# Patient Record
Sex: MALE | Race: WHITE | NOT HISPANIC OR LATINO | Employment: OTHER | URBAN - METROPOLITAN AREA
[De-identification: names, ages, dates, MRNs, and addresses within clinical notes are randomized per-mention and may not be internally consistent; named-entity substitution may affect disease eponyms.]

---

## 2017-01-09 RX ORDER — OMEPRAZOLE 20 MG/1
CAPSULE, DELAYED RELEASE ORAL
Qty: 60 CAPSULE | Refills: 0 | Status: SHIPPED | OUTPATIENT
Start: 2017-01-09 | End: 2017-04-01 | Stop reason: SDUPTHER

## 2017-01-13 DIAGNOSIS — N40.0 BENIGN NON-NODULAR PROSTATIC HYPERPLASIA WITHOUT LOWER URINARY TRACT SYMPTOMS: ICD-10-CM

## 2017-01-13 RX ORDER — TAMSULOSIN HYDROCHLORIDE 0.4 MG/1
CAPSULE ORAL
Qty: 180 CAPSULE | Refills: 0 | Status: SHIPPED | OUTPATIENT
Start: 2017-01-13 | End: 2017-03-06 | Stop reason: SDUPTHER

## 2017-02-22 RX ORDER — METOPROLOL TARTRATE 25 MG/1
TABLET, FILM COATED ORAL
Qty: 180 TABLET | Refills: 3 | Status: SHIPPED | OUTPATIENT
Start: 2017-02-22 | End: 2018-03-01 | Stop reason: SDUPTHER

## 2017-02-22 RX ORDER — SOTALOL HYDROCHLORIDE 80 MG/1
TABLET ORAL
Qty: 180 TABLET | Refills: 3 | Status: SHIPPED | OUTPATIENT
Start: 2017-02-22 | End: 2018-03-01 | Stop reason: SDUPTHER

## 2017-03-06 DIAGNOSIS — N40.0 BENIGN NON-NODULAR PROSTATIC HYPERPLASIA WITHOUT LOWER URINARY TRACT SYMPTOMS: ICD-10-CM

## 2017-03-06 RX ORDER — TAMSULOSIN HYDROCHLORIDE 0.4 MG/1
CAPSULE ORAL
Qty: 180 CAPSULE | Refills: 0 | Status: SHIPPED | OUTPATIENT
Start: 2017-03-06 | End: 2017-07-07 | Stop reason: SDUPTHER

## 2017-04-03 RX ORDER — OMEPRAZOLE 20 MG/1
CAPSULE, DELAYED RELEASE ORAL
Qty: 60 CAPSULE | Refills: 6 | Status: ON HOLD | OUTPATIENT
Start: 2017-04-03 | End: 2017-07-16

## 2017-04-03 RX ORDER — OMEPRAZOLE 20 MG/1
CAPSULE, DELAYED RELEASE ORAL
Qty: 60 CAPSULE | Refills: 0 | Status: SHIPPED | OUTPATIENT
Start: 2017-04-03 | End: 2017-07-14

## 2017-06-13 DIAGNOSIS — R60.0 EDEMA EXTREMITIES: ICD-10-CM

## 2017-06-13 RX ORDER — HYDROCHLOROTHIAZIDE 25 MG/1
TABLET ORAL
Qty: 30 TABLET | Refills: 0 | OUTPATIENT
Start: 2017-06-13

## 2017-06-17 DIAGNOSIS — R60.0 EDEMA EXTREMITIES: ICD-10-CM

## 2017-06-20 RX ORDER — HYDROCHLOROTHIAZIDE 25 MG/1
TABLET ORAL
Qty: 30 TABLET | Refills: 0 | OUTPATIENT
Start: 2017-06-20

## 2017-06-21 DIAGNOSIS — R60.0 EDEMA EXTREMITIES: ICD-10-CM

## 2017-06-22 DIAGNOSIS — R60.0 EDEMA EXTREMITIES: ICD-10-CM

## 2017-06-22 RX ORDER — HYDROCHLOROTHIAZIDE 25 MG/1
TABLET ORAL
Qty: 30 TABLET | Refills: 0 | OUTPATIENT
Start: 2017-06-22

## 2017-06-23 DIAGNOSIS — R60.0 EDEMA EXTREMITIES: ICD-10-CM

## 2017-06-23 RX ORDER — HYDROCHLOROTHIAZIDE 25 MG/1
TABLET ORAL
Qty: 30 TABLET | Refills: 0 | OUTPATIENT
Start: 2017-06-23

## 2017-06-26 RX ORDER — HYDROCHLOROTHIAZIDE 25 MG/1
TABLET ORAL
Qty: 30 TABLET | Refills: 0 | OUTPATIENT
Start: 2017-06-26

## 2017-06-27 NOTE — TELEPHONE ENCOUNTER
Faxed request from [facility] from [facility].  Past due in seeing pcp dr bowman.  Hasn't seen dr patino since December.    Left msg at pharmacy rx denied.

## 2017-06-30 DIAGNOSIS — R60.0 EDEMA EXTREMITIES: ICD-10-CM

## 2017-07-01 RX ORDER — HYDROCHLOROTHIAZIDE 25 MG/1
TABLET ORAL
Qty: 30 TABLET | Refills: 0 | OUTPATIENT
Start: 2017-07-01

## 2017-07-07 DIAGNOSIS — N40.0 BENIGN NON-NODULAR PROSTATIC HYPERPLASIA WITHOUT LOWER URINARY TRACT SYMPTOMS: ICD-10-CM

## 2017-07-07 RX ORDER — TAMSULOSIN HYDROCHLORIDE 0.4 MG/1
CAPSULE ORAL
Qty: 180 CAPSULE | Refills: 0 | OUTPATIENT
Start: 2017-07-07

## 2017-07-07 RX ORDER — TAMSULOSIN HYDROCHLORIDE 0.4 MG/1
2 CAPSULE ORAL DAILY
Qty: 180 CAPSULE | Refills: 0 | Status: SHIPPED | OUTPATIENT
Start: 2017-07-07 | End: 2017-09-29 | Stop reason: SDUPTHER

## 2017-07-14 ENCOUNTER — HOSPITAL ENCOUNTER (OUTPATIENT)
Facility: HOSPITAL | Age: 57
Discharge: HOME | End: 2017-07-16
Attending: EMERGENCY MEDICINE | Admitting: EMERGENCY MEDICINE
Payer: COMMERCIAL

## 2017-07-14 DIAGNOSIS — R06.02 SOB (SHORTNESS OF BREATH): ICD-10-CM

## 2017-07-14 DIAGNOSIS — I10 HTN (HYPERTENSION): Primary | ICD-10-CM

## 2017-07-14 DIAGNOSIS — R07.9 CHEST PAIN: ICD-10-CM

## 2017-07-14 DIAGNOSIS — I20.89 STABLE ANGINA PECTORIS: ICD-10-CM

## 2017-07-14 PROBLEM — N40.1 BENIGN PROSTATIC HYPERPLASIA WITH LOWER URINARY TRACT SYMPTOMS: Status: ACTIVE | Noted: 2017-07-14

## 2017-07-14 PROBLEM — K21.9 GASTROESOPHAGEAL REFLUX DISEASE WITHOUT ESOPHAGITIS: Status: ACTIVE | Noted: 2017-07-14

## 2017-07-14 PROBLEM — I48.0 PAROXYSMAL A-FIB: Status: ACTIVE | Noted: 2017-07-14

## 2017-07-14 PROBLEM — R60.0 BILATERAL LEG EDEMA: Status: ACTIVE | Noted: 2017-07-14

## 2017-07-14 LAB
ALBUMIN SERPL BCP-MCNC: 4 G/DL
ALP SERPL-CCNC: 77 U/L
ALT SERPL W/O P-5'-P-CCNC: 13 U/L
ANION GAP SERPL CALC-SCNC: 8 MMOL/L
AST SERPL-CCNC: 15 U/L
BASOPHILS # BLD AUTO: 0.01 K/UL
BASOPHILS NFR BLD: 0.1 %
BILIRUB SERPL-MCNC: 0.9 MG/DL
BNP SERPL-MCNC: 11 PG/ML
BUN SERPL-MCNC: 15 MG/DL
CALCIUM SERPL-MCNC: 9.3 MG/DL
CHLORIDE SERPL-SCNC: 98 MMOL/L
CO2 SERPL-SCNC: 27 MMOL/L
CREAT SERPL-MCNC: 1.1 MG/DL
DIFFERENTIAL METHOD: ABNORMAL
EOSINOPHIL # BLD AUTO: 0 K/UL
EOSINOPHIL NFR BLD: 0.2 %
ERYTHROCYTE [DISTWIDTH] IN BLOOD BY AUTOMATED COUNT: 13.5 %
EST. GFR  (AFRICAN AMERICAN): >60 ML/MIN/1.73 M^2
EST. GFR  (NON AFRICAN AMERICAN): >60 ML/MIN/1.73 M^2
GLUCOSE SERPL-MCNC: 114 MG/DL
HCT VFR BLD AUTO: 41.7 %
HGB BLD-MCNC: 14 G/DL
INR PPP: 1
LYMPHOCYTES # BLD AUTO: 0.9 K/UL
LYMPHOCYTES NFR BLD: 8.9 %
MCH RBC QN AUTO: 29.9 PG
MCHC RBC AUTO-ENTMCNC: 33.6 %
MCV RBC AUTO: 89 FL
MONOCYTES # BLD AUTO: 0.5 K/UL
MONOCYTES NFR BLD: 5.6 %
NEUTROPHILS # BLD AUTO: 8.1 K/UL
NEUTROPHILS NFR BLD: 84.9 %
PLATELET # BLD AUTO: 201 K/UL
PMV BLD AUTO: 10 FL
POTASSIUM SERPL-SCNC: 3.9 MMOL/L
PROT SERPL-MCNC: 7 G/DL
PROTHROMBIN TIME: 10.9 SEC
RBC # BLD AUTO: 4.68 M/UL
SODIUM SERPL-SCNC: 133 MMOL/L
TROPONIN I SERPL DL<=0.01 NG/ML-MCNC: 0.01 NG/ML
WBC # BLD AUTO: 9.53 K/UL

## 2017-07-14 PROCEDURE — 83880 ASSAY OF NATRIURETIC PEPTIDE: CPT

## 2017-07-14 PROCEDURE — G0378 HOSPITAL OBSERVATION PER HR: HCPCS

## 2017-07-14 PROCEDURE — 93005 ELECTROCARDIOGRAM TRACING: CPT

## 2017-07-14 PROCEDURE — 84484 ASSAY OF TROPONIN QUANT: CPT | Mod: 91

## 2017-07-14 PROCEDURE — 36415 COLL VENOUS BLD VENIPUNCTURE: CPT

## 2017-07-14 PROCEDURE — 84484 ASSAY OF TROPONIN QUANT: CPT

## 2017-07-14 PROCEDURE — 85610 PROTHROMBIN TIME: CPT

## 2017-07-14 PROCEDURE — 63600175 PHARM REV CODE 636 W HCPCS: Performed by: HOSPITALIST

## 2017-07-14 PROCEDURE — 93010 ELECTROCARDIOGRAM REPORT: CPT | Mod: ,,, | Performed by: INTERNAL MEDICINE

## 2017-07-14 PROCEDURE — 85025 COMPLETE CBC W/AUTO DIFF WBC: CPT

## 2017-07-14 PROCEDURE — 80053 COMPREHEN METABOLIC PANEL: CPT

## 2017-07-14 PROCEDURE — 99220 PR INITIAL OBSERVATION CARE,LEVL III: CPT | Mod: ,,, | Performed by: HOSPITALIST

## 2017-07-14 PROCEDURE — 99284 EMERGENCY DEPT VISIT MOD MDM: CPT | Mod: 25

## 2017-07-14 PROCEDURE — 25000003 PHARM REV CODE 250: Performed by: HOSPITALIST

## 2017-07-14 PROCEDURE — 94761 N-INVAS EAR/PLS OXIMETRY MLT: CPT

## 2017-07-14 PROCEDURE — 99283 EMERGENCY DEPT VISIT LOW MDM: CPT | Mod: ,,, | Performed by: EMERGENCY MEDICINE

## 2017-07-14 RX ORDER — HYDROCODONE BITARTRATE AND ACETAMINOPHEN 5; 325 MG/1; MG/1
1 TABLET ORAL EVERY 4 HOURS PRN
Status: DISCONTINUED | OUTPATIENT
Start: 2017-07-14 | End: 2017-07-16 | Stop reason: HOSPADM

## 2017-07-14 RX ORDER — GLUCAGON 1 MG
1 KIT INJECTION
Status: DISCONTINUED | OUTPATIENT
Start: 2017-07-14 | End: 2017-07-16 | Stop reason: HOSPADM

## 2017-07-14 RX ORDER — SILODOSIN 8 MG/1
8 CAPSULE ORAL DAILY
Status: DISCONTINUED | OUTPATIENT
Start: 2017-07-15 | End: 2017-07-16 | Stop reason: HOSPADM

## 2017-07-14 RX ORDER — RAMELTEON 8 MG/1
8 TABLET ORAL NIGHTLY PRN
Status: DISCONTINUED | OUTPATIENT
Start: 2017-07-14 | End: 2017-07-16 | Stop reason: HOSPADM

## 2017-07-14 RX ORDER — PANTOPRAZOLE SODIUM 40 MG/1
40 TABLET, DELAYED RELEASE ORAL DAILY
Status: DISCONTINUED | OUTPATIENT
Start: 2017-07-15 | End: 2017-07-16 | Stop reason: HOSPADM

## 2017-07-14 RX ORDER — CETIRIZINE HYDROCHLORIDE 5 MG/1
10 TABLET ORAL DAILY
Status: DISCONTINUED | OUTPATIENT
Start: 2017-07-15 | End: 2017-07-16 | Stop reason: HOSPADM

## 2017-07-14 RX ORDER — SOTALOL HYDROCHLORIDE 80 MG/1
80 TABLET ORAL 2 TIMES DAILY
Status: DISCONTINUED | OUTPATIENT
Start: 2017-07-14 | End: 2017-07-16

## 2017-07-14 RX ORDER — POLYETHYLENE GLYCOL 3350, SODIUM SULFATE ANHYDROUS, SODIUM BICARBONATE, SODIUM CHLORIDE, POTASSIUM CHLORIDE 236; 22.74; 6.74; 5.86; 2.97 G/4L; G/4L; G/4L; G/4L; G/4L
4 POWDER, FOR SOLUTION ORAL ONCE
Status: DISCONTINUED | OUTPATIENT
Start: 2017-07-14 | End: 2017-07-14

## 2017-07-14 RX ORDER — METOPROLOL TARTRATE 25 MG/1
25 TABLET, FILM COATED ORAL 2 TIMES DAILY
Status: DISCONTINUED | OUTPATIENT
Start: 2017-07-14 | End: 2017-07-15

## 2017-07-14 RX ORDER — METHYLPHENIDATE HYDROCHLORIDE 5 MG/1
5 TABLET ORAL 2 TIMES DAILY WITH MEALS
Status: DISCONTINUED | OUTPATIENT
Start: 2017-07-15 | End: 2017-07-14

## 2017-07-14 RX ORDER — TAMSULOSIN HYDROCHLORIDE 0.4 MG/1
2 CAPSULE ORAL DAILY
Status: DISCONTINUED | OUTPATIENT
Start: 2017-07-15 | End: 2017-07-16 | Stop reason: HOSPADM

## 2017-07-14 RX ORDER — FUROSEMIDE 10 MG/ML
40 INJECTION INTRAMUSCULAR; INTRAVENOUS ONCE
Status: COMPLETED | OUTPATIENT
Start: 2017-07-14 | End: 2017-07-14

## 2017-07-14 RX ORDER — AMOXICILLIN 250 MG
2 CAPSULE ORAL 2 TIMES DAILY PRN
Status: DISCONTINUED | OUTPATIENT
Start: 2017-07-14 | End: 2017-07-16 | Stop reason: HOSPADM

## 2017-07-14 RX ORDER — IPRATROPIUM BROMIDE AND ALBUTEROL SULFATE 2.5; .5 MG/3ML; MG/3ML
3 SOLUTION RESPIRATORY (INHALATION) EVERY 4 HOURS PRN
Status: DISCONTINUED | OUTPATIENT
Start: 2017-07-14 | End: 2017-07-16 | Stop reason: HOSPADM

## 2017-07-14 RX ORDER — BUPROPION HYDROCHLORIDE 150 MG/1
300 TABLET ORAL DAILY
Status: DISCONTINUED | OUTPATIENT
Start: 2017-07-15 | End: 2017-07-16 | Stop reason: HOSPADM

## 2017-07-14 RX ORDER — IBUPROFEN 200 MG
16 TABLET ORAL
Status: DISCONTINUED | OUTPATIENT
Start: 2017-07-14 | End: 2017-07-16 | Stop reason: HOSPADM

## 2017-07-14 RX ORDER — POLYETHYLENE GLYCOL 3350 17 G/17G
17 POWDER, FOR SOLUTION ORAL DAILY
Status: DISCONTINUED | OUTPATIENT
Start: 2017-07-15 | End: 2017-07-16 | Stop reason: HOSPADM

## 2017-07-14 RX ORDER — POTASSIUM CHLORIDE 20 MEQ/1
20 TABLET, EXTENDED RELEASE ORAL DAILY
Status: DISCONTINUED | OUTPATIENT
Start: 2017-07-14 | End: 2017-07-16 | Stop reason: HOSPADM

## 2017-07-14 RX ORDER — FUROSEMIDE 10 MG/ML
40 INJECTION INTRAMUSCULAR; INTRAVENOUS 2 TIMES DAILY
Status: DISCONTINUED | OUTPATIENT
Start: 2017-07-15 | End: 2017-07-15

## 2017-07-14 RX ORDER — IBUPROFEN 200 MG
24 TABLET ORAL
Status: DISCONTINUED | OUTPATIENT
Start: 2017-07-14 | End: 2017-07-16 | Stop reason: HOSPADM

## 2017-07-14 RX ORDER — HYDROCHLOROTHIAZIDE 25 MG/1
25 TABLET ORAL DAILY
Status: DISCONTINUED | OUTPATIENT
Start: 2017-07-15 | End: 2017-07-15

## 2017-07-14 RX ORDER — ASPIRIN 325 MG
325 TABLET ORAL ONCE
Status: COMPLETED | OUTPATIENT
Start: 2017-07-14 | End: 2017-07-14

## 2017-07-14 RX ORDER — GUAIFENESIN 600 MG/1
600 TABLET, EXTENDED RELEASE ORAL 2 TIMES DAILY
Status: DISCONTINUED | OUTPATIENT
Start: 2017-07-14 | End: 2017-07-16 | Stop reason: HOSPADM

## 2017-07-14 RX ORDER — DIPHENHYDRAMINE HCL 50 MG
50 CAPSULE ORAL 2 TIMES DAILY
Status: DISCONTINUED | OUTPATIENT
Start: 2017-07-14 | End: 2017-07-16 | Stop reason: HOSPADM

## 2017-07-14 RX ORDER — NAPROXEN SODIUM 220 MG/1
81 TABLET, FILM COATED ORAL DAILY
Status: DISCONTINUED | OUTPATIENT
Start: 2017-07-15 | End: 2017-07-16 | Stop reason: HOSPADM

## 2017-07-14 RX ORDER — ONDANSETRON 2 MG/ML
4 INJECTION INTRAMUSCULAR; INTRAVENOUS EVERY 6 HOURS PRN
Status: DISCONTINUED | OUTPATIENT
Start: 2017-07-14 | End: 2017-07-16 | Stop reason: HOSPADM

## 2017-07-14 RX ORDER — ACETAMINOPHEN 325 MG/1
650 TABLET ORAL EVERY 6 HOURS PRN
Status: DISCONTINUED | OUTPATIENT
Start: 2017-07-14 | End: 2017-07-16 | Stop reason: HOSPADM

## 2017-07-14 RX ADMIN — METOPROLOL TARTRATE 25 MG: 25 TABLET ORAL at 09:07

## 2017-07-14 RX ADMIN — POTASSIUM CHLORIDE 20 MEQ: 1500 TABLET, EXTENDED RELEASE ORAL at 10:07

## 2017-07-14 RX ADMIN — SOTALOL HYDROCHLORIDE 80 MG: 80 TABLET ORAL at 10:07

## 2017-07-14 RX ADMIN — FUROSEMIDE 40 MG: 10 INJECTION, SOLUTION INTRAMUSCULAR; INTRAVENOUS at 09:07

## 2017-07-14 RX ADMIN — DIPHENHYDRAMINE HYDROCHLORIDE 50 MG: 50 CAPSULE ORAL at 10:07

## 2017-07-14 RX ADMIN — PANTOPRAZOLE SODIUM 600 MG: 40 TABLET, DELAYED RELEASE ORAL at 10:07

## 2017-07-14 RX ADMIN — ASPIRIN 325 MG ORAL TABLET 325 MG: 325 PILL ORAL at 10:07

## 2017-07-14 RX ADMIN — ACETAMINOPHEN 650 MG: 325 TABLET ORAL at 09:07

## 2017-07-14 NOTE — ED TRIAGE NOTES
"Pt presenst to the ED c c/o generlaized, non-raidating chest heaviness that is associated with nausea and sweating (when heaviness occurs). Pt rates discomfort as a 8/10 that is intermitten in nature. Discmfort is worse when the pt exerts himself and is mild at rest.     C/o legs hurting and swelling when "off of medications".    C/o wound to BLEs that open up when he has edema.    C/o diarrhea x1 day c 3 episodes    HA 10/10.   "

## 2017-07-14 NOTE — PROVIDER PROGRESS NOTES - EMERGENCY DEPT.
Encounter Date: 7/14/2017    ED Physician Progress Notes         EKG - STEMI Decision  Initial Reading: No STEMI present.

## 2017-07-14 NOTE — ED PROVIDER NOTES
Encounter Date: 7/14/2017    SCRIBE #1 NOTE: I, Veronica Lam, am scribing for, and in the presence of, Dr. Zuñiga.       History     Chief Complaint   Patient presents with    Chest Pain     Pt presented to the ED c/o chest pain. Pt c/o dizziness. nausea, and leg cramps as well. Pt states the s/s started this morning.      Time seen by provider: 5:20 PM    This is a 57 y.o. male with a PMHx of atrial fibrillation on xarelto and sotalol, depression, fibromyalgia asthma, HTN, DM, and thyroid disease who presents with complaint of episodes of chest heaviness, nausea, diaphoresis that are brought on by exertion for the past week. The patient denies recent infection or URI symptoms. These episodes resolve once he rests. He also has dyspnea on exertion and more severe lower extremity swelling than usual. He states he ran out of his hydrochlorothiazide and this made the lower extremity swelling worse. He refilled it about 10 days ago and the swelling has been improving but is not back to baseline yet.       The history is provided by the patient.     Review of patient's allergies indicates:   Allergen Reactions    Iodinated contrast- oral and iv dye Other (See Comments)     Raises BP       Past Medical History:   Diagnosis Date    Allergy     Asthma     Atrial fibrillation     Chronic rhinitis 9/26/2013    DDD (degenerative disc disease) 4/3/2015    Depression     Diabetes mellitus     Fibromyalgia     Gastroesophageal reflux disease without esophagitis 7/14/2017    Hypertension     Sinusitis, acute, maxillary 12/20/2012    Thyroid disease      Past Surgical History:   Procedure Laterality Date    CERVICAL SPINE SURGERY      GASTRIC BYPASS      [facility]  [facility]    TONSILLECTOMY       Family History   Problem Relation Age of Onset    Heart disease Father     Cancer Mother      lung    Hypertension Sister     Heart disease Brother     Cirrhosis Brother     Diabetes Brother       Social History   Substance Use Topics    Smoking status: Never Smoker    Smokeless tobacco: Never Used    Alcohol use No      Comment: rare     Review of Systems    Physical Exam     Initial Vitals [07/14/17 1541]   BP Pulse Resp Temp SpO2   (!) 151/79 71 17 99.1 °F (37.3 °C) 98 %      MAP       103         Physical Exam    Nursing note and vitals reviewed.  Constitutional: He appears well-developed and well-nourished. He is not diaphoretic. No distress.   HENT:   Head: Normocephalic and atraumatic.   Eyes: Conjunctivae and EOM are normal. Pupils are equal, round, and reactive to light. No scleral icterus.   Neck: Normal range of motion. Neck supple. No JVD present.   Cardiovascular: Normal rate, regular rhythm, normal heart sounds and intact distal pulses.   Pulmonary/Chest: Breath sounds normal. No respiratory distress. He has no wheezes. He exhibits no tenderness.   Abdominal: Soft. Bowel sounds are normal. He exhibits no distension. There is no tenderness.   Musculoskeletal: Normal range of motion. He exhibits edema. He exhibits no tenderness.   2+ pitting edema of lower extremities to mid shin    Neurological: He is alert and oriented to person, place, and time. He has normal strength. No cranial nerve deficit or sensory deficit.   Skin: Skin is warm and dry. No rash noted. No erythema.   Psychiatric: He has a normal mood and affect. His behavior is normal.         ED Course   Procedures  Labs Reviewed   CBC W/ AUTO DIFFERENTIAL - Abnormal; Notable for the following:        Result Value    Gran # 8.1 (*)     Lymph # 0.9 (*)     Gran% 84.9 (*)     Lymph% 8.9 (*)     All other components within normal limits   COMPREHENSIVE METABOLIC PANEL - Abnormal; Notable for the following:     Sodium 133 (*)     Glucose 114 (*)     All other components within normal limits   TROPONIN I   PROTIME-INR   B-TYPE NATRIURETIC PEPTIDE     EKG Readings: (Independently Interpreted)   Heart Rate: 71.   Normal sinus rhythm. No  signs of acute ischemia or arhythmia.        X-Rays:   Independently Interpreted Readings:   Chest X-Ray: No pneumonia, mild increase in CPS. No CHF     Medical Decision Making:   History:   Old Medical Records: I decided to obtain old medical records.  Old Records Summarized: records from clinic visits and records from previous admission(s).       <> Summary of Records: This is a 57 y.o. male with a PMHx of atrial fibrillation on xarelto and sotalol, depression, fibromyalgia asthma, HTN, DM, and thyroid disease. Last echo in 2014 with an EF of 65%.  Initial Assessment:   Patient with chest heaviness and shortness of breath on exertion for the last week as well as lower extremities swelling. His last echo was in 2014 showing a normal ef. Will do ACS workup and consult cardiology.   Independently Interpreted Test(s):   I have ordered and independently interpreted X-rays - see prior notes.  I have ordered and independently interpreted EKG Reading(s) - see prior notes  Clinical Tests:   Lab Tests: Ordered and Reviewed  Radiological Study: Ordered and Reviewed  Medical Tests: Ordered and Reviewed  Other:   I have discussed this case with another health care provider.            Scribe Attestation:   Scribe #1: I performed the above scribed service and the documentation accurately describes the services I performed. I attest to the accuracy of the note.    Attending Attestation:           Physician Attestation for Scribe:  Physician Attestation Statement for Scribe #1: I, Dr. Zuñiga, reviewed documentation, as scribed by Veronica Lam in my presence, and it is both accurate and complete.         Attending ED Notes:   Normal troponin and BNP. Discussed with cardiology who suggests serial troponins and stress test in the morning. Will admit to IM.          ED Course     Clinical Impression:   The primary encounter diagnosis was HTN (hypertension). Diagnoses of Chest pain, SOB (shortness of breath), and Stable angina  pectoris were also pertinent to this visit.    Disposition:   Disposition: Admitted                        Molly Zuñiga MD  07/19/17 6864

## 2017-07-15 LAB
ANION GAP SERPL CALC-SCNC: 16 MMOL/L
BASOPHILS # BLD AUTO: 0.02 K/UL
BASOPHILS NFR BLD: 0.3 %
BUN SERPL-MCNC: 13 MG/DL
CALCIUM SERPL-MCNC: 9.6 MG/DL
CHLORIDE SERPL-SCNC: 100 MMOL/L
CHOLEST/HDLC SERPL: 4.8 {RATIO}
CO2 SERPL-SCNC: 23 MMOL/L
CREAT SERPL-MCNC: 1.2 MG/DL
DIASTOLIC DYSFUNCTION: NO
DIFFERENTIAL METHOD: NORMAL
EOSINOPHIL # BLD AUTO: 0 K/UL
EOSINOPHIL NFR BLD: 0.1 %
ERYTHROCYTE [DISTWIDTH] IN BLOOD BY AUTOMATED COUNT: 13.6 %
EST. GFR  (AFRICAN AMERICAN): >60 ML/MIN/1.73 M^2
EST. GFR  (NON AFRICAN AMERICAN): >60 ML/MIN/1.73 M^2
GLUCOSE SERPL-MCNC: 96 MG/DL
HCT VFR BLD AUTO: 43.4 %
HDL/CHOLESTEROL RATIO: 21 %
HDLC SERPL-MCNC: 181 MG/DL
HDLC SERPL-MCNC: 38 MG/DL
HGB BLD-MCNC: 14.2 G/DL
LDLC SERPL CALC-MCNC: 107.2 MG/DL
LYMPHOCYTES # BLD AUTO: 1.4 K/UL
LYMPHOCYTES NFR BLD: 18.1 %
MAGNESIUM SERPL-MCNC: 1.9 MG/DL
MCH RBC QN AUTO: 29.8 PG
MCHC RBC AUTO-ENTMCNC: 32.7 %
MCV RBC AUTO: 91 FL
MITRAL VALVE REGURGITATION: NORMAL
MONOCYTES # BLD AUTO: 0.6 K/UL
MONOCYTES NFR BLD: 8.4 %
NEUTROPHILS # BLD AUTO: 5.5 K/UL
NEUTROPHILS NFR BLD: 73 %
NONHDLC SERPL-MCNC: 143 MG/DL
PHOSPHATE SERPL-MCNC: 3.7 MG/DL
PLATELET # BLD AUTO: 210 K/UL
PMV BLD AUTO: 10.6 FL
POTASSIUM SERPL-SCNC: 3.2 MMOL/L
RBC # BLD AUTO: 4.77 M/UL
RETIRED EF AND QEF - SEE NOTES: 60 (ref 55–65)
SODIUM SERPL-SCNC: 139 MMOL/L
TRICUSPID VALVE REGURGITATION: NORMAL
TRIGL SERPL-MCNC: 179 MG/DL
TROPONIN I SERPL DL<=0.01 NG/ML-MCNC: 0.01 NG/ML
TROPONIN I SERPL DL<=0.01 NG/ML-MCNC: <0.006 NG/ML
TSH SERPL DL<=0.005 MIU/L-ACNC: 1.66 UIU/ML
WBC # BLD AUTO: 7.47 K/UL

## 2017-07-15 PROCEDURE — 93306 TTE W/DOPPLER COMPLETE: CPT

## 2017-07-15 PROCEDURE — 93005 ELECTROCARDIOGRAM TRACING: CPT

## 2017-07-15 PROCEDURE — 85025 COMPLETE CBC W/AUTO DIFF WBC: CPT

## 2017-07-15 PROCEDURE — 84484 ASSAY OF TROPONIN QUANT: CPT

## 2017-07-15 PROCEDURE — 99225 PR SUBSEQUENT OBSERVATION CARE,LEVEL II: CPT | Mod: ,,, | Performed by: HOSPITALIST

## 2017-07-15 PROCEDURE — 99244 OFF/OP CNSLTJ NEW/EST MOD 40: CPT | Mod: ,,, | Performed by: INTERNAL MEDICINE

## 2017-07-15 PROCEDURE — 93306 TTE W/DOPPLER COMPLETE: CPT | Mod: 26,,, | Performed by: INTERNAL MEDICINE

## 2017-07-15 PROCEDURE — 83735 ASSAY OF MAGNESIUM: CPT

## 2017-07-15 PROCEDURE — 83036 HEMOGLOBIN GLYCOSYLATED A1C: CPT

## 2017-07-15 PROCEDURE — 84100 ASSAY OF PHOSPHORUS: CPT

## 2017-07-15 PROCEDURE — 80061 LIPID PANEL: CPT

## 2017-07-15 PROCEDURE — 36415 COLL VENOUS BLD VENIPUNCTURE: CPT

## 2017-07-15 PROCEDURE — 84443 ASSAY THYROID STIM HORMONE: CPT

## 2017-07-15 PROCEDURE — 80048 BASIC METABOLIC PNL TOTAL CA: CPT

## 2017-07-15 PROCEDURE — 93010 ELECTROCARDIOGRAM REPORT: CPT | Mod: ,,, | Performed by: INTERNAL MEDICINE

## 2017-07-15 PROCEDURE — 25000003 PHARM REV CODE 250: Performed by: HOSPITALIST

## 2017-07-15 PROCEDURE — 63600175 PHARM REV CODE 636 W HCPCS: Performed by: HOSPITALIST

## 2017-07-15 PROCEDURE — G0378 HOSPITAL OBSERVATION PER HR: HCPCS

## 2017-07-15 RX ORDER — POTASSIUM CHLORIDE 20 MEQ/1
60 TABLET, EXTENDED RELEASE ORAL ONCE
Status: COMPLETED | OUTPATIENT
Start: 2017-07-15 | End: 2017-07-15

## 2017-07-15 RX ORDER — NITROGLYCERIN 400 UG/1
1 SPRAY ORAL EVERY 5 MIN PRN
Status: DISCONTINUED | OUTPATIENT
Start: 2017-07-15 | End: 2017-07-15

## 2017-07-15 RX ORDER — METOPROLOL TARTRATE 25 MG/1
25 TABLET, FILM COATED ORAL 2 TIMES DAILY
Status: COMPLETED | OUTPATIENT
Start: 2017-07-15 | End: 2017-07-15

## 2017-07-15 RX ORDER — ATORVASTATIN CALCIUM 20 MG/1
80 TABLET, FILM COATED ORAL NIGHTLY
Status: DISCONTINUED | OUTPATIENT
Start: 2017-07-15 | End: 2017-07-16 | Stop reason: HOSPADM

## 2017-07-15 RX ORDER — NITROGLYCERIN 0.4 MG/1
0.4 TABLET SUBLINGUAL EVERY 5 MIN PRN
Status: DISCONTINUED | OUTPATIENT
Start: 2017-07-15 | End: 2017-07-16 | Stop reason: HOSPADM

## 2017-07-15 RX ADMIN — DIPHENHYDRAMINE HYDROCHLORIDE 50 MG: 50 CAPSULE ORAL at 08:07

## 2017-07-15 RX ADMIN — PANTOPRAZOLE SODIUM 600 MG: 40 TABLET, DELAYED RELEASE ORAL at 08:07

## 2017-07-15 RX ADMIN — NITROGLYCERIN 0.4 MG: 0.4 TABLET SUBLINGUAL at 10:07

## 2017-07-15 RX ADMIN — METOPROLOL TARTRATE 25 MG: 25 TABLET ORAL at 08:07

## 2017-07-15 RX ADMIN — CETIRIZINE HYDROCHLORIDE 10 MG: 5 TABLET, FILM COATED ORAL at 08:07

## 2017-07-15 RX ADMIN — PANTOPRAZOLE SODIUM 40 MG: 40 TABLET, DELAYED RELEASE ORAL at 08:07

## 2017-07-15 RX ADMIN — ATORVASTATIN CALCIUM 80 MG: 20 TABLET, FILM COATED ORAL at 08:07

## 2017-07-15 RX ADMIN — TAMSULOSIN HYDROCHLORIDE 0.8 MG: 0.4 CAPSULE ORAL at 08:07

## 2017-07-15 RX ADMIN — ACETAMINOPHEN 650 MG: 325 TABLET ORAL at 08:07

## 2017-07-15 RX ADMIN — POTASSIUM CHLORIDE 20 MEQ: 1500 TABLET, EXTENDED RELEASE ORAL at 08:07

## 2017-07-15 RX ADMIN — POTASSIUM CHLORIDE 60 MEQ: 1500 TABLET, EXTENDED RELEASE ORAL at 08:07

## 2017-07-15 RX ADMIN — ASPIRIN 81 MG 81 MG: 81 TABLET ORAL at 08:07

## 2017-07-15 RX ADMIN — BUPROPION HYDROCHLORIDE 300 MG: 150 TABLET, FILM COATED, EXTENDED RELEASE ORAL at 08:07

## 2017-07-15 RX ADMIN — SOTALOL HYDROCHLORIDE 80 MG: 80 TABLET ORAL at 08:07

## 2017-07-15 RX ADMIN — HYDROCHLOROTHIAZIDE 25 MG: 25 TABLET ORAL at 08:07

## 2017-07-15 RX ADMIN — FUROSEMIDE 40 MG: 10 INJECTION, SOLUTION INTRAVENOUS at 08:07

## 2017-07-15 RX ADMIN — RIVAROXABAN 20 MG: 20 TABLET, FILM COATED ORAL at 08:07

## 2017-07-15 NOTE — PROGRESS NOTES
Pt arrived to room from ED. Pt aaox4. Ambulating in room. Pt c/o headache 8/10 and chest heaviness. Pt reports chest heaviness is same pain as he has been having all day. Pt on telemetry. Pt oriented to room. Call bell in reach

## 2017-07-15 NOTE — ASSESSMENT & PLAN NOTE
- s/p pulmonary veins ablation in 2014  - 30 days event monitor in 2015 w/o evidence of afib   - Continue xarelto, sotalol and metoprolol   - no evidence of bleeding   - patient follows with Dr. Webb in EP clinic

## 2017-07-15 NOTE — H&P
[facility] Hospital Medicine  History & Physical    Patient Name: Shiraz Jha  MRN: [id number]  Admission Date: 7/14/2017  Attending Physician: Cathy Peres MD   Primary Care Provider: GUSTAVO Theodore MD    [facility] Medicine Team: [facility] MED C Sally Mast DO     Patient information was obtained from patient and ER records.     Subjective:     Principal Problem:Chest pain    Chief Complaint:   Chief Complaint   Patient presents with    Chest Pain     Pt presented to the ED c/o chest pain. Pt c/o dizziness. nausea, and leg cramps as well. Pt states the s/s started this morning.         HPI:     This is a 57 y.o. male with a PMHx of chronic paroxysmal atrial fibrillation well controlled on xarelto and sotalol, s/p pulmonary veins cryoablation in 2014, 30 days event monitor in 2015 w/o evidence of afib, HTN, allergic rhinitis, GERD, morbid obesity,  depression, fibromyalgia  presents to ER with complaint of episodes of chest heaviness, nausea, diaphoresis that are brought on by exertion for the past month. States he felt having chest pressure associated with diaphoresis, dizzy and frontal headache today around noon while walking with buggie at the grocery store. His coworker brought him to ER for further evaluation. Reports symptoms lasted for 30 minutes and then spontaneously resolved. States his headache is mild and likely from sinus congestion. Notes he feels exhausted and sometime have chest pressure after carrying his 100 lb dog up the stairs at home ( 20 steps ). He carries his dog 4-5 times a day. He states he ran out of his hydrochlorothiazide and this made the lower extremity swelling worse. He refilled it about 10 days ago and the swelling has been improving but is not back to baseline yet.        Past Medical History:   Diagnosis Date    Allergy     Asthma     Atrial fibrillation     Chronic rhinitis 9/26/2013    DDD (degenerative disc disease) 4/3/2015    Depression      Diabetes mellitus     Fibromyalgia     Gastroesophageal reflux disease without esophagitis 7/14/2017    Hypertension     Sinusitis, acute, maxillary 12/20/2012    Thyroid disease        Past Surgical History:   Procedure Laterality Date    CERVICAL SPINE SURGERY      GASTRIC BYPASS      SINUS SURGERY  2000    Dr. Watt at [facility]    TONSILLECTOMY         Review of patient's allergies indicates:   Allergen Reactions    Iodinated contrast- oral and iv dye Other (See Comments)     Raises BP         No current facility-administered medications on file prior to encounter.      Current Outpatient Prescriptions on File Prior to Encounter   Medication Sig    AMPHETAMINE SALT COMBO 20 MG tablet     buPROPion (WELLBUTRIN XL) 300 MG 24 hr tablet Take 300 mg by mouth once daily.      cetirizine (ZYRTEC) 10 MG tablet Take 10 mg by mouth once daily.    diphenhydrAMINE (BENADRYL) 50 MG capsule Take 1 capsule (50 mg total) by mouth as directed.    hydrochlorothiazide (HYDRODIURIL) 25 MG tablet Take 1 tablet (25 mg total) by mouth once daily.    loratadine (CLARITIN) 10 mg tablet Take 10 mg by mouth once daily.      metoprolol tartrate (LOPRESSOR) 25 MG tablet TAKE ONE TABLET BY MOUTH TWICE DAILY    omeprazole (PRILOSEC) 20 MG capsule TAKE ONE CAPSULE BY MOUTH ONCE DAILY    rivaroxaban (XARELTO) 20 mg Tab Take 1 tablet (20 mg total) by mouth once daily.    sotalol (BETAPACE) 80 MG tablet TAKE ONE TABLET BY MOUTH TWICE DAILY    tamsulosin (FLOMAX) 0.4 mg Cp24 Take 2 capsules (0.8 mg total) by mouth once daily.    [DISCONTINUED] buPROPion (WELLBUTRIN XL) 150 MG TB24 tablet     polyethylene glycol (GOLYTELY,NULYTELY) 236-22.74-6.74 gram suspension Take 4 L by mouth once.    silodosin 8 mg Cap capsule Take 1 capsule (8 mg total) by mouth once daily.    [DISCONTINUED] duloxetine (CYMBALTA) 60 MG capsule Take 120 mg by mouth once daily.      [DISCONTINUED] naftifine (NAFTIN) 1 % cream Apply topically once daily.  4wks to soles, daily to nails ongoing    [DISCONTINUED] omeprazole (PRILOSEC) 20 MG capsule TAKE ONE CAPSULE BY MOUTH ONCE DAILY    [DISCONTINUED] sucralfate (CARAFATE) 1 gram tablet Take 1 tablet (1 g total) by mouth 4 (four) times daily before meals and nightly.     Family History     Problem Relation (Age of Onset)    Cancer Mother    Cirrhosis Brother    Diabetes Brother    Heart disease Father, Brother    Hypertension Sister        Social History Main Topics    Smoking status: Never Smoker    Smokeless tobacco: Never Used    Alcohol use No      Comment: rare    Drug use: No    Sexual activity: Yes     Partners: Female     Review of Systems   Constitutional: Positive for diaphoresis and fatigue. Negative for activity change, appetite change, chills, fever and unexpected weight change.   HENT: Positive for congestion (sinus congestion ) and sinus pressure. Negative for dental problem, drooling, ear discharge, ear pain, facial swelling, hearing loss, mouth sores, nosebleeds, postnasal drip, rhinorrhea, sneezing, sore throat, tinnitus, trouble swallowing and voice change.    Eyes: Negative for photophobia, pain, discharge, redness, itching and visual disturbance.   Respiratory: Positive for chest tightness (chest pressure ). Negative for apnea, cough, choking, shortness of breath, wheezing and stridor.    Cardiovascular: Positive for leg swelling. Negative for chest pain and palpitations.   Gastrointestinal: Negative for abdominal distention, abdominal pain, anal bleeding, blood in stool, constipation, diarrhea, nausea, rectal pain and vomiting.   Endocrine: Negative for cold intolerance, heat intolerance, polydipsia, polyphagia and polyuria.   Genitourinary: Negative for decreased urine volume, difficulty urinating, discharge, dysuria, enuresis, flank pain, frequency, genital sores, hematuria, penile pain, penile swelling, scrotal swelling, testicular pain and urgency.   Musculoskeletal: Negative for  arthralgias, back pain, gait problem, joint swelling, myalgias, neck pain and neck stiffness.   Skin: Negative for color change, pallor, rash and wound.   Allergic/Immunologic: Negative for environmental allergies, food allergies and immunocompromised state.   Neurological: Positive for dizziness and headaches (frontal headache ). Negative for tremors, seizures, syncope, facial asymmetry, speech difficulty, weakness, light-headedness and numbness.   Hematological: Negative for adenopathy. Does not bruise/bleed easily.   Psychiatric/Behavioral: Negative for agitation, behavioral problems, confusion, decreased concentration, dysphoric mood, hallucinations, self-injury, sleep disturbance and suicidal ideas. The patient is not nervous/anxious and is not hyperactive.      Objective:     Vital Signs (Most Recent):  Temp: 99.3 °F (37.4 °C) (07/14/17 2115)  Pulse: 73 (07/14/17 2115)  Resp: 17 (07/14/17 2115)  BP: (!) 150/70 (07/14/17 2115)  SpO2: 99 % (07/14/17 2115) Vital Signs (24h Range):  Temp:  [99 °F (37.2 °C)-99.6 °F (37.6 °C)] 99.3 °F (37.4 °C)  Pulse:  [71-76] 73  Resp:  [17-18] 17  SpO2:  [96 %-99 %] 99 %  BP: (130-151)/(62-79) 150/70     Weight: 123.3 kg (271 lb 13.2 oz)  Body mass index is 42.57 kg/m².    Physical Exam   Constitutional: He is oriented to person, place, and time. He appears well-developed and well-nourished. No distress.   HENT:   Head: Normocephalic and atraumatic.   Right Ear: External ear normal.   Left Ear: External ear normal.   Nose: Nose normal.   Mouth/Throat: Oropharynx is clear and moist. No oropharyngeal exudate.   Eyes: Conjunctivae and EOM are normal. Pupils are equal, round, and reactive to light. Right eye exhibits no discharge. Left eye exhibits no discharge. No scleral icterus.   Neck: Normal range of motion. Neck supple. No JVD present. No tracheal deviation present. No thyromegaly present.   Cardiovascular: Normal rate, regular rhythm, normal heart sounds and intact distal  pulses.  Exam reveals no gallop and no friction rub.    No murmur heard.  Pulmonary/Chest: Effort normal and breath sounds normal. No stridor. No respiratory distress. He has no wheezes. He has no rales. He exhibits no tenderness.   Abdominal: Soft. Bowel sounds are normal. He exhibits no distension and no mass. There is no tenderness. No hernia.   Musculoskeletal: Normal range of motion. He exhibits edema (2 + bilateral  pitting edema of legs up to knees  ). He exhibits no tenderness or deformity.   Lymphadenopathy:     He has no cervical adenopathy.   Neurological: He is alert and oriented to person, place, and time. He has normal reflexes. He displays normal reflexes. No cranial nerve deficit. He exhibits normal muscle tone. Coordination normal.   Skin: Skin is warm and dry. No rash noted. He is not diaphoretic. No erythema. No pallor.   Psychiatric: He has a normal mood and affect. His behavior is normal. Judgment and thought content normal.       Significant Labs:   Admission on 07/14/2017   Component Date Value Ref Range Status    WBC 07/14/2017 9.53  3.90 - 12.70 K/uL Final    RBC 07/14/2017 4.68  4.60 - 6.20 M/uL Final    Hemoglobin 07/14/2017 14.0  14.0 - 18.0 g/dL Final    Hematocrit 07/14/2017 41.7  40.0 - 54.0 % Final    MCV 07/14/2017 89  82 - 98 fL Final    MCH 07/14/2017 29.9  27.0 - 31.0 pg Final    MCHC 07/14/2017 33.6  32.0 - 36.0 % Final    RDW 07/14/2017 13.5  11.5 - 14.5 % Final    Platelets 07/14/2017 201  150 - 350 K/uL Final    MPV 07/14/2017 10.0  9.2 - 12.9 fL Final    Gran # 07/14/2017 8.1* 1.8 - 7.7 K/uL Final    Lymph # 07/14/2017 0.9* 1.0 - 4.8 K/uL Final    Mono # 07/14/2017 0.5  0.3 - 1.0 K/uL Final    Eos # 07/14/2017 0.0  0.0 - 0.5 K/uL Final    Baso # 07/14/2017 0.01  0.00 - 0.20 K/uL Final    Gran% 07/14/2017 84.9* 38.0 - 73.0 % Final    Lymph% 07/14/2017 8.9* 18.0 - 48.0 % Final    Mono% 07/14/2017 5.6  4.0 - 15.0 % Final    Eosinophil% 07/14/2017 0.2  0.0 -  8.0 % Final    Basophil% 07/14/2017 0.1  0.0 - 1.9 % Final    Differential Method 07/14/2017 Automated   Final    Sodium 07/14/2017 133* 136 - 145 mmol/L Final    Potassium 07/14/2017 3.9  3.5 - 5.1 mmol/L Final    Chloride 07/14/2017 98  95 - 110 mmol/L Final    CO2 07/14/2017 27  23 - 29 mmol/L Final    Glucose 07/14/2017 114* 70 - 110 mg/dL Final    BUN, Bld 07/14/2017 15  6 - 20 mg/dL Final    Creatinine 07/14/2017 1.1  0.5 - 1.4 mg/dL Final    Calcium 07/14/2017 9.3  8.7 - 10.5 mg/dL Final    Total Protein 07/14/2017 7.0  6.0 - 8.4 g/dL Final    Albumin 07/14/2017 4.0  3.5 - 5.2 g/dL Final    Total Bilirubin 07/14/2017 0.9  0.1 - 1.0 mg/dL Final    Alkaline Phosphatase 07/14/2017 77  55 - 135 U/L Final    AST 07/14/2017 15  10 - 40 U/L Final    ALT 07/14/2017 13  10 - 44 U/L Final    Anion Gap 07/14/2017 8  8 - 16 mmol/L Final    eGFR if African American 07/14/2017 >60.0  >60 mL/min/1.73 m^2 Final    eGFR if non African American 07/14/2017 >60.0  >60 mL/min/1.73 m^2 Final    Troponin I 07/14/2017 0.013  0.000 - 0.026 ng/mL Final    Prothrombin Time 07/14/2017 10.9  9.0 - 12.5 sec Final    INR 07/14/2017 1.0  0.8 - 1.2 Final    BNP 07/14/2017 11  0 - 99 pg/mL Final         Significant Imaging: I have reviewed and interpreted all pertinent imaging results/findings within the past 24 hours.     EKG :  NSR @ 72 bpm, no acute ischemic pattern     CXR : NAPD     30 day Event monitor ( 03/09/15 ) :    Event monitor revealed no evidence of atrial fibrillation.  Please relay to patient.  If the patient reports that he had typical symptoms while wearing the monitor, I would reassure him, and no need for repeat PVI at this time.  If he did not have typical symptoms, consider repeat monitor or ILR (we can bring him in to discuss).    Pulmonary veins cryoablation ( 7/16/14 ) :    CONCLUSIONS:  1.  Normal baseline intervals.  2.  Pulmonary vein isolation with the cryoablation as described above.  3.   Block across the cavotricuspid isthmus.    2D ECHO ( 09/19/13 ) :     CONCLUSIONS     1 - Normal left ventricular systolic function (EF 60-65%).     2 - Quantitatively measured LV function is 55%.     3 - Normal left ventricular diastolic function.     4 - Biatrial enlargement.     5 - Normal right ventricular systolic function .     6 - Increased central venous pressure.     Assessment/Plan:     Active Diagnoses:    Diagnosis Date Noted POA    PRINCIPAL PROBLEM:  Chest pain [R07.9] 07/14/2017 Yes    Paroxysmal a-fib [I48.0] 07/14/2017 Yes    Gastroesophageal reflux disease without esophagitis [K21.9] 07/14/2017 Yes    Benign prostatic hyperplasia with lower urinary tract symptoms [N40.1] 07/14/2017 Yes    Bilateral leg edema [R60.0] 07/14/2017 Yes    Chronic rhinitis [J31.0] 09/26/2013 Yes    Morbid obesity [E66.01] 01/03/2013 Yes    Depression [F32.9] 11/15/2012 Yes    HTN (hypertension) [I10] 11/15/2012 Yes      Problems Resolved During this Admission:    Diagnosis Date Noted Date Resolved POA     # Stable angina   - chest pressure with exertion   - currently chest pain free, reports mild frontal headache 2/2 sinus congestion/allergies   - Oxygenating well on RA   - EKG NSR w/o ischemic pattern, troponin negative   - Continue beta blocker   - will start on aspirin   - trend troponin and monitor under telemetry   - check lipid panel in am   - Dobutamine stress echo for risk stratification   - Hold morning dose of sotalol and metoprolol, NPO after mid night  for stress test     # Leg edema   - 2+ pitting edema up to bl knees   - likely due to non compliant with diuretics HCTZ   - oxygenating well and CXR w/o pulmonary edema   - BNP negative in obese patient   - will start on lasix 40 mg IV bid while in hospital for diuresis   - strict I/Os, daily weight, fluid restriction, low salt diet   - dobutamine stress echo in am       # Paroxysmal afib   - s/p pulmonary veins ablation in 2014  - 30 days event  monitor in 2015 w/o evidence of afib   - Continue xarelto, sotalol and metoprolol   - no evidence of bleeding   - patient follows with Dr. Webb in EP clinic     # HTN   - Continue HCTZ and metoprolol     # BPH  - On rafaflo and flomax     # GERD : On PPI     # Depression   - stable on wellbutrin and Trintellix     # Allergic rhinitis   - on benadryl, zyrtec and mucinex     VTE Risk Mitigation         Ordered     rivaroxaban tablet 20 mg  Daily     Route:  Oral        07/14/17 2032        Sally Mast DO  Department of Hospital Medicine   [facility]

## 2017-07-15 NOTE — HPI
Principal Problem:Chest pain     Chief Complaint:        Chief Complaint   Patient presents with    Chest Pain       Pt presented to the ED c/o chest pain. Pt c/o dizziness. nausea, and leg cramps as well. Pt states the s/s started this morning.          HPI:      This is a 57 y.o. male with a PMHx of chronic paroxysmal atrial fibrillation well controlled on xarelto and sotalol, s/p pulmonary veins cryoablation in 2014, 30 days event monitor in 2015 w/o evidence of afib, HTN, allergic rhinitis, GERD, morbid obesity,  depression, fibromyalgia  presents to ER with complaint of episodes of chest heaviness, nausea, diaphoresis that are brought on by exertion for the past month. States he felt having chest pressure associated with diaphoresis, dizzy and frontal headache today around noon while walking with buggie at the grocery store. His coworker brought him to ER for further evaluation. Reports symptoms lasted for 30 minutes and then spontaneously resolved. States his headache is mild and likely from sinus congestion. Notes he feels exhausted and sometime have chest pressure after carrying his 100 lb dog up the stairs at home ( 20 steps ). He carries his dog 4-5 times a day. He states he ran out of his hydrochlorothiazide and this made the lower extremity swelling worse. He refilled it about 10 days ago and the swelling has been improving but is not back to baseline yet.

## 2017-07-15 NOTE — SUBJECTIVE & OBJECTIVE
Interval History: Pt reports worsening  7/10 left sided chest and abdominal pain associated with diaphoresis and nausea. Similar to chest pain which brought pt in.  Mild relief with ntg. Stress test held for now.        Review of Systems   Constitutional: Positive for diaphoresis. Negative for chills, fatigue and fever.   HENT: Negative for ear discharge and rhinorrhea.    Respiratory: Negative.  Negative for cough, chest tightness, shortness of breath and wheezing.    Cardiovascular: Positive for chest pain. Negative for palpitations and leg swelling.   Gastrointestinal: Positive for abdominal pain. Negative for abdominal distention, constipation, diarrhea, nausea and vomiting.   Genitourinary: Negative for decreased urine volume, dysuria, flank pain, frequency and urgency.   Musculoskeletal: Negative for back pain, gait problem and joint swelling.   Skin: Negative for rash.   Neurological: Negative for dizziness, weakness, light-headedness and headaches.   Psychiatric/Behavioral: Negative for agitation, behavioral problems and confusion.     Objective:     Vital Signs (Most Recent):  Temp: 98.1 °F (36.7 °C) (07/15/17 1557)  Pulse: 66 (07/15/17 1557)  Resp: 18 (07/15/17 1557)  BP: 116/69 (07/15/17 1557)  SpO2: 98 % (07/15/17 1557) Vital Signs (24h Range):  Temp:  [97.5 °F (36.4 °C)-99.6 °F (37.6 °C)] 98.1 °F (36.7 °C)  Pulse:  [61-76] 66  Resp:  [17-18] 18  SpO2:  [94 %-100 %] 98 %  BP: (115-165)/(62-94) 116/69     Weight: 123.3 kg (271 lb 13.2 oz)  Body mass index is 42.57 kg/m².    Intake/Output Summary (Last 24 hours) at 07/15/17 1646  Last data filed at 07/15/17 1300   Gross per 24 hour   Intake              320 ml   Output             3800 ml   Net            -3480 ml      Physical Exam   Constitutional: He is oriented to person, place, and time. He appears well-developed and well-nourished.   Eyes: EOM are normal. Pupils are equal, round, and reactive to light.   Cardiovascular: Normal rate, regular rhythm  and normal heart sounds.  Exam reveals no gallop and no friction rub.    No murmur heard.  Pulmonary/Chest: Effort normal and breath sounds normal. No respiratory distress. He has no wheezes. He has no rales. He exhibits no tenderness.   Abdominal: Soft. Bowel sounds are normal. He exhibits no distension and no mass. There is no tenderness. There is no rebound and no guarding.   Musculoskeletal: Normal range of motion.   Neurological: He is alert and oriented to person, place, and time. He displays normal reflexes. No cranial nerve deficit. He exhibits normal muscle tone. Coordination normal.   Skin: Skin is warm and dry.       Significant Labs:   CBC:   Recent Labs  Lab 07/14/17  1650 07/15/17  0342   WBC 9.53 7.47   HGB 14.0 14.2   HCT 41.7 43.4    210     CMP:   Recent Labs  Lab 07/14/17  1650 07/15/17  0342   * 139   K 3.9 3.2*   CL 98 100   CO2 27 23   * 96   BUN 15 13   CREATININE 1.1 1.2   CALCIUM 9.3 9.6   PROT 7.0  --    ALBUMIN 4.0  --    BILITOT 0.9  --    ALKPHOS 77  --    AST 15  --    ALT 13  --    ANIONGAP 8 16   EGFRNONAA >60.0 >60.0     Troponin:   Recent Labs  Lab 07/14/17 1650 07/14/17 2327 07/15/17  0342   TROPONINI 0.013 <0.006 0.015     EKG: NSR, no STD,COLLEEN,TWI    Significant Imaging: I have reviewed and interpreted all pertinent imaging results/findings within the past 24 hours.

## 2017-07-15 NOTE — ASSESSMENT & PLAN NOTE
- 2+ pitting edema up to bl knees on admit. Improved with lasix   - likely due to non compliant with diuretics HCTZ   - oxygenating well and CXR w/o pulmonary edema   - BNP negative in obese patient   - s/p lasix 40 mg IV x 2   - strict I/Os, daily weight, fluid restriction, low salt diet   - echo pending

## 2017-07-15 NOTE — UM SECONDARY REVIEW
Physician Advisor External    Level of Care Issue    Approved Observation- 07/15/2017 @ 0545- per Dr. Dmitriy Rodriguez, EHR, approves Outpatient.  LMD, RN

## 2017-07-15 NOTE — ASSESSMENT & PLAN NOTE
- Pt with hx of atypical chest pain which is exertional, relived by rest. Pt also reports some chest pain at rest recently as well.    - Pt reports 7/10 chest pain upon evaluation this AM at rest. Some improvement with ntg. Hold pharm stress test for now and cardiology consult to eval need to [facility] vs. SPECT stress given active chest pain   - EKG remains NSR w/o ischemic pattern, troponin's remain negative   - Continue beta blocker, aspirin, statin   - f/u lipid panel, check a1c    - Hold morning dose of sotalol and metoprolol, NPO after mid night  for stress test

## 2017-07-15 NOTE — PROGRESS NOTES
[facility] Hospital Medicine  Progress Note    Patient Name: Shiraz Jha  MRN: [id number]  Patient Class: OP- Observation   Admission Date: 7/14/2017  Length of Stay: 0 days  Attending Physician: Cathy Peers MD  Primary Care Provider: GUSTAVO Theodore MD    [facility] Medicine Team: [facility] Cathy Peres MD    Subjective:       Principal Problem:Chest pain     Chief Complaint:        Chief Complaint   Patient presents with    Chest Pain       Pt presented to the ED c/o chest pain. Pt c/o dizziness. nausea, and leg cramps as well. Pt states the s/s started this morning.          HPI:      This is a 57 y.o. male with a PMHx of chronic paroxysmal atrial fibrillation well controlled on xarelto and sotalol, s/p pulmonary veins cryoablation in 2014, 30 days event monitor in 2015 w/o evidence of afib, HTN, allergic rhinitis, GERD, morbid obesity,  depression, fibromyalgia  presents to ER with complaint of episodes of chest heaviness, nausea, diaphoresis that are brought on by exertion for the past month. States he felt having chest pressure associated with diaphoresis, dizzy and frontal headache today around noon while walking with buggie at the grocery store. His coworker brought him to ER for further evaluation. Reports symptoms lasted for 30 minutes and then spontaneously resolved. States his headache is mild and likely from sinus congestion. Notes he feels exhausted and sometime have chest pressure after carrying his 100 lb dog up the stairs at home ( 20 steps ). He carries his dog 4-5 times a day. He states he ran out of his hydrochlorothiazide and this made the lower extremity swelling worse. He refilled it about 10 days ago and the swelling has been improving but is not back to baseline yet.          Hospital Course:  No notes on file    Interval History: Pt reports worsening  7/10 left sided chest and abdominal pain associated with diaphoresis and nausea. Similar to chest pain  which brought pt in.  Mild relief with ntg. Stress test held for now.        Review of Systems   Constitutional: Positive for diaphoresis. Negative for chills, fatigue and fever.   HENT: Negative for ear discharge and rhinorrhea.    Respiratory: Negative.  Negative for cough, chest tightness, shortness of breath and wheezing.    Cardiovascular: Positive for chest pain. Negative for palpitations and leg swelling.   Gastrointestinal: Positive for abdominal pain. Negative for abdominal distention, constipation, diarrhea, nausea and vomiting.   Genitourinary: Negative for decreased urine volume, dysuria, flank pain, frequency and urgency.   Musculoskeletal: Negative for back pain, gait problem and joint swelling.   Skin: Negative for rash.   Neurological: Negative for dizziness, weakness, light-headedness and headaches.   Psychiatric/Behavioral: Negative for agitation, behavioral problems and confusion.     Objective:     Vital Signs (Most Recent):  Temp: 98.1 °F (36.7 °C) (07/15/17 1557)  Pulse: 66 (07/15/17 1557)  Resp: 18 (07/15/17 1557)  BP: 116/69 (07/15/17 1557)  SpO2: 98 % (07/15/17 1557) Vital Signs (24h Range):  Temp:  [97.5 °F (36.4 °C)-99.6 °F (37.6 °C)] 98.1 °F (36.7 °C)  Pulse:  [61-76] 66  Resp:  [17-18] 18  SpO2:  [94 %-100 %] 98 %  BP: (115-165)/(62-94) 116/69     Weight: 123.3 kg (271 lb 13.2 oz)  Body mass index is 42.57 kg/m².    Intake/Output Summary (Last 24 hours) at 07/15/17 1646  Last data filed at 07/15/17 1300   Gross per 24 hour   Intake              320 ml   Output             3800 ml   Net            -3480 ml      Physical Exam   Constitutional: He is oriented to person, place, and time. He appears well-developed and well-nourished.   Eyes: EOM are normal. Pupils are equal, round, and reactive to light.   Cardiovascular: Normal rate, regular rhythm and normal heart sounds.  Exam reveals no gallop and no friction rub.    No murmur heard.  Pulmonary/Chest: Effort normal and breath sounds  normal. No respiratory distress. He has no wheezes. He has no rales. He exhibits no tenderness.   Abdominal: Soft. Bowel sounds are normal. He exhibits no distension and no mass. There is no tenderness. There is no rebound and no guarding.   Musculoskeletal: Normal range of motion.   Neurological: He is alert and oriented to person, place, and time. He displays normal reflexes. No cranial nerve deficit. He exhibits normal muscle tone. Coordination normal.   Skin: Skin is warm and dry.       Significant Labs:   CBC:   Recent Labs  Lab 07/14/17  1650 07/15/17  0342   WBC 9.53 7.47   HGB 14.0 14.2   HCT 41.7 43.4    210     CMP:   Recent Labs  Lab 07/14/17  1650 07/15/17  0342   * 139   K 3.9 3.2*   CL 98 100   CO2 27 23   * 96   BUN 15 13   CREATININE 1.1 1.2   CALCIUM 9.3 9.6   PROT 7.0  --    ALBUMIN 4.0  --    BILITOT 0.9  --    ALKPHOS 77  --    AST 15  --    ALT 13  --    ANIONGAP 8 16   EGFRNONAA >60.0 >60.0     Troponin:   Recent Labs  Lab 07/14/17 1650 07/14/17  2327 07/15/17  0342   TROPONINI 0.013 <0.006 0.015     EKG: NSR, no STD,COLLEEN,TWI    Significant Imaging: I have reviewed and interpreted all pertinent imaging results/findings within the past 24 hours.    Assessment/Plan:      * Chest pain    - Pt with hx of atypical chest pain which is exertional, relived by rest. Pt also reports some chest pain at rest recently as well.    - Pt reports 7/10 chest pain upon evaluation this AM at rest. Some improvement with ntg. Hold pharm stress test for now and cardiology consult to eval need to [facility] vs. SPECT stress given active chest pain   - EKG remains NSR w/o ischemic pattern, troponin's remain negative   - Continue beta blocker, aspirin, statin   - f/u lipid panel, check a1c    - Hold morning dose of sotalol and metoprolol, NPO after mid night  for stress test           Bilateral leg edema    - 2+ pitting edema up to bl knees on admit. Improved with lasix   - likely due to non compliant with  diuretics HCTZ   - oxygenating well and CXR w/o pulmonary edema   - BNP negative in obese patient   - s/p lasix 40 mg IV x 2   - strict I/Os, daily weight, fluid restriction, low salt diet   - echo pending         Benign prostatic hyperplasia with lower urinary tract symptoms    - On rafaflo and flomax         Gastroesophageal reflux disease without esophagitis    Cont PPI           Paroxysmal a-fib    - s/p pulmonary veins ablation in 2014  - 30 days event monitor in 2015 w/o evidence of afib   - Continue xarelto, sotalol and metoprolol   - no evidence of bleeding   - patient follows with Dr. Webb in EP clinic           Chronic rhinitis    - on benadryl, zyrtec and mucinex           Depression    stable on wellbutrin and Trintellix           HTN (hypertension)    Hold hctz for now ; lasix prn given hx of lower extremity edema.   -Received Lasix 40 IV x 2           VTE Risk Mitigation         Ordered     rivaroxaban tablet 20 mg  Daily     Route:  Oral        07/14/17 2032          Cathy Peres MD  Department of Hospital Medicine   [facility]

## 2017-07-15 NOTE — CONSULTS
"7/14/2017    RFC: "chest pain"    HPI:  Shiraz Jha is a 57 y.o. male with PMH of pAF, pulm vein cryoablation 2014, HTN, obesity, fibromyalgia with chronic neck and back pain. He reports more exertional CP over the last month particularly with carrying his dog up stairs. Additionally, he has had CP with recent grocery shopping that goes away with rest but is a/w diaphoresis and N/V. He currently has LUQ abdominal pain as well as "chest heaviness". However, he denies SOB and vomiting. The pain is non-radiating and is not a/w hemoptysis. He denies changes with breathing or body position. The pain at rest is what prompted him to go to the ED. PET 2013 negative. ECHO 2013 with EF 60-65%.     PMH:  Past Medical History:   Diagnosis Date    Allergy     Asthma     Atrial fibrillation     Chronic rhinitis 9/26/2013    DDD (degenerative disc disease) 4/3/2015    Depression     Diabetes mellitus     Fibromyalgia     Gastroesophageal reflux disease without esophagitis 7/14/2017    Hypertension     Sinusitis, acute, maxillary 12/20/2012    Thyroid disease        PSH:  Past Surgical History:   Procedure Laterality Date    CERVICAL SPINE SURGERY      GASTRIC BYPASS      SINUS SURGERY  2000    [facility]    TONSILLECTOMY         Family:  Family History   Problem Relation Age of Onset    Heart disease Father     Cancer Mother      lung    Hypertension Sister     Heart disease Brother     Cirrhosis Brother     Diabetes Brother        Social:  Social History     Social History    Marital status: Single     Spouse name: N/A    Number of children: N/A    Years of education: N/A     Occupational History    Not on file.     Social History Main Topics    Smoking status: Never Smoker    Smokeless tobacco: Never Used    Alcohol use No      Comment: rare    Drug use: No    Sexual activity: Yes     Partners: Female     Other Topics Concern    Not on file     Social History Narrative    [place], lives " alone w/2 dogs.    Dogs are his kids.    Works PT --  at [employer], on ssdi from neck and back/depression.       Medications:  No current facility-administered medications on file prior to encounter.      Current Outpatient Prescriptions on File Prior to Encounter   Medication Sig Dispense Refill    AMPHETAMINE SALT COMBO 20 MG tablet       buPROPion (WELLBUTRIN XL) 300 MG 24 hr tablet Take 300 mg by mouth once daily.        cetirizine (ZYRTEC) 10 MG tablet Take 10 mg by mouth once daily.      diphenhydrAMINE (BENADRYL) 50 MG capsule Take 1 capsule (50 mg total) by mouth as directed. 3 capsule 1    hydrochlorothiazide (HYDRODIURIL) 25 MG tablet Take 1 tablet (25 mg total) by mouth once daily. 30 tablet 11    loratadine (CLARITIN) 10 mg tablet Take 10 mg by mouth once daily.        metoprolol tartrate (LOPRESSOR) 25 MG tablet TAKE ONE TABLET BY MOUTH TWICE DAILY 180 tablet 3    omeprazole (PRILOSEC) 20 MG capsule TAKE ONE CAPSULE BY MOUTH ONCE DAILY 60 capsule 6    rivaroxaban (XARELTO) 20 mg Tab Take 1 tablet (20 mg total) by mouth once daily. 30 tablet 5    sotalol (BETAPACE) 80 MG tablet TAKE ONE TABLET BY MOUTH TWICE DAILY 180 tablet 3    tamsulosin (FLOMAX) 0.4 mg Cp24 Take 2 capsules (0.8 mg total) by mouth once daily. 180 capsule 0    polyethylene glycol (GOLYTELY,NULYTELY) 236-22.74-6.74 gram suspension Take 4 L by mouth once.      silodosin 8 mg Cap capsule Take 1 capsule (8 mg total) by mouth once daily. 30 capsule 0       Allergies:  Review of patient's allergies indicates:   Allergen Reactions    Iodinated contrast- oral and iv dye Other (See Comments)     Raises BP         Tobacco, alcohol, drugs:  History   Smoking Status    Never Smoker   Smokeless Tobacco    Never Used     History   Alcohol Use No     Comment: rare     History   Drug Use No       ROS:  (+) is positive  General: Weight change, fever, chills, night sweats  Skin: Itching and rash  HEENT: Trauma, acute visual  loss, hearing loss, tinnitus, discharge, epistaxis, sore throat   Cardiac: CP, palpitations, DAI, orthopnea, PND  Resp: SOB, wheeze, cough, hemoptysis  GI: N/V, dysphagia, bowel movement changes, diarrhea, constipation, bleeding, abdominal pain, and jaundice  Urinary: Frequency, urgency, dysuria, hematuria, incontinence  Vascular: Edema and claudication  MSK: Muscle weakness, pain, joint stiffness, redness, swelling  Neuro: Numbness, tingling, tremors  Heme: Easy bruising and bleeding  Endo: Heat/cold intolerance, excessive sweating, polyuria, polydipsia  Psych: Depression and anxiety    Vitals:  Temp: 97.5 °F (36.4 °C) (07/15/17 1200)  Pulse: 74 (07/15/17 1400)  Resp: 18 (07/15/17 1200)  BP: 125/72 (07/15/17 1200)  SpO2: 95 % (07/15/17 1007)  Temp:  [97.5 °F (36.4 °C)-99.6 °F (37.6 °C)]   Pulse:  [61-76]   Resp:  [17-18]   BP: (115-165)/(62-94)   SpO2:  [94 %-100 %]     Ins/Outs:    Intake/Output Summary (Last 24 hours) at 07/15/17 1521  Last data filed at 07/15/17 0600   Gross per 24 hour   Intake                0 ml   Output             2300 ml   Net            -2300 ml       PE:  General: alert and oriented, conversant  Skin: no nail pitting apparent  HEENT: no scleral icterus, EOMI, no ear discharge  Neck: Normal ROM, no masses  Heart: RRR, no r/g appreciated  Lungs: CTAB  Abdomen: soft, NT, ND, +BS  MSK: Normal ROM  Vascular: 2+ radial pulses, no edema  Lymph: No gross cervical LORNE  Neuro: CN 2-12 grossly intact, 5/5 strength in the UE and LEs     Labs:  CBC with Diff:     Recent Labs  Lab 07/14/17  1650 07/15/17  0342   WBC 9.53 7.47   HGB 14.0 14.2   HCT 41.7 43.4    210   LYMPH 8.9*  0.9* 18.1  1.4   MONO 5.6  0.5 8.4  0.6   EOSINOPHIL 0.2 0.1       COAG:    Recent Labs  Lab 07/14/17  1650   INR 1.0       CMP:   Recent Labs  Lab 07/14/17  1650 07/15/17  0342   * 96   CALCIUM 9.3 9.6   ALBUMIN 4.0  --    PROT 7.0  --    * 139   K 3.9 3.2*   CO2 27 23   CL 98 100   BUN 15 13    CREATININE 1.1 1.2   ALKPHOS 77  --    ALT 13  --    AST 15  --    BILITOT 0.9  --    MG  --  1.9   PHOS  --  3.7     Estimated Creatinine Clearance: 87.6 mL/min (based on Cr of 1.2).    .  Recent Labs  Lab 07/14/17  1650 07/14/17  2327 07/15/17  0342   TROPONINI 0.013 <0.006 0.015   BNP 11  --   --          Diagnostic Results:  Ejection Fractions   EF   Date Value Ref Range Status   07/15/2014 65     09/19/2013 60     11/02/2012  50          Assessment and Plan  Shiraz Jha is a 57 y.o. male with PMH of pAF, pulm vein cryoablation 2014, HTN, obesity, fibromyalgia with chronic neck and back pain who presents with CP.    Atypical CP  - ECGs and Tn negative for ischemia  - recommend exercise stress ECG    Migue Hinojosa MD

## 2017-07-15 NOTE — PLAN OF CARE
Problem: Patient Care Overview  Goal: Plan of Care Review  Outcome: Ongoing (interventions implemented as appropriate)  Pt remained free from falls or injuries this shift. Pt independent in repositioning. No skin breakdown noticed. Pt rested well through the night. Lasix 40mg IV given x1. Pt instructed on accurate I&O's. troponins q6hr. Pt SR on telemetry. Stress test this am

## 2017-07-16 ENCOUNTER — DOCUMENTATION ONLY (OUTPATIENT)
Dept: CARDIOLOGY | Facility: CLINIC | Age: 57
End: 2017-07-16

## 2017-07-16 VITALS
HEART RATE: 71 BPM | BODY MASS INDEX: 42.66 KG/M2 | HEIGHT: 67 IN | OXYGEN SATURATION: 97 % | RESPIRATION RATE: 18 BRPM | SYSTOLIC BLOOD PRESSURE: 116 MMHG | TEMPERATURE: 98 F | DIASTOLIC BLOOD PRESSURE: 62 MMHG | WEIGHT: 271.81 LBS

## 2017-07-16 LAB
DIASTOLIC DYSFUNCTION: NO
ESTIMATED AVG GLUCOSE: 114 MG/DL
HBA1C MFR BLD HPLC: 5.6 %
RETIRED EF AND QEF - SEE NOTES: 60 (ref 55–65)

## 2017-07-16 PROCEDURE — C8930 TTE W OR W/O CONTR, CONT ECG: HCPCS

## 2017-07-16 PROCEDURE — 25000003 PHARM REV CODE 250: Performed by: HOSPITALIST

## 2017-07-16 PROCEDURE — 93351 STRESS TTE COMPLETE: CPT | Mod: 26,,, | Performed by: INTERNAL MEDICINE

## 2017-07-16 PROCEDURE — G0378 HOSPITAL OBSERVATION PER HR: HCPCS

## 2017-07-16 PROCEDURE — 93321 DOPPLER ECHO F-UP/LMTD STD: CPT | Mod: 26,,, | Performed by: INTERNAL MEDICINE

## 2017-07-16 PROCEDURE — 99217 PR OBSERVATION CARE DISCHARGE: CPT | Mod: ,,, | Performed by: HOSPITALIST

## 2017-07-16 RX ORDER — NAPROXEN SODIUM 220 MG/1
81 TABLET, FILM COATED ORAL DAILY
Refills: 0 | COMMUNITY
Start: 2017-07-16 | End: 2022-05-06

## 2017-07-16 RX ORDER — NITROGLYCERIN 0.4 MG/1
0.4 TABLET SUBLINGUAL EVERY 5 MIN PRN
Qty: 30 TABLET | Refills: 0 | Status: SHIPPED | OUTPATIENT
Start: 2017-07-16 | End: 2017-07-16 | Stop reason: HOSPADM

## 2017-07-16 RX ORDER — OMEPRAZOLE 20 MG/1
40 CAPSULE, DELAYED RELEASE ORAL DAILY
Qty: 60 CAPSULE | Refills: 3 | Status: SHIPPED | OUTPATIENT
Start: 2017-07-16 | End: 2019-01-07 | Stop reason: SDUPTHER

## 2017-07-16 RX ORDER — POTASSIUM CHLORIDE 20 MEQ/1
40 TABLET, EXTENDED RELEASE ORAL EVERY 4 HOURS
Status: COMPLETED | OUTPATIENT
Start: 2017-07-16 | End: 2017-07-16

## 2017-07-16 RX ADMIN — POTASSIUM CHLORIDE 20 MEQ: 1500 TABLET, EXTENDED RELEASE ORAL at 09:07

## 2017-07-16 RX ADMIN — PANTOPRAZOLE SODIUM 600 MG: 40 TABLET, DELAYED RELEASE ORAL at 09:07

## 2017-07-16 RX ADMIN — SILODOSIN 8 MG: 8 CAPSULE ORAL at 09:07

## 2017-07-16 RX ADMIN — POTASSIUM CHLORIDE 40 MEQ: 1500 TABLET, EXTENDED RELEASE ORAL at 03:07

## 2017-07-16 RX ADMIN — DIPHENHYDRAMINE HYDROCHLORIDE 50 MG: 50 CAPSULE ORAL at 09:07

## 2017-07-16 RX ADMIN — ASPIRIN 81 MG 81 MG: 81 TABLET ORAL at 09:07

## 2017-07-16 RX ADMIN — CETIRIZINE HYDROCHLORIDE 10 MG: 5 TABLET, FILM COATED ORAL at 09:07

## 2017-07-16 RX ADMIN — PANTOPRAZOLE SODIUM 40 MG: 40 TABLET, DELAYED RELEASE ORAL at 09:07

## 2017-07-16 RX ADMIN — BUPROPION HYDROCHLORIDE 300 MG: 150 TABLET, FILM COATED, EXTENDED RELEASE ORAL at 09:07

## 2017-07-16 RX ADMIN — TAMSULOSIN HYDROCHLORIDE 0.8 MG: 0.4 CAPSULE ORAL at 09:07

## 2017-07-16 RX ADMIN — RIVAROXABAN 20 MG: 20 TABLET, FILM COATED ORAL at 09:07

## 2017-07-16 RX ADMIN — POTASSIUM CHLORIDE 40 MEQ: 1500 TABLET, EXTENDED RELEASE ORAL at 06:07

## 2017-07-16 NOTE — PLAN OF CARE
Problem: Patient Care Overview  Goal: Plan of Care Review  Outcome: Ongoing (interventions implemented as appropriate)  Plan of care reviewed with pt and understanding verbalized. Fall reduction measures in place, bed in lowest position, wheels locked, upper side rails raised. Cardiac rhythm being monitored. Pt denies pain or discomfort.

## 2017-07-16 NOTE — PROGRESS NOTES
Patient verified by 2 identifiers and allergies reviewed.  Procedure explained & consent obtained.  20g IV in place to Rt AC.  Exercise Stress Echo performed using Echo contrast.  Denies previous reactions to blood transfusions   3cc Optison administered, echo images obtained.  Pt tolerated testing well. Post study discharge instructions reviewed with patient, patient verbalized understanding.  Patient transferred back to OBS 1 via wc in stable condition.

## 2017-07-16 NOTE — PLAN OF CARE
Problem: Patient Care Overview  Goal: Plan of Care Review  Outcome: Ongoing (interventions implemented as appropriate)  Fall precaution maintained thru shift; pt denies any pain or discomfort; no sign of distress noted; blood sugar monitor; skin dry and intact;

## 2017-07-16 NOTE — NURSING
Pt discharged home. VSS. AVS reviewed and understanding verbalized by patient. IV removed, tip intact. Cardiac monitor on. Pt waiting for family to arrive.

## 2017-07-16 NOTE — PROGRESS NOTES
Brief Cardiology Note:  57M with pAF, HTN here with chest pain sent for exercise stress echo today.    Stress test results reviewed with Dr. Sadler  -EKG/echo negative for ischemia; however patient failed to achieve target HR (HR peak 103, 62% predicted).  -Notably, his sx of CP and dyspnea were not reproduced during the protocol  -Based on lack of reproducibility with significant exertion, would consider negative   -Can follow up with general cardiology or PCP as per patient preference  -Return to clinic or ED if symptoms return or worsen.    Sebas Milligan MD  Cardiology P[facility]  [phone]

## 2017-07-19 ENCOUNTER — OFFICE VISIT (OUTPATIENT)
Dept: INTERNAL MEDICINE | Facility: CLINIC | Age: 57
End: 2017-07-19
Payer: COMMERCIAL

## 2017-07-19 VITALS
TEMPERATURE: 98 F | BODY MASS INDEX: 40.43 KG/M2 | HEIGHT: 68 IN | SYSTOLIC BLOOD PRESSURE: 90 MMHG | WEIGHT: 266.75 LBS | HEART RATE: 68 BPM | DIASTOLIC BLOOD PRESSURE: 70 MMHG

## 2017-07-19 DIAGNOSIS — R60.9 EDEMA, UNSPECIFIED TYPE: Primary | ICD-10-CM

## 2017-07-19 DIAGNOSIS — I10 ESSENTIAL HYPERTENSION: ICD-10-CM

## 2017-07-19 DIAGNOSIS — I48.91 ATRIAL FIBRILLATION, UNSPECIFIED TYPE: ICD-10-CM

## 2017-07-19 DIAGNOSIS — E66.01 MORBID OBESITY DUE TO EXCESS CALORIES: ICD-10-CM

## 2017-07-19 PROCEDURE — 99999 PR PBB SHADOW E&M-EST. PATIENT-LVL III: CPT | Mod: PBBFAC,,, | Performed by: INTERNAL MEDICINE

## 2017-07-19 PROCEDURE — 99213 OFFICE O/P EST LOW 20 MIN: CPT | Mod: S$GLB,,, | Performed by: INTERNAL MEDICINE

## 2017-07-19 RX ORDER — FUROSEMIDE 40 MG/1
40 TABLET ORAL DAILY
Qty: 30 TABLET | Refills: 12 | Status: SHIPPED | OUTPATIENT
Start: 2017-07-19 | End: 2019-01-07 | Stop reason: SDUPTHER

## 2017-07-19 RX ORDER — DEXTROAMPHETAMINE SACCHARATE, AMPHETAMINE ASPARTATE, DEXTROAMPHETAMINE SULFATE AND AMPHETAMINE SULFATE 7.5; 7.5; 7.5; 7.5 MG/1; MG/1; MG/1; MG/1
30 TABLET ORAL DAILY
Refills: 0 | COMMUNITY
Start: 2017-07-05 | End: 2021-04-22 | Stop reason: DRUGHIGH

## 2017-07-19 NOTE — PROGRESS NOTES
"Subjective:       Patient ID: Shiraz Jha is a 57 y.o. male.    Chief Complaint: No chief complaint on file.    Patient presents for followup after being admitted to Observation Unit in the ER on 7/14/17.  Summary reviewed with patient.  He had a stress test and echocardiogram which were normal.  He originally went to the ER because of progressive problem with swelling of his legs; right greater than left.  He had been without his HCTZ for several days.  Received IV Lasix and the above tests.  Fluid improved and patient discharged for followup with primary care and cardiology.  He is still waiting on the cardiology appointment.  His regular PCP couldn't see him so he is booked with me for ER followup.  He still thinks he has some leg swelling in the right leg.    He also states that he was told that he should have a test to "check the circulation in his legs."  I see nothing about this referred in the summary.  I suspect someone was thinking venous study to R/O chronic DVT.  So will proceed with this.    Denies chest pain, but still has exertional dyspnea.  He is morbidly obese.      Review of Systems   Constitutional: Negative for chills and fever.   Respiratory: Positive for shortness of breath. Negative for cough, chest tightness and wheezing.    Cardiovascular: Positive for leg swelling. Negative for chest pain and palpitations.   Gastrointestinal: Negative.    Neurological: Negative for dizziness, light-headedness and headaches.       Objective:      Physical Exam   Constitutional: He appears well-developed and well-nourished. No distress.   Cardiovascular: Normal rate, regular rhythm and normal heart sounds.    Pulmonary/Chest: Effort normal and breath sounds normal. No respiratory distress.   Musculoskeletal:   Trace pretibial edema on right; none on left leg.   Vitals reviewed.      Assessment:       1. Edema, unspecified type    2. Essential hypertension    3. Atrial fibrillation, unspecified type    4.    "  Morbid obesity.  Plan:   1. Change HCTZ to Lasix for now.  2. See Cardiology as planned.  3. Weight reduction.  4. Schedule venous doppler of both legs to r/o DVT.  5. Followup with regular PCP as planned.

## 2017-07-19 NOTE — PATIENT INSTRUCTIONS
1. Change HCTZ to Lasix for the leg fluid for now.  2. See Cardiology as planned.  3. Schedule venous doppler of legs to look at circulation.  4. Continue all regular meds.

## 2017-07-20 ENCOUNTER — CLINICAL SUPPORT (OUTPATIENT)
Dept: CARDIOLOGY | Facility: CLINIC | Age: 57
End: 2017-07-20
Payer: MEDICARE

## 2017-07-20 DIAGNOSIS — R60.9 EDEMA, UNSPECIFIED TYPE: ICD-10-CM

## 2017-07-20 PROCEDURE — 93970 EXTREMITY STUDY: CPT | Mod: S$GLB,,, | Performed by: INTERNAL MEDICINE

## 2017-07-20 NOTE — DISCHARGE SUMMARY
[facility] Hospital Medicine  Discharge Summary      Patient Name: Shiraz Jha  MRN: [id number]  Admission Date: 7/14/2017  Hospital Length of Stay: 0 days  Discharge Date and Time: 7/16/2017  6:40 PM  Attending Physician: No att. providers found   Discharging Provider: Cathy Peres MD  Primary Care Provider: GUSTAVO Theodore MD    [facility] Medicine Team: [facility] HOSP MED C Cathy Peres MD          Principal Problem:Chest pain     Chief Complaint:           Chief Complaint   Patient presents with    Chest Pain       Pt presented to the ED c/o chest pain. Pt c/o dizziness. nausea, and leg cramps as well. Pt states the s/s started this morning.          HPI:      This is a 57 y.o. male with a PMHx of chronic paroxysmal atrial fibrillation well controlled on xarelto and sotalol, s/p pulmonary veins cryoablation in 2014, 30 days event monitor in 2015 w/o evidence of afib, HTN, allergic rhinitis, GERD, morbid obesity,  depression, fibromyalgia  presents to ER with complaint of episodes of chest heaviness, nausea, diaphoresis that are brought on by exertion for the past month. States he felt having chest pressure associated with diaphoresis, dizzy and frontal headache today around noon while walking with buggie at the grocery store. His coworker brought him to ER for further evaluation. Reports symptoms lasted for 30 minutes and then spontaneously resolved. States his headache is mild and likely from sinus congestion. Notes he feels exhausted and sometime have chest pressure after carrying his 100 lb dog up the stairs at home ( 20 steps ). He carries his dog 4-5 times a day. He states he ran out of his hydrochlorothiazide and this made the lower extremity swelling worse. He refilled it about 10 days ago and the swelling has been improving but is not back to baseline yet.             * No surgery found *      Indwelling Lines/Drains at time of discharge:   Lines/Drains/Airways          No matching  active lines, drains, or airways        Hospital Course:     Chest pain   Pt reported worsening 7/10 left sided chest and abdominal pain associated with diaphoresis and nausea. Similar to chest pain which brought pt in.  Mild relief with ntg. Troponin's and EKG were wnl. Cardiology was consulted. Recommending proceeding with exercise stress echo. Stress echo was done and negative; however pt did not reach target heart rate thus less sensitive. Given hx, pain likely noncardiac in nature. Echo was wl.   Pt discharged to f/u with PCP.       Bilateral leg edema     - 2+ pitting edema up to bl knees on admit. Improved with lasix   - oxygenating well and CXR w/o pulmonary edema. BNP negative  - May be a component of venous insufficiency given negative cardiac workup and echo, normal albumin, normal cmp. Recommend SURINDER's given claudication symptoms then hi [facility] as an outpt. Can assess for DVT as well; however pt is on anticoagulation thus less likely           Consults:   Consults         Status Ordering Provider     Inpatient consult to Cardiology  Once     Provider:  (Not yet assigned)    Completed RACHAEL BRADFORD          Significant Diagnostic Studies: Cardiac Graphics: Echocardiogram:   2D echo with color flow doppler:   Results for orders placed or performed during the hospital encounter of 07/14/17   2D echo with color flow doppler   Result Value Ref Range    EF 60 55 - 65    Mitral Valve Regurgitation TRIVIAL     Diastolic Dysfunction No     Tricuspid Valve Regurgitation TRIVIAL        Pending Diagnostic Studies:     Procedure Component Value Units Date/Time    Pharmacological stress echo [[id number]]     Order Status:  Sent Lab Status:  No result         Final Active Diagnoses:    Diagnosis Date Noted POA    PRINCIPAL PROBLEM:  Chest pain [R07.9] 07/14/2017 Yes    Paroxysmal a-fib [I48.0] 07/14/2017 Yes    Gastroesophageal reflux disease without esophagitis [K21.9] 07/14/2017 Yes    Benign prostatic  hyperplasia with lower urinary tract symptoms [N40.1] 07/14/2017 Yes    Bilateral leg edema [R60.0] 07/14/2017 Yes    Chronic rhinitis [J31.0] 09/26/2013 Yes    Depression [F32.9] 11/15/2012 Yes    HTN (hypertension) [I10] 11/15/2012 Yes      Problems Resolved During this Admission:    Diagnosis Date Noted Date Resolved POA      Discharged Condition: good    Disposition: Home or Self Care    Follow Up:  Follow-up Information     P Randolph Theodore MD. Schedule an appointment as soon as possible for a visit in 2 weeks.    Specialty:  Internal Medicine  Contact information:  [address] [facility] [place] [address]  [phone]                 Patient Instructions:     Ambulatory Referral to [facility]   Referral Priority: Routine Referral Type: Consultation   Referral Reason: Specialty Services Required    Number of Visits Requested: 1      Ambulatory Referral to Internal Medicine   Referral Priority: Routine Referral Type: Consultation   Referral Reason: Specialty Services Required    Referred to Provider: GEORGES THEODORE Requested Specialty: Internal Medicine   Number of Visits Requested: 1      Diet general     Diet Cardiac     Activity as tolerated     Call MD for:  temperature >100.4     Call MD for:  persistent nausea and vomiting or diarrhea     Call MD for:  severe uncontrolled pain     Call MD for:  redness, tenderness, or signs of infection (pain, swelling, redness, odor or green/yellow discharge around incision site)     Call MD for:  difficulty breathing or increased cough     Call MD for:  severe persistent headache     Call MD for:  worsening rash     Call MD for:  persistent dizziness, light-headedness, or visual disturbances     Call MD for:  increased confusion or weakness       Medications:  Reconciled Home Medications:   Discharge Medication List as of 7/16/2017  6:06 PM      START taking these medications    Details   aspirin 81 MG Chew Take 1 tablet (81 mg total) by mouth  once daily., Starting Sun 7/16/2017, Until Mon 7/16/2018, OTC         CONTINUE these medications which have CHANGED    Details   omeprazole (PRILOSEC) 20 MG capsule Take 2 capsules (40 mg total) by mouth once daily., Starting Sun 7/16/2017, Until Tue 8/15/2017, Normal         CONTINUE these medications which have NOT CHANGED    Details   AMPHETAMINE SALT COMBO 20 MG tablet Starting 4/5/2016, Until Discontinued, Historical Med      buPROPion (WELLBUTRIN XL) 300 MG 24 hr tablet Take 300 mg by mouth once daily.  , Until Discontinued, Historical Med      cetirizine (ZYRTEC) 10 MG tablet Take 10 mg by mouth once daily., Until Discontinued, Historical Med      diphenhydrAMINE (BENADRYL) 50 MG capsule Take 1 capsule (50 mg total) by mouth as directed., Starting 6/2/2014, Until Discontinued, Normal      loratadine (CLARITIN) 10 mg tablet Take 10 mg by mouth once daily.  , Until Discontinued, Historical Med      metoprolol tartrate (LOPRESSOR) 25 MG tablet TAKE ONE TABLET BY MOUTH TWICE DAILY, Normal      polyethylene glycol (GOLYTELY,NULYTELY) 236-22.74-6.74 gram suspension Take 4 L by mouth once., Starting 1/13/2014, Historical Med      rivaroxaban (XARELTO) 20 mg Tab Take 1 tablet (20 mg total) by mouth once daily., Starting 4/25/2016, Until Discontinued, Normal      sotalol (BETAPACE) 80 MG tablet TAKE ONE TABLET BY MOUTH TWICE DAILY, Normal      tamsulosin (FLOMAX) 0.4 mg Cp24 Take 2 capsules (0.8 mg total) by mouth once daily., Starting Fri 7/7/2017, Normal      vortioxetine (TRINTELLIX) 20 mg Tab Take 20 mg by mouth., Historical Med      hydrochlorothiazide (HYDRODIURIL) 25 MG tablet Take 1 tablet (25 mg total) by mouth once daily., Starting Wed 6/1/2016, Until Fri 7/14/2017, Normal      silodosin 8 mg Cap capsule Take 1 capsule (8 mg total) by mouth once daily., Starting 2/29/2016, Until Wed 3/30/16, Normal         STOP taking these medications       duloxetine (CYMBALTA) 60 MG capsule Comments:   Reason for  Stopping:         naftifine (NAFTIN) 1 % cream Comments:   Reason for Stopping:         nitroGLYCERIN (NITROSTAT) 0.4 MG SL tablet Comments:   Reason for Stopping:         sucralfate (CARAFATE) 1 gram tablet Comments:   Reason for Stopping:             Time spent on the discharge of patient: >30 minutes    Cathy Peres MD  Department of Hospital Medicine  [facility]

## 2017-07-21 ENCOUNTER — PATIENT MESSAGE (OUTPATIENT)
Dept: INTERNAL MEDICINE | Facility: CLINIC | Age: 57
End: 2017-07-21

## 2017-07-31 ENCOUNTER — TELEPHONE (OUTPATIENT)
Dept: ELECTROPHYSIOLOGY | Facility: CLINIC | Age: 57
End: 2017-07-31

## 2017-08-01 ENCOUNTER — HOSPITAL ENCOUNTER (OUTPATIENT)
Dept: CARDIOLOGY | Facility: CLINIC | Age: 57
Discharge: HOME | End: 2017-08-01
Payer: COMMERCIAL

## 2017-08-01 ENCOUNTER — OFFICE VISIT (OUTPATIENT)
Dept: ELECTROPHYSIOLOGY | Facility: CLINIC | Age: 57
End: 2017-08-01
Payer: MEDICARE

## 2017-08-01 VITALS
WEIGHT: 269.38 LBS | DIASTOLIC BLOOD PRESSURE: 70 MMHG | HEART RATE: 58 BPM | BODY MASS INDEX: 40.83 KG/M2 | HEIGHT: 68 IN | SYSTOLIC BLOOD PRESSURE: 112 MMHG

## 2017-08-01 DIAGNOSIS — R20.2 NUMBNESS AND TINGLING OF BOTH LEGS: ICD-10-CM

## 2017-08-01 DIAGNOSIS — E11.9 TYPE 2 DIABETES MELLITUS WITHOUT COMPLICATION, WITHOUT LONG-TERM CURRENT USE OF INSULIN: ICD-10-CM

## 2017-08-01 DIAGNOSIS — I48.92 ATRIAL FLUTTER, UNSPECIFIED TYPE: ICD-10-CM

## 2017-08-01 DIAGNOSIS — Z79.899 ENCOUNTER FOR MONITORING SOTALOL THERAPY: ICD-10-CM

## 2017-08-01 DIAGNOSIS — I48.91 ATRIAL FIBRILLATION: ICD-10-CM

## 2017-08-01 DIAGNOSIS — I10 ESSENTIAL HYPERTENSION: ICD-10-CM

## 2017-08-01 DIAGNOSIS — Z98.890 STATUS POST CATHETER ABLATION OF ATRIAL FIBRILLATION: ICD-10-CM

## 2017-08-01 DIAGNOSIS — Z51.81 ENCOUNTER FOR MONITORING SOTALOL THERAPY: ICD-10-CM

## 2017-08-01 DIAGNOSIS — I48.0 PAROXYSMAL ATRIAL FIBRILLATION: Primary | ICD-10-CM

## 2017-08-01 DIAGNOSIS — R60.0 BILATERAL LEG EDEMA: Primary | ICD-10-CM

## 2017-08-01 DIAGNOSIS — K21.9 GASTROESOPHAGEAL REFLUX DISEASE WITHOUT ESOPHAGITIS: ICD-10-CM

## 2017-08-01 DIAGNOSIS — G47.30 SLEEP APNEA, UNSPECIFIED TYPE: ICD-10-CM

## 2017-08-01 DIAGNOSIS — Z98.890 STATUS POST CATHETER ABLATION OF ATRIAL FLUTTER: ICD-10-CM

## 2017-08-01 DIAGNOSIS — R20.0 NUMBNESS AND TINGLING OF BOTH LEGS: ICD-10-CM

## 2017-08-01 DIAGNOSIS — Z79.01 CURRENT USE OF LONG TERM ANTICOAGULATION: ICD-10-CM

## 2017-08-01 PROCEDURE — 93000 ELECTROCARDIOGRAM COMPLETE: CPT | Mod: S$GLB,,, | Performed by: INTERNAL MEDICINE

## 2017-08-01 PROCEDURE — 99999 PR PBB SHADOW E&M-EST. PATIENT-LVL III: CPT | Mod: PBBFAC,,, | Performed by: NURSE PRACTITIONER

## 2017-08-01 PROCEDURE — 99214 OFFICE O/P EST MOD 30 MIN: CPT | Mod: S$GLB,,, | Performed by: NURSE PRACTITIONER

## 2017-08-01 PROCEDURE — 3044F HG A1C LEVEL LT 7.0%: CPT | Mod: S$GLB,,, | Performed by: NURSE PRACTITIONER

## 2017-08-01 NOTE — PROGRESS NOTES
Subjective:    Patient ID:  Shiraz Jha is a 57 y.o. male who presents for follow-up of Atrial Fibrillation.     Shiraz Jha is a patient of Dr. Webb.    HPI     Mr. Jha is a 57 y.o. male with pAF s/p PVI, AFL s/p CTI RFA, HTN, DM, GERD, and QUINTIN. Mr. 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 Mr. Jha initially presented to [facility] ED (11/2012) with symptomatic AFL and AF. Had been on dronedarone at that time. Mr. Jha underwent a successful CTI RFA (11/15/12) without complication. Sotalol subsequently started. He did well initially, but had ultimately developed increasing episodes. Mr. Jha underwent cryoablation PVI (07/16/14) without issue.  He was without symptoms for the month following the procedure. At his last office visit (02/03/15), Mr. Jha reported experiencing symptoms 1-2 x/week, with episodes lasting no more than 15 minutes in duration. Prior to the procedures, Mr. Jha would often become diaphoretic and experience CP and presyncope when in an atrial arrhythmia. Following the procedures, he no longer notes CP with episodes.      He was recently admitted (07/14/17) for CP; work-up negative. BLE edema improved w/Lasix; u/s negative for evidence of DVT. He was managed conservatively and discharged home.     Since discharge, Mr. Jha reports continued LE edema (developed over the last year); he states that he has lost hair on both legs; he also notes tingling at times. He notes occasional SOB/DAI, with episodes lasting approximately 5-10 minutes in duration; resolving with rest. He states that he is often tired throughout the day; compliant w/CPAP - last evaluated ~2008. He notes occasional LH/weakness over the last weeks. He denies AF/AFL recurrence, palpitations, or syncope.     Recent cardiac studies:  Exercise Stress Echo (07/16/17): The EKG portion of this study was negative for ischemia at a low workload, and peak heart rate of 103  bpm (62% of predicted). The Echo portion of this study revealed an EF of 60-65%, normal left ventricular diastolic function, normal right ventricular systolic function, and no evidence of stress-induced myocardial ischemia.     I reviewed today's ECG which demonstrated SB at 58 bpm; , QRS 96, and QTc 402.     Review of Systems   Constitution: Positive for weakness and malaise/fatigue. Negative for diaphoresis.   HENT: Negative for headaches and nosebleeds.    Eyes: Negative for double vision.   Cardiovascular: Positive for dyspnea on exertion and leg swelling. Negative for chest pain, irregular heartbeat, near-syncope, palpitations and syncope.   Respiratory: Positive for shortness of breath.    Skin: Positive for unusual hair distribution (hair loss BLE).   Musculoskeletal: Negative.    Gastrointestinal: Negative for abdominal pain, hematemesis and hematochezia.   Genitourinary: Negative for hematuria.   Neurological: Positive for excessive daytime sleepiness, dizziness, light-headedness and paresthesias.   Psychiatric/Behavioral: Negative for altered mental status.        Objective:    Physical Exam   Constitutional: He is oriented to person, place, and time. He appears well-developed and well-nourished.   HENT:   Head: Normocephalic and atraumatic.   Eyes: Pupils are equal, round, and reactive to light.   Cardiovascular: Regular rhythm, normal heart sounds and intact distal pulses.  Bradycardia present.    Pulmonary/Chest: Effort normal. He has wheezes.   Musculoskeletal: Normal range of motion. He exhibits edema.   Neurological: He is alert and oriented to person, place, and time.   Vitals reviewed.        Assessment:       1. Paroxysmal atrial fibrillation    2. Status post catheter ablation of atrial fibrillation    3. Atrial flutter, unspecified type    4. Status post catheter ablation of atrial flutter    5. Encounter for monitoring sotalol therapy    6. Current use of long term anticoagulation    7.  Essential hypertension    8. Type 2 diabetes mellitus without complication, without long-term current use of insulin    9. Gastroesophageal reflux disease without esophagitis    10. Sleep apnea, unspecified type         Plan:       In summary, Mr. Jha is a 57 y.o. male with pAF s/p PVI, AFL s/p CTI RFA, HTN, DM, GERD, and QUINTIN. Mr. Jha is doing well from a rhythm perspective without clinical recurrence of AF/AFL; rhythm-controlled on Sotalol and anticoagulated on Xarelto. Mr. Jha has noted increased BLE edema, hair loss, and tingling/numbness; recent LE u/s negative for DVT.     Will discuss ABIs and the need for a possible sleep medicine re-evaluation w/Dr. Webb. Will make any recommended changes to plan.   Continue current medication regimen.   Follow up in clinic in 6 months, sooner as needed.     ODETTE Lance, APRN, FNP-C        (A copy of today's note was sent to Dr. Webb.)

## 2017-08-08 ENCOUNTER — CLINICAL SUPPORT (OUTPATIENT)
Dept: CARDIOLOGY | Facility: CLINIC | Age: 57
End: 2017-08-08
Payer: COMMERCIAL

## 2017-08-08 DIAGNOSIS — R60.0 BILATERAL LEG EDEMA: ICD-10-CM

## 2017-08-08 DIAGNOSIS — R20.2 NUMBNESS AND TINGLING OF BOTH LEGS: ICD-10-CM

## 2017-08-08 DIAGNOSIS — R20.0 NUMBNESS AND TINGLING OF BOTH LEGS: ICD-10-CM

## 2017-08-08 LAB — VASCULAR ANKLE BRACHIAL INDEX (ABI) RIGHT: 1.25 (ref 0.9–1.2)

## 2017-08-08 PROCEDURE — 93924 LWR XTR VASC STDY BILAT: CPT | Mod: S$GLB,,, | Performed by: INTERNAL MEDICINE

## 2017-09-29 DIAGNOSIS — N40.0 BENIGN NON-NODULAR PROSTATIC HYPERPLASIA WITHOUT LOWER URINARY TRACT SYMPTOMS: ICD-10-CM

## 2017-09-29 RX ORDER — TAMSULOSIN HYDROCHLORIDE 0.4 MG/1
CAPSULE ORAL
Qty: 180 CAPSULE | Refills: 0 | Status: SHIPPED | OUTPATIENT
Start: 2017-09-29 | End: 2018-01-10 | Stop reason: SDUPTHER

## 2017-09-29 NOTE — TELEPHONE ENCOUNTER
Call pt and clarify plans for f/u. lov with me 3 years.  Will render one refill only for tamsulosin..

## 2017-11-16 ENCOUNTER — OFFICE VISIT (OUTPATIENT)
Dept: INTERNAL MEDICINE | Facility: CLINIC | Age: 57
End: 2017-11-16
Payer: COMMERCIAL

## 2017-11-16 VITALS
HEIGHT: 68 IN | SYSTOLIC BLOOD PRESSURE: 112 MMHG | DIASTOLIC BLOOD PRESSURE: 84 MMHG | RESPIRATION RATE: 16 BRPM | TEMPERATURE: 98 F | BODY MASS INDEX: 40.76 KG/M2 | HEART RATE: 59 BPM | WEIGHT: 268.94 LBS

## 2017-11-16 DIAGNOSIS — J01.00 ACUTE NON-RECURRENT MAXILLARY SINUSITIS: Primary | ICD-10-CM

## 2017-11-16 DIAGNOSIS — J20.9 ACUTE BRONCHITIS, UNSPECIFIED ORGANISM: ICD-10-CM

## 2017-11-16 DIAGNOSIS — I10 HTN, GOAL BELOW 130/80: ICD-10-CM

## 2017-11-16 PROCEDURE — 96372 THER/PROPH/DIAG INJ SC/IM: CPT | Mod: S$GLB,,, | Performed by: INTERNAL MEDICINE

## 2017-11-16 PROCEDURE — 99214 OFFICE O/P EST MOD 30 MIN: CPT | Mod: 25,S$GLB,, | Performed by: INTERNAL MEDICINE

## 2017-11-16 PROCEDURE — 99999 PR PBB SHADOW E&M-EST. PATIENT-LVL III: CPT | Mod: PBBFAC,,, | Performed by: INTERNAL MEDICINE

## 2017-11-16 RX ORDER — DESOXIMETASONE 0.5 MG/G
CREAM TOPICAL 2 TIMES DAILY
Qty: 60 G | Refills: 0 | Status: SHIPPED | OUTPATIENT
Start: 2017-11-16 | End: 2018-03-21 | Stop reason: SDUPTHER

## 2017-11-16 RX ORDER — NITROGLYCERIN 0.4 MG/1
1 TABLET SUBLINGUAL DAILY PRN
COMMUNITY
Start: 2017-08-14 | End: 2019-09-19 | Stop reason: SDUPTHER

## 2017-11-16 RX ORDER — TRIAMCINOLONE ACETONIDE 40 MG/ML
40 INJECTION, SUSPENSION INTRA-ARTICULAR; INTRAMUSCULAR
Status: COMPLETED | OUTPATIENT
Start: 2017-11-16 | End: 2017-11-16

## 2017-11-16 RX ORDER — DOXYCYCLINE 100 MG/1
100 CAPSULE ORAL 2 TIMES DAILY
Qty: 14 CAPSULE | Refills: 0 | Status: SHIPPED | OUTPATIENT
Start: 2017-11-16 | End: 2018-02-08

## 2017-11-16 RX ADMIN — TRIAMCINOLONE ACETONIDE 40 MG: 40 INJECTION, SUSPENSION INTRA-ARTICULAR; INTRAMUSCULAR at 12:11

## 2017-11-16 NOTE — PROGRESS NOTES
Subjective:       Patient ID: Shiraz Jha is a 57 y.o. male.    Chief Complaint: Sinus Problem (sick close to a week)    HPI   Pt with HTN is here c/o 1 week of persistent sinus/chest congestion, dry cough, post nasal drip, sore throat. Minimal relief with Claritin, Mucinex.   Review of Systems   Constitutional: Negative for activity change, appetite change, chills, diaphoresis, fatigue, fever and unexpected weight change.   HENT: Positive for congestion, postnasal drip, rhinorrhea, sinus pain, sinus pressure and sore throat. Negative for sneezing, trouble swallowing and voice change.    Respiratory: Positive for cough. Negative for shortness of breath and wheezing.    Cardiovascular: Negative for chest pain, palpitations and leg swelling.   Gastrointestinal: Negative for abdominal pain, blood in stool, constipation, diarrhea, nausea and vomiting.   Genitourinary: Negative for dysuria.   Musculoskeletal: Negative for arthralgias and myalgias.   Skin: Negative for rash and wound.   Allergic/Immunologic: Negative for environmental allergies and food allergies.   Hematological: Negative for adenopathy. Does not bruise/bleed easily.       Objective:      Physical Exam   Constitutional: He is oriented to person, place, and time. He appears well-developed and well-nourished. No distress.   HENT:   Head: Normocephalic and atraumatic.   Right Ear: External ear normal.   Left Ear: External ear normal.   Nose: Mucosal edema and rhinorrhea present. Right sinus exhibits maxillary sinus tenderness. Left sinus exhibits maxillary sinus tenderness.   Mouth/Throat: Oropharynx is clear and moist. No oropharyngeal exudate.   Eyes: Conjunctivae and EOM are normal. Pupils are equal, round, and reactive to light. Right eye exhibits no discharge. Left eye exhibits no discharge. No scleral icterus.   Neck: Normal range of motion. Neck supple. No JVD present.   Cardiovascular: Normal rate, regular rhythm and normal heart sounds.    No  murmur heard.  Pulmonary/Chest: Effort normal and breath sounds normal. No respiratory distress. He has no wheezes. He has no rales.   Abdominal: Soft. Bowel sounds are normal. There is no tenderness.   Musculoskeletal: He exhibits no edema.   Lymphadenopathy:     He has no cervical adenopathy.   Neurological: He is alert and oriented to person, place, and time.   Skin: Skin is warm and dry. No rash noted. He is not diaphoretic. No pallor.       Assessment:       1. Acute non-recurrent maxillary sinusitis    2. Acute bronchitis, unspecified organism    3. HTN, goal below 130/80        Plan:    1. Rx Doxycycline 100 mg BID x 7 days, Kenalog 40 mg IM, start Flonase and continue Claritin       No decongestants 2/2 HTN   2. Start Mucinex DM PRN    3. Controlled, CPT

## 2018-01-10 DIAGNOSIS — N40.0 BENIGN NON-NODULAR PROSTATIC HYPERPLASIA WITHOUT LOWER URINARY TRACT SYMPTOMS: ICD-10-CM

## 2018-01-11 RX ORDER — TAMSULOSIN HYDROCHLORIDE 0.4 MG/1
CAPSULE ORAL
Qty: 180 CAPSULE | Refills: 0 | Status: SHIPPED | OUTPATIENT
Start: 2018-01-11 | End: 2018-04-12 | Stop reason: SDUPTHER

## 2018-01-25 ENCOUNTER — TELEPHONE (OUTPATIENT)
Dept: ELECTROPHYSIOLOGY | Facility: CLINIC | Age: 58
End: 2018-01-25

## 2018-01-25 NOTE — TELEPHONE ENCOUNTER
Spoke with patient. He is having ongoing issues with leg pain and swelling. States he does not have a general cardiologist, he thought Dr Webb was a general cardiologist. Advised to speak with his PCP and possibly ask about seeing a general cardiologist or Vascular Md. He verbalizes understanding and will start with Dr Theodore.

## 2018-01-25 NOTE — TELEPHONE ENCOUNTER
----- Message from Bouchra Barber sent at 1/25/2018  8:36 AM CST -----  Contact: patient  Please call pt at [phone]. Patient is having bilateral leg edema (worse in the right leg). Need MD advise and patient is very frustrated. Dr Webb pt    Thank you

## 2018-01-25 NOTE — TELEPHONE ENCOUNTER
----- Message from Giancarlo Paris MA sent at 1/25/2018 12:57 PM CST -----  Contact: Pt called  Please call patient at [phone].  He states he's returning your call.  He's a patient of  Dr. Webb.    Thank You,  Bernadine

## 2018-02-07 ENCOUNTER — TELEPHONE (OUTPATIENT)
Dept: ELECTROPHYSIOLOGY | Facility: CLINIC | Age: 58
End: 2018-02-07

## 2018-02-07 DIAGNOSIS — I48.91 ATRIAL FIBRILLATION, UNSPECIFIED TYPE: Primary | ICD-10-CM

## 2018-02-08 ENCOUNTER — OFFICE VISIT (OUTPATIENT)
Dept: CARDIOLOGY | Facility: CLINIC | Age: 58
End: 2018-02-08
Payer: COMMERCIAL

## 2018-02-08 VITALS
HEART RATE: 76 BPM | WEIGHT: 274.25 LBS | SYSTOLIC BLOOD PRESSURE: 120 MMHG | DIASTOLIC BLOOD PRESSURE: 72 MMHG | HEIGHT: 68 IN | BODY MASS INDEX: 41.57 KG/M2

## 2018-02-08 DIAGNOSIS — I10 ESSENTIAL HYPERTENSION: ICD-10-CM

## 2018-02-08 DIAGNOSIS — R60.0 BILATERAL LEG EDEMA: Primary | ICD-10-CM

## 2018-02-08 DIAGNOSIS — I48.0 PAROXYSMAL ATRIAL FIBRILLATION: ICD-10-CM

## 2018-02-08 DIAGNOSIS — Z79.01 CURRENT USE OF LONG TERM ANTICOAGULATION: ICD-10-CM

## 2018-02-08 DIAGNOSIS — G47.33 OSA (OBSTRUCTIVE SLEEP APNEA): ICD-10-CM

## 2018-02-08 PROCEDURE — 99999 PR PBB SHADOW E&M-EST. PATIENT-LVL III: CPT | Mod: PBBFAC,,, | Performed by: INTERNAL MEDICINE

## 2018-02-08 PROCEDURE — 99215 OFFICE O/P EST HI 40 MIN: CPT | Mod: S$GLB,,, | Performed by: INTERNAL MEDICINE

## 2018-02-08 PROCEDURE — 93000 ELECTROCARDIOGRAM COMPLETE: CPT | Mod: S$GLB,,, | Performed by: INTERNAL MEDICINE

## 2018-02-08 PROCEDURE — 3008F BODY MASS INDEX DOCD: CPT | Mod: S$GLB,,, | Performed by: INTERNAL MEDICINE

## 2018-02-08 NOTE — LETTER
February 11, 2018      All Webb MD  [address] [facility] [address]           [place] [facility]  [address] [facility]  [place] [address]  Phone: [phone]          Patient: Shiraz Jha   MR Number: [id number]   YOB: 1960   Date of Visit: 2/8/2018       Dear Dr. All Webb:    Thank you for referring Shiraz Jha to me for evaluation. Attached you will find relevant portions of my assessment and plan of care.    If you have questions, please do not hesitate to call me. I look forward to following Shiraz Jha along with you.    Sincerely,    Susan Scott MD    Enclosure  CC:  No Recipients    If you would like to receive this communication electronically, please contact [email or web address] or [phone] to request more information on [facility] [employer] access.    For providers and/or their staff who would like to refer a patient to [facility], please contact us through our one-stop-shop provider referral line, [facility], at [phone].    If you feel you have received this communication in error or would no longer like to receive these types of communications, please e-mail [email or web address]

## 2018-02-08 NOTE — PROGRESS NOTES
Subjective:     Patient ID:  Shiraz Jha is a 58 y.o. male who presents for evaluation of Edema      Problem List:  Aflutter - ablation   Afib - ablation   HTN  QUINTIN  Overweight  Family h/o CAD      HPI:     This is . Shiraz Jha's first visit with me. He is here for evaluation of leg swelling, R > L, first documented in . Treated w furosemide 20 mg a day increased to 40 mg a day in . He reports mild shortness of breath on exertion such as climbing a flight of stairs. He does not report orthopnea or PND.  He has obstructive sleep apnea and has been using CPAP since .   Stress echo : LV 4.7 cm, LVEF 60%, normal RV size and function, no ischemia but he did not reach target heart rate. PA pressure not reported.  On metoprolol and sotalol for atrial fib. Also on rivaroxaban for stroke prevention.  He was treated at  for afib and despite multiple recurrences was not offered an ablation. Underwent RFA for atrial flutter in  and pulm vein isolation for atrial fibrillation. No more recurrences of atrial fib.    Coronary risk factors: He does not have a h/o CAD. He is obese. NonHDL chol was ~140 with a HDL in the upper 30s in . He does not take a statin. He has well treated hypertension. His father had a MI and underwent CABG in his 50s - lived to be 80 yr. He had 3 brothers who all  of non-cardiac causes.      Review of Systems   Constitution: Negative for weakness, malaise/fatigue, weight gain and weight loss.   HENT: Negative for hearing loss and nosebleeds.    Eyes: Positive for visual disturbance.   Cardiovascular: Positive for dyspnea on exertion and leg swelling. Negative for chest pain, claudication, irregular heartbeat, orthopnea, palpitations, paroxysmal nocturnal dyspnea and syncope.   Respiratory: Negative for cough, sputum production and wheezing.    Hematologic/Lymphatic: Does not bruise/bleed easily.   Musculoskeletal: Positive for arthritis, back pain, muscle cramps  and muscle weakness. Negative for falls, joint pain and myalgias.   Gastrointestinal: Negative for abdominal pain, heartburn and melena.   Genitourinary: Positive for frequency. Negative for hematuria and nocturia.   Neurological: Positive for headaches. Negative for dizziness, light-headedness, loss of balance, numbness and paresthesias.   Psychiatric/Behavioral: Negative for depression. The patient is not nervous/anxious.          Current Outpatient Prescriptions   Medication Sig    aspirin 81 MG Chew Take 1 tablet (81 mg total) by mouth once daily.    buPROPion (WELLBUTRIN XL) 300 MG 24 hr tablet Take 300 mg by mouth once daily.      cetirizine (ZYRTEC) 10 MG tablet Take 10 mg by mouth once daily.    desoximetasone (TOPICORT) 0.05 % cream Apply topically 2 (two) times daily. (Patient taking differently: Apply topically as needed. )    dextroamphetamine-amphetamine 30 mg Tab TK 1 T PO  QAM AND 1/2 T IN THE AFTERNOON    diphenhydrAMINE (BENADRYL) 50 MG capsule Take 1 capsule (50 mg total) by mouth as directed. (Patient taking differently: Take 50 mg by mouth 2 (two) times daily. )    furosemide (LASIX) 40 MG tablet Take 1 tablet (40 mg total) by mouth Daily. For fluid in legs    metoprolol tartrate (LOPRESSOR) 25 MG tablet TAKE ONE TABLET BY MOUTH TWICE DAILY    nitroGLYCERIN (NITROSTAT) 0.4 MG SL tablet Place 1 tablet under the tongue daily as needed.    omeprazole (PRILOSEC) 20 MG capsule Take 2 capsules (40 mg total) by mouth once daily. (Patient taking differently: Take 20 mg by mouth once daily. )    rivaroxaban (XARELTO) 20 mg Tab Take 1 tablet (20 mg total) by mouth once daily.    sotalol (BETAPACE) 80 MG tablet TAKE ONE TABLET BY MOUTH TWICE DAILY    tamsulosin (FLOMAX) 0.4 mg Cp24 TAKE TWO CAPSULES BY MOUTH ONCE DAILY    vortioxetine (TRINTELLIX) 20 mg Tab Take 20 mg by mouth once daily.     polyethylene glycol (GOLYTELY,NULYTELY) 236-22.74-6.74 gram suspension Take 4 L by mouth once.  "          Past Medical History:   Diagnosis Date    Allergy     Asthma     Atrial fibrillation     Chronic rhinitis 9/26/2013    DDD (degenerative disc disease) 4/3/2015    Depression     Diabetes mellitus     Fibromyalgia     Gastroesophageal reflux disease without esophagitis 7/14/2017    Hypertension     Sinusitis, acute, maxillary 12/20/2012    Thyroid disease          Social History   Substance Use Topics    Smoking status: Never Smoker    Smokeless tobacco: Never Used    Alcohol use No      Comment: rare         Family History   Problem Relation Age of Onset    Heart disease Father     Cancer Mother      lung    Hypertension Sister     Heart disease Brother     Cirrhosis Brother     Diabetes Brother          Objective:     Physical Exam   Constitutional: He is oriented to person, place, and time. He appears well-developed and well-nourished.   /72   Pulse 76   Ht 5' 8" (1.727 m)   Wt 124.4 kg (274 lb 4 oz)   BMI 41.70 kg/m²      HENT:   Head: Normocephalic and atraumatic.   Neck: No JVD present. Carotid bruit is not present.   Cardiovascular: Normal rate, regular rhythm, S1 normal and S2 normal.  Exam reveals no gallop.    No murmur heard.  Pulses:       Radial pulses are 2+ on the right side, and 2+ on the left side.        Posterior tibial pulses are 2+ on the right side, and 2+ on the left side.   Pulmonary/Chest: Effort normal. He has no wheezes. He has no rales. Chest wall is not dull to percussion.   Abdominal: Soft. There is no splenomegaly or hepatomegaly. There is no tenderness.   Musculoskeletal:        Right lower leg: He exhibits edema.        Left lower leg: He exhibits edema.   Neurological: He is alert and oriented to person, place, and time. Gait normal.   Skin: Skin is warm and dry. No bruising noted. No cyanosis. Nails show no clubbing.   Psychiatric: He has a normal mood and affect. His speech is normal and behavior is normal. Judgment and thought content " normal. Cognition and memory are normal.         Lab Results   Component Value Date    CHOL 181 07/15/2017    CHOL 148 01/02/2014     Lab Results   Component Value Date    HDL 38 (L) 07/15/2017    HDL 29 (L) 01/02/2014     Lab Results   Component Value Date    LDLCALC 107.2 07/15/2017    LDLCALC 91.8 01/02/2014     Lab Results   Component Value Date    TRIG 179 (H) 07/15/2017    TRIG 136 01/02/2014     Lab Results   Component Value Date    CHOLHDL 21.0 07/15/2017    CHOLHDL 19.6 (L) 01/02/2014     Lab Results   Component Value Date    ALT 13 07/14/2017     Lab Results   Component Value Date    ALT 13 07/14/2017    AST 15 07/14/2017    ALKPHOS 77 07/14/2017    BILITOT 0.9 07/14/2017      Lab Results   Component Value Date    CREATININE 1.2 07/15/2017    BUN 13 07/15/2017     07/15/2017    K 3.2 (L) 07/15/2017     07/15/2017    CO2 23 07/15/2017         Assessment and Plan:     Bilateral leg edema  Comments:  I suspect that he has pulmonary hypertension and right ventricular dysfunction. Also r/o venous insufficiency.   Orders:  -     2D echo with color flow doppler; Future  -     Cardiology Lab US Lower Extremity Veins Bilateral; Future    QUINTIN (obstructive sleep apnea)  Comments:  He has used CPAP for several years. Recommend Sleep medicine consult for adequacy of CPAP.   Orders:  -     2D echo with color flow doppler; Future  -     Ambulatory consult to Sleep Disorders    Paroxysmal atrial fibrillation  Comments:  Continue sotalol and FUP w EP.  Orders:  -     IN OFFICE EKG 12-LEAD (to Muse); Future    Current use of long term anticoagulation  Comments:  Take rivaroxaban w evening meal.   No NSAIDs.    Essential hypertension  Comments:  Stable. No change in meds.        Follow-up in about 1 year (around 2/8/2019).

## 2018-02-14 ENCOUNTER — TELEPHONE (OUTPATIENT)
Dept: ELECTROPHYSIOLOGY | Facility: CLINIC | Age: 58
End: 2018-02-14

## 2018-02-19 DIAGNOSIS — I10 ESSENTIAL HYPERTENSION: Primary | ICD-10-CM

## 2018-02-22 ENCOUNTER — CLINICAL SUPPORT (OUTPATIENT)
Dept: CARDIOLOGY | Facility: CLINIC | Age: 58
End: 2018-02-22
Attending: INTERNAL MEDICINE
Payer: COMMERCIAL

## 2018-02-22 DIAGNOSIS — R60.0 BILATERAL LEG EDEMA: ICD-10-CM

## 2018-02-22 DIAGNOSIS — G47.33 OSA (OBSTRUCTIVE SLEEP APNEA): ICD-10-CM

## 2018-02-22 LAB
DIASTOLIC DYSFUNCTION: NO
ESTIMATED PA SYSTOLIC PRESSURE: 30.04
MITRAL VALVE REGURGITATION: NORMAL
RETIRED EF AND QEF - SEE NOTES: 55 (ref 55–65)
TRICUSPID VALVE REGURGITATION: NORMAL

## 2018-02-22 PROCEDURE — 93306 TTE W/DOPPLER COMPLETE: CPT | Mod: S$GLB,,, | Performed by: INTERNAL MEDICINE

## 2018-02-22 PROCEDURE — 93970 EXTREMITY STUDY: CPT | Mod: S$GLB,,, | Performed by: INTERNAL MEDICINE

## 2018-03-01 RX ORDER — SOTALOL HYDROCHLORIDE 80 MG/1
80 TABLET ORAL 2 TIMES DAILY
Qty: 180 TABLET | Refills: 3 | Status: SHIPPED | OUTPATIENT
Start: 2018-03-01 | End: 2018-11-14 | Stop reason: SDUPTHER

## 2018-03-01 RX ORDER — METOPROLOL TARTRATE 25 MG/1
25 TABLET, FILM COATED ORAL 2 TIMES DAILY
Qty: 180 TABLET | Refills: 3 | Status: SHIPPED | OUTPATIENT
Start: 2018-03-01 | End: 2018-11-14 | Stop reason: SDUPTHER

## 2018-03-05 ENCOUNTER — PATIENT MESSAGE (OUTPATIENT)
Dept: CARDIOLOGY | Facility: CLINIC | Age: 58
End: 2018-03-05

## 2018-03-05 DIAGNOSIS — I77.89 ENLARGED THORACIC AORTA: ICD-10-CM

## 2018-03-06 ENCOUNTER — PATIENT MESSAGE (OUTPATIENT)
Dept: CARDIOLOGY | Facility: CLINIC | Age: 58
End: 2018-03-06

## 2018-03-06 NOTE — TELEPHONE ENCOUNTER
Echo normal except for mildly enlarged aorta. Tell pt that is not concerning but will need to repeat an echo when he returns in 1 yr.

## 2018-03-21 ENCOUNTER — OFFICE VISIT (OUTPATIENT)
Dept: INTERNAL MEDICINE | Facility: CLINIC | Age: 58
End: 2018-03-21
Payer: COMMERCIAL

## 2018-03-21 VITALS
BODY MASS INDEX: 41.46 KG/M2 | TEMPERATURE: 98 F | DIASTOLIC BLOOD PRESSURE: 70 MMHG | WEIGHT: 273.56 LBS | HEART RATE: 66 BPM | HEIGHT: 68 IN | SYSTOLIC BLOOD PRESSURE: 102 MMHG

## 2018-03-21 DIAGNOSIS — Z79.01 CURRENT USE OF LONG TERM ANTICOAGULATION: ICD-10-CM

## 2018-03-21 DIAGNOSIS — I77.89 ENLARGED THORACIC AORTA: ICD-10-CM

## 2018-03-21 DIAGNOSIS — E11.9 TYPE 2 DIABETES MELLITUS WITHOUT COMPLICATION, WITHOUT LONG-TERM CURRENT USE OF INSULIN: ICD-10-CM

## 2018-03-21 DIAGNOSIS — G47.30 SLEEP APNEA, UNSPECIFIED TYPE: ICD-10-CM

## 2018-03-21 DIAGNOSIS — E66.01 MORBID OBESITY WITH BMI OF 40.0-44.9, ADULT: ICD-10-CM

## 2018-03-21 DIAGNOSIS — M54.12 CERVICAL RADICULOPATHY: ICD-10-CM

## 2018-03-21 DIAGNOSIS — J31.0 CHRONIC RHINITIS, UNSPECIFIED TYPE: ICD-10-CM

## 2018-03-21 DIAGNOSIS — L30.9 ECZEMA, UNSPECIFIED TYPE: Primary | ICD-10-CM

## 2018-03-21 DIAGNOSIS — I48.0 PAROXYSMAL ATRIAL FIBRILLATION: ICD-10-CM

## 2018-03-21 DIAGNOSIS — K21.9 GASTROESOPHAGEAL REFLUX DISEASE WITHOUT ESOPHAGITIS: ICD-10-CM

## 2018-03-21 DIAGNOSIS — Z98.890 STATUS POST CATHETER ABLATION OF ATRIAL FLUTTER: ICD-10-CM

## 2018-03-21 DIAGNOSIS — I48.92 ATRIAL FLUTTER, UNSPECIFIED TYPE: ICD-10-CM

## 2018-03-21 DIAGNOSIS — I20.89 STABLE ANGINA PECTORIS: ICD-10-CM

## 2018-03-21 DIAGNOSIS — M47.812 FACET ARTHROPATHY, CERVICAL: ICD-10-CM

## 2018-03-21 DIAGNOSIS — F32.89 OTHER DEPRESSION: ICD-10-CM

## 2018-03-21 DIAGNOSIS — I10 ESSENTIAL HYPERTENSION: ICD-10-CM

## 2018-03-21 DIAGNOSIS — Z98.890 STATUS POST CATHETER ABLATION OF ATRIAL FIBRILLATION: ICD-10-CM

## 2018-03-21 DIAGNOSIS — N40.1 BENIGN PROSTATIC HYPERPLASIA WITH LOWER URINARY TRACT SYMPTOMS, SYMPTOM DETAILS UNSPECIFIED: ICD-10-CM

## 2018-03-21 PROBLEM — R07.9 CHEST PAIN: Status: RESOLVED | Noted: 2017-07-14 | Resolved: 2018-03-21

## 2018-03-21 PROBLEM — R20.0 NUMBNESS AND TINGLING OF BOTH LEGS: Status: RESOLVED | Noted: 2017-08-01 | Resolved: 2018-03-21

## 2018-03-21 PROBLEM — R60.0 BILATERAL LEG EDEMA: Status: RESOLVED | Noted: 2017-07-14 | Resolved: 2018-03-21

## 2018-03-21 PROBLEM — Z51.81 ENCOUNTER FOR MONITORING SOTALOL THERAPY: Status: RESOLVED | Noted: 2017-08-01 | Resolved: 2018-03-21

## 2018-03-21 PROBLEM — Z79.899 ENCOUNTER FOR MONITORING SOTALOL THERAPY: Status: RESOLVED | Noted: 2017-08-01 | Resolved: 2018-03-21

## 2018-03-21 PROBLEM — R20.2 NUMBNESS AND TINGLING OF BOTH LEGS: Status: RESOLVED | Noted: 2017-08-01 | Resolved: 2018-03-21

## 2018-03-21 PROCEDURE — 3074F SYST BP LT 130 MM HG: CPT | Mod: CPTII,S$GLB,, | Performed by: FAMILY MEDICINE

## 2018-03-21 PROCEDURE — 99214 OFFICE O/P EST MOD 30 MIN: CPT | Mod: S$GLB,,, | Performed by: FAMILY MEDICINE

## 2018-03-21 PROCEDURE — 3044F HG A1C LEVEL LT 7.0%: CPT | Mod: CPTII,S$GLB,, | Performed by: FAMILY MEDICINE

## 2018-03-21 PROCEDURE — 99999 PR PBB SHADOW E&M-EST. PATIENT-LVL III: CPT | Mod: PBBFAC,,, | Performed by: FAMILY MEDICINE

## 2018-03-21 PROCEDURE — 3078F DIAST BP <80 MM HG: CPT | Mod: CPTII,S$GLB,, | Performed by: FAMILY MEDICINE

## 2018-03-21 RX ORDER — DESOXIMETASONE 0.5 MG/G
CREAM TOPICAL 2 TIMES DAILY
Qty: 60 G | Refills: 11 | Status: SHIPPED | OUTPATIENT
Start: 2018-03-21 | End: 2021-02-09

## 2018-03-21 NOTE — PROGRESS NOTES
Subjective:   Patient ID: Shiraz Jha is a 58 y.o. male.    Chief Complaint: Medication Refill      HPI  Has longstanding dry skin and eczema that responds well to occ corticosteroid cream and is here for refill.    Patient queried and denies any further complaints.    .    ALLERGIES AND MEDICATIONS: updated and reviewed.  Review of patient's allergies indicates:   Allergen Reactions    Iodinated contrast- oral and iv dye Other (See Comments)     Raises BP         Current Outpatient Prescriptions:     aspirin 81 MG Chew, Take 1 tablet (81 mg total) by mouth once daily., Disp: , Rfl: 0    buPROPion (WELLBUTRIN XL) 300 MG 24 hr tablet, Take 300 mg by mouth once daily.  , Disp: , Rfl:     cetirizine (ZYRTEC) 10 MG tablet, Take 10 mg by mouth once daily., Disp: , Rfl:     dextroamphetamine-amphetamine 30 mg Tab, TK 1 T PO  QAM AND 1/2 T IN THE AFTERNOON, Disp: , Rfl: 0    diphenhydrAMINE (BENADRYL) 50 MG capsule, Take 1 capsule (50 mg total) by mouth as directed. (Patient taking differently: Take 50 mg by mouth 2 (two) times daily. ), Disp: 3 capsule, Rfl: 1    furosemide (LASIX) 40 MG tablet, Take 1 tablet (40 mg total) by mouth Daily. For fluid in legs, Disp: 30 tablet, Rfl: 12    metoprolol tartrate (LOPRESSOR) 25 MG tablet, Take 1 tablet (25 mg total) by mouth 2 (two) times daily., Disp: 180 tablet, Rfl: 3    nitroGLYCERIN (NITROSTAT) 0.4 MG SL tablet, Place 1 tablet under the tongue daily as needed., Disp: , Rfl:     omeprazole (PRILOSEC) 20 MG capsule, Take 2 capsules (40 mg total) by mouth once daily. (Patient taking differently: Take 20 mg by mouth once daily. ), Disp: 60 capsule, Rfl: 3    polyethylene glycol (GOLYTELY,NULYTELY) 236-22.74-6.74 gram suspension, Take 4 L by mouth once., Disp: , Rfl:     rivaroxaban (XARELTO) 20 mg Tab, Take 1 tablet (20 mg total) by mouth once daily., Disp: 30 tablet, Rfl: 5    sotalol (BETAPACE) 80 MG tablet, Take 1 tablet (80 mg total) by mouth 2 (two) times  "daily., Disp: 180 tablet, Rfl: 3    tamsulosin (FLOMAX) 0.4 mg Cp24, TAKE TWO CAPSULES BY MOUTH ONCE DAILY, Disp: 180 capsule, Rfl: 0    vortioxetine (TRINTELLIX) 20 mg Tab, Take 20 mg by mouth once daily. , Disp: , Rfl:     desoximetasone (TOPICORT) 0.05 % cream, Apply topically 2 (two) times daily., Disp: 60 g, Rfl: 11    Review of Systems   Constitutional: Negative for activity change, appetite change, chills, diaphoresis, fatigue, fever and unexpected weight change.   HENT: Negative for congestion, ear discharge, ear pain, postnasal drip, rhinorrhea, sneezing and sore throat.    Eyes: Negative for photophobia and discharge.   Respiratory: Negative for cough, chest tightness, shortness of breath and wheezing.    Cardiovascular: Negative for chest pain and palpitations.   Gastrointestinal: Negative for abdominal distention, abdominal pain, diarrhea, nausea and vomiting.   Genitourinary: Negative for dysuria.   Musculoskeletal: Negative for arthralgias and neck pain.   Skin: Negative for rash.   Neurological: Negative for headaches.       Objective:     Vitals:    03/21/18 0950   BP: 102/70   Pulse: 66   Temp: 97.6 °F (36.4 °C)   TempSrc: Oral   Weight: 124.1 kg (273 lb 9.5 oz)   Height: 5' 8" (1.727 m)   PainSc: 0-No pain     Body mass index is 41.6 kg/m².    Physical Exam   Constitutional: He appears well-developed and well-nourished.   HENT:   Head: Normocephalic and atraumatic.   Right Ear: Hearing, tympanic membrane, external ear and ear canal normal. No drainage or swelling. No decreased hearing is noted.   Left Ear: Hearing, tympanic membrane, external ear and ear canal normal. No drainage or swelling. No decreased hearing is noted.   Nose: Nose normal. No rhinorrhea.   Mouth/Throat: Oropharynx is clear and moist. No oropharyngeal exudate, posterior oropharyngeal edema or posterior oropharyngeal erythema.   Eyes: Conjunctivae, EOM and lids are normal. Pupils are equal, round, and reactive to light. Right " eye exhibits no discharge and no exudate. Left eye exhibits no discharge and no exudate. Right conjunctiva is not injected. Left conjunctiva is not injected.   Neck: Trachea normal and full passive range of motion without pain. Normal carotid pulses, no hepatojugular reflux and no JVD present. Carotid bruit is not present. No neck rigidity. No edema and no erythema present. No thyroid mass and no thyromegaly present.   Cardiovascular: Normal rate, regular rhythm and normal heart sounds.    Pulmonary/Chest: Effort normal. No respiratory distress.   Abdominal: Soft. Normal appearance and bowel sounds are normal. There is no tenderness. There is negative Torres's sign.   Lymphadenopathy:     He has no cervical adenopathy.   Neurological: He is alert.   Skin: Skin is warm and dry.   Psychiatric: He has a normal mood and affect. His speech is normal and behavior is normal.       Assessment and Plan:   Shiraz was seen today for medication refill.    Diagnoses and all orders for this visit:    Eczema, unspecified type    Cervical radiculopathy    Other depression    Chronic rhinitis, unspecified type    Essential hypertension    Atrial flutter, unspecified type    Paroxysmal atrial fibrillation    Stable angina pectoris    Status post catheter ablation of atrial fibrillation    Status post catheter ablation of atrial flutter    Enlarged thoracic aorta    Current use of long term anticoagulation    Benign prostatic hyperplasia with lower urinary tract symptoms, symptom details unspecified    Morbid obesity with BMI of 40.0-44.9, adult    Type 2 diabetes mellitus without complication, without long-term current use of insulin    Gastroesophageal reflux disease without esophagitis    Facet arthropathy, cervical    Sleep apnea, unspecified type    Other orders  -     desoximetasone (TOPICORT) 0.05 % cream; Apply topically 2 (two) times daily.        Follow-up in about 2 weeks (around 4/4/2018).    THIS NOTE WILL BE SHARED WITH  THE PATIENT.

## 2018-04-05 DIAGNOSIS — I77.89 ENLARGED THORACIC AORTA: Primary | ICD-10-CM

## 2018-04-05 DIAGNOSIS — I48.0 PAROXYSMAL ATRIAL FIBRILLATION: ICD-10-CM

## 2018-04-10 ENCOUNTER — OFFICE VISIT (OUTPATIENT)
Dept: PAIN MEDICINE | Facility: CLINIC | Age: 58
End: 2018-04-10
Payer: COMMERCIAL

## 2018-04-10 VITALS
SYSTOLIC BLOOD PRESSURE: 135 MMHG | HEART RATE: 69 BPM | DIASTOLIC BLOOD PRESSURE: 88 MMHG | BODY MASS INDEX: 41.63 KG/M2 | WEIGHT: 273.81 LBS

## 2018-04-10 DIAGNOSIS — M54.40 CHRONIC LOW BACK PAIN WITH SCIATICA, SCIATICA LATERALITY UNSPECIFIED, UNSPECIFIED BACK PAIN LATERALITY: ICD-10-CM

## 2018-04-10 DIAGNOSIS — G89.29 CHRONIC LOW BACK PAIN WITH SCIATICA, SCIATICA LATERALITY UNSPECIFIED, UNSPECIFIED BACK PAIN LATERALITY: ICD-10-CM

## 2018-04-10 DIAGNOSIS — M25.552 PAIN OF BOTH HIP JOINTS: ICD-10-CM

## 2018-04-10 DIAGNOSIS — M25.551 PAIN OF BOTH HIP JOINTS: ICD-10-CM

## 2018-04-10 DIAGNOSIS — M51.36 DDD (DEGENERATIVE DISC DISEASE), LUMBAR: Primary | ICD-10-CM

## 2018-04-10 PROCEDURE — 3075F SYST BP GE 130 - 139MM HG: CPT | Mod: CPTII,S$GLB,, | Performed by: NURSE PRACTITIONER

## 2018-04-10 PROCEDURE — 3079F DIAST BP 80-89 MM HG: CPT | Mod: CPTII,S$GLB,, | Performed by: NURSE PRACTITIONER

## 2018-04-10 PROCEDURE — 99999 PR PBB SHADOW E&M-EST. PATIENT-LVL IV: CPT | Mod: PBBFAC,,, | Performed by: NURSE PRACTITIONER

## 2018-04-10 PROCEDURE — 99214 OFFICE O/P EST MOD 30 MIN: CPT | Mod: S$GLB,,, | Performed by: NURSE PRACTITIONER

## 2018-04-10 NOTE — PROGRESS NOTES
Chronic patient Established Note (Follow up visit)      SUBJECTIVE:    Shiraz Jha presents to the clinic for a follow-up appointment for low back pain. This is a new patient to me,  Onset of pain 1 month, pain is pelvis, back with bilateral leg pain, pain is worse while moving, and sitting for long periods of time. Pain is sharp, dull, aching throbbing with movement. Laying flat alleviates pain, pain is worse at the end of the day. He state he has been seeing a chiropractor over the last 2 months with some relief he states he has gotten some relief but continues to hurt.    Since the last visit, Shiraz Jha states the pain has been persistant. Current pain intensity is 3/10.    Pain Disability Index Review:  Last 3 PDI Scores 4/10/2018 4/1/2015 2/10/2015   Pain Disability Index (PDI) 40 38 60       Pain Medications:     - Opioids: None  - Adjuvant Medications: Cymbalta ( Duloxetine)  - Anti-Coagulants: Xarelto  - Others: See Medication Sheet     Opioid Contract: no      report:  Reviewed and consistent with medication use as prescribed.     Pain Procedures:      C7-T1 CERVICAL EPIDURAL STEROID INJECTION   03/24/15     Physical Therapy/Home Exercise: no     Imaging:      Doctor's Imaging MRI Cervical Spine W/O Contrast     IMPRESSION:     1. Spondylotic changes associated with a broad-based moderately prominent disc herniation at the C6-C7 intervertebral disc space lateralized greater towards the left as compared to the right.      2. Spur/Disc herniation complex also lateralized towards the left side at the C5-C6 intervertebral disc, as discussed above.      3. Stable postoperative changes at the C6-C7 intervertebral disc space.     REVIEW OF SYSTEMS:  Allergies:   Review of patient's allergies indicates:   Allergen Reactions    Iodinated contrast- oral and iv dye Other (See Comments)     Raises BP         Current Medications:   Current Outpatient Prescriptions   Medication Sig Dispense Refill    aspirin 81  MG Chew Take 1 tablet (81 mg total) by mouth once daily.  0    buPROPion (WELLBUTRIN XL) 300 MG 24 hr tablet Take 300 mg by mouth once daily.        cetirizine (ZYRTEC) 10 MG tablet Take 10 mg by mouth once daily.      dextroamphetamine-amphetamine 30 mg Tab TK 1 T PO  QAM AND 1/2 T IN THE AFTERNOON  0    diphenhydrAMINE (BENADRYL) 50 MG capsule Take 1 capsule (50 mg total) by mouth as directed. (Patient taking differently: Take 50 mg by mouth 2 (two) times daily. ) 3 capsule 1    furosemide (LASIX) 40 MG tablet Take 1 tablet (40 mg total) by mouth Daily. For fluid in legs 30 tablet 12    metoprolol tartrate (LOPRESSOR) 25 MG tablet Take 1 tablet (25 mg total) by mouth 2 (two) times daily. 180 tablet 3    nitroGLYCERIN (NITROSTAT) 0.4 MG SL tablet Place 1 tablet under the tongue daily as needed.      omeprazole (PRILOSEC) 20 MG capsule Take 2 capsules (40 mg total) by mouth once daily. (Patient taking differently: Take 20 mg by mouth once daily. ) 60 capsule 3    polyethylene glycol (GOLYTELY,NULYTELY) 236-22.74-6.74 gram suspension Take 4 L by mouth once.      rivaroxaban (XARELTO) 20 mg Tab Take 1 tablet (20 mg total) by mouth once daily. 30 tablet 5    sotalol (BETAPACE) 80 MG tablet Take 1 tablet (80 mg total) by mouth 2 (two) times daily. 180 tablet 3    tamsulosin (FLOMAX) 0.4 mg Cp24 TAKE TWO CAPSULES BY MOUTH ONCE DAILY 180 capsule 0    vortioxetine (TRINTELLIX) 20 mg Tab Take 20 mg by mouth once daily.       desoximetasone (TOPICORT) 0.05 % cream Apply topically 2 (two) times daily. 60 g 11     No current facility-administered medications for this visit.        REVIEW OF SYSTEMS:    GENERAL:  No weight loss, malaise or fevers.  HEENT:  Negative for frequent or significant headaches.  NECK:  Negative for lumps, goiter, pain and significant neck swelling.  RESPIRATORY:  Negative for cough, wheezing or shortness of breath.   CARDIOVASCULAR:  Negative for chest pain, leg swelling or palpitations.  + Afib  GI:  Negative for abdominal discomfort, blood in stools or black stools or change in bowel habits.  MUSCULOSKELETAL:  See HPI.  SKIN:  Negative for lesions, rash, and itching.  PSYCH:  + for sleep disturbance, mood disorder and recent psychosocial stressors. + depression, no suicidal ideations.   HEMATOLOGY/LYMPHOLOGY:  Negative for prolonged bleeding, bruising easily or swollen nodes.  NEURO:   No history of headaches, syncope, paralysis, seizures or tremors.  All other reviewed and negative other than HPI.    Past Medical History:  Past Medical History:   Diagnosis Date    Allergy     Asthma     Atrial fibrillation     Chronic rhinitis 9/26/2013    DDD (degenerative disc disease) 4/3/2015    Depression     Diabetes mellitus     Fibromyalgia     Gastroesophageal reflux disease without esophagitis 7/14/2017    Hypertension     Sinusitis, acute, maxillary 12/20/2012    Thyroid disease        Past Surgical History:  Past Surgical History:   Procedure Laterality Date    CERVICAL SPINE SURGERY      GASTRIC BYPASS      SINUS SURGERY  2000    [facility]    TONSILLECTOMY         Family History:  Family History   Problem Relation Age of Onset    Heart disease Father     Cancer Mother      lung    Hypertension Sister     Heart disease Brother     Cirrhosis Brother     Diabetes Brother        Social History:  Social History     Social History    Marital status: Single     Spouse name: N/A    Number of children: N/A    Years of education: N/A     Social History Main Topics    Smoking status: Never Smoker    Smokeless tobacco: Never Used    Alcohol use No      Comment: rare    Drug use: No    Sexual activity: Yes     Partners: Female     Other Topics Concern    None     Social History Narrative    From [place], lives alone w/2 dogs.    Dogs are his kids.    Works PT --  at [employer], on ssdi from neck and back/depression.       OBJECTIVE:    /88   Pulse 69   Wt 124.2 kg  (273 lb 13 oz)   BMI 41.63 kg/m²     PHYSICAL EXAMINATION:    General appearance: Well appearing, in no acute distress, alert and oriented x3.  Morbid Obesity  Psych:  Mood and affect appropriate.  Skin: Skin color, texture, turgor normal, no rashes or lesions, in both upper and lower body.  Head/face:  Atraumatic, normocephalic. No palpable lymph nodes  Neck: No pain to palpation over the cervical paraspinous muscles. Spurling Negative. No pain with neck flexion, extension, or lateral flexion. .  Cor: RRR  Pulm: CTA  GI: Abdomen soft and non-tender.  Back: Straight leg raising in the sitting and supine positions is + to radicular pain on the right leg.  + pain to palpation over the L- spine or costovertebral angles. Normal range of motion with pain reproduction.  + Facet Loading Bilaterally.  Extremities: Peripheral joint ROM is full and pain free without obvious instability or laxity in all four extremities. No deformities, edema, or skin discoloration. Good capillary refill.  Musculoskeletal: Shoulder, hip, sacroiliac and knee provocative maneuvers are negative. Bilateral upper and lower extremity strength is normal and symmetric.  No atrophy or tone abnormalities are noted.  Neuro: Bilateral upper and lower extremity coordination and muscle stretch reflexes are physiologic and symmetric.  Plantar response are downgoing. No loss of sensation is noted.  Gait: Normal.    ASSESSMENT: 58 y.o. year old male with low back and leg  pain, consistent with      Diagnosis:    1. DDD (degenerative disc disease), lumbar  X-Ray Lumbar Spine AP And Lateral    MRI Lumbar Spine Without Contrast   2. Pain of both hip joints  X-Ray Hips Bilateral 2 View Inc AP Pelvis    X-Ray Lumbar Spine AP And Lateral   3. Chronic low back pain with sciatica, sciatica laterality unspecified, unspecified back pain laterality  MRI Lumbar Spine Without Contrast         PLAN:     - I have stressed the importance of physical activity and a home  exercise plan to help with pain and improve health.  - Patient can continue with medications for now since they are providing benefits, using them appropriately, and without side effects  - Counseled patient regarding the importance of activity modification, constant sleeping habits and physical therapy.  -x-ray lumbar and pelvis  -MRI Lumbar  -RTC 2-3 weeks.  -The above plan and management options were discussed at length with patient. Patient is in agreement with the above and verbalized understanding. Dr. Lyman   was consulted on this patient  and agrees with this plan.       LATRICE Perez  Interventional Pain Management        04/10/2018

## 2018-04-12 DIAGNOSIS — N40.0 BENIGN NON-NODULAR PROSTATIC HYPERPLASIA WITHOUT LOWER URINARY TRACT SYMPTOMS: ICD-10-CM

## 2018-04-12 RX ORDER — TAMSULOSIN HYDROCHLORIDE 0.4 MG/1
CAPSULE ORAL
Qty: 30 CAPSULE | Refills: 0 | Status: SHIPPED | OUTPATIENT
Start: 2018-04-12 | End: 2018-05-21 | Stop reason: SDUPTHER

## 2018-04-24 ENCOUNTER — PATIENT MESSAGE (OUTPATIENT)
Dept: PAIN MEDICINE | Facility: CLINIC | Age: 58
End: 2018-04-24

## 2018-04-24 ENCOUNTER — HOSPITAL ENCOUNTER (OUTPATIENT)
Dept: RADIOLOGY | Facility: HOSPITAL | Age: 58
Discharge: HOME | End: 2018-04-24
Attending: NURSE PRACTITIONER
Payer: COMMERCIAL

## 2018-04-24 DIAGNOSIS — M25.551 PAIN OF BOTH HIP JOINTS: ICD-10-CM

## 2018-04-24 DIAGNOSIS — M54.40 CHRONIC LOW BACK PAIN WITH SCIATICA, SCIATICA LATERALITY UNSPECIFIED, UNSPECIFIED BACK PAIN LATERALITY: ICD-10-CM

## 2018-04-24 DIAGNOSIS — M25.552 PAIN OF BOTH HIP JOINTS: ICD-10-CM

## 2018-04-24 DIAGNOSIS — G89.29 CHRONIC LOW BACK PAIN WITH SCIATICA, SCIATICA LATERALITY UNSPECIFIED, UNSPECIFIED BACK PAIN LATERALITY: ICD-10-CM

## 2018-04-24 DIAGNOSIS — M51.36 DDD (DEGENERATIVE DISC DISEASE), LUMBAR: ICD-10-CM

## 2018-04-24 PROCEDURE — 72100 X-RAY EXAM L-S SPINE 2/3 VWS: CPT | Mod: 26,,, | Performed by: RADIOLOGY

## 2018-04-24 PROCEDURE — 73521 X-RAY EXAM HIPS BI 2 VIEWS: CPT | Mod: TC,FY

## 2018-04-24 PROCEDURE — 72148 MRI LUMBAR SPINE W/O DYE: CPT | Mod: 26,,, | Performed by: RADIOLOGY

## 2018-04-24 PROCEDURE — 72148 MRI LUMBAR SPINE W/O DYE: CPT | Mod: TC

## 2018-04-24 PROCEDURE — 73521 X-RAY EXAM HIPS BI 2 VIEWS: CPT | Mod: 26,,, | Performed by: RADIOLOGY

## 2018-04-24 PROCEDURE — 72100 X-RAY EXAM L-S SPINE 2/3 VWS: CPT | Mod: TC,FY

## 2018-04-25 ENCOUNTER — PATIENT MESSAGE (OUTPATIENT)
Dept: PAIN MEDICINE | Facility: CLINIC | Age: 58
End: 2018-04-25

## 2018-04-25 NOTE — PROGRESS NOTES
Chronic patient Established Note (Follow up visit)      SUBJECTIVE:    Shiraz Jha presents to the clinic for a follow-up appointment for imaging results and low back denise, pain has been ongoing, L>R pain is worse with standing for  Long periods of time, MRI and x-rays show @ L3-4 enlarged facet joints and the lamina and mild hypertrophic changes of the ligamentum flava resulting in at least a moderate central canal spinal stenosis, @ L4-5 there is enlarged lamina and the facet joints and mild hypertrophic changes of the facet joints.  This results in at least a mild central canal spinal stenosis. I will s/f a Bilateral L3-4-5 MBB to help with axial low back pain.  Since the last visit, Shiraz Jha states the pain has been persistant. Current pain intensity is 5/10.    Pain Disability Index Review:  Last 3 PDI Scores 2018 4/10/2018 2015   Pain Disability Index (PDI) 56 40 38       Pain Medications:     - Opioids: None  - Adjuvant Medications: Cymbalta ( Duloxetine)  - Anti-Coagulants: Xarelto  - Others: See Medication Sheet     Opioid Contract: no      report:  Reviewed and consistent with medication use as prescribed.     Pain Procedures:  C7-T1 CERVICAL EPIDURAL STEROID INJECTION   03/24/15     Physical Therapy/Home Exercise: no     [facility]agin[facility] X-Ray Lumbar Spine AP And Lateral    Narrative     EXAMINATION:  XR LUMBAR SPINE AP AND LATERAL    CLINICAL HISTORY:  Low back pain, <6wks, no red flags, no prior management;Other intervertebral disc degeneration, lumbar region    TECHNIQUE:  AP, lateral and spot images were performed of the lumbar spine.    COMPARISON:  None    FINDINGS:  The vertebral body heights and intervertebral disc spaces are fairly well maintained.  No fracture.  No marrow replacement process.  SI joints unremarkable.  L4-5 and L5-S1 facet arthropathy noted.   Impression       Lumbar spondylosis.      Electronically signed by: Moreno Adkins MD  Date: 2018  Time: 12:41     Encounter     View Encounter          Signed by     Signed Credentials Date/Time  Phone Pager   MORENO ADKINS MD 4/24/2018 12:41 [phone]   Reviewed By     ZAIDA Jacobs on 4/25/2018 08:13   Exam Details     Performed Procedure Technologist Supporting Staff Performing Physician   X-Ray Lumbar Spine Ap And Lateral Samson Jorgensen, RT       Appointment Date/Status Modality Department    4/24/2018     Arrived [facility] XR-A [facility] XRAY OP       Begin Exam End Exam  End Exam Questionnaires   4/24/2018 11:40 AM 4/24/2018 11:46 AM  IMAGING END ALL     4/24/18 X-Ray Hips Bilateral 2 View Inc AP Pelvis    Narrative     EXAMINATION:  XR HIPS BILATERAL 2 VIEW INCL AP PELVIS    CLINICAL HISTORY:  Pain in right hip    TECHNIQUE:  AP view of the pelvis and frogleg lateral views of both hips were performed.    COMPARISON:  None.    FINDINGS:  No fractures identified. The alignment is within normal limits.  No evidence of a marrow replacement process.Probable phlebolith in the pelvis.  SI joints unremarkable.  The hip joint spaces are fairly well maintained.   Impression       No acute findings.      Electronically signed by: Moreno Adkins MD  Date: 04/24/2018  Time: 12:42    Encounter     View Encounter          Signed by     Signed Credentials Date/Time  Phone Pager   MORENO ADKINS MD 4/24/2018 12:42 [phone]   Reviewed By     ZAIDA Jacobs on 4/25/2018 08:15   Exam Details     Performed Procedure Technologist Supporting Staff Performing Physician   X-Ray Hips Bilateral 2 View Inc AP Pelvis Samson Jorgensen, RT       Appointment Date/Status Modality Department    4/24/2018     Arrived [facility] XR-A [facility] XRAY OP       Begin Exam End Exam  End Exam Questionnaires   4/24/2018 11:40 AM 4/24/2018 11:44 AM  IMAGING END ALL      Reason For Exam   Priority: Routine   Dx: Pain of both hip joints [M25.551, M25.552 (ICD-10-CM)]   Order Report      Order Details     4/24/18 MRI  Lumbar Spine Without Contrast    Narrative     EXAMINATION:  MRI LUMBAR SPINE WITHOUT CONTRAST    CLINICAL HISTORY:  Low back pain, <6wks, no red flags, no prior management; Other intervertebral disc degeneration, lumbar region    TECHNIQUE:  Multiplanar, multisequence MR images were acquired from the thoracolumbar junction to the sacrum without the administration of contrast.    COMPARISON:  None.    FINDINGS:  There is normal lumbar lordosis.  No spondylolisthesis or spondylolysis.  There is no marrow edema throughout the vertebral bodies to suggest acute fractures or marrow replacing processes throughout the lumbar spine.  The tip of the conus is at T12-L1 disc space.  Paraspinal soft tissues are unremarkable.  On the sagittal images there is suggestion of a distended urinary bladder.    L5-S1: There is normal intervertebral disc height no disc herniations or significant central canal spinal stenosis.  There is mild bilateral facet arthropathy.  No significant foraminal stenosis.    L4-5: Normal intervertebral disc height without soft tissue disc herniations.  There is enlarged lamina and the facet joints and mild hypertrophic changes of the facet joints.  This results in at least a mild central canal spinal stenosis.    L3-4: Normal intervertebral disc height without soft tissue disc herniations.  There is enlarged facet joints and the lamina and mild hypertrophic changes of the ligamentum flava resulting in at least a moderate central canal spinal stenosis.    L2-3: Normal intervertebral disc height without soft tissue disc herniations.  There is hypertrophic changes of the dorsal elements including the lamina and the facet joints and mild hypertrophic changes of the ligamentum flava resulting in moderate central canal spinal stenosis.    L1-2: No disc herniations.  Hypertrophic changes of the dorsal elements including the lamina and the facet joints resulting in mild central canal stenosis.    T12-L1: No disc  herniations or spinal stenosis or foraminal stenosis.  There is mild marginal anterior spondylotic osteophytes.    It should be noted that the CSF space within the thecal sac at the L1-2, L2-3 L3-4 disc spaces is compromised from the spinal stenotic changes.   Impression       1. At least moderate stenotic changes at the L2-3 and L3-4 disc spaces and mild stenosis at the L1-2 and L4-5 disc spaces, mainly from hypertrophic changes of the dorsal elements.  It is possible that these findings most likely on a congenital basis.  2. No significant disc herniations.  3. Distended urinary bladder.      Electronically signed by: Emiliano Pimentel MD  Date: 04/24/2018  Time: 11:55    Encounter     View Encounter          Signed by     Signed Credentials Date/Time  Phone Pager   EMILIANO PIMENTEL MD 4/24/2018 11:55 [phone]    Reviewed By     ZAIDA Jacobs on 4/25/2018 08:13   Exam Details     Performed Procedure Technologist Supporting Staff Performing Physician   MRI Lumbar Spine Without Contrast Netta Rodgers, RT        Appointment Date/Status Modality Department    4/24/2018     Arrived [facility] MRI1 [facility] MRI       Begin Exam End Exam Begin Exam Questionnaires End Exam Questionnaires   4/24/2018 11:09 AM 4/24/2018 11:41 AM MRI TECH NAVIGATOR QUESTIONS IMAGING END ALL             [facility] MRI Cervical Spine W/O Contrast     IMPRESSION:     1. Spondylotic changes associated with a broad-based moderately prominent disc herniation at the C6-C7 intervertebral disc space lateralized greater towards the left as compared to the right.      2. Spur/Disc herniation complex also lateralized towards the left side at the C5-C6 intervertebral disc, as discussed above.      3. Stable postoperative changes at the C6-C7 intervertebral disc space.  Allergies:   Review of patient's allergies indicates:   Allergen Reactions    Iodinated contrast- oral and iv dye Other (See Comments)     Raises BP         Current Medications:    Current Outpatient Prescriptions   Medication Sig Dispense Refill    aspirin 81 MG Chew Take 1 tablet (81 mg total) by mouth once daily.  0    buPROPion (WELLBUTRIN XL) 300 MG 24 hr tablet Take 300 mg by mouth once daily.        cetirizine (ZYRTEC) 10 MG tablet Take 10 mg by mouth once daily.      dextroamphetamine-amphetamine 30 mg Tab TK 1 T PO  QAM AND 1/2 T IN THE AFTERNOON  0    diphenhydrAMINE (BENADRYL) 50 MG capsule Take 1 capsule (50 mg total) by mouth as directed. (Patient taking differently: Take 50 mg by mouth 2 (two) times daily. ) 3 capsule 1    furosemide (LASIX) 40 MG tablet Take 1 tablet (40 mg total) by mouth Daily. For fluid in legs 30 tablet 12    metoprolol tartrate (LOPRESSOR) 25 MG tablet Take 1 tablet (25 mg total) by mouth 2 (two) times daily. 180 tablet 3    nitroGLYCERIN (NITROSTAT) 0.4 MG SL tablet Place 1 tablet under the tongue daily as needed.      omeprazole (PRILOSEC) 20 MG capsule Take 2 capsules (40 mg total) by mouth once daily. (Patient taking differently: Take 20 mg by mouth once daily. ) 60 capsule 3    polyethylene glycol (GOLYTELY,NULYTELY) 236-22.74-6.74 gram suspension Take 4 L by mouth once.      rivaroxaban (XARELTO) 20 mg Tab Take 1 tablet (20 mg total) by mouth once daily. 30 tablet 5    sotalol (BETAPACE) 80 MG tablet Take 1 tablet (80 mg total) by mouth 2 (two) times daily. 180 tablet 3    tamsulosin (FLOMAX) 0.4 mg Cp24 TAKE 2 ONCE DAILY 30 capsule 0    vortioxetine (TRINTELLIX) 20 mg Tab Take 20 mg by mouth once daily.       desoximetasone (TOPICORT) 0.05 % cream Apply topically 2 (two) times daily. 60 g 11     No current facility-administered medications for this visit.        REVIEW OF SYSTEMS:    GENERAL:  No weight loss, malaise or fevers.  HEENT:  Negative for frequent or significant headaches.  NECK:  Negative for lumps, goiter, pain and significant neck swelling.  RESPIRATORY:  Negative for cough, wheezing or shortness of breath.    CARDIOVASCULAR:  Negative for chest pain, leg swelling or palpitations. + Afib  GI:  Negative for abdominal discomfort, blood in stools or black stools or change in bowel habits.  MUSCULOSKELETAL:  See HPI.  SKIN:  Negative for lesions, rash, and itching.  PSYCH:  + for sleep disturbance, mood disorder and recent psychosocial stressors. + depression, no suicidal ideations.   HEMATOLOGY/LYMPHOLOGY:  Negative for prolonged bleeding, bruising easily or swollen nodes.  NEURO:   No history of headaches, syncope, paralysis, seizures or tremors.  All other reviewed and negative other than HPI.    Past Medical History:  Past Medical History:   Diagnosis Date    Allergy     Asthma     Atrial fibrillation     Chronic rhinitis 9/26/2013    DDD (degenerative disc disease) 4/3/2015    Depression     Diabetes mellitus     Fibromyalgia     Gastroesophageal reflux disease without esophagitis 7/14/2017    Hypertension     Sinusitis, acute, maxillary 12/20/2012    Thyroid disease        Past Surgical History:  Past Surgical History:   Procedure Laterality Date    CERVICAL SPINE SURGERY      GASTRIC BYPASS      SINUS SURG[facility]  2000    [facility]    TONSILLECTOMY         Family History:  Family History   Problem Relation Age of Onset    Heart disease Father     Cancer Mother      lung    Hypertension Sister     Heart disease Brother     Cirrhosis Brother     Diabetes Brother        Social History:  Social History     Social History    Marital status: Single     Spouse name: N/A    Number of children: N/A    Years of education: N/A     Social History Main Topics    Smoking status: Never Smoker    Smokeless tobacco: Never Used    Alcohol use No      Comment: rare    Drug use: No    Sexual activity: Yes     Partners: Female     Other Topics Concern    None     Social History Narrative    From [place], lives alone w/2 dogs.    Dogs are his kids.    Works PT --  at [employer], on ssdi from neck and  back/depression.       OBJECTIVE:    /72   Pulse 65   Wt 124.7 kg (274 lb 14.6 oz)   BMI 41.80 kg/m²     PHYSICAL EXAMINATION:    General appearance: Well appearing, in no acute distress, alert and oriented x3.  Psych:  Mood and affect appropriate.  Skin: Skin color, texture, turgor normal, no rashes or lesions, in both upper and lower body.  Head/face:  Atraumatic, normocephalic. No palpable lymph nodes  Neck: No pain to palpation over the cervical paraspinous muscles. Spurling Negative. No pain with neck flexion, extension, or lateral flexion. .  Cor: RRR  Pulm: CTA  GI: Abdomen soft and non-tender.  Back: Straight leg raising in the sitting and supine positions is negative to radicular pain. + pain to palpation over the L- spine. + Facet Loading Bilaterally.   Normal range of motion with pain reproduction. Positive FABERE, Yeoman's and Galensen test on the both side.  Extremities: Peripheral joint ROM is full and pain free without obvious instability or laxity in all four extremities. No deformities, edema, or skin discoloration. Good capillary refill.  Musculoskeletal: Shoulder, hip, sacroiliac and knee provocative maneuvers are negative. Bilateral upper and lower extremity strength is normal and symmetric.  No atrophy or tone abnormalities are noted.  Neuro: Bilateral upper and lower extremity coordination and muscle stretch reflexes are physiologic and symmetric.  Plantar response are downgoing. No loss of sensation is noted.  Gait: Normal.    ASSESSMENT: 58 y.o. year old male with axial low back pain     Diagnosis:    1. DDD (degenerative disc disease), lumbar     2. Facet arthropathy, multilevel     3. Facet hypertrophy of lumbosacral region           PLAN:     - I have stressed the importance of physical activity and a home exercise plan to help with pain and improve health.  - Patient can continue with medications for now since they are providing benefits, using them appropriately, and without  side effects.  - Previous imaging was reviewed and discussed with the patient today.   - Schedule for a Diagnostic/Therapeutic Medial branch block at Left/ Right L3,4, and L5 to help with axial low back pain and progress with a home exercise program.  -I have explained the risks, benefits, and alternatives of the procedure in detail. The patient voices understanding and all questions have been answered. The patient agrees to proceed as planned. Written Consent obtained.   - Counseled patient regarding the importance of activity modification, constant sleeping habits and physical therapy.  -RTC 2-3 weeks following procedure.  -The above plan and management options were discussed at length with patient. Patient is in agreement with the above and verbalized understanding. Dr. Lyman   was consulted on this patient  and agrees with this plan.      Bairon Campos NP-ANGELA  Interventional Pain Management      04/26/2018

## 2018-04-26 ENCOUNTER — OFFICE VISIT (OUTPATIENT)
Dept: PAIN MEDICINE | Facility: CLINIC | Age: 58
End: 2018-04-26
Payer: COMMERCIAL

## 2018-04-26 VITALS
HEART RATE: 65 BPM | WEIGHT: 274.94 LBS | SYSTOLIC BLOOD PRESSURE: 117 MMHG | DIASTOLIC BLOOD PRESSURE: 72 MMHG | BODY MASS INDEX: 41.8 KG/M2

## 2018-04-26 DIAGNOSIS — M47.819 FACET ARTHROPATHY, MULTILEVEL: ICD-10-CM

## 2018-04-26 DIAGNOSIS — M51.36 DDD (DEGENERATIVE DISC DISEASE), LUMBAR: Primary | ICD-10-CM

## 2018-04-26 DIAGNOSIS — M47.817 FACET HYPERTROPHY OF LUMBOSACRAL REGION: ICD-10-CM

## 2018-04-26 PROCEDURE — 99213 OFFICE O/P EST LOW 20 MIN: CPT | Mod: S$GLB,,, | Performed by: NURSE PRACTITIONER

## 2018-04-26 PROCEDURE — 99999 PR PBB SHADOW E&M-EST. PATIENT-LVL IV: CPT | Mod: PBBFAC,,, | Performed by: NURSE PRACTITIONER

## 2018-04-26 PROCEDURE — 3078F DIAST BP <80 MM HG: CPT | Mod: CPTII,S$GLB,, | Performed by: NURSE PRACTITIONER

## 2018-04-26 PROCEDURE — 3074F SYST BP LT 130 MM HG: CPT | Mod: CPTII,S$GLB,, | Performed by: NURSE PRACTITIONER

## 2018-04-27 ENCOUNTER — TELEPHONE (OUTPATIENT)
Dept: PAIN MEDICINE | Facility: HOSPITAL | Age: 58
End: 2018-04-27

## 2018-04-30 NOTE — DISCHARGE INSTRUCTIONS
Home Care Instructions Pain Management:    1.  DIET:    You may resume your normal diet today.    2.  BATHING:    You may shower with luke warm water.    3.  DRESSING:    You may remove your bandage today.    4.  ACTIVITY LEVEL:      You may resume your normal activities 24 hours after your procedure.    5.  MEDICATIONS:    You may resume your normal medications today.    6.  SPECIAL INSTRUCTIONS:    No heat to the injection site for 24 hours including bath or shower, heating pad, moist heat or hot tubs.    Use an ice pack to the injection site for any pain or discomfort.  Apply ice packs for 20 minute intervals as needed.    If you have received any sedatives by mouth today, you can not drive for 12 hours.    If you have received sedation through an IV, you can not drive for 24 hours.    PLEASE CALL YOUR DOCTOR FOR THE FOLLOWIN.  Redness or swelling around the injection site.  2.  Fever of 101 degrees.  3.  Drainage (pus) from the injection site.  4.  For any continuous bleeding (some dried blood over the incision is normal.)    FOR EMERGENCIES:    If any unusual problems or difficulties occur during clinic hours, call [phone] or dial [phone].    Follow up with with your physician in 2-3 weeks.       Procedural Sedation  Procedural sedation is medicine to ease discomfort, pain, and anxiety during a procedure. The medicine is often given through an IV (intravenous) line in your arm or hand. In some cases, the medicine may be taken by mouth or inhaled. While you are under sedation, you will likely be awake. But you may not remember anything afterward.  Why procedural sedation is used  Sedation is used for many types of procedures. The goal is to reduce pain, anxiety, and stressful memories of a procedure. It can also help your healthcare provider treat you. For example, having a broken bone fixed may be easier if you feel relaxed.  Procedural sedation is used only for short, basic procedures. It is not used  for complex surgeries. Some procedures that use this type of sedation include:  · Dental surgery  · Breast biopsy, to take a sample of breast tissue  · Endoscopy, to look at gastrointestinal problems  · Bronchoscopy, to check for lung problems  · Bone or joint realignment, to fix a broken bone or dislocated joint  · Minor foot or skin surgery  · Electrical cardioversion, to restore a normal heart rhythm  · Lumbar puncture, to assess neurological disease  Risks of procedural sedation  Procedural sedation has some risks and possible side effects, such as:  · Headache  · Nausea and vomiting  · Unpleasant memory of the procedure  · Lowered rate of breathing  · Changes in heart rate and blood pressure (rare)  · Inhalation of stomach contents into your lungs (rare)  Side effects will likely go away shortly after the procedure. Your healthcare team will watch your heart rate and breathing during your sedation. This is to help prevent problems.  Your own risks may vary based on your age and your overall health. They also depend on the type of sedation you are given. Talk with your healthcare provider about the risks that apply most to you.  Getting ready for procedural sedation  Talk with your healthcare provider about how to get ready for your procedure. Tell him or her about all the medicines you take. This includes over-the-counter medicines such as ibuprofen. It also includes vitamins, herbs, and other supplements. You may need to stop taking some medicines before the procedure, such as blood thinners and aspirin. If you smoke, you should stop to lessen the chance of a lung issue. Talk with your healthcare provider if you need help to stop smoking.  Tell your healthcare provider if you:  · Have had any problems in the past with sedation or anesthesia  · Have had any recent changes in your health, such as an infection or fever  · Are pregnant or think you may be pregnant  Also be sure to:  · Ask a family member or friend  to take you home after the procedure. You cant drive on the day you receive sedation.  · Not eat or drink after midnight the night before your procedure, if advised.  · Not plan on making any important decisions, such as financial or legal, for the day after you receive the sedation.  · Follow all other instructions from your healthcare provider.  During your procedural sedation  You may have your procedure in a hospital or a medical clinic. Sedation is done by a trained healthcare provider. In general, you can expect the following:  · You will be given medicine through an IV line in your arm or hand. Or you may receive a shot. The medicine may also be given by mouth. Or you may inhale it through a mask.  · If you receive medicine through an IV, you may feel the effects very quickly. You will start to feel relaxed and drowsy.  · During the procedure, your heart rate, breathing, and blood pressure will be closely watched. Your breathing and blood pressure may decrease a little. But you will likely not need help with your breathing. You may receive a little extra oxygen through a mask or through some soft plastic prongs underneath your nose.  · You will probably be awake the entire time. If you do fall asleep, you should be easy to wake up, if needed. You should feel little or no pain.  · When your procedure is over, the sedative medicine will be stopped.  After your procedural sedation  You will begin to feel more awake and aware. But you will likely be drowsy for a while afterward. You will be closely watched as you become more alert. You may have a faint memory of the procedure. Or you may not remember it at all.  You should be able to return home within an hour or two after your procedure. Plan to have someone stay with you for a few hours. Side effects such as headache and nausea may go away quickly. Tell your healthcare provider if they continue.  Dont drive or make any important decisions for at least 24  hours. Be sure to follow all after-care instructions.     When to call your healthcare provider  Have someone call your healthcare provider right away if you have any of these:  · Drowsiness that gets worse  · Weakness or dizziness that gets worse  · Repeated vomiting  · You cant be awakened  · Severe or ongoing pain from the procedure, not relieved by the pain medicine   Date Last Reviewed: 2/1/2017  © 5224-5180 [employer]. [address] [place] [address], [place] [address]. All rights reserved. This information is not intended as a substitute for professional medical care. Always follow your healthcare professional's instructions.

## 2018-05-01 ENCOUNTER — SURGERY (OUTPATIENT)
Age: 58
End: 2018-05-01

## 2018-05-01 ENCOUNTER — HOSPITAL ENCOUNTER (OUTPATIENT)
Facility: HOSPITAL | Age: 58
Discharge: HOME | End: 2018-05-01
Attending: ANESTHESIOLOGY | Admitting: ANESTHESIOLOGY
Payer: COMMERCIAL

## 2018-05-01 VITALS
BODY MASS INDEX: 41.68 KG/M2 | OXYGEN SATURATION: 99 % | RESPIRATION RATE: 17 BRPM | HEIGHT: 68 IN | SYSTOLIC BLOOD PRESSURE: 111 MMHG | WEIGHT: 275 LBS | HEART RATE: 61 BPM | DIASTOLIC BLOOD PRESSURE: 67 MMHG | TEMPERATURE: 98 F

## 2018-05-01 DIAGNOSIS — M47.9 OSTEOARTHRITIS OF SPINE, UNSPECIFIED SPINAL OSTEOARTHRITIS COMPLICATION STATUS, UNSPECIFIED SPINAL REGION: Primary | ICD-10-CM

## 2018-05-01 DIAGNOSIS — M47.819 SPONDYLOSIS WITHOUT MYELOPATHY: ICD-10-CM

## 2018-05-01 DIAGNOSIS — G89.29 CHRONIC PAIN: ICD-10-CM

## 2018-05-01 PROCEDURE — 64493 INJ PARAVERT F JNT L/S 1 LEV: CPT | Mod: 50,,, | Performed by: ANESTHESIOLOGY

## 2018-05-01 PROCEDURE — 64495 INJ PARAVERT F JNT L/S 3 LEV: CPT | Mod: 50 | Performed by: ANESTHESIOLOGY

## 2018-05-01 PROCEDURE — 64494 INJ PARAVERT F JNT L/S 2 LEV: CPT | Mod: 50,,, | Performed by: ANESTHESIOLOGY

## 2018-05-01 PROCEDURE — 99152 MOD SED SAME PHYS/QHP 5/>YRS: CPT | Mod: ,,, | Performed by: ANESTHESIOLOGY

## 2018-05-01 PROCEDURE — 63600175 PHARM REV CODE 636 W HCPCS: Performed by: ANESTHESIOLOGY

## 2018-05-01 PROCEDURE — 25000003 PHARM REV CODE 250: Performed by: ANESTHESIOLOGY

## 2018-05-01 PROCEDURE — 64493 INJ PARAVERT F JNT L/S 1 LEV: CPT | Mod: 50 | Performed by: ANESTHESIOLOGY

## 2018-05-01 PROCEDURE — 64494 INJ PARAVERT F JNT L/S 2 LEV: CPT | Mod: 50 | Performed by: ANESTHESIOLOGY

## 2018-05-01 RX ORDER — BUPIVACAINE HYDROCHLORIDE 2.5 MG/ML
INJECTION, SOLUTION EPIDURAL; INFILTRATION; INTRACAUDAL
Status: DISCONTINUED | OUTPATIENT
Start: 2018-05-01 | End: 2018-05-01 | Stop reason: HOSPADM

## 2018-05-01 RX ORDER — SODIUM CHLORIDE 9 MG/ML
500 INJECTION, SOLUTION INTRAVENOUS CONTINUOUS
Status: DISCONTINUED | OUTPATIENT
Start: 2018-05-01 | End: 2018-05-01 | Stop reason: HOSPADM

## 2018-05-01 RX ORDER — LIDOCAINE HYDROCHLORIDE 10 MG/ML
INJECTION, SOLUTION EPIDURAL; INFILTRATION; INTRACAUDAL; PERINEURAL
Status: DISCONTINUED | OUTPATIENT
Start: 2018-05-01 | End: 2018-05-01 | Stop reason: HOSPADM

## 2018-05-01 RX ORDER — DEXAMETHASONE SODIUM PHOSPHATE 10 MG/ML
INJECTION INTRAMUSCULAR; INTRAVENOUS
Status: DISCONTINUED | OUTPATIENT
Start: 2018-05-01 | End: 2018-05-01 | Stop reason: HOSPADM

## 2018-05-01 RX ORDER — MIDAZOLAM HYDROCHLORIDE 1 MG/ML
INJECTION, SOLUTION INTRAMUSCULAR; INTRAVENOUS
Status: DISCONTINUED | OUTPATIENT
Start: 2018-05-01 | End: 2018-05-01 | Stop reason: HOSPADM

## 2018-05-01 RX ORDER — FENTANYL CITRATE 50 UG/ML
INJECTION, SOLUTION INTRAMUSCULAR; INTRAVENOUS
Status: DISCONTINUED | OUTPATIENT
Start: 2018-05-01 | End: 2018-05-01 | Stop reason: HOSPADM

## 2018-05-01 RX ADMIN — FENTANYL CITRATE 100 MCG: 50 INJECTION, SOLUTION INTRAMUSCULAR; INTRAVENOUS at 12:05

## 2018-05-01 RX ADMIN — SODIUM CHLORIDE 500 ML: 9 INJECTION, SOLUTION INTRAVENOUS at 12:05

## 2018-05-01 RX ADMIN — MIDAZOLAM 2 MG: 1 INJECTION INTRAMUSCULAR; INTRAVENOUS at 12:05

## 2018-05-01 RX ADMIN — DEXAMETHASONE SODIUM PHOSPHATE 10 MG: 10 INJECTION, SOLUTION INTRAMUSCULAR; INTRAVENOUS at 12:05

## 2018-05-01 RX ADMIN — LIDOCAINE HYDROCHLORIDE 5 ML: 10 INJECTION, SOLUTION EPIDURAL; INFILTRATION; INTRACAUDAL; PERINEURAL at 12:05

## 2018-05-01 RX ADMIN — BUPIVACAINE HYDROCHLORIDE 5 ML: 2.5 INJECTION, SOLUTION EPIDURAL; INFILTRATION; INTRACAUDAL; PERINEURAL at 12:05

## 2018-05-01 NOTE — H&P (VIEW-ONLY)
Chronic patient Established Note (Follow up visit)      SUBJECTIVE:    Shiraz Jha presents to the clinic for a follow-up appointment for imaging results and low back denise, pain has been ongoing, L>R pain is worse with standing for  Long periods of time, MRI and x-rays show @ L3-4 enlarged facet joints and the lamina and mild hypertrophic changes of the ligamentum flava resulting in at least a moderate central canal spinal stenosis, @ L4-5 there is enlarged lamina and the facet joints and mild hypertrophic changes of the facet joints.  This results in at least a mild central canal spinal stenosis. I will s/f a Bilateral L3-4-5 MBB to help with axial low back pain.  Since the last visit, Shiraz Jha states the pain has been persistant. Current pain intensity is 5/10.    Pain Disability Index Review:  Last 3 PDI Scores 2018 4/10/2018 2015   Pain Disability Index (PDI) 56 40 38       Pain Medications:     - Opioids: None  - Adjuvant Medications: Cymbalta ( Duloxetine)  - Anti-Coagulants: Xarelto  - Others: See Medication Sheet     Opioid Contract: no      report:  Reviewed and consistent with medication use as prescribed.     Pain Procedures:  C7-T1 CERVICAL EPIDURAL STEROID INJECTION   03/24/15     Physical Therapy/Home Exercise: no     Imagin18 X-Ray Lumbar Spine AP And Lateral    Narrative     EXAMINATION:  XR LUMBAR SPINE AP AND LATERAL    CLINICAL HISTORY:  Low back pain, <6wks, no red flags, no prior management;Other intervertebral disc degeneration, lumbar region    TECHNIQUE:  AP, lateral and spot images were performed of the lumbar spine.    COMPARISON:  None    FINDINGS:  The vertebral body heights and intervertebral disc spaces are fairly well maintained.  No fracture.  No marrow replacement process.  SI joints unremarkable.  L4-5 and L5-S1 facet arthropathy noted.   Impression       Lumbar spondylosis.      Electronically signed by: Moreno Adkins MD  Date: 2018  Time: 12:41     Encounter     View Encounter          Signed by     Signed Credentials Date/Time  Phone Pager   MORENO ADKINS MD 4/24/2018 12:41 [phone]   Reviewed By     ZAIDA Jacobs on 4/25/2018 08:13   Exam Details     Performed Procedure Technologist Supporting Staff Performing Physician   X-Ray Lumbar Spine Ap And Lateral Samson Jorgensen, RT       Appointment Date/Status Modality Department    4/24/2018     Arrived [facility] XR-A [facility] XRAY OP       Begin Exam End Exam  End Exam Questionnaires   4/24/2018 11:40 AM 4/24/2018 11:46 AM  IMAGING END ALL     4/24/18 X-Ray Hips Bilateral 2 View Inc AP Pelvis    Narrative     EXAMINATION:  XR HIPS BILATERAL 2 VIEW INCL AP PELVIS    CLINICAL HISTORY:  Pain in right hip    TECHNIQUE:  AP view of the pelvis and frogleg lateral views of both hips were performed.    COMPARISON:  None.    FINDINGS:  No fractures identified. The alignment is within normal limits.  No evidence of a marrow replacement process.Probable phlebolith in the pelvis.  SI joints unremarkable.  The hip joint spaces are fairly well maintained.   Impression       No acute findings.      Electronically signed by: Moreno Adkins MD  Date: 04/24/2018  Time: 12:42    Encounter     View Encounter          Signed by     Signed Credentials Date/Time  Phone Pager   MORENO ADKINS MD 4/24/2018 12:42 [phone]   Reviewed By     ZAIDA Jacobs on 4/25/2018 08:15   Exam Details     Performed Procedure Technologist Supporting Staff Performing Physician   X-Ray Hips Bilateral 2 View Inc AP Pelvis Samson Jorgensen, RT       Appointment Date/Status Modality Department    4/24/2018     Arrived [facility] XR-A [facility] XRAY OP       Begin Exam End Exam  End Exam Questionnaires   4/24/2018 11:40 AM 4/24/2018 11:44 AM  IMAGING END ALL      Reason For Exam   Priority: Routine   Dx: Pain of both hip joints [M25.551, M25.552 (ICD-10-CM)]   Order Report      Order Details     4/24/18 MRI  Lumbar Spine Without Contrast    Narrative     EXAMINATION:  MRI LUMBAR SPINE WITHOUT CONTRAST    CLINICAL HISTORY:  Low back pain, <6wks, no red flags, no prior management; Other intervertebral disc degeneration, lumbar region    TECHNIQUE:  Multiplanar, multisequence MR images were acquired from the thoracolumbar junction to the sacrum without the administration of contrast.    COMPARISON:  None.    FINDINGS:  There is normal lumbar lordosis.  No spondylolisthesis or spondylolysis.  There is no marrow edema throughout the vertebral bodies to suggest acute fractures or marrow replacing processes throughout the lumbar spine.  The tip of the conus is at T12-L1 disc space.  Paraspinal soft tissues are unremarkable.  On the sagittal images there is suggestion of a distended urinary bladder.    L5-S1: There is normal intervertebral disc height no disc herniations or significant central canal spinal stenosis.  There is mild bilateral facet arthropathy.  No significant foraminal stenosis.    L4-5: Normal intervertebral disc height without soft tissue disc herniations.  There is enlarged lamina and the facet joints and mild hypertrophic changes of the facet joints.  This results in at least a mild central canal spinal stenosis.    L3-4: Normal intervertebral disc height without soft tissue disc herniations.  There is enlarged facet joints and the lamina and mild hypertrophic changes of the ligamentum flava resulting in at least a moderate central canal spinal stenosis.    L2-3: Normal intervertebral disc height without soft tissue disc herniations.  There is hypertrophic changes of the dorsal elements including the lamina and the facet joints and mild hypertrophic changes of the ligamentum flava resulting in moderate central canal spinal stenosis.    L1-2: No disc herniations.  Hypertrophic changes of the dorsal elements including the lamina and the facet joints resulting in mild central canal stenosis.    T12-L1: No disc  herniations or spinal stenosis or foraminal stenosis.  There is mild marginal anterior spondylotic osteophytes.    It should be noted that the CSF space within the thecal sac at the L1-2, L2-3 L3-4 disc spaces is compromised from the spinal stenotic changes.   Impression       1. At least moderate stenotic changes at the L2-3 and L3-4 disc spaces and mild stenosis at the L1-2 and L4-5 disc spaces, mainly from hypertrophic changes of the dorsal elements.  It is possible that these findings most likely on a congenital basis.  2. No significant disc herniations.  3. Distended urinary bladder.      Electronically signed by: Emiliano Pimentel MD  Date: 04/24/2018  Time: 11:55    Encounter     View Encounter          Signed by     Signed Credentials Date/Time  Phone Pager   EMILIANO PIMENTEL MD 4/24/2018 11:55 [phone]    Reviewed By     ZAIDA Jacobs on 4/25/2018 08:13   Exam Details     Performed Procedure Technologist Supporting Staff Performing Physician   MRI Lumbar Spine Without Contrast Netta Rodgers, RT        Appointment Date/Status Modality Department    4/24/2018     Arrived [facility] MRI1 [facility] MRI       Begin Exam End Exam Begin Exam Questionnaires End Exam Questionnaires   4/24/2018 11:09 AM 4/24/2018 11:41 AM MRI TECH NAVIGATOR QUESTIONS IMAGING END ALL             [facility] MRI Cervical Spine W/O Contrast     IMPRESSION:     1. Spondylotic changes associated with a broad-based moderately prominent disc herniation at the C6-C7 intervertebral disc space lateralized greater towards the left as compared to the right.      2. Spur/Disc herniation complex also lateralized towards the left side at the C5-C6 intervertebral disc, as discussed above.      3. Stable postoperative changes at the C6-C7 intervertebral disc space.  Allergies:   Review of patient's allergies indicates:   Allergen Reactions    Iodinated contrast- oral and iv dye Other (See Comments)     Raises BP         Current Medications:    Current Outpatient Prescriptions   Medication Sig Dispense Refill    aspirin 81 MG Chew Take 1 tablet (81 mg total) by mouth once daily.  0    buPROPion (WELLBUTRIN XL) 300 MG 24 hr tablet Take 300 mg by mouth once daily.        cetirizine (ZYRTEC) 10 MG tablet Take 10 mg by mouth once daily.      dextroamphetamine-amphetamine 30 mg Tab TK 1 T PO  QAM AND 1/2 T IN THE AFTERNOON  0    diphenhydrAMINE (BENADRYL) 50 MG capsule Take 1 capsule (50 mg total) by mouth as directed. (Patient taking differently: Take 50 mg by mouth 2 (two) times daily. ) 3 capsule 1    furosemide (LASIX) 40 MG tablet Take 1 tablet (40 mg total) by mouth Daily. For fluid in legs 30 tablet 12    metoprolol tartrate (LOPRESSOR) 25 MG tablet Take 1 tablet (25 mg total) by mouth 2 (two) times daily. 180 tablet 3    nitroGLYCERIN (NITROSTAT) 0.4 MG SL tablet Place 1 tablet under the tongue daily as needed.      omeprazole (PRILOSEC) 20 MG capsule Take 2 capsules (40 mg total) by mouth once daily. (Patient taking differently: Take 20 mg by mouth once daily. ) 60 capsule 3    polyethylene glycol (GOLYTELY,NULYTELY) 236-22.74-6.74 gram suspension Take 4 L by mouth once.      rivaroxaban (XARELTO) 20 mg Tab Take 1 tablet (20 mg total) by mouth once daily. 30 tablet 5    sotalol (BETAPACE) 80 MG tablet Take 1 tablet (80 mg total) by mouth 2 (two) times daily. 180 tablet 3    tamsulosin (FLOMAX) 0.4 mg Cp24 TAKE 2 ONCE DAILY 30 capsule 0    vortioxetine (TRINTELLIX) 20 mg Tab Take 20 mg by mouth once daily.       desoximetasone (TOPICORT) 0.05 % cream Apply topically 2 (two) times daily. 60 g 11     No current facility-administered medications for this visit.        REVIEW OF SYSTEMS:    GENERAL:  No weight loss, malaise or fevers.  HEENT:  Negative for frequent or significant headaches.  NECK:  Negative for lumps, goiter, pain and significant neck swelling.  RESPIRATORY:  Negative for cough, wheezing or shortness of breath.    CARDIOVASCULAR:  Negative for chest pain, leg swelling or palpitations. + Afib  GI:  Negative for abdominal discomfort, blood in stools or black stools or change in bowel habits.  MUSCULOSKELETAL:  See HPI.  SKIN:  Negative for lesions, rash, and itching.  PSYCH:  + for sleep disturbance, mood disorder and recent psychosocial stressors. + depression, no suicidal ideations.   HEMATOLOGY/LYMPHOLOGY:  Negative for prolonged bleeding, bruising easily or swollen nodes.  NEURO:   No history of headaches, syncope, paralysis, seizures or tremors.  All other reviewed and negative other than HPI.    Past Medical History:  Past Medical History:   Diagnosis Date    Allergy     Asthma     Atrial fibrillation     Chronic rhinitis 9/26/2013    DDD (degenerative disc disease) 4/3/2015    Depression     Diabetes mellitus     Fibromyalgia     Gastroesophageal reflux disease without esophagitis 7/14/2017    Hypertension     Sinusitis, acute, maxillary 12/20/2012    Thyroid disease        Past Surgical History:  Past Surgical History:   Procedure Laterality Date    CERVICAL SPINE SURGERY      GASTRIC BYPASS      SINUS SURG[facility]  2000    [facility]    TONSILLECTOMY         Family History:  Family History   Problem Relation Age of Onset    Heart disease Father     Cancer Mother      lung    Hypertension Sister     Heart disease Brother     Cirrhosis Brother     Diabetes Brother        Social History:  Social History     Social History    Marital status: Single     Spouse name: N/A    Number of children: N/A    Years of education: N/A     Social History Main Topics    Smoking status: Never Smoker    Smokeless tobacco: Never Used    Alcohol use No      Comment: rare    Drug use: No    Sexual activity: Yes     Partners: Female     Other Topics Concern    None     Social History Narrative    From [place], lives alone w/2 dogs.    Dogs are his kids.    Works PT --  at [employer], on ssdi from neck and  back/depression.       OBJECTIVE:    /72   Pulse 65   Wt 124.7 kg (274 lb 14.6 oz)   BMI 41.80 kg/m²     PHYSICAL EXAMINATION:    General appearance: Well appearing, in no acute distress, alert and oriented x3.  Psych:  Mood and affect appropriate.  Skin: Skin color, texture, turgor normal, no rashes or lesions, in both upper and lower body.  Head/face:  Atraumatic, normocephalic. No palpable lymph nodes  Neck: No pain to palpation over the cervical paraspinous muscles. Spurling Negative. No pain with neck flexion, extension, or lateral flexion. .  Cor: RRR  Pulm: CTA  GI: Abdomen soft and non-tender.  Back: Straight leg raising in the sitting and supine positions is negative to radicular pain. + pain to palpation over the L- spine. + Facet Loading Bilaterally.   Normal range of motion with pain reproduction. Positive FABERE, Yeoman's and Galensen test on the both side.  Extremities: Peripheral joint ROM is full and pain free without obvious instability or laxity in all four extremities. No deformities, edema, or skin discoloration. Good capillary refill.  Musculoskeletal: Shoulder, hip, sacroiliac and knee provocative maneuvers are negative. Bilateral upper and lower extremity strength is normal and symmetric.  No atrophy or tone abnormalities are noted.  Neuro: Bilateral upper and lower extremity coordination and muscle stretch reflexes are physiologic and symmetric.  Plantar response are downgoing. No loss of sensation is noted.  Gait: Normal.    ASSESSMENT: 58 y.o. year old male with axial low back pain     Diagnosis:    1. DDD (degenerative disc disease), lumbar     2. Facet arthropathy, multilevel     3. Facet hypertrophy of lumbosacral region           PLAN:     - I have stressed the importance of physical activity and a home exercise plan to help with pain and improve health.  - Patient can continue with medications for now since they are providing benefits, using them appropriately, and without  side effects.  - Previous imaging was reviewed and discussed with the patient today.   - Schedule for a Diagnostic/Therapeutic Medial branch block at Left/ Right L3,4, and L5 to help with axial low back pain and progress with a home exercise program.  -I have explained the risks, benefits, and alternatives of the procedure in detail. The patient voices understanding and all questions have been answered. The patient agrees to proceed as planned. Written Consent obtained.   - Counseled patient regarding the importance of activity modification, constant sleeping habits and physical therapy.  -RTC 2-3 weeks following procedure.  -The above plan and management options were discussed at length with patient. Patient is in agreement with the above and verbalized understanding. Dr. Lyman   was consulted on this patient  and agrees with this plan.      Bairon Campos NP-ANGELA  Interventional Pain Management      04/26/2018

## 2018-05-01 NOTE — OP NOTE
"Patient Name: Shiraz Jha  MRN: [id number]    INFORMED CONSENT: The procedure, risks, benefits and options were discussed with patient. There are no contraindications to the procedure. The patient expressed understanding and agreed to proceed. The personnel performing the procedure was discussed. I verify that I personally obtained Shiraz's consent prior to the start of the procedure and the signed consent can be found on the patient's chart.    Procedure Date: 05/01/2018    Anesthesia: Topical    Pre Procedure diagnosis: Facet arthropathy, lumbar [M46.96]  Facet hypertrophy of lumbar region [M47.896]  1. Osteoarthritis of spine, unspecified spinal osteoarthritis complication status, unspecified spinal region    2. Spondylosis without myelopathy    3. Chronic pain      Post-Procedure diagnosis: SAME      Moderate Sedation: Yes - Fentanyl 100 mcg and Midazolam 2 mg    PROCEDURE: BILATERAL L3-4-5 LUMBAR FACET MEDIAL BRANCH NERVE BLOCK      DESCRIPTION OF PROCEDURE:The patient was brought to the procedure room. After performing time out. IV access was obtained prior to the procedure. The patient was positioned prone on the fluoroscopy table. Continuous hemodynamic monitoring was initiated including blood pressure, EKG, and pulse oximetry. The area of the lumbar spine was prepped chlorhexidine and draped into a sterile field. Fluoroscopy was used to identify the location of the bilateral side  L3, L4, and L5 medial branch nerves at the junctions of the superior articular process and the transverse processes of  L4, L5, and the sacral ala respectively. Skin anesthesia was achieved using 5 cc of Lidocaine 1% over the injection sites. A 22 gauge, 3 1/2" spinal needle was slowly inserted at each level using AP, lateral and oblique fluoroscopic imaging. Negative aspiration for blood or CSF was confirmed. .  6 ml bupivacaine 0.25%was injected at all sites. The needles were removed and bleeding was nil. A sterile dressing was " applied. No specimens collected. Shiraz was taken back to the recovery room for further observation.     Blood Loss: Nill  Specimen: None    Roman Lyman MD

## 2018-05-01 NOTE — DISCHARGE SUMMARY
Discharge Note  Short Stay      SUMMARY     Admit Date: 5/1/2018    Attending Physician: Roman Lyman      Discharge Physician: Roman Lyman      Discharge Date: 5/1/2018 12:42 PM    Procedure(s) (LRB):  BLOCK-NERVE-MEDIAL BRANCH-LUMBAR- Bilateral L3-4-5 (Bilateral)    Final Diagnosis: Facet arthropathy, lumbar [M46.96]  Facet hypertrophy of lumbar region [M47.896]    Disposition: Home or self care    Patient Instructions:   Current Discharge Medication List      CONTINUE these medications which have NOT CHANGED    Details   buPROPion (WELLBUTRIN XL) 300 MG 24 hr tablet Take 300 mg by mouth once daily.        cetirizine (ZYRTEC) 10 MG tablet Take 10 mg by mouth once daily.      dextroamphetamine-amphetamine 30 mg Tab TK 1 T PO  QAM AND 1/2 T IN THE AFTERNOON  Refills: 0      diphenhydrAMINE (BENADRYL) 50 MG capsule Take 1 capsule (50 mg total) by mouth as directed.  Qty: 3 capsule, Refills: 1    Comments: Take 1 tab at 6 pm & 11 pm on night before your procedure, then 1 tab at 6 am on day of procedure  Associated Diagnoses: Atrial fibrillation      furosemide (LASIX) 40 MG tablet Take 1 tablet (40 mg total) by mouth Daily. For fluid in legs  Qty: 30 tablet, Refills: 12    Associated Diagnoses: Edema, unspecified type      metoprolol tartrate (LOPRESSOR) 25 MG tablet Take 1 tablet (25 mg total) by mouth 2 (two) times daily.  Qty: 180 tablet, Refills: 3      omeprazole (PRILOSEC) 20 MG capsule Take 2 capsules (40 mg total) by mouth once daily.  Qty: 60 capsule, Refills: 3      polyethylene glycol (GOLYTELY,NULYTELY) 236-22.74-6.74 gram suspension Take 4 L by mouth once.      rivaroxaban (XARELTO) 20 mg Tab Take 1 tablet (20 mg total) by mouth once daily.  Qty: 30 tablet, Refills: 5    Associated Diagnoses: Atrial fibrillation, unspecified      sotalol (BETAPACE) 80 MG tablet Take 1 tablet (80 mg total) by mouth 2 (two) times daily.  Qty: 180 tablet, Refills: 3      tamsulosin (FLOMAX) 0.4 mg Cp24 TAKE 2 ONCE  DAILY  Qty: 30 capsule, Refills: 0    Associated Diagnoses: Benign non-nodular prostatic hyperplasia without lower urinary tract symptoms      vortioxetine (TRINTELLIX) 20 mg Tab Take 20 mg by mouth once daily.       aspirin 81 MG Chew Take 1 tablet (81 mg total) by mouth once daily.  Refills: 0      desoximetasone (TOPICORT) 0.05 % cream Apply topically 2 (two) times daily.  Qty: 60 g, Refills: 11      nitroGLYCERIN (NITROSTAT) 0.4 MG SL tablet Place 1 tablet under the tongue daily as needed.                 Discharge Diagnosis: Facet arthropathy, lumbar [M46.96]  Facet hypertrophy of lumbar region [M47.896]  Condition on Discharge: Stable with no complications to procedure   Diet on Discharge: Same as before.  Activity: as per instruction sheet.  Discharge to: Home with a responsible adult.  Follow up: 2-4 weeks

## 2018-05-14 NOTE — PROGRESS NOTES
"Chronic patient Established Note (Follow up visit)      SUBJECTIVE:    Shiraz Jha presents to the clinic for a 2 wk follow-up appointment for low back pain. He is S/P BILATERAL L3-4-5 LUMBAR FACET MEDIAL BRANCH NERVE BLOCK on 18 with 20% relief for a few days.  Pt states he got "no relief" MRI show that  there is enlarged lamina and the facet joints and mild hypertrophic changes of the facet joints.  This results in at least a mild central canal spinal stenosis. I will s/f a L4-5 IESI to see if it helps his low back and bilateral leg tingling.  Since the last visit, Shiraz Jha states the pain has been persistant. Current pain intensity is 2/10.    Pain Disability Index Review:  Last 3 PDI Scores 5/15/2018 2018 4/10/2018   Pain Disability Index (PDI) 60 56 40       Pain Medications:     - Opioids: None  - Adjuvant Medications: Cymbalta ( Duloxetine)  - Anti-Coagulants: Xarelto  - Others: See Medication Sheet     Opioid Contract: no      report:  Reviewed and consistent with medication use as prescribed.     Pain Procedures:    18 BILATERAL L3-4-5 LUMBAR FACET MEDIAL BRANCH NERVE BLOCK  C7-T1 CERVICAL EPIDURAL STEROID INJECTION   03/24/15     Physical Therapy/Home Exercise: no     Imagin18 X-Ray Lumbar Spine AP And Lateral     Narrative      EXAMINATION:  XR LUMBAR SPINE AP AND LATERAL    CLINICAL HISTORY:  Low back pain, <6wks, no red flags, no prior management;Other intervertebral disc degeneration, lumbar region    TECHNIQUE:  AP, lateral and spot images were performed of the lumbar spine.    COMPARISON:  None    FINDINGS:  The vertebral body heights and intervertebral disc spaces are fairly well maintained.  No fracture.  No marrow replacement process.  SI joints unremarkable.  L4-5 and L5-S1 facet arthropathy noted.   Impression        Lumbar spondylosis.      Electronically signed by: Moreno Adkins MD  Date: 2018  Time: 12:41    Encounter      View Encounter          Signed by "      Signed Credentials Date/Time   Phone Pager   MORENO ADKINS MD 4/24/2018 12:41 [phone]   Reviewed By      ZAIDA Jacobs on 4/25/2018 08:13   Exam Details      Performed Procedure Technologist Supporting Staff Performing Physician   X-Ray Lumbar Spine Ap And Lateral Samson Jorgensen, RT         Appointment Date/Status Modality Department     4/24/2018     Arrived [facility] XR-A [facility] XRAY OP         Begin Exam End Exam   End Exam Questionnaires   4/24/2018 11:40 AM 4/24/2018 11:46 AM   IMAGING END ALL      4/24/18 X-Ray Hips Bilateral 2 View Inc AP Pelvis     Narrative      EXAMINATION:  XR HIPS BILATERAL 2 VIEW INCL AP PELVIS    CLINICAL HISTORY:  Pain in right hip    TECHNIQUE:  AP view of the pelvis and frogleg lateral views of both hips were performed.    COMPARISON:  None.    FINDINGS:  No fractures identified. The alignment is within normal limits.  No evidence of a marrow replacement process.Probable phlebolith in the pelvis.  SI joints unremarkable.  The hip joint spaces are fairly well maintained.   Impression        No acute findings.      Electronically signed by: Moreno Adkins MD  Date: 04/24/2018  Time: 12:42    Encounter      View Encounter          Signed by      Signed Credentials Date/Time   Phone Pager   MORENO ADKINS MD 4/24/2018 12:42 [phone]   Reviewed By      ZAIDA Jacobs on 4/25/2018 08:15   Exam Details      Performed Procedure Technologist Supporting Staff Performing Physician   X-Ray Hips Bilateral 2 View Inc AP Pelvis Samson Jorgensen, RT         Appointment Date/Status Modality Department     4/24/2018     Arrived [facility] XR-A [facility] XRAY OP         Begin Exam End Exam   End Exam Questionnaires   4/24/2018 11:40 AM 4/24/2018 11:44 AM   IMAGING END ALL       Reason For Exam   Priority: Routine   Dx: Pain of both hip joints [M25.551, M25.552 (ICD-10-CM)]   Order Report       Order Details      4/24/18 MRI Lumbar Spine Without  Contrast     Narrative      EXAMINATION:  MRI LUMBAR SPINE WITHOUT CONTRAST    CLINICAL HISTORY:  Low back pain, <6wks, no red flags, no prior management; Other intervertebral disc degeneration, lumbar region    TECHNIQUE:  Multiplanar, multisequence MR images were acquired from the thoracolumbar junction to the sacrum without the administration of contrast.    COMPARISON:  None.    FINDINGS:  There is normal lumbar lordosis.  No spondylolisthesis or spondylolysis.  There is no marrow edema throughout the vertebral bodies to suggest acute fractures or marrow replacing processes throughout the lumbar spine.  The tip of the conus is at T12-L1 disc space.  Paraspinal soft tissues are unremarkable.  On the sagittal images there is suggestion of a distended urinary bladder.    L5-S1: There is normal intervertebral disc height no disc herniations or significant central canal spinal stenosis.  There is mild bilateral facet arthropathy.  No significant foraminal stenosis.    L4-5: Normal intervertebral disc height without soft tissue disc herniations.  There is enlarged lamina and the facet joints and mild hypertrophic changes of the facet joints.  This results in at least a mild central canal spinal stenosis.    L3-4: Normal intervertebral disc height without soft tissue disc herniations.  There is enlarged facet joints and the lamina and mild hypertrophic changes of the ligamentum flava resulting in at least a moderate central canal spinal stenosis.    L2-3: Normal intervertebral disc height without soft tissue disc herniations.  There is hypertrophic changes of the dorsal elements including the lamina and the facet joints and mild hypertrophic changes of the ligamentum flava resulting in moderate central canal spinal stenosis.    L1-2: No disc herniations.  Hypertrophic changes of the dorsal elements including the lamina and the facet joints resulting in mild central canal stenosis.    T12-L1: No disc herniations or  spinal stenosis or foraminal stenosis.  There is mild marginal anterior spondylotic osteophytes.    It should be noted that the CSF space within the thecal sac at the L1-2, L2-3 L3-4 disc spaces is compromised from the spinal stenotic changes.   Impression        1. At least moderate stenotic changes at the L2-3 and L3-4 disc spaces and mild stenosis at the L1-2 and L4-5 disc spaces, mainly from hypertrophic changes of the dorsal elements.  It is possible that these findings most likely on a congenital basis.  2. No significant disc herniations.  3. Distended urinary bladder.      Electronically signed by: Emiliano Pimentel MD  Date: 04/24/2018  Time: 11:55    Encounter      View Encounter          Signed by      Signed Credentials Date/Time   Phone Pager   EMILIANO PIMENTEL MD 4/24/2018 11:55 [phone]     Reviewed By      ZAIDA Jacobs on 4/25/2018 08:13   Exam Details      Performed Procedure Technologist Supporting Staff Performing Physician   MRI Lumbar Spine Without Contrast Netta Rodgers, RT           Appointment Date/Status Modality Department     4/24/2018     Arrived [facility] MRI1 [facility] MRI         Begin Exam End Exam Begin Exam Questionnaires End Exam Questionnaires   4/24/2018 11:09 AM 4/24/2018 11:41 AM MRI TECH NAVIGATOR QUESTIONS IMAGING END ALL                Doctor's Imaging MRI Cervical Spine W/O Contrast     IMPRESSION:     1. Spondylotic changes associated with a broad-based moderately prominent disc herniation at the C6-C7 intervertebral disc space lateralized greater towards the left as compared to the right.      2. Spur/Disc herniation complex also lateralized towards the left side at the C5-C6 intervertebral disc, as discussed above.      3. Stable postoperative changes at the C6-C7 intervertebral disc space.      Allergies:   Review of patient's allergies indicates:   Allergen Reactions    Iodinated contrast- oral and iv dye Other (See Comments)     Raises BP         Current Medications:    Current Outpatient Prescriptions   Medication Sig Dispense Refill    buPROPion (WELLBUTRIN XL) 300 MG 24 hr tablet Take 300 mg by mouth once daily.        cetirizine (ZYRTEC) 10 MG tablet Take 10 mg by mouth once daily.      dextroamphetamine-amphetamine 30 mg Tab TK 1 T PO  QAM AND 1/2 T IN THE AFTERNOON  0    diphenhydrAMINE (BENADRYL) 50 MG capsule Take 1 capsule (50 mg total) by mouth as directed. (Patient taking differently: Take 50 mg by mouth 2 (two) times daily. ) 3 capsule 1    furosemide (LASIX) 40 MG tablet Take 1 tablet (40 mg total) by mouth Daily. For fluid in legs 30 tablet 12    metoprolol tartrate (LOPRESSOR) 25 MG tablet Take 1 tablet (25 mg total) by mouth 2 (two) times daily. 180 tablet 3    nitroGLYCERIN (NITROSTAT) 0.4 MG SL tablet Place 1 tablet under the tongue daily as needed.      omeprazole (PRILOSEC) 20 MG capsule Take 2 capsules (40 mg total) by mouth once daily. (Patient taking differently: Take 20 mg by mouth once daily. ) 60 capsule 3    polyethylene glycol (GOLYTELY,NULYTELY) 236-22.74-6.74 gram suspension Take 4 L by mouth once.      rivaroxaban (XARELTO) 20 mg Tab Take 1 tablet (20 mg total) by mouth once daily. 30 tablet 5    sotalol (BETAPACE) 80 MG tablet Take 1 tablet (80 mg total) by mouth 2 (two) times daily. 180 tablet 3    tamsulosin (FLOMAX) 0.4 mg Cp24 TAKE 2 ONCE DAILY 30 capsule 0    vortioxetine (TRINTELLIX) 20 mg Tab Take 20 mg by mouth once daily.       aspirin 81 MG Chew Take 1 tablet (81 mg total) by mouth once daily.  0    desoximetasone (TOPICORT) 0.05 % cream Apply topically 2 (two) times daily. 60 g 11     No current facility-administered medications for this visit.        REVIEW OF SYSTEMS:    GENERAL:  No weight loss, malaise or fevers. Morbid obesity  HEENT:  Negative for frequent or significant headaches.  NECK:  Negative for lumps, goiter, pain and significant neck swelling.  RESPIRATORY:  Negative for cough, wheezing or shortness of  breath.   CARDIOVASCULAR:  Negative for chest pain, leg swelling or palpitations. + Afib  GI:  Negative for abdominal discomfort, blood in stools or black stools or change in bowel habits.  MUSCULOSKELETAL:  See HPI.  SKIN:  Negative for lesions, rash, and itching.  PSYCH:  + for sleep disturbance, mood disorder and recent psychosocial stressors. + depression, no suicidal ideations.   HEMATOLOGY/LYMPHOLOGY:  Negative for prolonged bleeding, bruising easily or swollen nodes.  NEURO:   No history of headaches, syncope, paralysis, seizures or tremors.  All other reviewed and negative other than HPI.     Past Medical History:  Past Medical History:   Diagnosis Date    Allergy     Asthma     Atrial fibrillation     Chronic rhinitis 9/26/2013    DDD (degenerative disc disease) 4/3/2015    Depression     Diabetes mellitus     Fibromyalgia     Gastroesophageal reflux disease without esophagitis 7/14/2017    Hypertension     Sinusitis, acute, maxillary 12/20/2012    Thyroid disease        Past Surgical History:  Past Surgical History:   Procedure Laterality Date    CERVICAL SPINE SURGERY      GASTRIC BYPASS      SINUS SURGERY  2000    [facility]    TONSILLECTOMY         Family History:  Family History   Problem Relation Age of Onset    Heart disease Father     Cancer Mother         lung    Hypertension Sister     Heart disease Brother     Cirrhosis Brother     Diabetes Brother        Social History:  Social History     Social History    Marital status: Single     Spouse name: N/A    Number of children: N/A    Years of education: N/A     Social History Main Topics    Smoking status: Never Smoker    Smokeless tobacco: Never Used    Alcohol use No      Comment: rare    Drug use: No    Sexual activity: Yes     Partners: Female     Other Topics Concern    None     Social History Narrative    [place], lives alone w/2 dogs.    Dogs are his kids.    Works PT --  at [employer] on ssdi  from neck and back/depression.       OBJECTIVE:    /82   Pulse 61   Wt 123.2 kg (271 lb 9.7 oz)   BMI 41.30 kg/m²     PHYSICAL EXAMINATION:    General appearance: Well appearing, in no acute distress, alert and oriented x3.  Psych:  Mood and affect appropriate.  Skin: Skin color, texture, turgor normal, no rashes or lesions, in both upper and lower body.  Head/face:  Atraumatic, normocephalic. No palpable lymph nodes  Neck: No pain to palpation over the cervical paraspinous muscles. Spurling Negative. No pain with neck flexion, extension, or lateral flexion. .  Cor: RRR  Pulm: CTA  GI: Abdomen soft and non-tender.  Back: Straight leg raising in the sitting and supine positions is + tingling bilaterally. + pain to palpation over the L- spine. + Facet Loading Bilaterally.   Normal range of motion with pain reproduction. Positive FABERE, Yeoman's and Galensen test on the both side.  Extremities: Peripheral joint ROM is full and pain free without obvious instability or laxity in all four extremities. No deformities, edema, or skin discoloration. Good capillary refill.  Musculoskeletal: Shoulder, hip, sacroiliac and knee provocative maneuvers are negative. Bilateral upper and lower extremity strength is normal and symmetric.  No atrophy or tone abnormalities are noted.  Neuro: Bilateral upper and lower extremity coordination and muscle stretch reflexes are physiologic and symmetric.  Plantar response are downgoing. No loss of sensation is noted.  Gait: Normal.    ASSESSMENT: 58 y.o. year old male with low back and bilateral leg tingling, consistent with      Diagnosis:    1. Chronic pain syndrome     2. Chronic low back pain with sciatica, sciatica laterality unspecified, unspecified back pain laterality     3. Facet arthropathy, multilevel     4. Osteoarthritis of spine, unspecified spinal osteoarthritis complication status, unspecified spinal region           PLAN:     - I have stressed the importance of  physical activity and a home exercise plan to help with pain and improve health.  - Patient can continue with medications for now since they are providing benefits, using them appropriately, and without side effects.  - Counseled patient regarding the importance of activity modification, constant sleeping habits and physical therapy.  - Previous imaging was reviewed and discussed with the patient today.   - Schedule for a Lumbar Epidural Steroid Injection at L4-5 to help withpain and progress with a Home exercise program.  - I have explained the risks, benefits, and alternatives of the procedure in detail. The patient voices understanding and all questions have been answered. The patient agrees to proceed as planned. Written Consent obtained.   -RTC 2-3 weeks following procedure.  -The above plan and management options were discussed at length with patient. Patient is in agreement with the above and verbalized understanding. Dr. Lyman   was consulted on this patient  and agrees with this plan.      Bairon Campos NP-C  Interventional Pain Management      05/15/2018

## 2018-05-15 ENCOUNTER — OFFICE VISIT (OUTPATIENT)
Dept: PAIN MEDICINE | Facility: CLINIC | Age: 58
End: 2018-05-15
Payer: COMMERCIAL

## 2018-05-15 VITALS
SYSTOLIC BLOOD PRESSURE: 120 MMHG | HEART RATE: 61 BPM | WEIGHT: 271.63 LBS | BODY MASS INDEX: 41.3 KG/M2 | DIASTOLIC BLOOD PRESSURE: 82 MMHG

## 2018-05-15 DIAGNOSIS — M47.9 OSTEOARTHRITIS OF SPINE, UNSPECIFIED SPINAL OSTEOARTHRITIS COMPLICATION STATUS, UNSPECIFIED SPINAL REGION: ICD-10-CM

## 2018-05-15 DIAGNOSIS — G89.4 CHRONIC PAIN SYNDROME: Primary | ICD-10-CM

## 2018-05-15 DIAGNOSIS — M47.819 FACET ARTHROPATHY, MULTILEVEL: ICD-10-CM

## 2018-05-15 DIAGNOSIS — G89.29 CHRONIC LOW BACK PAIN WITH SCIATICA, SCIATICA LATERALITY UNSPECIFIED, UNSPECIFIED BACK PAIN LATERALITY: ICD-10-CM

## 2018-05-15 DIAGNOSIS — M54.40 CHRONIC LOW BACK PAIN WITH SCIATICA, SCIATICA LATERALITY UNSPECIFIED, UNSPECIFIED BACK PAIN LATERALITY: ICD-10-CM

## 2018-05-15 PROCEDURE — 99213 OFFICE O/P EST LOW 20 MIN: CPT | Mod: S$GLB,,, | Performed by: NURSE PRACTITIONER

## 2018-05-15 PROCEDURE — 3079F DIAST BP 80-89 MM HG: CPT | Mod: CPTII,S$GLB,, | Performed by: NURSE PRACTITIONER

## 2018-05-15 PROCEDURE — 3074F SYST BP LT 130 MM HG: CPT | Mod: CPTII,S$GLB,, | Performed by: NURSE PRACTITIONER

## 2018-05-15 PROCEDURE — 3008F BODY MASS INDEX DOCD: CPT | Mod: CPTII,S$GLB,, | Performed by: NURSE PRACTITIONER

## 2018-05-15 PROCEDURE — 99999 PR PBB SHADOW E&M-EST. PATIENT-LVL III: CPT | Mod: PBBFAC,,, | Performed by: NURSE PRACTITIONER

## 2018-05-16 ENCOUNTER — TELEPHONE (OUTPATIENT)
Dept: PAIN MEDICINE | Facility: CLINIC | Age: 58
End: 2018-05-16

## 2018-05-16 NOTE — TELEPHONE ENCOUNTER
Left message for patient regarding medication clearance. Patient was informed that the staff received clearance for him to hold his Xarelto 3 days prior to procedure. No answer at this time.

## 2018-05-16 NOTE — TELEPHONE ENCOUNTER
----- Message from All Webb MD sent at 5/15/2018  5:54 PM CDT -----  ok  ----- Message -----  From: Nichole Mauricio LPN  Sent: 5/15/2018  10:41 AM  To: All Webb MD    Patient is for a Lumbar Epidural Steroid Injection on 5/29/18 and need clearance to hold Xarleto 3 days prior to procedure.     Please advise thank you

## 2018-05-21 ENCOUNTER — LAB VISIT (OUTPATIENT)
Dept: LAB | Facility: HOSPITAL | Age: 58
End: 2018-05-21
Attending: UROLOGY
Payer: COMMERCIAL

## 2018-05-21 ENCOUNTER — OFFICE VISIT (OUTPATIENT)
Dept: UROLOGY | Facility: CLINIC | Age: 58
End: 2018-05-21
Payer: COMMERCIAL

## 2018-05-21 VITALS
HEART RATE: 70 BPM | SYSTOLIC BLOOD PRESSURE: 125 MMHG | HEIGHT: 68 IN | DIASTOLIC BLOOD PRESSURE: 70 MMHG | BODY MASS INDEX: 41.7 KG/M2 | WEIGHT: 275.13 LBS

## 2018-05-21 DIAGNOSIS — N40.0 BENIGN NON-NODULAR PROSTATIC HYPERPLASIA WITHOUT LOWER URINARY TRACT SYMPTOMS: ICD-10-CM

## 2018-05-21 DIAGNOSIS — N40.1 BENIGN NON-NODULAR PROSTATIC HYPERPLASIA WITH LOWER URINARY TRACT SYMPTOMS: Primary | ICD-10-CM

## 2018-05-21 DIAGNOSIS — N40.1 BENIGN NON-NODULAR PROSTATIC HYPERPLASIA WITH LOWER URINARY TRACT SYMPTOMS: ICD-10-CM

## 2018-05-21 LAB
COMPLEXED PSA SERPL-MCNC: 0.48 NG/ML
PROTEIN URINE RANDOM: NORMAL

## 2018-05-21 PROCEDURE — 3074F SYST BP LT 130 MM HG: CPT | Mod: CPTII,S$GLB,, | Performed by: UROLOGY

## 2018-05-21 PROCEDURE — 36415 COLL VENOUS BLD VENIPUNCTURE: CPT

## 2018-05-21 PROCEDURE — 99203 OFFICE O/P NEW LOW 30 MIN: CPT | Mod: S$GLB,,, | Performed by: UROLOGY

## 2018-05-21 PROCEDURE — 51798 US URINE CAPACITY MEASURE: CPT | Mod: S$GLB,,, | Performed by: UROLOGY

## 2018-05-21 PROCEDURE — 84153 ASSAY OF PSA TOTAL: CPT

## 2018-05-21 PROCEDURE — 3078F DIAST BP <80 MM HG: CPT | Mod: CPTII,S$GLB,, | Performed by: UROLOGY

## 2018-05-21 PROCEDURE — 3008F BODY MASS INDEX DOCD: CPT | Mod: CPTII,S$GLB,, | Performed by: UROLOGY

## 2018-05-21 PROCEDURE — 99999 PR PBB SHADOW E&M-EST. PATIENT-LVL III: CPT | Mod: PBBFAC,,, | Performed by: UROLOGY

## 2018-05-21 RX ORDER — TAMSULOSIN HYDROCHLORIDE 0.4 MG/1
0.4 CAPSULE ORAL DAILY
Qty: 30 CAPSULE | Refills: 3 | Status: SHIPPED | OUTPATIENT
Start: 2018-05-21 | End: 2018-05-23 | Stop reason: SDUPTHER

## 2018-05-21 NOTE — PROGRESS NOTES
Subjective:      Patient ID: Shiraz Jha is a 58 y.o. male.    Chief Complaint: Follow-up (refill on Rx)    HPI  Patient is a 58 y.o. male who is established to our clinic and was initially referred by their PCP, Dr. Theodore for evaluation of BPH.     Benign Prostatic Hypertrophy  Patient complains of lower urinary tract symptoms. He reports intermittency, straining and weak stream. He denies incomplete emptying, nocturia and urgency. Patient states symptoms are of moderate severity. Onset of symptoms was several years ago and was gradual in onset. His AUA Symptom Score is, 10/3 (0,1,3,0,3,3,0). He has no personal history and a family history of prostate cancer. He reports a history of no complicating symptoms. He denies flank pain, gross hematuria, kidney stones and recurrent UTI.  He takes flomax which helps his urinating symptoms considerably.  His father had prostate cancer.     Lab Results   Component Value Date    PSA 0.26 01/02/2014    PSADIAG 0.30 12/08/2014         Review of Systems  All other systems reviewed and negative except pertinent positives noted in HPI.    Objective:     Physical Exam   Nursing note and vitals reviewed.  Constitutional: He is oriented to person, place, and time. Vital signs are normal. He appears well-developed and well-nourished. He is cooperative.   HENT:   Head: Normocephalic.   Neck: No tracheal deviation present.   Cardiovascular: Normal rate and intact distal pulses.    Pulmonary/Chest: Effort normal. No accessory muscle usage. No tachypnea. No respiratory distress.   Abdominal: Soft. Normal appearance. He exhibits no distension, no fluid wave, no ascites and no mass. There is no tenderness. There is no rebound and no CVA tenderness. No hernia. Hernia confirmed negative in the right inguinal area and confirmed negative in the left inguinal area.   Liver/spleen non-palpable.   Genitourinary: Prostate normal, testes normal and penis normal. Rectal exam shows no external  hemorrhoid, no mass, no tenderness and anal tone normal. Prostate is not tender. Right testis shows no mass and no tenderness. Right testis is descended. Left testis shows no mass and no tenderness. Left testis is descended. Circumcised.   Genitourinary Comments: Scrotum showed no rashes or lesions.  Anus/perineum without lesion.  Epididymis showed no masses or tenderness. No meatal stenosis, meatus in normal location. No penile discharge/plaques. Prostate smooth, no nodules, 20 grams.  Seminal vesicles non-palpable.  Normal sphincter tone.        Musculoskeletal: Normal range of motion.   Lymphadenopathy: No inguinal adenopathy noted on the right or left side.        Right: No inguinal adenopathy present.        Left: No inguinal adenopathy present.   Neurological: He is alert and oriented to person, place, and time. He has normal strength.   Skin: No bruising and no rash noted.     Psychiatric: He has a normal mood and affect. His speech is normal and behavior is normal. Thought content normal.     Assessment:     1. Benign non-nodular prostatic hyperplasia with lower urinary tract symptoms    2. Benign non-nodular prostatic hyperplasia without lower urinary tract symptoms      Plan:     1. BPH:  -A post void residual bladder scan was performed immediately after voiding. The patient's PVR was noted to be 11mL.  -Avoid bladder irritants including but not limited to caffeine, alcohol, smoking, spicy foods, acidic foods, tomato-based products, citrus, artificial sweeteners, chocolate, coffee or tea.    -prostate meds: flomax; continue.     -psa today--will notify of results.

## 2018-05-23 ENCOUNTER — TELEPHONE (OUTPATIENT)
Dept: UROLOGY | Facility: CLINIC | Age: 58
End: 2018-05-23

## 2018-05-23 DIAGNOSIS — N40.0 BENIGN NON-NODULAR PROSTATIC HYPERPLASIA WITHOUT LOWER URINARY TRACT SYMPTOMS: ICD-10-CM

## 2018-05-23 DIAGNOSIS — N40.1 BENIGN NON-NODULAR PROSTATIC HYPERPLASIA WITH LOWER URINARY TRACT SYMPTOMS: Primary | ICD-10-CM

## 2018-05-23 RX ORDER — TAMSULOSIN HYDROCHLORIDE 0.4 MG/1
0.4 CAPSULE ORAL 2 TIMES DAILY
Qty: 180 CAPSULE | Refills: 3 | Status: SHIPPED | OUTPATIENT
Start: 2018-05-23 | End: 2019-08-28 | Stop reason: SDUPTHER

## 2018-05-23 NOTE — TELEPHONE ENCOUNTER
Please just notify him that I have re-sent the flomax per his dosing to [facility].  I do not need to discuss that with him.  Thank you.

## 2018-05-28 NOTE — DISCHARGE INSTRUCTIONS
Home Care Instructions Pain Management:    1.  DIET:    You may resume your normal diet today.    2.  BATHING:    You may shower with luke warm water.    3.  DRESSING:    You may remove your bandage today.    4.  ACTIVITY LEVEL:      You may resume your normal activities 24 hours after your procedure.    5.  MEDICATIONS:    You may resume your normal medications today.    6.  SPECIAL INSTRUCTIONS:    No heat to the injection site for 24 hours including bath or shower, heating pad, moist heat or hot tubs.    Use an ice pack to the injection site for any pain or discomfort.  Apply ice packs for 20 minute intervals as needed.    If you have received any sedatives by mouth today, you can not drive for 12 hours.    If you have received sedation through an IV, you can not drive for 24 hours.    PLEASE CALL YOUR DOCTOR FOR THE FOLLOWIN.  Redness or swelling around the injection site.  2.  Fever of 101 degrees.  3.  Drainage (pus) from the injection site.  4.  For any continuous bleeding (some dried blood over the incision is normal.)    FOR EMERGENCIES:    If any unusual problems or difficulties occur during clinic hours, call [phone] or dial [phone].    Follow up with with your physician in 2-3 weeks.       Procedural Sedation  Procedural sedation is medicine to ease discomfort, pain, and anxiety during a procedure. The medicine is often given through an IV (intravenous) line in your arm or hand. In some cases, the medicine may be taken by mouth or inhaled. While you are under sedation, you will likely be awake. But you may not remember anything afterward.  Why procedural sedation is used  Sedation is used for many types of procedures. The goal is to reduce pain, anxiety, and stressful memories of a procedure. It can also help your healthcare provider treat you. For example, having a broken bone fixed may be easier if you feel relaxed.  Procedural sedation is used only for short, basic procedures. It is not used  for complex surgeries. Some procedures that use this type of sedation include:  · Dental surgery  · Breast biopsy, to take a sample of breast tissue  · Endoscopy, to look at gastrointestinal problems  · Bronchoscopy, to check for lung problems  · Bone or joint realignment, to fix a broken bone or dislocated joint  · Minor foot or skin surgery  · Electrical cardioversion, to restore a normal heart rhythm  · Lumbar puncture, to assess neurological disease  Risks of procedural sedation  Procedural sedation has some risks and possible side effects, such as:  · Headache  · Nausea and vomiting  · Unpleasant memory of the procedure  · Lowered rate of breathing  · Changes in heart rate and blood pressure (rare)  · Inhalation of stomach contents into your lungs (rare)  Side effects will likely go away shortly after the procedure. Your healthcare team will watch your heart rate and breathing during your sedation. This is to help prevent problems.  Your own risks may vary based on your age and your overall health. They also depend on the type of sedation you are given. Talk with your healthcare provider about the risks that apply most to you.  Getting ready for procedural sedation  Talk with your healthcare provider about how to get ready for your procedure. Tell him or her about all the medicines you take. This includes over-the-counter medicines such as ibuprofen. It also includes vitamins, herbs, and other supplements. You may need to stop taking some medicines before the procedure, such as blood thinners and aspirin. If you smoke, you should stop to lessen the chance of a lung issue. Talk with your healthcare provider if you need help to stop smoking.  Tell your healthcare provider if you:  · Have had any problems in the past with sedation or anesthesia  · Have had any recent changes in your health, such as an infection or fever  · Are pregnant or think you may be pregnant  Also be sure to:  · Ask a family member or friend  to take you home after the procedure. You cant drive on the day you receive sedation.  · Not eat or drink after midnight the night before your procedure, if advised.  · Not plan on making any important decisions, such as financial or legal, for the day after you receive the sedation.  · Follow all other instructions from your healthcare provider.  During your procedural sedation  You may have your procedure in a hospital or a medical clinic. Sedation is done by a trained healthcare provider. In general, you can expect the following:  · You will be given medicine through an IV line in your arm or hand. Or you may receive a shot. The medicine may also be given by mouth. Or you may inhale it through a mask.  · If you receive medicine through an IV, you may feel the effects very quickly. You will start to feel relaxed and drowsy.  · During the procedure, your heart rate, breathing, and blood pressure will be closely watched. Your breathing and blood pressure may decrease a little. But you will likely not need help with your breathing. You may receive a little extra oxygen through a mask or through some soft plastic prongs underneath your nose.  · You will probably be awake the entire time. If you do fall asleep, you should be easy to wake up, if needed. You should feel little or no pain.  · When your procedure is over, the sedative medicine will be stopped.  After your procedural sedation  You will begin to feel more awake and aware. But you will likely be drowsy for a while afterward. You will be closely watched as you become more alert. You may have a faint memory of the procedure. Or you may not remember it at all.  You should be able to return home within an hour or two after your procedure. Plan to have someone stay with you for a few hours. Side effects such as headache and nausea may go away quickly. Tell your healthcare provider if they continue.  Dont drive or make any important decisions for at least 24  hours. Be sure to follow all after-care instructions.     When to call your healthcare provider  Have someone call your healthcare provider right away if you have any of these:  · Drowsiness that gets worse  · Weakness or dizziness that gets worse  · Repeated vomiting  · You cant be awakened  · Severe or ongoing pain from the procedure, not relieved by the pain medicine   Date Last Reviewed: 2/1/2017  © 9504-1500 [employer]. [address] [place] [address], [place] [address]. All rights reserved. This information is not intended as a substitute for professional medical care. Always follow your healthcare professional's instructions.

## 2018-05-29 ENCOUNTER — HOSPITAL ENCOUNTER (OUTPATIENT)
Facility: HOSPITAL | Age: 58
Discharge: HOME | End: 2018-05-29
Attending: ANESTHESIOLOGY | Admitting: ANESTHESIOLOGY
Payer: COMMERCIAL

## 2018-05-29 ENCOUNTER — SURGERY (OUTPATIENT)
Age: 58
End: 2018-05-29

## 2018-05-29 VITALS
DIASTOLIC BLOOD PRESSURE: 67 MMHG | WEIGHT: 275 LBS | OXYGEN SATURATION: 100 % | HEIGHT: 68 IN | TEMPERATURE: 98 F | HEART RATE: 61 BPM | RESPIRATION RATE: 15 BRPM | BODY MASS INDEX: 41.68 KG/M2 | SYSTOLIC BLOOD PRESSURE: 112 MMHG

## 2018-05-29 DIAGNOSIS — M54.16 LUMBAR RADICULOPATHY: ICD-10-CM

## 2018-05-29 DIAGNOSIS — M51.36 DDD (DEGENERATIVE DISC DISEASE), LUMBAR: Primary | ICD-10-CM

## 2018-05-29 DIAGNOSIS — G89.29 CHRONIC PAIN: ICD-10-CM

## 2018-05-29 LAB — POCT GLUCOSE: 80 MG/DL (ref 70–110)

## 2018-05-29 PROCEDURE — 62323 NJX INTERLAMINAR LMBR/SAC: CPT | Mod: ,,, | Performed by: ANESTHESIOLOGY

## 2018-05-29 PROCEDURE — 62323 NJX INTERLAMINAR LMBR/SAC: CPT | Performed by: ANESTHESIOLOGY

## 2018-05-29 PROCEDURE — 63600175 PHARM REV CODE 636 W HCPCS: Performed by: ANESTHESIOLOGY

## 2018-05-29 RX ORDER — MIDAZOLAM HYDROCHLORIDE 1 MG/ML
INJECTION, SOLUTION INTRAMUSCULAR; INTRAVENOUS
Status: DISCONTINUED | OUTPATIENT
Start: 2018-05-29 | End: 2018-05-29 | Stop reason: HOSPADM

## 2018-05-29 RX ORDER — SODIUM CHLORIDE 9 MG/ML
500 INJECTION, SOLUTION INTRAVENOUS CONTINUOUS
Status: DISCONTINUED | OUTPATIENT
Start: 2018-05-29 | End: 2018-05-29 | Stop reason: HOSPADM

## 2018-05-29 RX ORDER — LIDOCAINE HYDROCHLORIDE 5 MG/ML
5 INJECTION, SOLUTION INFILTRATION; INTRAVENOUS ONCE
Status: CANCELLED | OUTPATIENT
Start: 2018-05-29

## 2018-05-29 RX ADMIN — MIDAZOLAM 2 MG: 1 INJECTION INTRAMUSCULAR; INTRAVENOUS at 10:05

## 2018-05-29 NOTE — DISCHARGE SUMMARY
Discharge Note  Short Stay      SUMMARY     Admit Date: 5/29/2018    Attending Physician: Roman Lyman      Discharge Physician: Roman Lyman      Discharge Date: 5/29/2018 2:21 PM    Procedure(s) (LRB):  INJECTION-STEROID-EPIDURAL-LUMBAR- L4-5 (N/A)    Final Diagnosis: Lumbar radiculopathy [M54.16]    Disposition: Home or self care    Patient Instructions:   Discharge Medication List as of 5/28/2018  9:07 AM      START taking these medications    Details   aspirin 81 MG Chew Take 1 tablet (81 mg total) by mouth once daily., Starting Sun 7/16/2017, Until Mon 7/16/2018, OTC      buPROPion (WELLBUTRIN XL) 300 MG 24 hr tablet Take 300 mg by mouth once daily.  , Until Discontinued, Historical Med      cetirizine (ZYRTEC) 10 MG tablet Take 10 mg by mouth once daily., Until Discontinued, Historical Med      dextroamphetamine-amphetamine 30 mg Tab TK 1 T PO  QAM AND 1/2 T IN THE AFTERNOON, Historical Med      diphenhydrAMINE (BENADRYL) 50 MG capsule Take 1 capsule (50 mg total) by mouth as directed., Starting Mon 6/2/2014, Normal      furosemide (LASIX) 40 MG tablet Take 1 tablet (40 mg total) by mouth Daily. For fluid in legs, Starting Wed 7/19/2017, Until Sat 11/16/2019, Normal      metoprolol tartrate (LOPRESSOR) 25 MG tablet Take 1 tablet (25 mg total) by mouth 2 (two) times daily., Starting Thu 3/1/2018, Normal      nitroGLYCERIN (NITROSTAT) 0.4 MG SL tablet Place 1 tablet under the tongue daily as needed., Starting Mon 8/14/2017, Historical Med      omeprazole (PRILOSEC) 20 MG capsule Take 2 capsules (40 mg total) by mouth once daily., Starting Sun 7/16/2017, Until Sun 11/16/2025, Normal      polyethylene glycol (GOLYTELY,NULYTELY) 236-22.74-6.74 gram suspension Take 4 L by mouth once., Starting 1/13/2014, Historical Med      rivaroxaban (XARELTO) 20 mg Tab Take 1 tablet (20 mg total) by mouth once daily., Starting 4/25/2016, Until Discontinued, Normal      sotalol (BETAPACE) 80 MG tablet Take 1 tablet (80 mg  total) by mouth 2 (two) times daily., Starting Thu 3/1/2018, Normal      tamsulosin (FLOMAX) 0.4 mg Cp24 Take 1 capsule (0.4 mg total) by mouth 2 (two) times daily., Starting Wed 5/23/2018, Until Tue 8/21/2018, Normal      vortioxetine (TRINTELLIX) 20 mg Tab Take 20 mg by mouth once daily. , Historical Med         CONTINUE these medications which have NOT CHANGED    Details   desoximetasone (TOPICORT) 0.05 % cream Apply topically 2 (two) times daily., Starting Wed 3/21/2018, Until Sat 3/31/2018, Normal                 Discharge Diagnosis: Lumbar radiculopathy [M54.16]  Condition on Discharge: Stable with no complications to procedure   Diet on Discharge: Same as before.  Activity: as per instruction sheet.  Discharge to: Home with a responsible adult.  Follow up: 2-4 weeks

## 2018-05-29 NOTE — OP NOTE
"Patient Name: Shiraz Jha  MRN: [id number]    INFORMED CONSENT: The procedure, risks, benefits and options were discussed with patient. There are no contraindications to the procedure. The patient expressed understanding and agreed to proceed. The personnel performing the procedure was discussed. I verify that I personally obtained Shiraz's consent prior to the start of the procedure and the signed consent can be found on the patient's chart.    Procedure Date: 05/29/2018    Anesthesia: Topical    Pre Procedure diagnosis: Lumbar radiculopathy [M54.16]  1. DDD (degenerative disc disease), lumbar    2. Lumbar radiculopathy    3. Chronic pain      Post-Procedure diagnosis: SAME      Sedation:  no- Midazolam 2 mg single dose      PROCEDURE: L4-5 INTERLAMINAR EPIDURAL STEROID INJECTION - LUMBAR      DESCRIPTION OF PROCEDURE: The patient was brought to the procedure room.  After performing time out IV access was obtained prior to the procedure. The patient was positioned prone on the fluoroscopy table. Continuous hemodynamic monitoring was initiated including blood pressure, EKG, and pulse oximetry.   The area of the spine was prepped with chlorhexidine three times and draped in a sterile fashion.  Skin anesthesia was achieved using 3 mL of lidocaine 1% over the respective injection site. The L4-5 interspace was visualized under fluoroscopic imaging. An 18 gauge 3 1/2" Tuohy needle was slowly inserted and advanced using loss of resistance to saline with AP and lateral fluoroscopic imaging for needle guidance. Negative aspiration for blood or CSF was confirmed. Epidural contrast spread was confirmed using 2mL of Omnipaque 300 contrast spreading in the lumbar epidural space.6 mL of Lidocaine 0.5% and 10 mg decadron was injected. The needle was removed and bleeding was nil.  A sterile dressing was applied. Shiraz was taken back to the recovery room for further observation.     Blood Loss: Nill  Specimen: None      Roman ROSS " MD N[place]

## 2018-05-29 NOTE — H&P (VIEW-ONLY)
"Chronic patient Established Note (Follow up visit)      SUBJECTIVE:    Shiraz Jha presents to the clinic for a 2 wk follow-up appointment for low back pain. He is S/P BILATERAL L3-4-5 LUMBAR FACET MEDIAL BRANCH NERVE BLOCK on 18 with 20% relief for a few days.  Pt states he got "no relief" MRI show that  there is enlarged lamina and the facet joints and mild hypertrophic changes of the facet joints.  This results in at least a mild central canal spinal stenosis. I will s/f a L4-5 IESI to see if it helps his low back and bilateral leg tingling.  Since the last visit, Shiraz Jha states the pain has been persistant. Current pain intensity is 2/10.    Pain Disability Index Review:  Last 3 PDI Scores 5/15/2018 2018 4/10/2018   Pain Disability Index (PDI) 60 56 40       Pain Medications:     - Opioids: None  - Adjuvant Medications: Cymbalta ( Duloxetine)  - Anti-Coagulants: Xarelto  - Others: See Medication Sheet     Opioid Contract: no      report:  Reviewed and consistent with medication use as prescribed.     Pain Procedures:    18 BILATERAL L3-4-5 LUMBAR FACET MEDIAL BRANCH NERVE BLOCK  C7-T1 CERVICAL EPIDURAL STEROID INJECTION   03/24/15     Physical Therapy/Home Exercise: no     Imagin18 X-Ray Lumbar Spine AP And Lateral     Narrative      EXAMINATION:  XR LUMBAR SPINE AP AND LATERAL    CLINICAL HISTORY:  Low back pain, <6wks, no red flags, no prior management;Other intervertebral disc degeneration, lumbar region    TECHNIQUE:  AP, lateral and spot images were performed of the lumbar spine.    COMPARISON:  None    FINDINGS:  The vertebral body heights and intervertebral disc spaces are fairly well maintained.  No fracture.  No marrow replacement process.  SI joints unremarkable.  L4-5 and L5-S1 facet arthropathy noted.   Impression        Lumbar spondylosis.      Electronically signed by: Moreno Adkins MD  Date: 2018  Time: 12:41    Encounter      View Encounter          Signed by "      Signed Credentials Date/Time   Phone Pager   MORENO ADKINS MD 4/24/2018 12:41 [phone]   Reviewed By      ZAIDA Jacobs on 4/25/2018 08:13   Exam Details      Performed Procedure Technologist Supporting Staff Performing Physician   X-Ray Lumbar Spine Ap And Lateral Samson Jorgensen, RT         Appointment Date/Status Modality Department     4/24/2018     Arrived [facility] XR-A [facility] XRAY OP         Begin Exam End Exam   End Exam Questionnaires   4/24/2018 11:40 AM 4/24/2018 11:46 AM   IMAGING END ALL      4/24/18 X-Ray Hips Bilateral 2 View Inc AP Pelvis     Narrative      EXAMINATION:  XR HIPS BILATERAL 2 VIEW INCL AP PELVIS    CLINICAL HISTORY:  Pain in right hip    TECHNIQUE:  AP view of the pelvis and frogleg lateral views of both hips were performed.    COMPARISON:  None.    FINDINGS:  No fractures identified. The alignment is within normal limits.  No evidence of a marrow replacement process.Probable phlebolith in the pelvis.  SI joints unremarkable.  The hip joint spaces are fairly well maintained.   Impression        No acute findings.      Electronically signed by: Moreno Adkins MD  Date: 04/24/2018  Time: 12:42    Encounter      View Encounter          Signed by      Signed Credentials Date/Time   Phone Pager   MORENO ADKINS MD 4/24/2018 12:42 [phone]   Reviewed By      ZAIDA Jacobs on 4/25/2018 08:15   Exam Details      Performed Procedure Technologist Supporting Staff Performing Physician   X-Ray Hips Bilateral 2 View Inc AP Pelvis Samson Jorgensen, RT         Appointment Date/Status Modality Department     4/24/2018     Arrived [facility] XR-A [facility] XRAY OP         Begin Exam End Exam   End Exam Questionnaires   4/24/2018 11:40 AM 4/24/2018 11:44 AM   IMAGING END ALL       Reason For Exam   Priority: Routine   Dx: Pain of both hip joints [M25.551, M25.552 (ICD-10-CM)]   Order Report       Order Details      4/24/18 MRI Lumbar Spine Without  Contrast     Narrative      EXAMINATION:  MRI LUMBAR SPINE WITHOUT CONTRAST    CLINICAL HISTORY:  Low back pain, <6wks, no red flags, no prior management; Other intervertebral disc degeneration, lumbar region    TECHNIQUE:  Multiplanar, multisequence MR images were acquired from the thoracolumbar junction to the sacrum without the administration of contrast.    COMPARISON:  None.    FINDINGS:  There is normal lumbar lordosis.  No spondylolisthesis or spondylolysis.  There is no marrow edema throughout the vertebral bodies to suggest acute fractures or marrow replacing processes throughout the lumbar spine.  The tip of the conus is at T12-L1 disc space.  Paraspinal soft tissues are unremarkable.  On the sagittal images there is suggestion of a distended urinary bladder.    L5-S1: There is normal intervertebral disc height no disc herniations or significant central canal spinal stenosis.  There is mild bilateral facet arthropathy.  No significant foraminal stenosis.    L4-5: Normal intervertebral disc height without soft tissue disc herniations.  There is enlarged lamina and the facet joints and mild hypertrophic changes of the facet joints.  This results in at least a mild central canal spinal stenosis.    L3-4: Normal intervertebral disc height without soft tissue disc herniations.  There is enlarged facet joints and the lamina and mild hypertrophic changes of the ligamentum flava resulting in at least a moderate central canal spinal stenosis.    L2-3: Normal intervertebral disc height without soft tissue disc herniations.  There is hypertrophic changes of the dorsal elements including the lamina and the facet joints and mild hypertrophic changes of the ligamentum flava resulting in moderate central canal spinal stenosis.    L1-2: No disc herniations.  Hypertrophic changes of the dorsal elements including the lamina and the facet joints resulting in mild central canal stenosis.    T12-L1: No disc herniations or  spinal stenosis or foraminal stenosis.  There is mild marginal anterior spondylotic osteophytes.    It should be noted that the CSF space within the thecal sac at the L1-2, L2-3 L3-4 disc spaces is compromised from the spinal stenotic changes.   Impression        1. At least moderate stenotic changes at the L2-3 and L3-4 disc spaces and mild stenosis at the L1-2 and L4-5 disc spaces, mainly from hypertrophic changes of the dorsal elements.  It is possible that these findings most likely on a congenital basis.  2. No significant disc herniations.  3. Distended urinary bladder.      Electronically signed by: Emiliano Pimentel MD  Date: 04/24/2018  Time: 11:55    Encounter      View Encounter          Signed by      Signed Credentials Date/Time   Phone Pager   EMILIANO PIMENTEL MD 4/24/2018 11:55 [phone]     Reviewed By      ZAIDA Jacobs on 4/25/2018 08:13   Exam Details      Performed Procedure Technologist Supporting Staff Performing Physician   MRI Lumbar Spine Without Contrast Netta Rodgers, RT           Appointment Date/Status Modality Department     4/24/2018     Arrived [facility] MRI1 [facility] MRI         Begin Exam End Exam Begin Exam Questionnaires End Exam Questionnaires   4/24/2018 11:09 AM 4/24/2018 11:41 AM MRI TECH NAVIGATOR QUESTIONS IMAGING END ALL                Doctor's Imaging MRI Cervical Spine W/O Contrast     IMPRESSION:     1. Spondylotic changes associated with a broad-based moderately prominent disc herniation at the C6-C7 intervertebral disc space lateralized greater towards the left as compared to the right.      2. Spur/Disc herniation complex also lateralized towards the left side at the C5-C6 intervertebral disc, as discussed above.      3. Stable postoperative changes at the C6-C7 intervertebral disc space.      Allergies:   Review of patient's allergies indicates:   Allergen Reactions    Iodinated contrast- oral and iv dye Other (See Comments)     Raises BP         Current Medications:    Current Outpatient Prescriptions   Medication Sig Dispense Refill    buPROPion (WELLBUTRIN XL) 300 MG 24 hr tablet Take 300 mg by mouth once daily.        cetirizine (ZYRTEC) 10 MG tablet Take 10 mg by mouth once daily.      dextroamphetamine-amphetamine 30 mg Tab TK 1 T PO  QAM AND 1/2 T IN THE AFTERNOON  0    diphenhydrAMINE (BENADRYL) 50 MG capsule Take 1 capsule (50 mg total) by mouth as directed. (Patient taking differently: Take 50 mg by mouth 2 (two) times daily. ) 3 capsule 1    furosemide (LASIX) 40 MG tablet Take 1 tablet (40 mg total) by mouth Daily. For fluid in legs 30 tablet 12    metoprolol tartrate (LOPRESSOR) 25 MG tablet Take 1 tablet (25 mg total) by mouth 2 (two) times daily. 180 tablet 3    nitroGLYCERIN (NITROSTAT) 0.4 MG SL tablet Place 1 tablet under the tongue daily as needed.      omeprazole (PRILOSEC) 20 MG capsule Take 2 capsules (40 mg total) by mouth once daily. (Patient taking differently: Take 20 mg by mouth once daily. ) 60 capsule 3    polyethylene glycol (GOLYTELY,NULYTELY) 236-22.74-6.74 gram suspension Take 4 L by mouth once.      rivaroxaban (XARELTO) 20 mg Tab Take 1 tablet (20 mg total) by mouth once daily. 30 tablet 5    sotalol (BETAPACE) 80 MG tablet Take 1 tablet (80 mg total) by mouth 2 (two) times daily. 180 tablet 3    tamsulosin (FLOMAX) 0.4 mg Cp24 TAKE 2 ONCE DAILY 30 capsule 0    vortioxetine (TRINTELLIX) 20 mg Tab Take 20 mg by mouth once daily.       aspirin 81 MG Chew Take 1 tablet (81 mg total) by mouth once daily.  0    desoximetasone (TOPICORT) 0.05 % cream Apply topically 2 (two) times daily. 60 g 11     No current facility-administered medications for this visit.        REVIEW OF SYSTEMS:    GENERAL:  No weight loss, malaise or fevers. Morbid obesity  HEENT:  Negative for frequent or significant headaches.  NECK:  Negative for lumps, goiter, pain and significant neck swelling.  RESPIRATORY:  Negative for cough, wheezing or shortness of  breath.   CARDIOVASCULAR:  Negative for chest pain, leg swelling or palpitations. + Afib  GI:  Negative for abdominal discomfort, blood in stools or black stools or change in bowel habits.  MUSCULOSKELETAL:  See HPI.  SKIN:  Negative for lesions, rash, and itching.  PSYCH:  + for sleep disturbance, mood disorder and recent psychosocial stressors. + depression, no suicidal ideations.   HEMATOLOGY/LYMPHOLOGY:  Negative for prolonged bleeding, bruising easily or swollen nodes.  NEURO:   No history of headaches, syncope, paralysis, seizures or tremors.  All other reviewed and negative other than HPI.     Past Medical History:  Past Medical History:   Diagnosis Date    Allergy     Asthma     Atrial fibrillation     Chronic rhinitis 9/26/2013    DDD (degenerative disc disease) 4/3/2015    Depression     Diabetes mellitus     Fibromyalgia     Gastroesophageal reflux disease without esophagitis 7/14/2017    Hypertension     Sinusitis, acute, maxillary 12/20/2012    Thyroid disease        Past Surgical History:  Past Surgical History:   Procedure Laterality Date    CERVICAL SPINE SURGERY      GASTRIC BYPASS      [facility]    TONSILLECTOMY         Family History:  Family History   Problem Relation Age of Onset    Heart disease Father     Cancer Mother         lung    Hypertension Sister     Heart disease Brother     Cirrhosis Brother     Diabetes Brother        Social History:  Social History     Social History    Marital status: Single     Spouse name: N/A    Number of children: N/A    Years of education: N/A     Social History Main Topics    Smoking status: Never Smoker    Smokeless tobacco: Never Used    Alcohol use No      Comment: rare    Drug use: No    Sexual activity: Yes     Partners: Female     Other Topics Concern    None     Social History Narrative    From [place], lives alone w/2 dogs.    Dogs are his kids.    Works PT --  at [employer] on [employer]  from neck and back/depression.       OBJECTIVE:    /82   Pulse 61   Wt 123.2 kg (271 lb 9.7 oz)   BMI 41.30 kg/m²     PHYSICAL EXAMINATION:    General appearance: Well appearing, in no acute distress, alert and oriented x3.  Psych:  Mood and affect appropriate.  Skin: Skin color, texture, turgor normal, no rashes or lesions, in both upper and lower body.  Head/face:  Atraumatic, normocephalic. No palpable lymph nodes  Neck: No pain to palpation over the cervical paraspinous muscles. Spurling Negative. No pain with neck flexion, extension, or lateral flexion. .  Cor: RRR  Pulm: CTA  GI: Abdomen soft and non-tender.  Back: Straight leg raising in the sitting and supine positions is + tingling bilaterally. + pain to palpation over the L- spine. + Facet Loading Bilaterally.   Normal range of motion with pain reproduction. Positive FABERE, Yeoman's and Galensen test on the both side.  Extremities: Peripheral joint ROM is full and pain free without obvious instability or laxity in all four extremities. No deformities, edema, or skin discoloration. Good capillary refill.  Musculoskeletal: Shoulder, hip, sacroiliac and knee provocative maneuvers are negative. Bilateral upper and lower extremity strength is normal and symmetric.  No atrophy or tone abnormalities are noted.  Neuro: Bilateral upper and lower extremity coordination and muscle stretch reflexes are physiologic and symmetric.  Plantar response are downgoing. No loss of sensation is noted.  Gait: Normal.    ASSESSMENT: 58 y.o. year old male with low back and bilateral leg tingling, consistent with      Diagnosis:    1. Chronic pain syndrome     2. Chronic low back pain with sciatica, sciatica laterality unspecified, unspecified back pain laterality     3. Facet arthropathy, multilevel     4. Osteoarthritis of spine, unspecified spinal osteoarthritis complication status, unspecified spinal region           PLAN:     - I have stressed the importance of  physical activity and a home exercise plan to help with pain and improve health.  - Patient can continue with medications for now since they are providing benefits, using them appropriately, and without side effects.  - Counseled patient regarding the importance of activity modification, constant sleeping habits and physical therapy.  - Previous imaging was reviewed and discussed with the patient today.   - Schedule for a Lumbar Epidural Steroid Injection at L4-5 to help withpain and progress with a Home exercise program.  - I have explained the risks, benefits, and alternatives of the procedure in detail. The patient voices understanding and all questions have been answered. The patient agrees to proceed as planned. Written Consent obtained.   -RTC 2-3 weeks following procedure.  -The above plan and management options were discussed at length with patient. Patient is in agreement with the above and verbalized understanding. Dr. Lyman   was consulted on this patient  and agrees with this plan.      Bairon Campos NP-C  Interventional Pain Management      05/15/2018

## 2018-06-04 ENCOUNTER — OFFICE VISIT (OUTPATIENT)
Dept: INTERNAL MEDICINE | Facility: CLINIC | Age: 58
End: 2018-06-04
Payer: COMMERCIAL

## 2018-06-04 VITALS
RESPIRATION RATE: 18 BRPM | HEART RATE: 62 BPM | BODY MASS INDEX: 41.5 KG/M2 | WEIGHT: 273.81 LBS | SYSTOLIC BLOOD PRESSURE: 104 MMHG | HEIGHT: 68 IN | TEMPERATURE: 98 F | DIASTOLIC BLOOD PRESSURE: 71 MMHG

## 2018-06-04 DIAGNOSIS — J20.9 ACUTE BRONCHITIS, UNSPECIFIED ORGANISM: ICD-10-CM

## 2018-06-04 DIAGNOSIS — I10 HTN, GOAL BELOW 130/80: ICD-10-CM

## 2018-06-04 DIAGNOSIS — B97.89 VIRAL SINUSITIS: Primary | ICD-10-CM

## 2018-06-04 DIAGNOSIS — J32.9 VIRAL SINUSITIS: Primary | ICD-10-CM

## 2018-06-04 PROCEDURE — 99999 PR PBB SHADOW E&M-EST. PATIENT-LVL III: CPT | Mod: PBBFAC,,, | Performed by: INTERNAL MEDICINE

## 2018-06-04 PROCEDURE — 99214 OFFICE O/P EST MOD 30 MIN: CPT | Mod: S$GLB,,, | Performed by: INTERNAL MEDICINE

## 2018-06-04 PROCEDURE — 3074F SYST BP LT 130 MM HG: CPT | Mod: CPTII,S$GLB,, | Performed by: INTERNAL MEDICINE

## 2018-06-04 PROCEDURE — 3008F BODY MASS INDEX DOCD: CPT | Mod: CPTII,S$GLB,, | Performed by: INTERNAL MEDICINE

## 2018-06-04 PROCEDURE — 3078F DIAST BP <80 MM HG: CPT | Mod: CPTII,S$GLB,, | Performed by: INTERNAL MEDICINE

## 2018-06-04 RX ORDER — LEVOCETIRIZINE DIHYDROCHLORIDE 5 MG/1
5 TABLET, FILM COATED ORAL NIGHTLY
Qty: 30 TABLET | Refills: 3 | Status: SHIPPED | OUTPATIENT
Start: 2018-06-04 | End: 2019-09-19

## 2018-06-04 RX ORDER — NAFTIFINE HYDROCHLORIDE 2 G/100G
GEL TOPICAL DAILY PRN
COMMUNITY
Start: 2018-05-25 | End: 2023-05-16

## 2018-06-04 RX ORDER — CODEINE PHOSPHATE AND GUAIFENESIN 10; 100 MG/5ML; MG/5ML
5 SOLUTION ORAL NIGHTLY
Qty: 120 ML | Refills: 0 | Status: SHIPPED | OUTPATIENT
Start: 2018-06-04 | End: 2018-06-14

## 2018-06-04 RX ORDER — FLUTICASONE PROPIONATE 50 MCG
2 SPRAY, SUSPENSION (ML) NASAL DAILY
Qty: 16 G | Refills: 2 | Status: SHIPPED | OUTPATIENT
Start: 2018-06-04 | End: 2018-07-04

## 2018-06-04 NOTE — PROGRESS NOTES
Subjective:       Patient ID: Shiraz Jha is a 58 y.o. male.    Chief Complaint: Nasal Congestion and Cough    HPI   Pt with HTN is here for evaluation 4 days of persistent sinus/chest congestion, productive cough, post nasal drip, sore throat, chills, subjective fevers, body aches. Minimal relief with Mucinex/benadryl.  Review of Systems   Constitutional: Positive for chills and fatigue. Negative for activity change, appetite change, diaphoresis, fever and unexpected weight change.   HENT: Positive for congestion, postnasal drip, rhinorrhea, sinus pain, sinus pressure and sore throat. Negative for sneezing, trouble swallowing and voice change.    Respiratory: Positive for cough. Negative for shortness of breath and wheezing.    Cardiovascular: Negative for chest pain, palpitations and leg swelling.   Gastrointestinal: Negative for abdominal pain, blood in stool, constipation, diarrhea, nausea and vomiting.   Genitourinary: Negative for dysuria.   Musculoskeletal: Negative for arthralgias and myalgias.   Skin: Negative for rash and wound.   Allergic/Immunologic: Negative for environmental allergies and food allergies.   Hematological: Negative for adenopathy. Does not bruise/bleed easily.       Objective:      Physical Exam   Constitutional: He is oriented to person, place, and time. He appears well-developed and well-nourished. No distress.   HENT:   Head: Normocephalic and atraumatic.   Right Ear: External ear normal.   Left Ear: External ear normal.   Nose: Mucosal edema and rhinorrhea present.   Mouth/Throat: Oropharynx is clear and moist. No oropharyngeal exudate.   Eyes: Conjunctivae and EOM are normal. Pupils are equal, round, and reactive to light. Right eye exhibits no discharge. Left eye exhibits no discharge. No scleral icterus.   Neck: Normal range of motion. Neck supple. No JVD present.   Cardiovascular: Normal rate, regular rhythm and normal heart sounds.    No murmur heard.  Pulmonary/Chest: Effort  normal and breath sounds normal. No respiratory distress. He has no wheezes. He has no rales.   Abdominal: Soft. Bowel sounds are normal. There is no tenderness.   Musculoskeletal: He exhibits no edema.   Lymphadenopathy:     He has no cervical adenopathy.   Neurological: He is alert and oriented to person, place, and time.   Skin: Skin is warm and dry. No rash noted. He is not diaphoretic. No pallor.       Assessment:       1. Viral sinusitis    2. Acute bronchitis, unspecified organism    3. HTN, goal below 130/80        Plan:    1. Rx Xyzal/Flonase       No decongestants 2/2 HTN    2. Rx Cheratussin AC qHS   3. Stable, CPT

## 2018-06-12 ENCOUNTER — TELEPHONE (OUTPATIENT)
Dept: PAIN MEDICINE | Facility: CLINIC | Age: 58
End: 2018-06-12

## 2018-06-12 ENCOUNTER — OFFICE VISIT (OUTPATIENT)
Dept: PAIN MEDICINE | Facility: CLINIC | Age: 58
End: 2018-06-12
Payer: COMMERCIAL

## 2018-06-12 VITALS
HEART RATE: 58 BPM | WEIGHT: 272.5 LBS | BODY MASS INDEX: 41.43 KG/M2 | SYSTOLIC BLOOD PRESSURE: 121 MMHG | DIASTOLIC BLOOD PRESSURE: 89 MMHG

## 2018-06-12 DIAGNOSIS — M54.16 LUMBAR RADICULOPATHY: Primary | ICD-10-CM

## 2018-06-12 DIAGNOSIS — M47.819 FACET ARTHROPATHY, MULTILEVEL: ICD-10-CM

## 2018-06-12 DIAGNOSIS — M51.36 DDD (DEGENERATIVE DISC DISEASE), LUMBAR: ICD-10-CM

## 2018-06-12 DIAGNOSIS — G89.4 CHRONIC PAIN SYNDROME: ICD-10-CM

## 2018-06-12 DIAGNOSIS — G89.29 CHRONIC LOW BACK PAIN WITH SCIATICA, SCIATICA LATERALITY UNSPECIFIED, UNSPECIFIED BACK PAIN LATERALITY: ICD-10-CM

## 2018-06-12 DIAGNOSIS — M54.40 CHRONIC LOW BACK PAIN WITH SCIATICA, SCIATICA LATERALITY UNSPECIFIED, UNSPECIFIED BACK PAIN LATERALITY: ICD-10-CM

## 2018-06-12 PROCEDURE — 99999 PR PBB SHADOW E&M-EST. PATIENT-LVL IV: CPT | Mod: PBBFAC,,, | Performed by: NURSE PRACTITIONER

## 2018-06-12 PROCEDURE — 3008F BODY MASS INDEX DOCD: CPT | Mod: CPTII,S$GLB,, | Performed by: NURSE PRACTITIONER

## 2018-06-12 PROCEDURE — 99213 OFFICE O/P EST LOW 20 MIN: CPT | Mod: S$GLB,,, | Performed by: NURSE PRACTITIONER

## 2018-06-12 PROCEDURE — 3079F DIAST BP 80-89 MM HG: CPT | Mod: CPTII,S$GLB,, | Performed by: NURSE PRACTITIONER

## 2018-06-12 PROCEDURE — 3074F SYST BP LT 130 MM HG: CPT | Mod: CPTII,S$GLB,, | Performed by: NURSE PRACTITIONER

## 2018-06-12 NOTE — TELEPHONE ENCOUNTER
Left detailed message for patient regarding medication clearance. Patient was informed that the staff has received clearance for him to hold his Xarelto and ASA 3 days prior to procedure. No answer at this time.

## 2018-06-12 NOTE — TELEPHONE ENCOUNTER
----- Message from All Webb MD sent at 6/12/2018 12:42 PM CDT -----  ok  ----- Message -----  From: Nichole Mauricio LPN  Sent: 6/12/2018  10:49 AM  To: All Webb MD    Patient is scheduled for a Lumbar Epidural Steroid Injection on 7/3/18 with Dr. Lyman and need clearance to hold Xarelto and ASA 3 days prior to procedure.     Please advise thank you

## 2018-06-12 NOTE — PROGRESS NOTES
Chronic patient Established Note (Follow up visit)      SUBJECTIVE:    Shiraz Jha presents to the clinic for a follow-up appointment for low back pain. He is S/P L4-5 INTERLAMINAR EPIDURAL STEROID INJECTION - LUMBAR on 18 with 50% relief but states he would like to repeat to capture more relief. He states he got better so I will repeat injection. Pt states he recent ly had a sinus injection, discussed in detail that he cannot have this next ALE until he has not had infection for 2 weeks. Discussed that we will s/f this next ALE for 2 weeks. Pts PDI is much better since injection.   Since the last visit, Shiraz Jha states the pain has been stable. Current pain intensity is 5/10.    Pain Disability Index Review:  Last 3 PDI Scores 2018 5/15/2018 2018   Pain Disability Index (PDI) 31 60 56       Pain Medications:     - Opioids: None  - Adjuvant Medications: Cymbalta ( Duloxetine)  - Anti-Coagulants: Xarelto  - Others: See Medication Sheet     Opioid Contract: no      report:  Reviewed and consistent with medication use as prescribed.     Pain Procedures:  18 L4-5 INTERLAMINAR EPIDURAL STEROID INJECTION - LUMBAR  18 BILATERAL L3-4-5 LUMBAR FACET MEDIAL BRANCH NERVE BLOCK  C7-T1 CERVICAL EPIDURAL STEROID INJECTION   03/24/15     Physical Therapy/Home Exercise: no     Imagin18 X-Ray Lumbar Spine AP And Lateral     Narrative      EXAMINATION:  XR LUMBAR SPINE AP AND LATERAL    CLINICAL HISTORY:  Low back pain, <6wks, no red flags, no prior management;Other intervertebral disc degeneration, lumbar region    TECHNIQUE:  AP, lateral and spot images were performed of the lumbar spine.    COMPARISON:  None    FINDINGS:  The vertebral body heights and intervertebral disc spaces are fairly well maintained.  No fracture.  No marrow replacement process.  SI joints unremarkable.  L4-5 and L5-S1 facet arthropathy noted.   Impression        Lumbar spondylosis.      Electronically signed by: Moreno  MD Lucille  Date: 04/24/2018  Time: 12:41    Encounter      View Encounter          Signed by      Signed Credentials Date/Time   Phone Pager   MORENO ADKINS MD 4/24/2018 12:41 [phone]   Reviewed By      ZAIDA Jacobs on 4/25/2018 08:13   Exam Details      Performed Procedure Technologist Supporting Staff Performing Physician   X-Ray Lumbar Spine Ap And Lateral Samson Jorgensen, RT         Appointment Date/Status Modality Department     4/24/2018     Arrived [facility] OD XR-A [facility] XRAY OP         Begin Exam End Exam   End Exam Questionnaires   4/24/2018 11:40 AM 4/24/2018 11:46 AM   IMAGING END ALL      4/24/18 X-Ray Hips Bilateral 2 View Inc AP Pelvis     Narrative      EXAMINATION:  XR HIPS BILATERAL 2 VIEW INCL AP PELVIS    CLINICAL HISTORY:  Pain in right hip    TECHNIQUE:  AP view of the pelvis and frogleg lateral views of both hips were performed.    COMPARISON:  None.    FINDINGS:  No fractures identified. The alignment is within normal limits.  No evidence of a marrow replacement process.Probable phlebolith in the pelvis.  SI joints unremarkable.  The hip joint spaces are fairly well maintained.   Impression        No acute findings.      Electronically signed by: Moreno Adkins MD  Date: 04/24/2018  Time: 12:42    Encounter      View Encounter          Signed by      Signed Credentials Date/Time   Phone Pager   MORENO ADKINS MD 4/24/2018 12:42 [phone]   Reviewed By      ZAIDA Jacobs on 4/25/2018 08:15   Exam Details      Performed Procedure Technologist Supporting Staff Performing Physician   X-Ray Hips Bilateral 2 View Inc AP Pelvis Samson Jorgensen, RT         Appointment Date/Status Modality Department     4/24/2018     Arrived [facility] OD XR-A [facility] XRAY OP         Begin Exam End Exam   End Exam Questionnaires   4/24/2018 11:40 AM 4/24/2018 11:44 AM   IMAGING END ALL       Reason For Exam   Priority: Routine   Dx: Pain of both hip joints [M25.551,  M25.552 (ICD-10-CM)]   Order Report       Order Details      4/24/18 MRI Lumbar Spine Without Contrast     Narrative      EXAMINATION:  MRI LUMBAR SPINE WITHOUT CONTRAST    CLINICAL HISTORY:  Low back pain, <6wks, no red flags, no prior management; Other intervertebral disc degeneration, lumbar region    TECHNIQUE:  Multiplanar, multisequence MR images were acquired from the thoracolumbar junction to the sacrum without the administration of contrast.    COMPARISON:  None.    FINDINGS:  There is normal lumbar lordosis.  No spondylolisthesis or spondylolysis.  There is no marrow edema throughout the vertebral bodies to suggest acute fractures or marrow replacing processes throughout the lumbar spine.  The tip of the conus is at T12-L1 disc space.  Paraspinal soft tissues are unremarkable.  On the sagittal images there is suggestion of a distended urinary bladder.    L5-S1: There is normal intervertebral disc height no disc herniations or significant central canal spinal stenosis.  There is mild bilateral facet arthropathy.  No significant foraminal stenosis.    L4-5: Normal intervertebral disc height without soft tissue disc herniations.  There is enlarged lamina and the facet joints and mild hypertrophic changes of the facet joints.  This results in at least a mild central canal spinal stenosis.    L3-4: Normal intervertebral disc height without soft tissue disc herniations.  There is enlarged facet joints and the lamina and mild hypertrophic changes of the ligamentum flava resulting in at least a moderate central canal spinal stenosis.    L2-3: Normal intervertebral disc height without soft tissue disc herniations.  There is hypertrophic changes of the dorsal elements including the lamina and the facet joints and mild hypertrophic changes of the ligamentum flava resulting in moderate central canal spinal stenosis.    L1-2: No disc herniations.  Hypertrophic changes of the dorsal elements including the lamina and the  facet joints resulting in mild central canal stenosis.    T12-L1: No disc herniations or spinal stenosis or foraminal stenosis.  There is mild marginal anterior spondylotic osteophytes.    It should be noted that the CSF space within the thecal sac at the L1-2, L2-3 L3-4 disc spaces is compromised from the spinal stenotic changes.   Impression        1. At least moderate stenotic changes at the L2-3 and L3-4 disc spaces and mild stenosis at the L1-2 and L4-5 disc spaces, mainly from hypertrophic changes of the dorsal elements.  It is possible that these findings most likely on a congenital basis.  2. No significant disc herniations.  3. Distended urinary bladder.      Electronically signed by: Emiliano Pimentel MD  Date: 04/24/2018  Time: 11:55    Encounter      View Encounter          Signed by      Signed Credentials Date/Time   Phone Pager   EMILIANO PIMENTEL MD 4/24/2018 11:55 [phone]     Reviewed By      ZAIDA Jacobs on 4/25/2018 08:13   Exam Details      Performed Procedure Technologist Supporting Staff Performing Physician   MRI Lumbar Spine Without Contrast Netta Rodgers, RT           Appointment Date/Status Modality Department     4/24/2018     Arrived [facility] MRI1 [facility] MRI         Begin Exam End Exam Begin Exam Questionnaires End Exam Questionnaires   4/24/2018 11:09 AM 4/24/2018 11:41 AM MRI TECH NAVIGATOR QUESTIONS IMAGING END ALL                [facility] MRI Cervical Spine W/O Contrast     IMPRESSION:     1. Spondylotic changes associated with a broad-based moderately prominent disc herniation at the C6-C7 intervertebral disc space lateralized greater towards the left as compared to the right.      2. Spur/Disc herniation complex also lateralized towards the left side at the C5-C6 intervertebral disc, as discussed above.      3. Stable postoperative changes at the C6-C7 intervertebral disc space.    Allergies:   Review of patient's allergies indicates:   Allergen Reactions    Iodinated  contrast- oral and iv dye Other (See Comments)     Raises BP         Current Medications:   Current Outpatient Prescriptions   Medication Sig Dispense Refill    aspirin 81 MG Chew Take 1 tablet (81 mg total) by mouth once daily.  0    buPROPion (WELLBUTRIN XL) 300 MG 24 hr tablet Take 300 mg by mouth once daily.        desoximetasone (TOPICORT) 0.05 % cream Apply topically 2 (two) times daily. 60 g 11    dextroamphetamine-amphetamine 30 mg Tab TK 1 T PO  QAM AND 1/2 T IN THE AFTERNOON  0    fluticasone (FLONASE) 50 mcg/actuation nasal spray 2 sprays (100 mcg total) by Each Nare route once daily. 16 g 2    furosemide (LASIX) 40 MG tablet Take 1 tablet (40 mg total) by mouth Daily. For fluid in legs 30 tablet 12    guaifenesin-codeine 100-10 mg/5 ml (CHERATUSSIN AC)  mg/5 mL syrup Take 5 mLs by mouth nightly. 120 mL 0    levocetirizine (XYZAL) 5 MG tablet Take 1 tablet (5 mg total) by mouth every evening. 30 tablet 3    metoprolol tartrate (LOPRESSOR) 25 MG tablet Take 1 tablet (25 mg total) by mouth 2 (two) times daily. 180 tablet 3    NAFTIN 2 % Gel       nitroGLYCERIN (NITROSTAT) 0.4 MG SL tablet Place 1 tablet under the tongue daily as needed.      omeprazole (PRILOSEC) 20 MG capsule Take 2 capsules (40 mg total) by mouth once daily. (Patient taking differently: Take 20 mg by mouth once daily. ) 60 capsule 3    polyethylene glycol (GOLYTELY,NULYTELY) 236-22.74-6.74 gram suspension Take 4 L by mouth once.      rivaroxaban (XARELTO) 20 mg Tab Take 1 tablet (20 mg total) by mouth once daily. 30 tablet 5    sotalol (BETAPACE) 80 MG tablet Take 1 tablet (80 mg total) by mouth 2 (two) times daily. 180 tablet 3    tamsulosin (FLOMAX) 0.4 mg Cp24 Take 1 capsule (0.4 mg total) by mouth 2 (two) times daily. 180 capsule 3    vortioxetine (TRINTELLIX) 20 mg Tab Take 20 mg by mouth once daily.        No current facility-administered medications for this visit.        REVIEW OF SYSTEMS:    GENERAL:  No  weight loss, malaise or fevers. Morbid obesity  HEENT:  Negative for frequent or significant headaches.  NECK:  Negative for lumps, goiter, pain and significant neck swelling.  RESPIRATORY:  Negative for cough, wheezing or shortness of breath.   CARDIOVASCULAR:  Negative for chest pain, leg swelling or palpitations. + Afib  GI:  Negative for abdominal discomfort, blood in stools or black stools or change in bowel habits.  MUSCULOSKELETAL:  See HPI.  SKIN:  Negative for lesions, rash, and itching.  PSYCH:  + for sleep disturbance, mood disorder and recent psychosocial stressors. + depression, no suicidal ideations.   HEMATOLOGY/LYMPHOLOGY:  Negative for prolonged bleeding, bruising easily or swollen nodes.  NEURO:   No history of headaches, syncope, paralysis, seizures or tremors.  All other reviewed and negative other than HPI.    Past Medical History:  Past Medical History:   Diagnosis Date    Allergy     Asthma     Atrial fibrillation     Chronic rhinitis 9/26/2013    DDD (degenerative disc disease) 4/3/2015    Depression     Diabetes mellitus     Fibromyalgia     Gastroesophageal reflux disease without esophagitis 7/14/2017    Hypertension     Sinusitis, acute, maxillary 12/20/2012    Thyroid disease        Past Surgical History:  Past Surgical History:   Procedure Laterality Date    CERVICAL SPINE SURGERY      EPIDURAL STEROID INJECTION INTO LUMBAR SPINE N/A 5/29/2018    Procedure: INJECTION-STEROID-EPIDURAL-LUMBAR- L4-5;  Surgeon: Roman Lyman MD;  Location: [facility];  Service: Pain Management;  Laterality: N/A;  Patient is diabetic and Xarleto     GASTRIC BYPASS      SINUS SURGERY  2000    [facility]    TONSILLECTOMY         Family History:  Family History   Problem Relation Age of Onset    Heart disease Father     Cancer Mother         lung    Hypertension Sister     Heart disease Brother     Cirrhosis Brother     Diabetes Brother        Social History:  Social History      Social History    Marital status: Single     Spouse name: N/A    Number of children: N/A    Years of education: N/A     Social History Main Topics    Smoking status: Never Smoker    Smokeless tobacco: Never Used    Alcohol use No      Comment: rare    Drug use: No    Sexual activity: Yes     Partners: Female     Other Topics Concern    None     Social History Narrative    From [place], lives alone w/2 dogs.    Dogs are his kids.    Works PT --  at [employer], on ssdi from neck and back/depression.       OBJECTIVE:    /89   Pulse (!) 58   Wt 123.6 kg (272 lb 7.8 oz)   BMI 41.43 kg/m²     PHYSICAL EXAMINATION:    General appearance: Well appearing, in no acute distress, alert and oriented x3.  Psych:  Mood and affect appropriate.  Skin: Skin color, texture, turgor normal, no rashes or lesions, in both upper and lower body.  Head/face:  Atraumatic, normocephalic. No palpable lymph nodes  Neck: No pain to palpation over the cervical paraspinous muscles. Spurling Negative. No pain with neck flexion, extension, or lateral flexion. .  Cor: RRR  Pulm: CTA  GI: Abdomen soft and non-tender.  Back: Straight leg raising in the sitting and supine positions is + tingling bilaterally. + pain to palpation over the L- spine. + Facet Loading Bilaterally.   Normal range of motion with pain reproduction. Positive FABERE, Yeoman's and Galensen test on the both side.  Extremities: Peripheral joint ROM is full and pain free without obvious instability or laxity in all four extremities. No deformities, edema, or skin discoloration. Good capillary refill.  Musculoskeletal: Shoulder, hip, sacroiliac and knee provocative maneuvers are negative. Bilateral upper and lower extremity strength is normal and symmetric.  No atrophy or tone abnormalities are noted.  Neuro: Bilateral upper and lower extremity coordination and muscle stretch reflexes are physiologic and symmetric.  Plantar response are downgoing. No loss of  sensation is noted.  Gait: Normal.    ASSESSMENT: 58 y.o. year old male with ow back and bilateral leg tingling, consistent with      Diagnosis:    1. Lumbar radiculopathy     2. DDD (degenerative disc disease), lumbar     3. Chronic pain syndrome     4. Chronic low back pain with sciatica, sciatica laterality unspecified, unspecified back pain laterality     5. Facet arthropathy, multilevel           PLAN:     - I have stressed the importance of physical activity and a home exercise plan to help with pain and improve health.  - Patient can continue with medications for now since they are providing benefits, using them appropriately, and without side effects.  - Counseled patient regarding the importance of activity modification, constant sleeping habits and physical therapy.  -Schedule for a REPEAT  Lumbar Epidural Steroid Injection at L4-5 to help withpain and progress with a Home exercise program.  - I have explained the risks, benefits, and alternatives of the procedure in detail. The patient voices understanding and all questions have been answered. The patient agrees to proceed as planned. Written Consent obtained.   -RTC 2-3 weeks following procedure.  -The above plan and management options were discussed at length with patient. Patient is in agreement with the above and verbalized understanding. Dr. Lyman   was consulted on this patient  and agrees with this plan.    aBiron Campos, DOMINGO-C  Interventional Pain Management      06/12/2018

## 2018-06-29 ENCOUNTER — TELEPHONE (OUTPATIENT)
Dept: PAIN MEDICINE | Facility: HOSPITAL | Age: 58
End: 2018-06-29

## 2018-06-29 NOTE — DISCHARGE INSTRUCTIONS
Home Care Instructions Pain Management:    1.  DIET:    You may resume your normal diet today.    2.  BATHING:    You may shower with luke warm water.    3.  DRESSING:    You may remove your bandage today.    4.  ACTIVITY LEVEL:      You may resume your normal activities 24 hours after your procedure.    5.  MEDICATIONS:    You may resume your normal medications today.    6.  SPECIAL INSTRUCTIONS:    No heat to the injection site for 24 hours including bath or shower, heating pad, moist heat or hot tubs.    Use an ice pack to the injection site for any pain or discomfort.  Apply ice packs for 20 minute intervals as needed.    If you have received any sedatives by mouth today, you can not drive for 12 hours.    If you have received sedation through an IV, you can not drive for 24 hours.    PLEASE CALL YOUR DOCTOR FOR THE FOLLOWIN.  Redness or swelling around the injection site.  2.  Fever of 101 degrees.  3.  Drainage (pus) from the injection site.  4.  For any continuous bleeding (some dried blood over the incision is normal.)    FOR EMERGENCIES:    If any unusual problems or difficulties occur during clinic hours, call [phone] or dial [phone].    Follow up with with your physician in 2-3 weeks.

## 2018-07-03 ENCOUNTER — HOSPITAL ENCOUNTER (OUTPATIENT)
Facility: HOSPITAL | Age: 58
Discharge: HOME | End: 2018-07-03
Attending: ANESTHESIOLOGY | Admitting: ANESTHESIOLOGY
Payer: COMMERCIAL

## 2018-07-03 ENCOUNTER — SURGERY (OUTPATIENT)
Age: 58
End: 2018-07-03

## 2018-07-03 VITALS
WEIGHT: 274 LBS | TEMPERATURE: 98 F | HEART RATE: 61 BPM | RESPIRATION RATE: 17 BRPM | OXYGEN SATURATION: 98 % | DIASTOLIC BLOOD PRESSURE: 63 MMHG | HEIGHT: 68 IN | BODY MASS INDEX: 41.52 KG/M2 | SYSTOLIC BLOOD PRESSURE: 114 MMHG

## 2018-07-03 DIAGNOSIS — M51.36 DDD (DEGENERATIVE DISC DISEASE), LUMBAR: Primary | ICD-10-CM

## 2018-07-03 DIAGNOSIS — G89.29 CHRONIC PAIN: ICD-10-CM

## 2018-07-03 DIAGNOSIS — M54.17 LUMBOSACRAL RADICULOPATHY: ICD-10-CM

## 2018-07-03 LAB — POCT GLUCOSE: 92 MG/DL (ref 70–110)

## 2018-07-03 PROCEDURE — 63600175 PHARM REV CODE 636 W HCPCS: Performed by: ANESTHESIOLOGY

## 2018-07-03 PROCEDURE — 62323 NJX INTERLAMINAR LMBR/SAC: CPT | Mod: ,,, | Performed by: ANESTHESIOLOGY

## 2018-07-03 PROCEDURE — 62323 NJX INTERLAMINAR LMBR/SAC: CPT | Performed by: ANESTHESIOLOGY

## 2018-07-03 PROCEDURE — 25000003 PHARM REV CODE 250: Performed by: ANESTHESIOLOGY

## 2018-07-03 PROCEDURE — 25500020 PHARM REV CODE 255: Performed by: ANESTHESIOLOGY

## 2018-07-03 RX ORDER — SODIUM CHLORIDE 9 MG/ML
500 INJECTION, SOLUTION INTRAVENOUS CONTINUOUS
Status: DISCONTINUED | OUTPATIENT
Start: 2018-07-03 | End: 2018-07-03 | Stop reason: HOSPADM

## 2018-07-03 RX ORDER — DEXAMETHASONE SODIUM PHOSPHATE 10 MG/ML
INJECTION INTRAMUSCULAR; INTRAVENOUS
Status: DISCONTINUED | OUTPATIENT
Start: 2018-07-03 | End: 2018-07-03 | Stop reason: HOSPADM

## 2018-07-03 RX ORDER — DIPHENHYDRAMINE HYDROCHLORIDE 50 MG/ML
INJECTION INTRAMUSCULAR; INTRAVENOUS
Status: DISCONTINUED | OUTPATIENT
Start: 2018-07-03 | End: 2018-07-03 | Stop reason: HOSPADM

## 2018-07-03 RX ORDER — LIDOCAINE HYDROCHLORIDE 10 MG/ML
INJECTION, SOLUTION EPIDURAL; INFILTRATION; INTRACAUDAL; PERINEURAL
Status: DISCONTINUED | OUTPATIENT
Start: 2018-07-03 | End: 2018-07-03 | Stop reason: HOSPADM

## 2018-07-03 RX ORDER — TRIAMCINOLONE ACETONIDE 40 MG/ML
INJECTION, SUSPENSION INTRA-ARTICULAR; INTRAMUSCULAR
Status: DISCONTINUED | OUTPATIENT
Start: 2018-07-03 | End: 2018-07-03 | Stop reason: HOSPADM

## 2018-07-03 RX ORDER — MIDAZOLAM HYDROCHLORIDE 1 MG/ML
INJECTION, SOLUTION INTRAMUSCULAR; INTRAVENOUS
Status: DISCONTINUED | OUTPATIENT
Start: 2018-07-03 | End: 2018-07-03 | Stop reason: HOSPADM

## 2018-07-03 RX ADMIN — LIDOCAINE HYDROCHLORIDE 5 ML: 10 INJECTION, SOLUTION EPIDURAL; INFILTRATION; INTRACAUDAL; PERINEURAL at 10:07

## 2018-07-03 RX ADMIN — DIPHENHYDRAMINE HYDROCHLORIDE 25 MG: 50 INJECTION, SOLUTION INTRAMUSCULAR; INTRAVENOUS at 10:07

## 2018-07-03 RX ADMIN — MIDAZOLAM 2 MG: 1 INJECTION INTRAMUSCULAR; INTRAVENOUS at 10:07

## 2018-07-03 RX ADMIN — DEXAMETHASONE SODIUM PHOSPHATE 10 MG: 10 INJECTION, SOLUTION INTRAMUSCULAR; INTRAVENOUS at 10:07

## 2018-07-03 RX ADMIN — IOHEXOL 5 ML: 300 INJECTION, SOLUTION INTRAVENOUS at 10:07

## 2018-07-03 NOTE — H&P (VIEW-ONLY)
Chronic patient Established Note (Follow up visit)      SUBJECTIVE:    Shiraz Jha presents to the clinic for a follow-up appointment for low back pain. He is S/P L4-5 INTERLAMINAR EPIDURAL STEROID INJECTION - LUMBAR on 18 with 50% relief but states he would like to repeat to capture more relief. He states he got better so I will repeat injection. Pt states he recent ly had a sinus injection, discussed in detail that he cannot have this next ALE until he has not had infection for 2 weeks. Discussed that we will s/f this next ALE for 2 weeks. Pts PDI is much better since injection.   Since the last visit, Shiraz Jha states the pain has been stable. Current pain intensity is 5/10.    Pain Disability Index Review:  Last 3 PDI Scores 2018 5/15/2018 2018   Pain Disability Index (PDI) 31 60 56       Pain Medications:     - Opioids: None  - Adjuvant Medications: Cymbalta ( Duloxetine)  - Anti-Coagulants: Xarelto  - Others: See Medication Sheet     Opioid Contract: no      report:  Reviewed and consistent with medication use as prescribed.     Pain Procedures:  18 L4-5 INTERLAMINAR EPIDURAL STEROID INJECTION - LUMBAR  18 BILATERAL L3-4-5 LUMBAR FACET MEDIAL BRANCH NERVE BLOCK  C7-T1 CERVICAL EPIDURAL STEROID INJECTION   03/24/15     Physical Therapy/Home Exercise: no     Imagin18 X-Ray Lumbar Spine AP And Lateral     Narrative      EXAMINATION:  XR LUMBAR SPINE AP AND LATERAL    CLINICAL HISTORY:  Low back pain, <6wks, no red flags, no prior management;Other intervertebral disc degeneration, lumbar region    TECHNIQUE:  AP, lateral and spot images were performed of the lumbar spine.    COMPARISON:  None    FINDINGS:  The vertebral body heights and intervertebral disc spaces are fairly well maintained.  No fracture.  No marrow replacement process.  SI joints unremarkable.  L4-5 and L5-S1 facet arthropathy noted.   Impression        Lumbar spondylosis.      Electronically signed by: Moreno  MD Lucille  Date: 04/24/2018  Time: 12:41    Encounter      View Encounter          Signed by      Signed Credentials Date/Time   Phone Pager   MORENO ADKINS MD 4/24/2018 12:41 [phone]   Reviewed By      ZAIDA Jacobs on 4/25/2018 08:13   Exam Details      Performed Procedure Technologist Supporting Staff Performing Physician   X-Ray Lumbar Spine Ap And Lateral Samson Jorgensen, RT         Appointment Date/Status Modality Department     4/24/2018     Arrived [facility] OD XR-A [facility] XRAY OP         Begin Exam End Exam   End Exam Questionnaires   4/24/2018 11:40 AM 4/24/2018 11:46 AM   IMAGING END ALL      4/24/18 X-Ray Hips Bilateral 2 View Inc AP Pelvis     Narrative      EXAMINATION:  XR HIPS BILATERAL 2 VIEW INCL AP PELVIS    CLINICAL HISTORY:  Pain in right hip    TECHNIQUE:  AP view of the pelvis and frogleg lateral views of both hips were performed.    COMPARISON:  None.    FINDINGS:  No fractures identified. The alignment is within normal limits.  No evidence of a marrow replacement process.Probable phlebolith in the pelvis.  SI joints unremarkable.  The hip joint spaces are fairly well maintained.   Impression        No acute findings.      Electronically signed by: Moreno Adkins MD  Date: 04/24/2018  Time: 12:42    Encounter      View Encounter          Signed by      Signed Credentials Date/Time   Phone Pager   MORENO ADKINS MD 4/24/2018 12:42 [phone]   Reviewed By      ZAIDA Jacobs on 4/25/2018 08:15   Exam Details      Performed Procedure Technologist Supporting Staff Performing Physician   X-Ray Hips Bilateral 2 View Inc AP Pelvis Samson Jorgensen, RT         Appointment Date/Status Modality Department     4/24/2018     Arrived [facility] OD XR-A [facility] XRAY OP         Begin Exam End Exam   End Exam Questionnaires   4/24/2018 11:40 AM 4/24/2018 11:44 AM   IMAGING END ALL       Reason For Exam   Priority: Routine   Dx: Pain of both hip joints [M25.551,  M25.552 (ICD-10-CM)]   Order Report       Order Details      4/24/18 MRI Lumbar Spine Without Contrast     Narrative      EXAMINATION:  MRI LUMBAR SPINE WITHOUT CONTRAST    CLINICAL HISTORY:  Low back pain, <6wks, no red flags, no prior management; Other intervertebral disc degeneration, lumbar region    TECHNIQUE:  Multiplanar, multisequence MR images were acquired from the thoracolumbar junction to the sacrum without the administration of contrast.    COMPARISON:  None.    FINDINGS:  There is normal lumbar lordosis.  No spondylolisthesis or spondylolysis.  There is no marrow edema throughout the vertebral bodies to suggest acute fractures or marrow replacing processes throughout the lumbar spine.  The tip of the conus is at T12-L1 disc space.  Paraspinal soft tissues are unremarkable.  On the sagittal images there is suggestion of a distended urinary bladder.    L5-S1: There is normal intervertebral disc height no disc herniations or significant central canal spinal stenosis.  There is mild bilateral facet arthropathy.  No significant foraminal stenosis.    L4-5: Normal intervertebral disc height without soft tissue disc herniations.  There is enlarged lamina and the facet joints and mild hypertrophic changes of the facet joints.  This results in at least a mild central canal spinal stenosis.    L3-4: Normal intervertebral disc height without soft tissue disc herniations.  There is enlarged facet joints and the lamina and mild hypertrophic changes of the ligamentum flava resulting in at least a moderate central canal spinal stenosis.    L2-3: Normal intervertebral disc height without soft tissue disc herniations.  There is hypertrophic changes of the dorsal elements including the lamina and the facet joints and mild hypertrophic changes of the ligamentum flava resulting in moderate central canal spinal stenosis.    L1-2: No disc herniations.  Hypertrophic changes of the dorsal elements including the lamina and the  facet joints resulting in mild central canal stenosis.    T12-L1: No disc herniations or spinal stenosis or foraminal stenosis.  There is mild marginal anterior spondylotic osteophytes.    It should be noted that the CSF space within the thecal sac at the L1-2, L2-3 L3-4 disc spaces is compromised from the spinal stenotic changes.   Impression        1. At least moderate stenotic changes at the L2-3 and L3-4 disc spaces and mild stenosis at the L1-2 and L4-5 disc spaces, mainly from hypertrophic changes of the dorsal elements.  It is possible that these findings most likely on a congenital basis.  2. No significant disc herniations.  3. Distended urinary bladder.      Electronically signed by: Emiliano Pimentel MD  Date: 04/24/2018  Time: 11:55    Encounter      View Encounter          Signed by      Signed Credentials Date/Time   Phone Pager   EMILIANO PIMENTEL MD 4/24/2018 11:55 [phone]     Reviewed By      ZAIDA Jacobs on 4/25/2018 08:13   Exam Details      Performed Procedure Technologist Supporting Staff Performing Physician   MRI Lumbar Spine Without Contrast Netta Rodgers, RT           Appointment Date/Status Modality Department     4/24/2018     Arrived [facility]         Begin Exam End Exam Begin Exam Questionnaires End Exam Questionnaires   4/24/2018 11:09 AM 4/24/2018 11:41 AM MRI TECH NAVIGATOR QUESTIONS IMAGING END ALL                [facility] MRI Cervical Spine W/O Contrast     IMPRESSION:     1. Spondylotic changes associated with a broad-based moderately prominent disc herniation at the C6-C7 intervertebral disc space lateralized greater towards the left as compared to the right.      2. Spur/Disc herniation complex also lateralized towards the left side at the C5-C6 intervertebral disc, as discussed above.      3. Stable postoperative changes at the C6-C7 intervertebral disc space.    Allergies:   Review of patient's allergies indicates:   Allergen Reactions    Iodinated  contrast- oral and iv dye Other (See Comments)     Raises BP         Current Medications:   Current Outpatient Prescriptions   Medication Sig Dispense Refill    aspirin 81 MG Chew Take 1 tablet (81 mg total) by mouth once daily.  0    buPROPion (WELLBUTRIN XL) 300 MG 24 hr tablet Take 300 mg by mouth once daily.        desoximetasone (TOPICORT) 0.05 % cream Apply topically 2 (two) times daily. 60 g 11    dextroamphetamine-amphetamine 30 mg Tab TK 1 T PO  QAM AND 1/2 T IN THE AFTERNOON  0    fluticasone (FLONASE) 50 mcg/actuation nasal spray 2 sprays (100 mcg total) by Each Nare route once daily. 16 g 2    furosemide (LASIX) 40 MG tablet Take 1 tablet (40 mg total) by mouth Daily. For fluid in legs 30 tablet 12    guaifenesin-codeine 100-10 mg/5 ml (CHERATUSSIN AC)  mg/5 mL syrup Take 5 mLs by mouth nightly. 120 mL 0    levocetirizine (XYZAL) 5 MG tablet Take 1 tablet (5 mg total) by mouth every evening. 30 tablet 3    metoprolol tartrate (LOPRESSOR) 25 MG tablet Take 1 tablet (25 mg total) by mouth 2 (two) times daily. 180 tablet 3    NAFTIN 2 % Gel       nitroGLYCERIN (NITROSTAT) 0.4 MG SL tablet Place 1 tablet under the tongue daily as needed.      omeprazole (PRILOSEC) 20 MG capsule Take 2 capsules (40 mg total) by mouth once daily. (Patient taking differently: Take 20 mg by mouth once daily. ) 60 capsule 3    polyethylene glycol (GOLYTELY,NULYTELY) 236-22.74-6.74 gram suspension Take 4 L by mouth once.      rivaroxaban (XARELTO) 20 mg Tab Take 1 tablet (20 mg total) by mouth once daily. 30 tablet 5    sotalol (BETAPACE) 80 MG tablet Take 1 tablet (80 mg total) by mouth 2 (two) times daily. 180 tablet 3    tamsulosin (FLOMAX) 0.4 mg Cp24 Take 1 capsule (0.4 mg total) by mouth 2 (two) times daily. 180 capsule 3    vortioxetine (TRINTELLIX) 20 mg Tab Take 20 mg by mouth once daily.        No current facility-administered medications for this visit.        REVIEW OF SYSTEMS:    GENERAL:  No  weight loss, malaise or fevers. Morbid obesity  HEENT:  Negative for frequent or significant headaches.  NECK:  Negative for lumps, goiter, pain and significant neck swelling.  RESPIRATORY:  Negative for cough, wheezing or shortness of breath.   CARDIOVASCULAR:  Negative for chest pain, leg swelling or palpitations. + Afib  GI:  Negative for abdominal discomfort, blood in stools or black stools or change in bowel habits.  MUSCULOSKELETAL:  See HPI.  SKIN:  Negative for lesions, rash, and itching.  PSYCH:  + for sleep disturbance, mood disorder and recent psychosocial stressors. + depression, no suicidal ideations.   HEMATOLOGY/LYMPHOLOGY:  Negative for prolonged bleeding, bruising easily or swollen nodes.  NEURO:   No history of headaches, syncope, paralysis, seizures or tremors.  All other reviewed and negative other than HPI.    Past Medical History:  Past Medical History:   Diagnosis Date    Allergy     Asthma     Atrial fibrillation     Chronic rhinitis 9/26/2013    DDD (degenerative disc disease) 4/3/2015    Depression     Diabetes mellitus     Fibromyalgia     Gastroesophageal reflux disease without esophagitis 7/14/2017    Hypertension     Sinusitis, acute, maxillary 12/20/2012    Thyroid disease        Past Surgical History:  Past Surgical History:   Procedure Laterality Date    CERVICAL SPINE SURGERY      EPIDURAL STEROID INJECTION INTO LUMBAR SPINE N/A 5/29/2018    Procedure: INJECTION-STEROID-EPIDURAL-LUMBAR- L4-5;  Surgeon: Roman Lyman MD;  Location: [facility];  Service: Pain Management;  Laterality: N/A;  Patient is diabetic and Xarleto     GASTRIC BYPASS      [facility]  2000    [facility]    TONSILLECTOMY         Family History:  Family History   Problem Relation Age of Onset    Heart disease Father     Cancer Mother         lung    Hypertension Sister     Heart disease Brother     Cirrhosis Brother     Diabetes Brother        Social History:  Social History      Social History    Marital status: Single     Spouse name: N/A    Number of children: N/A    Years of education: N/A     Social History Main Topics    Smoking status: Never Smoker    Smokeless tobacco: Never Used    Alcohol use No      Comment: rare    Drug use: No    Sexual activity: Yes     Partners: Female     Other Topics Concern    None     Social History Narrative    From [place], lives alone w/2 dogs.    Dogs are his kids.    Works PT --  at [employer], on ssdi from neck and back/depression.       OBJECTIVE:    /89   Pulse (!) 58   Wt 123.6 kg (272 lb 7.8 oz)   BMI 41.43 kg/m²     PHYSICAL EXAMINATION:    General appearance: Well appearing, in no acute distress, alert and oriented x3.  Psych:  Mood and affect appropriate.  Skin: Skin color, texture, turgor normal, no rashes or lesions, in both upper and lower body.  Head/face:  Atraumatic, normocephalic. No palpable lymph nodes  Neck: No pain to palpation over the cervical paraspinous muscles. Spurling Negative. No pain with neck flexion, extension, or lateral flexion. .  Cor: RRR  Pulm: CTA  GI: Abdomen soft and non-tender.  Back: Straight leg raising in the sitting and supine positions is + tingling bilaterally. + pain to palpation over the L- spine. + Facet Loading Bilaterally.   Normal range of motion with pain reproduction. Positive FABERE, Yeoman's and Galensen test on the both side.  Extremities: Peripheral joint ROM is full and pain free without obvious instability or laxity in all four extremities. No deformities, edema, or skin discoloration. Good capillary refill.  Musculoskeletal: Shoulder, hip, sacroiliac and knee provocative maneuvers are negative. Bilateral upper and lower extremity strength is normal and symmetric.  No atrophy or tone abnormalities are noted.  Neuro: Bilateral upper and lower extremity coordination and muscle stretch reflexes are physiologic and symmetric.  Plantar response are downgoing. No loss of  sensation is noted.  Gait: Normal.    ASSESSMENT: 58 y.o. year old male with ow back and bilateral leg tingling, consistent with      Diagnosis:    1. Lumbar radiculopathy     2. DDD (degenerative disc disease), lumbar     3. Chronic pain syndrome     4. Chronic low back pain with sciatica, sciatica laterality unspecified, unspecified back pain laterality     5. Facet arthropathy, multilevel           PLAN:     - I have stressed the importance of physical activity and a home exercise plan to help with pain and improve health.  - Patient can continue with medications for now since they are providing benefits, using them appropriately, and without side effects.  - Counseled patient regarding the importance of activity modification, constant sleeping habits and physical therapy.  -Schedule for a REPEAT  Lumbar Epidural Steroid Injection at L4-5 to help withpain and progress with a Home exercise program.  - I have explained the risks, benefits, and alternatives of the procedure in detail. The patient voices understanding and all questions have been answered. The patient agrees to proceed as planned. Written Consent obtained.   -RTC 2-3 weeks following procedure.  -The above plan and management options were discussed at length with patient. Patient is in agreement with the above and verbalized understanding. Dr. Lyman   was consulted on this patient  and agrees with this plan.    Bairon Campos, DOMINGO-C  Interventional Pain Management      06/12/2018

## 2018-07-03 NOTE — OP NOTE
"Patient Name: Shiraz Jha  MRN: [id number]    INFORMED CONSENT: The procedure, risks, benefits and options were discussed with patient. There are no contraindications to the procedure. The patient expressed understanding and agreed to proceed. The personnel performing the procedure was discussed. I verify that I personally obtained Shiraz's consent prior to the start of the procedure and the signed consent can be found on the patient's chart.    Procedure Date: 07/03/2018    Anesthesia: Topical    Pre Procedure diagnosis: Lumbar radiculopathy [M54.16]  1. DDD (degenerative disc disease), lumbar    2. Lumbosacral radiculopathy    3. Chronic pain      Post-Procedure diagnosis: SAME      Sedation:  no- Midazolam 2 mg single dose      PROCEDURE: L4-5 INTERLAMINAR EPIDURAL STEROID INJECTION - LUMBAR      DESCRIPTION OF PROCEDURE: The patient was brought to the procedure room.  After performing time out IV access was obtained prior to the procedure. The patient was positioned prone on the fluoroscopy table. Continuous hemodynamic monitoring was initiated including blood pressure, EKG, and pulse oximetry.   The area of the spine was prepped with chlorhexidine three times and draped in a sterile fashion.  Skin anesthesia was achieved using 3 mL of lidocaine 1% over the respective injection site. The L4-5 interspace was visualized under fluoroscopic imaging. An 18 gauge 3 1/2" Tuohy needle was slowly inserted and advanced using loss of resistance to saline with AP and lateral fluoroscopic imaging for needle guidance. Negative aspiration for blood or CSF was confirmed. Epidural contrast spread was confirmed using 2mL of Omnipaque 300 contrast spreading in the lumbar epidural space.6 mL of Lidocaine 0.5% and 10 mg decadron was injected. The needle was removed and bleeding was nil.  A sterile dressing was applied. Shiraz was taken back to the recovery room for further observation.     Blood Loss: Nill  Specimen: None      Roman N " MD Nura

## 2018-07-03 NOTE — INTERVAL H&P NOTE
The patient has been examined and the H&P has been reviewed:    I concur with the findings and no changes have occurred since H&P was written.    Anesthesia/Surgery risks, benefits and alternative options discussed and understood by patient/family.          Active Hospital Problems    Diagnosis  POA    Chronic pain [G89.29]  Yes      Resolved Hospital Problems    Diagnosis Date Resolved POA   No resolved problems to display.         Kevin Sheehan MD  Anesthesiology  [phone]   PGY-IV

## 2018-07-03 NOTE — DISCHARGE SUMMARY
Discharge Note  Short Stay      SUMMARY     Admit Date: 7/3/2018    Attending Physician: Roman Lyman      Discharge Physician: Roman Lyman      Discharge Date: 7/3/2018 10:31 AM    Procedure(s) (LRB):  INJECTION, STEROID, SPINE, LUMBAR, EPIDURAL- L4-5 (N/A)    Final Diagnosis: Lumbar radiculopathy [M54.16]    Disposition: Home or self care    Patient Instructions:   Current Discharge Medication List      CONTINUE these medications which have NOT CHANGED    Details   buPROPion (WELLBUTRIN XL) 300 MG 24 hr tablet Take 300 mg by mouth once daily.        dextroamphetamine-amphetamine 30 mg Tab TK 1 T PO  QAM AND 1/2 T IN THE AFTERNOON  Refills: 0      furosemide (LASIX) 40 MG tablet Take 1 tablet (40 mg total) by mouth Daily. For fluid in legs  Qty: 30 tablet, Refills: 12    Associated Diagnoses: Edema, unspecified type      levocetirizine (XYZAL) 5 MG tablet Take 1 tablet (5 mg total) by mouth every evening.  Qty: 30 tablet, Refills: 3    Associated Diagnoses: Viral sinusitis; Acute bronchitis, unspecified organism      metoprolol tartrate (LOPRESSOR) 25 MG tablet Take 1 tablet (25 mg total) by mouth 2 (two) times daily.  Qty: 180 tablet, Refills: 3      omeprazole (PRILOSEC) 20 MG capsule Take 2 capsules (40 mg total) by mouth once daily.  Qty: 60 capsule, Refills: 3      sotalol (BETAPACE) 80 MG tablet Take 1 tablet (80 mg total) by mouth 2 (two) times daily.  Qty: 180 tablet, Refills: 3      tamsulosin (FLOMAX) 0.4 mg Cp24 Take 1 capsule (0.4 mg total) by mouth 2 (two) times daily.  Qty: 180 capsule, Refills: 3    Associated Diagnoses: Benign non-nodular prostatic hyperplasia without lower urinary tract symptoms      vortioxetine (TRINTELLIX) 20 mg Tab Take 20 mg by mouth once daily.       aspirin 81 MG Chew Take 1 tablet (81 mg total) by mouth once daily.  Refills: 0      desoximetasone (TOPICORT) 0.05 % cream Apply topically 2 (two) times daily.  Qty: 60 g, Refills: 11      fluticasone (FLONASE) 50  mcg/actuation nasal spray 2 sprays (100 mcg total) by Each Nare route once daily.  Qty: 16 g, Refills: 2    Associated Diagnoses: Viral sinusitis; Acute bronchitis, unspecified organism      NAFTIN 2 % Gel       nitroGLYCERIN (NITROSTAT) 0.4 MG SL tablet Place 1 tablet under the tongue daily as needed.      polyethylene glycol (GOLYTELY,NULYTELY) 236-22.74-6.74 gram suspension Take 4 L by mouth once.      rivaroxaban (XARELTO) 20 mg Tab Take 1 tablet (20 mg total) by mouth once daily.  Qty: 30 tablet, Refills: 5    Associated Diagnoses: Atrial fibrillation, unspecified                 Discharge Diagnosis: Lumbar radiculopathy [M54.16]  Condition on Discharge: Stable with no complications to procedure   Diet on Discharge: Same as before.  Activity: as per instruction sheet.  Discharge to: Home with a responsible adult.  Follow up: 2-4 weeks

## 2018-07-12 ENCOUNTER — TELEPHONE (OUTPATIENT)
Dept: PAIN MEDICINE | Facility: CLINIC | Age: 58
End: 2018-07-12

## 2018-07-12 NOTE — TELEPHONE ENCOUNTER
Attempted to contact patient regarding message, no answer, left voice message requesting a return call.

## 2018-07-12 NOTE — TELEPHONE ENCOUNTER
----- Message from Christiana Rg sent at 7/12/2018  9:35 AM CDT -----  Contact: self  Patient states experiencing pain need appointment for next week.   Please call pt for more detail info [phone]    FYI:  I was unable to schedule appointment

## 2018-07-16 NOTE — PROGRESS NOTES
Chronic patient Established Note (Follow up visit)      SUBJECTIVE:    Shiraz Jha presents to the clinic for a follow-up appointment for low back pain. He is S/P L4-5 INTERLAMINAR EPIDURAL STEROID INJECTION - LUMBAR on 7/3/18 with 50% relief for one day and 0% now. His pain continues to be low back and some buttocks pain, worse pain is sitting and after moving around. He states today he has no shooting pain down his legs but has hx of radicular pain. I will not schedule for another injection at this point since he has had 3 this year. I will s/f Physical therapy to help with lumbar stabilization and muscular strengthening.  Since the last visit, Shiraz Jha states the pain has been persistant. Current pain intensity is 10/10.    Pain Disability Index Review:  Last 3 PDI Scores 2018 2018 5/15/2018   Pain Disability Index (PDI) 40 31 60       Pain Medications:     - Opioids: None  - Adjuvant Medications: Cymbalta ( Duloxetine)  - Anti-Coagulants: Xarelto  - Others: See Medication Sheet     Opioid Contract: no      report:  Reviewed and consistent with medication use as prescribed.     Pain Procedures: 7/3/18 L4-5 INTERLAMINAR EPIDURAL STEROID INJECTION - LUMBAR  18 L4-5 INTERLAMINAR EPIDURAL STEROID INJECTION - LUMBAR  18 BILATERAL L3-4-5 LUMBAR FACET MEDIAL BRANCH NERVE BLOCK  C7-T1 CERVICAL EPIDURAL STEROID INJECTION   03/24/15     Physical Therapy/Home Exercise: no     Imagin18 X-Ray Lumbar Spine AP And Lateral     Narrative      EXAMINATION:  XR LUMBAR SPINE AP AND LATERAL    CLINICAL HISTORY:  Low back pain, <6wks, no red flags, no prior management;Other intervertebral disc degeneration, lumbar region    TECHNIQUE:  AP, lateral and spot images were performed of the lumbar spine.    COMPARISON:  None    FINDINGS:  The vertebral body heights and intervertebral disc spaces are fairly well maintained.  No fracture.  No marrow replacement process.  SI joints unremarkable.  L4-5 and  L5-S1 facet arthropathy noted.   Impression        Lumbar spondylosis.      Electronically signed by: Moreno Adkins MD  Date: 04/24/2018  Time: 12:41    Encounter      View Encounter          Signed by      Signed Credentials Date/Time   Phone Pager   MORENO ADKINS MD 4/24/2018 12:41 [phone]   Reviewed By      ZAIDA Jacobs on 4/25/2018 08:13   Exam Details      Performed Procedure Technologist Supporting Staff Performing Physician   X-Ray Lumbar Spine Ap And Lateral Samson Jorgensen, RT         Appointment Date/Status Modality Department     4/24/2018     Arrived [facility] ODC XR-A [facility] XRAY OP         Begin Exam End Exam   End Exam Questionnaires   4/24/2018 11:40 AM 4/24/2018 11:46 AM   IMAGING END ALL      4/24/18 X-Ray Hips Bilateral 2 View Inc AP Pelvis     Narrative      EXAMINATION:  XR HIPS BILATERAL 2 VIEW INCL AP PELVIS    CLINICAL HISTORY:  Pain in right hip    TECHNIQUE:  AP view of the pelvis and frogleg lateral views of both hips were performed.    COMPARISON:  None.    FINDINGS:  No fractures identified. The alignment is within normal limits.  No evidence of a marrow replacement process.Probable phlebolith in the pelvis.  SI joints unremarkable.  The hip joint spaces are fairly well maintained.   Impression        No acute findings.      Electronically signed by: Moreno Adkins MD  Date: 04/24/2018  Time: 12:42    Encounter      View Encounter          Signed by      Signed Credentials Date/Time   Phone Pager   MORENO ADKINS MD 4/24/2018 12:42 [phone]   Reviewed By      ZAIDA Jacobs on 4/25/2018 08:15   Exam Details      Performed Procedure Technologist Supporting Staff Performing Physician   X-Ray Hips Bilateral 2 View Inc AP Pelvis Samson Jorgensen, RT         Appointment Date/Status Modality Department     4/24/2018     Arrived [facility] XR-A [facility] XRAY OP         Begin Exam End Exam   End Exam Questionnaires   4/24/2018 11:40 AM 4/24/2018  11:44 AM   IMAGING END ALL       Reason For Exam   Priority: Routine   Dx: Pain of both hip joints [M25.551, M25.552 (ICD-10-CM)]   Order Report       Order Details      4/24/18 MRI Lumbar Spine Without Contrast     Narrative      EXAMINATION:  MRI LUMBAR SPINE WITHOUT CONTRAST    CLINICAL HISTORY:  Low back pain, <6wks, no red flags, no prior management; Other intervertebral disc degeneration, lumbar region    TECHNIQUE:  Multiplanar, multisequence MR images were acquired from the thoracolumbar junction to the sacrum without the administration of contrast.    COMPARISON:  None.    FINDINGS:  There is normal lumbar lordosis.  No spondylolisthesis or spondylolysis.  There is no marrow edema throughout the vertebral bodies to suggest acute fractures or marrow replacing processes throughout the lumbar spine.  The tip of the conus is at T12-L1 disc space.  Paraspinal soft tissues are unremarkable.  On the sagittal images there is suggestion of a distended urinary bladder.    L5-S1: There is normal intervertebral disc height no disc herniations or significant central canal spinal stenosis.  There is mild bilateral facet arthropathy.  No significant foraminal stenosis.    L4-5: Normal intervertebral disc height without soft tissue disc herniations.  There is enlarged lamina and the facet joints and mild hypertrophic changes of the facet joints.  This results in at least a mild central canal spinal stenosis.    L3-4: Normal intervertebral disc height without soft tissue disc herniations.  There is enlarged facet joints and the lamina and mild hypertrophic changes of the ligamentum flava resulting in at least a moderate central canal spinal stenosis.    L2-3: Normal intervertebral disc height without soft tissue disc herniations.  There is hypertrophic changes of the dorsal elements including the lamina and the facet joints and mild hypertrophic changes of the ligamentum flava resulting in moderate central canal spinal  stenosis.    L1-2: No disc herniations.  Hypertrophic changes of the dorsal elements including the lamina and the facet joints resulting in mild central canal stenosis.    T12-L1: No disc herniations or spinal stenosis or foraminal stenosis.  There is mild marginal anterior spondylotic osteophytes.    It should be noted that the CSF space within the thecal sac at the L1-2, L2-3 L3-4 disc spaces is compromised from the spinal stenotic changes.   Impression        1. At least moderate stenotic changes at the L2-3 and L3-4 disc spaces and mild stenosis at the L1-2 and L4-5 disc spaces, mainly from hypertrophic changes of the dorsal elements.  It is possible that these findings most likely on a congenital basis.  2. No significant disc herniations.  3. Distended urinary bladder.      Electronically signed by: Emiliano Pimentel MD  Date: 04/24/2018  Time: 11:55    Encounter      View Encounter          Signed by      Signed Credentials Date/Time   Phone Pager   EMILIANO PIMENTEL MD 4/24/2018 11:55 [phone]     Reviewed By      ZAIDA Jacobs on 4/25/2018 08:13   Exam Details      Performed Procedure Technologist Supporting Staff Performing Physician   MRI Lumbar Spine Without Contrast Netta Rodgers, RT           Appointment Date/Status Modality Department     4/24/2018     Arrived [facility] MRI1 [facility] MRI         Begin Exam End Exam Begin Exam Questionnaires End Exam Questionnaires   4/24/2018 11:09 AM 4/24/2018 11:41 AM MRI TECH NAVIGATOR QUESTIONS IMAGING END ALL                [facility] MRI Cervical Spine W/O Contrast     IMPRESSION:     1. Spondylotic changes associated with a broad-based moderately prominent disc herniation at the C6-C7 intervertebral disc space lateralized greater towards the left as compared to the right.      2. Spur/Disc herniation complex also lateralized towards the left side at the C5-C6 intervertebral disc, as discussed above.      3. Stable postoperative changes at the C6-C7  intervertebral disc space.    Allergies:   Review of patient's allergies indicates:   Allergen Reactions    Iodinated contrast- oral and iv dye Other (See Comments)     Raises BP         Current Medications:   Current Outpatient Prescriptions   Medication Sig Dispense Refill    buPROPion (WELLBUTRIN XL) 300 MG 24 hr tablet Take 300 mg by mouth once daily.        dextroamphetamine-amphetamine 30 mg Tab TK 1 T PO  QAM AND 1/2 T IN THE AFTERNOON  0    furosemide (LASIX) 40 MG tablet Take 1 tablet (40 mg total) by mouth Daily. For fluid in legs 30 tablet 12    levocetirizine (XYZAL) 5 MG tablet Take 1 tablet (5 mg total) by mouth every evening. 30 tablet 3    metoprolol tartrate (LOPRESSOR) 25 MG tablet Take 1 tablet (25 mg total) by mouth 2 (two) times daily. 180 tablet 3    NAFTIN 2 % Gel       nitroGLYCERIN (NITROSTAT) 0.4 MG SL tablet Place 1 tablet under the tongue daily as needed.      omeprazole (PRILOSEC) 20 MG capsule Take 2 capsules (40 mg total) by mouth once daily. (Patient taking differently: Take 20 mg by mouth once daily. ) 60 capsule 3    polyethylene glycol (GOLYTELY,NULYTELY) 236-22.74-6.74 gram suspension Take 4 L by mouth once.      rivaroxaban (XARELTO) 20 mg Tab Take 1 tablet (20 mg total) by mouth once daily. 30 tablet 5    sotalol (BETAPACE) 80 MG tablet Take 1 tablet (80 mg total) by mouth 2 (two) times daily. 180 tablet 3    tamsulosin (FLOMAX) 0.4 mg Cp24 Take 1 capsule (0.4 mg total) by mouth 2 (two) times daily. 180 capsule 3    vortioxetine (TRINTELLIX) 20 mg Tab Take 20 mg by mouth once daily.       aspirin 81 MG Chew Take 1 tablet (81 mg total) by mouth once daily.  0    desoximetasone (TOPICORT) 0.05 % cream Apply topically 2 (two) times daily. 60 g 11     No current facility-administered medications for this visit.        REVIEW OF SYSTEMS:    GENERAL:  No weight loss, malaise or fevers. Morbid obesity  HEENT:  Negative for frequent or significant headaches.  NECK:   Negative for lumps, goiter, pain and significant neck swelling.  RESPIRATORY:  Negative for cough, wheezing or shortness of breath.   CARDIOVASCULAR:  Negative for chest pain, leg swelling or palpitations. + Afib  GI:  Negative for abdominal discomfort, blood in stools or black stools or change in bowel habits.  MUSCULOSKELETAL:  See HPI.  SKIN:  Negative for lesions, rash, and itching.  PSYCH:  + for sleep disturbance, mood disorder and recent psychosocial stressors. + depression, no suicidal ideations.   HEMATOLOGY/LYMPHOLOGY:  Negative for prolonged bleeding, bruising easily or swollen nodes.  NEURO:   No history of headaches, syncope, paralysis, seizures or tremors.  All other reviewed and negative other than HPI.    Past Medical History:  Past Medical History:   Diagnosis Date    Allergy     Asthma     Atrial fibrillation     Chronic rhinitis 9/26/2013    DDD (degenerative disc disease) 4/3/2015    Depression     Diabetes mellitus     Fibromyalgia     Gastroesophageal reflux disease without esophagitis 7/14/2017    Hypertension     Sinusitis, acute, maxillary 12/20/2012    Thyroid disease        Past Surgical History:  Past Surgical History:   Procedure Laterality Date    CERVICAL SPINE SURGERY      EPIDURAL STEROID INJECTION INTO LUMBAR SPINE N/A 5/29/2018    Procedure: INJECTION-STEROID-EPIDURAL-LUMBAR- L4-5;  Surgeon: Roman Lyman MD;  [place] [facility] [place] [place]  Service: Pain Management;  Laterality: N/A;  Patient is diabetic and Xarleto     EPIDURAL STEROID INJECTION INTO LUMBAR SPINE N/A 7/3/2018    Procedure: INJECTION, STEROID, SPINE, LUMBAR, EPIDURAL- L4-5;  Surgeon: Roman Lyman MD;  Location: [facility] PAIN [facility];  Service: Pain Management;  Laterality: N/A;  Patient takes Xarelto and ASA     GASTRIC BYPASS      SINUS SURGERY  2000    [facility]    TONSILLECTOMY         Family History:  Family History   Problem Relation Age of Onset    Heart disease Father     Cancer  Mother         lung    Hypertension Sister     Heart disease Brother     Cirrhosis Brother     Diabetes Brother        Social History:  Social History     Social History    Marital status: Single     Spouse name: N/A    Number of children: N/A    Years of education: N/A     Social History Main Topics    Smoking status: Never Smoker    Smokeless tobacco: Never Used    Alcohol use No      Comment: rare    Drug use: No    Sexual activity: Yes     Partners: Female     Other Topics Concern    None     Social History Narrative    From [place], lives alone w/2 dogs.    Dogs are his kids.    Works PT --  at [employer], on ssdi from neck and back/depression.       OBJECTIVE:    /68   Pulse 61   Wt 125.6 kg (276 lb 14.4 oz)   BMI 42.10 kg/m²     PHYSICAL EXAMINATION:    General appearance: Well appearing, in no acute distress, alert and oriented x3.  Psych:  Mood and affect appropriate.  Skin: Skin color, texture, turgor normal, no rashes or lesions, in both upper and lower body.  Head/face:  Atraumatic, normocephalic. No palpable lymph nodes  Neck: No pain to palpation over the cervical paraspinous muscles. Spurling Negative. No pain with neck flexion, extension, or lateral flexion. .  Cor: RRR  Pulm: CTA  GI: Abdomen soft and non-tender.  Back: Straight leg raising in the sitting and supine positions is negative to radicular pain. + pain to palpation over the L- spine or costovertebral angles. Normal range of motion without pain reproduction. +PSIS bilaterally, + Facet Loading Bilaterally. Positive FABERE, Yeoman's and Galensen test on the both side.  Extremities: Peripheral joint ROM is full and pain free without obvious instability or laxity in all four extremities. No deformities, edema, or skin discoloration. Good capillary refill.  Musculoskeletal: Shoulder, hip, sacroiliac and knee provocative maneuvers are negative. Bilateral upper and lower extremity strength is normal and symmetric.  No  atrophy or tone abnormalities are noted.  Neuro: Bilateral upper and lower extremity coordination and muscle stretch reflexes are physiologic and symmetric.  Plantar response are downgoing. No loss of sensation is noted.  Gait: Normal.    ASSESSMENT: 58 y.o. year old male with low back  pain, consistent with      Diagnosis:    1. Sacroiliac joint pain  Ambulatory consult to Physical Therapy   2. DDD (degenerative disc disease), lumbar  Ambulatory consult to Physical Therapy   3. Lumbosacral radiculopathy  Ambulatory consult to Physical Therapy   4. Chronic low back pain with sciatica, sciatica laterality unspecified, unspecified back pain laterality  Ambulatory consult to Physical Therapy         PLAN:     - I have stressed the importance of physical activity and a home exercise plan to help with pain and improve health.  - Referral to Physical therapy for Lumbar stabilization, core strengthening, and a home exercise program.  - Patient can continue with medications for now since they are providing benefits, using them appropriately, and without side effects.  - Counseled patient regarding the importance of activity modification, constant sleeping habits and physical therapy.  -Consider in the future bilateral SI joint injections.   -RTC in 10 weeks  -The above plan and management options were discussed at length with patient. Patient is in agreement with the above and verbalized understanding. Dr. Lyman   was consulted on this patient  and agrees with this plan.    LATRICE Perez  Interventional Pain Management      07/17/2018

## 2018-07-17 ENCOUNTER — OFFICE VISIT (OUTPATIENT)
Dept: PAIN MEDICINE | Facility: CLINIC | Age: 58
End: 2018-07-17
Payer: COMMERCIAL

## 2018-07-17 VITALS
DIASTOLIC BLOOD PRESSURE: 68 MMHG | WEIGHT: 276.88 LBS | HEART RATE: 61 BPM | SYSTOLIC BLOOD PRESSURE: 116 MMHG | BODY MASS INDEX: 42.1 KG/M2

## 2018-07-17 DIAGNOSIS — M53.3 SACROILIAC JOINT PAIN: Primary | ICD-10-CM

## 2018-07-17 DIAGNOSIS — G89.29 CHRONIC LOW BACK PAIN WITH SCIATICA, SCIATICA LATERALITY UNSPECIFIED, UNSPECIFIED BACK PAIN LATERALITY: ICD-10-CM

## 2018-07-17 DIAGNOSIS — M54.40 CHRONIC LOW BACK PAIN WITH SCIATICA, SCIATICA LATERALITY UNSPECIFIED, UNSPECIFIED BACK PAIN LATERALITY: ICD-10-CM

## 2018-07-17 DIAGNOSIS — M54.17 LUMBOSACRAL RADICULOPATHY: ICD-10-CM

## 2018-07-17 DIAGNOSIS — M51.36 DDD (DEGENERATIVE DISC DISEASE), LUMBAR: ICD-10-CM

## 2018-07-17 PROCEDURE — 3008F BODY MASS INDEX DOCD: CPT | Mod: CPTII,S$GLB,, | Performed by: NURSE PRACTITIONER

## 2018-07-17 PROCEDURE — 99213 OFFICE O/P EST LOW 20 MIN: CPT | Mod: S$GLB,,, | Performed by: NURSE PRACTITIONER

## 2018-07-17 PROCEDURE — 3074F SYST BP LT 130 MM HG: CPT | Mod: CPTII,S$GLB,, | Performed by: NURSE PRACTITIONER

## 2018-07-17 PROCEDURE — 99999 PR PBB SHADOW E&M-EST. PATIENT-LVL IV: CPT | Mod: PBBFAC,,, | Performed by: NURSE PRACTITIONER

## 2018-07-17 PROCEDURE — 3078F DIAST BP <80 MM HG: CPT | Mod: CPTII,S$GLB,, | Performed by: NURSE PRACTITIONER

## 2018-08-09 DIAGNOSIS — R60.9 EDEMA, UNSPECIFIED TYPE: ICD-10-CM

## 2018-08-09 RX ORDER — FUROSEMIDE 40 MG/1
TABLET ORAL
Refills: 0 | OUTPATIENT
Start: 2018-08-09

## 2018-08-13 ENCOUNTER — CLINICAL SUPPORT (OUTPATIENT)
Dept: REHABILITATION | Facility: HOSPITAL | Age: 58
End: 2018-08-13
Payer: COMMERCIAL

## 2018-08-13 DIAGNOSIS — G89.29 CHRONIC RIGHT-SIDED LOW BACK PAIN WITHOUT SCIATICA: ICD-10-CM

## 2018-08-13 DIAGNOSIS — M54.50 CHRONIC RIGHT-SIDED LOW BACK PAIN WITHOUT SCIATICA: ICD-10-CM

## 2018-08-13 PROCEDURE — 97161 PT EVAL LOW COMPLEX 20 MIN: CPT | Mod: PO | Performed by: PHYSICAL THERAPIST

## 2018-08-13 PROCEDURE — 97110 THERAPEUTIC EXERCISES: CPT | Mod: PO | Performed by: PHYSICAL THERAPIST

## 2018-08-13 NOTE — PATIENT INSTRUCTIONS
Copyright © I. All rights reserved.   Log Roll        Lying on back, bend left knee and place left arm across chest. Roll all in one movement to the right. Reverse to roll to the left. Always move as one unit by weston abdominal muscles and try not to push with feet.       Copyright © I. All rights reserved.     Stand to Sit / Sit to Stand        To sit: Bend knees to lower self onto chair reaching back with both hands for armrests.   To stand: Reverse sequence by scooting to the edge of the chair, leaning forward, contract abdominal muscles and push up with hands into standing to avoid extension of lumbar spine.   Mid-Back Stretch      Blow your air out bringing ribs inward, push back with hands into position pictured above, return to starting position. Should not be painful.  Hold ___5_ seconds.  Repeat _5___ times per set. Do ___1_ sets per session. Do __2__ sessions per day. DO IN SITTING POSITION WITH BALL OR PILLOW     [email or web address].[employer].[email or web address]     Copyright © [employer]. All rights reserved.   Mid-Back Stretch

## 2018-08-13 NOTE — PLAN OF CARE
Physical Therapy Initial Evaluation     Name: Shiraz RUBI [facility] Number: [id number]    Diagnosis:   Encounter Diagnosis   Name Primary?    Chronic right-sided low back pain without sciatica      Physician: Bairon Campos FNP  Treatment Orders: PT Eval and Treat  Past Medical History:   Diagnosis Date    Allergy     Asthma     Atrial fibrillation     Chronic rhinitis 9/26/2013    DDD (degenerative disc disease) 4/3/2015    Depression     Diabetes mellitus     Fibromyalgia     Gastroesophageal reflux disease without esophagitis 7/14/2017    Hypertension     Sinusitis, acute, maxillary 12/20/2012    Thyroid disease      Current Outpatient Medications   Medication Sig    aspirin 81 MG Chew Take 1 tablet (81 mg total) by mouth once daily.    buPROPion (WELLBUTRIN XL) 300 MG 24 hr tablet Take 300 mg by mouth once daily.      desoximetasone (TOPICORT) 0.05 % cream Apply topically 2 (two) times daily.    dextroamphetamine-amphetamine 30 mg Tab TK 1 T PO  QAM AND 1/2 T IN THE AFTERNOON    furosemide (LASIX) 40 MG tablet Take 1 tablet (40 mg total) by mouth Daily. For fluid in legs    levocetirizine (XYZAL) 5 MG tablet Take 1 tablet (5 mg total) by mouth every evening.    metoprolol tartrate (LOPRESSOR) 25 MG tablet Take 1 tablet (25 mg total) by mouth 2 (two) times daily.    NAFTIN 2 % Gel     nitroGLYCERIN (NITROSTAT) 0.4 MG SL tablet Place 1 tablet under the tongue daily as needed.    omeprazole (PRILOSEC) 20 MG capsule Take 2 capsules (40 mg total) by mouth once daily. (Patient taking differently: Take 20 mg by mouth once daily. )    polyethylene glycol (GOLYTELY,NULYTELY) 236-22.74-6.74 gram suspension Take 4 L by mouth once.    rivaroxaban (XARELTO) 20 mg Tab Take 1 tablet (20 mg total) by mouth once daily.    sotalol (BETAPACE) 80 MG tablet Take 1 tablet (80 mg total) by mouth 2 (two) times daily.    tamsulosin (FLOMAX) 0.4 mg Cp24 Take 1  capsule (0.4 mg total) by mouth 2 (two) times daily.    vortioxetine (TRINTELLIX) 20 mg Tab Take 20 mg by mouth once daily.      No current facility-administered medications for this visit.      Review of patient's allergies indicates:   Allergen Reactions    Iodinated contrast- oral and iv dye Other (See Comments)     Raises BP         Evaluation Date: 8/13/18  Visit # authorized: 1  Authorization period: 7/17/18-7/17/18  Plan of care Expiration: 9/20/18  MD referral: yes    X ray 4/10/18  The vertebral body heights and intervertebral disc spaces are fairly well maintained.  No fracture.  No marrow replacement process.  SI joints unremarkable.  L4-5 and L5-S1 facet arthropathy noted.      Impression       Lumbar spondylosis.     FINDINGS:  There is normal lumbar lordosis.  No spondylolisthesis or spondylolysis.  There is no marrow edema throughout the vertebral bodies to suggest acute fractures or marrow replacing processes throughout the lumbar spine.  The tip of the conus is at T12-L1 disc space.  Paraspinal soft tissues are unremarkable.  On the sagittal images there is suggestion of a distended urinary bladder.    L5-S1: There is normal intervertebral disc height no disc herniations or significant central canal spinal stenosis.  There is mild bilateral facet arthropathy.  No significant foraminal stenosis.    L4-5: Normal intervertebral disc height without soft tissue disc herniations.  There is enlarged lamina and the facet joints and mild hypertrophic changes of the facet joints.  This results in at least a mild central canal spinal stenosis.    L3-4: Normal intervertebral disc height without soft tissue disc herniations.  There is enlarged facet joints and the lamina and mild hypertrophic changes of the ligamentum flava resulting in at least a moderate central canal spinal stenosis.    L2-3: Normal intervertebral disc height without soft tissue disc herniations.  There is hypertrophic changes of the dorsal  elements including the lamina and the facet joints and mild hypertrophic changes of the ligamentum flava resulting in moderate central canal spinal stenosis.    L1-2: No disc herniations.  Hypertrophic changes of the dorsal elements including the lamina and the facet joints resulting in mild central canal stenosis.    T12-L1: No disc herniations or spinal stenosis or foraminal stenosis.  There is mild marginal anterior spondylotic osteophytes.    It should be noted that the CSF space within the thecal sac at the L1-2, L2-3 L3-4 disc spaces is compromised from the spinal stenotic changes.      Impression       1. At least moderate stenotic changes at the L2-3 and L3-4 disc spaces and mild stenosis at the L1-2 and L4-5 disc spaces, mainly from hypertrophic changes of the dorsal elements.  It is possible that these findings most likely on a congenital basis.  2. No significant disc herniations.  3. Distended urinary bladder.           Subjective     Patient states:  He is S/P L4-5 INTERLAMINAR EPIDURAL STEROID INJECTION - LUMBAR on 7/3/18 with 50% relief for one day and 0% now. His pain continues to be low back and some buttocks pain, worse pain is sitting and after moving around. He states today he has no shooting pain down his legs but has hx of radicular pain.  No injury pt can recall. 6 months onset, has had 3 epidural injections this year.   Pain Scale: Shiraz rates pain on a scale of 0-10 to be 10 at worst; 2 currently; 1 at best .  Onset: sudden  Radicular symptoms:  Yes on occasion, R side  Aggravating factors:   Standing brushing teeth, movement in pm, sharp pains,  Cutting grass, self propelled  Easing factors:  Laying down on my back  Prior Therapy: no, chiropractor did not help  Functional Deficits Leading to Referral: pt wakes up without too much pain, by mid day pain level becomes increasingly worse  Prior functional status: I with ADL, walk without pain  Occupation:  [employer] , able to do  activities                       Pts goals:  Not to have pain  Pt reports he was active prior to onset    Objective     Posture Alignment: slouched posture, increased mm bulk L side lumbar pvms, lumbar erector spinae hypertrophy noted, increased thoracic kyphosis    LUMBAR SPINE AROM:   Flexion: Wnl, excessive lumbar flexion   Extension: NT   Left Sidebend: 25% limited   Right Sidebend: 25% limited   Left Rotation: 25% limited   Right Rotation: 25% limited       LOWER EXTREMITY STRENGTH:   Left Right   Quadriceps 3+/5 3+/5   Hamstrings 3+/5 3+/5     Iliopsoas 4-/5 3+/5   PGM 4-/5 4-/5   Hip IR 3+/5 3+/5   Hip ER 3+/5 3+/5   Hip Ext 3+/5 3+/5       Dermatomes: Sensation: Light Touch: Intact    Flexibility:R hamstring 70 degrees, L hamstring 55 degrees and painful    IS angle: 115 degrees  Abdominal MMT: 2/5    Special Tests:   Left Right   Slump - +   SLR - +   BKFO - +     GAIT: Shiraz ambulates with no assistive device with independently.     GAIT DEVIATIONS: Shiraz displays antalgic gait    Pt/family was provided educational information, including: role of PT, goals for PT, scheduling - pt verbalized understanding. Discussed insurance limitations with pt.     TREATMENT     Time In: 9am  Time Out: 10am    PT Evaluation Completed? Yes  Discussed Plan of Care with patient: Yes    Shiraz received 15 minutes of therapeutic exercise & instruction including:  Importance of exhalation for EO activation x 10 reps  Sit to stand with glut contraction I/o lumbar extensors x 10 reps  Rolling with proper body mechanics  Marching in supine with abdominal activation x 15 reps    Written Home Exercises Provided: yes  Shiraz demo good understanding of the education provided. Patient demo good return demo of skill of exercises.    Assessment     Patient presents with lumbar extension/rotation syndrome, core mm weakness, decreased glut activation on return from flexion  Pt prognosis is Good.  Pt will benefit from skilled outpatient  physical therapy to address the above stated deficits, provide pt/family education and to maximize pt's level of independence.     Medical necessity is demonstrated by the following IMPAIRMENTS/PROBLEMS:  1. Increased Pain  2. Decreased Segmental Mobility & Decreased ROM  3. Decreased Core & BLE strength  4. Decreased Flexibility BLE  5. Decreased Tolerance to Functional Activities    Pt's spiritual, cultural and educational needs considered and pt agreeable to plan of care and goals as stated below:     Anticipated Barriers for physical therapy: none    Short Term GOALS: 3 weeks. Pt agrees with goals set.  1. Patient demonstrates independence with HEP.   2. Patient demonstrates independence with Postural Awareness, able to perform transitory movements without pain  3. Patient demonstrates independence with body mechanics.     Long Term GOALS: 6 weeks. Pt agrees with goals set.  1. Patient demonstrates increased abdominal MMT to 3/5 to improve tolerance to functional activities pain free.   2. Patient demonstrates increased strength BLE's to 4-/5 or greater to improve tolerance to functional activities pain free.   3. Patient demonstrates improved overall function per FOTO Lumbar Survey to 50% Limitation or less.     CMS Impairment/Limitation/Restriction for FOTO Lumbar Spine Survey  Status Limitation G-Code CMS Severity Modifier  Intake 32% 68% Current Status CL - At least 60 percent but less than 80 percent  Predicted 45% 55% Goal Status+ CK - At least 40 percent but less than 60 percent    PLAN     Outpatient physical therapy 2 times weekly to include: pt ed, hep, therapeutic exercises, and modalities prn. Cont PT for  6 weeks. Pt may be seen by PTA as part of the rehabilitation team.     Therapist: Sabra Cline, PT  8/13/2018

## 2018-08-23 ENCOUNTER — PATIENT MESSAGE (OUTPATIENT)
Dept: PAIN MEDICINE | Facility: CLINIC | Age: 58
End: 2018-08-23

## 2018-08-30 NOTE — PROGRESS NOTES
Physical Therapy Daily Treatment Note     Name: Shiraz RUBI [facility] Number: [id number]    Therapy Diagnosis:   Encounter Diagnosis   Name Primary?    Chronic right-sided low back pain without sciatica Yes     Physician: Bairon Campos FNP    Visit Date: 8/31/2018  Evaluation Date: 8/13/18  Visit # authorized: 2  Authorization period: 7/17/18-7/17/18  Plan of care Expiration: 9/20/18  MD referral: yes  Time In: 1:00  Time Out:2:00  Treatment time: 50     Precautions: standard    Subjective     Pt reports that he feels negative due to chronic lower back pain with some radicular c/o to LE's (B). States that he has had 3 epidural injections without relief.   Pain: 3/10 at rest and moderate to severe with walking to lower back. .        Objective     Shiraz received therapeutic exercises to develop strength, endurance, ROM, flexibility, posture and core stabilization for 45 minutes including:    Recumbent stationary bike x 5 minutes level 1    Sciatic nerve glide x 20  Single Knee to chest stretch 3 x 20 sec  Supine hamstring stretch 2 x 30 sec with belt  LTR stretch x 10 with 5 sec hold  TA set/PPTx 20 with 5 sec hold  TA set with Hooklying hip abd/ER with GTB x 20  TA set with Hooklying hip add isometric x 20  Ta set with  Marching  X 10 each  GS x 20  Bridging 2  X 10  Seated trunk flex with T-ball x 10 with 5 sec    Patient receives manual therapy for pain control/muscle relaxation x 5 minutes to include:   STM lumbar paraspinals    Patient receive a cold pack applied to lower back x 10 minutes for pain control       Written Home Exercises Provided:  Patient educated to continue with ex's provided on eval along with updated HEP to include: knee to chest stretch, Nerve glide, pelvic tilt, Hooklying hip abd/ER, Hooklying hip add iso, Hooklying march, bridging. He was advised to avoid exacerbation of pain with ex's.   Exercises were reviewed and Shiraz was able to demonstrate them prior to the end of the session.  Shiraz  demonstrated good  understanding of the education provided.        Assessment     Patient lorrie Tx fair. Patient requires verbal and tactile cues for core engagement with ex's. He is limited by stenosis type symptoms and reports some relief with trunk flexion stretching. Discomfort level remained 3/10 post session. WIll monitor and attempt to progress as tolerated.   Pt will continue to benefit from skilled outpatient physical therapy to address the deficits listed in the problem list box on initial evaluation, provide pt/family education and to maximize pt's level of independence in the home and community environment.         Short Term GOALS: 3 weeks. Pt agrees with goals set.  1. Patient demonstrates independence with HEP.   2. Patient demonstrates independence with Postural Awareness, able to perform transitory movements without pain  3. Patient demonstrates independence with body mechanics.      Long Term GOALS: 6 weeks. Pt agrees with goals set.  1. Patient demonstrates increased abdominal MMT to 3/5 to improve tolerance to functional activities pain free.   2. Patient demonstrates increased strength BLE's to 4-/5 or greater to improve tolerance to functional activities pain free.   3. Patient demonstrates improved overall function per FOTO Lumbar Survey to 50% Limitation or less.     Plan     Continue PT towards established plan of care and goals.    Klaus White, PTA

## 2018-08-31 ENCOUNTER — CLINICAL SUPPORT (OUTPATIENT)
Dept: REHABILITATION | Facility: HOSPITAL | Age: 58
End: 2018-08-31
Payer: COMMERCIAL

## 2018-08-31 DIAGNOSIS — G89.29 CHRONIC RIGHT-SIDED LOW BACK PAIN WITHOUT SCIATICA: Primary | ICD-10-CM

## 2018-08-31 DIAGNOSIS — M54.50 CHRONIC RIGHT-SIDED LOW BACK PAIN WITHOUT SCIATICA: Primary | ICD-10-CM

## 2018-08-31 PROCEDURE — 97110 THERAPEUTIC EXERCISES: CPT | Mod: PO

## 2018-09-04 ENCOUNTER — OFFICE VISIT (OUTPATIENT)
Dept: PAIN MEDICINE | Facility: CLINIC | Age: 58
End: 2018-09-04
Payer: COMMERCIAL

## 2018-09-04 VITALS
WEIGHT: 285.81 LBS | HEART RATE: 74 BPM | HEIGHT: 68 IN | SYSTOLIC BLOOD PRESSURE: 116 MMHG | BODY MASS INDEX: 43.32 KG/M2 | DIASTOLIC BLOOD PRESSURE: 73 MMHG

## 2018-09-04 DIAGNOSIS — M51.36 DDD (DEGENERATIVE DISC DISEASE), LUMBAR: Primary | ICD-10-CM

## 2018-09-04 DIAGNOSIS — M46.1 SACROILIITIS: ICD-10-CM

## 2018-09-04 DIAGNOSIS — M47.819 SPONDYLOSIS WITHOUT MYELOPATHY: ICD-10-CM

## 2018-09-04 PROCEDURE — 3078F DIAST BP <80 MM HG: CPT | Mod: CPTII,S$GLB,, | Performed by: ANESTHESIOLOGY

## 2018-09-04 PROCEDURE — 3074F SYST BP LT 130 MM HG: CPT | Mod: CPTII,S$GLB,, | Performed by: ANESTHESIOLOGY

## 2018-09-04 PROCEDURE — 3008F BODY MASS INDEX DOCD: CPT | Mod: CPTII,S$GLB,, | Performed by: ANESTHESIOLOGY

## 2018-09-04 PROCEDURE — 99999 PR PBB SHADOW E&M-EST. PATIENT-LVL III: CPT | Mod: PBBFAC,,, | Performed by: ANESTHESIOLOGY

## 2018-09-04 PROCEDURE — 99214 OFFICE O/P EST MOD 30 MIN: CPT | Mod: S$GLB,,, | Performed by: ANESTHESIOLOGY

## 2018-09-04 RX ORDER — GABAPENTIN 300 MG/1
300 CAPSULE ORAL 3 TIMES DAILY
Qty: 90 CAPSULE | Refills: 0 | Status: SHIPPED | OUTPATIENT
Start: 2018-09-11 | End: 2018-10-25 | Stop reason: SDUPTHER

## 2018-09-04 RX ORDER — GABAPENTIN 100 MG/1
CAPSULE ORAL
COMMUNITY
Start: 2018-08-22 | End: 2018-09-04

## 2018-09-04 NOTE — PROGRESS NOTES
Chronic patient Established Note (Follow up visit)      SUBJECTIVE:    Shiraz Jha presents to the clinic for a follow-up appointment for low back. Since the last visit, Shiraz Jha states the pain has been persistant. Current pain intensity is 7/10.    Pain Disability Index Review:  Last 3 PDI Scores 2018 2018 5/15/2018   Pain Disability Index (PDI) 40 31 60         Pain Medications:     - Opioids: None  - Adjuvant Medications: Cymbalta ( Duloxetine)  - Anti-Coagulants: Xarelto  - Others: See Medication Sheet     Opioid Contract: no      report:  Reviewed and consistent with medication use as prescribed.     Pain Procedures: 7/3/18 L4-5 INTERLAMINAR EPIDURAL STEROID INJECTION - LUMBAR  18 L4-5 INTERLAMINAR EPIDURAL STEROID INJECTION - LUMBAR  18 BILATERAL L3-4-5 LUMBAR FACET MEDIAL BRANCH NERVE BLOCK  C7-T1 CERVICAL EPIDURAL STEROID INJECTION   03/24/15     Physical Therapy/Home Exercise: no     Imagin18 X-Ray Lumbar Spine AP And Lateral     Narrative      EXAMINATION:  XR LUMBAR SPINE AP AND LATERAL    CLINICAL HISTORY:  Low back pain, <6wks, no red flags, no prior management;Other intervertebral disc degeneration, lumbar region    TECHNIQUE:  AP, lateral and spot images were performed of the lumbar spine.    COMPARISON:  None    FINDINGS:  The vertebral body heights and intervertebral disc spaces are fairly well maintained.  No fracture.  No marrow replacement process.  SI joints unremarkable.  L4-5 and L5-S1 facet arthropathy noted.   Impression        Lumbar spondylosis.             Allergies:   Review of patient's allergies indicates:   Allergen Reactions    Iodinated contrast- oral and iv dye Other (See Comments)     Raises BP         Current Medications:   Current Outpatient Medications   Medication Sig Dispense Refill    buPROPion (WELLBUTRIN XL) 300 MG 24 hr tablet Take 300 mg by mouth once daily.        dextroamphetamine-amphetamine 30 mg Tab TK 1 T PO  QAM AND 1/2 T IN  THE AFTERNOON  0    furosemide (LASIX) 40 MG tablet Take 1 tablet (40 mg total) by mouth Daily. For fluid in legs 30 tablet 12    metoprolol tartrate (LOPRESSOR) 25 MG tablet Take 1 tablet (25 mg total) by mouth 2 (two) times daily. 180 tablet 3    nitroGLYCERIN (NITROSTAT) 0.4 MG SL tablet Place 1 tablet under the tongue daily as needed.      omeprazole (PRILOSEC) 20 MG capsule Take 2 capsules (40 mg total) by mouth once daily. (Patient taking differently: Take 20 mg by mouth once daily. ) 60 capsule 3    polyethylene glycol (GOLYTELY,NULYTELY) 236-22.74-6.74 gram suspension Take 4 L by mouth once.      rivaroxaban (XARELTO) 20 mg Tab Take 1 tablet (20 mg total) by mouth once daily. 30 tablet 5    sotalol (BETAPACE) 80 MG tablet Take 1 tablet (80 mg total) by mouth 2 (two) times daily. 180 tablet 3    aspirin 81 MG Chew Take 1 tablet (81 mg total) by mouth once daily.  0    desoximetasone (TOPICORT) 0.05 % cream Apply topically 2 (two) times daily. 60 g 11    gabapentin (NEURONTIN) 100 MG capsule       levocetirizine (XYZAL) 5 MG tablet Take 1 tablet (5 mg total) by mouth every evening. 30 tablet 3    NAFTIN 2 % Gel       tamsulosin (FLOMAX) 0.4 mg Cp24 Take 1 capsule (0.4 mg total) by mouth 2 (two) times daily. 180 capsule 3    vortioxetine (TRINTELLIX) 20 mg Tab Take 20 mg by mouth once daily.        No current facility-administered medications for this visit.        REVIEW OF SYSTEMS:    GENERAL:  No weight loss, malaise or fevers.  HEENT:  Negative for frequent or significant headaches.  NECK:  Negative for lumps, goiter, pain and significant neck swelling.  RESPIRATORY:  Negative for cough, wheezing or shortness of breath.  CARDIOVASCULAR:  Negative for chest pain, leg swelling or palpitations.  GI:  Negative for abdominal discomfort, blood in stools or black stools or change in bowel habits.  MUSCULOSKELETAL:  See HPI.  SKIN:  Negative for lesions, rash, and itching.  PSYCH:  Positive for sleep  disturbance, mood disorder and recent psychosocial stressors.  HEMATOLOGY/LYMPHOLOGY:  Negative for prolonged bleeding, bruising easily or swollen nodes.  NEURO:   No history of headaches, syncope, paralysis, seizures or tremors.  All other reviewed and negative other than HPI.    Past Medical History:  Past Medical History:   Diagnosis Date    Allergy     Asthma     Atrial fibrillation     Chronic rhinitis 9/26/2013    DDD (degenerative disc disease) 4/3/2015    Depression     Diabetes mellitus     Fibromyalgia     Gastroesophageal reflux disease without esophagitis 7/14/2017    Hypertension     Sinusitis, acute, maxillary 12/20/2012    Thyroid disease        Past Surgical History:  Past Surgical History:   Procedure Laterality Date    CERVICAL SPINE SURGERY      GASTRIC BYPASS      SINUS SURGERY  2000    [facility]    TONSILLECTOMY         Family History:  Family History   Problem Relation Age of Onset    Heart disease Father     Cancer Mother         lung    Hypertension Sister     Heart disease Brother     Cirrhosis Brother     Diabetes Brother        Social History:  Social History     Socioeconomic History    Marital status: Single     Spouse name: None    Number of children: None    Years of education: None    Highest education level: None   Social Needs    Financial resource strain: None    Food insecurity - worry: None    Food insecurity - inability: None    Transportation needs - medical: None    Transportation needs - non-medical: None   Occupational History    None   Tobacco Use    Smoking status: Never Smoker    Smokeless tobacco: Never Used   Substance and Sexual Activity    Alcohol use: No     Comment: rare    Drug use: No    Sexual activity: Yes     Partners: Female   Other Topics Concern    None   Social History Narrative    From [place], lives alone w/2 dogs.    Dogs are his kids.    Works PT --  at [employer], on ssdi from neck and back/depression.  "      OBJECTIVE:    /73   Pulse 74   Ht 5' 8" (1.727 m)   Wt 129.6 kg (285 lb 13.2 oz)   BMI 43.46 kg/m²     PHYSICAL EXAMINATION:    General appearance: Well appearing, in no acute distress, alert and oriented x3.  Psych:  Mood and affect appropriate.  Skin: Skin color, texture, turgor normal, no rashes or lesions, in both upper and lower body.  Head/face:  Atraumatic, normocephalic. No palpable lymph nodes  Neck: No pain to palpation over the cervical paraspinous muscles. Spurling Negative. No pain with neck flexion, extension, or lateral flexion. .  Cor: RRR  Pulm: CTA  GI: Abdomen soft and non-tender.  Back: Straight leg raising in the sitting and supine positions is negative to radicular pain.  Mild-to-moderate pain to palpation over the spine or costovertebral angles. Normal range of motion without pain reproduction. Positive axial loading test bilateral. Positive tenderness over both SIJ, Positive FABERE,Ganselin and Yeoman's test on the both side.negative FADIR  Extremities: Peripheral joint ROM is full and pain free without obvious instability or laxity in all four extremities. No deformities, edema, or skin discoloration. Good capillary refill.  Musculoskeletal: Shoulder, hip, sacroiliac and knee provocative maneuvers are negative. Bilateral upper and lower extremity strength is normal and symmetric.  No atrophy or tone abnormalities are noted.  Neuro: Bilateral upper and lower extremity coordination and muscle stretch reflexes are physiologic and symmetric.  Plantar response are downgoing. No loss of sensation is noted.  Gait: Normal.    ASSESSMENT: 58 y.o. year old male with low back pain, consistent with      1. DDD (degenerative disc disease), lumbar  Ambulatory consult to [employer]   2. Spondylosis without myelopathy  Ambulatory consult to [employer]   3. Sacroiliitis  [facility]         PLAN:     - I have stressed the importance of " physical activity and a home exercise plan to help with pain and improve health.  - Patient can continue with medications for now since they are providing benefits, using them appropriately, and without side effects.  - consult are healthy back   - with no relief with the healthy back Program might have follow-up with a referral to surgery and updating imaging  - RTC 6-8 weeks  - Counseled patient regarding the importance of activity modification, constant sleeping habits and physical therapy.    The above plan and management options were discussed at length with patient. Patient is in agreement with the above and verbalized understanding.    Roman Lyman MD  09/04/2018

## 2018-09-06 ENCOUNTER — TELEPHONE (OUTPATIENT)
Dept: INTERNAL MEDICINE | Facility: CLINIC | Age: 58
End: 2018-09-06

## 2018-09-06 NOTE — TELEPHONE ENCOUNTER
----- Message from Arianne Warner sent at 9/6/2018  8:48 AM CDT -----  Contact: Shiraz Jha [phone]  Caller is requesting a sooner appointment. Caller declined first available appointment listed below. Caller will not accept being placed on the wait list and is requesting a message be sent to the provider.    When is the next available appointment:  10/9  Did you offer to schedule the next available appt and put the patient on the wait list?:     What visit type: Epp   Symptoms:  Need medication Refill   Patient preference of timeframe to be scheduled:  Any time this month in the morning   What is the reason the patient is requesting a sooner appointment? (insurance terminating, changing jobs):  Medication Refill & Physical   Would you prefer an answer via [employer]?:  No   Comments:

## 2018-09-07 ENCOUNTER — CLINICAL SUPPORT (OUTPATIENT)
Dept: REHABILITATION | Facility: HOSPITAL | Age: 58
End: 2018-09-07
Payer: COMMERCIAL

## 2018-09-07 DIAGNOSIS — M54.50 CHRONIC RIGHT-SIDED LOW BACK PAIN WITHOUT SCIATICA: ICD-10-CM

## 2018-09-07 DIAGNOSIS — G89.29 CHRONIC RIGHT-SIDED LOW BACK PAIN WITHOUT SCIATICA: ICD-10-CM

## 2018-09-07 PROCEDURE — 97110 THERAPEUTIC EXERCISES: CPT | Mod: PO | Performed by: PHYSICAL THERAPIST

## 2018-09-07 NOTE — PROGRESS NOTES
Physical Therapy Daily Treatment Note     Name: Shiraz RUBI [facility] Number: [id number]    Therapy Diagnosis:   Encounter Diagnosis   Name Primary?    Chronic right-sided low back pain without sciatica      Physician: Bairon Campos FNP    Visit Date: 9/7/2018  Evaluation Date: 8/13/18  Visit # authorized: 2  Authorization period: 7/17/18-7/17/18  Plan of care Expiration: 9/20/18  MD referral: yes  Time In: 7:10am  Time Out:8 am  Treatment time: 45 min     Precautions: standard    Subjective     Pt reports he has not having any radicular sxs today.  The therapy and gabapentin are really helping me.   Pain: 0/10 at rest but as the day progresses, pain level increases with activities such as walking the dogs or going up/down stairs. Also brushing teeth first thing in the morning is painful.   Pt has 20 steps to enter home    Objective     Shiraz received therapeutic exercises to develop strength, endurance, ROM, flexibility, posture and core stabilization for 45 minutes including:    Recumbent stationary bike x 5 minutes level 1 NP today  Sciatic nerve glide x 20  Single Knee to chest stretch 3 x 20 sec  Supine hamstring stretch 2 x 30 sec with belt  LTR stretch x 10 with 5 sec hold  TA set/PPTx 20 with 5 sec hold  TA set with Hooklying hip abd/ER with GTB x 20 NP today  TA set with Hooklying hip add isometric x 20  Ta set with  Marching  X 10 each  GS x 20  Bridging 2  X 10 NP today  Seated trunk flex with T-ball x 10 with 5 sec  Marching in supine L/R x 15 reps  phisioball rollouts x 15  Reps with emphasis on exhalation x 10 reps  pulldowns on cable column 7 lbs with cues to limit extension x 15 rpes  Chops on cable column 7 lbs x 15 reps with exhalation @ end range.     Patient received a cold pack applied to lower back x 10 minutes for pain control  In prone     Written Home Exercises Provided:  Patient educated to continue with ex's provided on eval along with updated HEP to include: knee to chest stretch, Nerve  glide, pelvic tilt, Hooklying hip abd/ER, Hooklying hip add iso, Hooklying march, bridging. He was advised to avoid exacerbation of pain with ex's.   Exercises were reviewed and Shiraz was able to demonstrate them prior to the end of the session.  Shiraz demonstrated good  understanding of the education provided.        Assessment     Patient lorrie Tx well. Patient requires verbal and tactile cues for core engagement  And to prevent extension during ex performance. Discomfort level remained 0/10 post session. WIll monitor and attempt to progress as tolerated.   Pt will continue to benefit from skilled outpatient physical therapy to address the deficits listed in the problem list box on initial evaluation, provide pt/family education and to maximize pt's level of independence in the home and community environment.         Short Term GOALS: 3 weeks. Pt agrees with goals set.  1. Patient demonstrates independence with HEP.   2. Patient demonstrates independence with Postural Awareness, able to perform transitory movements without pain  3. Patient demonstrates independence with body mechanics.      Long Term GOALS: 6 weeks. Pt agrees with goals set.  1. Patient demonstrates increased abdominal MMT to 3/5 to improve tolerance to functional activities pain free.   2. Patient demonstrates increased strength BLE's to 4-/5 or greater to improve tolerance to functional activities pain free.   3. Patient demonstrates improved overall function per FOTO Lumbar Survey to 50% Limitation or less.     Plan     Continue PT towards established plan of care and goals.    Sabra Cline, PT   9/7/18

## 2018-09-11 ENCOUNTER — CLINICAL SUPPORT (OUTPATIENT)
Dept: REHABILITATION | Facility: HOSPITAL | Age: 58
End: 2018-09-11
Payer: COMMERCIAL

## 2018-09-11 DIAGNOSIS — M54.50 CHRONIC RIGHT-SIDED LOW BACK PAIN WITHOUT SCIATICA: ICD-10-CM

## 2018-09-11 DIAGNOSIS — G89.29 CHRONIC RIGHT-SIDED LOW BACK PAIN WITHOUT SCIATICA: ICD-10-CM

## 2018-09-11 PROCEDURE — 97110 THERAPEUTIC EXERCISES: CPT | Mod: PO | Performed by: PHYSICAL THERAPIST

## 2018-09-11 NOTE — PROGRESS NOTES
Physical Therapy Daily Treatment Note     Name: Shiraz RUBI [facility] Number: [id number]    Therapy Diagnosis:   Encounter Diagnosis   Name Primary?    Chronic right-sided low back pain without sciatica      Physician: Bairon Campos FNP    Visit Date: 9/11/2018  Evaluation Date: 8/13/18  Visit # authorized: 3  Authorization period: 7/17/18-7/17/18  Plan of care Expiration: 9/20/18  MD referral: yes  Time In: 4 pm  Time Out 5 pm  Treatment time: 45 min     Precautions: standard    Subjective     Pt reports he is in some pain this afternoon due to time of day, doing exs, overall feeling better. Some pain with ambulation down L LE to lateral thigh    Objective     Shiraz received therapeutic exercises to develop strength, endurance, ROM, flexibility, posture and core stabilization for 45 minutes including:    Recumbent stationary bike x 5 minutes level 1 NP today  Sciatic nerve glide x 20  Single Knee to chest stretch 3 x 20 sec  Supine hamstring stretch 2 x 30 sec with belt  LTR stretch x 10 with 5 sec hold  TA set/PPTx 20 with 5 sec hold  TA set with Hooklying hip abd/ER with GTB x 20 NP today  TA set with Hooklying hip add isometric x 20  Ta set with  Marching  X 10 each  GS x 20  Bridging 2  X 10 NP today  Seated trunk flex with T-ball x 10 with 5 sec  Marching in supine L/R x 15 reps  phisioball rollouts x 15  Reps with emphasis on exhalation x 15 reps  Retractions on cable column x 15 reps for core activation, exhalation @ end range.  pulldowns on cable column 7 lbs with cues to limit extension x 15 rpes  Chops on cable column 7 lbs x 15 reps with exhalation @ end range.     STM to lumbar pvms x 10 min following exs in prone     Written Home Exercises Provided:  Patient educated to continue with ex's provided on eval along with updated HEP to include: knee to chest stretch, Nerve glide, pelvic tilt, Hooklying hip abd/ER, Hooklying hip add iso, Hooklying march, bridging. He was advised to avoid exacerbation of  pain with ex's.   Exercises were reviewed and Shiraz was able to demonstrate them prior to the end of the session.  Shiraz demonstrated good  understanding of the education provided.        Assessment     Patient lorrie Tx well. No pain following treatment today. Abatement of sxs with ambulation with hands stabilizing hips and core activation. Patient requires verbal and tactile cues for core engagement  And to prevent extension during ex performance. Discomfort level remained 0/10 post session. WIll monitor and attempt to progress as tolerated.   Pt will continue to benefit from skilled outpatient physical therapy to address the deficits listed in the problem list box on initial evaluation, provide pt/family education and to maximize pt's level of independence in the home and community environment.      Short Term GOALS: 3 weeks. Pt agrees with goals set.  1. Patient demonstrates independence with HEP.   2. Patient demonstrates independence with Postural Awareness, able to perform transitory movements without pain  3. Patient demonstrates independence with body mechanics.      Long Term GOALS: 6 weeks. Pt agrees with goals set.  1. Patient demonstrates increased abdominal MMT to 3/5 to improve tolerance to functional activities pain free.   2. Patient demonstrates increased strength BLE's to 4-/5 or greater to improve tolerance to functional activities pain free.   3. Patient demonstrates improved overall function per FOTO Lumbar Survey to 50% Limitation or less.     Plan     Continue PT towards established plan of care and goals.    Sabra Cline, PT   9/7/18

## 2018-09-14 ENCOUNTER — CLINICAL SUPPORT (OUTPATIENT)
Dept: REHABILITATION | Facility: HOSPITAL | Age: 58
End: 2018-09-14
Payer: COMMERCIAL

## 2018-09-14 DIAGNOSIS — G89.29 CHRONIC RIGHT-SIDED LOW BACK PAIN WITHOUT SCIATICA: ICD-10-CM

## 2018-09-14 DIAGNOSIS — M54.50 CHRONIC RIGHT-SIDED LOW BACK PAIN WITHOUT SCIATICA: ICD-10-CM

## 2018-09-14 PROCEDURE — 97110 THERAPEUTIC EXERCISES: CPT | Mod: PO | Performed by: PHYSICAL THERAPIST

## 2018-09-14 PROCEDURE — 97140 MANUAL THERAPY 1/> REGIONS: CPT | Mod: PO | Performed by: PHYSICAL THERAPIST

## 2018-09-14 NOTE — PROGRESS NOTES
"  Physical Therapy Daily Treatment Note     Name: Shiraz RUBI [facility] Number: [id number]    Therapy Diagnosis:   Encounter Diagnosis   Name Primary?    Chronic right-sided low back pain without sciatica      Physician: Bairon Campos FNP    Visit Date: 9/14/2018  Evaluation Date: 8/13/18  Visit # authorized: 5  Authorization period: 7/17/18-7/17/18  Plan of care Expiration: 9/20/18  MD referral: yes  Time In: 3 pm  Time Out 4 pm  Treatment time: 45 min     Precautions: standard    Subjective     Pt reports pain level is 1/10 "I realized I haven't been taking my gabapentin and so I started it up again yesterday".    Objective     Shiraz received therapeutic exercises to develop strength, endurance, ROM, flexibility, posture and core stabilization for 30 minutes including:    Recumbent stationary bike x 5 minutes level 3  Sciatic nerve glide x 20  Single Knee to chest stretch 3 x 20 sec  Supine hamstring stretch 2 x 30 sec with belt  LTR stretch x 10 with 5 sec hold np today  TA set/PPTx 20 with 5 sec hold  TA set with Hooklying hip abd/ER with GTB x 20   TA set with Hooklying hip add isometric x 20  Ta set with  Marching  X 20 each  Bridging 2  X 10 NP today  Seated trunk flex with T-ball x 10 with 5 sec  Marching in supine L/R x 20 reps  phisioball rollouts x 15  Reps with emphasis on exhalation x 15 reps with grey CLX band for resistance  Retractions on cable column x 15 reps for core activation, exhalation @ end range.  pulldowns on cable column 7 lbs with cues to limit extension x 15 rpes  Chops on cable column 7 lbs x 15 reps with exhalation @ end range.     IS angle:115 degrees    STM to lumbar pvms x 15 min following exs in prone    Ice to lumbar spine x 10 min following stm     Written Home Exercises Provided:  Patient educated to continue current HEP as pain is continuing to lessen   Exercises were reviewed and Shiraz was able to demonstrate them prior to the end of the session.  Shiraz demonstrated good  " understanding of the education provided.      Assessment     Patient lorrie Tx well. No pain following treatment today. Abatement of sxs with ambulation with hands stabilizing hips and core activation. Patient requires verbal and tactile cues for core engagement  And to prevent extension during ex performance. Discomfort level remained 0/10 post session. WIll monitor and attempt to progress as tolerated.   Pt will continue to benefit from skilled outpatient physical therapy to address the deficits listed in the problem list box on initial evaluation, provide pt/family education and to maximize pt's level of independence in the home and community environment.      Short Term GOALS: 3 weeks. Pt agrees with goals set.  1. Patient demonstrates independence with HEP.   2. Patient demonstrates independence with Postural Awareness, able to perform transitory movements without pain  3. Patient demonstrates independence with body mechanics.      Long Term GOALS: 6 weeks. Pt agrees with goals set.  1. Patient demonstrates increased abdominal MMT to 3/5 to improve tolerance to functional activities pain free.   2. Patient demonstrates increased strength BLE's to 4-/5 or greater to improve tolerance to functional activities pain free.   3. Patient demonstrates improved overall function per FOTO Lumbar Survey to 50% Limitation or less.     Plan     Continue PT towards established plan of care and goals.    Sabra Cline, PT   9/7/18

## 2018-09-21 ENCOUNTER — CLINICAL SUPPORT (OUTPATIENT)
Dept: REHABILITATION | Facility: HOSPITAL | Age: 58
End: 2018-09-21
Payer: COMMERCIAL

## 2018-09-21 DIAGNOSIS — G89.29 CHRONIC RIGHT-SIDED LOW BACK PAIN WITHOUT SCIATICA: ICD-10-CM

## 2018-09-21 DIAGNOSIS — M54.50 CHRONIC RIGHT-SIDED LOW BACK PAIN WITHOUT SCIATICA: ICD-10-CM

## 2018-09-21 PROCEDURE — 97110 THERAPEUTIC EXERCISES: CPT | Mod: PO | Performed by: PHYSICAL THERAPIST

## 2018-09-21 NOTE — PROGRESS NOTES
"  Physical Therapy Daily Treatment Note     Name: Shiraz RUBI [facility] Number: [id number]    Therapy Diagnosis:   Encounter Diagnosis   Name Primary?    Chronic right-sided low back pain without sciatica      Physician: Bairon Campos FNP    Visit Date: 9/21/2018  Evaluation Date: 8/13/18  Visit # authorized: 6  Authorization period: 7/17/18-7/17/18  Plan of care Expiration: 9/20/18  MD referral: yes  Time In: 2:55 pm  Time Out 3:40 pm  Treatment time: 45 min     Precautions: standard    Subjective     Pt reports he is approximately 30% better than evaluation. "As long as I keep my hips still I really don't have pain, if I forget and get up too quickly, I feel pain".    Objective     Shiraz received therapeutic exercises to develop strength, endurance, ROM, flexibility, posture and core stabilization for 30 minutes including:    Recumbent stationary bike x 5 minutes level 3  Sciatic nerve glide x 20  Single Knee to chest stretch 3 x 20 sec  Supine hamstring stretch 2 x 30 sec with belt  LTR stretch x 10 with 5 sec hold np today  TA set/PPTx 20 with 5 sec hold  TA set with Hooklying hip abd/ER with GTB x 20   TA set with Hooklying hip add isometric x 20  Ta set with  Marching  X 20 each  Bridging 2  X 10 NP today  Seated trunk flex with T-ball x 10 with 5 sec  Marching in supine L/R x 20 reps  phisioball rollouts x 15  Reps with emphasis on exhalation x 15 reps with grey CLX band for resistance  Retractions on cable column x 15 reps for core activation, exhalation @ end range.  pulldowns on cable column 7 lbs with cues to limit extension x 15 rpes  Chops on cable column 7 lbs x 15 reps with exhalation @ end range.   Sit to stand with 5 lb kettlebell x 15 reps    Ice to lumbar spine x 10 min following exs     Written Home Exercises Provided:  Patient educated to continue current HEP as pain is continuing to lessen   Exercises were reviewed and Shiraz was able to demonstrate them prior to the end of the session.  Shiraz " demonstrated good  understanding of the education provided.      Assessment     Patient lorrie Tx well. No pain following treatment today. Pt able to advance with core activation exs with decreased verbal cues Discomfort level remained 0/10 post session. WIll monitor and attempt to progress as tolerated.   Pt will continue to benefit from skilled outpatient physical therapy to address the deficits listed in the problem list box on initial evaluation, provide pt/family education and to maximize pt's level of independence in the home and community environment.      Short Term GOALS: 3 weeks. Pt agrees with goals set.  1. Patient demonstrates independence with HEP. MET  2. Patient demonstrates independence with Postural Awareness, able to perform transitory movements without pain  3. Patient demonstrates independence with body mechanics. MET     Long Term GOALS: 6 weeks. Pt agrees with goals set.  1. Patient demonstrates increased abdominal MMT to 3/5 to improve tolerance to functional activities pain free.   2. Patient demonstrates increased strength BLE's to 4-/5 or greater to improve tolerance to functional activities pain free.   3. Patient demonstrates improved overall function per FOTO Lumbar Survey to 50% Limitation or less.     Plan     Continue PT towards established plan of care and goals.    Sabra Cline, PT   9/21/18

## 2018-09-28 ENCOUNTER — CLINICAL SUPPORT (OUTPATIENT)
Dept: REHABILITATION | Facility: HOSPITAL | Age: 58
End: 2018-09-28
Payer: COMMERCIAL

## 2018-09-28 DIAGNOSIS — M54.50 CHRONIC RIGHT-SIDED LOW BACK PAIN WITHOUT SCIATICA: ICD-10-CM

## 2018-09-28 DIAGNOSIS — G89.29 CHRONIC RIGHT-SIDED LOW BACK PAIN WITHOUT SCIATICA: ICD-10-CM

## 2018-09-28 PROCEDURE — 97110 THERAPEUTIC EXERCISES: CPT | Mod: PO | Performed by: PHYSICAL THERAPIST

## 2018-09-28 NOTE — PROGRESS NOTES
Physical Therapy Daily Treatment Note     Name: Shiraz RUBI [facility] Number: [id number]    Therapy Diagnosis:   Encounter Diagnosis   Name Primary?    Chronic right-sided low back pain without sciatica      Physician: Bairon Campos FNP    Visit Date: 9/28/2018  Evaluation Date: 8/13/18  Visit # authorized: 7  Authorization period: 7/17/18-7/17/18  Plan of care Expiration: 9/20/18  MD referral: yes  Time In: 3 pm  Time Out 4 pm   Treatment time: 40 min     Precautions: standard    Subjective     Pt reports he is doing fairly well, prolonged standing lately and his back is hurting more Pt reports I with HEP..Pt reports pain today is 4/10    Objective   Pt received MH x 20 min to lumbar pvms   Shiraz received therapeutic exercises to develop strength, endurance, ROM, flexibility, posture and core stabilization for 40  minutes including:    Recumbent stationary bike x 5 minutes level 3 NP today  Sciatic nerve glide x 20  Single Knee to chest stretch 3 x 20 sec  Supine hamstring stretch 2 x 30 sec with belt  LTR stretch x 10 with 5 sec hold np today  TA set/PPTx 20 with 5 sec hold  TA set with Hooklying hip abd/ER with GTB x 20   TA set with Hooklying hip add isometric x 20  Ta set with  Marching  X 20 each  Bridging 2  X 10 NP today  Seated trunk flex with T-ball x 10 with 5 sec  Marching in supine L/R x 20 reps  phisioball rollouts x 15  Reps with emphasis on exhalation x 15 reps with grey CLX band for resistance  Retractions on cable column x 15 reps for core activation, exhalation @ end range.  pulldowns on cable column 7 lbs with cues to limit extension x 15 rpes  Chops on cable column 7 lbs x 15 reps with exhalation @ end range.   Sit to stand with 5 lb kettlebell x 15 reps  Seated decompressions 2 x 20 reps     Written Home Exercises Provided:  Patient educated to continue current HEP as pain is continuing to lessen   Exercises were reviewed and Shiraz was able to demonstrate them prior to the end of the session.   Shiraz demonstrated good  understanding of the education provided.      Assessment     Patient lorrie Tx well. No pain following treatment today. Pt aware he needs to avoid extended positions especially in standing Discomfort level remained 0/10 post session. WIll monitor and attempt to progress as tolerated.   Pt will continue to benefit from skilled outpatient physical therapy to address the deficits listed in the problem list box on initial evaluation, provide pt/family education and to maximize pt's level of independence in the home and community environment.      Short Term GOALS: 3 weeks. Pt agrees with goals set.  1. Patient demonstrates independence with HEP. MET  2. Patient demonstrates independence with Postural Awareness, able to perform transitory movements without pain  3. Patient demonstrates independence with body mechanics. MET     Long Term GOALS: 6 weeks. Pt agrees with goals set.  1. Patient demonstrates increased abdominal MMT to 3/5 to improve tolerance to functional activities pain free.   2. Patient demonstrates increased strength BLE's to 4-/5 or greater to improve tolerance to functional activities pain free.   3. Patient demonstrates improved overall function per FOTO Lumbar Survey to 50% Limitation or less.     Plan     Continue PT towards established plan of care and goals.    Sabra Cline, PT   9/28/18

## 2018-10-15 ENCOUNTER — CLINICAL SUPPORT (OUTPATIENT)
Dept: REHABILITATION | Facility: OTHER | Age: 58
End: 2018-10-15
Attending: ANESTHESIOLOGY
Payer: COMMERCIAL

## 2018-10-15 VITALS
HEART RATE: 58 BPM | HEIGHT: 69 IN | DIASTOLIC BLOOD PRESSURE: 79 MMHG | WEIGHT: 282.63 LBS | SYSTOLIC BLOOD PRESSURE: 123 MMHG | BODY MASS INDEX: 41.86 KG/M2

## 2018-10-15 DIAGNOSIS — G89.29 CHRONIC BILATERAL LOW BACK PAIN WITHOUT SCIATICA: ICD-10-CM

## 2018-10-15 DIAGNOSIS — M54.50 CHRONIC BILATERAL LOW BACK PAIN WITHOUT SCIATICA: ICD-10-CM

## 2018-10-15 DIAGNOSIS — M47.819 SPONDYLOSIS WITHOUT MYELOPATHY: Primary | ICD-10-CM

## 2018-10-15 PROCEDURE — 99203 OFFICE O/P NEW LOW 30 MIN: CPT | Mod: ,,, | Performed by: PHYSICAL MEDICINE & REHABILITATION

## 2018-10-15 RX ORDER — CLOBETASOL PROPIONATE 0.25 MG/G
CREAM TOPICAL
COMMUNITY
Start: 2018-07-23

## 2018-10-15 RX ORDER — LULICONAZOLE 10 MG/G
CREAM TOPICAL
COMMUNITY
Start: 2018-07-26

## 2018-10-15 RX ORDER — ACETAMINOPHEN 500 MG
500 TABLET ORAL EVERY 12 HOURS
COMMUNITY
End: 2021-05-25

## 2018-10-15 RX ORDER — IBUPROFEN 200 MG
200 TABLET ORAL EVERY 12 HOURS
COMMUNITY
End: 2020-04-24 | Stop reason: ALTCHOICE

## 2018-10-15 NOTE — PROGRESS NOTES
Health  met with patient to complete initial outcomes for the Healthy Back lumbar program.  Questions were reviewed with patient and discussed with Dr. Messer. The patient will meet with Dr. Messer to determine program enrollment.     HealthyBack Questionnaire  10/15/2018   Please select the location of your worst pain:  Low back   Please select the location of any additional pain: (hold down the control key, and click to select multiple responses) Seat- Left, Seat- Right, Leg- Right   Did any event trigger your pain?  No   How long has this pain been going on?  1 year   Please indicate how the pain is changing.  No Change   What is the WORST level of pain that you have experienced in the last week?  10   What is the LEAST level of pain that you have experienced in the past week?  2   What can you NOT do not that you used to be able to do?  N/A   Are your limitations mainly due to your pain?  No   What are your additional complaints, if any? None   Is there ever a time during the day when your pain stops, even for a brief moment, before returning? No   If yes, when your pain stops, does it disappear completely? Is it totally gone? No   Does bending forward make your typical pain worse? No   Morning: Worse during   Afternoon: Better during   Evening:  Worse during   Nighttime: Better during   Standing:  Worse   Lying on stomach: Worse   Lying on back: Better   Sitting:  Worse   Walking: Worse   Climbing stairs: Worse   In the last seven days, have you had any changes in your bowel and/or bladder habits? No   Have all of your attempts to treat your back/leg pain resulted in failure?  No   Do you believe your doctor(s) do NOT understand how much pain you have?  No   Do you believe that you have one or more conditions that have not been diagnosed by your doctor(s)?  No   Do you believe that you deserve more understanding from others including your family than you are getting?  No   Do you feel relatively calm  about how your back/leg pain has impacted your life?  No   Are you OK with treatment taking a very long time (even years) before you feel relief from your back/leg pain?  Yes   Do you believe that your pain has caused you to be more forgetful?  No   Do you feel that you have not received enough treatment for your pain?  No   Do you believe that your doctor(s) do not have the right training to treat your back/leg pain effectively?  No   Do you believe you should not be allowed to work or attend school because of your back/leg pain?  Yes   When did this pain begin?  1 year ago   Did the pain begin after an injury or an accident? No   Is the pain work related?  No   Please ángel which of the following over-the-counter medicines you take. (hold down the control key, and click to select multiple responses) Ibuprofen   Please ángel which of the following prescription medicines you take. (hold down the control key, and click to select multiple responses) Anticonvulsants (Gabapentin)   Emergency department  No   Health care providers (hold down the control key, and click to select multiple responses) Family doctor, Chiropractor, Physical therapist   Investigations done (hold down the control key, and click to select multiple responses) X-ray, MRI   Procedures (hold down the control key, and click to select multiple responses) Epidural steroid injections (ALE)   Emergency department  No   Health care providers (hold down the control key, and click to select multiple responses) Family doctor   Investigations done (hold down the control key, and click to select multiple responses) None   Procedures (hold down the control key, and click to select multiple responses) None   Have you had any surgery on your back?  No   Please ángel what you are experiencing. (hold down the control key, and click to select multiple responses) Shortness of breath, Chest pain, Arthritic joint pain, Muscle pain in arms or legs   First activity you would  like to perform better:  Stairs   Current ability score to perform first activity: 5   Second activity you would like to perform better: Sitting   Current ability score to perform second activity: 4   Third activity you would like to perform better: Standing   Current ability score to perform third activity: 4   Pain intensity:  The pain is severe and does not vary much.   Personal care (washing, dressing, etc.):  Washing and dressing increase the pain, and I find it necessary to change my way of doing it.   Lifting: Pain prevents me from lifting heavy weights, but I can manage light to medium weights if conveniently positioned.   Walking: I cannot walk more than a quarter mile without increasing pain.   Sitting: Pain prevents me from sitting more than 1/2 hour.   Standing: Pain prevents me from standing more than one hour.   Sleeping: I get pain in bed, but it does not prevent me from sleeping well.   Social life: Pain has restricted my social life to my home.   Traveling: I get no pain while traveling.   Changing degree of pain: My pain is neither getting better nor worse.   Do you need any help looking after yourself? I need no help at all.   When doing household tasks, e.g., preparing food, gardening, using the video recorder, radio, telephone, or washing the car: I need no help at all.   Thinking about how easily you can get around your home and community: I get around my home and community by myself without any difficulty.   Because of your health, your relationships, e.g., your friends, partner or parents, generally: Are seldom close and warm   Thinking about your relationships with other people: I have some friends but am often lonely for company.   Thinking about your health and your relationship with your  family:  My role in the family is unaffected by my health.   Thinking about your vision, including when using your glasses or contact lenses if needed: I have some difficulty focusing on things, or I do  not see them sharply, e.g., small print, a newspaper or objects in the distance.   Thinking about your hearing, including using your hearing aid if needed: I hear normally.   When you communicate with others, e.g., talking, listening, writing, or signing: I have no trouble speaking to them or understanding what they are saying.   Thinking about how you sleep: I am able to sleep without difficulty most of the time.   Thinking about how you generally feel: I feel moderately anxious, worried or depressed.   How much pain or discomfort do you experience? I suffer from severe pain.   Little interest or pleasure in doing things Nearly every day   Feeling down, depressed or hopeless Nearly every day   What is your occupation?  [employer]    How do you spend your leisure time? What are your hobbies? yard work and wood working   How do you spend your leisure time? What are your hobbies? yard work and wood working   What is your highest level of education? College   What is your work status? Employed   How did you hear about the Healthyback program?  Physician   When did this pain begin?  1 year ago   Do you need any help looking after yourself? I need no help at all.   When doing household tasks, e.g., preparing food, gardening, using the video recorder, radio, telephone, or washing the car: I need no help at all.   Thinking about how easily you can get around your home and community: I get around my home and community by myself without any difficulty.   Because of your health, your relationships, e.g., your friends, partner or parents, generally: Are seldom close and warm   Thinking about your relationships with other people: I have some friends but am often lonely for company.   Thinking about your health and your relationship with your  family:  My role in the family is unaffected by my health.   Thinking about your vision, including when using your glasses or contact lenses if needed: I have some difficulty  focusing on things, or I do not see them sharply, e.g., small print, a newspaper or objects in the distance.   Thinking about your hearing, including using your hearing aid if needed: I hear normally.   When you communicate with others, e.g., talking, listening, writing, or signing: I have no trouble speaking to them or understanding what they are saying.   Thinking about how you sleep: I am able to sleep without difficulty most of the time.   Thinking about how you generally feel: I feel moderately anxious, worried or depressed.   How much pain or discomfort do you experience? I suffer from severe pain.   Little interest or pleasure in doing things Nearly every day   Feeling down, depressed or hopeless Nearly every day

## 2018-10-15 NOTE — PROGRESS NOTES
Subjective:      Patient ID: Shiraz Jha is a 58 y.o. male.    Chief Complaint: No chief complaint on file.    Referred by: Roman Lyman MD     Mr Jha is a 57 yo male sent by Dr. Lyman  for evaluation for the healthy back lumbar program.  He has had low back pain for 1 year, there was no event that started the pain.  The pain is low back dominant, and goes to bilateral glutes and to the front of both hips and to the right knee.  The pain is a stabbing pain.  There is no tingling, numbness, burning or weakness.  The pain is constant, ranging from 2-10/10.  It is worse with standing, sitting, lying on stomach, walking, stairs, morning and evening.  It is better with lying on back and left side, fetal position, pain meds, afternoon and bedtime.  He has been to PT recently with some relief, but does not feel like it was enough.  He saw chiropractor no relief.  He has had 3 injections, last 2 injections with 50% relief for a few days.  History of cervical surgery, but no low back surgery.  His goals are stairs sitting and standing.  Pattern 1    Pain Procedures: 7/3/18 L4-5 INTERLAMINAR EPIDURAL STEROID INJECTION - LUMBAR  5/29/18 L4-5 INTERLAMINAR EPIDURAL STEROID INJECTION - LUMBAR  5/1/18 BILATERAL L3-4-5 LUMBAR FACET MEDIAL BRANCH NERVE BLOCK  C7-T1 CERVICAL EPIDURAL STEROID INJECTION   03/24/15    MRI lumbar 4/2018  There is normal lumbar lordosis.  No spondylolisthesis or spondylolysis.  There is no marrow edema throughout the vertebral bodies to suggest acute fractures or marrow replacing processes throughout the lumbar spine.  The tip of the conus is at T12-L1 disc space.  Paraspinal soft tissues are unremarkable.  On the sagittal images there is suggestion of a distended urinary bladder.    L5-S1: There is normal intervertebral disc height no disc herniations or significant central canal spinal stenosis.  There is mild bilateral facet arthropathy.  No significant foraminal stenosis.    L4-5: Normal  intervertebral disc height without soft tissue disc herniations.  There is enlarged lamina and the facet joints and mild hypertrophic changes of the facet joints.  This results in at least a mild central canal spinal stenosis.    L3-4: Normal intervertebral disc height without soft tissue disc herniations.  There is enlarged facet joints and the lamina and mild hypertrophic changes of the ligamentum flava resulting in at least a moderate central canal spinal stenosis.    L2-3: Normal intervertebral disc height without soft tissue disc herniations.  There is hypertrophic changes of the dorsal elements including the lamina and the facet joints and mild hypertrophic changes of the ligamentum flava resulting in moderate central canal spinal stenosis.    L1-2: No disc herniations.  Hypertrophic changes of the dorsal elements including the lamina and the facet joints resulting in mild central canal stenosis.    T12-L1: No disc herniations or spinal stenosis or foraminal stenosis.  There is mild marginal anterior spondylotic osteophytes.    It should be noted that the CSF space within the thecal sac at the L1-2, L2-3 L3-4 disc spaces is compromised from the spinal stenotic changes.    Impression      1. At least moderate stenotic changes at the L2-3 and L3-4 disc spaces and mild stenosis at the L1-2 and L4-5 disc spaces, mainly from hypertrophic changes of the dorsal elements.  It is possible that these findings most likely on a congenital basis.  2. No significant disc herniations.  3. Distended urinary bladder.    X-ray lumbar 4/2018  The vertebral body heights and intervertebral disc spaces are fairly well maintained.  No fracture.  No marrow replacement process.  SI joints unremarkable.  L4-5 and L5-S1 facet arthropathy noted.    Impression      Lumbar spondylosis.      Past Medical History:  No date: Allergy  No date: Asthma  No date: Atrial fibrillation  9/26/2013: Chronic rhinitis  4/3/2015: DDD (degenerative disc  disease)  No date: Depression  No date: Diabetes mellitus  No date: Fibromyalgia  7/14/2017: Gastroesophageal reflux disease without esophagitis  No date: Hypertension  12/20/2012: Sinusitis, acute, maxillary  No date: Thyroid disease    Past Surgical History:  11/15/2012: ABLATION; N/A      Comment:  Performed by All Webb MD at [facility] CATH LAB  7/16/2014: ABLATION Additional Codes; [id number] ; N/A      Comment:  Performed by All Webb MD at [facility] CATH LAB  5/1/2018: BLOCK-NERVE-MEDIAL BRANCH-LUMBAR- Bilateral L3-4-5;   Bilateral      Comment:  Performed by Roman Lyman MD at [facility]  No date: CERVICAL SPINE SURGERY  5/29/2018: EPIDURAL STEROID INJECTION INTO LUMBAR SPINE; N/A      Comment:  Procedure: INJECTION-STEROID-EPIDURAL-LUMBAR- L4-5;                 Surgeon: Roman Lyman MD;  Location: [facility];               Service: Pain Management;  Laterality: N/A;  Patient is                diabetic and Xarleto   7/3/2018: EPIDURAL STEROID INJECTION INTO LUMBAR SPINE; N/A      Comment:  Procedure: INJECTION, STEROID, SPINE, LUMBAR, EPIDURAL-                L4-5;  Surgeon: Roman Lyman MD;  Location: [facility];  Service: Pain Management;  Laterality: N/A;                 Patient takes Xarelto and ASA   No date: GASTRIC BYPASS  7/3/2018: INJECTION, STEROID, SPINE, LUMBAR, EPIDURAL- L4-5; N/A      Comment:  Performed by Roman Lyman MD at [facility]  5/29/2018: INJECTION-STEROID-EPIDURAL-LUMBAR- L4-5; N/A      Comment:  Performed by Roman Lyman MD at [facility]  11/16/2012: INSERTION, PULSE GENERATOR WITH 2 EXISTING LEADS, ICD;   Left      Comment:  Performed by Reilly Ray MD at [facility] CATH LAB  2000: SINUS SURGERY      Comment:  Dr. Watt at [facility]  No date: TONSILLECTOMY    Review of patient's family history indicates:  Problem: Heart disease      Relation: Father          Age of Onset: (Not Specified)  Problem: Cancer      Relation: Mother           Age of Onset: (Not Specified)          Comment: lung  Problem: Hypertension      Relation: Sister          Age of Onset: (Not Specified)  Problem: Heart disease      Relation: Brother          Age of Onset: (Not Specified)  Problem: Cirrhosis      Relation: Brother          Age of Onset: (Not Specified)  Problem: Diabetes      Relation: Brother          Age of Onset: (Not Specified)      Social History    Socioeconomic History      Marital status: Single      Spouse name: Not on file      Number of children: Not on file      Years of education: Not on file      Highest education level: Not on file    Social Needs      Financial resource strain: Not on file      Food insecurity - worry: Not on file      Food insecurity - inability: Not on file      Transportation needs - medical: Not on file      Transportation needs - non-medical: Not on file    Occupational History      Not on file    Tobacco Use      Smoking status: Never Smoker      Smokeless tobacco: Never Used    Substance and Sexual Activity      Alcohol use: No        Comment: rare      Drug use: No      Sexual activity: Yes        Partners: Female    Other Topics      Concerns:        Not on file    Social History Narrative      [place] w/2 dogs.      Dogs are his kids.      Works PT --  at [employer], on ssdi from neck and back/depression.      Current Outpatient Medications:  aspirin 81 MG Chew, Take 1 tablet (81 mg total) by mouth once daily., Disp: , Rfl: 0  buPROPion (WELLBUTRIN XL) 300 MG 24 hr tablet, Take 300 mg by mouth once daily.  , Disp: , Rfl:   desoximetasone (TOPICORT) 0.05 % cream, Apply topically 2 (two) times daily., Disp: 60 g, Rfl: 11  dextroamphetamine-amphetamine 30 mg Tab, TK 1 T PO  QAM AND 1/2 T IN THE AFTERNOON, Disp: , Rfl: 0  furosemide (LASIX) 40 MG tablet, Take 1 tablet (40 mg total) by mouth Daily. For fluid in legs, Disp: 30 tablet, Rfl: 12  gabapentin (NEURONTIN) 300 MG capsule, Take 1 capsule (300 mg  total) by mouth 3 (three) times daily., Disp: 90 capsule, Rfl: 0  levocetirizine (XYZAL) 5 MG tablet, Take 1 tablet (5 mg total) by mouth every evening., Disp: 30 tablet, Rfl: 3  metoprolol tartrate (LOPRESSOR) 25 MG tablet, Take 1 tablet (25 mg total) by mouth 2 (two) times daily., Disp: 180 tablet, Rfl: 3  NAFTIN 2 % Gel, , Disp: , Rfl:   nitroGLYCERIN (NITROSTAT) 0.4 MG SL tablet, Place 1 tablet under the tongue daily as needed., Disp: , Rfl:   omeprazole (PRILOSEC) 20 MG capsule, Take 2 capsules (40 mg total) by mouth once daily. (Patient taking differently: Take 20 mg by mouth once daily. ), Disp: 60 capsule, Rfl: 3  polyethylene glycol (GOLYTELY,NULYTELY) 236-22.74-6.74 gram suspension, Take 4 L by mouth once., Disp: , Rfl:   rivaroxaban (XARELTO) 20 mg Tab, Take 1 tablet (20 mg total) by mouth once daily., Disp: 30 tablet, Rfl: 5  sotalol (BETAPACE) 80 MG tablet, Take 1 tablet (80 mg total) by mouth 2 (two) times daily., Disp: 180 tablet, Rfl: 3  tamsulosin (FLOMAX) 0.4 mg Cp24, Take 1 capsule (0.4 mg total) by mouth 2 (two) times daily., Disp: 180 capsule, Rfl: 3  vortioxetine (TRINTELLIX) 20 mg Tab, Take 20 mg by mouth once daily. , Disp: , Rfl:     No current facility-administered medications for this visit.       Review of patient's allergies indicates:   -- Iodinated contrast- oral and iv dye -- Other (See Comments)    --  Raises BP                Review of Systems   Constitution: Negative for weight gain and weight loss.   Cardiovascular: Positive for dyspnea on exertion and leg swelling. Negative for chest pain.   Respiratory: Negative for shortness of breath.    Musculoskeletal: Positive for back pain (bilateral buttock, groin on right). Negative for joint pain and joint swelling.   Gastrointestinal: Negative for abdominal pain, bowel incontinence, nausea and vomiting.   Genitourinary: Negative for bladder incontinence.   Neurological: Negative for numbness.           Objective:           General    Vitals reviewed.  Constitutional: He is oriented to person, place, and time. He appears well-developed and well-nourished.   HENT:   Head: Normocephalic and atraumatic.   Pulmonary/Chest: Effort normal.   Neurological: He is alert and oriented to person, place, and time.   Psychiatric: He has a normal mood and affect. His behavior is normal. Judgment and thought content normal.     General Musculoskeletal Exam   Gait: normal     Right Ankle/Foot Exam     Tests   Heel Walk: able to perform  Tiptoe Walk: able to perform    Left Ankle/Foot Exam     Tests   Heel Walk: able to perform  Tiptoe Walk: able to perform  Back (L-Spine & T-Spine) / Neck (C-Spine) Exam     Tenderness Right paramedian tenderness of the Sacrum and Lower L-Spine. Left paramedian tenderness of the Sacrum and Lower L-Spine.     Back (L-Spine & T-Spine) Range of Motion   Extension: 20   Flexion: 80   Lateral bend right: 10   Lateral bend left: 10   Rotation right: 30   Rotation left: 30     Spinal Sensation   Right Side Sensation  C-Spine Level: normal   L-Spine Level: normal  S-Spine Level: normal  Left Side Sensation  C-Spine Level: normal  L-Spine Level: normal  S-Spine Level: normal    Back (L-Spine & T-Spine) Tests   Right Side Tests  Straight leg raise:      Sitting SLR: > 70 degrees      Left Side Tests  Straight leg raise:     Sitting SLR: > 70 degrees          Other He has no scoliosis .  Spinal Kyphosis:  Absent      Muscle Strength   Right Upper Extremity   Biceps: 5/5/5   Deltoid:  5/5  Triceps:  5/5  Wrist extension: 5/5/5   Finger Flexors:  5/5  Left Upper Extremity  Biceps: 5/5/5   Deltoid:  5/5  Triceps:  5/5  Wrist extension: 5/5/5   Finger Flexors:  5/5  Right Lower Extremity   Hip Flexion: 5/5   Quadriceps:  5/5   Anterior tibial:  5/5/5  EHL:  5/5  Left Lower Extremity   Hip Flexion: 5/5   Quadriceps:  5/5   Anterior tibial:  5/5/5   EHL:  5/5    Reflexes     Left Side  Biceps:  2+  Triceps:  2+  Brachioradialis:   2+  Quadriceps:  2+  Achilles:  2+  Left Lazar's Sign:  Absent  Babinski Sign:  absent    Right Side   Biceps:  2+  Triceps:  2+  Brachioradialis:  2+  Quadriceps:  2+  Achilles:  2+  Right Lazar's Sign:  absent  Babinski Sign:  absent    Vascular Exam     Right Pulses        Carotid:                  2+    Left Pulses        Carotid:                  2+        Assessment:       Encounter Diagnoses   Name Primary?    Spondylosis without myelopathy Yes    Chronic bilateral low back pain without sciatica          Plan:       Diagnoses and all orders for this visit:    Spondylosis without myelopathy  -     Ambulatory consult to [facility]    Chronic bilateral low back pain without sciatica  -     Ambulatory consult to [facility]         The patient has had a history of low back pain with limitations in there activities of Daily living    Previous treatment has not provided relief.    The situation was discussed at length with the patient.  More than 50% of the total time of 45 minutes was spent in counseling.  We discussed different causes of back pain and different treatment options.  We discussed the importance of stretching and strengthening.  We discussed posture. We discussed sitting better, and maintaining a curve in lumbar spine.  We discussed standing breaks.  We also discussed his abdominal pannus, which he feels impedes strengthening, and not likely to go away.   We discussed the pros and cons of further diagnostic testing, alternative treatment and Medications    Based on the history, physical exam, and functional index, an active physical therapy program is recommended.  The goal is to restore the patients function and reduce pain.  A program of progressive resistance exercises, biomechanical, and mobility maneuvers, instructions in proper body mechanics, aerobic conditioning and HEP will be utilized. The program will continue as long as making improvements.    An assessment of  patients progress will be made at each visit to document change in status.    The patient will be actively involved in there own treatment, and responsible for appointments and home program    The patient's lumbar isometric strength will be tested and they will be placed in a program of isolated strength training based on 50% of their total functional torque and advanced as clinically appropriate.      Directional preference of pain will further influence the patients active rehabilitation program    The patient was instructed there might be an initial increase in discomfort    They are enrolled with fair prognosis  Pattern 1

## 2018-10-25 RX ORDER — GABAPENTIN 300 MG/1
CAPSULE ORAL
Qty: 90 CAPSULE | Refills: 0 | Status: SHIPPED | OUTPATIENT
Start: 2018-10-25 | End: 2018-12-13

## 2018-11-14 ENCOUNTER — TELEPHONE (OUTPATIENT)
Dept: ELECTROPHYSIOLOGY | Facility: CLINIC | Age: 58
End: 2018-11-14

## 2018-11-14 ENCOUNTER — OFFICE VISIT (OUTPATIENT)
Dept: ELECTROPHYSIOLOGY | Facility: CLINIC | Age: 58
End: 2018-11-14
Payer: COMMERCIAL

## 2018-11-14 ENCOUNTER — HOSPITAL ENCOUNTER (OUTPATIENT)
Dept: CARDIOLOGY | Facility: CLINIC | Age: 58
Discharge: HOME | End: 2018-11-14
Payer: COMMERCIAL

## 2018-11-14 ENCOUNTER — CLINICAL SUPPORT (OUTPATIENT)
Dept: CARDIOLOGY | Facility: HOSPITAL | Age: 58
End: 2018-11-14
Attending: INTERNAL MEDICINE
Payer: COMMERCIAL

## 2018-11-14 VITALS
HEIGHT: 68 IN | HEART RATE: 56 BPM | DIASTOLIC BLOOD PRESSURE: 78 MMHG | WEIGHT: 281.75 LBS | BODY MASS INDEX: 42.7 KG/M2 | SYSTOLIC BLOOD PRESSURE: 126 MMHG

## 2018-11-14 DIAGNOSIS — I48.19 PERSISTENT ATRIAL FIBRILLATION: Primary | ICD-10-CM

## 2018-11-14 DIAGNOSIS — E66.01 MORBID OBESITY WITH BMI OF 40.0-44.9, ADULT: ICD-10-CM

## 2018-11-14 DIAGNOSIS — I48.19 PERSISTENT ATRIAL FIBRILLATION: ICD-10-CM

## 2018-11-14 DIAGNOSIS — G89.4 CHRONIC PAIN SYNDROME: ICD-10-CM

## 2018-11-14 DIAGNOSIS — E11.9 TYPE 2 DIABETES MELLITUS WITHOUT COMPLICATION, WITHOUT LONG-TERM CURRENT USE OF INSULIN: ICD-10-CM

## 2018-11-14 DIAGNOSIS — G47.33 OBSTRUCTIVE SLEEP APNEA SYNDROME: ICD-10-CM

## 2018-11-14 DIAGNOSIS — I48.91 ATRIAL FIBRILLATION, UNSPECIFIED TYPE: ICD-10-CM

## 2018-11-14 DIAGNOSIS — I48.92 ATRIAL FLUTTER, UNSPECIFIED TYPE: ICD-10-CM

## 2018-11-14 PROCEDURE — 99999 PR PBB SHADOW E&M-EST. PATIENT-LVL III: CPT | Mod: PBBFAC,,, | Performed by: INTERNAL MEDICINE

## 2018-11-14 PROCEDURE — 99214 OFFICE O/P EST MOD 30 MIN: CPT | Mod: S$GLB,,, | Performed by: INTERNAL MEDICINE

## 2018-11-14 PROCEDURE — 93000 ELECTROCARDIOGRAM COMPLETE: CPT | Mod: S$GLB,,, | Performed by: INTERNAL MEDICINE

## 2018-11-14 PROCEDURE — 93271 ECG/MONITORING AND ANALYSIS: CPT

## 2018-11-14 PROCEDURE — 93272 ECG/REVIEW INTERPRET ONLY: CPT | Mod: ,,, | Performed by: INTERNAL MEDICINE

## 2018-11-14 PROCEDURE — 3074F SYST BP LT 130 MM HG: CPT | Mod: CPTII,S$GLB,, | Performed by: INTERNAL MEDICINE

## 2018-11-14 PROCEDURE — 3078F DIAST BP <80 MM HG: CPT | Mod: CPTII,S$GLB,, | Performed by: INTERNAL MEDICINE

## 2018-11-14 PROCEDURE — 3008F BODY MASS INDEX DOCD: CPT | Mod: CPTII,S$GLB,, | Performed by: INTERNAL MEDICINE

## 2018-11-14 RX ORDER — METOPROLOL TARTRATE 25 MG/1
25 TABLET, FILM COATED ORAL 2 TIMES DAILY
Qty: 180 TABLET | Refills: 3 | Status: SHIPPED | OUTPATIENT
Start: 2018-11-14 | End: 2018-11-15 | Stop reason: SDUPTHER

## 2018-11-14 RX ORDER — SOTALOL HYDROCHLORIDE 80 MG/1
80 TABLET ORAL 2 TIMES DAILY
Qty: 180 TABLET | Refills: 3 | Status: SHIPPED | OUTPATIENT
Start: 2018-11-14 | End: 2018-11-15 | Stop reason: SDUPTHER

## 2018-11-14 RX ORDER — FUROSEMIDE 40 MG/1
40 TABLET ORAL DAILY
Qty: 90 TABLET | Refills: 0 | Status: SHIPPED | OUTPATIENT
Start: 2018-11-14 | End: 2018-11-15 | Stop reason: SDUPTHER

## 2018-11-14 NOTE — PROGRESS NOTES
Patient ID:  Shiraz Jha is a 58 y.o. male who presents for follow-up of Atrial Fibrillation      HPI     57 yo male with h/o Htn, morbid obesity, atrial fibrillation, atrial flutter, Sleep Apnea, diet controlled DM.   H/o recurrent atrial arrhythmias since 2008, previously managed at .   Presented to [facility] 11/12 with symptomatic atrial flutter and fibrillation. Had been on Dronedarone at that time. RFA of CTI 11/15/12. Sotalol and Xarelto then initiated.   Did well initially, but had increasing episodes. Subsequently had a Cryoablation PVI on 7/2014. He was last seen in 2/15, when he was still having on and off symptoms.     Has noted increasing fluid retention as of late.  Has been on Lasix 40 mg daily.  He indicates that Pharmacy was told he had no refills.  Stopped taking it 2 1/2 months ago.  Has not had symptoms c/w atrial fibrillation.  Notes dyspnea with walking up the stairs, which is not new.    ECG reveals nsr with QTc 401 msec.         Echo: 2/18:    1 - Normal left ventricular systolic function (EF 55-60%).     2 - Normal right ventricular systolic function .     3 - Normal left ventricular diastolic function.     4 - Mild left atrial enlargement.     5 - Mildly enlarged ascending aorta.     6 - The estimated PA systolic pressure is 30 mmHg.     Current Outpatient Medications on File Prior to Visit   Medication Sig Dispense Refill    acetaminophen (TYLENOL) 500 MG tablet Take 500 mg by mouth every 12 (twelve) hours.      aspirin 81 MG Chew Take 1 tablet (81 mg total) by mouth once daily.  0    buPROPion (WELLBUTRIN XL) 300 MG 24 hr tablet Take 300 mg by mouth once daily.        desoximetasone (TOPICORT) 0.05 % cream Apply topically 2 (two) times daily. 60 g 11    dextroamphetamine-amphetamine 30 mg Tab TK 1 T PO  QAM AND 1/2 T IN THE AFTERNOON  0    furosemide (LASIX) 40 MG tablet Take 1 tablet (40 mg total) by mouth Daily. For fluid in legs 30 tablet 12    gabapentin (NEURONTIN) 300 MG  capsule TAKE ONE BY MOUTH THREE TIMES DAILY 90 capsule 0    ibuprofen (ADVIL,MOTRIN) 200 MG tablet Take 200 mg by mouth every 12 (twelve) hours.      IMPOYZ 0.025 % Crea       levocetirizine (XYZAL) 5 MG tablet Take 1 tablet (5 mg total) by mouth every evening. 30 tablet 3    LUZU 1 % Crea       metoprolol tartrate (LOPRESSOR) 25 MG tablet Take 1 tablet (25 mg total) by mouth 2 (two) times daily. 180 tablet 3    NAFTIN 2 % Gel       nitroGLYCERIN (NITROSTAT) 0.4 MG SL tablet Place 1 tablet under the tongue daily as needed.      omeprazole (PRILOSEC) 20 MG capsule Take 2 capsules (40 mg total) by mouth once daily. (Patient taking differently: Take 20 mg by mouth once daily. ) 60 capsule 3    polyethylene glycol (GOLYTELY,NULYTELY) 236-22.74-6.74 gram suspension Take 4 L by mouth once.      rivaroxaban (XARELTO) 20 mg Tab Take 1 tablet (20 mg total) by mouth once daily. 30 tablet 5    sotalol (BETAPACE) 80 MG tablet Take 1 tablet (80 mg total) by mouth 2 (two) times daily. 180 tablet 3    tamsulosin (FLOMAX) 0.4 mg Cp24 Take 1 capsule (0.4 mg total) by mouth 2 (two) times daily. 180 capsule 3    vortioxetine (TRINTELLIX) 20 mg Tab Take 20 mg by mouth once daily.        No current facility-administered medications on file prior to visit.        Review of Systems   Constitution: Negative. Negative for weakness and malaise/fatigue.   Cardiovascular: Positive for leg swelling. Negative for chest pain, dyspnea on exertion, irregular heartbeat, near-syncope, orthopnea, palpitations, paroxysmal nocturnal dyspnea and syncope.   Respiratory: Positive for shortness of breath. Negative for cough.    Neurological: Negative for dizziness and light-headedness.   All other systems reviewed and are negative.       Objective:    Physical Exam   Constitutional: He is oriented to person, place, and time. He appears well-developed and well-nourished.   Eyes: Conjunctivae are normal. No scleral icterus.   Neck: No JVD  present. No tracheal deviation present.   Cardiovascular: Normal rate, regular rhythm and normal heart sounds. PMI is not displaced.   Pulmonary/Chest: Effort normal and breath sounds normal. No respiratory distress.   Abdominal: Soft. There is no hepatosplenomegaly. There is no tenderness.   Musculoskeletal: He exhibits no edema (lower extremity) or tenderness.   Neurological: He is alert and oriented to person, place, and time.   Skin: Skin is warm and dry. No rash noted.   Psychiatric: He has a normal mood and affect. His behavior is normal.         Assessment:       1. Persistent atrial fibrillation    2. Atrial flutter, unspecified type    3. Chronic pain syndrome    4. Type 2 diabetes mellitus without complication, without long-term current use of insulin    5. Obstructive sleep apnea syndrome    6. Morbid obesity with BMI of 40.0-44.9, adult         Plan:           Doing well, without symptomatic recurrence.  Given his weight, my preference is to continue Sotalol.  Event monitor with auto-trigger.  Resume Lasix.  F/u with Dr. Scott as scheduled in 6 months, with me in one year (it would be best if he sees once of us every 6 months for Sotalol).

## 2018-11-14 NOTE — TELEPHONE ENCOUNTER
----- Message from Brian Grace MA sent at 11/14/2018 10:06 AM CST -----  Contact: [phone]      ----- Message -----  From: Yvette Richard  Sent: 11/14/2018   8:55 AM  To: Jon Watson is calling to give a drug interaction. Please call him back @ [phone]. Thanks, Yvette

## 2018-11-15 DIAGNOSIS — I48.19 PERSISTENT ATRIAL FIBRILLATION: ICD-10-CM

## 2018-11-15 RX ORDER — FUROSEMIDE 40 MG/1
40 TABLET ORAL DAILY
Qty: 90 TABLET | Refills: 0 | Status: SHIPPED | OUTPATIENT
Start: 2018-11-15 | End: 2019-02-07 | Stop reason: SDUPTHER

## 2018-11-15 RX ORDER — METOPROLOL TARTRATE 25 MG/1
25 TABLET, FILM COATED ORAL 2 TIMES DAILY
Qty: 180 TABLET | Refills: 3 | Status: SHIPPED | OUTPATIENT
Start: 2018-11-15 | End: 2019-12-09 | Stop reason: SDUPTHER

## 2018-11-15 RX ORDER — SOTALOL HYDROCHLORIDE 80 MG/1
80 TABLET ORAL 2 TIMES DAILY
Qty: 180 TABLET | Refills: 3 | Status: SHIPPED | OUTPATIENT
Start: 2018-11-15 | End: 2019-12-09

## 2018-11-28 ENCOUNTER — CLINICAL SUPPORT (OUTPATIENT)
Dept: REHABILITATION | Facility: OTHER | Age: 58
End: 2018-11-28
Attending: PHYSICAL MEDICINE & REHABILITATION
Payer: COMMERCIAL

## 2018-11-28 DIAGNOSIS — Z74.09 MOBILITY IMPAIRED: ICD-10-CM

## 2018-11-28 DIAGNOSIS — R53.1 WEAKNESS: ICD-10-CM

## 2018-11-28 DIAGNOSIS — M53.86 DECREASED ROM OF LUMBAR SPINE: ICD-10-CM

## 2018-11-28 PROCEDURE — 97110 THERAPEUTIC EXERCISES: CPT

## 2018-11-28 PROCEDURE — 97161 PT EVAL LOW COMPLEX 20 MIN: CPT

## 2018-11-29 NOTE — PLAN OF CARE
[facility] BACK - PHYSICAL THERAPY EVALUATION     Name: Shiraz RUBI [facility] Number: [id number]      Diagnosis:   Encounter Diagnoses   Name Primary?    Mobility impaired     Decreased ROM of lumbar spine     Weakness         Medical Diagnoses:  M47.819 (ICD-10-CM) - Spondylosis without myelopathy  M54.5,G89.29 (ICD-10-CM) - Chronic bilateral low back pain without sciatica      Physician: Ivania Messer, *     Treatment Orders: PT Eval and Treat    Past Medical History:   Diagnosis Date    Allergy     Asthma     Atrial fibrillation     Chronic rhinitis 9/26/2013    DDD (degenerative disc disease) 4/3/2015    Depression     Diabetes mellitus     Fibromyalgia     Gastroesophageal reflux disease without esophagitis 7/14/2017    Hypertension     Sinusitis, acute, maxillary 12/20/2012    Thyroid disease      Current Outpatient Medications   Medication Sig    acetaminophen (TYLENOL) 500 MG tablet Take 500 mg by mouth every 12 (twelve) hours.    aspirin 81 MG Chew Take 1 tablet (81 mg total) by mouth once daily.    buPROPion (WELLBUTRIN XL) 300 MG 24 hr tablet Take 300 mg by mouth once daily.      desoximetasone (TOPICORT) 0.05 % cream Apply topically 2 (two) times daily.    dextroamphetamine-amphetamine 30 mg Tab TK 1 T PO  QAM AND 1/2 T IN THE AFTERNOON    furosemide (LASIX) 40 MG tablet Take 1 tablet (40 mg total) by mouth Daily. For fluid in legs    furosemide (LASIX) 40 MG tablet Take 1 tablet (40 mg total) by mouth once daily.    gabapentin (NEURONTIN) 300 MG capsule TAKE ONE BY MOUTH THREE TIMES DAILY    ibuprofen (ADVIL,MOTRIN) 200 MG tablet Take 200 mg by mouth every 12 (twelve) hours.    IMPOYZ 0.025 % Crea     levocetirizine (XYZAL) 5 MG tablet Take 1 tablet (5 mg total) by mouth every evening.    LUZU 1 % Crea     metoprolol tartrate (LOPRESSOR) 25 MG tablet Take 1 tablet (25 mg total) by mouth 2 (two) times daily.    NAFTIN 2 % Gel     nitroGLYCERIN (NITROSTAT) 0.4 MG  "SL tablet Place 1 tablet under the tongue daily as needed.    omeprazole (PRILOSEC) 20 MG capsule Take 2 capsules (40 mg total) by mouth once daily. (Patient taking differently: Take 20 mg by mouth once daily. )    polyethylene glycol (GOLYTELY,NULYTELY) 236-22.74-6.74 gram suspension Take 4 L by mouth once.    rivaroxaban (XARELTO) 20 mg Tab Take 1 tablet (20 mg total) by mouth once daily.    sotalol (BETAPACE) 80 MG tablet Take 1 tablet (80 mg total) by mouth 2 (two) times daily.    tamsulosin (FLOMAX) 0.4 mg Cp24 Take 1 capsule (0.4 mg total) by mouth 2 (two) times daily.    vortioxetine (TRINTELLIX) 20 mg Tab Take 20 mg by mouth once daily.      No current facility-administered medications for this visit.      Review of patient's allergies indicates:   Allergen Reactions    Iodinated contrast- oral and iv dye Other (See Comments)     Raises BP         Precautions: Hx of A-Fib.     Pattern of pain determined: 1 PEN    Evaluation Date: 11/28/2018  Authorization Period Expiration: 12/31/18  Plan of Care Expiration: 2/28/19  Reassessment Due: 12/28/18  Visit # / Visits authorized: 1/ 12    Time In: 1430  Time Out: 1545  Total Billable Time: 75 minutes     HISTORY     History of Present Illness:   Patient reports low back pain, "sometimes I don't wana move". Patient had 3 Epidural injections in his back; he states it was a failure overall. His pain is low back dominant he reports, and across the back. When he sits, he feels a lot of pain. He feels the pain medication (Gabapentin) helps a lot. His pain is aggravated with prolonged standing, sitting, walking (can walk 1-2 mile but at the end he is in a lot of pain); when it is bad, his legs hurt as well.  He reports "bending forward takes the pressure off; like brushing my teeth". Patient feels limited and in pain with walking his dog around the block; moving around,  working, and getting up from kneeling.    MD's Notes:  Mr Jha is a 57 yo male sent by  " [facility]  for evaluation for the healthy back lumbar program.  He has had low back pain for 1 year, there was no event that started the pain.  The pain is low back dominant, and goes to bilateral glutes and to the front of both hips and to the right knee.  The pain is a stabbing pain.  There is no tingling, numbness, burning or weakness.  The pain is constant, ranging from 2-10/10.  It is worse with standing, sitting, lying on stomach, walking, stairs, morning and evening.  It is better with lying on back and left side, fetal position, pain meds, afternoon and bedtime.  He has been to PT recently with some relief, but does not feel like it was enough.  He saw chiropractor no relief.  He has had 3 injections, last 2 injections with 50% relief for a few days.  History of cervical surgery, but no low back surgery.  His goals are stairs sitting and standing.  Pattern 1    Pain Procedures:  7/3/18 L4-5 INTERLAMINAR EPIDURAL STEROID INJECTION - LUMBAR  5/29/18 L4-5 INTERLAMINAR EPIDURAL STEROID INJECTION - LUMBAR  5/1/18 BILATERAL L3-4-5 LUMBAR FACET MEDIAL BRANCH NERVE BLOCK  C7-T1 CERVICAL EPIDURAL STEROID INJECTION   03/24/15    Diagnostic Tests: From [facility]     MRI lumbar 4/2018  There is normal lumbar lordosis.  No spondylolisthesis or spondylolysis.  There is no marrow edema throughout the vertebral bodies to suggest acute fractures or marrow replacing processes throughout the lumbar spine.  The tip of the conus is at T12-L1 disc space.  Paraspinal soft tissues are unremarkable.  On the sagittal images there is suggestion of a distended urinary bladder.     L5-S1: There is normal intervertebral disc height no disc herniations or significant central canal spinal stenosis.  There is mild bilateral facet arthropathy.  No significant foraminal stenosis.     L4-5: Normal intervertebral disc height without soft tissue disc herniations.  There is enlarged lamina and the facet joints and mild hypertrophic changes of the facet  joints.  This results in at least a mild central canal spinal stenosis.     L3-4: Normal intervertebral disc height without soft tissue disc herniations.  There is enlarged facet joints and the lamina and mild hypertrophic changes of the ligamentum flava resulting in at least a moderate central canal spinal stenosis.     L2-3: Normal intervertebral disc height without soft tissue disc herniations.  There is hypertrophic changes of the dorsal elements including the lamina and the facet joints and mild hypertrophic changes of the ligamentum flava resulting in moderate central canal spinal stenosis.     L1-2: No disc herniations.  Hypertrophic changes of the dorsal elements including the lamina and the facet joints resulting in mild central canal stenosis.     T12-L1: No disc herniations or spinal stenosis or foraminal stenosis.  There is mild marginal anterior spondylotic osteophytes.     It should be noted that the CSF space within the thecal sac at the L1-2, L2-3 L3-4 disc spaces is compromised from the spinal stenotic changes.     Impression                    1. At least moderate stenotic changes at the L2-3 and L3-4 disc spaces and mild stenosis at the L1-2 and L4-5 disc spaces, mainly from hypertrophic changes of the dorsal elements.  It is possible that these findings most likely on a congenital basis.  2. No significant disc herniations.  3. Distended urinary bladder.     X-ray lumbar 4/2018  The vertebral body heights and intervertebral disc spaces are fairly well maintained.  No fracture.  No marrow replacement process.  SI joints unremarkable.  L4-5 and L5-S1 facet arthropathy noted.  Impression                    Lumbar spondylosis.       Pain Scale: Shiraz rates pain on a scale of 0-10 to be 10 at worst; 0 currently; 0 at best using VAS.   Pain location: Low back     Aggravating factors: prolonged standing, sitting, walking and bending forward  Easing Factors: Stooping fwd slightly; Gabapentin  Disturbed  "Sleep: No    Pattern of pain questions:  1.  Where is your pain the worst? Low back  2.  Is your pain constant or intermittent? Intermittent lately.  3.  Does bending forward make your typical pain worse? No; but extension does  4.  Since the start of your back pain, has there been a change in your bowel or bladder? No  5.  What can't you do now that you use to be able to do? Walk comfortable 2 miles a day; walk the dog; all activities w/o increasing pain after.     Prior Treatment: [facility]; No effect  Prior functional status: Unlimited  DME owned/used: none  Occupation:  [employer] management; [place]  Leisure: Walking; exercising; cutting grass                     Pts goals:  Return to PLOF activities w/o increased pain or difficulty (above).     Red Flag Screening:   Cough  Sneeze  Strain: (+) Used to be a jolts but not lately with Gabapentin  Bladder/ bowel: (--)  Falls: (--)  Night pain: (--) not lately with gabapentin  Unexplained weight loss: (--)  General health: " would say fair to good".     OBJECTIVE     Postural examination/scapula alignment: Rounded shoulder, Head forward, Slouched posture and Poor postural alignment overall.     Skin integrity: None    Edema: B lower legs edema (take fluid pill).    Correction of posture: no effect with lumbar roll, but felt comfortable with slim line roll.     MOVEMENT LOSS    ROM Loss   Flexion minimal loss   Extension moderate loss   Side bending Right moderate loss   Side bending Left moderate loss   Rotation Right minimal loss   Rotation Left minimal loss     Gross Lower Extremity Strength:  4+/5 to 5/5 prox-->distal  5x sit<>Stand for functional legs strength: 14 seconds w/o UE's use, with mild increase discomfort at legs and low back.     GAIT:  Assistive Device used: none  Level of Assistance: independent  Patient displays the following gait deviations:  unsteady gait, decreased step length, increased base of support, decreased weight shift and " decreased pelvic rotation.     Special Tests:   Test Name  Test Result   Prone Instability Test (+)   SI Joint Provocation Test (+)   Straight Leg Raise DNT   Neural Tension Test DNT   Crossed Straight Leg Raise DNT   Walking on toes (--)   Walking on heels  (--)       NEUROLOGICAL SCREENING     Sensory deficit: Normal sensation to hot/cold and touch B LE; diminished touch at Bottom of feet B.     Reflexes:    Left Right   Patella Tendon 1+ 1+   Achilles Tendon 1+ 1+   Babinski  (--) (--)   Clonus (--) (--)     REPEATED TEST MOVEMENTS:  Repeated Flexion in Standing worse   Repeated Extension in Standing pain during motion   Repeated Flexion in lying pain during motion; better in z-lie position.   Repeated Extension in lying  pressure but not pain.       Baseline Isometric Testing on Med X equipment: Testing administered by PT  Date of testin18  ROM 30-0deg   Max Peak Torque 119    Min Peak Torque 30    Flex/Ext Ratio 3.96   % below normative data 60   Counter weight 319   femur 7   Seat pad 0     Starting weight 50#.      CMS Impairment/Limitation/Restriction for FOTO Lumbar spine Survey    Therapist reviewed FOTO scores for Shiraz Jha on 2018.   FOTO documents entered into [employer] - see Media section.    Limitation Score: 47%  Category: Mobility    Current : CK = at least 40% but < 60% impaired, limited or restricted  Goal: CK = at least 40% but < 60% impaired, limited or restricted 44%  Discharge:        CMS Impairment/Limitation/Restriction for FOTO Lumbar Spine Survey  Status Limitation G-Code CMS Severity Modifier  Intake 53% 47% Current Status CK - At least 40 percent but less than 60 percent  Predicted 56% 44% Goal Status+ CK - At least 40 percent but less than 60 percent  D/C Status CK **only report if this is a one time visit    Treatment       PT Evaluation Completed? Yes  Discussed Plan of Care with patient: Yes      Home Exercise Program as follows:   Handouts were given to the patient. Pt  norman good understanding of the education provided. Shiraz demonstrated good return demonstration of activities.     - Patient received education regarding proper posture and body mechanics.  Patient was given top 10 tips handout which discusses posture seated, standing, lifting correctly, components of exercise, importance of nutrition and hydration, and importance of sleep.  - Raul roll tried, recommended, and purchase information was provided.    - Patient received a handout regarding anticipated muscular soreness following the isometric test and strategies for management were reviewed with patient including stretching, using ice and scheduled rest.       Pt was instructed in and performed the following:   Cardiovascular exercise and therapeutic exercise to improve posture, lumbar/cervical ROM, strength, and muscular endurance as follows:     Shiraz received therapeutic exercises to develop/improve posture, lumbar/cervical ROM, strength and muscular endurance for 25 minutes including the following exercises:     [facility] 11/28/2018   Visit Number 1   VAS Pain Rating 0   Lumbar Extension Seat Pad 0   Femur Restraint 7   Top Dead Center 24   Counterweight 319   Lumbar Flexion 30   Lumbar Extension 0   Lumbar Peak Torque 119   Min Torque 30   Test Percent Below Normative Data 60   Lumbar Weight 40   Ice - Z Lie (in min.) 10     HEP:  DKTC with Ball  SKTC  YUMIKO  PPT    Shiraz received the following manual therapy techniques: 00 minutes.       Assessment     This is a 58 y.o. male referred to [facility] and presents with medical diagnoses of  Spondylosis without myelopathy and Chronic bilateral low back pain without sciatica, and presents with obesity. Patient displayed minimal to moderate loss of lumbar spine AROM. He present with mild deficits with MMT strength; and deficit with functional legs strength per 5x STS at 14 seconds.  Patient with diminished sensation at bottom of his feet, and  compensate with vision. Patient had decreased activity tolerance due to fatigue and LBP.  Patient back extensor strength was tested with Medx,  Revealing results at 60% below normative data for his age group. His peak/min torques were recorded at 119/30 ft-lbs. He demonstrates limitations as described below in the problem list. He will beneft greatly from this program, in order to increase strength and aerobic capacity, to optimize exercise and activity tolerance and return to PLOF ability. Pt rehab potential is Good.           Patient received education on the Healthy Back program, purpose of the isometric test, progression of back strengthening as well as wellness approach and systemic strengthening.  Details of the program were discussed.  Reviewed that patient should feel support/pressure from med ex restraints but no pain or discomfort and patient expressed understanding.    Based on the above history and physical examination an active physical therapy program is recommended.  Pt will continue to benefit from skilled outpatient physical therapy to address the deficits listed below in the chart, provide pt/family education and to maximize pt's level of independence in the home and community environment. .     No environmental, cultural, spiritual, developmental or education needs expressed or noted    Medical necessity is demonstrated by the following problem list.    Pt presents with the following impairments:     History  Co-morbidities and personal factors that may impact the plan of care Co-morbidities:   Age; Obesity; multiple co-morbidities  M47.819 (ICD-10-CM) - Spondylosis without myelopathy  M54.5,G89.29 (ICD-10-CM) - Chronic bilateral low back pain without sciatica    Personal Factors:   age     low   Examination  Body Structures and Functions, activity limitations and participation restrictions that may impact the plan of care Body Regions:   back  lower extremities  trunk    Body Systems:     ROM  strength  balance  gait  transfers  transitions    Participation Restrictions:   NA    Activity limitations:   Learning and applying knowledge  no deficits    General Tasks and Commands  no deficits    Communication  no deficits    Mobility  lifting and carrying objects  walking  driving (bike, car, motorcycle)    Self care  washing oneself (bathing, drying, washing hands)    Domestic Life  shopping  cooking  doing house work (cleaning house, washing dishes, laundry)  assisting others    Interactions/Relationships  no deficits    Life Areas  no deficits    Community and Social Life  recreation and leisure         low   Clinical Presentation stable and uncomplicated low   Decision Making/ Complexity Score: low       GOALS: Pt is in agreement with the following goals.    Short term goals:  6 weeks or 10 visits   1.  Pt will demonstrate increased lumbar ROM by at least 3 degrees from the initial ROM value with improvements noted in functional ROM and ability to perform ADLs  2.  Pt will demonstrate increased maximum isometric torque value by 10% when compared to the initial value resulting in improved ability to perform bending, lifting, and carrying activities safely, confidently.    3.  Patient report a reduction in worst pain score by 1-2 points for improved tolerance during work and recreational activities  4.  Pt able to perform HEP correctly with minimal cueing or supervision for therapist      Long term goals: 13 weeks or 20 visits   1. Pt will demonstrate increased lumbar ROM by at least 6 degrees from initial ROM value, resulting in improved ability to perform functional fwd bending while standing and sitting.   2. Pt will demonstrate increased maximum isometric torque value by 20% when compared to the initial value resulting in improved ability to perform bending, lifting, and carrying activities safely, confidently.  3. Pt to demonstrate ability to independently control and reduce their pain through  "posture positioning and mechanical movements throughout a typical day.  4.  Patient will demonstrate improved overall function per FOTO Survey to CK = at least 40% but < 60% impaired, limited or restricted score or less.  5. Patient will improve 5x STS test for functional legs strength to 11 sec or less, in order to display improved strength and activity tolerance.     Plan   Outpatient physical therapy 2x week for 13 weeks or 20 visits to include the following:   - Patient education  - Therapeutic exercise  - Manual therapy  - Performance testing   - Neuromuscular Re-education  - Therapeutic activity   - Modalities    Pt may be seen by PTA as part of the rehabilitation team.     Therapist: Osmel Owens, PT  11/28/2018    "I certify the need for these services furnished under this plan of treatment and while under my care."    ____________________________________  Physician/Referring Practitioner    _______________  Date of Signature            "

## 2018-12-12 DIAGNOSIS — E11.9 TYPE 2 DIABETES MELLITUS WITHOUT COMPLICATION, WITHOUT LONG-TERM CURRENT USE OF INSULIN: ICD-10-CM

## 2018-12-12 DIAGNOSIS — Z12.11 ENCOUNTER FOR FIT (FECAL IMMUNOCHEMICAL TEST) SCREENING: Primary | ICD-10-CM

## 2018-12-13 ENCOUNTER — OFFICE VISIT (OUTPATIENT)
Dept: PAIN MEDICINE | Facility: CLINIC | Age: 58
End: 2018-12-13
Attending: ANESTHESIOLOGY
Payer: COMMERCIAL

## 2018-12-13 VITALS
HEART RATE: 60 BPM | HEIGHT: 68 IN | WEIGHT: 273.81 LBS | SYSTOLIC BLOOD PRESSURE: 121 MMHG | TEMPERATURE: 99 F | DIASTOLIC BLOOD PRESSURE: 80 MMHG | BODY MASS INDEX: 41.5 KG/M2

## 2018-12-13 DIAGNOSIS — M51.36 DDD (DEGENERATIVE DISC DISEASE), LUMBAR: ICD-10-CM

## 2018-12-13 DIAGNOSIS — M47.9 OSTEOARTHRITIS OF SPINE, UNSPECIFIED SPINAL OSTEOARTHRITIS COMPLICATION STATUS, UNSPECIFIED SPINAL REGION: Primary | ICD-10-CM

## 2018-12-13 DIAGNOSIS — M47.819 SPONDYLOSIS WITHOUT MYELOPATHY: ICD-10-CM

## 2018-12-13 PROCEDURE — 99999 PR PBB SHADOW E&M-EST. PATIENT-LVL III: CPT | Mod: PBBFAC,,, | Performed by: ANESTHESIOLOGY

## 2018-12-13 PROCEDURE — 3074F SYST BP LT 130 MM HG: CPT | Mod: CPTII,S$GLB,, | Performed by: ANESTHESIOLOGY

## 2018-12-13 PROCEDURE — 3008F BODY MASS INDEX DOCD: CPT | Mod: CPTII,S$GLB,, | Performed by: ANESTHESIOLOGY

## 2018-12-13 PROCEDURE — 3079F DIAST BP 80-89 MM HG: CPT | Mod: CPTII,S$GLB,, | Performed by: ANESTHESIOLOGY

## 2018-12-13 PROCEDURE — 99214 OFFICE O/P EST MOD 30 MIN: CPT | Mod: S$GLB,,, | Performed by: ANESTHESIOLOGY

## 2018-12-13 RX ORDER — GABAPENTIN 300 MG/1
300 CAPSULE ORAL 3 TIMES DAILY
Qty: 270 CAPSULE | Refills: 2 | Status: SHIPPED | OUTPATIENT
Start: 2018-12-13 | End: 2019-12-09 | Stop reason: SDUPTHER

## 2018-12-13 NOTE — PROGRESS NOTES
Chronic patient Established Note (Follow up visit)      SUBJECTIVE:    Shiraz Jha presents to the clinic for a follow-up appointment for low back pain. Since the last visit, Shiraz Jha states the pain has been improving. Current pain intensity is 1/10.  Patient is here for a f/u and request of medication refill, states his pain is fairly controled on gabapentin     Pain Disability Index Review:  Last 3 PDI Scores 7/17/2018 6/12/2018 5/15/2018   Pain Disability Index (PDI) 40 31 60       Pain Medications:    - Opioids: None  - Adjuvant Medications: Advil,Motrin ( Ibuprofen), Neurontin (Gabapentin) and wellbutrin  - Anti-Coagulants: Aspirin and xarelto  - Others: see med list    Opioid Contract: no     report:  Reviewed and consistent with medication use as prescribed.    Pain Procedures: 7/3/18 L4-5 INTERLAMINAR EPIDURAL STEROID INJECTION - LUMBAR  5/29/18 L4-5 INTERLAMINAR EPIDURAL STEROID INJECTION - LUMBAR  5/1/18 BILATERAL L3-4-5 LUMBAR FACET MEDIAL BRANCH NERVE BLOCK  C7-T1 CERVICAL EPIDURAL STEROID INJECTION   03/24/15    Physical Therapy/Home Exercise: no    Imaging:MRI Lumbar Spine Without Contrast   Narrative     EXAMINATION:  MRI LUMBAR SPINE WITHOUT CONTRAST    CLINICAL HISTORY:  Low back pain, <6wks, no red flags, no prior management; Other intervertebral disc degeneration, lumbar region    TECHNIQUE:  Multiplanar, multisequence MR images were acquired from the thoracolumbar junction to the sacrum without the administration of contrast.    COMPARISON:  None.    FINDINGS:  There is normal lumbar lordosis.  No spondylolisthesis or spondylolysis.  There is no marrow edema throughout the vertebral bodies to suggest acute fractures or marrow replacing processes throughout the lumbar spine.  The tip of the conus is at T12-L1 disc space.  Paraspinal soft tissues are unremarkable.  On the sagittal images there is suggestion of a distended urinary bladder.    L5-S1: There is normal intervertebral disc  height no disc herniations or significant central canal spinal stenosis.  There is mild bilateral facet arthropathy.  No significant foraminal stenosis.    L4-5: Normal intervertebral disc height without soft tissue disc herniations.  There is enlarged lamina and the facet joints and mild hypertrophic changes of the facet joints.  This results in at least a mild central canal spinal stenosis.    L3-4: Normal intervertebral disc height without soft tissue disc herniations.  There is enlarged facet joints and the lamina and mild hypertrophic changes of the ligamentum flava resulting in at least a moderate central canal spinal stenosis.    L2-3: Normal intervertebral disc height without soft tissue disc herniations.  There is hypertrophic changes of the dorsal elements including the lamina and the facet joints and mild hypertrophic changes of the ligamentum flava resulting in moderate central canal spinal stenosis.    L1-2: No disc herniations.  Hypertrophic changes of the dorsal elements including the lamina and the facet joints resulting in mild central canal stenosis.    T12-L1: No disc herniations or spinal stenosis or foraminal stenosis.  There is mild marginal anterior spondylotic osteophytes.    It should be noted that the CSF space within the thecal sac at the L1-2, L2-3 L3-4 disc spaces is compromised from the spinal stenotic changes.      Impression       1. At least moderate stenotic changes at the L2-3 and L3-4 disc spaces and mild stenosis at the L1-2 and L4-5 disc spaces, mainly from hypertrophic changes of the dorsal elements.  It is possible that these findings most likely on a congenital basis.  2. No significant disc herniations.  3. Distended urinary bladder.      Electronically signed by: Emiliano Nagy MD  Date: 04/24/2018  Time: 11:55                       4/24/18 X-Ray Lumbar Spine AP And Lateral     Narrative      EXAMINATION:  XR LUMBAR SPINE AP AND LATERAL    CLINICAL HISTORY:  Low back pain,  <6wks, no red flags, no prior management;Other intervertebral disc degeneration, lumbar region    TECHNIQUE:  AP, lateral and spot images were performed of the lumbar spine.    COMPARISON:  None    FINDINGS:  The vertebral body heights and intervertebral disc spaces are fairly well maintained.  No fracture.  No marrow replacement process.  SI joints unremarkable.  L4-5 and L5-S1 facet arthropathy noted.   Impression        Lumbar spondylosis.          X-Ray Hips Bilateral 2 View Inc AP Pelvis    Narrative     EXAMINATION:  XR HIPS BILATERAL 2 VIEW INCL AP PELVIS    CLINICAL HISTORY:  Pain in right hip    TECHNIQUE:  AP view of the pelvis and frogleg lateral views of both hips were performed.    COMPARISON:  None.    FINDINGS:  No fractures identified. The alignment is within normal limits.  No evidence of a marrow replacement process.Probable phlebolith in the pelvis.  SI joints unremarkable.  The hip joint spaces are fairly well maintained.      Impression       No acute findings.      Electronically signed by: Moreno Adkins MD  Date: 04/24/2018  Time: 12:42         Allergies:   Review of patient's allergies indicates:   Allergen Reactions    Iodinated contrast- oral and iv dye Other (See Comments)     Raises BP         Current Medications:   Current Outpatient Medications   Medication Sig Dispense Refill    acetaminophen (TYLENOL) 500 MG tablet Take 500 mg by mouth every 12 (twelve) hours.      aspirin 81 MG Chew Take 1 tablet (81 mg total) by mouth once daily.  0    buPROPion (WELLBUTRIN XL) 300 MG 24 hr tablet Take 300 mg by mouth once daily.        desoximetasone (TOPICORT) 0.05 % cream Apply topically 2 (two) times daily. 60 g 11    dextroamphetamine-amphetamine 30 mg Tab TK 1 T PO  QAM AND 1/2 T IN THE AFTERNOON  0    furosemide (LASIX) 40 MG tablet Take 1 tablet (40 mg total) by mouth Daily. For fluid in legs 30 tablet 12    furosemide (LASIX) 40 MG tablet Take 1 tablet (40 mg total) by mouth once  daily. 90 tablet 0    gabapentin (NEURONTIN) 300 MG capsule TAKE ONE BY MOUTH THREE TIMES DAILY 90 capsule 0    ibuprofen (ADVIL,MOTRIN) 200 MG tablet Take 200 mg by mouth every 12 (twelve) hours.      IMPOYZ 0.025 % Crea       levocetirizine (XYZAL) 5 MG tablet Take 1 tablet (5 mg total) by mouth every evening. 30 tablet 3    LUZU 1 % Crea       metoprolol tartrate (LOPRESSOR) 25 MG tablet Take 1 tablet (25 mg total) by mouth 2 (two) times daily. 180 tablet 3    NAFTIN 2 % Gel       nitroGLYCERIN (NITROSTAT) 0.4 MG SL tablet Place 1 tablet under the tongue daily as needed.      omeprazole (PRILOSEC) 20 MG capsule Take 2 capsules (40 mg total) by mouth once daily. (Patient taking differently: Take 20 mg by mouth once daily. ) 60 capsule 3    polyethylene glycol (GOLYTELY,NULYTELY) 236-22.74-6.74 gram suspension Take 4 L by mouth once.      rivaroxaban (XARELTO) 20 mg Tab Take 1 tablet (20 mg total) by mouth once daily. 90 tablet 3    sotalol (BETAPACE) 80 MG tablet Take 1 tablet (80 mg total) by mouth 2 (two) times daily. 180 tablet 3    tamsulosin (FLOMAX) 0.4 mg Cp24 Take 1 capsule (0.4 mg total) by mouth 2 (two) times daily. 180 capsule 3    vortioxetine (TRINTELLIX) 20 mg Tab Take 20 mg by mouth once daily.        No current facility-administered medications for this visit.        REVIEW OF SYSTEMS:    GENERAL:  No weight loss, malaise or fevers.  HEENT:  Negative for frequent or significant headaches.  NECK:  Negative for lumps, goiter, pain and significant neck swelling.  RESPIRATORY:  Negative for cough, wheezing or shortness of breath.  CARDIOVASCULAR:  Negative for chest pain, leg swelling or palpitations.  GI:  Negative for abdominal discomfort, blood in stools or black stools or change in bowel habits.  MUSCULOSKELETAL:  See HPI.  SKIN:  Negative for lesions, rash, and itching.  PSYCH:  Negative for sleep disturbance, mood disorder and recent psychosocial stressors.  HEMATOLOGY/LYMPHOLOGY:   Negative for prolonged bleeding, bruising easily or swollen nodes.  NEURO:   No history of headaches, syncope, paralysis, seizures or tremors.  All other reviewed and negative other than HPI.    Past Medical History:  Past Medical History:   Diagnosis Date    Allergy     Asthma     Atrial fibrillation     Chronic rhinitis 9/26/2013    DDD (degenerative disc disease) 4/3/2015    Depression     Diabetes mellitus     Fibromyalgia     Gastroesophageal reflux disease without esophagitis 7/14/2017    Hypertension     Sinusitis, acute, maxillary 12/20/2012    Thyroid disease        Past Surgical History:  Past Surgical History:   Procedure Laterality Date    ABLATION N/A 11/15/2012    Performed by [facility] CATH LAB    ABLATION Additional Codes; [id number]  N/A 7/16/2014    Performed by [facility] at [facility] CATH LAB    BLOCK-NERVE-MEDIAL BRANCH-LUMBAR- Bilateral L3-4-5 Bilateral 5/1/2018    [facility] at [facility] PAIN T    CERVICAL SPINE SURGERY      EPIDURAL STEROID INJECTION INTO LUMBAR SPINE N/A 5/29/2018    Procedure: INJECTION-STEROID-EPIDURAL-LUMBAR- L4-5;  Surgeon: Roman Lyman MD;  Location: [facility];  Service: Pain Management;  Laterality: N/A;  Patient is diabetic and Xarleto     EPIDURAL STEROID INJECTION INTO LUMBAR SPINE N/A 7/3/2018    Procedure: INJECTION, STEROID, SPINE, LUMBAR, EPIDURAL- L4-5;  Surgeon: Roman Lyman MD;  [facility];  Service: Pain Management;  Laterality: N/A;  Patient takes Xarelto and ASA     GASTRIC BYPASS      INJECTION, STEROID, SPINE, LUMBAR, EPIDURAL- L4-5 N/A 7/3/2018    [facility]    INJECTION-STEROID-EPIDURAL-LUMBAR- L4-5 N/A 5/29/2018    Performed by [facility] PAIN T    INSERTION, PULSE GENERATOR WITH 2 EXISTING LEADS, ICD Left 11/16/2012    Performed by Reilly Ray [facility] at [facility] CATH LAB    SINUS SURGERY  2000      [facility]    TONSILLECTOMY         Family History:  Family History   Problem Relation Age of Onset    Heart disease Father     Cancer Mother         lung    Hypertension Sister     Heart disease Brother     Cirrhosis Brother     Diabetes Brother        Social History:  Social History     Socioeconomic History    Marital status: Single     Spouse name: Not on file    Number of children: Not on file    Years of education: Not on file    Highest education level: Not on file   Social Needs    Financial resource strain: Not on file    Food insecurity - worry: Not on file    Food insecurity - inability: Not on file    Transportation needs - medical: Not on file    Transportation needs - non-medical: Not on file   Occupational History    Not on file   Tobacco Use    Smoking status: Never Smoker    Smokeless tobacco: Never Used   Substance and Sexual Activity    Alcohol use: No     Comment: rare    Drug use: No    Sexual activity: Yes     Partners: Female   Other Topics Concern    Not on file   Social History Narrative    From [place], lives alone w/2 dogs.    Dogs are his kids.    Works PT --  at [employer], on ssdi from neck and back/depression.       OBJECTIVE:    There were no vitals taken for this visit.    PHYSICAL EXAMINATION:    General appearance: Well appearing, in no acute distress, alert and oriented x3.  Psych:  Mood and affect appropriate.  Skin: Skin color, texture, turgor normal, no rashes or lesions, in both upper and lower body.  Head/face:  Atraumatic, normocephalic. No palpable lymph nodes  Neck: No pain to palpation over the cervical paraspinous muscles. Spurling Negative. No pain with neck flexion, extension, or lateral flexion. .  Cor: RRR  Pulm: CTA  GI: Abdomen soft and non-tender.  Back: Straight leg raising in the sitting and supine positions is negative to radicular pain. mild pain to palpation over the spine or costovertebral angles. Normal range of motion without  pain reproduction.  Extremities: Peripheral joint ROM is full and pain free without obvious instability or laxity in all four extremities. No deformities, edema, or skin discoloration. Good capillary refill.  Musculoskeletal: Shoulder, hip, sacroiliac and knee provocative maneuvers are negative. Bilateral upper and lower extremity strength is normal and symmetric.  No atrophy or tone abnormalities are noted.  Neuro: Bilateral upper and lower extremity coordination and muscle stretch reflexes are physiologic and symmetric.  Plantar response are downgoing. No loss of sensation is noted.  Gait: Normal.    ASSESSMENT: 58 y.o. year old male with low back pain, consistent with      1. Osteoarthritis of spine, unspecified spinal osteoarthritis complication status, unspecified spinal region     2. Spondylosis without myelopathy     3. DDD (degenerative disc disease), lumbar           PLAN:     - I have stressed the importance of physical activity and a home exercise plan to help with pain and improve health.  - Patient can continue with medications for now since they are providing benefits, using them appropriately, and without side effects.   -refill gabapentin 300 mg tid  - RTC as needed for f/u  - Counseled patient regarding the importance of activity modification, constant sleeping habits and physical therapy.    The above plan and management options were discussed at length with patient. Patient is in agreement with the above and verbalized understanding.    Roman Lyman MD  12/13/2018

## 2018-12-18 PROBLEM — G89.29 CHRONIC PAIN: Status: RESOLVED | Noted: 2018-05-01 | Resolved: 2018-12-18

## 2018-12-18 PROBLEM — M51.36 DDD (DEGENERATIVE DISC DISEASE), LUMBAR: Status: ACTIVE | Noted: 2018-12-18

## 2018-12-18 PROBLEM — G89.29 CHRONIC RIGHT-SIDED LOW BACK PAIN: Status: RESOLVED | Noted: 2018-08-13 | Resolved: 2018-12-18

## 2018-12-18 PROBLEM — M51.369 DDD (DEGENERATIVE DISC DISEASE), LUMBAR: Status: ACTIVE | Noted: 2018-12-18

## 2018-12-18 PROBLEM — M53.86 DECREASED ROM OF LUMBAR SPINE: Status: RESOLVED | Noted: 2018-11-28 | Resolved: 2018-12-18

## 2018-12-18 PROBLEM — M47.9 OSTEOARTHRITIS OF SPINE: Status: ACTIVE | Noted: 2018-12-18

## 2018-12-18 PROBLEM — M54.50 CHRONIC RIGHT-SIDED LOW BACK PAIN: Status: RESOLVED | Noted: 2018-08-13 | Resolved: 2018-12-18

## 2019-01-07 ENCOUNTER — OFFICE VISIT (OUTPATIENT)
Dept: INTERNAL MEDICINE | Facility: CLINIC | Age: 59
End: 2019-01-07
Payer: COMMERCIAL

## 2019-01-07 VITALS
BODY MASS INDEX: 39.76 KG/M2 | TEMPERATURE: 98 F | WEIGHT: 262.38 LBS | HEART RATE: 70 BPM | DIASTOLIC BLOOD PRESSURE: 70 MMHG | SYSTOLIC BLOOD PRESSURE: 122 MMHG | HEIGHT: 68 IN | RESPIRATION RATE: 18 BRPM

## 2019-01-07 DIAGNOSIS — K21.9 GASTROESOPHAGEAL REFLUX DISEASE WITHOUT ESOPHAGITIS: ICD-10-CM

## 2019-01-07 DIAGNOSIS — R19.7 DIARRHEA, UNSPECIFIED TYPE: Primary | ICD-10-CM

## 2019-01-07 PROCEDURE — 3078F PR MOST RECENT DIASTOLIC BLOOD PRESSURE < 80 MM HG: ICD-10-PCS | Mod: CPTII,S$GLB,, | Performed by: INTERNAL MEDICINE

## 2019-01-07 PROCEDURE — 99999 PR PBB SHADOW E&M-EST. PATIENT-LVL IV: CPT | Mod: PBBFAC,,, | Performed by: INTERNAL MEDICINE

## 2019-01-07 PROCEDURE — 99213 PR OFFICE/OUTPT VISIT, EST, LEVL III, 20-29 MIN: ICD-10-PCS | Mod: S$GLB,,, | Performed by: INTERNAL MEDICINE

## 2019-01-07 PROCEDURE — 3074F PR MOST RECENT SYSTOLIC BLOOD PRESSURE < 130 MM HG: ICD-10-PCS | Mod: CPTII,S$GLB,, | Performed by: INTERNAL MEDICINE

## 2019-01-07 PROCEDURE — 3008F PR BODY MASS INDEX (BMI) DOCUMENTED: ICD-10-PCS | Mod: CPTII,S$GLB,, | Performed by: INTERNAL MEDICINE

## 2019-01-07 PROCEDURE — 99213 OFFICE O/P EST LOW 20 MIN: CPT | Mod: S$GLB,,, | Performed by: INTERNAL MEDICINE

## 2019-01-07 PROCEDURE — 3078F DIAST BP <80 MM HG: CPT | Mod: CPTII,S$GLB,, | Performed by: INTERNAL MEDICINE

## 2019-01-07 PROCEDURE — 3074F SYST BP LT 130 MM HG: CPT | Mod: CPTII,S$GLB,, | Performed by: INTERNAL MEDICINE

## 2019-01-07 PROCEDURE — 3008F BODY MASS INDEX DOCD: CPT | Mod: CPTII,S$GLB,, | Performed by: INTERNAL MEDICINE

## 2019-01-07 PROCEDURE — 99999 PR PBB SHADOW E&M-EST. PATIENT-LVL IV: ICD-10-PCS | Mod: PBBFAC,,, | Performed by: INTERNAL MEDICINE

## 2019-01-07 RX ORDER — DIPHENOXYLATE HYDROCHLORIDE AND ATROPINE SULFATE 2.5; .025 MG/1; MG/1
1 TABLET ORAL 4 TIMES DAILY PRN
Qty: 20 TABLET | Refills: 0 | Status: SHIPPED | OUTPATIENT
Start: 2019-01-07 | End: 2019-01-17

## 2019-01-07 RX ORDER — OMEPRAZOLE 20 MG/1
20 CAPSULE, DELAYED RELEASE ORAL DAILY
Qty: 90 CAPSULE | Refills: 3 | Status: SHIPPED | OUTPATIENT
Start: 2019-01-07 | End: 2019-12-26 | Stop reason: SDUPTHER

## 2019-01-07 NOTE — PROGRESS NOTES
Subjective:       Patient ID: Shiraz Jha is a 58 y.o. male.    Chief Complaint: Diarrhea and Abdominal Pain    Patient known to me presents for acute diarrhea problem which started 3 days ago.  He had eaten some food prepared by someone else before this started.  No fever/chills.  No N/V.  No sign of blood in the stools.  Describes loose watery BMs up to 4-5 times a day.  He has taken max doses of Imodium-D with not much help.  He is able to get fluids in okay.  Crampy abdominal pain proceeds the diarrhea.    Also, needs refill of omeprazole which he uses for GERD.  Has been out for several weeks.      Review of Systems   Constitutional: Negative for chills and fever.   Respiratory: Negative.    Cardiovascular: Negative.    Gastrointestinal: Positive for abdominal pain and diarrhea. Negative for abdominal distention, blood in stool, nausea and vomiting.   Skin: Negative for rash.       Objective:      Physical Exam   Constitutional: He is oriented to person, place, and time. He appears well-developed and well-nourished. No distress.   Cardiovascular: Normal rate, regular rhythm and normal heart sounds.   Pulmonary/Chest: Effort normal and breath sounds normal. No respiratory distress.   Abdominal: Soft. Bowel sounds are normal. He exhibits no distension. There is no tenderness. There is rebound. There is no guarding.   Neurological: He is alert and oriented to person, place, and time.   Vitals reviewed.      Assessment:       1. Diarrhea, unspecified type    2. Gastroesophageal reflux disease without esophagitis        Plan:   1. Use lomotil in place of the imodium.  2. High oral fluid intake so that you are urinating every 6 hours.  3. Call if not improving in 72 hours or sooner for fever or any sign of blood in stool.  4. Omperazole 20 mgm refilled.

## 2019-01-07 NOTE — PATIENT INSTRUCTIONS
1. Use lomotil in place of the Imodium for the diarrhea for now.  2. High oral fluid intake - water, gatorade, juices, etc so that you are urinating every 6 hours at least.  3. If diarrhea persists beyond 72 hours more, then call regular PCP for directions.  4. Call sooner if fever develops or any sign of blood in the stool/diarrhea.  5. Refill omeprazole.

## 2019-01-09 ENCOUNTER — TELEPHONE (OUTPATIENT)
Dept: GASTROENTEROLOGY | Facility: CLINIC | Age: 59
End: 2019-01-09

## 2019-01-09 ENCOUNTER — OFFICE VISIT (OUTPATIENT)
Dept: INTERNAL MEDICINE | Facility: CLINIC | Age: 59
End: 2019-01-09
Payer: COMMERCIAL

## 2019-01-09 VITALS
WEIGHT: 266.75 LBS | BODY MASS INDEX: 40.43 KG/M2 | RESPIRATION RATE: 18 BRPM | HEIGHT: 68 IN | TEMPERATURE: 98 F | SYSTOLIC BLOOD PRESSURE: 114 MMHG | DIASTOLIC BLOOD PRESSURE: 64 MMHG | HEART RATE: 76 BPM

## 2019-01-09 DIAGNOSIS — R19.7 DIARRHEA, UNSPECIFIED TYPE: Primary | ICD-10-CM

## 2019-01-09 PROCEDURE — 3078F DIAST BP <80 MM HG: CPT | Mod: CPTII,S$GLB,, | Performed by: INTERNAL MEDICINE

## 2019-01-09 PROCEDURE — 99213 OFFICE O/P EST LOW 20 MIN: CPT | Mod: S$GLB,,, | Performed by: INTERNAL MEDICINE

## 2019-01-09 PROCEDURE — 99213 PR OFFICE/OUTPT VISIT, EST, LEVL III, 20-29 MIN: ICD-10-PCS | Mod: S$GLB,,, | Performed by: INTERNAL MEDICINE

## 2019-01-09 PROCEDURE — 3008F BODY MASS INDEX DOCD: CPT | Mod: CPTII,S$GLB,, | Performed by: INTERNAL MEDICINE

## 2019-01-09 PROCEDURE — 3008F PR BODY MASS INDEX (BMI) DOCUMENTED: ICD-10-PCS | Mod: CPTII,S$GLB,, | Performed by: INTERNAL MEDICINE

## 2019-01-09 PROCEDURE — 99999 PR PBB SHADOW E&M-EST. PATIENT-LVL III: CPT | Mod: PBBFAC,,, | Performed by: INTERNAL MEDICINE

## 2019-01-09 PROCEDURE — 99999 PR PBB SHADOW E&M-EST. PATIENT-LVL III: ICD-10-PCS | Mod: PBBFAC,,, | Performed by: INTERNAL MEDICINE

## 2019-01-09 PROCEDURE — 3074F PR MOST RECENT SYSTOLIC BLOOD PRESSURE < 130 MM HG: ICD-10-PCS | Mod: CPTII,S$GLB,, | Performed by: INTERNAL MEDICINE

## 2019-01-09 PROCEDURE — 3074F SYST BP LT 130 MM HG: CPT | Mod: CPTII,S$GLB,, | Performed by: INTERNAL MEDICINE

## 2019-01-09 PROCEDURE — 3078F PR MOST RECENT DIASTOLIC BLOOD PRESSURE < 80 MM HG: ICD-10-PCS | Mod: CPTII,S$GLB,, | Performed by: INTERNAL MEDICINE

## 2019-01-09 NOTE — TELEPHONE ENCOUNTER
----- Message from Erica Cummings sent at 1/9/2019  4:02 PM CST -----  Contact: pt [phone]  Pt has been scheduled with ZAIDA Faustin for 2/5/2019 and he needs to be seen sooner for Diarrhea, unspecified type [R19.7]. Would there be anyway for him to be seen sooner by you or anther physician in the office? Please advise.

## 2019-01-09 NOTE — PROGRESS NOTES
Subjective:       Patient ID: Shiraz Jha is a 58 y.o. male.    Chief Complaint: Diarrhea (started on Friday); Dizziness; and Excessive Sweating    HPI   Pt c/o 6 days of persistent loose stools(no blood) for which he is having 3-5 episodes per day a/w abdominal cramping. Pt denies any fevers/chills, recent Abx use, travel or illness.  No relief with Lomotil.   Review of Systems   Constitutional: Negative for activity change, appetite change, chills, diaphoresis, fatigue, fever and unexpected weight change.   HENT: Negative for postnasal drip, rhinorrhea, sinus pressure, sneezing, sore throat, trouble swallowing and voice change.    Respiratory: Negative for cough, shortness of breath and wheezing.    Cardiovascular: Negative for chest pain, palpitations and leg swelling.   Gastrointestinal: Positive for diarrhea. Negative for abdominal distention, abdominal pain (cramping), anal bleeding, blood in stool, constipation, nausea, rectal pain and vomiting.   Genitourinary: Negative for dysuria.   Musculoskeletal: Negative for arthralgias and myalgias.   Skin: Negative for rash and wound.   Allergic/Immunologic: Negative for environmental allergies and food allergies.   Hematological: Negative for adenopathy. Does not bruise/bleed easily.       Objective:      Physical Exam   Constitutional: He is oriented to person, place, and time. He appears well-developed and well-nourished. No distress.   HENT:   Head: Normocephalic and atraumatic.   Eyes: Conjunctivae and EOM are normal. Pupils are equal, round, and reactive to light. Right eye exhibits no discharge. Left eye exhibits no discharge. No scleral icterus.   Neck: Normal range of motion. Neck supple. No JVD present.   Cardiovascular: Normal rate, regular rhythm and normal heart sounds.   No murmur heard.  Pulmonary/Chest: Effort normal and breath sounds normal. No respiratory distress. He has no wheezes. He has no rales.   Abdominal: Soft. Bowel sounds are normal.  There is no tenderness.   Musculoskeletal: He exhibits no edema.   Lymphadenopathy:     He has no cervical adenopathy.   Neurological: He is alert and oriented to person, place, and time.   Skin: Skin is warm and dry. No rash noted. He is not diaphoretic. No pallor.       Assessment:       1. Diarrhea, unspecified type        Plan:    1. Check stool studies to r/o infection        Referral to GI

## 2019-01-10 ENCOUNTER — LAB VISIT (OUTPATIENT)
Dept: LAB | Facility: HOSPITAL | Age: 59
End: 2019-01-10
Attending: INTERNAL MEDICINE
Payer: COMMERCIAL

## 2019-01-10 DIAGNOSIS — R19.7 DIARRHEA, UNSPECIFIED TYPE: ICD-10-CM

## 2019-01-10 LAB
C DIFF GDH STL QL: NEGATIVE
C DIFF TOX A+B STL QL IA: NEGATIVE

## 2019-01-10 PROCEDURE — 87329 GIARDIA AG IA: CPT

## 2019-01-10 PROCEDURE — 87449 NOS EACH ORGANISM AG IA: CPT

## 2019-01-10 PROCEDURE — 87209 SMEAR COMPLEX STAIN: CPT

## 2019-01-10 PROCEDURE — 87046 STOOL CULTR AEROBIC BACT EA: CPT

## 2019-01-10 PROCEDURE — 89055 LEUKOCYTE ASSESSMENT FECAL: CPT

## 2019-01-10 PROCEDURE — 87045 FECES CULTURE AEROBIC BACT: CPT

## 2019-01-10 PROCEDURE — 87427 SHIGA-LIKE TOXIN AG IA: CPT

## 2019-01-11 LAB
CRYPTOSP AG STL QL IA: NEGATIVE
E COLI SXT1 STL QL IA: NEGATIVE
E COLI SXT2 STL QL IA: NEGATIVE
G LAMBLIA AG STL QL IA: NEGATIVE
O+P STL TRI STN: NORMAL
WBC #/AREA STL HPF: NORMAL /[HPF]

## 2019-01-14 LAB — BACTERIA STL CULT: NORMAL

## 2019-01-15 ENCOUNTER — CLINICAL SUPPORT (OUTPATIENT)
Dept: REHABILITATION | Facility: OTHER | Age: 59
End: 2019-01-15
Attending: PHYSICAL MEDICINE & REHABILITATION
Payer: COMMERCIAL

## 2019-01-15 DIAGNOSIS — M51.36 DDD (DEGENERATIVE DISC DISEASE), LUMBAR: ICD-10-CM

## 2019-01-15 PROCEDURE — 97110 THERAPEUTIC EXERCISES: CPT

## 2019-01-15 NOTE — PROGRESS NOTES
[facility]      Name: Shiraz RUBI [facility] Number: [id number]    Date of Treatment: 01/15/2019     Diagnosis: M51.36 (ICD-10-CM) - DDD (degenerative disc disease), lumbar                       M47.819 (ICD-10-CM) - Spondylosis without myelopathy                       M46.1 (ICD-10-CM) - Sacroiliitis  Physician: Ivania Messer, *    Precautions: History of A-Fib     Pattern of pain determined: 1 PEN    Evaluation Date: 2018  Authorization Period Expiration: 07/15/2019  Plan of Care Expiration: 19  Reassessment Due: 02/15/2019 (Completed 01/15/2019)  Visit # / Visits authorized:     Time In: 1555   Time Out: 1645  Total Billable Time: 50 minutes     Subjective   Shiraz reports moderate pain in his mid lower back. He indicates some compliance with HEP from initial evaluation but would like a review. (Initial evaluation back on 2018)    Patient reports their pain to be 5/10 on a 0-10 scale with 0 being no pain and 10 being the worst pain imaginable.  Pain location: Low back      Occupation:  [employer]; [employer]  Leisure: Walking; exercising; cutting grass                     Pts goals:  Return to PLOF activities w/o increased pain or difficulty (above).     Objective     Baseline Isometric Testing on Med X equipment: Testing administered by PT  Date of testin18  ROM 30-0deg   Max Peak Torque 119    Min Peak Torque 30    Flex/Ext Ratio 3.96   % below normative data 60   Counter weight 319   femur 7   Seat pad 0   Starting weight 50#.    CMS Impairment/Limitation/Restriction for FOTO Lumbar Spine Survey                         Status          Limitation            G-Code CMS Severity Modifier  Intake         53%             47%                     Current Status CK - At least 40 percent but less than 60 percent  Predicted   56%             44%                     Goal Status+ CK - At least 40 percent but less than 60 percent    Treatment     Pt was instructed in and performed the following:     Shiraz received therapeutic exercises to develop/improved posture, cardiovascular endurance, muscular endurance, lumbar/cervical ROM, strength and muscular endurance for 40 minutes including the following exercises:     -DKTC with Ball x15  -LTR x20  -Pelvic Tilts x10  -Bridging x15  -Prone Press Ups (partial) x10    [facility] 1/15/2019   Visit Number 2   VAS Pain Rating 5   Treadmill Time (in min.) 10   Speed 2   Lumbar Weight 50   Repetitions 20   Rating of Perceived Exertion 2   Ice - Z Lie (in min.) 10     Peripheral muscle strengthening which included 1 set of 15-20 repetitions at a slow, controlled 7 second per rep pace focused on strengthening supporting musculature for improved body mechanics and functional mobility.  Pt and therapist focused on proper form during treatment to ensure optimal strengthening of each targeted muscle group.  Machines were utilized including torso rotation, leg extension, leg curl, chest press, upright row. Tricep extension, bicep curl, leg press, and hip abduction added visit 3    Home Exercise Program as follows:   DKTC with Ball  SKTC  YUMIKO  PPT  Handouts were given to the patient. Pt demo good understanding of the education provided. Shiraz demonstrated fair return demonstration of activities.     Additional exercises taught this treatment session: Added bridging and press-ups     Assessment     Shiraz returns to PT for first follow-up since initial evaluation on 11/28/2018. Despite increased length of time between this date and eval date, he demonstrated good ability to perform core stability strengthening for HEP and did well with first day of dynamic exercises on the MedX Lumbar extension. He will benefit from progression per protocol as able and further education on strengthening core musculature.     -Patient is making good progress towards established goals. He performed x20 reps of 50 ft/lbs at an RPE of  2/10; Increase 10-15% next session as appropriate   Pt will continue to benefit from skilled outpatient physical therapy to address the deficits stated in the impairment chart, provide pt/family education and to maximize pt's level of independence in the home and community environment.       Pt's spiritual, cultural and educational needs considered and pt agreeable to plan of care and goals as stated below:     Medical necessity is demonstrated by the following problem list.    Pt presents with the following impairments:      History  Co-morbidities and personal factors that may impact the plan of care Co-morbidities:   Age; Obesity; multiple co-morbidities  M47.819 (ICD-10-CM) - Spondylosis without myelopathy  M54.5,G89.29 (ICD-10-CM) - Chronic bilateral low back pain without sciatica     Personal Factors:   age       low   Examination  Body Structures and Functions, activity limitations and participation restrictions that may impact the plan of care Body Regions:   back  lower extremities  trunk     Body Systems:    ROM  strength  balance  gait  transfers  transitions     Participation Restrictions:   NA     Activity limitations:   Learning and applying knowledge  no deficits     General Tasks and Commands  no deficits     Communication  no deficits     Mobility  lifting and carrying objects  walking  driving (bike, car, motorcycle)     Self care  washing oneself (bathing, drying, washing hands)     Domestic Life  shopping  cooking  doing house work (cleaning house, washing dishes, laundry)  assisting others     Interactions/Relationships  no deficits     Life Areas  no deficits     Community and Social Life  recreation and leisure             low   Clinical Presentation stable and uncomplicated low   Decision Making/ Complexity Score: low         GOALS: Pt is in agreement with the following goals.     Short term goals:  6 weeks or 10 visits   1.  Pt will demonstrate increased lumbar ROM by at least 3 degrees from  the initial ROM value with improvements noted in functional ROM and ability to perform ADLs  2.  Pt will demonstrate increased maximum isometric torque value by 10% when compared to the initial value resulting in improved ability to perform bending, lifting, and carrying activities safely, confidently.     3.  Patient report a reduction in worst pain score by 1-2 points for improved tolerance during work and recreational activities  4.  Pt able to perform HEP correctly with minimal cueing or supervision for therapist        Long term goals: 13 weeks or 20 visits   1. Pt will demonstrate increased lumbar ROM by at least 6 degrees from initial ROM value, resulting in improved ability to perform functional fwd bending while standing and sitting.   2. Pt will demonstrate increased maximum isometric torque value by 20% when compared to the initial value resulting in improved ability to perform bending, lifting, and carrying activities safely, confidently.  3. Pt to demonstrate ability to independently control and reduce their pain through posture positioning and mechanical movements throughout a typical day.  4.  Patient will demonstrate improved overall function per FOTO Survey to CK = at least 40% but < 60% impaired, limited or restricted score or less.  5. Patient will improve 5x STS test for functional legs strength to 11 sec or less, in order to display improved strength and activity tolerance.     Plan   Continue with established Plan of Care towards established PT goals.

## 2019-01-17 ENCOUNTER — CLINICAL SUPPORT (OUTPATIENT)
Dept: REHABILITATION | Facility: OTHER | Age: 59
End: 2019-01-17
Attending: PHYSICAL MEDICINE & REHABILITATION
Payer: COMMERCIAL

## 2019-01-17 DIAGNOSIS — M51.36 DDD (DEGENERATIVE DISC DISEASE), LUMBAR: Primary | ICD-10-CM

## 2019-01-17 PROCEDURE — 97110 THERAPEUTIC EXERCISES: CPT

## 2019-01-17 NOTE — PROGRESS NOTES
[facility]      Name: Shiraz RUBI [facility] Number: [id number]    Date of Treatment: 2019     Diagnosis: M51.36 (ICD-10-CM) - DDD (degenerative disc disease), lumbar                       M47.819 (ICD-10-CM) - Spondylosis without myelopathy                       M46.1 (ICD-10-CM) - Sacroiliitis  Physician: Ivania Messer, *    Precautions: History of A-Fib     Pattern of pain determined: 1 PEN    Evaluation Date: 2018  Authorization Period Expiration: 07/15/2019  Plan of Care Expiration: 19  Reassessment Due: 02/15/2019 (Completed 01/15/2019)  Visit # / Visits authorized: 3/12  ( INC 15- 20%)    Time In:400  Time Out: 500  Total Billable Time: 60 minutes     Subjective   Shiraz reports he is doing pretty good, but is hurting on the Left lB    Patient reports their pain to be 5/10 on a 0-10 scale with 0 being no pain and 10 being the worst pain imaginable.  Pain location: Low back      Occupation:  [employer]; [place]  Leisure: Walking; exercising; cutting grass                     Pts goals:  Return to PLOF activities w/o increased pain or difficulty (above).     Objective     Baseline Isometric Testing on Med X equipment: Testing administered by PT  Date of testin18  ROM 30-0deg   Max Peak Torque 119    Min Peak Torque 30    Flex/Ext Ratio 3.96   % below normative data 60   Counter weight 319   femur 7   Seat pad 0   Starting weight 50#.    CMS Impairment/Limitation/Restriction for FOTO Lumbar Spine Survey                         Status          Limitation            G-Code CMS Severity Modifier  Intake         53%             47%                     Current Status CK - At least 40 percent but less than 60 percent  Predicted   56%             44%                     Goal Status+ CK - At least 40 percent but less than 60 percent    Treatment    Pt was instructed in and performed the following:     Shiraz received therapeutic exercises to  develop/improved posture, cardiovascular endurance, muscular endurance, lumbar/cervical ROM, strength and muscular endurance for 60 minutes including the following exercises:   [facility] 1/17/2019   Visit Number 3   VAS Pain Rating 5   Treadmill Time (in min.) 10   Speed 2   Flexion in Lying 10   Lumbar Extension Seat Pad -   Femur Restraint -   Top Dead Center -   Counterweight -   Lumbar Flexion -   Lumbar Extension -   Lumbar Peak Torque -   Min Torque -   Test Percent Below Normative Data -   Lumbar Weight 60   Repetitions 20   Rating of Perceived Exertion 2   Ice - Z Lie (in min.) 10       -DKTC with Ball x15  -LTR x20  -Pelvic Tilts x10  -Bridging x15  -Prone Press Ups (partial) x10    Peripheral muscle strengthening which included 1 set of 15-20 repetitions at a slow, controlled 7 second per rep pace focused on strengthening supporting musculature for improved body mechanics and functional mobility.  Pt and therapist focused on proper form during treatment to ensure optimal strengthening of each targeted muscle group.  Machines were utilized including torso rotation, leg extension, leg curl, chest press, upright row. Tricep extension, bicep curl, leg press, and hip abduction added visit 3    Home Exercise Program as follows:   DKTC with Ball  SKTC  YUMIKO  PPT  Handouts were given to the patient. Pt demo good understanding of the education provided. Shiraz demonstrated fair return demonstration of activities.     Additional exercises taught this treatment session reviewed    Assessment     Pt tolerated treatment well and was able to increase weight on Med X to 60ft/lbs and completion of 20 reps with 2/10 exertion.  Increase 15-20% next visit on med X.  Pt required review of HEP today for proper form.  Making progress towards goals  Pt will continue to benefit from skilled outpatient physical therapy to address the deficits stated in the impairment chart, provide pt/family education and to maximize pt's  level of independence in the home and community environment.   Pt's spiritual, cultural and educational needs considered and pt agreeable to plan of care and goals as stated below:     Medical necessity is demonstrated by the following problem list.    Pt presents with the following impairments:      History  Co-morbidities and personal factors that may impact the plan of care Co-morbidities:   Age; Obesity; multiple co-morbidities  M47.819 (ICD-10-CM) - Spondylosis without myelopathy  M54.5,G89.29 (ICD-10-CM) - Chronic bilateral low back pain without sciatica     Personal Factors:   age       low   Examination  Body Structures and Functions, activity limitations and participation restrictions that may impact the plan of care Body Regions:   back  lower extremities  trunk     Body Systems:    ROM  strength  balance  gait  transfers  transitions     Participation Restrictions:   NA     Activity limitations:   Learning and applying knowledge  no deficits     General Tasks and Commands  no deficits     Communication  no deficits     Mobility  lifting and carrying objects  walking  driving (bike, car, motorcycle)     Self care  washing oneself (bathing, drying, washing hands)     Domestic Life  shopping  cooking  doing house work (cleaning house, washing dishes, laundry)  assisting others     Interactions/Relationships  no deficits     Life Areas  no deficits     Community and Social Life  recreation and leisure             low   Clinical Presentation stable and uncomplicated low   Decision Making/ Complexity Score: low         GOALS: Pt is in agreement with the following goals.     Short term goals:  6 weeks or 10 visits   1.  Pt will demonstrate increased lumbar ROM by at least 3 degrees from the initial ROM value with improvements noted in functional ROM and ability to perform ADLs  2.  Pt will demonstrate increased maximum isometric torque value by 10% when compared to the initial value resulting in improved ability  to perform bending, lifting, and carrying activities safely, confidently.     3.  Patient report a reduction in worst pain score by 1-2 points for improved tolerance during work and recreational activities  4.  Pt able to perform HEP correctly with minimal cueing or supervision for therapist        Long term goals: 13 weeks or 20 visits   1. Pt will demonstrate increased lumbar ROM by at least 6 degrees from initial ROM value, resulting in improved ability to perform functional fwd bending while standing and sitting.   2. Pt will demonstrate increased maximum isometric torque value by 20% when compared to the initial value resulting in improved ability to perform bending, lifting, and carrying activities safely, confidently.  3. Pt to demonstrate ability to independently control and reduce their pain through posture positioning and mechanical movements throughout a typical day.  4.  Patient will demonstrate improved overall function per FOTO Survey to CK = at least 40% but < 60% impaired, limited or restricted score or less.  5. Patient will improve 5x STS test for functional legs strength to 11 sec or less, in order to display improved strength and activity tolerance.     Plan   Continue with established Plan of Care towards established PT goals.

## 2019-01-22 ENCOUNTER — CLINICAL SUPPORT (OUTPATIENT)
Dept: REHABILITATION | Facility: OTHER | Age: 59
End: 2019-01-22
Attending: PHYSICAL MEDICINE & REHABILITATION
Payer: COMMERCIAL

## 2019-01-22 PROCEDURE — 97110 THERAPEUTIC EXERCISES: CPT

## 2019-01-22 NOTE — PROGRESS NOTES
[facility]      Name: Shiraz RUBI [facility] Number: [id number]    Date of Treatment: 2019     Diagnosis: M51.36 (ICD-10-CM) - DDD (degenerative disc disease), lumbar                       M47.819 (ICD-10-CM) - Spondylosis without myelopathy                       M46.1 (ICD-10-CM) - Sacroiliitis  Physician: Ivania Messer, *    Precautions: History of A-Fib     Pattern of pain determined: 1 PEN    Evaluation Date: 2018  Authorization Period Expiration: 07/15/2019  Plan of Care Expiration: 19  Reassessment Due: 02/15/2019 (Completed 01/15/2019)  Visit # / Visits authorized:   ( inc 10-15%)    Time In:400  Time Out: 500  Total Billable Time: 60 minutes     Subjective   Shiraz reports he is doing god, slightly achy    Patient reports their pain to be 3/10 on a 0-10 scale with 0 being no pain and 10 being the worst pain imaginable.  Pain location: Low back      Occupation:  [employer]; [place]  Leisure: Walking; exercising; cutting grass                     Pts goals:  Return to PLOF activities w/o increased pain or difficulty (above).     Objective     Baseline Isometric Testing on Med X equipment: Testing administered by PT  Date of testin18  ROM 30-0deg   Max Peak Torque 119    Min Peak Torque 30    Flex/Ext Ratio 3.96   % below normative data 60   Counter weight 319   femur 7   Seat pad 0   Starting weight 50#.    CMS Impairment/Limitation/Restriction for FOTO Lumbar Spine Survey                         Status          Limitation            G-Code CMS Severity Modifier  Intake         53%             47%                     Current Status CK - At least 40 percent but less than 60 percent  Predicted   56%             44%                     Goal Status+ CK - At least 40 percent but less than 60 percent    Treatment    Pt was instructed in and performed the following:     Shiraz received therapeutic exercises to develop/improved posture,  cardiovascular endurance, muscular endurance, lumbar/cervical ROM, strength and muscular endurance for 60 minutes including the following exercises:   [facility] 1/22/2019   Visit Number 4   VAS Pain Rating 3   Treadmill Time (in min.) 10   Speed 2   Extension in Lying 10   Flexion in Lying 10   Lumbar Extension Seat Pad -   Femur Restraint -   Top Dead Center -   Counterweight -   Lumbar Flexion -   Lumbar Extension -   Lumbar Peak Torque -   Min Torque -   Test Percent Below Normative Data -   Lumbar Weight 66   Repetitions 20   Rating of Perceived Exertion 2   Ice - Z Lie (in min.) 10       -DKTC with Ball x15  -LTR x20  -Pelvic Tilts x10  -Bridging x15  -Prone Press Ups (partial) x10    Peripheral muscle strengthening which included 1 set of 15-20 repetitions at a slow, controlled 7 second per rep pace focused on strengthening supporting musculature for improved body mechanics and functional mobility.  Pt and therapist focused on proper form during treatment to ensure optimal strengthening of each targeted muscle group.  Machines were utilized including torso rotation, leg extension, leg curl, chest press, upright row. Tricep extension, bicep curl, leg press, and hip abduction added visit 3    Home Exercise Program as follows:   DKTC with Ball  SKTC  YUMIKO  PPT  Handouts were given to the patient. Pt demo good understanding of the education provided. Shiraz demonstrated fair return demonstration of activities.     Additional exercises taught this treatment session reviewed    Assessment      Making progress towards goals.  Increased Med X weights by 10% with pt completing 20 reps with 2/10 exertion.  Increase 10% again next visit to increase exertion level, increase by 10-15% next visit  Pt will continue to benefit from skilled outpatient physical therapy to address the deficits stated in the impairment chart, provide pt/family education and to maximize pt's level of independence in the home and community  environment.   Pt's spiritual, cultural and educational needs considered and pt agreeable to plan of care and goals as stated below:     Medical necessity is demonstrated by the following problem list.    Pt presents with the following impairments:      History  Co-morbidities and personal factors that may impact the plan of care Co-morbidities:   Age; Obesity; multiple co-morbidities  M47.819 (ICD-10-CM) - Spondylosis without myelopathy  M54.5,G89.29 (ICD-10-CM) - Chronic bilateral low back pain without sciatica     Personal Factors:   age       low   Examination  Body Structures and Functions, activity limitations and participation restrictions that may impact the plan of care Body Regions:   back  lower extremities  trunk     Body Systems:    ROM  strength  balance  gait  transfers  transitions     Participation Restrictions:   NA     Activity limitations:   Learning and applying knowledge  no deficits     General Tasks and Commands  no deficits     Communication  no deficits     Mobility  lifting and carrying objects  walking  driving (bike, car, motorcycle)     Self care  washing oneself (bathing, drying, washing hands)     Domestic Life  shopping  cooking  doing house work (cleaning house, washing dishes, laundry)  assisting others     Interactions/Relationships  no deficits     Life Areas  no deficits     Community and Social Life  recreation and leisure             low   Clinical Presentation stable and uncomplicated low   Decision Making/ Complexity Score: low         GOALS: Pt is in agreement with the following goals.     Short term goals:  6 weeks or 10 visits   1.  Pt will demonstrate increased lumbar ROM by at least 3 degrees from the initial ROM value with improvements noted in functional ROM and ability to perform ADLs  2.  Pt will demonstrate increased maximum isometric torque value by 10% when compared to the initial value resulting in improved ability to perform bending, lifting, and carrying  activities safely, confidently.     3.  Patient report a reduction in worst pain score by 1-2 points for improved tolerance during work and recreational activities  4.  Pt able to perform HEP correctly with minimal cueing or supervision for therapist        Long term goals: 13 weeks or 20 visits   1. Pt will demonstrate increased lumbar ROM by at least 6 degrees from initial ROM value, resulting in improved ability to perform functional fwd bending while standing and sitting.   2. Pt will demonstrate increased maximum isometric torque value by 20% when compared to the initial value resulting in improved ability to perform bending, lifting, and carrying activities safely, confidently.  3. Pt to demonstrate ability to independently control and reduce their pain through posture positioning and mechanical movements throughout a typical day.  4.  Patient will demonstrate improved overall function per FOTO Survey to CK = at least 40% but < 60% impaired, limited or restricted score or less.  5. Patient will improve 5x STS test for functional legs strength to 11 sec or less, in order to display improved strength and activity tolerance.     Plan   Continue with established Plan of Care towards established PT goals. -

## 2019-01-24 ENCOUNTER — CLINICAL SUPPORT (OUTPATIENT)
Dept: REHABILITATION | Facility: OTHER | Age: 59
End: 2019-01-24
Attending: PHYSICAL MEDICINE & REHABILITATION
Payer: COMMERCIAL

## 2019-01-24 DIAGNOSIS — M51.36 DDD (DEGENERATIVE DISC DISEASE), LUMBAR: Primary | ICD-10-CM

## 2019-01-24 PROCEDURE — 97110 THERAPEUTIC EXERCISES: CPT | Performed by: PHYSICAL THERAPIST

## 2019-01-24 NOTE — PROGRESS NOTES
[facility]      Name: Shiraz RUBI [facility] Number: [id number]    Date of Treatment: 2019     Diagnosis: M51.36 (ICD-10-CM) - DDD (degenerative disc disease), lumbar                       M47.819 (ICD-10-CM) - Spondylosis without myelopathy                       M46.1 (ICD-10-CM) - Sacroiliitis  Physician: Ivania Messer, *    Precautions: History of A-Fib     Pattern of pain determined: 1 PEN    Evaluation Date: 2018  Authorization Period Expiration: 07/15/2019  Plan of Care Expiration: 19  Reassessment Due: 02/15/2019 (Completed 01/15/2019)  Visit # / Visits authorized:       Time In:400  Time Out: 500  Total Billable Time: 60 minutes     Subjective   Shiraz reports he is doing good, slightly achy.  Pt notes he was more sore after his last visit possibly due to the increased weight.  He would like to stay at 5% increase in weight today.  He also likes trying to do things for himself.     Patient reports their pain to be 3/10 on a 0-10 scale with 0 being no pain and 10 being the worst pain imaginable.  Pain location: Low back      Occupation:  Merchandise management; [employer]  Leisure: Walking; exercising; cutting grass                     Pts goals:  Return to PLOF activities w/o increased pain or difficulty (above).     Objective     Baseline Isometric Testing on Med X equipment: Testing administered by PT  Date of testin18  ROM 30-0deg   Max Peak Torque 119    Min Peak Torque 30    Flex/Ext Ratio 3.96   % below normative data 60   Counter weight 319   femur 7   Seat pad 0   Starting weight 50#.    CMS Impairment/Limitation/Restriction for FOTO Lumbar Spine Survey                         Status          Limitation            G-Code CMS Severity Modifier  Intake         53%             47%                     Current Status CK - At least 40 percent but less than 60 percent  Predicted   56%             44%                     Goal Status+ CK - At  least 40 percent but less than 60 percent  Visit 5                              47%    Treatment    Pt was instructed in and performed the following:       -DKTC with Ball x15  -LTR x20  -Pelvic Tilts x10  -Bridging x15  -Prone Press Ups (partial) x10    Shiraz received therapeutic exercises to develop/improved posture, cardiovascular endurance, muscular endurance, lumbar/cervical ROM, strength and muscular endurance for 60 minutes including the following exercises:     [facility] 1/24/2019   Visit Number 5   VAS Pain Rating 3   Treadmill Time (in min.) 10   Speed 2   Extension in Lying 10   Flexion in Lying 10   Lumbar Weight 69   Repetitions 20   Rating of Perceived Exertion 2   Ice - Z Lie (in min.) 10           Peripheral muscle strengthening which included 1 set of 15-20 repetitions at a slow, controlled 7 second per rep pace focused on strengthening supporting musculature for improved body mechanics and functional mobility.  Pt and therapist focused on proper form during treatment to ensure optimal strengthening of each targeted muscle group.  Machines were utilized including torso rotation, leg extension, leg curl, chest press, upright row. Tricep extension, bicep curl, leg press, and hip abduction added visit 3    Home Exercise Program as follows:   DKTC with Ball  SKTC  YUMIKO  PPT  Handouts were given to the patient. Pt demo good understanding of the education provided. Shiraz demonstrated fair return demonstration of activities.     Additional exercises taught this treatment session reviewed    Assessment      Making progress towards goals.  Increased Med X weights by 5% with pt completing 20 reps with 2/10 exertion.  Continue with 5% increase unless no DOMS next visit.   Pt will continue to benefit from skilled outpatient physical therapy to address the deficits stated in the impairment chart, provide pt/family education and to maximize pt's level of independence in the home and community  environment.   Pt's spiritual, cultural and educational needs considered and pt agreeable to plan of care and goals as stated below:     Medical necessity is demonstrated by the following problem list.    Pt presents with the following impairments:      History  Co-morbidities and personal factors that may impact the plan of care Co-morbidities:   Age; Obesity; multiple co-morbidities  M47.819 (ICD-10-CM) - Spondylosis without myelopathy  M54.5,G89.29 (ICD-10-CM) - Chronic bilateral low back pain without sciatica     Personal Factors:   age       low   Examination  Body Structures and Functions, activity limitations and participation restrictions that may impact the plan of care Body Regions:   back  lower extremities  trunk     Body Systems:    ROM  strength  balance  gait  transfers  transitions     Participation Restrictions:   NA     Activity limitations:   Learning and applying knowledge  no deficits     General Tasks and Commands  no deficits     Communication  no deficits     Mobility  lifting and carrying objects  walking  driving (bike, car, motorcycle)     Self care  washing oneself (bathing, drying, washing hands)     Domestic Life  shopping  cooking  doing house work (cleaning house, washing dishes, laundry)  assisting others     Interactions/Relationships  no deficits     Life Areas  no deficits     Community and Social Life  recreation and leisure             low   Clinical Presentation stable and uncomplicated low   Decision Making/ Complexity Score: low         GOALS: Pt is in agreement with the following goals.     Short term goals:  6 weeks or 10 visits   1.  Pt will demonstrate increased lumbar ROM by at least 3 degrees from the initial ROM value with improvements noted in functional ROM and ability to perform ADLs  2.  Pt will demonstrate increased maximum isometric torque value by 10% when compared to the initial value resulting in improved ability to perform bending, lifting, and carrying  activities safely, confidently.     3.  Patient report a reduction in worst pain score by 1-2 points for improved tolerance during work and recreational activities  4.  Pt able to perform HEP correctly with minimal cueing or supervision for therapist        Long term goals: 13 weeks or 20 visits   1. Pt will demonstrate increased lumbar ROM by at least 6 degrees from initial ROM value, resulting in improved ability to perform functional fwd bending while standing and sitting.   2. Pt will demonstrate increased maximum isometric torque value by 20% when compared to the initial value resulting in improved ability to perform bending, lifting, and carrying activities safely, confidently.  3. Pt to demonstrate ability to independently control and reduce their pain through posture positioning and mechanical movements throughout a typical day.  4.  Patient will demonstrate improved overall function per FOTO Survey to CK = at least 40% but < 60% impaired, limited or restricted score or less.  5. Patient will improve 5x STS test for functional legs strength to 11 sec or less, in order to display improved strength and activity tolerance.     Plan   Continue with established Plan of Care towards established PT goals. -

## 2019-01-29 ENCOUNTER — CLINICAL SUPPORT (OUTPATIENT)
Dept: REHABILITATION | Facility: OTHER | Age: 59
End: 2019-01-29
Attending: PHYSICAL MEDICINE & REHABILITATION
Payer: COMMERCIAL

## 2019-01-29 DIAGNOSIS — M51.36 DDD (DEGENERATIVE DISC DISEASE), LUMBAR: Primary | ICD-10-CM

## 2019-01-29 PROCEDURE — 97110 THERAPEUTIC EXERCISES: CPT

## 2019-01-29 NOTE — PROGRESS NOTES
[facility]      Name: Shiraz RUBI [facility] Number: [id number]    Date of Treatment: 2019     Diagnosis: M51.36 (ICD-10-CM) - DDD (degenerative disc disease), lumbar                       M47.819 (ICD-10-CM) - Spondylosis without myelopathy                       M46.1 (ICD-10-CM) - Sacroiliitis  Physician: Ivania Messer, *    Precautions: History of A-Fib     Pattern of pain determined: 1 PEN    Evaluation Date: 2018  Authorization Period Expiration: 07/15/2019  Plan of Care Expiration: 19  Reassessment Due: 02/15/2019 (Completed 01/15/2019)  Visit # / Visits authorized:    (increase 10% if DOMS low)    Time In:400   Time Out: 500  Total Billable Time: 60 minutes     Subjective   Shiraz reports he is doing good.  Pt states he did not have DOMS after last visit     Patient reports their pain to be 0/10 on a 0-10 scale with 0 being no pain and 10 being the worst pain imaginable.  Pain location: Low back      Occupation:  Merchandise management; [employer]  Leisure: Walking; exercising; cutting grass                     Pts goals:  Return to PLOF activities w/o increased pain or difficulty (above).     Objective     Baseline Isometric Testing on Med X equipment: Testing administered by PT  Date of testin18  ROM 30-0deg   Max Peak Torque 119    Min Peak Torque 30    Flex/Ext Ratio 3.96   % below normative data 60   Counter weight 319   femur 7   Seat pad 0   Starting weight 50#.    CMS Impairment/Limitation/Restriction for FOTO Lumbar Spine Survey                         Status          Limitation            G-Code CMS Severity Modifier  Intake         53%             47%                     Current Status CK - At least 40 percent but less than 60 percent  Predicted   56%             44%                     Goal Status+ CK - At least 40 percent but less than 60 percent  Visit 5                              47%    Treatment    Pt was instructed in and  performed the following:       -DKTC with Ball x15  -LTR x20  -Pelvic Tilts x15  -Bridging x15  -Prone Press Ups (partial) x10    Shiraz received therapeutic exercises to develop/improved posture, cardiovascular endurance, muscular endurance, lumbar/cervical ROM, strength and muscular endurance for 60 minutes including the following exercises:     [facility] 1/29/2019   Visit Number 6   VAS Pain Rating 0   Treadmill Time (in min.) 10   Speed 2   Extension in Lying 10   Flexion in Lying 10   Lumbar Extension Seat Pad -   Femur Restraint -   Top Dead Center -   Counterweight -   Lumbar Flexion -   Lumbar Extension -   Lumbar Peak Torque -   Min Torque -   Test Percent Below Normative Data -   Lumbar Weight 72   Repetitions 20   Rating of Perceived Exertion 2   Ice - Z Lie (in min.) 10         Peripheral muscle strengthening which included 1 set of 15-20 repetitions at a slow, controlled 7 second per rep pace focused on strengthening supporting musculature for improved body mechanics and functional mobility.  Pt and therapist focused on proper form during treatment to ensure optimal strengthening of each targeted muscle group.  Machines were utilized including torso rotation, leg extension, leg curl, chest press, upright row. Tricep extension, bicep curl, leg press, and hip abduction added visit 3    Home Exercise Program as follows:   DKTC with Ball  SKTC  YUMIKO  PPT  Handouts were given to the patient. Pt demo good understanding of the education provided. Shiraz demonstrated fair return demonstration of activities.     Additional exercises taught this treatment session reviewed    Assessment   Pt tolerated treatment well and reports no increased pain with exercises. Pt went over 70ft/lbs on MedX and therefore had a warm up prior to exercise today.  Only inc 5% secondary to patients first time performing a warm up.  Inc 10% next visit if little DOMS from today.  Pt will continue to benefit from skilled outpatient  physical therapy to address the deficits stated in the impairment chart, provide pt/family education and to maximize pt's level of independence in the home and community environment.   Pt's spiritual, cultural and educational needs considered and pt agreeable to plan of care and goals as stated below:     Medical necessity is demonstrated by the following problem list.    Pt presents with the following impairments:      History  Co-morbidities and personal factors that may impact the plan of care Co-morbidities:   Age; Obesity; multiple co-morbidities  M47.819 (ICD-10-CM) - Spondylosis without myelopathy  M54.5,G89.29 (ICD-10-CM) - Chronic bilateral low back pain without sciatica     Personal Factors:   age       low   Examination  Body Structures and Functions, activity limitations and participation restrictions that may impact the plan of care Body Regions:   back  lower extremities  trunk     Body Systems:    ROM  strength  balance  gait  transfers  transitions     Participation Restrictions:   NA     Activity limitations:   Learning and applying knowledge  no deficits     General Tasks and Commands  no deficits     Communication  no deficits     Mobility  lifting and carrying objects  walking  driving (bike, car, motorcycle)     Self care  washing oneself (bathing, drying, washing hands)     Domestic Life  shopping  cooking  doing house work (cleaning house, washing dishes, laundry)  assisting others     Interactions/Relationships  no deficits     Life Areas  no deficits     Community and Social Life  recreation and leisure             low   Clinical Presentation stable and uncomplicated low   Decision Making/ Complexity Score: low         GOALS: Pt is in agreement with the following goals.     Short term goals:  6 weeks or 10 visits   1.  Pt will demonstrate increased lumbar ROM by at least 3 degrees from the initial ROM value with improvements noted in functional ROM and ability to perform ADLs  2.  Pt will  demonstrate increased maximum isometric torque value by 10% when compared to the initial value resulting in improved ability to perform bending, lifting, and carrying activities safely, confidently.     3.  Patient report a reduction in worst pain score by 1-2 points for improved tolerance during work and recreational activities  4.  Pt able to perform HEP correctly with minimal cueing or supervision for therapist        Long term goals: 13 weeks or 20 visits   1. Pt will demonstrate increased lumbar ROM by at least 6 degrees from initial ROM value, resulting in improved ability to perform functional fwd bending while standing and sitting.   2. Pt will demonstrate increased maximum isometric torque value by 20% when compared to the initial value resulting in improved ability to perform bending, lifting, and carrying activities safely, confidently.  3. Pt to demonstrate ability to independently control and reduce their pain through posture positioning and mechanical movements throughout a typical day.  4.  Patient will demonstrate improved overall function per FOTO Survey to CK = at least 40% but < 60% impaired, limited or restricted score or less.  5. Patient will improve 5x STS test for functional legs strength to 11 sec or less, in order to display improved strength and activity tolerance.     Plan   Continue with established Plan of Care towards established PT goals. -

## 2019-01-31 ENCOUNTER — CLINICAL SUPPORT (OUTPATIENT)
Dept: REHABILITATION | Facility: OTHER | Age: 59
End: 2019-01-31
Attending: PHYSICAL MEDICINE & REHABILITATION
Payer: COMMERCIAL

## 2019-01-31 DIAGNOSIS — M51.36 DDD (DEGENERATIVE DISC DISEASE), LUMBAR: Primary | ICD-10-CM

## 2019-01-31 PROCEDURE — 97110 THERAPEUTIC EXERCISES: CPT

## 2019-01-31 NOTE — PROGRESS NOTES
[facility]      Name: Shiraz RUBI [facility] Number: [id number]    Date of Treatment: 2019     Diagnosis: M51.36 (ICD-10-CM) - DDD (degenerative disc disease), lumbar                       M47.819 (ICD-10-CM) - Spondylosis without myelopathy                       M46.1 (ICD-10-CM) - Sacroiliitis  Physician: Ivania Messer, *    Precautions: History of A-Fib     Pattern of pain determined: 1 PEN    Evaluation Date: 2018  Authorization Period Expiration: 07/15/2019  Plan of Care Expiration: 19  Reassessment Due: 02/15/2019 (Completed 01/15/2019)  Visit # / Visits authorized:    (increase 10% if DOMS low)    Time In:400   Time Out: 500  Total Billable Time: 60 minutes     Subjective   Shiraz reports he is doing good.  Pt states he did not have DOMS after last visit     Patient reports their pain to be 1.5/10 on a 0-10 scale with 0 being no pain and 10 being the worst pain imaginable.  Pain location: Low back      Occupation:  Merchandise management; [employer]  Leisure: Walking; exercising; cutting grass                     Pts goals:  Return to PLOF activities w/o increased pain or difficulty (above).     Objective     Baseline Isometric Testing on Med X equipment: Testing administered by PT  Date of testin18  ROM 30-0deg   Max Peak Torque 119    Min Peak Torque 30    Flex/Ext Ratio 3.96   % below normative data 60   Counter weight 319   femur 7   Seat pad 0   Starting weight 50#.    CMS Impairment/Limitation/Restriction for FOTO Lumbar Spine Survey                         Status          Limitation            G-Code CMS Severity Modifier  Intake         53%             47%                     Current Status CK - At least 40 percent but less than 60 percent  Predicted   56%             44%                     Goal Status+ CK - At least 40 percent but less than 60 percent  Visit 5                              47%    Treatment    Pt was instructed in and  performed the following:       -DKTC with Ball x15  -LTR x20  -Pelvic Tilts x15  -Bridging x15  -Prone Press Ups (partial) x10  PPT c/ double LE tuck x 10  Shiraz received therapeutic exercises to develop/improved posture, cardiovascular endurance, muscular endurance, lumbar/cervical ROM, strength and muscular endurance for 60 minutes including the following exercises:     [facility] 1/31/2019   Visit Number 7   VAS Pain Rating 0   Treadmill Time (in min.) 10   Speed 2   Extension in Lying 10   Flexion in Lying 10   Lumbar Extension Seat Pad -   Femur Restraint -   Top Dead Center -   Counterweight -   Lumbar Flexion 42   Lumbar Extension 0   Lumbar Peak Torque -   Min Torque -   Test Percent Below Normative Data -   Lumbar Weight 75   Repetitions 20   Rating of Perceived Exertion 2   Ice - Z Lie (in min.) 10           Peripheral muscle strengthening which included 1 set of 15-20 repetitions at a slow, controlled 7 second per rep pace focused on strengthening supporting musculature for improved body mechanics and functional mobility.  Pt and therapist focused on proper form during treatment to ensure optimal strengthening of each targeted muscle group.  Machines were utilized including torso rotation, leg extension, leg curl, chest press, upright row. Tricep extension, bicep curl, leg press, and hip abduction added visit 3    Home Exercise Program as follows:   DKTC with Ball  SKTC  YUMIKO  PPT  Handouts were given to the patient. Pt demo good understanding of the education provided. Shiraz demonstrated fair return demonstration of activities.     Additional exercises taught this treatment session reviewed    Assessment   Pt tolerated treatment well and reports no increased pain with exercises. Pt attained 20 reps at 75ft/lbs, increase 5-10% next visit dependent on DOMS  Pt will continue to benefit from skilled outpatient physical therapy to address the deficits stated in the impairment chart, provide pt/family  education and to maximize pt's level of independence in the home and community environment.   Pt's spiritual, cultural and educational needs considered and pt agreeable to plan of care and goals as stated below:     Medical necessity is demonstrated by the following problem list.    Pt presents with the following impairments:      History  Co-morbidities and personal factors that may impact the plan of care Co-morbidities:   Age; Obesity; multiple co-morbidities  M47.819 (ICD-10-CM) - Spondylosis without myelopathy  M54.5,G89.29 (ICD-10-CM) - Chronic bilateral low back pain without sciatica     Personal Factors:   age       low   Examination  Body Structures and Functions, activity limitations and participation restrictions that may impact the plan of care Body Regions:   back  lower extremities  trunk     Body Systems:    ROM  strength  balance  gait  transfers  transitions     Participation Restrictions:   NA     Activity limitations:   Learning and applying knowledge  no deficits     General Tasks and Commands  no deficits     Communication  no deficits     Mobility  lifting and carrying objects  walking  driving (bike, car, motorcycle)     Self care  washing oneself (bathing, drying, washing hands)     Domestic Life  shopping  cooking  doing house work (cleaning house, washing dishes, laundry)  assisting others     Interactions/Relationships  no deficits     Life Areas  no deficits     Community and Social Life  recreation and leisure             low   Clinical Presentation stable and uncomplicated low   Decision Making/ Complexity Score: low         GOALS: Pt is in agreement with the following goals.     Short term goals:  6 weeks or 10 visits   1.  Pt will demonstrate increased lumbar ROM by at least 3 degrees from the initial ROM value with improvements noted in functional ROM and ability to perform ADLs  2.  Pt will demonstrate increased maximum isometric torque value by 10% when compared to the initial  value resulting in improved ability to perform bending, lifting, and carrying activities safely, confidently.     3.  Patient report a reduction in worst pain score by 1-2 points for improved tolerance during work and recreational activities  4.  Pt able to perform HEP correctly with minimal cueing or supervision for therapist        Long term goals: 13 weeks or 20 visits   1. Pt will demonstrate increased lumbar ROM by at least 6 degrees from initial ROM value, resulting in improved ability to perform functional fwd bending while standing and sitting.   2. Pt will demonstrate increased maximum isometric torque value by 20% when compared to the initial value resulting in improved ability to perform bending, lifting, and carrying activities safely, confidently.  3. Pt to demonstrate ability to independently control and reduce their pain through posture positioning and mechanical movements throughout a typical day.  4.  Patient will demonstrate improved overall function per FOTO Survey to CK = at least 40% but < 60% impaired, limited or restricted score or less.  5. Patient will improve 5x STS test for functional legs strength to 11 sec or less, in order to display improved strength and activity tolerance.     Plan   Continue with established Plan of Care towards established PT goals. -

## 2019-02-05 ENCOUNTER — LAB VISIT (OUTPATIENT)
Dept: LAB | Facility: HOSPITAL | Age: 59
End: 2019-02-05
Attending: NURSE PRACTITIONER
Payer: COMMERCIAL

## 2019-02-05 ENCOUNTER — TELEPHONE (OUTPATIENT)
Dept: GASTROENTEROLOGY | Facility: CLINIC | Age: 59
End: 2019-02-05

## 2019-02-05 ENCOUNTER — OFFICE VISIT (OUTPATIENT)
Dept: GASTROENTEROLOGY | Facility: CLINIC | Age: 59
End: 2019-02-05
Payer: COMMERCIAL

## 2019-02-05 VITALS — BODY MASS INDEX: 41.63 KG/M2 | WEIGHT: 274.69 LBS | HEIGHT: 68 IN

## 2019-02-05 DIAGNOSIS — K21.9 GASTROESOPHAGEAL REFLUX DISEASE, ESOPHAGITIS PRESENCE NOT SPECIFIED: ICD-10-CM

## 2019-02-05 DIAGNOSIS — R19.7 DIARRHEA, UNSPECIFIED TYPE: ICD-10-CM

## 2019-02-05 DIAGNOSIS — Z12.11 COLON CANCER SCREENING: ICD-10-CM

## 2019-02-05 DIAGNOSIS — R19.7 DIARRHEA, UNSPECIFIED TYPE: Primary | ICD-10-CM

## 2019-02-05 DIAGNOSIS — R13.10 DYSPHAGIA, UNSPECIFIED TYPE: ICD-10-CM

## 2019-02-05 LAB
ALBUMIN SERPL BCP-MCNC: 3.9 G/DL
ALP SERPL-CCNC: 86 U/L
ALT SERPL W/O P-5'-P-CCNC: 14 U/L
ANION GAP SERPL CALC-SCNC: 9 MMOL/L
AST SERPL-CCNC: 19 U/L
BASOPHILS # BLD AUTO: 0.03 K/UL
BASOPHILS NFR BLD: 0.6 %
BILIRUB SERPL-MCNC: 0.6 MG/DL
BUN SERPL-MCNC: 9 MG/DL
CALCIUM SERPL-MCNC: 9.4 MG/DL
CHLORIDE SERPL-SCNC: 104 MMOL/L
CO2 SERPL-SCNC: 28 MMOL/L
CREAT SERPL-MCNC: 1 MG/DL
DIFFERENTIAL METHOD: ABNORMAL
EOSINOPHIL # BLD AUTO: 0.1 K/UL
EOSINOPHIL NFR BLD: 2.6 %
ERYTHROCYTE [DISTWIDTH] IN BLOOD BY AUTOMATED COUNT: 14.6 %
EST. GFR  (AFRICAN AMERICAN): >60 ML/MIN/1.73 M^2
EST. GFR  (NON AFRICAN AMERICAN): >60 ML/MIN/1.73 M^2
GLUCOSE SERPL-MCNC: 106 MG/DL
HCT VFR BLD AUTO: 41.1 %
HGB BLD-MCNC: 12.6 G/DL
LYMPHOCYTES # BLD AUTO: 1.9 K/UL
LYMPHOCYTES NFR BLD: 36 %
MCH RBC QN AUTO: 27.5 PG
MCHC RBC AUTO-ENTMCNC: 30.7 G/DL
MCV RBC AUTO: 90 FL
MONOCYTES # BLD AUTO: 0.4 K/UL
MONOCYTES NFR BLD: 7.6 %
NEUTROPHILS # BLD AUTO: 2.9 K/UL
NEUTROPHILS NFR BLD: 53.2 %
PLATELET # BLD AUTO: 264 K/UL
PMV BLD AUTO: 10.2 FL
POTASSIUM SERPL-SCNC: 4.1 MMOL/L
PROT SERPL-MCNC: 6.8 G/DL
RBC # BLD AUTO: 4.59 M/UL
SODIUM SERPL-SCNC: 141 MMOL/L
WBC # BLD AUTO: 5.39 K/UL

## 2019-02-05 PROCEDURE — 3008F PR BODY MASS INDEX (BMI) DOCUMENTED: ICD-10-PCS | Mod: CPTII,S$GLB,, | Performed by: NURSE PRACTITIONER

## 2019-02-05 PROCEDURE — 85025 COMPLETE CBC W/AUTO DIFF WBC: CPT

## 2019-02-05 PROCEDURE — 3008F BODY MASS INDEX DOCD: CPT | Mod: CPTII,S$GLB,, | Performed by: NURSE PRACTITIONER

## 2019-02-05 PROCEDURE — 36415 COLL VENOUS BLD VENIPUNCTURE: CPT

## 2019-02-05 PROCEDURE — 99999 PR PBB SHADOW E&M-EST. PATIENT-LVL III: CPT | Mod: PBBFAC,,, | Performed by: NURSE PRACTITIONER

## 2019-02-05 PROCEDURE — 80053 COMPREHEN METABOLIC PANEL: CPT

## 2019-02-05 PROCEDURE — 99999 PR PBB SHADOW E&M-EST. PATIENT-LVL III: ICD-10-PCS | Mod: PBBFAC,,, | Performed by: NURSE PRACTITIONER

## 2019-02-05 PROCEDURE — 99203 PR OFFICE/OUTPT VISIT, NEW, LEVL III, 30-44 MIN: ICD-10-PCS | Mod: S$GLB,,, | Performed by: NURSE PRACTITIONER

## 2019-02-05 PROCEDURE — 99203 OFFICE O/P NEW LOW 30 MIN: CPT | Mod: S$GLB,,, | Performed by: NURSE PRACTITIONER

## 2019-02-05 RX ORDER — DICYCLOMINE HYDROCHLORIDE 20 MG/1
20 TABLET ORAL EVERY 6 HOURS
Qty: 120 TABLET | Refills: 6 | Status: SHIPPED | OUTPATIENT
Start: 2019-02-05 | End: 2019-03-07

## 2019-02-05 RX ORDER — DIPHENHYDRAMINE HCL 25 MG
25 TABLET ORAL NIGHTLY PRN
COMMUNITY
End: 2019-03-29

## 2019-02-05 RX ORDER — SODIUM, POTASSIUM,MAG SULFATES 17.5-3.13G
1 SOLUTION, RECONSTITUTED, ORAL ORAL DAILY
Qty: 1 KIT | Refills: 0 | Status: SHIPPED | OUTPATIENT
Start: 2019-02-05 | End: 2019-02-07

## 2019-02-05 NOTE — LETTER
February 5, 2019      Caden Castillo, DO  [address]  [place] [address]           [facility]  [address] [place] [address]  Phone: [phone]          Patient: Shiraz Jha   MR Number: [id number]   YOB: 1960   Date of Visit: 2/5/2019       Dear Dr. Caden Castillo:    Thank you for referring Shiraz Jha to me for evaluation. Attached you will find relevant portions of my assessment and plan of care.    If you have questions, please do not hesitate to call me. I look forward to following Shiraz Jha along with you.    Sincerely,    Yi Faustin, [facility]    Enclosure  CC:  No Recipients    If you would like to receive this communication electronically, please contact [email or web address] or [phone] to request more information on [email or web address] Link access.    For providers and/or their staff who would like to refer a patient to [facility], please contact us through our one-stop-shop provider referral line, Josselyn Zhu, at [phone].    If you feel you have received this communication in error or would no longer like to receive these types of communications, please e-mail [email or web address]

## 2019-02-05 NOTE — TELEPHONE ENCOUNTER
----- Message from ZAIDA Toribio sent at 2/5/2019  4:08 PM CST -----  Let him know that labs are ok.  Slightly low hemoglobin otherwise normal

## 2019-02-05 NOTE — TELEPHONE ENCOUNTER
Message forwarded to Dr Webb for clearance to hold Xarelto for 2 days prior to EGD and Colonoscopy. [id number] = 2 with no history of CVA/TIA

## 2019-02-05 NOTE — TELEPHONE ENCOUNTER
Dr Webb cleared patient to hold Xarelto for 2 days prior to EGD and Colonoscopy. Message forwarded to Yi CERDA.

## 2019-02-05 NOTE — PATIENT INSTRUCTIONS
SUPREP Instructions    You are scheduled for a colonoscopy with Dr. Duncan on 03/04/2019 at [facility] [address] [place] [address]  To ensure that your test is accurate and complete, you MUST follow these instructions listed below.  If you have any questions, please call our office at [phone].  Plan on being at the hospital for your procedure for 3-4 hours.    1.  Follow a CLEAR LIQUID DIET for the entire day before your scheduled colonoscopy.  This means no solid food the entire day starting when you wake.  You may have as much of the clear liquids as you want throughout the day.   CLEAR LIQUID DIET:   - Avoid Red, Orange, Purple, and/or Blue food coloring   - NO DAIRY   - You can have:  Coffee with sugar (no creamer), tea, water, soda, apple or white grape juice, chicken or beef broth/bouillon (no meat, noodles, or veggies), green/yellow popsicles, green/yellow Jell-O, lemonade.    2.  AT 5 pm the evening before your colonoscopy, POUR ONE (1) BOTTLE OF SUPREP INTO THE MIXING CONTAINER, PROVIDED INSIDE THE BOX.  ADD WATER TO THE LINE ON THE CONTAINER AND MIX IT WELL.  DRINK THE ENTIRE CONTAINER AND THEN DRINK TWO (2) MORE CONTAINERS OF WATER OVER THE NEXT 1 HOUR.  This is sometimes easier to drink if this solution is cold, so you can mix the solution a few hours ahead of time and place in the refrigerator prior to drinking.  You have to drink the solution within 24 hours of mixing it.  Do NOT put this solution over ice.  It IS ok to drink with a straw.    3.  The endoscopy department will call you 2 days before your colonoscopy to tell you the exact time to arrive, AND to tell you the exact time to drink the 2nd portion of your prep (which will be FIVE HOURS BEFORE YOUR ARRIVAL TIME).  At this time given to you, POUR ONE (1) BOTTLE OF SUPREP INTO THE MIXING CONTAINER, PROVIDED INSIDE THE BOX.  ADD WATER TO THE LINE ON THE CONTAINER AND MIX IT WELL.  DRINK THE ENTIRE CONTAINER AND THEN DRINK TWO (2)  MORE CONTAINERS OF WATER OVER THE NEXT 1 HOUR.  This is sometimes easier to drink if this solution is cold, so you can mix the solution a few hours ahead of time and place in the refrigerator prior to drinking.  You have to drink the solution within 24 hours of mixing it.  Do NOT put this solution over ice.  It IS ok to drink with a straw. Once this is complete, you may not have ANYTHING else by mouth!    4.  You must have someone with you to DRIVE YOU HOME since you will be receiving IV sedation for the colonoscopy.    5.  It is ok to take your heart, blood pressure, and seizure medications in the morning of your test with a SIP of water.  Hold other medications until after your procedure.  Do NOT have anything else to eat or drink the morning of your colonoscopy.  It is ok to brush your teeth.    6.  If you are on blood thinners THAT YOU HAVE BEEN INSTRUCTED TO HOLD BY YOUR DOCTOR FOR THIS PROCEDURE, then do NOT take this the morning of your colonoscopy.  Do NOT stop these medications on your own, they must be approved to be held by your doctor.  Your colonoscopy can NOT be done if you are on these medications.  Examples of blood thinners include: Coumadin, Aggrenox, Plavix, Pradaxa, Reapro, Pletal, Xarelto, Ticagrelor, Brilinta, Eliquis, and high dose aspirin (325 mg).  You do not have to stop baby aspirin 81 mg.    7.  IF YOU ARE DIABETIC:  NO INSULIN OR ORAL MEDICATIONS THE MORNING OF THE COLONOSCOPY.  TAKE ONLY HALF THE DOSE OF YOUR INSULIN THE DAY BEFORE THE COLONOSCOPY.  DO NOT TAKE ANY ORAL DIABETIC MEDICATIONS THE DAY BEFORE THE COLONOSCOPY.  IF YOU ARE AN INSULIN DEPENDENT DIABETIC WITH UNSTABLE BLOOD SUGARDS, NOTIFY YOUR PRIMARY CARE PHYSICIAN FOR INSTRUCTIONS.

## 2019-02-05 NOTE — PROGRESS NOTES
Subjective:       Patient ID: Shiraz Jha is a 59 y.o. male.    Chief Complaint: Diarrhea    HPI  Reports diarrhea off and on for one month.  He describes 1-3 stools per day that initially will be soft but with some form and then turn to loose stool.  He will use lomotil after a second loose stool with improvement.    He will have fecal incontinence with watery stool at night while sleeping.   At times will have abdominal cramping after eating and then loose stools.   Notes beans worsen diarrhea and bread seem to make it better.   He does not always get the feeling of complete emptying even with multiple loose stools.  He notes at times with the liquid will also have small pea sized pieces of stool.  Denies blood with bowel movement or black stools.  Notes foul odor of stool.  Denies recent antibiotic use.  Recent stool studies negative. He has never had colonoscopy.    He reports EGD 8-10 years ago.  He was on PPI shortly after but did not continue with this.  He has been on omeprazole for 3 months after ENT workup was suggestive of reflux.  He does not have the sensation of acid reflux.  No nausea, vomiting.  He has dysphagia at times with very dry foods.  He has had gastric bypass.    Review of Systems   Constitutional: Negative.  Negative for activity change, appetite change, fatigue, fever and unexpected weight change.   HENT: Positive for trouble swallowing.    Respiratory: Negative for shortness of breath.    Cardiovascular: Negative for chest pain.   Gastrointestinal: Positive for abdominal pain and diarrhea. Negative for blood in stool, constipation, nausea and vomiting.   Skin: Negative.    Neurological: Negative.    Psychiatric/Behavioral: Negative.        Objective:      Physical Exam   Constitutional: He is oriented to person, place, and time. He appears well-developed and well-nourished. No distress.   Eyes: No scleral icterus.   Cardiovascular: Normal rate.   Pulmonary/Chest: Effort normal. No  respiratory distress.   Abdominal: Soft. There is no tenderness.   Neurological: He is alert and oriented to person, place, and time.   Skin: Skin is warm and dry. He is not diaphoretic.   Psychiatric: He has a normal mood and affect. His behavior is normal. Judgment and thought content normal.   Vitals reviewed.      Assessment:       1. Diarrhea, unspecified type    2. Gastroesophageal reflux disease, esophagitis presence not specified    3. Dysphagia, unspecified type    4. Colon cancer screening        Plan:         Shiraz was seen today for diarrhea.    Diagnoses and all orders for this visit:    Diarrhea, unspecified type  -     CBC auto differential; Future  -     Comprehensive metabolic panel; Future  -     Clostridium difficile EIA; Future  -     Pancreatic elastase, fecal; Future  -     Case request GI: ESOPHAGOGASTRODUODENOSCOPY (EGD), COLONOSCOPY  -     sodium,potassium,mag sulfates (SUPREP BOWEL PREP KIT) 17.5-3.13-1.6 gram SolR; Take 177 mLs by mouth once daily. for 2 days    Gastroesophageal reflux disease, esophagitis presence not specified    Dysphagia, unspecified type    Colon cancer screening    Other orders  -     dicyclomine (BENTYL) 20 mg tablet; Take 1 tablet (20 mg total) by mouth every 6 (six) hours.    I have explained the planned procedures to the patient.The risks, benefits and alternatives of the procedure were also explained in detail. Patient verbalized understanding, all questions were answered. The patient agrees to proceed as planned    Could consider changing PPI.      Could consider trial of xifaxan or addition of probiotic.

## 2019-02-05 NOTE — TELEPHONE ENCOUNTER
----- Message from Tereza Smith sent at 2/5/2019 12:54 PM CST -----  Contact: Yariel [phone]  Pharmacy would like to speak about still not receiving the PREP kit. Please advise.

## 2019-02-07 RX ORDER — FUROSEMIDE 40 MG/1
40 TABLET ORAL DAILY
Qty: 90 TABLET | Refills: 0 | Status: SHIPPED | OUTPATIENT
Start: 2019-02-07 | End: 2019-04-26 | Stop reason: SDUPTHER

## 2019-02-11 ENCOUNTER — OFFICE VISIT (OUTPATIENT)
Dept: OPTOMETRY | Facility: CLINIC | Age: 59
End: 2019-02-11
Payer: COMMERCIAL

## 2019-02-11 DIAGNOSIS — H52.223 REGULAR ASTIGMATISM OF BOTH EYES: ICD-10-CM

## 2019-02-11 DIAGNOSIS — H52.4 PRESBYOPIA: ICD-10-CM

## 2019-02-11 DIAGNOSIS — H25.13 NS (NUCLEAR SCLEROSIS), BILATERAL: ICD-10-CM

## 2019-02-11 DIAGNOSIS — H35.413 LATTICE DEGENERATION OF BOTH RETINAS: ICD-10-CM

## 2019-02-11 DIAGNOSIS — E11.9 TYPE 2 DIABETES MELLITUS WITHOUT COMPLICATION, WITHOUT LONG-TERM CURRENT USE OF INSULIN: Primary | ICD-10-CM

## 2019-02-11 PROCEDURE — 92015 PR REFRACTION: ICD-10-PCS | Mod: S$GLB,,, | Performed by: OPTOMETRIST

## 2019-02-11 PROCEDURE — 99999 PR PBB SHADOW E&M-EST. PATIENT-LVL II: ICD-10-PCS | Mod: PBBFAC,,, | Performed by: OPTOMETRIST

## 2019-02-11 PROCEDURE — 92004 COMPRE OPH EXAM NEW PT 1/>: CPT | Mod: S$GLB,,, | Performed by: OPTOMETRIST

## 2019-02-11 PROCEDURE — 92015 DETERMINE REFRACTIVE STATE: CPT | Mod: S$GLB,,, | Performed by: OPTOMETRIST

## 2019-02-11 PROCEDURE — 99999 PR PBB SHADOW E&M-EST. PATIENT-LVL II: CPT | Mod: PBBFAC,,, | Performed by: OPTOMETRIST

## 2019-02-11 PROCEDURE — 92004 PR EYE EXAM, NEW PATIENT,COMPREHESV: ICD-10-PCS | Mod: S$GLB,,, | Performed by: OPTOMETRIST

## 2019-02-13 ENCOUNTER — CLINICAL SUPPORT (OUTPATIENT)
Dept: REHABILITATION | Facility: OTHER | Age: 59
End: 2019-02-13
Attending: PHYSICAL MEDICINE & REHABILITATION
Payer: COMMERCIAL

## 2019-02-13 DIAGNOSIS — M51.36 DDD (DEGENERATIVE DISC DISEASE), LUMBAR: Primary | ICD-10-CM

## 2019-02-13 PROCEDURE — 97110 THERAPEUTIC EXERCISES: CPT | Performed by: PHYSICAL THERAPIST

## 2019-02-13 NOTE — PROGRESS NOTES
HPI     DLS 3/3/15 Dr Pacheco  Lattice degeneration right  Decreased vision distance and near  Uses OTC reading glasses  No itching burning or tearing  Occasional floaters, no flashes  No diplopia  Diabetes had gastric by pass sx 2010, no longer takes meds for diabetes    Last edited by Jess Seals on 2/11/2019  1:32 PM. (History)            Assessment /Plan     For exam results, see Encounter Report.    Type 2 diabetes mellitus without complication, without long-term current use of insulin   No retinopathy, monitor yearly    Lattice degeneration of both retinas   Stable, no holes, tears or detachments   Discussed Rd precautions, monitor yearly    NS (nuclear sclerosis), bilateral   Mild, monitor    Regular astigmatism of both eyes  Presbyopia   Rx specs    RTC 1 year, sooner PRN

## 2019-02-13 NOTE — PROGRESS NOTES
[facility]      Name: Shiraz RUBI [facility] Number: [id number]    Date of Treatment: 2019     Diagnosis: M51.36 (ICD-10-CM) - DDD (degenerative disc disease), lumbar                       M47.819 (ICD-10-CM) - Spondylosis without myelopathy                       M46.1 (ICD-10-CM) - Sacroiliitis  Physician: Ivania Messer, *    Precautions: History of A-Fib     Pattern of pain determined: 1 PEN    Evaluation Date: 2018  Authorization Period Expiration: 07/15/2019  Plan of Care Expiration: 19  Reassessment Due: 02/15/2019 (Completed 01/15/2019)  Visit # / Visits authorized:    (increase 10% if DOMS low)    Time In:400   Time Out: 500  Total Billable Time: 60 minutes     Subjective   Shiraz reports he is doing good.  Pt states he did not have DOMS after last visit and would like to continue at 10% increases due to lower RPE.  A little sore after mowing grass this week.     Patient reports their pain to be 1/10 on a 0-10 scale with 0 being no pain and 10 being the worst pain imaginable.  Pain location: Low back      Occupation:  [employer] management; [employer]  Leisure: Walking; exercising; cutting grass                     Pts goals:  Return to PLOF activities w/o increased pain or difficulty (above).     Objective     Baseline Isometric Testing on Med X equipment: Testing administered by PT  Date of testin18  ROM 30-0deg   Max Peak Torque 119    Min Peak Torque 30    Flex/Ext Ratio 3.96   % below normative data 60   Counter weight 319   femur 7   Seat pad 0   Starting weight 50#.    CMS Impairment/Limitation/Restriction for FOTO Lumbar Spine Survey                         Status          Limitation            G-Code CMS Severity Modifier  Intake         53%             47%                     Current Status CK - At least 40 percent but less than 60 percent  Predicted   56%             44%                     Goal Status+ CK - At least 40 percent but  less than 60 percent  Visit 5                              47%    Treatment    Pt was instructed in and performed the following:       -DKTC with Ball x15  -LTR x20  -Pelvic Tilts x15  -Bridging x15  -Prone Press Ups (partial) x10  PPT c/ double LE tuck x 10  Shiraz received therapeutic exercises to develop/improved posture, cardiovascular endurance, muscular endurance, lumbar/cervical ROM, strength and muscular endurance for 60 minutes including the following exercises:     [facility] 2/13/2019   Visit Number 8   VAS Pain Rating 0   Treadmill Time (in min.) 10   Speed 2   Extension in Lying 10   Flexion in Lying 10   Lumbar Weight 83   Repetitions 20   Rating of Perceived Exertion 2   Ice - Z Lie (in min.) 10     Peripheral muscle strengthening which included 1 set of 15-20 repetitions at a slow, controlled 7 second per rep pace focused on strengthening supporting musculature for improved body mechanics and functional mobility.  Pt and therapist focused on proper form during treatment to ensure optimal strengthening of each targeted muscle group.  Machines were utilized including torso rotation, leg extension, leg curl, chest press, upright row. Tricep extension, bicep curl, leg press, and hip abduction added visit 3    Home Exercise Program as follows:   DKTC with Ball  SKTC  YUMIKO  PPT  Handouts were given to the patient. Pt demo good understanding of the education provided. Shiraz demonstrated fair return demonstration of activities.     Additional exercises taught this treatment session reviewed    Assessment   Pt tolerated treatment well and reports no increased pain with exercises. Pt attained 20 reps at 83 ft/lbs, increase 5-10% next visit dependent on DOMS.  Pt appears to be pushing himself to do 20 reps every time, but noted low RPE so continue with 10% increase next visit as well.    Pt will continue to benefit from skilled outpatient physical therapy to address the deficits stated in the impairment  chart, provide pt/family education and to maximize pt's level of independence in the home and community environment.   Pt's spiritual, cultural and educational needs considered and pt agreeable to plan of care and goals as stated below:     Medical necessity is demonstrated by the following problem list.    Pt presents with the following impairments:      History  Co-morbidities and personal factors that may impact the plan of care Co-morbidities:   Age; Obesity; multiple co-morbidities  M47.819 (ICD-10-CM) - Spondylosis without myelopathy  M54.5,G89.29 (ICD-10-CM) - Chronic bilateral low back pain without sciatica     Personal Factors:   age       low   Examination  Body Structures and Functions, activity limitations and participation restrictions that may impact the plan of care Body Regions:   back  lower extremities  trunk     Body Systems:    ROM  strength  balance  gait  transfers  transitions     Participation Restrictions:   NA     Activity limitations:   Learning and applying knowledge  no deficits     General Tasks and Commands  no deficits     Communication  no deficits     Mobility  lifting and carrying objects  walking  driving (bike, car, motorcycle)     Self care  washing oneself (bathing, drying, washing hands)     Domestic Life  shopping  cooking  doing house work (cleaning house, washing dishes, laundry)  assisting others     Interactions/Relationships  no deficits     Life Areas  no deficits     Community and Social Life  recreation and leisure             low   Clinical Presentation stable and uncomplicated low   Decision Making/ Complexity Score: low         GOALS: Pt is in agreement with the following goals.     Short term goals:  6 weeks or 10 visits   1.  Pt will demonstrate increased lumbar ROM by at least 3 degrees from the initial ROM value with improvements noted in functional ROM and ability to perform ADLs  2.  Pt will demonstrate increased maximum isometric torque value by 10% when  compared to the initial value resulting in improved ability to perform bending, lifting, and carrying activities safely, confidently.     3.  Patient report a reduction in worst pain score by 1-2 points for improved tolerance during work and recreational activities  4.  Pt able to perform HEP correctly with minimal cueing or supervision for therapist        Long term goals: 13 weeks or 20 visits   1. Pt will demonstrate increased lumbar ROM by at least 6 degrees from initial ROM value, resulting in improved ability to perform functional fwd bending while standing and sitting.   2. Pt will demonstrate increased maximum isometric torque value by 20% when compared to the initial value resulting in improved ability to perform bending, lifting, and carrying activities safely, confidently.  3. Pt to demonstrate ability to independently control and reduce their pain through posture positioning and mechanical movements throughout a typical day.  4.  Patient will demonstrate improved overall function per FOTO Survey to CK = at least 40% but < 60% impaired, limited or restricted score or less.  5. Patient will improve 5x STS test for functional legs strength to 11 sec or less, in order to display improved strength and activity tolerance.     Plan   Continue with established Plan of Care towards established PT goals. Continue with 10% increase if low RPE.

## 2019-02-15 ENCOUNTER — CLINICAL SUPPORT (OUTPATIENT)
Dept: REHABILITATION | Facility: OTHER | Age: 59
End: 2019-02-15
Attending: PHYSICAL MEDICINE & REHABILITATION
Payer: COMMERCIAL

## 2019-02-15 DIAGNOSIS — M51.36 DDD (DEGENERATIVE DISC DISEASE), LUMBAR: Primary | ICD-10-CM

## 2019-02-15 PROCEDURE — 97110 THERAPEUTIC EXERCISES: CPT

## 2019-02-15 NOTE — PROGRESS NOTES
[facility]      Name: Shiraz RUBI [facility] Number: [id number]    Date of Treatment: 02/15/2019     Diagnosis: M51.36 (ICD-10-CM) - DDD (degenerative disc disease), lumbar                       M47.819 (ICD-10-CM) - Spondylosis without myelopathy                       M46.1 (ICD-10-CM) - Sacroiliitis  Physician: Ivania Messer, *    Precautions: History of A-Fib     Pattern of pain determined: 1 PEN    Evaluation Date: 2018  Authorization Period Expiration: 07/15/2019  Plan of Care Expiration: 19  Reassessment Due: 03/15/2019   Visit # / Visits authorized:        Time In: 1500  Time Out: 1550  Total Billable Time: 50 minutes     Subjective   Shiraz reports feeling a little more sore after last session, but states that he would like to try adding more weight today.     Patient reports their pain to be 2/10 on a 0-10 scale with 0 being no pain and 10 being the worst pain imaginable.  Pain location: Low back      Occupation:  [employer] management; [place]  Leisure: Walking; exercising; cutting grass                     Pts goals:  Return to PLOF activities w/o increased pain or difficulty (above).     Objective     Baseline Isometric Testing on Med X equipment: Testing administered by PT  Date of testin18  ROM 30-0deg   Max Peak Torque 119    Min Peak Torque 30    Flex/Ext Ratio 3.96   % below normative data 60   Counter weight 319   femur 7   Seat pad 0   Starting weight 50#.    CMS Impairment/Limitation/Restriction for FOTO Lumbar Spine Survey                         Status          Limitation            G-Code CMS Severity Modifier  Intake         53%             47%                     Current Status CK - At least 40 percent but less than 60 percent  Predicted   56%             44%                     Goal Status+ CK - At least 40 percent but less than 60 percent  Visit 5                              47%    Treatment    Pt was instructed in and  performed the following:   Shiraz received therapeutic exercises to develop/improved posture, cardiovascular endurance, muscular endurance, lumbar/cervical ROM, strength and muscular endurance for 60 minutes including the following exercises:       -DKTC with Ball x15  -LTR x20  -Pelvic Tilts x15 -->TrA  +Marching x20  +1 LE Kicks x20   -Bridging x10 with 5 second holds   -Prone Press Ups (partial) x10    [facility] 2/15/2019   Visit Number 9   VAS Pain Rating 0   Treadmill Time (in min.) 10   Speed 2   Lumbar Weight 88   Repetitions 18   Rating of Perceived Exertion 4   Ice - Z Lie (in min.) 10       Peripheral muscle strengthening which included 1 set of 15-20 repetitions at a slow, controlled 7 second per rep pace focused on strengthening supporting musculature for improved body mechanics and functional mobility.  Pt and therapist focused on proper form during treatment to ensure optimal strengthening of each targeted muscle group.  Machines were utilized including torso rotation, leg extension, leg curl, chest press, upright row. Tricep extension, bicep curl, leg press, and hip abduction added visit 3    Home Exercise Program as follows:   DKTC with Ball  SKTC  YUMIKO  PPT  Handouts were given to the patient. Pt demo good understanding of the education provided. Shiraz demonstrated fair return demonstration of activities.     Additional exercises taught this treatment session reviewed    Assessment     Shiraz tolerated session well with good progression of core strengthening exercises. He tolerated a more moderate increase in weight on the MedX, but did complain of muscular fatigue. PT plan to continue advancing per protocol as appropriate.     -He performed x18 reps of 88 ft/lbs at an RPE of 4/10; Continue with same weight working on increased reps   Pt will continue to benefit from skilled outpatient physical therapy to address the deficits stated in the impairment chart, provide pt/family education and to  maximize pt's level of independence in the home and community environment.   Pt's spiritual, cultural and educational needs considered and pt agreeable to plan of care and goals as stated below:     Medical necessity is demonstrated by the following problem list.    Pt presents with the following impairments:      History  Co-morbidities and personal factors that may impact the plan of care Co-morbidities:   Age; Obesity; multiple co-morbidities  M47.819 (ICD-10-CM) - Spondylosis without myelopathy  M54.5,G89.29 (ICD-10-CM) - Chronic bilateral low back pain without sciatica     Personal Factors:   age       low   Examination  Body Structures and Functions, activity limitations and participation restrictions that may impact the plan of care Body Regions:   back  lower extremities  trunk     Body Systems:    ROM  strength  balance  gait  transfers  transitions     Participation Restrictions:   NA     Activity limitations:   Learning and applying knowledge  no deficits     General Tasks and Commands  no deficits     Communication  no deficits     Mobility  lifting and carrying objects  walking  driving (bike, car, motorcycle)     Self care  washing oneself (bathing, drying, washing hands)     Domestic Life  shopping  cooking  doing house work (cleaning house, washing dishes, laundry)  assisting others     Interactions/Relationships  no deficits     Life Areas  no deficits     Community and Social Life  recreation and leisure             low   Clinical Presentation stable and uncomplicated low   Decision Making/ Complexity Score: low         GOALS: Pt is in agreement with the following goals.     Short term goals:  6 weeks or 10 visits   1.  Pt will demonstrate increased lumbar ROM by at least 3 degrees from the initial ROM value with improvements noted in functional ROM and ability to perform ADLs  2.  Pt will demonstrate increased maximum isometric torque value by 10% when compared to the initial value resulting in  improved ability to perform bending, lifting, and carrying activities safely, confidently.     3.  Patient report a reduction in worst pain score by 1-2 points for improved tolerance during work and recreational activities  4.  Pt able to perform HEP correctly with minimal cueing or supervision for therapist        Long term goals: 13 weeks or 20 visits   1. Pt will demonstrate increased lumbar ROM by at least 6 degrees from initial ROM value, resulting in improved ability to perform functional fwd bending while standing and sitting.   2. Pt will demonstrate increased maximum isometric torque value by 20% when compared to the initial value resulting in improved ability to perform bending, lifting, and carrying activities safely, confidently.  3. Pt to demonstrate ability to independently control and reduce their pain through posture positioning and mechanical movements throughout a typical day.  4.  Patient will demonstrate improved overall function per FOTO Survey to CK = at least 40% but < 60% impaired, limited or restricted score or less.  5. Patient will improve 5x STS test for functional legs strength to 11 sec or less, in order to display improved strength and activity tolerance.     Plan   Continue with established Plan of Care towards established PT goals. Continue with 10% increase if low RPE.

## 2019-02-28 ENCOUNTER — TELEPHONE (OUTPATIENT)
Dept: ENDOSCOPY | Facility: HOSPITAL | Age: 59
End: 2019-02-28

## 2019-02-28 NOTE — TELEPHONE ENCOUNTER
Spoke with patient about arrival time @ 0800. EGD and colon exams.    Prep instructions reviewed: the day before the procedure, follow a clear liquid diet all day, then start the first 1/2 of prep at 5pm and take 2nd 1/2 of prep @ 0100.  Pt must be completely NPO when prep completed @ 0200.              Medications: Do not take Insulin or oral diabetic medications the day of the procedure.  Take as prescribed: heart, seizure and blood pressure medication in the morning with a sip of water (less than an ounce).  Take any breathing medications and bring inhalers to hospital with you Leave all valuables and jewelry at home. Last dose Xarelto Friday, 03/01/19 (48 hr hold).    Wear comfortable clothes to procedure to change into hospital gown You cannot drive for 24 hours after your procedure because you will receive sedation for your procedure to make you comfortable.  A ride must be provided at discharge.

## 2019-03-04 ENCOUNTER — TELEPHONE (OUTPATIENT)
Dept: ENDOSCOPY | Facility: HOSPITAL | Age: 59
End: 2019-03-04

## 2019-03-18 ENCOUNTER — CLINICAL SUPPORT (OUTPATIENT)
Dept: REHABILITATION | Facility: OTHER | Age: 59
End: 2019-03-18
Attending: PHYSICAL MEDICINE & REHABILITATION
Payer: COMMERCIAL

## 2019-03-18 DIAGNOSIS — M51.36 DDD (DEGENERATIVE DISC DISEASE), LUMBAR: Primary | ICD-10-CM

## 2019-03-18 PROCEDURE — 97110 THERAPEUTIC EXERCISES: CPT

## 2019-03-18 NOTE — PLAN OF CARE
[facility]      Name: Shiraz RUBI [facility] Number: [id number]    Date of Treatment: 02/15/2019     Diagnosis: M51.36 (ICD-10-CM) - DDD (degenerative disc disease), lumbar                       M47.819 (ICD-10-CM) - Spondylosis without myelopathy                       M46.1 (ICD-10-CM) - Sacroiliitis  Physician: Ivania Messer, *    Precautions: History of A-Fib     Pattern of pain determined: 1 PEN    Evaluation Date: 2018  Authorization Period Expiration: 07/15/2019  Plan of Care Expiration: 19  Reassessment Due: 03/15/2019   Visit # / Visits authorized:        Time In: 1500  Time Out: 1550  Total Billable Time: 50 minutes     Subjective   Shiraz reports feeling a little more sore after last session, but states that he would like to try adding more weight today.     Patient reports their pain to be 2/10 on a 0-10 scale with 0 being no pain and 10 being the worst pain imaginable.  Pain location: Low back      Occupation:  [employer]; [employer]  Leisure: Walking; exercising; cutting grass                     Pts goals:  Return to PLOF activities w/o increased pain or difficulty (above).     Objective     Baseline Isometric Testing on Med X equipment: Testing administered by PT  Date of testin18  ROM 30-0deg   Max Peak Torque 119    Min Peak Torque 30    Flex/Ext Ratio 3.96   % below normative data 60   Counter weight 319   femur 7   Seat pad 0   Starting weight 50#.    CMS Impairment/Limitation/Restriction for FOTO Lumbar Spine Survey                         Status          Limitation            G-Code CMS Severity Modifier  Intake         53%             47%                     Current Status CK - At least 40 percent but less than 60 percent  Predicted   56%             44%                     Goal Status+ CK - At least 40 percent but less than 60 percent  Visit 5                              47%    Treatment    Pt was instructed in and  performed the following:   Shiraz received therapeutic exercises to develop/improved posture, cardiovascular endurance, muscular endurance, lumbar/cervical ROM, strength and muscular endurance for 60 minutes including the following exercises:       -DKTC with Ball x15  -LTR x20  -Pelvic Tilts x15 -->TrA  +Marching x20  +1 LE Kicks x20   -Bridging x10 with 5 second holds   -Prone Press Ups (partial) x10    [facility] 2/15/2019   Visit Number 9   VAS Pain Rating 0   Treadmill Time (in min.) 10   Speed 2   Lumbar Weight 88   Repetitions 18   Rating of Perceived Exertion 4   Ice - Z Lie (in min.) 10       Peripheral muscle strengthening which included 1 set of 15-20 repetitions at a slow, controlled 7 second per rep pace focused on strengthening supporting musculature for improved body mechanics and functional mobility.  Pt and therapist focused on proper form during treatment to ensure optimal strengthening of each targeted muscle group.  Machines were utilized including torso rotation, leg extension, leg curl, chest press, upright row. Tricep extension, bicep curl, leg press, and hip abduction added visit 3    Home Exercise Program as follows:   DKTC with Ball  SKTC  YUMIKO  PPT  Handouts were given to the patient. Pt demo good understanding of the education provided. Shiraz demonstrated fair return demonstration of activities.     Additional exercises taught this treatment session reviewed    Assessment     Shiraz tolerated session well with good progression of core strengthening exercises. He tolerated a more moderate increase in weight on the MedX, but did complain of muscular fatigue. PT plan to continue advancing per protocol as appropriate.     -He performed x18 reps of 88 ft/lbs at an RPE of 4/10; Continue with same weight working on increased reps   Pt will continue to benefit from skilled outpatient physical therapy to address the deficits stated in the impairment chart, provide pt/family education and to  maximize pt's level of independence in the home and community environment.   Pt's spiritual, cultural and educational needs considered and pt agreeable to plan of care and goals as stated below:     Medical necessity is demonstrated by the following problem list.    Pt presents with the following impairments:      History  Co-morbidities and personal factors that may impact the plan of care Co-morbidities:   Age; Obesity; multiple co-morbidities  M47.819 (ICD-10-CM) - Spondylosis without myelopathy  M54.5,G89.29 (ICD-10-CM) - Chronic bilateral low back pain without sciatica     Personal Factors:   age       low   Examination  Body Structures and Functions, activity limitations and participation restrictions that may impact the plan of care Body Regions:   back  lower extremities  trunk     Body Systems:    ROM  strength  balance  gait  transfers  transitions     Participation Restrictions:   NA     Activity limitations:   Learning and applying knowledge  no deficits     General Tasks and Commands  no deficits     Communication  no deficits     Mobility  lifting and carrying objects  walking  driving (bike, car, motorcycle)     Self care  washing oneself (bathing, drying, washing hands)     Domestic Life  shopping  cooking  doing house work (cleaning house, washing dishes, laundry)  assisting others     Interactions/Relationships  no deficits     Life Areas  no deficits     Community and Social Life  recreation and leisure             low   Clinical Presentation stable and uncomplicated low   Decision Making/ Complexity Score: low         GOALS: Pt is in agreement with the following goals.     Short term goals:  6 weeks or 10 visits   1.  Pt will demonstrate increased lumbar ROM by at least 3 degrees from the initial ROM value with improvements noted in functional ROM and ability to perform ADLs  2.  Pt will demonstrate increased maximum isometric torque value by 10% when compared to the initial value resulting in  improved ability to perform bending, lifting, and carrying activities safely, confidently.     3.  Patient report a reduction in worst pain score by 1-2 points for improved tolerance during work and recreational activities  4.  Pt able to perform HEP correctly with minimal cueing or supervision for therapist        Long term goals: 13 weeks or 20 visits   1. Pt will demonstrate increased lumbar ROM by at least 6 degrees from initial ROM value, resulting in improved ability to perform functional fwd bending while standing and sitting.   2. Pt will demonstrate increased maximum isometric torque value by 20% when compared to the initial value resulting in improved ability to perform bending, lifting, and carrying activities safely, confidently.  3. Pt to demonstrate ability to independently control and reduce their pain through posture positioning and mechanical movements throughout a typical day.  4.  Patient will demonstrate improved overall function per FOTO Survey to CK = at least 40% but < 60% impaired, limited or restricted score or less.  5. Patient will improve 5x STS test for functional legs strength to 11 sec or less, in order to display improved strength and activity tolerance.     Plan   Continue with established Plan of Care towards established PT goals. Continue with 10% increase if low RPE.

## 2019-03-18 NOTE — PROGRESS NOTES
[facility]      Name: Shiraz RUBI [facility] Number: [id number]    Date of Treatment: 2019     Diagnosis: M51.36 (ICD-10-CM) - DDD (degenerative disc disease), lumbar                       M47.819 (ICD-10-CM) - Spondylosis without myelopathy                       M46.1 (ICD-10-CM) - Sacroiliitis  Physician: Ivania Messer, *    Precautions: History of A-Fib     Pattern of pain determined: 1 PEN    Evaluation Date: 2018  Authorization Period Expiration: 07/15/2019  Plan of Care Expiration: 19  Reassessment Due: 03/15/2019   Visit # / Visits authorized: 10/12   (Test on 11th visit)    Time In: 1500  Time Out: 1550  Total Billable Time: 50 minutes     Face to Face discussion of patient was done between PT and PTA.     Subjective   Shiraz reports feeling LB discomfort, but no increased pin.     Patient reports their pain to be 2/10 on a 0-10 scale with 0 being no pain and 10 being the worst pain imaginable.  Pain location: Low back      Occupation:  [employer] management; [employer]  Leisure: Walking; exercising; cutting grass                     Pts goals:  Return to PLOF activities w/o increased pain or difficulty (above).     Objective     Baseline Isometric Testing on Med X equipment: Testing administered by PT  Date of testin18  ROM 30-0deg   Max Peak Torque 119    Min Peak Torque 30    Flex/Ext Ratio 3.96   % below normative data 60   Counter weight 319   femur 7   Seat pad 0   Starting weight 50#.    CMS Impairment/Limitation/Restriction for FOTO Lumbar Spine Survey                         Status          Limitation            G-Code CMS Severity Modifier  Intake         53%             47%                     Current Status CK - At least 40 percent but less than 60 percent  Predicted   56%             44%                     Goal Status+ CK - At least 40 percent but less than 60 percent  Visit 5                              47%    Treatment    Pt was  instructed in and performed the following:   Shiraz received therapeutic exercises to develop/improved posture, cardiovascular endurance, muscular endurance, lumbar/cervical ROM, strength and muscular endurance for 60 minutes including the following exercises:     [facility] 3/18/2019   Visit Number 10   VAS Pain Rating 2   Treadmill Time (in min.) 10   Speed 2   Extension in Lying 10   Flexion in Lying 10   Lumbar Extension Seat Pad -   Femur Restraint -   Top Dead Center -   Counterweight -   Lumbar Flexion -   Lumbar Extension -   Lumbar Peak Torque -   Min Torque -   Test Percent Below Normative Data -   Lumbar Weight 88   Repetitions 20   Rating of Perceived Exertion 3   Ice - Z Lie (in min.) 10       -DKTC with Ball x15  -LTR x20  -Pelvic Tilts   TrA  +Marching x20  +1 LE Kicks x20   -Bridging x10 with 5 second holds   -Prone Press Ups (partial) x10        Peripheral muscle strengthening which included 1 set of 15-20 repetitions at a slow, controlled 7 second per rep pace focused on strengthening supporting musculature for improved body mechanics and functional mobility.  Pt and therapist focused on proper form during treatment to ensure optimal strengthening of each targeted muscle group.  Machines were utilized including torso rotation, leg extension, leg curl, chest press, upright row. Tricep extension, bicep curl, leg press, and hip abduction added visit 3    Home Exercise Program as follows:   DKTC with Ball  SKTC  YUMIKO  PPT  Handouts were given to the patient. Pt demo good understanding of the education provided. Shiraz demonstrated fair return demonstration of activities.     Additional exercises taught this treatment session reviewed    Assessment     Shiraz tolerated session with a decreased in LB discomfort.  He tolerated a more moderate increase in weight on the MedX, but did complain of muscular fatigue. PT plan to continue advancing per protocol as appropriate. Pt tolerated his HEP well with  min vc to slow down.     -He performed x20 reps with the same weight at an RPE of 3/10; Pt will continue to benefit from skilled outpatient physical therapy to address the deficits stated in the impairment chart, provide pt/family education and to maximize pt's level of independence in the home and community environment.   Pt's spiritual, cultural and educational needs considered and pt agreeable to plan of care and goals as stated below:     Medical necessity is demonstrated by the following problem list.    Pt presents with the following impairments:      History  Co-morbidities and personal factors that may impact the plan of care Co-morbidities:   Age; Obesity; multiple co-morbidities  M47.819 (ICD-10-CM) - Spondylosis without myelopathy  M54.5,G89.29 (ICD-10-CM) - Chronic bilateral low back pain without sciatica     Personal Factors:   age       low   Examination  Body Structures and Functions, activity limitations and participation restrictions that may impact the plan of care Body Regions:   back  lower extremities  trunk     Body Systems:    ROM  strength  balance  gait  transfers  transitions     Participation Restrictions:   NA     Activity limitations:   Learning and applying knowledge  no deficits     General Tasks and Commands  no deficits     Communication  no deficits     Mobility  lifting and carrying objects  walking  driving (bike, car, motorcycle)     Self care  washing oneself (bathing, drying, washing hands)     Domestic Life  shopping  cooking  doing house work (cleaning house, washing dishes, laundry)  assisting others     Interactions/Relationships  no deficits     Life Areas  no deficits     Community and Social Life  recreation and leisure             low   Clinical Presentation stable and uncomplicated low   Decision Making/ Complexity Score: low         GOALS: Pt is in agreement with the following goals.     Short term goals:  6 weeks or 10 visits   1.  Pt will demonstrate increased  lumbar ROM by at least 3 degrees from the initial ROM value with improvements noted in functional ROM and ability to perform ADLs  2.  Pt will demonstrate increased maximum isometric torque value by 10% when compared to the initial value resulting in improved ability to perform bending, lifting, and carrying activities safely, confidently.     3.  Patient report a reduction in worst pain score by 1-2 points for improved tolerance during work and recreational activities  4.  Pt able to perform HEP correctly with minimal cueing or supervision for therapist        Long term goals: 13 weeks or 20 visits   1. Pt will demonstrate increased lumbar ROM by at least 6 degrees from initial ROM value, resulting in improved ability to perform functional fwd bending while standing and sitting.   2. Pt will demonstrate increased maximum isometric torque value by 20% when compared to the initial value resulting in improved ability to perform bending, lifting, and carrying activities safely, confidently.  3. Pt to demonstrate ability to independently control and reduce their pain through posture positioning and mechanical movements throughout a typical day.  4.  Patient will demonstrate improved overall function per FOTO Survey to CK = at least 40% but < 60% impaired, limited or restricted score or less.  5. Patient will improve 5x STS test for functional legs strength to 11 sec or less, in order to display improved strength and activity tolerance.     Plan   Continue with established Plan of Care towards established PT goals. Continue with 10% increase if low RPE.

## 2019-03-22 ENCOUNTER — CLINICAL SUPPORT (OUTPATIENT)
Dept: REHABILITATION | Facility: OTHER | Age: 59
End: 2019-03-22
Attending: PHYSICAL MEDICINE & REHABILITATION
Payer: COMMERCIAL

## 2019-03-22 DIAGNOSIS — M51.36 DDD (DEGENERATIVE DISC DISEASE), LUMBAR: Primary | ICD-10-CM

## 2019-03-22 PROCEDURE — 97110 THERAPEUTIC EXERCISES: CPT

## 2019-03-22 NOTE — PROGRESS NOTES
"[facility]      Name: Shiraz RUBI [facility] Number: [id number]    Date of Treatment: 2019     Diagnosis: M51.36 (ICD-10-CM) - DDD (degenerative disc disease), lumbar                       M47.819 (ICD-10-CM) - Spondylosis without myelopathy                       M46.1 (ICD-10-CM) - Sacroiliitis  Physician: Ivania Messer, *    Precautions: History of A-Fib     Pattern of pain determined: 1 PEN    Evaluation Date: 2018  Authorization Period Expiration: 07/15/2019  Plan of Care Expiration: 19 --> Extend to 2019  Reassessment Due: 2019   Visit # / Visits authorized:   **TEST**    Time In: 1500  Time Out: 1550  Total Billable Time: 50 minutes     Face to Face discussion of patient was done between PT and PTA.     Subjective   Shiraz reports some "stiffness" this date but denies any significant pain. He verbalizes that he is pleased with current progress and states interest in continuing to work with PT as long as he can make progress.     Patient reports their pain to be 0/10 on a 0-10 scale with 0 being no pain and 10 being the worst pain imaginable.  Pain location: Low back      Occupation:  [employer] management; [employer]  Leisure: Walking; exercising; cutting grass                     Pts goals:  Return to PLOF activities w/o increased pain or difficulty (above).     Objective     Baseline Isometric Testing on Med X equipment: Testing administered by PT  Date of testin18  ROM 30-0deg   Max Peak Torque 119    Min Peak Torque 30    Flex/Ext Ratio 3.96   % below normative data 60   Counter weight 319   femur 7   Seat pad 0   Starting weight 50#.    Progress Isometric Testing on Med X equipment: Testing administered by PT  Date of testin2019  ROM   42-0 deg   Max Peak Torque   162 ft/lbs   Min Peak Torque   81 ft/lbs   Flex/Ext Ratio   2.0   % normative data   -42%   % initial data           +71%    CMS " Impairment/Limitation/Restriction for FOTO Lumbar Spine Survey    Status   Limitation  G-Code CMS Severity Modifier  Intake   53%   47%  Predicted  56%   44%   Goal Status+ CK - At least 40 percent but less than 60 percent  1/24/2019  53%   47%  3/22/2019  60%   40%   Current Status CK - At least 40 percent but less than 60 percent    Treatment    Pt was instructed in and performed the following:   Shiraz received therapeutic exercises to develop/improved posture, cardiovascular endurance, muscular endurance, lumbar/cervical ROM, strength and muscular endurance for 60 minutes including the following exercises:     -DKTC with Ball x15  -LTR x20  -Pelvic Tilts   TrA  +Marching x20  +1 LE Kicks x20   -Bridging x10 with 5 second holds   -Prone Press Ups (partial) x10    **Re-Testing on MedX as documented above     Peripheral muscle strengthening which included 1 set of 15-20 repetitions at a slow, controlled 7 second per rep pace focused on strengthening supporting musculature for improved body mechanics and functional mobility.  Pt and therapist focused on proper form during treatment to ensure optimal strengthening of each targeted muscle group.  Machines were utilized including torso rotation, leg extension, leg curl, chest press, upright row. Tricep extension, bicep curl, leg press, and hip abduction added visit 3    Home Exercise Program as follows:   DKTC with Ball  SKTC  YUMIKO  PPT  Handouts were given to the patient. Pt demo good understanding of the education provided. Shiraz demonstrated fair return demonstration of activities.     Additional exercises taught this treatment session reviewed    Assessment     Shiraz did well this date with mid-point progress re-testing on the MedX. He should benefit from continuing to progress through plan working to address continued symptoms and improve functional strength.  Per MedX testing:   -Increased peak torque to 162 ft/lbs and minimal torque of 81 ft/lbs  -Increased ROM  from 30-0 initial testing to 42-0 current testing   -Variance Analysis demonstrates 71% increase in strength from first test  -Variance Analysis demonstrate 42% below age-related norms     Pt's spiritual, cultural and educational needs considered and pt agreeable to plan of care and goals as stated below:     Medical necessity is demonstrated by the following problem list.    Pt presents with the following impairments:      History  Co-morbidities and personal factors that may impact the plan of care Co-morbidities:   Age; Obesity; multiple co-morbidities  M47.819 (ICD-10-CM) - Spondylosis without myelopathy  M54.5,G89.29 (ICD-10-CM) - Chronic bilateral low back pain without sciatica     Personal Factors:   age       low   Examination  Body Structures and Functions, activity limitations and participation restrictions that may impact the plan of care Body Regions:   back  lower extremities  trunk     Body Systems:    ROM  strength  balance  gait  transfers  transitions     Participation Restrictions:   NA     Activity limitations:   Learning and applying knowledge  no deficits     General Tasks and Commands  no deficits     Communication  no deficits     Mobility  lifting and carrying objects  walking  driving (bike, car, motorcycle)     Self care  washing oneself (bathing, drying, washing hands)     Domestic Life  shopping  cooking  doing house work (cleaning house, washing dishes, laundry)  assisting others     Interactions/Relationships  no deficits     Life Areas  no deficits     Community and Social Life  recreation and leisure             low   Clinical Presentation stable and uncomplicated low   Decision Making/ Complexity Score: low         GOALS: Pt is in agreement with the following goals.     Short term goals:  6 weeks or 10 visits   1.  Pt will demonstrate increased lumbar ROM by at least 3 degrees from the initial ROM value with improvements noted in functional ROM and ability to perform ADLs  2.  Pt  will demonstrate increased maximum isometric torque value by 10% when compared to the initial value resulting in improved ability to perform bending, lifting, and carrying activities safely, confidently.     3.  Patient report a reduction in worst pain score by 1-2 points for improved tolerance during work and recreational activities  4.  Pt able to perform HEP correctly with minimal cueing or supervision for therapist        Long term goals: 13 weeks or 20 visits   1. Pt will demonstrate increased lumbar ROM by at least 6 degrees from initial ROM value, resulting in improved ability to perform functional fwd bending while standing and sitting.   2. Pt will demonstrate increased maximum isometric torque value by 20% when compared to the initial value resulting in improved ability to perform bending, lifting, and carrying activities safely, confidently.  3. Pt to demonstrate ability to independently control and reduce their pain through posture positioning and mechanical movements throughout a typical day.  4.  Patient will demonstrate improved overall function per FOTO Survey to CK = at least 40% but < 60% impaired, limited or restricted score or less.  5. Patient will improve 5x STS test for functional legs strength to 11 sec or less, in order to display improved strength and activity tolerance.     Plan   Continue with established Plan of Care towards established PT goals. Continue with 10% increase if low RPE.

## 2019-03-25 ENCOUNTER — CLINICAL SUPPORT (OUTPATIENT)
Dept: REHABILITATION | Facility: OTHER | Age: 59
End: 2019-03-25
Attending: PHYSICAL MEDICINE & REHABILITATION
Payer: COMMERCIAL

## 2019-03-25 DIAGNOSIS — M51.36 DDD (DEGENERATIVE DISC DISEASE), LUMBAR: Primary | ICD-10-CM

## 2019-03-25 PROCEDURE — 97110 THERAPEUTIC EXERCISES: CPT

## 2019-03-25 NOTE — PLAN OF CARE
"[facility]      Name: Shiraz RUBI [facility] Number: [id number]    Date of Treatment: 2019     Diagnosis: M51.36 (ICD-10-CM) - DDD (degenerative disc disease), lumbar                       M47.819 (ICD-10-CM) - Spondylosis without myelopathy                       M46.1 (ICD-10-CM) - Sacroiliitis  Physician: Ivania Messer, *    Precautions: History of A-Fib     Pattern of pain determined: 1 PEN    Evaluation Date: 2018  Authorization Period Expiration: 07/15/2019  Plan of Care Expiration: 19 --> Extend to 2019  Reassessment Due: 2019   Visit # / Visits authorized:   **TEST**    Time In: 1500  Time Out: 1550  Total Billable Time: 50 minutes     Face to Face discussion of patient was done between PT and PTA.     Subjective   Shiraz reports some "stiffness" this date but denies any significant pain. He verbalizes that he is pleased with current progress and states interest in continuing to work with PT as long as he can make progress.     Patient reports their pain to be 0/10 on a 0-10 scale with 0 being no pain and 10 being the worst pain imaginable.  Pain location: Low back      Occupation:  Merchandise management; [employer]  Leisure: Walking; exercising; cutting grass                     Pts goals:  Return to PLOF activities w/o increased pain or difficulty (above).     Objective     Baseline Isometric Testing on Med X equipment: Testing administered by PT  Date of testin18  ROM 30-0deg   Max Peak Torque 119    Min Peak Torque 30    Flex/Ext Ratio 3.96   % below normative data 60   Counter weight 319   femur 7   Seat pad 0   Starting weight 50#.    Progress Isometric Testing on Med X equipment: Testing administered by PT  Date of testin2019  ROM   42-0 deg   Max Peak Torque   162 ft/lbs   Min Peak Torque   81 ft/lbs   Flex/Ext Ratio   2.0   % normative data   -42%   % initial data           +71%    CMS " Impairment/Limitation/Restriction for FOTO Lumbar Spine Survey    Status   Limitation  G-Code CMS Severity Modifier  Intake   53%   47%  Predicted  56%   44%   Goal Status+ CK - At least 40 percent but less than 60 percent  1/24/2019  53%   47%  3/22/2019  60%   40%   Current Status CK - At least 40 percent but less than 60 percent    Treatment    Pt was instructed in and performed the following:   Shiraz received therapeutic exercises to develop/improved posture, cardiovascular endurance, muscular endurance, lumbar/cervical ROM, strength and muscular endurance for 60 minutes including the following exercises:     -DKTC with Ball x15  -LTR x20  -Pelvic Tilts   TrA  +Marching x20  +1 LE Kicks x20   -Bridging x10 with 5 second holds   -Prone Press Ups (partial) x10    **Re-Testing on MedX as documented above     Peripheral muscle strengthening which included 1 set of 15-20 repetitions at a slow, controlled 7 second per rep pace focused on strengthening supporting musculature for improved body mechanics and functional mobility.  Pt and therapist focused on proper form during treatment to ensure optimal strengthening of each targeted muscle group.  Machines were utilized including torso rotation, leg extension, leg curl, chest press, upright row. Tricep extension, bicep curl, leg press, and hip abduction added visit 3    Home Exercise Program as follows:   DKTC with Ball  SKTC  YUMIKO  PPT  Handouts were given to the patient. Pt demo good understanding of the education provided. Shiraz demonstrated fair return demonstration of activities.     Additional exercises taught this treatment session reviewed    Assessment     Shiraz did well this date with mid-point progress re-testing on the MedX. He should benefit from continuing to progress through plan working to address continued symptoms and improve functional strength.  Per MedX testing:   -Increased peak torque to 162 ft/lbs and minimal torque of 81 ft/lbs  -Increased ROM  from 30-0 initial testing to 42-0 current testing   -Variance Analysis demonstrates 71% increase in strength from first test  -Variance Analysis demonstrate 42% below age-related norms     Pt's spiritual, cultural and educational needs considered and pt agreeable to plan of care and goals as stated below:     Medical necessity is demonstrated by the following problem list.    Pt presents with the following impairments:      History  Co-morbidities and personal factors that may impact the plan of care Co-morbidities:   Age; Obesity; multiple co-morbidities  M47.819 (ICD-10-CM) - Spondylosis without myelopathy  M54.5,G89.29 (ICD-10-CM) - Chronic bilateral low back pain without sciatica     Personal Factors:   age       low   Examination  Body Structures and Functions, activity limitations and participation restrictions that may impact the plan of care Body Regions:   back  lower extremities  trunk     Body Systems:    ROM  strength  balance  gait  transfers  transitions     Participation Restrictions:   NA     Activity limitations:   Learning and applying knowledge  no deficits     General Tasks and Commands  no deficits     Communication  no deficits     Mobility  lifting and carrying objects  walking  driving (bike, car, motorcycle)     Self care  washing oneself (bathing, drying, washing hands)     Domestic Life  shopping  cooking  doing house work (cleaning house, washing dishes, laundry)  assisting others     Interactions/Relationships  no deficits     Life Areas  no deficits     Community and Social Life  recreation and leisure             low   Clinical Presentation stable and uncomplicated low   Decision Making/ Complexity Score: low         GOALS: Pt is in agreement with the following goals.     Short term goals:  6 weeks or 10 visits   1.  Pt will demonstrate increased lumbar ROM by at least 3 degrees from the initial ROM value with improvements noted in functional ROM and ability to perform ADLs  2.  Pt  will demonstrate increased maximum isometric torque value by 10% when compared to the initial value resulting in improved ability to perform bending, lifting, and carrying activities safely, confidently.     3.  Patient report a reduction in worst pain score by 1-2 points for improved tolerance during work and recreational activities  4.  Pt able to perform HEP correctly with minimal cueing or supervision for therapist        Long term goals: 13 weeks or 20 visits   1. Pt will demonstrate increased lumbar ROM by at least 6 degrees from initial ROM value, resulting in improved ability to perform functional fwd bending while standing and sitting.   2. Pt will demonstrate increased maximum isometric torque value by 20% when compared to the initial value resulting in improved ability to perform bending, lifting, and carrying activities safely, confidently.  3. Pt to demonstrate ability to independently control and reduce their pain through posture positioning and mechanical movements throughout a typical day.  4.  Patient will demonstrate improved overall function per FOTO Survey to CK = at least 40% but < 60% impaired, limited or restricted score or less.  5. Patient will improve 5x STS test for functional legs strength to 11 sec or less, in order to display improved strength and activity tolerance.     Plan   Continue with established Plan of Care towards established PT goals. Continue with 10% increase if low RPE.

## 2019-03-25 NOTE — PROGRESS NOTES
"[facility]      Name: Shiraz RUBI [facility] Number: [id number]    Date of Treatment: 2019     Diagnosis: M51.36 (ICD-10-CM) - DDD (degenerative disc disease), lumbar                       M47.819 (ICD-10-CM) - Spondylosis without myelopathy                       M46.1 (ICD-10-CM) - Sacroiliitis  Physician: Ivania Messer, *    Precautions: History of A-Fib     Pattern of pain determined: 1 PEN    Evaluation Date: 2018  Authorization Period Expiration: 07/15/2019  Plan of Care Expiration: 19 --> Extend to 2019  Reassessment Due: 2019   Visit # / Visits authorized:     Time In: 3:00  Time Out: 4:00  Total Billable Time: 50 minutes     Face to Face discussion of patient was done between PT and PTA.     Subjective   Shiraz reports some "stiffness" this date but denies any significant pain. He verbalizes that he is pleased with current progress.     Patient reports their pain to be 0/10 on a 0-10 scale with 0 being no pain and 10 being the worst pain imaginable.  Pain location: Low back      Occupation:  Merchandise management; [employer]  Leisure: Walking; exercising; cutting grass                     Pts goals:  Return to PLOF activities w/o increased pain or difficulty (above).     Objective     Baseline Isometric Testing on Med X equipment: Testing administered by PT  Date of testin18  ROM 30-0deg   Max Peak Torque 119    Min Peak Torque 30    Flex/Ext Ratio 3.96   % below normative data 60   Counter weight 319   femur 7   Seat pad 0   Starting weight 50#.    Progress Isometric Testing on Med X equipment: Testing administered by PT  Date of testin2019  ROM   42-0 deg   Max Peak Torque   162 ft/lbs   Min Peak Torque   81 ft/lbs   Flex/Ext Ratio   2.0   % normative data   -42%   % initial data           +71%    CMS Impairment/Limitation/Restriction for FOTO Lumbar Spine Survey    Status   Limitation  G-Code CMS Severity " Modifier  Intake   53%   47%  Predicted  56%   44%   Goal Status+ CK - At least 40 percent but less than 60 percent  1/24/2019  53%   47%  3/22/2019  60%   40%   Current Status CK - At least 40 percent but less than 60 percent    Treatment    Pt was instructed in and performed the following:   Shiraz received therapeutic exercises to develop/improved posture, cardiovascular endurance, muscular endurance, lumbar/cervical ROM, strength and muscular endurance for 60 minutes including the following exercises:   [facility] 3/25/2019   Visit Number 10   VAS Pain Rating 2   Treadmill Time (in min.) 10   Speed 2   Extension in Lying 10   Flexion in Lying 10   Lumbar Extension Seat Pad -   Femur Restraint -   Top Dead Center -   Counterweight -   Lumbar Flexion -   Lumbar Extension -   Lumbar Peak Torque -   Min Torque -   Test Percent Below Normative Data -   Lumbar Weight 92   Repetitions 18   Rating of Perceived Exertion 4   Ice - Z Lie (in min.) 10   -DKTC with Ball x15  -LTR x20  -Pelvic Tilts   TrA  +Marching x20  +1 LE Kicks x20   -Bridging x10 with 5 second holds   -Prone Press Ups (partial) x10        Peripheral muscle strengthening which included 1 set of 15-20 repetitions at a slow, controlled 7 second per rep pace focused on strengthening supporting musculature for improved body mechanics and functional mobility.  Pt and therapist focused on proper form during treatment to ensure optimal strengthening of each targeted muscle group.  Machines were utilized including torso rotation, leg extension, leg curl, chest press, upright row. Tricep extension, bicep curl, leg press, and hip abduction added visit 3    Home Exercise Program as follows:   DKTC with Ball  SKTC  YUMIKO  PPT  Handouts were given to the patient. Pt demo good understanding of the education provided. Shiraz demonstrated fair return demonstration of activities.     Additional exercises taught this treatment session reviewed    Assessment   Pt  tolerated the session well with a decrease in LBP. Pt with a weight increase and 18 reps with no c/o pain. Pt demo the HEP well with min vc. Pt progressing well with a decrease in his LBP.     Pt's spiritual, cultural and educational needs considered and pt agreeable to plan of care and goals as stated below:     Medical necessity is demonstrated by the following problem list.    Pt presents with the following impairments:      History  Co-morbidities and personal factors that may impact the plan of care Co-morbidities:   Age; Obesity; multiple co-morbidities  M47.819 (ICD-10-CM) - Spondylosis without myelopathy  M54.5,G89.29 (ICD-10-CM) - Chronic bilateral low back pain without sciatica     Personal Factors:   age       low   Examination  Body Structures and Functions, activity limitations and participation restrictions that may impact the plan of care Body Regions:   back  lower extremities  trunk     Body Systems:    ROM  strength  balance  gait  transfers  transitions     Participation Restrictions:   NA     Activity limitations:   Learning and applying knowledge  no deficits     General Tasks and Commands  no deficits     Communication  no deficits     Mobility  lifting and carrying objects  walking  driving (bike, car, motorcycle)     Self care  washing oneself (bathing, drying, washing hands)     Domestic Life  shopping  cooking  doing house work (cleaning house, washing dishes, laundry)  assisting others     Interactions/Relationships  no deficits     Life Areas  no deficits     Community and Social Life  recreation and leisure             low   Clinical Presentation stable and uncomplicated low   Decision Making/ Complexity Score: low         GOALS: Pt is in agreement with the following goals.     Short term goals:  6 weeks or 10 visits   1.  Pt will demonstrate increased lumbar ROM by at least 3 degrees from the initial ROM value with improvements noted in functional ROM and ability to perform ADLs  2.  Pt  will demonstrate increased maximum isometric torque value by 10% when compared to the initial value resulting in improved ability to perform bending, lifting, and carrying activities safely, confidently.     3.  Patient report a reduction in worst pain score by 1-2 points for improved tolerance during work and recreational activities  4.  Pt able to perform HEP correctly with minimal cueing or supervision for therapist        Long term goals: 13 weeks or 20 visits   1. Pt will demonstrate increased lumbar ROM by at least 6 degrees from initial ROM value, resulting in improved ability to perform functional fwd bending while standing and sitting.   2. Pt will demonstrate increased maximum isometric torque value by 20% when compared to the initial value resulting in improved ability to perform bending, lifting, and carrying activities safely, confidently.  3. Pt to demonstrate ability to independently control and reduce their pain through posture positioning and mechanical movements throughout a typical day.  4.  Patient will demonstrate improved overall function per FOTO Survey to CK = at least 40% but < 60% impaired, limited or restricted score or less.  5. Patient will improve 5x STS test for functional legs strength to 11 sec or less, in order to display improved strength and activity tolerance.     Plan   Continue with established Plan of Care towards established PT goals. Continue with 10% increase if low RPE.

## 2019-03-29 ENCOUNTER — HOSPITAL ENCOUNTER (OUTPATIENT)
Dept: RADIOLOGY | Facility: HOSPITAL | Age: 59
Discharge: HOME | End: 2019-03-29
Attending: INTERNAL MEDICINE
Payer: COMMERCIAL

## 2019-03-29 ENCOUNTER — OFFICE VISIT (OUTPATIENT)
Dept: INTERNAL MEDICINE | Facility: CLINIC | Age: 59
End: 2019-03-29
Payer: COMMERCIAL

## 2019-03-29 VITALS
BODY MASS INDEX: 42.44 KG/M2 | WEIGHT: 280 LBS | RESPIRATION RATE: 16 BRPM | HEIGHT: 68 IN | TEMPERATURE: 99 F | DIASTOLIC BLOOD PRESSURE: 54 MMHG | HEART RATE: 56 BPM | SYSTOLIC BLOOD PRESSURE: 100 MMHG

## 2019-03-29 DIAGNOSIS — R19.7 DIARRHEA, UNSPECIFIED TYPE: Primary | ICD-10-CM

## 2019-03-29 DIAGNOSIS — I48.0 PAROXYSMAL ATRIAL FIBRILLATION: ICD-10-CM

## 2019-03-29 DIAGNOSIS — R50.9 FEVER AND CHILLS: ICD-10-CM

## 2019-03-29 DIAGNOSIS — I20.89 STABLE ANGINA PECTORIS: ICD-10-CM

## 2019-03-29 DIAGNOSIS — E66.01 MORBID OBESITY WITH BMI OF 40.0-44.9, ADULT: ICD-10-CM

## 2019-03-29 DIAGNOSIS — R19.7 DIARRHEA, UNSPECIFIED TYPE: ICD-10-CM

## 2019-03-29 DIAGNOSIS — R10.84 GENERALIZED ABDOMINAL PAIN: ICD-10-CM

## 2019-03-29 DIAGNOSIS — H66.001 ACUTE SUPPURATIVE OTITIS MEDIA OF RIGHT EAR WITHOUT SPONTANEOUS RUPTURE OF TYMPANIC MEMBRANE, RECURRENCE NOT SPECIFIED: ICD-10-CM

## 2019-03-29 DIAGNOSIS — I77.89 ENLARGED THORACIC AORTA: ICD-10-CM

## 2019-03-29 PROCEDURE — 3074F SYST BP LT 130 MM HG: CPT | Mod: CPTII,S$GLB,, | Performed by: INTERNAL MEDICINE

## 2019-03-29 PROCEDURE — 99214 PR OFFICE/OUTPT VISIT, EST, LEVL IV, 30-39 MIN: ICD-10-PCS | Mod: S$GLB,,, | Performed by: INTERNAL MEDICINE

## 2019-03-29 PROCEDURE — 25500020 PHARM REV CODE 255: Performed by: INTERNAL MEDICINE

## 2019-03-29 PROCEDURE — 3008F PR BODY MASS INDEX (BMI) DOCUMENTED: ICD-10-PCS | Mod: CPTII,S$GLB,, | Performed by: INTERNAL MEDICINE

## 2019-03-29 PROCEDURE — A9698 NON-RAD CONTRAST MATERIALNOC: HCPCS | Performed by: INTERNAL MEDICINE

## 2019-03-29 PROCEDURE — 74176 CT ABD & PELVIS W/O CONTRAST: CPT | Mod: 26,,, | Performed by: RADIOLOGY

## 2019-03-29 PROCEDURE — 99999 PR PBB SHADOW E&M-EST. PATIENT-LVL V: ICD-10-PCS | Mod: PBBFAC,,, | Performed by: INTERNAL MEDICINE

## 2019-03-29 PROCEDURE — 3074F PR MOST RECENT SYSTOLIC BLOOD PRESSURE < 130 MM HG: ICD-10-PCS | Mod: CPTII,S$GLB,, | Performed by: INTERNAL MEDICINE

## 2019-03-29 PROCEDURE — 74176 CT ABDOMEN PELVIS WITHOUT CONTRAST: ICD-10-PCS | Mod: 26,,, | Performed by: RADIOLOGY

## 2019-03-29 PROCEDURE — 99999 PR PBB SHADOW E&M-EST. PATIENT-LVL V: CPT | Mod: PBBFAC,,, | Performed by: INTERNAL MEDICINE

## 2019-03-29 PROCEDURE — 99214 OFFICE O/P EST MOD 30 MIN: CPT | Mod: S$GLB,,, | Performed by: INTERNAL MEDICINE

## 2019-03-29 PROCEDURE — 3078F PR MOST RECENT DIASTOLIC BLOOD PRESSURE < 80 MM HG: ICD-10-PCS | Mod: CPTII,S$GLB,, | Performed by: INTERNAL MEDICINE

## 2019-03-29 PROCEDURE — 3008F BODY MASS INDEX DOCD: CPT | Mod: CPTII,S$GLB,, | Performed by: INTERNAL MEDICINE

## 2019-03-29 PROCEDURE — 74176 CT ABD & PELVIS W/O CONTRAST: CPT | Mod: TC

## 2019-03-29 PROCEDURE — 3078F DIAST BP <80 MM HG: CPT | Mod: CPTII,S$GLB,, | Performed by: INTERNAL MEDICINE

## 2019-03-29 RX ORDER — AMOXICILLIN AND CLAVULANATE POTASSIUM 875; 125 MG/1; MG/1
1 TABLET, FILM COATED ORAL 2 TIMES DAILY
Qty: 14 TABLET | Refills: 0 | Status: SHIPPED | OUTPATIENT
Start: 2019-03-29 | End: 2019-04-05

## 2019-03-29 RX ORDER — DICYCLOMINE HYDROCHLORIDE 20 MG/1
20 TABLET ORAL EVERY 6 HOURS
COMMUNITY
End: 2019-07-25

## 2019-03-29 RX ADMIN — Medication 900 ML: at 05:03

## 2019-03-29 NOTE — PROGRESS NOTES
Subjective:       Patient ID: Shiraz Jha is a 59 y.o. male who presents for Diarrhea (started sick yesterday.); Fever; and Otalgia      Diarrhea    This is a recurrent problem. The current episode started yesterday. The problem occurs 5 to 10 times per day. The problem has been unchanged. The stool consistency is described as watery. Associated symptoms include abdominal pain, chills, a fever, headaches and a URI. Pertinent negatives include no arthralgias, coughing, myalgias or vomiting. Nothing aggravates the symptoms. There are no known risk factors. He has tried increased fluids for the symptoms. The treatment provided no relief. There is no history of inflammatory bowel disease.   Abdominal Pain   This is a new problem. The current episode started yesterday. The onset quality is sudden. The problem occurs intermittently. The problem has been waxing and waning. The pain is located in the LLQ. The pain is at a severity of 8/10. The quality of the pain is aching. The abdominal pain does not radiate. Associated symptoms include anorexia, diarrhea, a fever and headaches. Pertinent negatives include no arthralgias, constipation, hematuria, myalgias, nausea or vomiting. Nothing aggravates the pain. The pain is relieved by nothing. He has tried nothing for the symptoms. Prior diagnostic workup includes GI consult. His past medical history is significant for abdominal surgery. Patient's medical history does not include kidney stones.   Otalgia    There is pain in both ears. This is a new problem. The current episode started 1 to 4 weeks ago. The problem has been waxing and waning. Maximum temperature: subjective. The pain is moderate. Associated symptoms include abdominal pain, diarrhea, headaches and rhinorrhea. Pertinent negatives include no coughing, ear discharge, hearing loss, rash, sore throat or vomiting. He has tried nothing for the symptoms. There is no history of a chronic ear infection.        Review of  Systems   Constitutional: Positive for appetite change (decreased), chills, fatigue and fever. Negative for diaphoresis.   HENT: Positive for ear pain, rhinorrhea and sinus pressure. Negative for congestion, ear discharge, hearing loss, postnasal drip and sore throat.    Eyes: Negative for redness and itching.   Respiratory: Positive for shortness of breath. Negative for cough, chest tightness and wheezing.    Cardiovascular: Positive for leg swelling. Negative for chest pain and palpitations.   Gastrointestinal: Positive for abdominal pain, anorexia and diarrhea. Negative for abdominal distention, blood in stool, constipation, nausea and vomiting.   Genitourinary: Negative for hematuria.   Musculoskeletal: Negative for arthralgias, back pain and myalgias.   Skin: Negative for rash.   Neurological: Positive for headaches. Negative for dizziness, weakness and numbness.   Hematological: Negative for adenopathy.       Objective:      Physical Exam   Constitutional: He is oriented to person, place, and time. He appears well-developed and well-nourished.   HENT:   Head: Normocephalic and atraumatic.   Right Ear: Hearing and external ear normal. No drainage. Tympanic membrane is erythematous and bulging. A middle ear effusion is present.   Left Ear: Hearing and external ear normal. No drainage. Tympanic membrane is erythematous. Tympanic membrane is not bulging.  No middle ear effusion.   Nose: Nose normal. Right sinus exhibits no maxillary sinus tenderness and no frontal sinus tenderness. Left sinus exhibits no maxillary sinus tenderness and no frontal sinus tenderness.   Mouth/Throat: Uvula is midline and mucous membranes are normal. Posterior oropharyngeal erythema present.   Eyes: Pupils are equal, round, and reactive to light. EOM and lids are normal.   Neck: Full passive range of motion without pain.   Cardiovascular: Normal rate and regular rhythm.   No murmur heard.  Pulmonary/Chest: Effort normal and breath  sounds normal. He has no wheezes.   Abdominal: Soft. Bowel sounds are normal. He exhibits no distension. There is tenderness in the left upper quadrant and left lower quadrant. There is no rigidity, no rebound and no guarding.   Musculoskeletal: Normal range of motion.   Neurological: He is alert and oriented to person, place, and time.   Skin: Skin is warm, dry and intact.   Psychiatric: He has a normal mood and affect.       Assessment/Plan:         1. Diarrhea, unspecified type  - Stool Exam-Ova,Cysts,Parasites; Future  - Stool culture; Future  - Giardia / Cryptosporidum, EIA; Future  - WBC, Stool; Future  - Clostridium difficile EIA; Future  - CBC auto differential; Future  - Comprehensive metabolic panel; Future    2. Generalized abdominal pain  - colitis vs gastroenteritis vs divertculitis  - CT abdomen, will treat empirically with Augmentin  - return to GI for f/u    3. Fever and chills  - CBC auto differential; Future  - may use tylenol as needed    4. Acute suppurative otitis media of right ear without spontaneous rupture of tympanic membrane, recurrence not specified  - amoxicillin-clavulanate 875-125mg (AUGMENTIN) 875-125 mg per tablet; Take 1 tablet by mouth 2 (two) times daily. for 7 days  Dispense: 14 tablet; Refill: 0    5. Paroxysmal atrial fibrillation  - stable, rate-controlled    6. Enlarged thoracic aorta  - based on prior imaging, stable, sees Cards    7. Stable angina pectoris  - asymptomatic    8. Morbid obesity with BMI of 40.0-44.9, adult  - s/p bypass, weight is trending up, to manage with pcp    Call if no improvement in symptoms    Kiesha Blanco MD

## 2019-04-03 DIAGNOSIS — R19.7 DIARRHEA, UNSPECIFIED TYPE: Primary | ICD-10-CM

## 2019-04-22 ENCOUNTER — OFFICE VISIT (OUTPATIENT)
Dept: INTERNAL MEDICINE | Facility: CLINIC | Age: 59
End: 2019-04-22
Payer: COMMERCIAL

## 2019-04-22 VITALS
RESPIRATION RATE: 16 BRPM | BODY MASS INDEX: 41.96 KG/M2 | WEIGHT: 276.88 LBS | SYSTOLIC BLOOD PRESSURE: 106 MMHG | DIASTOLIC BLOOD PRESSURE: 60 MMHG | HEART RATE: 64 BPM | TEMPERATURE: 98 F | HEIGHT: 68 IN

## 2019-04-22 DIAGNOSIS — Z98.84 H/O GASTRIC BYPASS: ICD-10-CM

## 2019-04-22 DIAGNOSIS — K59.1 FUNCTIONAL DIARRHEA: Primary | ICD-10-CM

## 2019-04-22 DIAGNOSIS — E66.01 MORBID OBESITY DUE TO EXCESS CALORIES: ICD-10-CM

## 2019-04-22 DIAGNOSIS — R10.84 GENERALIZED ABDOMINAL PAIN: ICD-10-CM

## 2019-04-22 PROCEDURE — 3078F DIAST BP <80 MM HG: CPT | Mod: CPTII,S$GLB,, | Performed by: INTERNAL MEDICINE

## 2019-04-22 PROCEDURE — 99214 OFFICE O/P EST MOD 30 MIN: CPT | Mod: S$GLB,,, | Performed by: INTERNAL MEDICINE

## 2019-04-22 PROCEDURE — 3008F BODY MASS INDEX DOCD: CPT | Mod: CPTII,S$GLB,, | Performed by: INTERNAL MEDICINE

## 2019-04-22 PROCEDURE — 99214 PR OFFICE/OUTPT VISIT, EST, LEVL IV, 30-39 MIN: ICD-10-PCS | Mod: S$GLB,,, | Performed by: INTERNAL MEDICINE

## 2019-04-22 PROCEDURE — 3008F PR BODY MASS INDEX (BMI) DOCUMENTED: ICD-10-PCS | Mod: CPTII,S$GLB,, | Performed by: INTERNAL MEDICINE

## 2019-04-22 PROCEDURE — 3074F SYST BP LT 130 MM HG: CPT | Mod: CPTII,S$GLB,, | Performed by: INTERNAL MEDICINE

## 2019-04-22 PROCEDURE — 99999 PR PBB SHADOW E&M-EST. PATIENT-LVL IV: CPT | Mod: PBBFAC,,, | Performed by: INTERNAL MEDICINE

## 2019-04-22 PROCEDURE — 3074F PR MOST RECENT SYSTOLIC BLOOD PRESSURE < 130 MM HG: ICD-10-PCS | Mod: CPTII,S$GLB,, | Performed by: INTERNAL MEDICINE

## 2019-04-22 PROCEDURE — 3078F PR MOST RECENT DIASTOLIC BLOOD PRESSURE < 80 MM HG: ICD-10-PCS | Mod: CPTII,S$GLB,, | Performed by: INTERNAL MEDICINE

## 2019-04-22 PROCEDURE — 99999 PR PBB SHADOW E&M-EST. PATIENT-LVL IV: ICD-10-PCS | Mod: PBBFAC,,, | Performed by: INTERNAL MEDICINE

## 2019-04-23 ENCOUNTER — LAB VISIT (OUTPATIENT)
Dept: LAB | Facility: HOSPITAL | Age: 59
End: 2019-04-23
Attending: INTERNAL MEDICINE
Payer: COMMERCIAL

## 2019-04-23 DIAGNOSIS — Z98.84 H/O GASTRIC BYPASS: ICD-10-CM

## 2019-04-23 DIAGNOSIS — R10.84 GENERALIZED ABDOMINAL PAIN: ICD-10-CM

## 2019-04-23 DIAGNOSIS — E66.01 MORBID OBESITY DUE TO EXCESS CALORIES: ICD-10-CM

## 2019-04-23 DIAGNOSIS — K59.1 FUNCTIONAL DIARRHEA: ICD-10-CM

## 2019-04-23 LAB
25(OH)D3+25(OH)D2 SERPL-MCNC: 71 NG/ML (ref 30–96)
ALBUMIN SERPL BCP-MCNC: 3.7 G/DL (ref 3.5–5.2)
ALP SERPL-CCNC: 84 U/L (ref 55–135)
ALT SERPL W/O P-5'-P-CCNC: 14 U/L (ref 10–44)
AMYLASE SERPL-CCNC: 29 U/L (ref 20–110)
ANION GAP SERPL CALC-SCNC: 9 MMOL/L (ref 8–16)
AST SERPL-CCNC: 19 U/L (ref 10–40)
BASOPHILS # BLD AUTO: 0.05 K/UL (ref 0–0.2)
BASOPHILS NFR BLD: 1.1 % (ref 0–1.9)
BILIRUB SERPL-MCNC: 0.6 MG/DL (ref 0.1–1)
BUN SERPL-MCNC: 10 MG/DL (ref 6–20)
CALCIUM SERPL-MCNC: 9.3 MG/DL (ref 8.7–10.5)
CHLORIDE SERPL-SCNC: 103 MMOL/L (ref 95–110)
CHOLEST SERPL-MCNC: 178 MG/DL (ref 120–199)
CHOLEST/HDLC SERPL: 4.9 {RATIO} (ref 2–5)
CO2 SERPL-SCNC: 28 MMOL/L (ref 23–29)
CREAT SERPL-MCNC: 1.1 MG/DL (ref 0.5–1.4)
DIFFERENTIAL METHOD: ABNORMAL
EOSINOPHIL # BLD AUTO: 0.1 K/UL (ref 0–0.5)
EOSINOPHIL NFR BLD: 2.6 % (ref 0–8)
ERYTHROCYTE [DISTWIDTH] IN BLOOD BY AUTOMATED COUNT: 14.6 % (ref 11.5–14.5)
ERYTHROCYTE [SEDIMENTATION RATE] IN BLOOD BY WESTERGREN METHOD: 7 MM/HR (ref 0–23)
EST. GFR  (AFRICAN AMERICAN): >60 ML/MIN/1.73 M^2
EST. GFR  (NON AFRICAN AMERICAN): >60 ML/MIN/1.73 M^2
GLUCOSE SERPL-MCNC: 94 MG/DL (ref 70–110)
HCT VFR BLD AUTO: 38.3 % (ref 40–54)
HDLC SERPL-MCNC: 36 MG/DL (ref 40–75)
HDLC SERPL: 20.2 % (ref 20–50)
HGB BLD-MCNC: 11.9 G/DL (ref 14–18)
IMM GRANULOCYTES # BLD AUTO: 0.01 K/UL (ref 0–0.04)
IMM GRANULOCYTES NFR BLD AUTO: 0.2 % (ref 0–0.5)
IRON SERPL-MCNC: 41 UG/DL (ref 45–160)
LDLC SERPL CALC-MCNC: 111 MG/DL (ref 63–159)
LYMPHOCYTES # BLD AUTO: 1.5 K/UL (ref 1–4.8)
LYMPHOCYTES NFR BLD: 31.3 % (ref 18–48)
MCH RBC QN AUTO: 27.6 PG (ref 27–31)
MCHC RBC AUTO-ENTMCNC: 31.1 G/DL (ref 32–36)
MCV RBC AUTO: 89 FL (ref 82–98)
MONOCYTES # BLD AUTO: 0.4 K/UL (ref 0.3–1)
MONOCYTES NFR BLD: 9.2 % (ref 4–15)
NEUTROPHILS # BLD AUTO: 2.6 K/UL (ref 1.8–7.7)
NEUTROPHILS NFR BLD: 55.6 % (ref 38–73)
NONHDLC SERPL-MCNC: 142 MG/DL
NRBC BLD-RTO: 0 /100 WBC
PLATELET # BLD AUTO: 223 K/UL (ref 150–350)
PMV BLD AUTO: 10.7 FL (ref 9.2–12.9)
POTASSIUM SERPL-SCNC: 3.9 MMOL/L (ref 3.5–5.1)
PROT SERPL-MCNC: 6.5 G/DL (ref 6–8.4)
RBC # BLD AUTO: 4.31 M/UL (ref 4.6–6.2)
SATURATED IRON: 7 % (ref 20–50)
SODIUM SERPL-SCNC: 140 MMOL/L (ref 136–145)
T4 FREE SERPL-MCNC: 0.8 NG/DL (ref 0.71–1.51)
TOTAL IRON BINDING CAPACITY: 548 UG/DL (ref 250–450)
TRANSFERRIN SERPL-MCNC: 370 MG/DL (ref 200–375)
TRIGL SERPL-MCNC: 155 MG/DL (ref 30–150)
TSH SERPL DL<=0.005 MIU/L-ACNC: 1.84 UIU/ML (ref 0.4–4)
VIT B12 SERPL-MCNC: >2000 PG/ML (ref 210–950)
WBC # BLD AUTO: 4.69 K/UL (ref 3.9–12.7)

## 2019-04-23 PROCEDURE — 80061 LIPID PANEL: CPT

## 2019-04-23 PROCEDURE — 84439 ASSAY OF FREE THYROXINE: CPT

## 2019-04-23 PROCEDURE — 36415 COLL VENOUS BLD VENIPUNCTURE: CPT | Mod: PO

## 2019-04-23 PROCEDURE — 85652 RBC SED RATE AUTOMATED: CPT

## 2019-04-23 PROCEDURE — 85025 COMPLETE CBC W/AUTO DIFF WBC: CPT

## 2019-04-23 PROCEDURE — 83516 IMMUNOASSAY NONANTIBODY: CPT | Mod: 59

## 2019-04-23 PROCEDURE — 84443 ASSAY THYROID STIM HORMONE: CPT

## 2019-04-23 PROCEDURE — 82607 VITAMIN B-12: CPT

## 2019-04-23 PROCEDURE — 82306 VITAMIN D 25 HYDROXY: CPT

## 2019-04-23 PROCEDURE — 82150 ASSAY OF AMYLASE: CPT

## 2019-04-23 PROCEDURE — 80053 COMPREHEN METABOLIC PANEL: CPT

## 2019-04-23 PROCEDURE — 83540 ASSAY OF IRON: CPT

## 2019-04-26 LAB
GLIADIN PEPTIDE IGA SER-ACNC: 4 UNITS
GLIADIN PEPTIDE IGG SER-ACNC: 4 UNITS
IGA SERPL-MCNC: 135 MG/DL (ref 70–400)
TTG IGA SER-ACNC: 5 UNITS
TTG IGG SER-ACNC: 7 UNITS

## 2019-04-26 RX ORDER — FUROSEMIDE 40 MG/1
40 TABLET ORAL DAILY
Qty: 90 TABLET | Refills: 0 | Status: SHIPPED | OUTPATIENT
Start: 2019-04-26 | End: 2019-07-25 | Stop reason: SDUPTHER

## 2019-04-29 NOTE — PROGRESS NOTES
Subjective:       Patient ID: Shiraz Jha is a 59 y.o. male.    Chief Complaint: Weight Loss (pt wants to get all blood work for weight loss candidacy done all at once.)  HISTORY OF PRESENT ILLNESS:  This 59-year-old male patient of Dr. Theodore's comes   in for discussion about problems related to weight loss.  He had had surgery for   weight loss at [facility] with gastric bypass 5 years ago.  With that, he   lost weight, but he also developed recurrent diarrhea.  He has been seen on a   number of occasions for that.  He has been seen by GI.  The only person he had   seen is a nurse practitioner.  He also was followed by Psychiatry, Dr. Barajas,   Cardiology and Dr. Theodore.  The patient saw Dr. Blanco approximately 1 month ago   for the diarrhea and there is a detailed analysis of that history at that time.    The current problem is no different.  The patient has had the recurrent   diarrhea and has had intermittent abdominal pain, not really related to the   diarrhea spells.  The pain is described as sudden and intense.  It waxes and   wanes in intensity.  It has been in no localized area, but more in the lower   quadrants.  The patient's history is the same as that stated history and he   mostly is just dissatisfied with what has been offered thus far.    PHYSICAL EXAMINATION:  ABDOMEN:  He has an abdomen that is obese.  MUSCULOSKELETAL:  He is tender, particularly in the lower quadrants with no real   guarding and no rebound.  Bowel sounds are active.  I feel no mass, fullness or   organomegaly.    IMPRESSION AND PLAN:  1.  Status post gastric bypass surgery for weight loss.  2.  Recurrent diarrhea.  3.  New problem with abdominal pain that has been intermittent, but persistent,   primarily in the lower quadrants.  I have ordered some evaluation for this and   have arranged for him to see Bariatric Medicine and advised him to speak with GI   so that he can be seen by one of the physicians.  Lab evaluation is  still in   progress, but some is back.  He has chemistries that are normal.  CBC shows mild   anemia.  Celiac panel is negative.  Amylase is negative.  B12 is high.  Iron,   TIBC shows a somewhat low iron and is consistent with iron deficiency.  Sed rate   is 7, which is low.  Thyroid function is normal.      JDC/IN  dd: 04/28/2019 21:31:21 (CDT)  td: 04/29/2019 10:49:18 (CDT)  Doc ID   #[id number]  Job ID #[id number]    CC:     Dict [id number]  HPI  Review of Systems   Constitutional: Negative for activity change, appetite change, fatigue and unexpected weight change.   HENT: Negative for dental problem, hearing loss, mouth sores, postnasal drip and sinus pressure.    Eyes: Negative for discharge and visual disturbance.   Respiratory: Negative for cough and shortness of breath.    Cardiovascular: Negative for chest pain and palpitations.   Gastrointestinal: Positive for abdominal pain and diarrhea. Negative for blood in stool, constipation and nausea.   Genitourinary: Negative for dysuria, hematuria and testicular pain.   Musculoskeletal: Negative for arthralgias, back pain, joint swelling and neck pain.   Skin: Negative for rash.   Neurological: Negative for weakness and headaches.   Psychiatric/Behavioral: Negative for agitation and sleep disturbance.       Objective:      Physical Exam   Constitutional: He is oriented to person, place, and time. He appears well-developed and well-nourished.   HENT:   Head: Normocephalic.   Right Ear: External ear normal.   Left Ear: External ear normal.   Mouth/Throat: Oropharynx is clear and moist.   Eyes: Pupils are equal, round, and reactive to light. EOM are normal. Right eye exhibits no discharge.   Neck: Normal range of motion. No JVD present. No tracheal deviation present. No thyromegaly present.   Cardiovascular: Normal rate, regular rhythm, normal heart sounds and intact distal pulses. Exam reveals no gallop.   No murmur heard.  Pulmonary/Chest: Effort normal and breath sounds  normal. He has no wheezes. He has no rales.   Abdominal: Soft. Bowel sounds are normal. He exhibits no mass. There is tenderness. There is no guarding.   Genitourinary: Prostate normal and penis normal. Rectal exam shows guaiac negative stool.   Musculoskeletal: Normal range of motion. He exhibits no edema.   Lymphadenopathy:     He has no cervical adenopathy.   Neurological: He is oriented to person, place, and time. He displays normal reflexes. No cranial nerve deficit. Coordination normal.   Skin: Skin is warm. No rash noted. No pallor.   Psychiatric: He has a normal mood and affect.       Assessment:       1. Functional diarrhea    2. Generalized abdominal pain    3. Morbid obesity due to excess calories    4. H/O gastric bypass        Plan:

## 2019-05-01 ENCOUNTER — HOSPITAL ENCOUNTER (EMERGENCY)
Facility: HOSPITAL | Age: 59
Discharge: HOME | End: 2019-05-01
Attending: EMERGENCY MEDICINE
Payer: COMMERCIAL

## 2019-05-01 ENCOUNTER — OFFICE VISIT (OUTPATIENT)
Dept: INTERNAL MEDICINE | Facility: CLINIC | Age: 59
End: 2019-05-01
Payer: COMMERCIAL

## 2019-05-01 ENCOUNTER — TELEPHONE (OUTPATIENT)
Dept: INTERNAL MEDICINE | Facility: CLINIC | Age: 59
End: 2019-05-01

## 2019-05-01 VITALS
BODY MASS INDEX: 42.12 KG/M2 | DIASTOLIC BLOOD PRESSURE: 70 MMHG | RESPIRATION RATE: 16 BRPM | OXYGEN SATURATION: 100 % | WEIGHT: 277 LBS | HEART RATE: 54 BPM | TEMPERATURE: 98 F | SYSTOLIC BLOOD PRESSURE: 150 MMHG

## 2019-05-01 VITALS
DIASTOLIC BLOOD PRESSURE: 62 MMHG | TEMPERATURE: 98 F | SYSTOLIC BLOOD PRESSURE: 106 MMHG | HEART RATE: 70 BPM | OXYGEN SATURATION: 96 % | BODY MASS INDEX: 42.03 KG/M2 | WEIGHT: 277.31 LBS | HEIGHT: 68 IN

## 2019-05-01 DIAGNOSIS — S09.90XA INJURY OF HEAD, INITIAL ENCOUNTER: Primary | ICD-10-CM

## 2019-05-01 DIAGNOSIS — I48.19 PERSISTENT ATRIAL FIBRILLATION: ICD-10-CM

## 2019-05-01 DIAGNOSIS — Z74.09 MOBILITY IMPAIRED: ICD-10-CM

## 2019-05-01 DIAGNOSIS — Z79.01 CURRENT USE OF LONG TERM ANTICOAGULATION: ICD-10-CM

## 2019-05-01 DIAGNOSIS — Z98.890 STATUS POST CATHETER ABLATION OF ATRIAL FIBRILLATION: ICD-10-CM

## 2019-05-01 DIAGNOSIS — S09.90XA TRAUMATIC INJURY OF HEAD, INITIAL ENCOUNTER: Primary | ICD-10-CM

## 2019-05-01 PROCEDURE — 99999 PR PBB SHADOW E&M-EST. PATIENT-LVL IV: ICD-10-PCS | Mod: PBBFAC,,, | Performed by: NURSE PRACTITIONER

## 2019-05-01 PROCEDURE — 99499 NO LOS: ICD-10-PCS | Mod: S$GLB,,, | Performed by: NURSE PRACTITIONER

## 2019-05-01 PROCEDURE — 99284 EMERGENCY DEPT VISIT MOD MDM: CPT | Mod: ,,, | Performed by: EMERGENCY MEDICINE

## 2019-05-01 PROCEDURE — 99284 PR EMERGENCY DEPT VISIT,LEVEL IV: ICD-10-PCS | Mod: ,,, | Performed by: EMERGENCY MEDICINE

## 2019-05-01 PROCEDURE — 25000003 PHARM REV CODE 250: Performed by: EMERGENCY MEDICINE

## 2019-05-01 PROCEDURE — 99499 UNLISTED E&M SERVICE: CPT | Mod: S$GLB,,, | Performed by: NURSE PRACTITIONER

## 2019-05-01 PROCEDURE — 99999 PR PBB SHADOW E&M-EST. PATIENT-LVL IV: CPT | Mod: PBBFAC,,, | Performed by: NURSE PRACTITIONER

## 2019-05-01 PROCEDURE — 63600175 PHARM REV CODE 636 W HCPCS: Performed by: EMERGENCY MEDICINE

## 2019-05-01 PROCEDURE — 90471 IMMUNIZATION ADMIN: CPT | Performed by: EMERGENCY MEDICINE

## 2019-05-01 PROCEDURE — 90715 TDAP VACCINE 7 YRS/> IM: CPT | Performed by: EMERGENCY MEDICINE

## 2019-05-01 PROCEDURE — 99284 EMERGENCY DEPT VISIT MOD MDM: CPT

## 2019-05-01 RX ORDER — ACETAMINOPHEN 500 MG
1000 TABLET ORAL
Status: COMPLETED | OUTPATIENT
Start: 2019-05-01 | End: 2019-05-01

## 2019-05-01 RX ADMIN — CLOSTRIDIUM TETANI TOXOID ANTIGEN (FORMALDEHYDE INACTIVATED), CORYNEBACTERIUM DIPHTHERIAE TOXOID ANTIGEN (FORMALDEHYDE INACTIVATED), BORDETELLA PERTUSSIS TOXOID ANTIGEN (GLUTARALDEHYDE INACTIVATED), BORDETELLA PERTUSSIS FILAMENTOUS HEMAGGLUTININ ANTIGEN (FORMALDEHYDE INACTIVATED), BORDETELLA PERTUSSIS PERTACTIN ANTIGEN, AND BORDETELLA PERTUSSIS FIMBRIAE 2/3 ANTIGEN 0.5 ML: 5; 2; 2.5; 5; 3; 5 INJECTION, SUSPENSION INTRAMUSCULAR at 08:05

## 2019-05-01 RX ADMIN — ACETAMINOPHEN 1000 MG: 500 TABLET ORAL at 06:05
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
## 2019-05-02 NOTE — ED NOTES
The patient is awake, alert and cooperative with a calm affect, patient is aware of environment, airway is open and patent, respirations are spontaneous, normal effort and rate noted, skin warm and dry, moves all extremities well, appearance: no apparent distress noted, bed placed in low position, side rails up x 2, call light is within reach of patient, explanation of care provided to patient, alarms set and turned on for monitor, pulse ox, awaiting results of head CT. Patient reports HA 5/10, notified MD who states will order motrin if CT is negative. will continue to monitor.

## 2019-05-02 NOTE — DISCHARGE INSTRUCTIONS
If you have any worsening headache, dizziness, trouble walking, numbness, weakness, or any other new, worsening or worrisome symptoms please return immediately to the emergency department for a re-evaluation.    You can continue to take her medications as prescribed.    You can take Tylenol as needed for minor pain control.

## 2019-05-03 ENCOUNTER — TELEPHONE (OUTPATIENT)
Dept: CARDIOLOGY | Facility: CLINIC | Age: 59
End: 2019-05-03

## 2019-05-03 NOTE — TELEPHONE ENCOUNTER
----- Message from Kady Gibson sent at 5/3/2019 12:01 PM CDT -----  Contact: Pt called   Pt was in the ER and requesting to see Dr. Scott soon as possible SOB, Chest Tightness, and BP problems. Please call pt @ [phone]. Thank you.
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
## 2019-05-13 ENCOUNTER — TELEPHONE (OUTPATIENT)
Dept: INTERNAL MEDICINE | Facility: CLINIC | Age: 59
End: 2019-05-13

## 2019-05-13 NOTE — TELEPHONE ENCOUNTER
----- Message from Conrad Kaplan MD sent at 5/13/2019  8:48 AM CDT -----  His lab I good except he is mildly anemic and low on iron so should be on FESO4 tab daily for next year and repeat lab then.  His celiac evaluation was neg

## 2019-07-23 ENCOUNTER — LAB VISIT (OUTPATIENT)
Dept: LAB | Facility: HOSPITAL | Age: 59
End: 2019-07-23
Attending: INTERNAL MEDICINE
Payer: COMMERCIAL

## 2019-07-23 ENCOUNTER — CLINICAL SUPPORT (OUTPATIENT)
Dept: CARDIOLOGY | Facility: CLINIC | Age: 59
End: 2019-07-23
Attending: INTERNAL MEDICINE
Payer: COMMERCIAL

## 2019-07-23 VITALS
DIASTOLIC BLOOD PRESSURE: 66 MMHG | WEIGHT: 277 LBS | BODY MASS INDEX: 41.98 KG/M2 | HEIGHT: 68 IN | SYSTOLIC BLOOD PRESSURE: 104 MMHG | HEART RATE: 56 BPM

## 2019-07-23 DIAGNOSIS — I10 ESSENTIAL HYPERTENSION: ICD-10-CM

## 2019-07-23 DIAGNOSIS — I77.89 ENLARGED THORACIC AORTA: ICD-10-CM

## 2019-07-23 LAB
ALT SERPL W/O P-5'-P-CCNC: 16 U/L (ref 10–44)
ANION GAP SERPL CALC-SCNC: 8 MMOL/L (ref 8–16)
ASCENDING AORTA: 4.2 CM
AST SERPL-CCNC: 23 U/L (ref 10–40)
AV INDEX (PROSTH): 0.77
AV MEAN GRADIENT: 5 MMHG
AV PEAK GRADIENT: 8 MMHG
AV VALVE AREA: 3.72 CM2
AV VELOCITY RATIO: 0.74
BSA FOR ECHO PROCEDURE: 2.46 M2
BUN SERPL-MCNC: 12 MG/DL (ref 6–20)
CALCIUM SERPL-MCNC: 10 MG/DL (ref 8.7–10.5)
CHLORIDE SERPL-SCNC: 103 MMOL/L (ref 95–110)
CHOLEST SERPL-MCNC: 188 MG/DL (ref 120–199)
CHOLEST/HDLC SERPL: 4.3 {RATIO} (ref 2–5)
CO2 SERPL-SCNC: 28 MMOL/L (ref 23–29)
CREAT SERPL-MCNC: 1.2 MG/DL (ref 0.5–1.4)
CV ECHO LV RWT: 0.28 CM
DOP CALC AO PEAK VEL: 1.42 M/S
DOP CALC AO VTI: 33.95 CM
DOP CALC LVOT AREA: 4.8 CM2
DOP CALC LVOT DIAMETER: 2.48 CM
DOP CALC LVOT PEAK VEL: 1.05 M/S
DOP CALC LVOT STROKE VOLUME: 126.25 CM3
DOP CALCLVOT PEAK VEL VTI: 26.15 CM
E WAVE DECELERATION TIME: 240.34 MSEC
E/A RATIO: 1.66
E/E' RATIO: 9.78 M/S
ECHO LV POSTERIOR WALL: 0.81 CM (ref 0.6–1.1)
EST. GFR  (AFRICAN AMERICAN): >60 ML/MIN/1.73 M^2
EST. GFR  (NON AFRICAN AMERICAN): >60 ML/MIN/1.73 M^2
FRACTIONAL SHORTENING: 32 % (ref 28–44)
GLUCOSE SERPL-MCNC: 92 MG/DL (ref 70–110)
HDLC SERPL-MCNC: 44 MG/DL (ref 40–75)
HDLC SERPL: 23.4 % (ref 20–50)
INTERVENTRICULAR SEPTUM: 0.8 CM (ref 0.6–1.1)
IVRT: 0.11 MSEC
LA MAJOR: 6.6 CM
LA MINOR: 6.05 CM
LA WIDTH: 4.4 CM
LDLC SERPL CALC-MCNC: 115.4 MG/DL (ref 63–159)
LEFT ATRIUM SIZE: 4.6 CM
LEFT ATRIUM VOLUME INDEX: 46.3 ML/M2
LEFT ATRIUM VOLUME: 108.61 CM3
LEFT INTERNAL DIMENSION IN SYSTOLE: 3.9 CM (ref 2.1–4)
LEFT VENTRICLE DIASTOLIC VOLUME INDEX: 70.08 ML/M2
LEFT VENTRICLE DIASTOLIC VOLUME: 164.5 ML
LEFT VENTRICLE MASS INDEX: 75 G/M2
LEFT VENTRICLE SYSTOLIC VOLUME INDEX: 28.1 ML/M2
LEFT VENTRICLE SYSTOLIC VOLUME: 65.99 ML
LEFT VENTRICULAR INTERNAL DIMENSION IN DIASTOLE: 5.77 CM (ref 3.5–6)
LEFT VENTRICULAR MASS: 175.19 G
LV LATERAL E/E' RATIO: 9.78 M/S
LV SEPTAL E/E' RATIO: 9.78 M/S
MV PEAK A VEL: 0.53 M/S
MV PEAK E VEL: 0.88 M/S
NONHDLC SERPL-MCNC: 144 MG/DL
PISA TR MAX VEL: 2.26 M/S
POTASSIUM SERPL-SCNC: 4 MMOL/L (ref 3.5–5.1)
PULM VEIN S/D RATIO: 0.84
PV PEAK D VEL: 0.43 M/S
PV PEAK S VEL: 0.36 M/S
RA MAJOR: 6.75 CM
RA PRESSURE: 3 MMHG
RA WIDTH: 3.47 CM
RIGHT VENTRICULAR END-DIASTOLIC DIMENSION: 3.69 CM
SINUS: 3.41 CM
SODIUM SERPL-SCNC: 139 MMOL/L (ref 136–145)
STJ: 3.2 CM
TDI LATERAL: 0.09 M/S
TDI SEPTAL: 0.09 M/S
TDI: 0.09 M/S
TR MAX PG: 20 MMHG
TRICUSPID ANNULAR PLANE SYSTOLIC EXCURSION: 3.26 CM
TRIGL SERPL-MCNC: 143 MG/DL (ref 30–150)
TV REST PULMONARY ARTERY PRESSURE: 23 MMHG

## 2019-07-23 PROCEDURE — 84460 ALANINE AMINO (ALT) (SGPT): CPT

## 2019-07-23 PROCEDURE — 80048 BASIC METABOLIC PNL TOTAL CA: CPT

## 2019-07-23 PROCEDURE — 80061 LIPID PANEL: CPT

## 2019-07-23 PROCEDURE — 93306 TRANSTHORACIC ECHO (TTE) COMPLETE: ICD-10-PCS | Mod: S$GLB,,, | Performed by: INTERNAL MEDICINE

## 2019-07-23 PROCEDURE — 36415 COLL VENOUS BLD VENIPUNCTURE: CPT | Mod: PO

## 2019-07-23 PROCEDURE — 99999 PR PBB SHADOW E&M-EST. PATIENT-LVL II: ICD-10-PCS | Mod: PBBFAC,,,

## 2019-07-23 PROCEDURE — 93306 TTE W/DOPPLER COMPLETE: CPT | Mod: S$GLB,,, | Performed by: INTERNAL MEDICINE

## 2019-07-23 PROCEDURE — 99999 PR PBB SHADOW E&M-EST. PATIENT-LVL II: CPT | Mod: PBBFAC,,,

## 2019-07-23 PROCEDURE — 84450 TRANSFERASE (AST) (SGOT): CPT

## 2019-07-25 ENCOUNTER — OFFICE VISIT (OUTPATIENT)
Dept: CARDIOLOGY | Facility: CLINIC | Age: 59
End: 2019-07-25
Payer: COMMERCIAL

## 2019-07-25 VITALS
SYSTOLIC BLOOD PRESSURE: 100 MMHG | BODY MASS INDEX: 41.96 KG/M2 | HEIGHT: 68 IN | DIASTOLIC BLOOD PRESSURE: 64 MMHG | HEART RATE: 60 BPM | WEIGHT: 276.88 LBS

## 2019-07-25 DIAGNOSIS — E11.9 TYPE 2 DIABETES MELLITUS WITHOUT COMPLICATION, WITHOUT LONG-TERM CURRENT USE OF INSULIN: ICD-10-CM

## 2019-07-25 DIAGNOSIS — I87.2 VENOUS INSUFFICIENCY (CHRONIC) (PERIPHERAL): ICD-10-CM

## 2019-07-25 DIAGNOSIS — I48.19 PERSISTENT ATRIAL FIBRILLATION: Primary | ICD-10-CM

## 2019-07-25 DIAGNOSIS — Z79.899 LONG TERM CURRENT USE OF ANTIARRHYTHMIC DRUG: ICD-10-CM

## 2019-07-25 DIAGNOSIS — I10 ESSENTIAL HYPERTENSION: ICD-10-CM

## 2019-07-25 DIAGNOSIS — E66.01 MORBID OBESITY WITH BMI OF 40.0-44.9, ADULT: ICD-10-CM

## 2019-07-25 DIAGNOSIS — Z79.01 CURRENT USE OF LONG TERM ANTICOAGULATION: ICD-10-CM

## 2019-07-25 DIAGNOSIS — G47.33 OBSTRUCTIVE SLEEP APNEA SYNDROME: ICD-10-CM

## 2019-07-25 PROBLEM — I48.0 PAROXYSMAL ATRIAL FIBRILLATION: Status: RESOLVED | Noted: 2017-07-14 | Resolved: 2019-07-25

## 2019-07-25 PROCEDURE — 3078F DIAST BP <80 MM HG: CPT | Mod: CPTII,S$GLB,, | Performed by: NURSE PRACTITIONER

## 2019-07-25 PROCEDURE — 3074F SYST BP LT 130 MM HG: CPT | Mod: CPTII,S$GLB,, | Performed by: NURSE PRACTITIONER

## 2019-07-25 PROCEDURE — 3008F BODY MASS INDEX DOCD: CPT | Mod: CPTII,S$GLB,, | Performed by: NURSE PRACTITIONER

## 2019-07-25 PROCEDURE — 93000 EKG 12-LEAD: ICD-10-PCS | Mod: S$GLB,,, | Performed by: INTERNAL MEDICINE

## 2019-07-25 PROCEDURE — 3074F PR MOST RECENT SYSTOLIC BLOOD PRESSURE < 130 MM HG: ICD-10-PCS | Mod: CPTII,S$GLB,, | Performed by: NURSE PRACTITIONER

## 2019-07-25 PROCEDURE — 99999 PR PBB SHADOW E&M-EST. PATIENT-LVL IV: ICD-10-PCS | Mod: PBBFAC,,, | Performed by: NURSE PRACTITIONER

## 2019-07-25 PROCEDURE — 3008F PR BODY MASS INDEX (BMI) DOCUMENTED: ICD-10-PCS | Mod: CPTII,S$GLB,, | Performed by: NURSE PRACTITIONER

## 2019-07-25 PROCEDURE — 99999 PR PBB SHADOW E&M-EST. PATIENT-LVL IV: CPT | Mod: PBBFAC,,, | Performed by: NURSE PRACTITIONER

## 2019-07-25 PROCEDURE — 3078F PR MOST RECENT DIASTOLIC BLOOD PRESSURE < 80 MM HG: ICD-10-PCS | Mod: CPTII,S$GLB,, | Performed by: NURSE PRACTITIONER

## 2019-07-25 PROCEDURE — 93000 ELECTROCARDIOGRAM COMPLETE: CPT | Mod: S$GLB,,, | Performed by: INTERNAL MEDICINE

## 2019-07-25 PROCEDURE — 99214 PR OFFICE/OUTPT VISIT, EST, LEVL IV, 30-39 MIN: ICD-10-PCS | Mod: S$GLB,,, | Performed by: NURSE PRACTITIONER

## 2019-07-25 PROCEDURE — 99214 OFFICE O/P EST MOD 30 MIN: CPT | Mod: S$GLB,,, | Performed by: NURSE PRACTITIONER

## 2019-07-25 RX ORDER — FUROSEMIDE 40 MG/1
40 TABLET ORAL DAILY
Qty: 90 TABLET | Refills: 3 | Status: SHIPPED | OUTPATIENT
Start: 2019-07-25 | End: 2019-10-16

## 2019-07-25 NOTE — PROGRESS NOTES
Mr. Jha is a patient of Dr. Scott and was last seen in [facility] 2/8/18.      Subjective:   Patient ID:  Shiraz Jah is a 59 y.o. male who presents for follow-up of Hypertension    Problem List:  Aflutter - ablation 11/12 (Dr. Webb)  Afib - ablation 7/14  HTN  QUINTIN, using cpap  Obesity  Family h/o CAD      HPI:     Mr. Jha is in clinic today is in clinic today for routine follow up.  Reports chronic LE edema that is only minimally helped by furosemide.  He gets SOB going upstairs.  He walks a lot as part of his job.  Reports intermittent episodes of weakness and sweating similar to when he was in atrial fibrillation.  He only gets these episodes about every other month.  Patient is taking xarelto for thromboembolic prophylaxis.  Denies bleeding gums, epistaxis, hematuria, and hematochezia.      Review of Systems   Constitution: Negative for decreased appetite, diaphoresis, malaise/fatigue, weight gain and weight loss.   Eyes: Negative for visual disturbance.   Cardiovascular: Negative for chest pain, claudication, dyspnea on exertion, irregular heartbeat, leg swelling, near-syncope, orthopnea, palpitations, paroxysmal nocturnal dyspnea and syncope.        Denies chest pressure   Respiratory: Negative for cough, hemoptysis, shortness of breath, sleep disturbances due to breathing and snoring.    Endocrine: Negative for cold intolerance and heat intolerance.   Hematologic/Lymphatic: Negative for bleeding problem. Does not bruise/bleed easily.   Musculoskeletal: Positive for arthritis, back pain, joint pain and muscle cramps. Negative for myalgias.   Gastrointestinal: Positive for diarrhea (with consumption of dairy). Negative for bloating, abdominal pain, anorexia, change in bowel habit, constipation, nausea and vomiting.   Neurological: Positive for excessive daytime sleepiness, headaches, loss of balance and numbness (and tingling in his hands). Negative for difficulty with concentration, disturbances in  coordination, dizziness, light-headedness and weakness.   Psychiatric/Behavioral: Positive for depression. The patient is nervous/anxious. The patient does not have insomnia.        Allergies and current medications updated and reviewed:  Review of patient's allergies indicates:   Allergen Reactions    Iodinated contrast- oral and iv dye Other (See Comments)     Raises BP       Current Outpatient Medications   Medication Sig    acetaminophen (TYLENOL) 500 MG tablet Take 500 mg by mouth every 12 (twelve) hours.    aspirin 81 MG Chew Take 1 tablet (81 mg total) by mouth once daily.    buPROPion (WELLBUTRIN XL) 300 MG 24 hr tablet Take 300 mg by mouth once daily.      desoximetasone (TOPICORT) 0.05 % cream Apply topically 2 (two) times daily.    dextroamphetamine-amphetamine 30 mg Tab TK 1 T PO  QAM AND 1/2 T IN THE AFTERNOON    furosemide (LASIX) 40 MG tablet Take 1 tablet (40 mg total) by mouth once daily.    gabapentin (NEURONTIN) 300 MG capsule Take 1 capsule (300 mg total) by mouth 3 (three) times daily.    ibuprofen (ADVIL,MOTRIN) 200 MG tablet Take 200 mg by mouth every 12 (twelve) hours.    IMPOYZ 0.025 % Crea     levocetirizine (XYZAL) 5 MG tablet Take 1 tablet (5 mg total) by mouth every evening.    LUZU 1 % Crea     metoprolol tartrate (LOPRESSOR) 25 MG tablet Take 1 tablet (25 mg total) by mouth 2 (two) times daily.    NAFTIN 2 % Gel daily as needed.     nitroGLYCERIN (NITROSTAT) 0.4 MG SL tablet Place 1 tablet under the tongue daily as needed.    omeprazole (PRILOSEC) 20 MG capsule Take 1 capsule (20 mg total) by mouth once daily.    rivaroxaban (XARELTO) 20 mg Tab Take 1 tablet (20 mg total) by mouth once daily.    sotalol (BETAPACE) 80 MG tablet Take 1 tablet (80 mg total) by mouth 2 (two) times daily.    tamsulosin (FLOMAX) 0.4 mg Cp24 Take 1 capsule (0.4 mg total) by mouth 2 (two) times daily.    vortioxetine (TRINTELLIX) 20 mg Tab Take 20 mg by mouth once daily.      No current  "facility-administered medications for this visit.        Objective:          /64   Pulse 60   Ht 5' 8" (1.727 m)   Wt 125.6 kg (276 lb 14.4 oz)   BMI 42.10 kg/m²       Physical Exam   Constitutional: He is oriented to person, place, and time. Vital signs are normal. He appears well-developed and well-nourished. He is active. No distress.   HENT:   Head: Normocephalic and atraumatic.   Eyes: Conjunctivae and lids are normal. No scleral icterus.   Neck: Neck supple. Normal carotid pulses, no hepatojugular reflux and no JVD present. Carotid bruit is not present.   Cardiovascular: Normal rate, regular rhythm, S1 normal, S2 normal and intact distal pulses. PMI is not displaced. Exam reveals no gallop and no friction rub.   No murmur heard.  Pulses:       Carotid pulses are 2+ on the right side, and 2+ on the left side.       Radial pulses are 2+ on the right side, and 2+ on the left side.        Dorsalis pedis pulses are 2+ on the right side, and 2+ on the left side.        Posterior tibial pulses are 1+ on the right side, and 1+ on the left side.   Pulmonary/Chest: Effort normal and breath sounds normal. No respiratory distress. He has no decreased breath sounds. He has no wheezes. He has no rhonchi. He has no rales. He exhibits no tenderness.   Abdominal: Soft. Normal appearance and bowel sounds are normal. He exhibits no distension, no fluid wave, no abdominal bruit, no ascites and no pulsatile midline mass. There is no hepatosplenomegaly. There is no tenderness.   Musculoskeletal: He exhibits edema (BLE +2 pitting to knees).   Neurological: He is alert and oriented to person, place, and time. Gait normal.   Skin: Skin is warm, dry and intact. No rash noted. He is not diaphoretic. Nails show no clubbing.   Psychiatric: He has a normal mood and affect. His speech is normal and behavior is normal. Judgment and thought content normal. Cognition and memory are normal.   Nursing note and vitals reviewed.      " Chemistry        Component Value Date/Time     07/23/2019 1035    K 4.0 07/23/2019 1035     07/23/2019 1035    CO2 28 07/23/2019 1035    BUN 12 07/23/2019 1035    CREATININE 1.2 07/23/2019 1035    GLU 92 07/23/2019 1035        Component Value Date/Time    CALCIUM 10.0 07/23/2019 1035    ALKPHOS 84 04/23/2019 1058    AST 23 07/23/2019 1035    ALT 16 07/23/2019 1035    BILITOT 0.6 04/23/2019 1058    ESTGFRAFRICA >60.0 07/23/2019 1035    EGFRNONAA >60.0 07/23/2019 1035        Lab Results   Component Value Date    HGBA1C 5.6 07/15/2017         Recent Labs   Lab 07/14/17  1650  04/23/19  1058 07/23/19  1035   WBC 9.53   < > 4.69  --    Hemoglobin 14.0   < > 11.9 L  --    Hematocrit 41.7   < > 38.3 L  --    Mean Corpuscular Volume 89   < > 89  --    Platelets 201   < > 223  --    BNP 11  --   --   --    TSH  --    < > 1.838  --    Cholesterol  --    < > 178 188   HDL  --    < > 36 L 44   LDL Cholesterol  --    < > 111.0 115.4   Triglycerides  --    < > 155 H 143   Hdl/Cholesterol Ratio  --    < > 20.2 23.4    < > = values in this interval not displayed.       Recent Labs   Lab 07/14/17  1650   INR 1.0        Test(s) Reviewed  I have reviewed the following in detail:  [] Stress test   [] Angiography   [x] Echocardiogram   [x] Labs   [] Other:         Assessment/Plan:   1. Persistent atrial fibrillation  RRR. No changes.     - IN OFFICE EKG 12-LEAD (to Muse); Future  - Magnesium; Future    2. Current use of long term anticoagulation  Denies bleeding.  Discussed when to seek immediate medical attention.       3. Long term current use of antiarrhythmic drug  Followed by Dr. Webb    4. Essential hypertension  BP at goal <130/80. Continue current regimen.    - furosemide (LASIX) 40 MG tablet; Take 1 tablet (40 mg total) by mouth once daily.  Dispense: 90 tablet; Refill: 3  - IN OFFICE EKG 12-LEAD (to Muse); Future  - Comprehensive metabolic panel; Future    5. Morbid obesity with BMI of 40.0-44.9, adult  BMI 42.1  Encouraged increased CV exercise to 30 minutes a day for 5 days a week.       6. Type 2 diabetes mellitus without complication, without long-term current use of insulin  A1C at goal <7. F/U with PCP as planned    - Comprehensive metabolic panel; Future  - Lipid panel; Future    7. Obstructive sleep apnea syndrome  Using cpap.  No f/u with sleep clinic in >10 years.  Reports some headaches and daytime sleepiness.  Refer to sleep clinic.     - Ambulatory referral to Sleep Disorders    8. Venous insufficiency (chronic) (peripheral)  CVP normal earlier this week on TTE so the LE edema is not from HF.  Suspect venous insufficiency.  Will get US to confirm dx.  If reflux is noted, plan to refer to vascular surgery.     - COMPRESSION STOCKINGS  - CV US Lower Extremity Veins Bilateral Insufficiency; Future    Patient was discussed with but not examined by Dr. Scott    Follow up in about 1 year (around 7/25/2020).

## 2019-07-25 NOTE — PATIENT INSTRUCTIONS
Increase cardiovascular exercise to 30 minutes of brisk walking a day for 4-5 days a week.  You can use a stationary bike or swim for 30 minutes a day instead of walking.  Whatever exercise you choose, make sure you are working hard enough to increase your heart rate.     Understanding Chronic Venous Insufficiency  Problems with the veins in the legs may lead to chronic venous insufficiency (CVI). CVI means that there is a long-term problem with the veins not being able to pump blood back to your heart. When this happens, blood stays in the legs and causes swelling and aching.   Two problems that may lead to chronic venous insufficiency are:  · Damaged valves. Valves keep blood flowing from the legs through the blood vessels and back to the heart. When the valves are damaged, blood does not flow as well.   · Deep vein thrombosis (DVT). Blood clots may form in the deep veins of the legs. This may cause pain, redness, and swelling in the legs. It may also block the flow of blood back to the heart. Seek immediate medical care if you have these symptoms.  · A blood clot in the leg can also break off and travel to the lungs. This is called pulmonary embolism (PE). In the lungs, the clot can cut off the flow of blood. This may cause chest pain, trouble breathing, sweating, a fast heartbeat, coughing (may cough up blood), and fainting. It is a medical emergency and may cause death. Call 911 if you have these symptoms.  · Healthcare providers call the two conditions, DVT and PE, venous thromboembolism (VTE).  CVI cant be cured, but you can control leg swelling to reduce the likelihood of ulcers (sores).  Recognizing the symptoms  Be aware of the following:  · If you stand or sit with your feet down for long periods, your legs may ache or feel heavy.  · Swollen ankles are possibly the most common symptom of CVI.  · As swelling increases, the skin over your ankles may show red spots or a brownish tinge. The skin may feel  leathery or scaly, and may start to itch.  · If swelling is not controlled, an ulcer (open wound) may form.  What you can do  Reduce your risk of developing ulcers by doing the following:  · Increase blood flow back to your heart by elevating your legs, exercising daily, and wearing elastic stockings.  · Boost blood flow in your legs by losing excess weight.  · If you must stand or sit in one place for a period of time, keep your blood moving by wiggling your toes, shifting your body position, and rising up on the balls of your feet.    Date Last Reviewed: 5/1/2016  © 6037-3502 [employer]. [address] [place] [address], [place] [address]. All rights reserved. This information is not intended as a substitute for professional medical care. Always follow your healthcare professional's instructions.

## 2019-08-06 ENCOUNTER — CLINICAL SUPPORT (OUTPATIENT)
Dept: CARDIOLOGY | Facility: CLINIC | Age: 59
End: 2019-08-06
Attending: NURSE PRACTITIONER
Payer: COMMERCIAL

## 2019-08-06 DIAGNOSIS — I87.2 VENOUS INSUFFICIENCY (CHRONIC) (PERIPHERAL): ICD-10-CM

## 2019-08-06 LAB
LEFT GREAT SAPHENOUS DISTAL THIGH DIA: 0.37 CM
LEFT GREAT SAPHENOUS JUNCTION DIA: 0.82 CM
LEFT GREAT SAPHENOUS KNEE DIA: 0.35 CM
LEFT GREAT SAPHENOUS MIDDLE THIGH DIA: 0.38 CM
LEFT GREAT SAPHENOUS PROXIMAL CALF DIA: 0.29 CM
LEFT SMALL SAPHENOUS KNEE DIA: 0.52 CM
LEFT SMALL SAPHENOUS SPJ DIA: 0.25 CM
RIGHT GREAT SAPHENOUS DISTAL THIGH DIA: 0.33 CM
RIGHT GREAT SAPHENOUS JUNCTION DIA: 0.71 CM
RIGHT GREAT SAPHENOUS KNEE DIA: 0.27 CM
RIGHT GREAT SAPHENOUS MIDDLE THIGH DIA: 0.48 CM
RIGHT GREAT SAPHENOUS PROXIMAL CALF DIA: 0.23 CM
RIGHT SMALL SAPHENOUS KNEE DIA: 0.36 CM
RIGHT SMALL SAPHENOUS SPJ DIA: 0.23 CM

## 2019-08-06 PROCEDURE — 93970 CV US LOWER VENOUS INSUFFICIENCY BILATERAL (CUPID ONLY): ICD-10-PCS | Mod: S$GLB,,, | Performed by: INTERNAL MEDICINE

## 2019-08-06 PROCEDURE — 93970 EXTREMITY STUDY: CPT | Mod: S$GLB,,, | Performed by: INTERNAL MEDICINE

## 2019-08-16 ENCOUNTER — PATIENT MESSAGE (OUTPATIENT)
Dept: CARDIOLOGY | Facility: CLINIC | Age: 59
End: 2019-08-16

## 2019-08-28 ENCOUNTER — OFFICE VISIT (OUTPATIENT)
Dept: UROLOGY | Facility: CLINIC | Age: 59
End: 2019-08-28
Payer: COMMERCIAL

## 2019-08-28 VITALS
BODY MASS INDEX: 42.24 KG/M2 | DIASTOLIC BLOOD PRESSURE: 78 MMHG | WEIGHT: 277.75 LBS | SYSTOLIC BLOOD PRESSURE: 136 MMHG | HEART RATE: 84 BPM

## 2019-08-28 DIAGNOSIS — N40.1 BENIGN NON-NODULAR PROSTATIC HYPERPLASIA WITH LOWER URINARY TRACT SYMPTOMS: Primary | ICD-10-CM

## 2019-08-28 DIAGNOSIS — N40.0 BENIGN NON-NODULAR PROSTATIC HYPERPLASIA WITHOUT LOWER URINARY TRACT SYMPTOMS: ICD-10-CM

## 2019-08-28 PROCEDURE — 3075F PR MOST RECENT SYSTOLIC BLOOD PRESS GE 130-139MM HG: ICD-10-PCS | Mod: CPTII,S$GLB,, | Performed by: UROLOGY

## 2019-08-28 PROCEDURE — 99213 PR OFFICE/OUTPT VISIT, EST, LEVL III, 20-29 MIN: ICD-10-PCS | Mod: S$GLB,,, | Performed by: UROLOGY

## 2019-08-28 PROCEDURE — 3075F SYST BP GE 130 - 139MM HG: CPT | Mod: CPTII,S$GLB,, | Performed by: UROLOGY

## 2019-08-28 PROCEDURE — 3078F PR MOST RECENT DIASTOLIC BLOOD PRESSURE < 80 MM HG: ICD-10-PCS | Mod: CPTII,S$GLB,, | Performed by: UROLOGY

## 2019-08-28 PROCEDURE — 3078F DIAST BP <80 MM HG: CPT | Mod: CPTII,S$GLB,, | Performed by: UROLOGY

## 2019-08-28 PROCEDURE — 99999 PR PBB SHADOW E&M-EST. PATIENT-LVL III: ICD-10-PCS | Mod: PBBFAC,,, | Performed by: UROLOGY

## 2019-08-28 PROCEDURE — 99213 OFFICE O/P EST LOW 20 MIN: CPT | Mod: S$GLB,,, | Performed by: UROLOGY

## 2019-08-28 PROCEDURE — 3008F BODY MASS INDEX DOCD: CPT | Mod: CPTII,S$GLB,, | Performed by: UROLOGY

## 2019-08-28 PROCEDURE — 3008F PR BODY MASS INDEX (BMI) DOCUMENTED: ICD-10-PCS | Mod: CPTII,S$GLB,, | Performed by: UROLOGY

## 2019-08-28 PROCEDURE — 99999 PR PBB SHADOW E&M-EST. PATIENT-LVL III: CPT | Mod: PBBFAC,,, | Performed by: UROLOGY

## 2019-08-28 RX ORDER — TAMSULOSIN HYDROCHLORIDE 0.4 MG/1
0.4 CAPSULE ORAL 2 TIMES DAILY
Qty: 180 CAPSULE | Refills: 3 | Status: SHIPPED | OUTPATIENT
Start: 2019-08-28 | End: 2020-08-24

## 2019-08-28 NOTE — PROGRESS NOTES
Subjective:      Patient ID: Shiraz Jha is a 59 y.o. male.    Chief Complaint: Follow-up and Medication Refill (flomax 2x a day)    HPI  Patient is a 59 y.o. male who is established to our clinic and was initially referred by their PCP, Dr. Theodore for evaluation of BPH.     Benign Prostatic Hypertrophy  Patient complains of lower urinary tract symptoms. He reports intermittency, straining and weak stream. He denies incomplete emptying, nocturia and urgency. Patient states symptoms are of moderate severity. Onset of symptoms was several years ago and was gradual in onset. . He has no personal history but does have a family history of prostate cancer. He reports a history of no complicating symptoms. He denies flank pain, gross hematuria, kidney stones and recurrent UTI.   He takes flomax which helps his urinating symptoms considerably.  His father had prostate cancer.     Lab Results   Component Value Date    PSA 0.26 01/02/2014    PSADIAG 0.48 05/21/2018    PSADIAG 0.30 12/08/2014         Review of Systems  All other systems reviewed and negative except pertinent positives noted in HPI.    Objective:     Physical Exam   Constitutional: He is oriented to person, place, and time. He appears well-developed and well-nourished. No distress.   HENT:   Head: Normocephalic and atraumatic.   Eyes: No scleral icterus.   Neck: No tracheal deviation present.   Pulmonary/Chest: Effort normal. No respiratory distress.   Neurological: He is alert and oriented to person, place, and time.   Psychiatric: He has a normal mood and affect. His behavior is normal. Judgment and thought content normal.     Assessment:     1. Benign non-nodular prostatic hyperplasia with lower urinary tract symptoms    2. Benign non-nodular prostatic hyperplasia without lower urinary tract symptoms      Plan:     1. BPH:    -Avoid bladder irritants including but not limited to caffeine, alcohol, smoking, spicy foods, acidic foods, tomato-based products,  citrus, artificial sweeteners, chocolate, coffee or tea.    -prostate meds: flomax; continue.  Refill sent    -psa in 1 year    I spent 15 minutes with the patient of which more than half was spent in direct consultation with the patient in regards to our treatment and plan.

## 2019-09-03 ENCOUNTER — DOCUMENTATION ONLY (OUTPATIENT)
Dept: REHABILITATION | Facility: HOSPITAL | Age: 59
End: 2019-09-03

## 2019-09-03 NOTE — PROGRESS NOTES
PHYSICAL THERAPY DISCHARGE:    This patient was originally evaluated at our facility on: 8/13/18         Pt attended PT for a total of 7 Visits receiving: Manual Therapy, Therex, Postural Education, Body Mechanics Training, Home Exercise Instruction     This patient's last visit occurred on: 9/28/18      Short term goals were achieved: no    Long term goals were achieved: no    Pt was DC'd from our care secondary to: pt did not attend subsequent visits       Therapist: Sabra Cline, PT  9/3/2019

## 2019-09-05 ENCOUNTER — PATIENT OUTREACH (OUTPATIENT)
Dept: ADMINISTRATIVE | Facility: OTHER | Age: 59
End: 2019-09-05

## 2019-09-10 ENCOUNTER — OFFICE VISIT (OUTPATIENT)
Dept: SLEEP MEDICINE | Facility: CLINIC | Age: 59
End: 2019-09-10
Payer: COMMERCIAL

## 2019-09-10 VITALS
SYSTOLIC BLOOD PRESSURE: 115 MMHG | HEIGHT: 68 IN | HEART RATE: 53 BPM | WEIGHT: 276.69 LBS | BODY MASS INDEX: 41.93 KG/M2 | DIASTOLIC BLOOD PRESSURE: 69 MMHG

## 2019-09-10 DIAGNOSIS — G47.33 OBSTRUCTIVE SLEEP APNEA: Primary | ICD-10-CM

## 2019-09-10 PROCEDURE — 3078F PR MOST RECENT DIASTOLIC BLOOD PRESSURE < 80 MM HG: ICD-10-PCS | Mod: CPTII,S$GLB,, | Performed by: NURSE PRACTITIONER

## 2019-09-10 PROCEDURE — 3078F DIAST BP <80 MM HG: CPT | Mod: CPTII,S$GLB,, | Performed by: NURSE PRACTITIONER

## 2019-09-10 PROCEDURE — 99999 PR PBB SHADOW E&M-EST. PATIENT-LVL IV: CPT | Mod: PBBFAC,,, | Performed by: NURSE PRACTITIONER

## 2019-09-10 PROCEDURE — 99999 PR PBB SHADOW E&M-EST. PATIENT-LVL IV: ICD-10-PCS | Mod: PBBFAC,,, | Performed by: NURSE PRACTITIONER

## 2019-09-10 PROCEDURE — 99203 OFFICE O/P NEW LOW 30 MIN: CPT | Mod: S$GLB,,, | Performed by: NURSE PRACTITIONER

## 2019-09-10 PROCEDURE — 3008F BODY MASS INDEX DOCD: CPT | Mod: CPTII,S$GLB,, | Performed by: NURSE PRACTITIONER

## 2019-09-10 PROCEDURE — 3074F PR MOST RECENT SYSTOLIC BLOOD PRESSURE < 130 MM HG: ICD-10-PCS | Mod: CPTII,S$GLB,, | Performed by: NURSE PRACTITIONER

## 2019-09-10 PROCEDURE — 3008F PR BODY MASS INDEX (BMI) DOCUMENTED: ICD-10-PCS | Mod: CPTII,S$GLB,, | Performed by: NURSE PRACTITIONER

## 2019-09-10 PROCEDURE — 3074F SYST BP LT 130 MM HG: CPT | Mod: CPTII,S$GLB,, | Performed by: NURSE PRACTITIONER

## 2019-09-10 PROCEDURE — 99203 PR OFFICE/OUTPT VISIT, NEW, LEVL III, 30-44 MIN: ICD-10-PCS | Mod: S$GLB,,, | Performed by: NURSE PRACTITIONER

## 2019-09-10 NOTE — LETTER
September 10, 2019      Twyla Bahena NP  [address]  [facility] [address]           [facility] [place] [facility]  [address]  [facility] [address]  Phone: [phone]          Patient: Shiraz Jha   MR Number: [id number]   YOB: 1960   Date of Visit: 9/10/2019       Dear Twyla Bahena:    Thank you for referring Shiraz Jha to me for evaluation. Attached you will find relevant portions of my assessment and plan of care.    If you have questions, please do not hesitate to call me. I look forward to following Shiraz Jha along with you.    Sincerely,    Lizzie Gomez, DOMINGO    Enclosure  CC:  No Recipients    If you would like to receive this communication electronically, please contact [email or web address] or [phone] to request more information on [facility] access.    For providers and/or their staff who would like to refer a patient to [facility], please contact us through our one-stop-shop provider referral line, [facility], at [phone].    If you feel you have received this communication in error or would no longer like to receive these types of communications, please e-mail [email or web address]

## 2019-09-10 NOTE — PROGRESS NOTES
Shiraz Jha  was seen as a new patient, referred by DOMINGO Bahena, for the management of obstructive sleep apnea.     CHIEF COMPLAINT: CPAP monitoring    HISTORY OF PRESENT ILLNESS:Shiraz Jha a 59 y.o. male presents for the management of obstructive sleep apnea. He was diagnosed with sleep apnea by study done in [place] ~ 2009. No record available for review today. Has remained adherent with cpap 14cm since this time. Had more mask leaks with former FFM. Now using old bulky nose mask but no chin strap. Dislikes it.Having inc'd oral drying, enough to contribute to cavities. Disrupted sleep only occasionally 1-2x. Only occasionally has difficulty falling asleep. Snoring resolved and no air gasping.     Hx gastric bypass  Tried in past- ambien 5mg  Afib, hx RFA  BP stable    Denies symptoms of restless legs or kicking during sleep.       EPWORTH SLEEPINESS SCALE 9/10/2019   Sitting and reading 3   Watching TV 0   Sitting, inactive in a public place (e.g. a theatre or a meeting) 3   As a passenger in a car for an hour without a break 3   Lying down to rest in the afternoon when circumstances permit 3   Sitting and talking to someone 0   Sitting quietly after a lunch without alcohol 3   In a car, while stopped for a few minutes in traffic 0   Total score 15     Sleep Clinic New Patient 9/10/2019   What time do you go to bed on a week day? (Give a range) 11:00 PM   What time do you go to bed on a day off? (Give a range) 11:00 PM   How long does it take you to fall asleep? (Give a range) Depends, on activities during the day. 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   On average, how many times per night do you wake up? 3 to 4 times a week   How long does it take you to fall back into sleep? (Give a range) A good while   What time do you wake up to start your day on a week day? (Give a range) My Alarm is set for 5:30 AM. When do I get out of bed, 6 - 8 AM and I need to start the day at 7:00 am for work   What time do you wake up  "to start your day on a day off? (Give a range) Between 8:00 AM - 9:00 AM   What time do you get out of bed? (Give a range) My Alarm is set for 5:30 AM. When do I get out of bed, 6 - 7 AM   On average, how many hours do you sleep? 6-7 hours   On average, how many naps do you take per day? 0-1   Rate your sleep quality from 0 to 5 (0-poor, 5-great). 2   Have you experienced:  Weight gain?   How much weight have you lost or gained (in lbs.) in the last year? 100 lbs   On average, how many times per night do you go to the bathroom?  0-1   Have you ever had a sleep study/CPAP machine/surgery for sleep apnea? Yes   Have you ever had a CPAP machine for sleep apnea? Yes   Have you ever had surgery for sleep apnea? No     FAMILY HISTORY: No known sleep disorders. Mom snore    SOCIAL HISTORy: Single, route     REVIEW OF SYSTEMS:  Sleep related symptoms as per hospitals  Sleep Clinic ROS  9/10/2019   Difficulty breathing through the nose?  Yes   Sore throat or dry mouth in the morning? Yes   Irregular or very fast heart beat?  Sometimes   Shortness of breath?  Sometimes   Acid reflux? No   Body aches and pains?  Yes   Morning headaches? Sometimes   Dizziness? No   Mood changes?  Sometimes   Do you exercise?  Sometimes   Do you feel like moving your legs a lot?  No       PHYSICAL EXAM:   /69 (BP Location: Left arm, Patient Position: Sitting, BP Method: Large (Automatic))   Pulse (!) 53   Ht 5' 8" (1.727 m)   Wt 125.5 kg (276 lb 10.8 oz)   BMI 42.07 kg/m²   GENERAL:  Morbid obese body habitus, well groomed   HEENT: Conjunctivae are non-erythematous; Pupils equal, round, and reactive to light; Nose is symmetrical  SKIN: On face and neck: No abrasions, no rashes, no lesions  RESPIRATORY: Normal chest expansion and non-labored breathing at rest.   CARDIOVASCULAR:regular rate and rhythm  EXTREMITIES: No edema. No clubbing. No cyanosis. Station normal. Gait normal.   NEURO/PSYCH: Oriented to time, place and person. " Normal attention span and concentration. Affect is full. Mood is normal.     Interrogation- machine fair condition, no filters. Old worn true blue mask. AHI 1.9, avg use 7:42h/night. 0% leak and 0% periodic    ASSESSMENT:     QUINTIN, dx'd 2009. Remains adherent with cpap benefiting from therapy. Eligible for new machine and could use new supplies. PSG not readily available.   He has medical comorbidities of morbid obesity, hypertension,paroxysmal AFibrillation s/p successful RFA. Warrants continued treatment      PLAN:   1. Continue C PAP 14cm. Attempt obtain outside sleep study to get new machine/supplies sooner, otherwise get home sleep study.     2. Discussed etiology of QUINTIN and potential ramifications of untreated QUINTIN, including stroke, heart disease, HTN      Thank you for allowing me the opportunity to participate in the care of your patient

## 2019-09-19 ENCOUNTER — OFFICE VISIT (OUTPATIENT)
Dept: CARDIOLOGY | Facility: CLINIC | Age: 59
End: 2019-09-19
Payer: COMMERCIAL

## 2019-09-19 VITALS
HEIGHT: 68 IN | BODY MASS INDEX: 41.6 KG/M2 | SYSTOLIC BLOOD PRESSURE: 112 MMHG | WEIGHT: 274.5 LBS | HEART RATE: 68 BPM | DIASTOLIC BLOOD PRESSURE: 66 MMHG

## 2019-09-19 DIAGNOSIS — I48.19 PERSISTENT ATRIAL FIBRILLATION: ICD-10-CM

## 2019-09-19 DIAGNOSIS — I87.2 VENOUS INSUFFICIENCY (CHRONIC) (PERIPHERAL): Primary | ICD-10-CM

## 2019-09-19 DIAGNOSIS — Z98.890 STATUS POST CATHETER ABLATION OF ATRIAL FIBRILLATION: ICD-10-CM

## 2019-09-19 DIAGNOSIS — I48.92 ATRIAL FLUTTER, UNSPECIFIED TYPE: ICD-10-CM

## 2019-09-19 DIAGNOSIS — E11.9 TYPE 2 DIABETES MELLITUS WITHOUT COMPLICATION, WITHOUT LONG-TERM CURRENT USE OF INSULIN: ICD-10-CM

## 2019-09-19 DIAGNOSIS — Z79.01 CURRENT USE OF LONG TERM ANTICOAGULATION: ICD-10-CM

## 2019-09-19 DIAGNOSIS — Z98.890 STATUS POST CATHETER ABLATION OF ATRIAL FLUTTER: ICD-10-CM

## 2019-09-19 DIAGNOSIS — I48.91 ATRIAL FIBRILLATION, UNSPECIFIED TYPE: Primary | ICD-10-CM

## 2019-09-19 DIAGNOSIS — G47.33 OBSTRUCTIVE SLEEP APNEA SYNDROME: ICD-10-CM

## 2019-09-19 DIAGNOSIS — I10 ESSENTIAL HYPERTENSION: ICD-10-CM

## 2019-09-19 DIAGNOSIS — Z98.84 STATUS POST GASTRIC BYPASS FOR OBESITY: ICD-10-CM

## 2019-09-19 DIAGNOSIS — E66.01 MORBID OBESITY WITH BMI OF 40.0-44.9, ADULT: ICD-10-CM

## 2019-09-19 PROCEDURE — 3078F PR MOST RECENT DIASTOLIC BLOOD PRESSURE < 80 MM HG: ICD-10-PCS | Mod: CPTII,S$GLB,, | Performed by: NURSE PRACTITIONER

## 2019-09-19 PROCEDURE — 3008F PR BODY MASS INDEX (BMI) DOCUMENTED: ICD-10-PCS | Mod: CPTII,S$GLB,, | Performed by: NURSE PRACTITIONER

## 2019-09-19 PROCEDURE — 99214 OFFICE O/P EST MOD 30 MIN: CPT | Mod: S$GLB,,, | Performed by: NURSE PRACTITIONER

## 2019-09-19 PROCEDURE — 99999 PR PBB SHADOW E&M-EST. PATIENT-LVL IV: ICD-10-PCS | Mod: PBBFAC,,, | Performed by: NURSE PRACTITIONER

## 2019-09-19 PROCEDURE — 99999 PR PBB SHADOW E&M-EST. PATIENT-LVL IV: CPT | Mod: PBBFAC,,, | Performed by: NURSE PRACTITIONER

## 2019-09-19 PROCEDURE — 3074F SYST BP LT 130 MM HG: CPT | Mod: CPTII,S$GLB,, | Performed by: NURSE PRACTITIONER

## 2019-09-19 PROCEDURE — 3008F BODY MASS INDEX DOCD: CPT | Mod: CPTII,S$GLB,, | Performed by: NURSE PRACTITIONER

## 2019-09-19 PROCEDURE — 3078F DIAST BP <80 MM HG: CPT | Mod: CPTII,S$GLB,, | Performed by: NURSE PRACTITIONER

## 2019-09-19 PROCEDURE — 3074F PR MOST RECENT SYSTOLIC BLOOD PRESSURE < 130 MM HG: ICD-10-PCS | Mod: CPTII,S$GLB,, | Performed by: NURSE PRACTITIONER

## 2019-09-19 PROCEDURE — 99214 PR OFFICE/OUTPT VISIT, EST, LEVL IV, 30-39 MIN: ICD-10-PCS | Mod: S$GLB,,, | Performed by: NURSE PRACTITIONER

## 2019-09-19 RX ORDER — NITROGLYCERIN 0.4 MG/1
TABLET SUBLINGUAL
Qty: 100 TABLET | Refills: 0 | Status: SHIPPED | OUTPATIENT
Start: 2019-09-19

## 2019-09-19 NOTE — PATIENT INSTRUCTIONS
Please make a follow up with Dr. Webb for November    Take the xarelto 20 mg with your biggest/heaviest meal.

## 2019-09-19 NOTE — PROGRESS NOTES
Mr. Jha is a patient of Dr. Scott and was last seen in [facility] 7/25/2019.      Subjective:   Patient ID:  Shiraz Jha is a 59 y.o. male who presents for follow-up of Leg Swelling    Problem List:  Aflutter - ablation 11/12 (Dr. Webb)  Afib - ablation 7/14  HTN  QUINTIN, using cpap  Obesity  -s/p gastric bypass   Family h/o CAD      HPI:     Mr. Jha is in clinic today for continued LE edema and discoloration of his legs.  He had a venous ultrasound on 8/6/19 for the swelling and mild reflux was noted in the left popliteal.  Patient denies chest pain with exertion or at rest, palpitations, SOB, DAI, dizziness, syncope, orthopnea, PND, or claudication.  Reports no routine exercise.  He is working an active job with lots of walking and bending during the day.  Patient has history of obstructive sleep apnea.  Reports nightly use of CPAP machine and denies any associated sx of QUINTIN.    Review of Systems   Constitution: Negative for decreased appetite, diaphoresis, malaise/fatigue, weight gain and weight loss.   Eyes: Negative for visual disturbance.   Cardiovascular: Positive for leg swelling. Negative for chest pain, claudication, dyspnea on exertion, irregular heartbeat, near-syncope, orthopnea, palpitations, paroxysmal nocturnal dyspnea and syncope.        Denies chest pressure   Respiratory: Negative for cough, hemoptysis, shortness of breath, sleep disturbances due to breathing and snoring.    Endocrine: Negative for cold intolerance and heat intolerance.   Hematologic/Lymphatic: Negative for bleeding problem. Does not bruise/bleed easily.   Musculoskeletal: Negative for myalgias.   Gastrointestinal: Negative for bloating, abdominal pain, anorexia, change in bowel habit, constipation, diarrhea, nausea and vomiting.   Neurological: Negative for difficulty with concentration, disturbances in coordination, excessive daytime sleepiness, dizziness, headaches, light-headedness, loss of balance, numbness and  weakness.   Psychiatric/Behavioral: The patient does not have insomnia.        Allergies and current medications updated and reviewed:  Review of patient's allergies indicates:   Allergen Reactions    Iodinated contrast media Other (See Comments)     Raises BP       Current Outpatient Medications   Medication Sig    acetaminophen (TYLENOL) 500 MG tablet Take 500 mg by mouth every 12 (twelve) hours.    aspirin 81 MG Chew Take 1 tablet (81 mg total) by mouth once daily.    buPROPion (WELLBUTRIN XL) 300 MG 24 hr tablet Take 300 mg by mouth once daily.      desoximetasone (TOPICORT) 0.05 % cream Apply topically 2 (two) times daily. (Patient taking differently: Apply topically 2 (two) times daily. Pt using prn.)    dextroamphetamine-amphetamine 30 mg Tab TK 1 T PO  QAM AND 1/2 T IN THE AFTERNOON    furosemide (LASIX) 40 MG tablet Take 1 tablet (40 mg total) by mouth once daily.    gabapentin (NEURONTIN) 300 MG capsule Take 1 capsule (300 mg total) by mouth 3 (three) times daily.    ibuprofen (ADVIL,MOTRIN) 200 MG tablet Take 200 mg by mouth every 12 (twelve) hours.    IMPOYZ 0.025 % Crea Apply topically as needed.     LUZU 1 % Crea Apply topically as needed.     metoprolol tartrate (LOPRESSOR) 25 MG tablet Take 1 tablet (25 mg total) by mouth 2 (two) times daily.    NAFTIN 2 % Gel daily as needed.     nitroGLYCERIN (NITROSTAT) 0.4 MG SL tablet Please dispense 25 pills in 4 bottles.    omeprazole (PRILOSEC) 20 MG capsule Take 1 capsule (20 mg total) by mouth once daily.    rivaroxaban (XARELTO) 20 mg Tab Take 1 tablet (20 mg total) by mouth once daily.    sotalol (BETAPACE) 80 MG tablet Take 1 tablet (80 mg total) by mouth 2 (two) times daily.    tamsulosin (FLOMAX) 0.4 mg Cap Take 1 capsule (0.4 mg total) by mouth 2 (two) times daily.    vortioxetine (TRINTELLIX) 20 mg Tab Take 20 mg by mouth once daily.      No current facility-administered medications for this visit.      Objective:          BP  "112/66   Pulse 68   Ht 5' 8" (1.727 m)   Wt 124.5 kg (274 lb 7.6 oz)   BMI 41.73 kg/m²       Physical Exam   Constitutional: He is oriented to person, place, and time. Vital signs are normal. He appears well-developed and well-nourished. He is active. No distress.   HENT:   Head: Normocephalic and atraumatic.   Eyes: Conjunctivae and lids are normal. No scleral icterus.   Neck: Neck supple. Normal carotid pulses, no hepatojugular reflux and no JVD present. Carotid bruit is not present.   Cardiovascular: Normal rate, S1 normal, S2 normal and intact distal pulses. An irregularly irregular rhythm present. PMI is not displaced. Exam reveals no gallop and no friction rub.   No murmur heard.  Pulses:       Carotid pulses are 2+ on the right side, and 2+ on the left side.       Radial pulses are 2+ on the right side, and 2+ on the left side.        Dorsalis pedis pulses are 2+ on the right side, and 2+ on the left side.        Posterior tibial pulses are 1+ on the right side, and 1+ on the left side.   Pulmonary/Chest: Effort normal and breath sounds normal. No respiratory distress. He has no decreased breath sounds. He has no wheezes. He has no rhonchi. He has no rales. He exhibits no tenderness.   Abdominal: Soft. Normal appearance and bowel sounds are normal. He exhibits no distension, no fluid wave, no abdominal bruit, no ascites and no pulsatile midline mass. There is no hepatosplenomegaly. There is no tenderness.   Musculoskeletal: He exhibits edema (BLE soft +2 pitting edema to knees  ).   Neurological: He is alert and oriented to person, place, and time. Gait normal.   Skin: Skin is warm, dry and intact. No rash noted. He is not diaphoretic. Nails show no clubbing.   Psychiatric: He has a normal mood and affect. His speech is normal and behavior is normal. Judgment and thought content normal. Cognition and memory are normal.   Nursing note and vitals reviewed.      Chemistry        Component Value Date/Time    "  07/23/2019 1035    K 4.0 07/23/2019 1035     07/23/2019 1035    CO2 28 07/23/2019 1035    BUN 12 07/23/2019 1035    CREATININE 1.2 07/23/2019 1035    GLU 92 07/23/2019 1035        Component Value Date/Time    CALCIUM 10.0 07/23/2019 1035    ALKPHOS 84 04/23/2019 1058    AST 23 07/23/2019 1035    ALT 16 07/23/2019 1035    BILITOT 0.6 04/23/2019 1058    ESTGFRAFRICA >60.0 07/23/2019 1035    EGFRNONAA >60.0 07/23/2019 1035        Lab Results   Component Value Date    HGBA1C 5.6 07/15/2017     Recent Labs   Lab 07/14/17  1650  04/23/19  1058 07/23/19  1035   WBC 9.53   < > 4.69  --    Hemoglobin 14.0   < > 11.9 L  --    Hematocrit 41.7   < > 38.3 L  --    Mean Corpuscular Volume 89   < > 89  --    Platelets 201   < > 223  --    BNP 11  --   --   --    TSH  --    < > 1.838  --    Cholesterol  --    < > 178 188   HDL  --    < > 36 L 44   LDL Cholesterol  --    < > 111.0 115.4   Triglycerides  --    < > 155 H 143   Hdl/Cholesterol Ratio  --    < > 20.2 23.4    < > = values in this interval not displayed.       Recent Labs   Lab 07/14/17  1650   INR 1.0        Test(s) Reviewed  I have reviewed the following in detail:  [] Stress test   [] Angiography   [x] Echocardiogram   [x] Labs   [] Other:         Assessment/Plan:   1. Venous insufficiency (chronic) (peripheral)  He had a TTE 7/23 with normal cvp and LV systolic function.  LE edema is unchanged from previous.  Instructed to elevate legs when seated, low salt diet, increase walking and compression stockings. No venous reflux noted on US.  Refer to Dr. Ureña in interventional cardiology.     - COMPRESSION STOCKINGS  - Ambulatory Referral to Interventional Cardiology    2. Morbid obesity with BMI of 40.0-44.9, adult  BMI 41.7 Encouraged increased CV exercise to 30 minutes a day for 5 days a week.       3. Essential hypertension  BP at goal <130/80. Continue current regimen.      4. Atrial flutter, unspecified type      5. Persistent atrial  fibrillation  Irregularly irregular rhythm.    6. Status post catheter ablation of atrial fibrillation      7. Status post catheter ablation of atrial flutter      8. Current use of long term anticoagulation  Denies bleeding.  Discussed when to seek immediate medical attention.       9. Type 2 diabetes mellitus without complication, without long-term current use of insulin  A1C at goal <7. F/U with PCP as planned      10. Obstructive sleep apnea syndrome  Encouraged nightly cpap use.    11. Status post gastric bypass for obesity         Patient was discussed with but not examined by Dr. Scott    Follow up in about 1 year (around 9/19/2020).

## 2019-09-24 ENCOUNTER — PATIENT MESSAGE (OUTPATIENT)
Dept: SLEEP MEDICINE | Facility: CLINIC | Age: 59
End: 2019-09-24

## 2019-09-24 ENCOUNTER — TELEPHONE (OUTPATIENT)
Dept: SLEEP MEDICINE | Facility: OTHER | Age: 59
End: 2019-09-24

## 2019-10-01 ENCOUNTER — TELEPHONE (OUTPATIENT)
Dept: SLEEP MEDICINE | Facility: OTHER | Age: 59
End: 2019-10-01

## 2019-10-11 ENCOUNTER — PATIENT OUTREACH (OUTPATIENT)
Dept: ADMINISTRATIVE | Facility: OTHER | Age: 59
End: 2019-10-11

## 2019-10-11 DIAGNOSIS — E11.9 TYPE 2 DIABETES MELLITUS WITHOUT COMPLICATION, WITHOUT LONG-TERM CURRENT USE OF INSULIN: Primary | ICD-10-CM

## 2019-10-16 ENCOUNTER — INITIAL CONSULT (OUTPATIENT)
Dept: CARDIOLOGY | Facility: CLINIC | Age: 59
End: 2019-10-16
Payer: COMMERCIAL

## 2019-10-16 ENCOUNTER — LAB VISIT (OUTPATIENT)
Dept: LAB | Facility: HOSPITAL | Age: 59
End: 2019-10-16
Attending: INTERNAL MEDICINE
Payer: COMMERCIAL

## 2019-10-16 VITALS
OXYGEN SATURATION: 97 % | DIASTOLIC BLOOD PRESSURE: 64 MMHG | HEART RATE: 60 BPM | SYSTOLIC BLOOD PRESSURE: 110 MMHG | BODY MASS INDEX: 42.13 KG/M2 | HEIGHT: 68 IN | WEIGHT: 278 LBS

## 2019-10-16 DIAGNOSIS — I48.0 PAROXYSMAL ATRIAL FIBRILLATION: ICD-10-CM

## 2019-10-16 DIAGNOSIS — I10 ESSENTIAL HYPERTENSION: ICD-10-CM

## 2019-10-16 DIAGNOSIS — E66.01 MORBID OBESITY WITH BMI OF 40.0-44.9, ADULT: ICD-10-CM

## 2019-10-16 DIAGNOSIS — I20.89 STABLE ANGINA PECTORIS: ICD-10-CM

## 2019-10-16 DIAGNOSIS — R60.0 EDEMA OF BOTH LOWER EXTREMITIES: ICD-10-CM

## 2019-10-16 DIAGNOSIS — Z79.01 CURRENT USE OF LONG TERM ANTICOAGULATION: ICD-10-CM

## 2019-10-16 DIAGNOSIS — Z98.890 STATUS POST CATHETER ABLATION OF ATRIAL FIBRILLATION: ICD-10-CM

## 2019-10-16 DIAGNOSIS — R60.0 EDEMA OF BOTH LOWER EXTREMITIES: Primary | ICD-10-CM

## 2019-10-16 DIAGNOSIS — Z98.890 STATUS POST CATHETER ABLATION OF ATRIAL FLUTTER: ICD-10-CM

## 2019-10-16 DIAGNOSIS — I87.2 VENOUS INSUFFICIENCY OF LEFT LOWER EXTREMITY: ICD-10-CM

## 2019-10-16 LAB
ALBUMIN SERPL BCP-MCNC: 3.8 G/DL (ref 3.5–5.2)
ALP SERPL-CCNC: 82 U/L (ref 55–135)
ALT SERPL W/O P-5'-P-CCNC: 16 U/L (ref 10–44)
ANION GAP SERPL CALC-SCNC: 10 MMOL/L (ref 8–16)
AST SERPL-CCNC: 26 U/L (ref 10–40)
BILIRUB SERPL-MCNC: 0.9 MG/DL (ref 0.1–1)
BUN SERPL-MCNC: 13 MG/DL (ref 6–20)
CALCIUM SERPL-MCNC: 9.4 MG/DL (ref 8.7–10.5)
CHLORIDE SERPL-SCNC: 103 MMOL/L (ref 95–110)
CO2 SERPL-SCNC: 26 MMOL/L (ref 23–29)
CREAT SERPL-MCNC: 1 MG/DL (ref 0.5–1.4)
EST. GFR  (AFRICAN AMERICAN): >60 ML/MIN/1.73 M^2
EST. GFR  (NON AFRICAN AMERICAN): >60 ML/MIN/1.73 M^2
GLUCOSE SERPL-MCNC: 90 MG/DL (ref 70–110)
POTASSIUM SERPL-SCNC: 4.1 MMOL/L (ref 3.5–5.1)
PROT SERPL-MCNC: 6.6 G/DL (ref 6–8.4)
SODIUM SERPL-SCNC: 139 MMOL/L (ref 136–145)

## 2019-10-16 PROCEDURE — 99215 OFFICE O/P EST HI 40 MIN: CPT | Mod: S$GLB,,, | Performed by: INTERNAL MEDICINE

## 2019-10-16 PROCEDURE — 36415 COLL VENOUS BLD VENIPUNCTURE: CPT | Mod: PO

## 2019-10-16 PROCEDURE — 99999 PR PBB SHADOW E&M-EST. PATIENT-LVL V: CPT | Mod: PBBFAC,,, | Performed by: INTERNAL MEDICINE

## 2019-10-16 PROCEDURE — 80053 COMPREHEN METABOLIC PANEL: CPT

## 2019-10-16 PROCEDURE — 3008F PR BODY MASS INDEX (BMI) DOCUMENTED: ICD-10-PCS | Mod: CPTII,S$GLB,, | Performed by: INTERNAL MEDICINE

## 2019-10-16 PROCEDURE — 3008F BODY MASS INDEX DOCD: CPT | Mod: CPTII,S$GLB,, | Performed by: INTERNAL MEDICINE

## 2019-10-16 PROCEDURE — 3078F DIAST BP <80 MM HG: CPT | Mod: CPTII,S$GLB,, | Performed by: INTERNAL MEDICINE

## 2019-10-16 PROCEDURE — 99999 PR PBB SHADOW E&M-EST. PATIENT-LVL V: ICD-10-PCS | Mod: PBBFAC,,, | Performed by: INTERNAL MEDICINE

## 2019-10-16 PROCEDURE — 3074F PR MOST RECENT SYSTOLIC BLOOD PRESSURE < 130 MM HG: ICD-10-PCS | Mod: CPTII,S$GLB,, | Performed by: INTERNAL MEDICINE

## 2019-10-16 PROCEDURE — 3074F SYST BP LT 130 MM HG: CPT | Mod: CPTII,S$GLB,, | Performed by: INTERNAL MEDICINE

## 2019-10-16 PROCEDURE — 99215 PR OFFICE/OUTPT VISIT, EST, LEVL V, 40-54 MIN: ICD-10-PCS | Mod: S$GLB,,, | Performed by: INTERNAL MEDICINE

## 2019-10-16 PROCEDURE — 3078F PR MOST RECENT DIASTOLIC BLOOD PRESSURE < 80 MM HG: ICD-10-PCS | Mod: CPTII,S$GLB,, | Performed by: INTERNAL MEDICINE

## 2019-10-16 RX ORDER — FUROSEMIDE 40 MG/1
40 TABLET ORAL 2 TIMES DAILY
Qty: 180 TABLET | Refills: 3 | Status: SHIPPED | OUTPATIENT
Start: 2019-10-16 | End: 2019-11-27

## 2019-10-16 NOTE — PROGRESS NOTES
"[facility]    CC: Lower Extremity Edema      Patient ID: Shiraz Jha is a 59 y.o. male with a past medical history of Afib s/p ablation 7/14, HTN, DM2, morbid obesity s/p gastric bypass, who presents for an initial appointment.  Pertinent history/events are as follows:    -Pt kindly referred by Twyla Bahena and Dr Scott for evaluation of lower extremity edema (per Dr. Scott's note on 9/24/2019:  "Mr. Jha is in clinic today for continued LE edema and discoloration of his legs.  He had a venous ultrasound on 8/6/19 for the swelling and mild reflux was noted in the left popliteal.  Patient denies chest pain with exertion or at rest, palpitations, SOB, DAI, dizziness, syncope, orthopnea, PND, or claudication.  Instructed to elevate legs when seated, low salt diet, increase walking and compression stockings. No venous reflux noted on US.  Refer to Dr. Ureña in interventional cardiology."    HPI:  Mr. Jha reports BLE edema for the past 1 year.  States LE edema is improved with graduated compression hose.  He has no LE pain, claudication symptoms, rest pain, or tissue loss.  No smoking history.  States he has gained over 50 pounds in the past year, despite gastric bypass surgery.  BLE Venous Reflux Study from 8/6/2019 showed no evidence of lower extremity DVT bilaterally.  There is mild left popliteal (deep venous) reflux.      Past Medical History:   Diagnosis Date    Allergy     Asthma     Atrial fibrillation     Cataract     Chronic rhinitis 9/26/2013    DDD (degenerative disc disease) 4/3/2015    Depression     Diabetes mellitus     Fibromyalgia     Gastroesophageal reflux disease without esophagitis 7/14/2017    Hypertension     Sinusitis, acute, maxillary 12/20/2012    Thyroid disease      Past Surgical History:   Procedure Laterality Date    CERVICAL SPINE SURGERY      EPIDURAL STEROID INJECTION INTO LUMBAR SPINE N/A 5/29/2018    Procedure: INJECTION-STEROID-EPIDURAL-LUMBAR- " L4-5;  Surgeon: Roman Lyman MD;  Location: [facility];  Service: Pain Management;  Laterality: N/A;  Patient is diabetic and Xarleto     EPIDURAL STEROID INJECTION INTO LUMBAR SPINE N/A 7/3/2018    Procedure: INJECTION, STEROID, SPINE, LUMBAR, EPIDURAL- L4-5;  Surgeon: Roman Lyman MD;  Location: [facility];  Service: Pain Management;  Laterality: N/A;  Patient takes Xarelto and ASA     GASTRIC BYPASS      SINUS SURGERY  2000    [facility]    TONSILLECTOMY       Social History     Socioeconomic History    Marital status: Single     Spouse name: Not on file    Number of children: Not on file    Years of education: Not on file    Highest education level: Not on file   Occupational History    Not on file   Social Needs    Financial resource strain: Not on file    Food insecurity:     Worry: Not on file     Inability: Not on file    Transportation needs:     Medical: Not on file     Non-medical: Not on file   Tobacco Use    Smoking status: Never Smoker    Smokeless tobacco: Never Used   Substance and Sexual Activity    Alcohol use: No     Comment: rare    Drug use: No    Sexual activity: Yes     Partners: Female   Lifestyle    Physical activity:     Days per week: Not on file     Minutes per session: Not on file    Stress: Not on file   Relationships    Social connections:     Talks on phone: Not on file     Gets together: Not on file     Attends Gnosticism service: Not on file     Active member of club or organization: Not on file     Attends meetings of clubs or organizations: Not on file     Relationship status: Not on file   Other Topics Concern    Not on file   Social History Narrative    [place], lives alone w/2 dogs.    Dogs are his kids.    Works PT --  at [employer], on ssdi from neck and back/depression.     Family History   Problem Relation Age of Onset    Heart disease Father     Cancer Mother         lung    Hypertension Sister     Heart disease  Brother     Cirrhosis Brother     Diabetes Brother        Review of patient's allergies indicates:   Allergen Reactions    Iodinated contrast media Other (See Comments)     Raises BP         Medication List with Changes/Refills   Current Medications    ACETAMINOPHEN (TYLENOL) 500 MG TABLET    Take 500 mg by mouth every 12 (twelve) hours.    ASPIRIN 81 MG CHEW    Take 1 tablet (81 mg total) by mouth once daily.    BUPROPION (WELLBUTRIN XL) 300 MG 24 HR TABLET    Take 300 mg by mouth once daily.      DESOXIMETASONE (TOPICORT) 0.05 % CREAM    Apply topically 2 (two) times daily.    DEXTROAMPHETAMINE-AMPHETAMINE 30 MG TAB    TK 1 T PO  QAM AND 1/2 T IN THE AFTERNOON    FUROSEMIDE (LASIX) 40 MG TABLET    Take 1 tablet (40 mg total) by mouth once daily.    GABAPENTIN (NEURONTIN) 300 MG CAPSULE    Take 1 capsule (300 mg total) by mouth 3 (three) times daily.    IBUPROFEN (ADVIL,MOTRIN) 200 MG TABLET    Take 200 mg by mouth every 12 (twelve) hours.    IMPOYZ 0.025 % CREA    Apply topically as needed.     LUZU 1 % CREA    Apply topically as needed.     METOPROLOL TARTRATE (LOPRESSOR) 25 MG TABLET    Take 1 tablet (25 mg total) by mouth 2 (two) times daily.    NAFTIN 2 % GEL    daily as needed.     NITROGLYCERIN (NITROSTAT) 0.4 MG SL TABLET    Please dispense 25 pills in 4 bottles.    OMEPRAZOLE (PRILOSEC) 20 MG CAPSULE    Take 1 capsule (20 mg total) by mouth once daily.    RIVAROXABAN (XARELTO) 20 MG TAB    Take 1 tablet (20 mg total) by mouth once daily.    SOTALOL (BETAPACE) 80 MG TABLET    Take 1 tablet (80 mg total) by mouth 2 (two) times daily.    TAMSULOSIN (FLOMAX) 0.4 MG CAP    Take 1 capsule (0.4 mg total) by mouth 2 (two) times daily.    VORTIOXETINE (TRINTELLIX) 20 MG TAB    Take 20 mg by mouth once daily.        Review of Systems  Constitution: Denies chills, fever, and sweats.  HENT: Denies headaches or blurry vision.  Cardiovascular: Denies chest pain or irregular heart beat.  Respiratory: Denies cough or  shortness of breath.  Gastrointestinal: Denies abdominal pain, nausea, or vomiting.  Musculoskeletal: Positive for BLE edema.  Neurological: Denies dizziness or focal weakness.  Psychiatric/Behavioral: Normal mental status.  Hematologic/Lymphatic: Denies bleeding problem or easy bruising/bleeding.  Skin: Denies rash or suspicious lesions    Physical Examination  There were no vitals taken for this visit.    Constitutional: No acute distress, conversant  HEENT: Sclera anicteric, Pupils equal, round and reactive to light, extraocular motions intact, Oropharynx clear  Neck: No JVD, no carotid bruits  Cardiovascular: regular rate and rhythm, no murmur, rubs or gallops, normal S1/S2  Pulmonary: Clear to auscultation bilaterally  Abdominal: Abdomen soft, nontender, nondistended, positive bowel sounds  Extremities: No lower extremity edema,   Pulses:  Carotid pulses are 2+ on the right side, and 2+ on the left side.  Radial pulses are 2+ on the right side, and 2+ on the left side.   Femoral pulses are 2+ on the right side, and 2+ on the left side.  Popliteal pulses are 2+ on the right side, and 2+ on the left side.   Dorsalis pedis pulses are 2+ on the right side, and 2+ on the left side.   Posterior tibial pulses are 2+ on the right side, and 2+ on the left side.    Skin: No ecchymosis, erythema, or ulcers  Psych: Alert and oriented x 3, appropriate affect  Neuro: CNII-XII intact, no focal deficits    Labs:  Most Recent Data  CBC:   Lab Results   Component Value Date    WBC 4.69 04/23/2019    HGB 11.9 (L) 04/23/2019    HCT 38.3 (L) 04/23/2019     04/23/2019    MCV 89 04/23/2019    RDW 14.6 (H) 04/23/2019     BMP:   Lab Results   Component Value Date     07/23/2019    K 4.0 07/23/2019     07/23/2019    CO2 28 07/23/2019    BUN 12 07/23/2019    CREATININE 1.2 07/23/2019    GLU 92 07/23/2019    CALCIUM 10.0 07/23/2019    MG 1.9 07/15/2017    PHOS 3.7 07/15/2017     LFTS;   Lab Results   Component Value  Date    PROT 6.5 04/23/2019    ALBUMIN 3.7 04/23/2019    BILITOT 0.6 04/23/2019    AST 23 07/23/2019    ALKPHOS 84 04/23/2019    ALT 16 07/23/2019     COAGS:   Lab Results   Component Value Date    INR 1.0 07/14/2017     FLP:   Lab Results   Component Value Date    CHOL 188 07/23/2019    HDL 44 07/23/2019    LDLCALC 115.4 07/23/2019    TRIG 143 07/23/2019    CHOLHDL 23.4 07/23/2019     CARDIAC:   Lab Results   Component Value Date    TROPONINI 0.015 07/15/2017    BNP 11 07/14/2017       Echo 7/23/2019:  · Normal left ventricular systolic function. The estimated ejection fraction is 60%  · Normal right ventricular systolic function.  · Normal LV diastolic function.  · Moderate left atrial enlargement.  · Mild right atrial enlargement.  · The ascending aorta is mildly dilated (42mm in diameter, unchanged since Feb 2018).  · The estimated PA systolic pressure is 23 mm Hg  · Normal central venous pressure (3 mm Hg).       BLE Venous Reflux Study 8/6/2019:  No evidence of lower extremity DVT bilaterally.  Mild left popliteal (deep venous) reflux.      Assessment/Plan:  Shiraz Jha is a 59 y.o. male with a past medical history of Afib s/p ablation 7/14, HTN, DM2, morbid obesity s/p gastric bypass, who presents for an initial appointment.      1. BLE Edema- BLE edema likely due to a component of venous insufficiency and morbid obesity.  Check cmp today.  If potassium and creatinine are appropriate, increase lasix to 40 mg bid for 7 days, then resume at 40 mg daily.  Pt to continue this cycle/regimen for lasiz.  Pt will benefit from significant weight loss.  Continue graduated compression hose, leg elevation and low salt diet.  Limit fluid intake to 2 liters a day.  Check exercise SURINDER to evaluate for significant PAD.     2. Morbid Obesity- Refer back to Bariatric Surgery.     Follow up in 1 month    Total duration of face to face visit time 30 minutes.  Total time spent counseling greater than fifty percent of total visit  time.  Counseling included discussion regarding imaging findings, diagnosis, possibilities, treatment options, risks and benefits.  The patient had many questions regarding the options and long-term effects.    Randolph Ureña MD, PhD  Interventional Cardiology

## 2019-10-16 NOTE — LETTER
October 16, 2019      Twyla Bahena, DOMINGO  [address]  [facility] [address]           [place] [facility]  [address]  [place] [address]  Phone: [phone]          Patient: Shiraz Jha   MR Number: [id number]   YOB: 1960   Date of Visit: 10/16/2019       Dear Twyla Bahena:    Thank you for referring Shiraz Jha to me for evaluation. Attached you will find relevant portions of my assessment and plan of care.    If you have questions, please do not hesitate to call me. I look forward to following Shiraz Jha along with you.    Sincerely,    Randolph Ureña MD PhD    Enclosure  CC:  No Recipients    If you would like to receive this communication electronically, please contact [email or web address] or [phone] to request more information on [employer] Link access.    For providers and/or their staff who would like to refer a patient to [facility], please contact us through our one-stop-shop provider referral line, [facility] , at [phone].    If you feel you have received this communication in error or would no longer like to receive these types of communications, please e-mail [email or web address]

## 2019-10-16 NOTE — PATIENT INSTRUCTIONS
Assessment/Plan:  Shiraz Jha is a 59 y.o. male with a past medical history of Afib s/p ablation 7/14, HTN, DM2, morbid obesity s/p gastric bypass, who presents for an initial appointment.      1. BLE Edema- BLE edema likely due to a component of venous insufficiency and morbid obesity.  Check cmp today.  If potassium and creatinine are appropriate, increase lasix to 40 mg bid for 7 days, then resume at 40 mg daily.  Pt to continue this cycle/regimen for lasiz.  Pt will benefit from significant weight loss.  Continue graduated compression hose, leg elevation and low salt diet.  Limit fluid intake to 2 liters a day.       2. Morbid Obesity- Refer back to Bariatric Surgery.     Follow up in 1 month

## 2019-10-18 ENCOUNTER — TELEPHONE (OUTPATIENT)
Dept: SLEEP MEDICINE | Facility: OTHER | Age: 59
End: 2019-10-18

## 2019-10-21 ENCOUNTER — HOSPITAL ENCOUNTER (OUTPATIENT)
Dept: SLEEP MEDICINE | Facility: OTHER | Age: 59
Discharge: HOME | End: 2019-10-21
Attending: NURSE PRACTITIONER
Payer: COMMERCIAL

## 2019-10-21 DIAGNOSIS — G47.33 OBSTRUCTIVE SLEEP APNEA: ICD-10-CM

## 2019-10-21 PROCEDURE — 95800 SLP STDY UNATTENDED: CPT

## 2019-11-06 ENCOUNTER — PATIENT MESSAGE (OUTPATIENT)
Dept: SLEEP MEDICINE | Facility: CLINIC | Age: 59
End: 2019-11-06

## 2019-11-06 ENCOUNTER — TELEPHONE (OUTPATIENT)
Dept: SLEEP MEDICINE | Facility: CLINIC | Age: 59
End: 2019-11-06

## 2019-11-06 DIAGNOSIS — G47.33 OBSTRUCTIVE SLEEP APNEA: Primary | ICD-10-CM

## 2019-11-06 PROCEDURE — 95800 SLP STDY UNATTENDED: CPT | Mod: 26,,, | Performed by: PSYCHIATRY & NEUROLOGY

## 2019-11-06 PROCEDURE — 95800 PR SLEEP STUDY, UNATTENDED, RECORD HEART RATE/O2 SAT/RESP ANAL/SLEEP TIME: ICD-10-PCS | Mod: 26,,, | Performed by: PSYCHIATRY & NEUROLOGY

## 2019-11-06 NOTE — TELEPHONE ENCOUNTER
I left pt a vm letting him know that his sleep study results aren't available yet. We will contact him as soon as we have his results.

## 2019-11-06 NOTE — TELEPHONE ENCOUNTER
----- Message from Abbe Toure sent at 11/6/2019  2:13 PM CST -----  Contact: CAPRI GHOSH   Name of Who is Calling: CAPRI GHOSH       What is the request in detail: Patient is requesting a call back regarding his sleep study results and supplies. Please contact to further advise.      Can the clinic reply by [facility]: NO      [facility] [phone]

## 2019-11-12 DIAGNOSIS — I48.19 PERSISTENT ATRIAL FIBRILLATION: ICD-10-CM

## 2019-11-24 ENCOUNTER — PATIENT OUTREACH (OUTPATIENT)
Dept: ADMINISTRATIVE | Facility: OTHER | Age: 59
End: 2019-11-24

## 2019-11-25 ENCOUNTER — CLINICAL SUPPORT (OUTPATIENT)
Dept: CARDIOLOGY | Facility: CLINIC | Age: 59
End: 2019-11-25
Attending: INTERNAL MEDICINE
Payer: COMMERCIAL

## 2019-11-25 DIAGNOSIS — I48.0 PAROXYSMAL ATRIAL FIBRILLATION: ICD-10-CM

## 2019-11-25 DIAGNOSIS — R60.0 EDEMA OF BOTH LOWER EXTREMITIES: ICD-10-CM

## 2019-11-25 DIAGNOSIS — Z79.01 CURRENT USE OF LONG TERM ANTICOAGULATION: ICD-10-CM

## 2019-11-25 DIAGNOSIS — I10 ESSENTIAL HYPERTENSION: ICD-10-CM

## 2019-11-25 DIAGNOSIS — I87.2 VENOUS INSUFFICIENCY OF LEFT LOWER EXTREMITY: ICD-10-CM

## 2019-11-25 DIAGNOSIS — E66.01 MORBID OBESITY WITH BMI OF 40.0-44.9, ADULT: ICD-10-CM

## 2019-11-25 DIAGNOSIS — Z98.890 STATUS POST CATHETER ABLATION OF ATRIAL FLUTTER: ICD-10-CM

## 2019-11-25 DIAGNOSIS — I20.89 STABLE ANGINA PECTORIS: ICD-10-CM

## 2019-11-25 DIAGNOSIS — Z98.890 STATUS POST CATHETER ABLATION OF ATRIAL FIBRILLATION: ICD-10-CM

## 2019-11-25 LAB
ABI CLAUDICATION TIME: 0 MIN
IMMEDIATE ARM BP: 142 MMHG
IMMEDIATE LEFT ABI: 1.35
IMMEDIATE LEFT TIBIAL: 191 MMHG
IMMEDIATE RIGHT ABI: 1.35
IMMEDIATE RIGHT TIBIAL: 191 MMHG
LEFT ABI: 1.24
LEFT ARM BP: 125 MMHG
LEFT DORSALIS PEDIS: 152 MMHG
LEFT POSTERIOR TIBIAL: 155 MMHG
RIGHT ABI: 1.34
RIGHT ARM BP: 119 MMHG
RIGHT DORSALIS PEDIS: 71 MMHG
RIGHT POSTERIOR TIBIAL: 168 MMHG
TREADMILL GRADE: 12 %
TREADMILL SPEED: 2 MPH
TREADMILL TIME: 5 MIN

## 2019-11-25 PROCEDURE — 93924 CV US ANKLE / BRACHIAL INDICES W/ EXERCISE STRESS (CUPID ONLY): ICD-10-PCS | Mod: S$GLB,,, | Performed by: INTERNAL MEDICINE

## 2019-11-25 PROCEDURE — 93924 LWR XTR VASC STDY BILAT: CPT | Mod: S$GLB,,, | Performed by: INTERNAL MEDICINE

## 2019-11-27 ENCOUNTER — OFFICE VISIT (OUTPATIENT)
Dept: CARDIOLOGY | Facility: CLINIC | Age: 59
End: 2019-11-27
Payer: COMMERCIAL

## 2019-11-27 VITALS
HEART RATE: 61 BPM | BODY MASS INDEX: 41.26 KG/M2 | HEIGHT: 68 IN | WEIGHT: 272.25 LBS | DIASTOLIC BLOOD PRESSURE: 62 MMHG | SYSTOLIC BLOOD PRESSURE: 105 MMHG

## 2019-11-27 DIAGNOSIS — E66.01 MORBID OBESITY WITH BMI OF 40.0-44.9, ADULT: ICD-10-CM

## 2019-11-27 DIAGNOSIS — I87.2 VENOUS INSUFFICIENCY OF LEFT LOWER EXTREMITY: ICD-10-CM

## 2019-11-27 DIAGNOSIS — R60.0 EDEMA OF BOTH LOWER EXTREMITIES: Primary | ICD-10-CM

## 2019-11-27 DIAGNOSIS — Z98.84 STATUS POST GASTRIC BYPASS FOR OBESITY: ICD-10-CM

## 2019-11-27 PROCEDURE — 3078F PR MOST RECENT DIASTOLIC BLOOD PRESSURE < 80 MM HG: ICD-10-PCS | Mod: CPTII,S$GLB,, | Performed by: INTERNAL MEDICINE

## 2019-11-27 PROCEDURE — 99215 PR OFFICE/OUTPT VISIT, EST, LEVL V, 40-54 MIN: ICD-10-PCS | Mod: S$GLB,,, | Performed by: INTERNAL MEDICINE

## 2019-11-27 PROCEDURE — 3078F DIAST BP <80 MM HG: CPT | Mod: CPTII,S$GLB,, | Performed by: INTERNAL MEDICINE

## 2019-11-27 PROCEDURE — 3074F SYST BP LT 130 MM HG: CPT | Mod: CPTII,S$GLB,, | Performed by: INTERNAL MEDICINE

## 2019-11-27 PROCEDURE — 99215 OFFICE O/P EST HI 40 MIN: CPT | Mod: S$GLB,,, | Performed by: INTERNAL MEDICINE

## 2019-11-27 PROCEDURE — 99999 PR PBB SHADOW E&M-EST. PATIENT-LVL V: CPT | Mod: PBBFAC,,, | Performed by: INTERNAL MEDICINE

## 2019-11-27 PROCEDURE — 99999 PR PBB SHADOW E&M-EST. PATIENT-LVL V: ICD-10-PCS | Mod: PBBFAC,,, | Performed by: INTERNAL MEDICINE

## 2019-11-27 PROCEDURE — 3008F PR BODY MASS INDEX (BMI) DOCUMENTED: ICD-10-PCS | Mod: CPTII,S$GLB,, | Performed by: INTERNAL MEDICINE

## 2019-11-27 PROCEDURE — 3074F PR MOST RECENT SYSTOLIC BLOOD PRESSURE < 130 MM HG: ICD-10-PCS | Mod: CPTII,S$GLB,, | Performed by: INTERNAL MEDICINE

## 2019-11-27 PROCEDURE — 3008F BODY MASS INDEX DOCD: CPT | Mod: CPTII,S$GLB,, | Performed by: INTERNAL MEDICINE

## 2019-11-27 RX ORDER — BUMETANIDE 1 MG/1
1 TABLET ORAL 2 TIMES DAILY
Qty: 60 TABLET | Refills: 11 | Status: SHIPPED | OUTPATIENT
Start: 2019-11-27 | End: 2020-12-01 | Stop reason: SDUPTHER

## 2019-11-27 NOTE — PATIENT INSTRUCTIONS
Assessment/Plan:  Shiraz Jha is a 59 y.o. male with a past medical history of Afib s/p ablation 7/14, HTN, DM2, morbid obesity s/p gastric bypass, who presents for a follow up appointment.      1. BLE Edema- BLE edema likely due to a component of venous insufficiency and morbid obesity.  Check cmp today.  If potassium and creatinine are appropriate, discontinue lasix and start bumex 1 mg bid.  Pt will benefit from significant weight loss.  Continue graduated compression hose, leg elevation and low salt diet.  Limit fluid intake to 2 liters a day.  Check exercise SURINDER to evaluate for significant PAD.     2. Morbid Obesity- Pt referred back to Bariatric Surgery.     Check cmp today and in 1 week  Follow up in 3 weeks

## 2019-11-27 NOTE — PROGRESS NOTES
"[facility]    CC: Lower Extremity Edema    Patient ID: Shiraz Jha is a 59 y.o. male with a past medical history of Afib s/p ablation 7/14, HTN, DM2, morbid obesity s/p gastric bypass, who presents for a follow up appointment.  Pertinent history/events are as follows:    -Pt kindly referred by Twyla Bahena and Dr Scott for evaluation of lower extremity edema (per Dr. Scott's note on 9/24/2019:  "Mr. Jha is in clinic today for continued LE edema and discoloration of his legs.  He had a venous ultrasound on 8/6/19 for the swelling and mild reflux was noted in the left popliteal.  Patient denies chest pain with exertion or at rest, palpitations, SOB, DAI, dizziness, syncope, orthopnea, PND, or claudication.  Instructed to elevate legs when seated, low salt diet, increase walking and compression stockings. No venous reflux noted on US.  Refer to Dr. Ureña in interventional cardiology."    -At our initial clinic visit on 10/16/2019, Mr. Jha reported BLE edema for the past 1 year.  States LE edema is improved with graduated compression hose.  He has no LE pain, claudication symptoms, rest pain, or tissue loss.  No smoking history.  States he has gained over 50 pounds in the past year, despite gastric bypass surgery.  BLE Venous Reflux Study from 8/6/2019 showed no evidence of lower extremity DVT bilaterally.  There is mild left popliteal (deep venous) reflux.  Plan:   BLE Edema- BLE edema likely due to a component of venous insufficiency and morbid obesity.  Check cmp today.  If potassium and creatinine are appropriate, increase lasix to 40 mg bid for 7 days, then resume at 40 mg daily.  Pt to continue this cycle/regimen for lasix.  Pt will benefit from significant weight loss.  Continue graduated compression hose, leg elevation and low salt diet.  Limit fluid intake to 2 liters a day.  Check exercise SURINDER to evaluate for significant PAD.   Morbid Obesity- Refer back to Bariatric Surgery. "     HPI:  Mr. Jha reports no significant improvement in BLE edema with lasix regimen of 40 mg bid for 7 days, then resuming at 40 mg daily.  Exercise SURINDER from 11/15/2019 showed normal rest and exercise SURINDER bilaterally with normal PVR waveforms bilaterally.  Exam shows 1+ BLE pitting edema.      Past Medical History:   Diagnosis Date    Allergy     Asthma     Atrial fibrillation     Cataract     Chronic rhinitis 9/26/2013    DDD (degenerative disc disease) 4/3/2015    Depression     Diabetes mellitus     Fibromyalgia     Gastroesophageal reflux disease without esophagitis 7/14/2017    Hypertension     Sinusitis, acute, maxillary 12/20/2012    Thyroid disease      Past Surgical History:   Procedure Laterality Date    CERVICAL SPINE SURGERY      EPIDURAL STEROID INJECTION INTO LUMBAR SPINE N/A 5/29/2018    Procedure: INJECTION-STEROID-EPIDURAL-LUMBAR- L4-5;  Surgeon: Roman Lyman MD;  Location: [facility];  Service: Pain Management;  Laterality: N/A;  Patient is diabetic and Xarleto     EPIDURAL STEROID INJECTION INTO LUMBAR SPINE N/A 7/3/2018    Procedure: INJECTION, STEROID, SPINE, LUMBAR, EPIDURAL- L4-5;  Surgeon: Roman Lyman MD;  Location: [facility];  Service: Pain Management;  Laterality: N/A;  Patient takes Xarelto and ASA     GASTRIC BYPASS      SINUS SURG[facility]  2000    [facility]    TONSILLECTOMY       Social History     Socioeconomic History    Marital status: Single     Spouse name: Not on file    Number of children: Not on file    Years of education: Not on file    Highest education level: Not on file   Occupational History    Not on file   Social Needs    Financial resource strain: Not on file    Food insecurity:     Worry: Not on file     Inability: Not on file    Transportation needs:     Medical: Not on file     Non-medical: Not on file   Tobacco Use    Smoking status: Never Smoker    Smokeless tobacco: Never Used   Substance and Sexual Activity     Alcohol use: No     Comment: rare    Drug use: No    Sexual activity: Yes     Partners: Female   Lifestyle    Physical activity:     Days per week: Not on file     Minutes per session: Not on file    Stress: Not on file   Relationships    Social connections:     Talks on phone: Not on file     Gets together: Not on file     Attends [employer] service: Not on file     Active member of club or organization: Not on file     Attends meetings of clubs or organizations: Not on file     Relationship status: Not on file   Other Topics Concern    Not on file   Social History Narrative    [place], lives alone w/2 dogs.    Dogs are his kids.    Works PT --  at [employer], on ssdi from neck and back/depression.     Family History   Problem Relation Age of Onset    Heart disease Father     Cancer Mother         lung    Hypertension Sister     Heart disease Brother     Cirrhosis Brother     Diabetes Brother        Review of patient's allergies indicates:   Allergen Reactions    Iodinated contrast media Other (See Comments)     Raises BP         Medication List with Changes/Refills   Current Medications    ACETAMINOPHEN (TYLENOL) 500 MG TABLET    Take 500 mg by mouth every 12 (twelve) hours.    ASPIRIN 81 MG CHEW    Take 1 tablet (81 mg total) by mouth once daily.    BUPROPION (WELLBUTRIN XL) 300 MG 24 HR TABLET    Take 300 mg by mouth once daily.      DESOXIMETASONE (TOPICORT) 0.05 % CREAM    Apply topically 2 (two) times daily.    DEXTROAMPHETAMINE-AMPHETAMINE 30 MG TAB    Adderall    FUROSEMIDE (LASIX) 40 MG TABLET    Take 1 tablet (40 mg total) by mouth 2 (two) times daily.    GABAPENTIN (NEURONTIN) 300 MG CAPSULE    Take 1 capsule (300 mg total) by mouth 3 (three) times daily.    IBUPROFEN (ADVIL,MOTRIN) 200 MG TABLET    Take 200 mg by mouth every 12 (twelve) hours.    IMPOYZ 0.025 % CREA    Apply topically as needed.     LUZU 1 % CREA    Apply topically as needed.     METOPROLOL TARTRATE (LOPRESSOR) 25 MG  "TABLET    Take 1 tablet (25 mg total) by mouth 2 (two) times daily.    NAFTIN 2 % GEL    daily as needed.     NITROGLYCERIN (NITROSTAT) 0.4 MG SL TABLET    Please dispense 25 pills in 4 bottles.    OMEPRAZOLE (PRILOSEC) 20 MG CAPSULE    Take 1 capsule (20 mg total) by mouth once daily.    RIVAROXABAN (XARELTO) 20 MG TAB    Take 1 tablet (20 mg total) by mouth once daily.    SOTALOL (BETAPACE) 80 MG TABLET    Take 1 tablet (80 mg total) by mouth 2 (two) times daily.    TAMSULOSIN (FLOMAX) 0.4 MG CAP    Take 1 capsule (0.4 mg total) by mouth 2 (two) times daily.    VORTIOXETINE (TRINTELLIX) 20 MG TAB    Take 20 mg by mouth once daily.        Review of Systems  Constitution: Denies chills, fever, and sweats.  HENT: Denies headaches or blurry vision.  Cardiovascular: Denies chest pain or irregular heart beat.  Respiratory: Denies cough or shortness of breath.  Gastrointestinal: Denies abdominal pain, nausea, or vomiting.  Musculoskeletal: Positive for BLE edema.  Neurological: Denies dizziness or focal weakness.  Psychiatric/Behavioral: Normal mental status.  Hematologic/Lymphatic: Denies bleeding problem or easy bruising/bleeding.  Skin: Denies rash or suspicious lesions    Physical Examination  /62   Pulse 61   Ht 5' 8" (1.727 m)   Wt 123.5 kg (272 lb 4.3 oz)   BMI 41.40 kg/m²     Constitutional: No acute distress, conversant  HEENT: Sclera anicteric, Pupils equal, round and reactive to light, extraocular motions intact, Oropharynx clear  Neck: No JVD, no carotid bruits  Cardiovascular: regular rate and rhythm, no murmur, rubs or gallops, normal S1/S2  Pulmonary: Clear to auscultation bilaterally  Abdominal: Abdomen soft, nontender, nondistended, positive bowel sounds  Extremities: 1+ pitting BLE edema   Pulses:  Carotid pulses are 2+ on the right side, and 2+ on the left side.  Radial pulses are 2+ on the right side, and 2+ on the left side.   Femoral pulses are 2+ on the right side, and 2+ on the left " side.  Popliteal pulses are 2+ on the right side, and 2+ on the left side.   Dorsalis pedis pulses are 2+ on the right side, and 2+ on the left side.   Posterior tibial pulses are 2+ on the right side, and 2+ on the left side.    Skin: No ecchymosis, erythema, or ulcers  Psych: Alert and oriented x 3, appropriate affect  Neuro: CNII-XII intact, no focal deficits    Labs:  Most Recent Data  CBC:   Lab Results   Component Value Date    WBC 4.69 04/23/2019    HGB 11.9 (L) 04/23/2019    HCT 38.3 (L) 04/23/2019     04/23/2019    MCV 89 04/23/2019    RDW 14.6 (H) 04/23/2019     BMP:   Lab Results   Component Value Date     10/16/2019    K 4.1 10/16/2019     10/16/2019    CO2 26 10/16/2019    BUN 13 10/16/2019    CREATININE 1.0 10/16/2019    GLU 90 10/16/2019    CALCIUM 9.4 10/16/2019    MG 1.9 07/15/2017    PHOS 3.7 07/15/2017     LFTS;   Lab Results   Component Value Date    PROT 6.6 10/16/2019    ALBUMIN 3.8 10/16/2019    BILITOT 0.9 10/16/2019    AST 26 10/16/2019    ALKPHOS 82 10/16/2019    ALT 16 10/16/2019     COAGS:   Lab Results   Component Value Date    INR 1.0 07/14/2017     FLP:   Lab Results   Component Value Date    CHOL 188 07/23/2019    HDL 44 07/23/2019    LDLCALC 115.4 07/23/2019    TRIG 143 07/23/2019    CHOLHDL 23.4 07/23/2019     CARDIAC:   Lab Results   Component Value Date    TROPONINI 0.015 07/15/2017    BNP 11 07/14/2017       Echo 7/23/2019:  · Normal left ventricular systolic function. The estimated ejection fraction is 60%  · Normal right ventricular systolic function.  · Normal LV diastolic function.  · Moderate left atrial enlargement.  · Mild right atrial enlargement.  · The ascending aorta is mildly dilated (42mm in diameter, unchanged since Feb 2018).  · The estimated PA systolic pressure is 23 mm Hg  · Normal central venous pressure (3 mm Hg).       Exercise SURINDER 11/15/2019:  Normal rest and exercise SURINDER bilaterally.  Normal PVR waveforms bilaterally.    BLE Venous Reflux  Study 8/6/2019:  No evidence of lower extremity DVT bilaterally.  Mild left popliteal (deep venous) reflux.      Assessment/Plan:  Shiraz Jha is a 59 y.o. male with a past medical history of Afib s/p ablation 7/14, HTN, DM2, morbid obesity s/p gastric bypass, who presents for a follow up appointment.      1. BLE Edema- BLE edema likely due to a component of venous insufficiency and morbid obesity.  Check cmp today.  If potassium and creatinine are appropriate, discontinue lasix and start bumex 1 mg bid.  Pt will benefit from significant weight loss.  Continue graduated compression hose, leg elevation and low salt diet.  Limit fluid intake to 2 liters a day.  Check exercise SURINDER to evaluate for significant PAD.     2. Morbid Obesity- Pt referred back to Bariatric Surgery.     Check cmp today and in 1 week  Follow up in 3 weeks    Total duration of face to face visit time 30 minutes.  Total time spent counseling greater than fifty percent of total visit time.  Counseling included discussion regarding imaging findings, diagnosis, possibilities, treatment options, risks and benefits.  The patient had many questions regarding the options and long-term effects.    Randolph Ureña MD, PhD  Interventional Cardiology

## 2019-11-29 ENCOUNTER — OFFICE VISIT (OUTPATIENT)
Dept: INTERNAL MEDICINE | Facility: CLINIC | Age: 59
End: 2019-11-29
Payer: COMMERCIAL

## 2019-11-29 ENCOUNTER — LAB VISIT (OUTPATIENT)
Dept: LAB | Facility: HOSPITAL | Age: 59
End: 2019-11-29
Attending: INTERNAL MEDICINE
Payer: COMMERCIAL

## 2019-11-29 VITALS
TEMPERATURE: 98 F | DIASTOLIC BLOOD PRESSURE: 68 MMHG | RESPIRATION RATE: 14 BRPM | HEIGHT: 68 IN | SYSTOLIC BLOOD PRESSURE: 116 MMHG | BODY MASS INDEX: 41.06 KG/M2 | HEART RATE: 58 BPM | WEIGHT: 270.94 LBS

## 2019-11-29 DIAGNOSIS — Z98.84 STATUS POST GASTRIC BYPASS FOR OBESITY: ICD-10-CM

## 2019-11-29 DIAGNOSIS — G44.89 CHRONIC MIXED HEADACHE SYNDROME: ICD-10-CM

## 2019-11-29 DIAGNOSIS — I87.2 VENOUS INSUFFICIENCY OF LEFT LOWER EXTREMITY: ICD-10-CM

## 2019-11-29 DIAGNOSIS — E66.01 MORBID OBESITY WITH BMI OF 40.0-44.9, ADULT: ICD-10-CM

## 2019-11-29 DIAGNOSIS — J32.4 PANSINUSITIS, UNSPECIFIED CHRONICITY: ICD-10-CM

## 2019-11-29 DIAGNOSIS — S09.90XD TRAUMATIC INJURY OF HEAD, SUBSEQUENT ENCOUNTER: ICD-10-CM

## 2019-11-29 DIAGNOSIS — R60.0 EDEMA OF BOTH LOWER EXTREMITIES: ICD-10-CM

## 2019-11-29 DIAGNOSIS — G44.89 OTHER HEADACHE SYNDROME: Primary | ICD-10-CM

## 2019-11-29 LAB
ALBUMIN SERPL BCP-MCNC: 3.9 G/DL (ref 3.5–5.2)
ALP SERPL-CCNC: 75 U/L (ref 55–135)
ALT SERPL W/O P-5'-P-CCNC: 22 U/L (ref 10–44)
ANION GAP SERPL CALC-SCNC: 8 MMOL/L (ref 8–16)
AST SERPL-CCNC: 21 U/L (ref 10–40)
BILIRUB SERPL-MCNC: 0.5 MG/DL (ref 0.1–1)
BUN SERPL-MCNC: 17 MG/DL (ref 6–20)
CALCIUM SERPL-MCNC: 9.8 MG/DL (ref 8.7–10.5)
CHLORIDE SERPL-SCNC: 102 MMOL/L (ref 95–110)
CO2 SERPL-SCNC: 30 MMOL/L (ref 23–29)
CREAT SERPL-MCNC: 1.1 MG/DL (ref 0.5–1.4)
EST. GFR  (AFRICAN AMERICAN): >60 ML/MIN/1.73 M^2
EST. GFR  (NON AFRICAN AMERICAN): >60 ML/MIN/1.73 M^2
GLUCOSE SERPL-MCNC: 109 MG/DL (ref 70–110)
POTASSIUM SERPL-SCNC: 4.3 MMOL/L (ref 3.5–5.1)
PROT SERPL-MCNC: 6.7 G/DL (ref 6–8.4)
SODIUM SERPL-SCNC: 140 MMOL/L (ref 136–145)

## 2019-11-29 PROCEDURE — 99214 OFFICE O/P EST MOD 30 MIN: CPT | Mod: S$GLB,,, | Performed by: INTERNAL MEDICINE

## 2019-11-29 PROCEDURE — 3078F PR MOST RECENT DIASTOLIC BLOOD PRESSURE < 80 MM HG: ICD-10-PCS | Mod: CPTII,S$GLB,, | Performed by: INTERNAL MEDICINE

## 2019-11-29 PROCEDURE — 80053 COMPREHEN METABOLIC PANEL: CPT

## 2019-11-29 PROCEDURE — 3078F DIAST BP <80 MM HG: CPT | Mod: CPTII,S$GLB,, | Performed by: INTERNAL MEDICINE

## 2019-11-29 PROCEDURE — 3008F BODY MASS INDEX DOCD: CPT | Mod: CPTII,S$GLB,, | Performed by: INTERNAL MEDICINE

## 2019-11-29 PROCEDURE — 36415 COLL VENOUS BLD VENIPUNCTURE: CPT | Mod: PO

## 2019-11-29 PROCEDURE — 99214 PR OFFICE/OUTPT VISIT, EST, LEVL IV, 30-39 MIN: ICD-10-PCS | Mod: S$GLB,,, | Performed by: INTERNAL MEDICINE

## 2019-11-29 PROCEDURE — 3074F SYST BP LT 130 MM HG: CPT | Mod: CPTII,S$GLB,, | Performed by: INTERNAL MEDICINE

## 2019-11-29 PROCEDURE — 99999 PR PBB SHADOW E&M-EST. PATIENT-LVL IV: CPT | Mod: PBBFAC,,, | Performed by: INTERNAL MEDICINE

## 2019-11-29 PROCEDURE — 3008F PR BODY MASS INDEX (BMI) DOCUMENTED: ICD-10-PCS | Mod: CPTII,S$GLB,, | Performed by: INTERNAL MEDICINE

## 2019-11-29 PROCEDURE — 3074F PR MOST RECENT SYSTOLIC BLOOD PRESSURE < 130 MM HG: ICD-10-PCS | Mod: CPTII,S$GLB,, | Performed by: INTERNAL MEDICINE

## 2019-11-29 PROCEDURE — 99999 PR PBB SHADOW E&M-EST. PATIENT-LVL IV: ICD-10-PCS | Mod: PBBFAC,,, | Performed by: INTERNAL MEDICINE

## 2019-11-29 RX ORDER — DOXYCYCLINE HYCLATE 100 MG
100 TABLET ORAL 2 TIMES DAILY
Qty: 40 TABLET | Refills: 1 | Status: SHIPPED | OUTPATIENT
Start: 2019-11-29 | End: 2020-01-14

## 2019-11-29 NOTE — PROGRESS NOTES
Subjective:       Patient ID: Shiraz Jha is a 59 y.o. male.    Chief Complaint: Sinus Problem (pressure---scheduled for nasal procedure on 12-12-19); Nasal Congestion (clear mucus); Cough (constant); and Otalgia (both)  Dictation #1  MRN:[id number]  CSN:[id number]  Dict 59-year-old white male patient of Dr. Theodore's who says he began having sinus infection in September of this year.  He had seen an ear nose and throat 1 week ago was given Augmentin for 10 days and Medrol Dosepak.  He continues to have the same pain in congestion. He was also treated by what he calls a tele doc with the same antibiotic.  He is wanting to get scans of his sinuses because of the persistent problem.    Patient also had a fairly severe head injury in May of 2019 resulting in a soft tissue hematoma left frontal area.  He has a number of other medical problems and is seeing Sleep therapy for sleep apnea on a regular basis.    Physical exam:  Skin-normal  HEENT-nose is open, ears clear, throat is clear.  He does not really have frontal sinus tenderness but he does have maxillary sinus tenderness  Neck-no stiffness, adenopathy  Chest-clear  Cranial nerves appear to be intact    Impression:  1.  Persisting sinus complaints having now gotten treated with Augmentin on 2 occasions recently.  I have 1st was reluctant to order scans on him but in light of his prior head injury I have ordered not just is sinus but also brain scan.  2.  History of head trauma May of 2019    Plan:  1.  I have ordered CT scan of his head and sinuses 2.  I placed on doxycycline 100 mg b.i.d. for 3 weeks 3.  Advised him to use saline flush and Mucinex along with that  HPI  Review of Systems   Constitutional: Negative for activity change, appetite change, fatigue and unexpected weight change.   HENT: Positive for congestion, ear pain, postnasal drip, sinus pressure and sinus pain. Negative for dental problem, hearing loss and mouth sores.    Eyes: Negative for discharge and  visual disturbance.   Respiratory: Negative for cough and shortness of breath.    Cardiovascular: Negative for chest pain and palpitations.   Gastrointestinal: Negative for abdominal pain, blood in stool, constipation, diarrhea and nausea.   Genitourinary: Negative for dysuria, hematuria and testicular pain.   Musculoskeletal: Negative for arthralgias, back pain, joint swelling and neck pain.   Skin: Negative for rash.   Neurological: Positive for headaches. Negative for weakness.   Psychiatric/Behavioral: Negative for agitation and sleep disturbance.       Objective:      Physical Exam   Constitutional: He is oriented to person, place, and time. He appears well-developed and well-nourished.   HENT:   Head: Normocephalic.   Right Ear: External ear normal.   Left Ear: External ear normal.   Mouth/Throat: Oropharynx is clear and moist.   Eyes: Pupils are equal, round, and reactive to light. EOM are normal. Right eye exhibits no discharge.   Neck: Normal range of motion. No JVD present. No tracheal deviation present. No thyromegaly present.   Cardiovascular: Normal rate, regular rhythm, normal heart sounds and intact distal pulses. Exam reveals no gallop.   No murmur heard.  Pulmonary/Chest: Effort normal and breath sounds normal. He has no wheezes. He has no rales.   Abdominal: Soft. Bowel sounds are normal. He exhibits no mass. There is no tenderness.   Genitourinary: Prostate normal and penis normal. Rectal exam shows guaiac negative stool.   Musculoskeletal: Normal range of motion. He exhibits no edema.   Lymphadenopathy:     He has no cervical adenopathy.   Neurological: He is oriented to person, place, and time. He displays normal reflexes. No cranial nerve deficit. Coordination normal.   Skin: Skin is warm. No rash noted. No pallor.   Psychiatric: He has a normal mood and affect.       Assessment:       1. Other headache syndrome    2. Chronic mixed headache syndrome    3. Traumatic injury of head, subsequent  encounter    4. Pansinusitis, unspecified chronicity        Plan:

## 2019-12-03 ENCOUNTER — TELEPHONE (OUTPATIENT)
Dept: SLEEP MEDICINE | Facility: CLINIC | Age: 59
End: 2019-12-03

## 2019-12-03 NOTE — TELEPHONE ENCOUNTER
1/15/2020    4:20 PM  20 mins.  Lizzie Gomez NP    [facility] NEUROLOGY      Patient Comments:   New CPAP Equipment Compliance

## 2019-12-05 ENCOUNTER — HOSPITAL ENCOUNTER (OUTPATIENT)
Dept: RADIOLOGY | Facility: HOSPITAL | Age: 59
Discharge: HOME | End: 2019-12-05
Attending: INTERNAL MEDICINE
Payer: COMMERCIAL

## 2019-12-05 ENCOUNTER — PATIENT OUTREACH (OUTPATIENT)
Dept: ADMINISTRATIVE | Facility: OTHER | Age: 59
End: 2019-12-05

## 2019-12-05 DIAGNOSIS — G44.89 CHRONIC MIXED HEADACHE SYNDROME: ICD-10-CM

## 2019-12-05 DIAGNOSIS — G44.89 OTHER HEADACHE SYNDROME: ICD-10-CM

## 2019-12-05 DIAGNOSIS — S09.90XD TRAUMATIC INJURY OF HEAD, SUBSEQUENT ENCOUNTER: ICD-10-CM

## 2019-12-05 DIAGNOSIS — J32.4 PANSINUSITIS, UNSPECIFIED CHRONICITY: ICD-10-CM

## 2019-12-05 PROCEDURE — 70486 CT SINUSES WITHOUT CONTRAST: ICD-10-PCS | Mod: 26,,, | Performed by: RADIOLOGY

## 2019-12-05 PROCEDURE — 70450 CT HEAD/BRAIN W/O DYE: CPT | Mod: TC

## 2019-12-05 PROCEDURE — 70450 CT HEAD WITHOUT CONTRAST: ICD-10-PCS | Mod: 26,,, | Performed by: RADIOLOGY

## 2019-12-05 PROCEDURE — 70450 CT HEAD/BRAIN W/O DYE: CPT | Mod: 26,,, | Performed by: RADIOLOGY

## 2019-12-05 PROCEDURE — 70486 CT MAXILLOFACIAL W/O DYE: CPT | Mod: TC

## 2019-12-05 PROCEDURE — 70486 CT MAXILLOFACIAL W/O DYE: CPT | Mod: 26,,, | Performed by: RADIOLOGY

## 2019-12-06 ENCOUNTER — TELEPHONE (OUTPATIENT)
Dept: ELECTROPHYSIOLOGY | Facility: CLINIC | Age: 59
End: 2019-12-06

## 2019-12-06 NOTE — TELEPHONE ENCOUNTER
Spoke with patient and advised that current guidelines call for a 2 day hold prior to procedure. Clearance to hold for 2 days faxed to ENT at [phone]

## 2019-12-06 NOTE — TELEPHONE ENCOUNTER
----- Message from Gissell Adorno RN sent at 12/6/2019 12:14 PM CST -----  Contact: self      ----- Message -----  From: Mariann Lord MA  Sent: 12/6/2019  11:55 AM CST  To: Jon Carrizales Staff    Pt  is having a nose procedure outside of [facility] on Thurs 12/12. Is taking Xarelto,is it OK to stop med for 5 days before procedure? Also needs a note  in writing saying this. Please call [phone]. pt.

## 2019-12-09 ENCOUNTER — HOSPITAL ENCOUNTER (OUTPATIENT)
Dept: CARDIOLOGY | Facility: CLINIC | Age: 59
Discharge: HOME | End: 2019-12-09
Payer: COMMERCIAL

## 2019-12-09 ENCOUNTER — OFFICE VISIT (OUTPATIENT)
Dept: ELECTROPHYSIOLOGY | Facility: CLINIC | Age: 59
End: 2019-12-09
Payer: COMMERCIAL

## 2019-12-09 VITALS
BODY MASS INDEX: 41.67 KG/M2 | HEIGHT: 68 IN | SYSTOLIC BLOOD PRESSURE: 118 MMHG | DIASTOLIC BLOOD PRESSURE: 64 MMHG | WEIGHT: 274.94 LBS | HEART RATE: 59 BPM

## 2019-12-09 DIAGNOSIS — M46.1 SACROILIITIS: ICD-10-CM

## 2019-12-09 DIAGNOSIS — I48.91 ATRIAL FIBRILLATION, UNSPECIFIED TYPE: ICD-10-CM

## 2019-12-09 DIAGNOSIS — M47.9 OSTEOARTHRITIS OF SPINE, UNSPECIFIED SPINAL OSTEOARTHRITIS COMPLICATION STATUS, UNSPECIFIED SPINAL REGION: Primary | ICD-10-CM

## 2019-12-09 DIAGNOSIS — M53.3 SACROILIAC JOINT PAIN: ICD-10-CM

## 2019-12-09 DIAGNOSIS — M51.36 DDD (DEGENERATIVE DISC DISEASE), LUMBAR: ICD-10-CM

## 2019-12-09 DIAGNOSIS — I48.92 ATRIAL FLUTTER, UNSPECIFIED TYPE: Primary | ICD-10-CM

## 2019-12-09 DIAGNOSIS — Z98.84 STATUS POST GASTRIC BYPASS FOR OBESITY: ICD-10-CM

## 2019-12-09 DIAGNOSIS — I48.19 PERSISTENT ATRIAL FIBRILLATION: ICD-10-CM

## 2019-12-09 DIAGNOSIS — I10 ESSENTIAL HYPERTENSION: ICD-10-CM

## 2019-12-09 DIAGNOSIS — M47.819 SPONDYLOSIS WITHOUT MYELOPATHY: ICD-10-CM

## 2019-12-09 DIAGNOSIS — G47.33 OBSTRUCTIVE SLEEP APNEA: ICD-10-CM

## 2019-12-09 DIAGNOSIS — M54.17 LUMBOSACRAL RADICULOPATHY: ICD-10-CM

## 2019-12-09 DIAGNOSIS — F32.89 OTHER DEPRESSION: ICD-10-CM

## 2019-12-09 DIAGNOSIS — E11.9 TYPE 2 DIABETES MELLITUS WITHOUT COMPLICATION, WITHOUT LONG-TERM CURRENT USE OF INSULIN: ICD-10-CM

## 2019-12-09 DIAGNOSIS — E66.01 MORBID OBESITY WITH BMI OF 40.0-44.9, ADULT: ICD-10-CM

## 2019-12-09 PROCEDURE — 3078F DIAST BP <80 MM HG: CPT | Mod: CPTII,S$GLB,, | Performed by: INTERNAL MEDICINE

## 2019-12-09 PROCEDURE — 3074F SYST BP LT 130 MM HG: CPT | Mod: CPTII,S$GLB,, | Performed by: INTERNAL MEDICINE

## 2019-12-09 PROCEDURE — 99999 PR PBB SHADOW E&M-EST. PATIENT-LVL III: CPT | Mod: PBBFAC,,, | Performed by: INTERNAL MEDICINE

## 2019-12-09 PROCEDURE — 99214 OFFICE O/P EST MOD 30 MIN: CPT | Mod: S$GLB,,, | Performed by: INTERNAL MEDICINE

## 2019-12-09 PROCEDURE — 93010 RHYTHM STRIP: ICD-10-PCS | Mod: S$GLB,,, | Performed by: INTERNAL MEDICINE

## 2019-12-09 PROCEDURE — 3008F PR BODY MASS INDEX (BMI) DOCUMENTED: ICD-10-PCS | Mod: CPTII,S$GLB,, | Performed by: INTERNAL MEDICINE

## 2019-12-09 PROCEDURE — 93005 ELECTROCARDIOGRAM TRACING: CPT | Mod: S$GLB,,, | Performed by: INTERNAL MEDICINE

## 2019-12-09 PROCEDURE — 3074F PR MOST RECENT SYSTOLIC BLOOD PRESSURE < 130 MM HG: ICD-10-PCS | Mod: CPTII,S$GLB,, | Performed by: INTERNAL MEDICINE

## 2019-12-09 PROCEDURE — 99214 PR OFFICE/OUTPT VISIT, EST, LEVL IV, 30-39 MIN: ICD-10-PCS | Mod: S$GLB,,, | Performed by: INTERNAL MEDICINE

## 2019-12-09 PROCEDURE — 3078F PR MOST RECENT DIASTOLIC BLOOD PRESSURE < 80 MM HG: ICD-10-PCS | Mod: CPTII,S$GLB,, | Performed by: INTERNAL MEDICINE

## 2019-12-09 PROCEDURE — 3008F BODY MASS INDEX DOCD: CPT | Mod: CPTII,S$GLB,, | Performed by: INTERNAL MEDICINE

## 2019-12-09 PROCEDURE — 93005 RHYTHM STRIP: ICD-10-PCS | Mod: S$GLB,,, | Performed by: INTERNAL MEDICINE

## 2019-12-09 PROCEDURE — 93010 ELECTROCARDIOGRAM REPORT: CPT | Mod: S$GLB,,, | Performed by: INTERNAL MEDICINE

## 2019-12-09 PROCEDURE — 99999 PR PBB SHADOW E&M-EST. PATIENT-LVL III: ICD-10-PCS | Mod: PBBFAC,,, | Performed by: INTERNAL MEDICINE

## 2019-12-09 RX ORDER — METOPROLOL TARTRATE 25 MG/1
25 TABLET, FILM COATED ORAL 2 TIMES DAILY
Qty: 180 TABLET | Refills: 3 | Status: SHIPPED | OUTPATIENT
Start: 2019-12-09 | End: 2020-12-10

## 2019-12-09 RX ORDER — SOTALOL HYDROCHLORIDE 80 MG/1
80 TABLET ORAL 2 TIMES DAILY
Qty: 180 TABLET | Refills: 3 | Status: SHIPPED | OUTPATIENT
Start: 2019-12-09 | End: 2020-12-10

## 2019-12-09 NOTE — TELEPHONE ENCOUNTER
Staff returned call to pt regarding refill    Pt states he needs refill for Gabapentin and would like to schedule an appt      Staff scheduled pt for 12/26/19 @ 2:20with Bessy

## 2019-12-09 NOTE — PROGRESS NOTES
Subjective:    Patient ID:  Shiraz Jha is a 59 y.o. male who presents for follow-up of Atrial Flutter      HPI 60 yo male with h/o Htn, morbid obesity, atrial fibrillation, atrial flutter, Sleep Apnea, diet controlled DM.     Background:  H/o recurrent atrial arrhythmias since 2008, previously managed at .   Presented to [facility] 11/12 with symptomatic atrial flutter and fibrillation. Had been on Dronedarone at that time. RFA of CTI 11/15/12. Sotalol and Xarelto then initiated.   Did well initially, but had increasing episodes. Subsequently had a Cryoablation PVI on 7/2014. He was last seen in 2/15, when he was still having on and off symptoms.   Has not had symptoms c/w atrial fibrillation.  Notes dyspnea with walking up the stairs, which is not new.  We elected to continue Sotalol and observe.  Echo 7/23/19 normal biventricular structure and function    Update:  Feeling well.  Notes headaches.  Denies palpitations c/w atrial fibrillation.  Seeing Dr. Ureña for venous insufficiency.  ECG reveals nsr with QTc 420 msec.    Review of Systems   Constitution: Negative. Negative for malaise/fatigue.   Cardiovascular: Positive for leg swelling. Negative for chest pain, dyspnea on exertion, irregular heartbeat, near-syncope, orthopnea, palpitations, paroxysmal nocturnal dyspnea and syncope.   Respiratory: Negative for cough and shortness of breath.    Neurological: Negative for dizziness, light-headedness and weakness.   All other systems reviewed and are negative.       Objective:    Physical Exam   Constitutional: He is oriented to person, place, and time. He appears well-developed and well-nourished.   Eyes: Conjunctivae are normal. No scleral icterus.   Neck: No JVD present. No tracheal deviation present.   Cardiovascular: Normal rate, regular rhythm and normal heart sounds. PMI is not displaced.   Pulmonary/Chest: Effort normal and breath sounds normal. No respiratory distress.   Abdominal: Soft. There is no  hepatosplenomegaly. There is no tenderness.   Musculoskeletal: He exhibits no edema (lower extremity) or tenderness.   Neurological: He is alert and oriented to person, place, and time.   Skin: Skin is warm and dry. No rash noted.   Psychiatric: He has a normal mood and affect. His behavior is normal.         Assessment:       1. Atrial flutter, unspecified type    2. Persistent atrial fibrillation    3. Essential hypertension    4. Other depression    5. Morbid obesity with BMI of 40.0-44.9, adult    6. Status post gastric bypass for obesity    7. Type 2 diabetes mellitus without complication, without long-term current use of insulin    8. Obstructive sleep apnea         Plan:             Doing well overall.  Continue current medications.  Weight reduction.  F/u in 6 months with Dr. Scott, with me in one year.

## 2019-12-10 ENCOUNTER — TELEPHONE (OUTPATIENT)
Dept: INTERNAL MEDICINE | Facility: CLINIC | Age: 59
End: 2019-12-10

## 2019-12-10 DIAGNOSIS — Z12.5 SCREENING PSA (PROSTATE SPECIFIC ANTIGEN): Primary | ICD-10-CM

## 2019-12-10 DIAGNOSIS — Z00.00 ROUTINE GENERAL MEDICAL EXAMINATION AT A HEALTH CARE FACILITY: ICD-10-CM

## 2019-12-10 RX ORDER — GABAPENTIN 300 MG/1
300 CAPSULE ORAL 3 TIMES DAILY
Qty: 270 CAPSULE | Refills: 2 | Status: SHIPPED | OUTPATIENT
Start: 2019-12-10 | End: 2019-12-26 | Stop reason: SDUPTHER

## 2019-12-13 ENCOUNTER — TELEPHONE (OUTPATIENT)
Dept: INTERNAL MEDICINE | Facility: CLINIC | Age: 59
End: 2019-12-13

## 2019-12-13 DIAGNOSIS — Z00.00 ROUTINE GENERAL MEDICAL EXAMINATION AT A HEALTH CARE FACILITY: Primary | ICD-10-CM

## 2019-12-13 NOTE — TELEPHONE ENCOUNTER
----- Message from Ingrid Castorena sent at 12/13/2019 10:02 AM CST -----  Contact: Pt self Home [phone]   Doctor appointment and lab have been scheduled.  Please link lab orders to the lab appointment.  Date of doctor appointment:  12/31/2019  Date of lab appointment:  12/26/2019  Physical or EP: Physical

## 2019-12-16 ENCOUNTER — PATIENT OUTREACH (OUTPATIENT)
Dept: ADMINISTRATIVE | Facility: OTHER | Age: 59
End: 2019-12-16

## 2019-12-17 ENCOUNTER — TELEPHONE (OUTPATIENT)
Dept: INTERNAL MEDICINE | Facility: CLINIC | Age: 59
End: 2019-12-17

## 2019-12-17 NOTE — TELEPHONE ENCOUNTER
----- Message from Conrad Kaplan MD sent at 12/10/2019 10:11 AM CST -----  His brain ct was fine but sinuses are inflamed so he needs another visit to ENT

## 2019-12-22 ENCOUNTER — PATIENT OUTREACH (OUTPATIENT)
Dept: ADMINISTRATIVE | Facility: OTHER | Age: 59
End: 2019-12-22

## 2019-12-22 DIAGNOSIS — Z12.11 ENCOUNTER FOR FECAL IMMUNOCHEMICAL TEST SCREENING: Primary | ICD-10-CM

## 2019-12-23 ENCOUNTER — OFFICE VISIT (OUTPATIENT)
Dept: URGENT CARE | Facility: CLINIC | Age: 59
End: 2019-12-23
Payer: COMMERCIAL

## 2019-12-23 VITALS
OXYGEN SATURATION: 97 % | TEMPERATURE: 98 F | RESPIRATION RATE: 18 BRPM | HEIGHT: 68 IN | SYSTOLIC BLOOD PRESSURE: 127 MMHG | WEIGHT: 274 LBS | DIASTOLIC BLOOD PRESSURE: 71 MMHG | HEART RATE: 62 BPM | BODY MASS INDEX: 41.52 KG/M2

## 2019-12-23 DIAGNOSIS — J98.01 ACUTE BRONCHOSPASM: ICD-10-CM

## 2019-12-23 DIAGNOSIS — J01.40 ACUTE PANSINUSITIS, RECURRENCE NOT SPECIFIED: Primary | ICD-10-CM

## 2019-12-23 PROCEDURE — 99214 OFFICE O/P EST MOD 30 MIN: CPT | Mod: 25,S$GLB,, | Performed by: INTERNAL MEDICINE

## 2019-12-23 PROCEDURE — 96372 THER/PROPH/DIAG INJ SC/IM: CPT | Mod: S$GLB,,, | Performed by: INTERNAL MEDICINE

## 2019-12-23 PROCEDURE — 99214 PR OFFICE/OUTPT VISIT, EST, LEVL IV, 30-39 MIN: ICD-10-PCS | Mod: 25,S$GLB,, | Performed by: INTERNAL MEDICINE

## 2019-12-23 PROCEDURE — 96372 PR INJECTION,THERAP/PROPH/DIAG2ST, IM OR SUBCUT: ICD-10-PCS | Mod: S$GLB,,, | Performed by: INTERNAL MEDICINE

## 2019-12-23 RX ORDER — BETAMETHASONE SODIUM PHOSPHATE AND BETAMETHASONE ACETATE 3; 3 MG/ML; MG/ML
9 INJECTION, SUSPENSION INTRA-ARTICULAR; INTRALESIONAL; INTRAMUSCULAR; SOFT TISSUE ONCE
Status: COMPLETED | OUTPATIENT
Start: 2019-12-23 | End: 2019-12-23

## 2019-12-23 RX ORDER — METHYLPREDNISOLONE 4 MG/1
TABLET ORAL
Qty: 1 PACKAGE | Refills: 0 | Status: SHIPPED | OUTPATIENT
Start: 2019-12-24 | End: 2020-01-08

## 2019-12-23 RX ADMIN — BETAMETHASONE SODIUM PHOSPHATE AND BETAMETHASONE ACETATE 9 MG: 3; 3 INJECTION, SUSPENSION INTRA-ARTICULAR; INTRALESIONAL; INTRAMUSCULAR; SOFT TISSUE at 01:12

## 2019-12-23 NOTE — PROGRESS NOTES
"Subjective:       Patient ID: Shiraz Jha is a 59 y.o. male.    Vitals:  height is 5' 8" (1.727 m) and weight is 124.3 kg (274 lb). His oral temperature is 97.9 °F (36.6 °C). His blood pressure is 127/71 and his pulse is 62. His respiration is 18 and oxygen saturation is 97%.     Chief Complaint: URI    URI    This is a recurrent problem. The current episode started in the past 7 days (2 days). The problem has been unchanged. There has been no fever. Associated symptoms include congestion, coughing, headaches, a plugged ear sensation, rhinorrhea and sneezing. Pertinent negatives include no ear pain, nausea, rash, sinus pain, sore throat, vomiting or wheezing. He has tried decongestant and antihistamine (doxy ) for the symptoms. The treatment provided mild relief.       Constitution: Positive for fatigue. Negative for chills, sweating and fever.   HENT: Positive for congestion, postnasal drip and sinus pressure. Negative for ear pain, sinus pain, sore throat and voice change.    Neck: Negative for painful lymph nodes.   Eyes: Negative for eye redness.   Respiratory: Positive for cough and sputum production. Negative for chest tightness, bloody sputum, COPD, shortness of breath, stridor, wheezing and asthma.    Gastrointestinal: Negative for nausea and vomiting.   Musculoskeletal: Negative for muscle ache.   Skin: Negative for rash.   Allergic/Immunologic: Positive for sneezing. Negative for seasonal allergies and asthma.   Neurological: Positive for headaches.   Hematologic/Lymphatic: Negative for swollen lymph nodes.       Objective:      Physical Exam   Constitutional: He appears well-developed and well-nourished.   HENT:   Head: Normocephalic and atraumatic.   Nose: Mucosal edema and rhinorrhea present. Right sinus exhibits maxillary sinus tenderness and frontal sinus tenderness. Left sinus exhibits maxillary sinus tenderness and frontal sinus tenderness.   Eyes: Pupils are equal, round, and reactive to light. " Conjunctivae and EOM are normal.   Neck: Normal range of motion. Neck supple.   Cardiovascular: Normal rate and regular rhythm.   Pulmonary/Chest: Effort normal. He has wheezes.   Nursing note and vitals reviewed.        Assessment:       1. Acute pansinusitis, recurrence not specified    2. Acute bronchospasm        Plan:         Acute pansinusitis, recurrence not specified  -     betamethasone acetate-betamethasone sodium phosphate injection 9 mg    Acute bronchospasm  -     betamethasone acetate-betamethasone sodium phosphate injection 9 mg  -     methylPREDNISolone (MEDROL DOSEPACK) 4 mg tablet; use as directed  Dispense: 1 Package; Refill: 0

## 2019-12-26 ENCOUNTER — OFFICE VISIT (OUTPATIENT)
Dept: SPINE | Facility: CLINIC | Age: 59
End: 2019-12-26
Payer: COMMERCIAL

## 2019-12-26 VITALS
HEART RATE: 61 BPM | DIASTOLIC BLOOD PRESSURE: 85 MMHG | SYSTOLIC BLOOD PRESSURE: 116 MMHG | HEIGHT: 68 IN | BODY MASS INDEX: 41.52 KG/M2 | WEIGHT: 274 LBS | RESPIRATION RATE: 18 BRPM

## 2019-12-26 DIAGNOSIS — M47.819 SPONDYLOSIS WITHOUT MYELOPATHY: ICD-10-CM

## 2019-12-26 DIAGNOSIS — K21.9 GASTROESOPHAGEAL REFLUX DISEASE WITHOUT ESOPHAGITIS: ICD-10-CM

## 2019-12-26 DIAGNOSIS — M46.1 SACROILIITIS: ICD-10-CM

## 2019-12-26 DIAGNOSIS — M47.9 OSTEOARTHRITIS OF SPINE, UNSPECIFIED SPINAL OSTEOARTHRITIS COMPLICATION STATUS, UNSPECIFIED SPINAL REGION: ICD-10-CM

## 2019-12-26 DIAGNOSIS — M51.36 DDD (DEGENERATIVE DISC DISEASE), LUMBAR: ICD-10-CM

## 2019-12-26 DIAGNOSIS — M54.17 LUMBOSACRAL RADICULOPATHY: ICD-10-CM

## 2019-12-26 DIAGNOSIS — M53.3 SACROILIAC JOINT PAIN: ICD-10-CM

## 2019-12-26 PROCEDURE — 3008F BODY MASS INDEX DOCD: CPT | Mod: CPTII,S$GLB,, | Performed by: NURSE PRACTITIONER

## 2019-12-26 PROCEDURE — 3079F PR MOST RECENT DIASTOLIC BLOOD PRESSURE 80-89 MM HG: ICD-10-PCS | Mod: CPTII,S$GLB,, | Performed by: NURSE PRACTITIONER

## 2019-12-26 PROCEDURE — 3074F PR MOST RECENT SYSTOLIC BLOOD PRESSURE < 130 MM HG: ICD-10-PCS | Mod: CPTII,S$GLB,, | Performed by: NURSE PRACTITIONER

## 2019-12-26 PROCEDURE — 3079F DIAST BP 80-89 MM HG: CPT | Mod: CPTII,S$GLB,, | Performed by: NURSE PRACTITIONER

## 2019-12-26 PROCEDURE — 99213 OFFICE O/P EST LOW 20 MIN: CPT | Mod: S$GLB,,, | Performed by: NURSE PRACTITIONER

## 2019-12-26 PROCEDURE — 99213 PR OFFICE/OUTPT VISIT, EST, LEVL III, 20-29 MIN: ICD-10-PCS | Mod: S$GLB,,, | Performed by: NURSE PRACTITIONER

## 2019-12-26 PROCEDURE — 3074F SYST BP LT 130 MM HG: CPT | Mod: CPTII,S$GLB,, | Performed by: NURSE PRACTITIONER

## 2019-12-26 PROCEDURE — 99999 PR PBB SHADOW E&M-EST. PATIENT-LVL III: CPT | Mod: PBBFAC,,, | Performed by: NURSE PRACTITIONER

## 2019-12-26 PROCEDURE — 99999 PR PBB SHADOW E&M-EST. PATIENT-LVL III: ICD-10-PCS | Mod: PBBFAC,,, | Performed by: NURSE PRACTITIONER

## 2019-12-26 PROCEDURE — 3008F PR BODY MASS INDEX (BMI) DOCUMENTED: ICD-10-PCS | Mod: CPTII,S$GLB,, | Performed by: NURSE PRACTITIONER

## 2019-12-26 RX ORDER — OMEPRAZOLE 20 MG/1
CAPSULE, DELAYED RELEASE ORAL
Qty: 90 CAPSULE | Refills: 0 | OUTPATIENT
Start: 2019-12-26

## 2019-12-26 RX ORDER — GABAPENTIN 300 MG/1
300 CAPSULE ORAL 3 TIMES DAILY
Qty: 270 CAPSULE | Refills: 3 | Status: SHIPPED | OUTPATIENT
Start: 2019-12-26 | End: 2020-12-07 | Stop reason: SDUPTHER

## 2019-12-26 RX ORDER — OMEPRAZOLE 20 MG/1
20 CAPSULE, DELAYED RELEASE ORAL DAILY
Qty: 90 CAPSULE | Refills: 3 | Status: SHIPPED | OUTPATIENT
Start: 2019-12-26 | End: 2021-01-07

## 2019-12-26 NOTE — PROGRESS NOTES
Chronic patient Established Note (Follow up visit)      SUBJECTIVE:    Interval History 12/26/2019:  The patient is here for follow up of back pain.  His pain has been well controlled on Gabapentin.  He is here for annual refill.  He feels as though this controls the majority of his pain.  He also had benefit with [facility] in the past but stopped due to extraneous health issues.  He would like a referral to restart this.  His pain today is 0/10.    Previous Encounter:  Shiraz Jha presents to the clinic for a follow-up appointment for low back pain. Since the last visit, Shiraz Jha states the pain has been improving. Current pain intensity is 1/10.  Patient is here for a f/u and request of medication refill, states his pain is fairly controled on gabapentin     Pain Disability Index Review:  Last 3 PDI Scores 12/13/2018 7/17/2018 6/12/2018   Pain Disability Index (PDI) 7 [phone] 31       Pain Medications:  Gabapentin 300 mg TID    Opioid Contract: no     report:  Reviewed and consistent with medication use as prescribed.    Pain Procedures: 7/3/18 L4-5 INTERLAMINAR EPIDURAL STEROID INJECTION - LUMBAR  5/29/18 L4-5 INTERLAMINAR EPIDURAL STEROID INJECTION - LUMBAR  5/1/18 BILATERAL L3-4-5 LUMBAR FACET MEDIAL BRANCH NERVE BLOCK  C7-T1 CERVICAL EPIDURAL STEROID INJECTION   03/24/15    Physical Therapy/Home Exercise: no    Imaging:MRI Lumbar Spine Without Contrast   Narrative     EXAMINATION:  MRI LUMBAR SPINE WITHOUT CONTRAST    CLINICAL HISTORY:  Low back pain, <6wks, no red flags, no prior management; Other intervertebral disc degeneration, lumbar region    TECHNIQUE:  Multiplanar, multisequence MR images were acquired from the thoracolumbar junction to the sacrum without the administration of contrast.    COMPARISON:  None.    FINDINGS:  There is normal lumbar lordosis.  No spondylolisthesis or spondylolysis.  There is no marrow edema throughout the vertebral bodies to suggest acute fractures or marrow replacing  processes throughout the lumbar spine.  The tip of the conus is at T12-L1 disc space.  Paraspinal soft tissues are unremarkable.  On the sagittal images there is suggestion of a distended urinary bladder.    L5-S1: There is normal intervertebral disc height no disc herniations or significant central canal spinal stenosis.  There is mild bilateral facet arthropathy.  No significant foraminal stenosis.    L4-5: Normal intervertebral disc height without soft tissue disc herniations.  There is enlarged lamina and the facet joints and mild hypertrophic changes of the facet joints.  This results in at least a mild central canal spinal stenosis.    L3-4: Normal intervertebral disc height without soft tissue disc herniations.  There is enlarged facet joints and the lamina and mild hypertrophic changes of the ligamentum flava resulting in at least a moderate central canal spinal stenosis.    L2-3: Normal intervertebral disc height without soft tissue disc herniations.  There is hypertrophic changes of the dorsal elements including the lamina and the facet joints and mild hypertrophic changes of the ligamentum flava resulting in moderate central canal spinal stenosis.    L1-2: No disc herniations.  Hypertrophic changes of the dorsal elements including the lamina and the facet joints resulting in mild central canal stenosis.    T12-L1: No disc herniations or spinal stenosis or foraminal stenosis.  There is mild marginal anterior spondylotic osteophytes.    It should be noted that the CSF space within the thecal sac at the L1-2, L2-3 L3-4 disc spaces is compromised from the spinal stenotic changes.      Impression       1. At least moderate stenotic changes at the L2-3 and L3-4 disc spaces and mild stenosis at the L1-2 and L4-5 disc spaces, mainly from hypertrophic changes of the dorsal elements.  It is possible that these findings most likely on a congenital basis.  2. No significant disc herniations.  3. Distended urinary  bladder.      Electronically signed by: Emiliano Nagy MD  Date: 04/24/2018  Time: 11:55                       4/24/18 X-Ray Lumbar Spine AP And Lateral     Narrative      EXAMINATION:  XR LUMBAR SPINE AP AND LATERAL    CLINICAL HISTORY:  Low back pain, <6wks, no red flags, no prior management;Other intervertebral disc degeneration, lumbar region    TECHNIQUE:  AP, lateral and spot images were performed of the lumbar spine.    COMPARISON:  None    FINDINGS:  The vertebral body heights and intervertebral disc spaces are fairly well maintained.  No fracture.  No marrow replacement process.  SI joints unremarkable.  L4-5 and L5-S1 facet arthropathy noted.   Impression        Lumbar spondylosis.          X-Ray Hips Bilateral 2 View Inc AP Pelvis    Narrative     EXAMINATION:  XR HIPS BILATERAL 2 VIEW INCL AP PELVIS    CLINICAL HISTORY:  Pain in right hip    TECHNIQUE:  AP view of the pelvis and frogleg lateral views of both hips were performed.    COMPARISON:  None.    FINDINGS:  No fractures identified. The alignment is within normal limits.  No evidence of a marrow replacement process.Probable phlebolith in the pelvis.  SI joints unremarkable.  The hip joint spaces are fairly well maintained.      Impression       No acute findings.      Electronically signed by: Moreno Adkins MD  Date: 04/24/2018  Time: 12:42         Allergies:   Review of patient's allergies indicates:   Allergen Reactions    Iodinated contrast- oral and iv dye Other (See Comments)     Raises BP         Current Medications:   Current Outpatient Medications   Medication Sig Dispense Refill    acetaminophen (TYLENOL) 500 MG tablet Take 500 mg by mouth every 12 (twelve) hours.      aspirin 81 MG Chew Take 1 tablet (81 mg total) by mouth once daily.  0    bumetanide (BUMEX) 1 MG tablet Take 1 tablet (1 mg total) by mouth 2 (two) times daily. 60 tablet 11    buPROPion (WELLBUTRIN XL) 300 MG 24 hr tablet Take 300 mg by mouth once daily.         dextroamphetamine-amphetamine 30 mg Tab Adderall  0    doxycycline (VIBRA-TABS) 100 MG tablet Take 1 tablet (100 mg total) by mouth 2 (two) times daily. 40 tablet 1    gabapentin (NEURONTIN) 300 MG capsule Take 1 capsule (300 mg total) by mouth 3 (three) times daily. 270 capsule 2    ibuprofen (ADVIL,MOTRIN) 200 MG tablet Take 200 mg by mouth every 12 (twelve) hours.      IMPOYZ 0.025 % Crea Apply topically as needed.       LUZU 1 % Crea Apply topically as needed.       methylPREDNISolone (MEDROL DOSEPACK) 4 mg tablet use as directed 1 Package 0    metoprolol tartrate (LOPRESSOR) 25 MG tablet Take 1 tablet (25 mg total) by mouth 2 (two) times daily. 180 tablet 3    NAFTIN 2 % Gel daily as needed.       nitroGLYCERIN (NITROSTAT) 0.4 MG SL tablet Please dispense 25 pills in 4 bottles. 100 tablet 0    omeprazole (PRILOSEC) 20 MG capsule Take 1 capsule (20 mg total) by mouth once daily. 90 capsule 3    rivaroxaban (XARELTO) 20 mg Tab Take 1 tablet (20 mg total) by mouth once daily. 90 tablet 3    sotalol (BETAPACE) 80 MG tablet Take 1 tablet (80 mg total) by mouth 2 (two) times daily. 180 tablet 3    vortioxetine (TRINTELLIX) 20 mg Tab Take 20 mg by mouth once daily.       desoximetasone (TOPICORT) 0.05 % cream Apply topically 2 (two) times daily. (Patient taking differently: Apply topically 2 (two) times daily. Pt using prn.) 60 g 11    tamsulosin (FLOMAX) 0.4 mg Cap Take 1 capsule (0.4 mg total) by mouth 2 (two) times daily. 180 capsule 3     No current facility-administered medications for this visit.        REVIEW OF SYSTEMS:    GENERAL:  No weight loss, malaise or fevers.  HEENT:  Negative for frequent or significant headaches.  NECK:  Negative for lumps, goiter, pain and significant neck swelling.  RESPIRATORY:  Negative for cough, wheezing or shortness of breath.  CARDIOVASCULAR:  Negative for chest pain, leg swelling or palpitations.  GI:  Negative for abdominal discomfort, blood in stools or  black stools or change in bowel habits.  MUSCULOSKELETAL:  See HPI.  SKIN:  Negative for lesions, rash, and itching.  PSYCH:  Negative for sleep disturbance, mood disorder and recent psychosocial stressors.  HEMATOLOGY/LYMPHOLOGY:  Negative for prolonged bleeding, bruising easily or swollen nodes.  NEURO:   No history of headaches, syncope, paralysis, seizures or tremors.  All other reviewed and negative other than HPI.    Past Medical History:  Past Medical History:   Diagnosis Date    Allergy     Asthma     Atrial fibrillation     Cataract     Chronic rhinitis 9/26/2013    DDD (degenerative disc disease) 4/3/2015    Depression     Diabetes mellitus     Fibromyalgia     Gastroesophageal reflux disease without esophagitis 7/14/2017    Hypertension     Sinusitis, acute, maxillary 12/20/2012    Thyroid disease        Past Surgical History:  Past Surgical History:   Procedure Laterality Date    CERVICAL SPINE SURGERY      EPIDURAL STEROID INJECTION INTO LUMBAR SPINE N/A 5/29/2018    Procedure: INJECTION-STEROID-EPIDURAL-LUMBAR- L4-5;  Surgeon: Roman Lyman MD;  Location: [facility];  Service: Pain Management;  Laterality: N/A;  Patient is diabetic and Xarleto     EPIDURAL STEROID INJECTION INTO LUMBAR SPINE N/A 7/3/2018    Procedure: INJECTION, STEROID, SPINE, LUMBAR, EPIDURAL- L4-5;  Surgeon: Roman Lyman MD;  Location: [facility];  Service: Pain Management;  Laterality: N/A;  Patient takes Xarelto and ASA     GASTRIC BYPASS      SINUS SURGERY  2000    Dr[facility] Watt at [facility]    TONSILLECTOMY         Family History:  Family History   Problem Relation Age of Onset    Heart disease Father     Cancer Mother         lung    Hypertension Sister     Heart disease Brother     Cirrhosis Brother     Diabetes Brother        Social History:  Social History     Socioeconomic History    Marital status: Single     Spouse name: Not on file    Number of children: Not on file    Years of  "education: Not on file    Highest education level: Not on file   Occupational History    Not on file   Social Needs    Financial resource strain: Not on file    Food insecurity:     Worry: Not on file     Inability: Not on file    Transportation needs:     Medical: Not on file     Non-medical: Not on file   Tobacco Use    Smoking status: Never Smoker    Smokeless tobacco: Never Used   Substance and Sexual Activity    Alcohol use: No     Comment: rare    Drug use: No    Sexual activity: Yes     Partners: Female   Lifestyle    Physical activity:     Days per week: Not on file     Minutes per session: Not on file    Stress: Not on file   Relationships    Social connections:     Talks on phone: Not on file     Gets together: Not on file     Attends Protestant service: Not on file     Active member of club or organization: Not on file     Attends meetings of clubs or organizations: Not on file     Relationship status: Not on file   Other Topics Concern    Not on file   Social History Narrative    [place], lives alone w/2 dogs.    Dogs are his kids.    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       OBJECTIVE:    /85   Pulse 61   Resp 18   Ht 5' 8" (1.727 m)   Wt 124.3 kg (274 lb)   BMI 41.66 kg/m²     PHYSICAL EXAMINATION:    General appearance: Well appearing, in no acute distress, alert and oriented x3.  Psych:  Mood and affect appropriate.  Skin: Skin color, texture, turgor normal, no rashes or lesions, in both upper and lower body.  Head/face:  Atraumatic, normocephalic. No palpable lymph nodes  Neck: No pain to palpation over the cervical paraspinous muscles. Spurling Negative. No pain with neck flexion, extension, or lateral flexion. .  Cor: RRR  Pulm: CTA  GI: Abdomen soft and non-tender.  Back: Straight leg raising in the sitting and supine positions is negative to radicular pain. mild pain to palpation over the spine or costovertebral angles. Normal range " of motion without pain reproduction.  Extremities: Peripheral joint ROM is full and pain free without obvious instability or laxity in all four extremities. No deformities, edema, or skin discoloration. Good capillary refill.  Musculoskeletal: Shoulder, hip, sacroiliac and knee provocative maneuvers are negative. Bilateral upper and lower extremity strength is normal and symmetric.  No atrophy or tone abnormalities are noted.  Neuro: Bilateral upper and lower extremity coordination and muscle stretch reflexes are physiologic and symmetric.  Plantar response are downgoing. No loss of sensation is noted.  Gait: Normal.    ASSESSMENT: 59 y.o. year old male with low back pain, consistent with      No diagnosis found.      PLAN:     - I have stressed the importance of physical activity and a home exercise plan to help with pain and improve health.  - Patient can continue with medications for now since they are providing benefits, using them appropriately, and without side effects.   - Refill gabapentin 300 mg tid with one year of refills.  - I placed another order for [facility].  - RTC as needed for f/u in one year.  - Counseled patient regarding the importance of activity modification, constant sleeping habits and physical therapy.    The above plan and management options were discussed at length with patient. Patient is in agreement with the above and verbalized understanding.    ZAIDA Garcia  12/26/2019

## 2019-12-31 ENCOUNTER — OFFICE VISIT (OUTPATIENT)
Dept: INTERNAL MEDICINE | Facility: CLINIC | Age: 59
End: 2019-12-31
Payer: COMMERCIAL

## 2019-12-31 VITALS
SYSTOLIC BLOOD PRESSURE: 128 MMHG | HEART RATE: 72 BPM | DIASTOLIC BLOOD PRESSURE: 78 MMHG | WEIGHT: 266 LBS | RESPIRATION RATE: 16 BRPM | TEMPERATURE: 99 F | BODY MASS INDEX: 40.32 KG/M2 | HEIGHT: 68 IN

## 2019-12-31 DIAGNOSIS — R60.0 EDEMA OF BOTH LOWER EXTREMITIES: ICD-10-CM

## 2019-12-31 DIAGNOSIS — G44.89 CHRONIC MIXED HEADACHE SYNDROME: ICD-10-CM

## 2019-12-31 DIAGNOSIS — I48.19 PERSISTENT ATRIAL FIBRILLATION: ICD-10-CM

## 2019-12-31 DIAGNOSIS — J11.1 INFLUENZA: Primary | ICD-10-CM

## 2019-12-31 DIAGNOSIS — J32.4 PANSINUSITIS, UNSPECIFIED CHRONICITY: ICD-10-CM

## 2019-12-31 PROCEDURE — 3008F PR BODY MASS INDEX (BMI) DOCUMENTED: ICD-10-PCS | Mod: CPTII,S$GLB,, | Performed by: INTERNAL MEDICINE

## 2019-12-31 PROCEDURE — 3074F SYST BP LT 130 MM HG: CPT | Mod: CPTII,S$GLB,, | Performed by: INTERNAL MEDICINE

## 2019-12-31 PROCEDURE — 99214 OFFICE O/P EST MOD 30 MIN: CPT | Mod: S$GLB,,, | Performed by: INTERNAL MEDICINE

## 2019-12-31 PROCEDURE — 3078F DIAST BP <80 MM HG: CPT | Mod: CPTII,S$GLB,, | Performed by: INTERNAL MEDICINE

## 2019-12-31 PROCEDURE — 99999 PR PBB SHADOW E&M-EST. PATIENT-LVL V: ICD-10-PCS | Mod: PBBFAC,,, | Performed by: INTERNAL MEDICINE

## 2019-12-31 PROCEDURE — 3078F PR MOST RECENT DIASTOLIC BLOOD PRESSURE < 80 MM HG: ICD-10-PCS | Mod: CPTII,S$GLB,, | Performed by: INTERNAL MEDICINE

## 2019-12-31 PROCEDURE — 3074F PR MOST RECENT SYSTOLIC BLOOD PRESSURE < 130 MM HG: ICD-10-PCS | Mod: CPTII,S$GLB,, | Performed by: INTERNAL MEDICINE

## 2019-12-31 PROCEDURE — 99214 PR OFFICE/OUTPT VISIT, EST, LEVL IV, 30-39 MIN: ICD-10-PCS | Mod: S$GLB,,, | Performed by: INTERNAL MEDICINE

## 2019-12-31 PROCEDURE — 3008F BODY MASS INDEX DOCD: CPT | Mod: CPTII,S$GLB,, | Performed by: INTERNAL MEDICINE

## 2019-12-31 PROCEDURE — 99999 PR PBB SHADOW E&M-EST. PATIENT-LVL V: CPT | Mod: PBBFAC,,, | Performed by: INTERNAL MEDICINE

## 2019-12-31 RX ORDER — OSELTAMIVIR PHOSPHATE 75 MG/1
75 CAPSULE ORAL 2 TIMES DAILY
Qty: 10 CAPSULE | Refills: 0 | Status: SHIPPED | OUTPATIENT
Start: 2019-12-31 | End: 2020-01-05

## 2020-01-02 ENCOUNTER — TELEPHONE (OUTPATIENT)
Dept: BARIATRICS | Facility: CLINIC | Age: 60
End: 2020-01-02

## 2020-01-03 NOTE — PROGRESS NOTES
Subjective:       Patient ID: Shiraz Jha is a 59 y.o. male.    Chief Complaint: Nasal Congestion (saw y ou recently a nd went to urgent care,  fatigue really easy); Cough; Wheezing; Back Pain (off and on in last couple of days. ); and Fatigue  Dictation #1  MRN:[id number]  CSN:[id number]  Dict 59-year-old white male patient of Dr. Theodore's comes in for a cough it has been going on now for 3 months.  He has not gotten the flu vaccine he has been wheezing he was seen at urgent care and diagnosed as having pan sinusitis treated with steroid injection and follow up [place].  He has not gotten better with that.  I had seen him and gave great detail about his sinus problems his prior history of head injury and this problem seems to date back to September.  Patient has now been coughing for 3 months.  He was scheduled today to do an annual exam but in light of this it was not done.    Physical exam:  Skin-fair, no diaphoresis  HEENT-ears, nose, throat clear  Neck-no stiffness or adenopathy  Chest-no rales, no rub, scattered wheeze, breath sounds okay  Heart-no irregularity    Impression:  1.  Probable influenza for which I put him on treatment with Tamiflu  2.  Mixed headache syndrome  3.  Pansinusitis   4.  Chronic atrial fibrillation    Plan:  1.  Patient was advised to return for his annual physical when clear 2.  Recommended nasal saline, Mucinex, drink plenty of fluids 3.  Finish oral steroids and doxycycline ordered  HPI  Review of Systems   Constitutional: Positive for fatigue and fever. Negative for activity change, appetite change and unexpected weight change.   HENT: Negative for dental problem, hearing loss, mouth sores, postnasal drip and sinus pressure.    Eyes: Negative for discharge and visual disturbance.   Respiratory: Positive for cough and wheezing. Negative for shortness of breath.    Cardiovascular: Negative for chest pain and palpitations.   Gastrointestinal: Negative for abdominal pain, blood in stool,  constipation, diarrhea and nausea.   Genitourinary: Negative for dysuria, hematuria and testicular pain.   Musculoskeletal: Negative for arthralgias, back pain, joint swelling and neck pain.   Skin: Negative for rash.   Neurological: Negative for weakness and headaches.   Psychiatric/Behavioral: Negative for agitation and sleep disturbance.       Objective:      Physical Exam   Constitutional: He is oriented to person, place, and time. He appears well-developed and well-nourished.   HENT:   Head: Normocephalic.   Right Ear: External ear normal.   Left Ear: External ear normal.   Mouth/Throat: Oropharynx is clear and moist.   Eyes: Pupils are equal, round, and reactive to light. EOM are normal. Right eye exhibits no discharge.   Neck: Normal range of motion. No JVD present. No tracheal deviation present. No thyromegaly present.   Cardiovascular: Normal rate, regular rhythm, normal heart sounds and intact distal pulses. Exam reveals no gallop.   No murmur heard.  Pulmonary/Chest: Effort normal. He has wheezes. He has no rales.   Abdominal: Soft. Bowel sounds are normal. He exhibits no mass. There is no tenderness.   Genitourinary: Prostate normal and penis normal. Rectal exam shows guaiac negative stool.   Musculoskeletal: Normal range of motion. He exhibits no edema.   Lymphadenopathy:     He has no cervical adenopathy.   Neurological: He is oriented to person, place, and time. He displays normal reflexes. No cranial nerve deficit. Coordination normal.   Skin: Skin is warm. No rash noted. No pallor.   Psychiatric: He has a normal mood and affect.       Assessment:       1. Influenza    2. Chronic mixed headache syndrome    3. Pansinusitis, unspecified chronicity    4. Persistent atrial fibrillation    5. Edema of both lower extremities        Plan:

## 2020-01-07 ENCOUNTER — PATIENT OUTREACH (OUTPATIENT)
Dept: ADMINISTRATIVE | Facility: OTHER | Age: 60
End: 2020-01-07

## 2020-01-08 ENCOUNTER — LAB VISIT (OUTPATIENT)
Dept: LAB | Facility: HOSPITAL | Age: 60
End: 2020-01-08
Attending: INTERNAL MEDICINE
Payer: COMMERCIAL

## 2020-01-08 ENCOUNTER — OFFICE VISIT (OUTPATIENT)
Dept: CARDIOLOGY | Facility: CLINIC | Age: 60
End: 2020-01-08
Payer: COMMERCIAL

## 2020-01-08 VITALS
HEART RATE: 72 BPM | SYSTOLIC BLOOD PRESSURE: 108 MMHG | HEIGHT: 68 IN | WEIGHT: 277.56 LBS | BODY MASS INDEX: 42.07 KG/M2 | DIASTOLIC BLOOD PRESSURE: 66 MMHG

## 2020-01-08 DIAGNOSIS — E66.01 MORBID OBESITY WITH BMI OF 40.0-44.9, ADULT: ICD-10-CM

## 2020-01-08 DIAGNOSIS — R60.0 EDEMA OF BOTH LOWER EXTREMITIES: ICD-10-CM

## 2020-01-08 DIAGNOSIS — I87.2 VENOUS INSUFFICIENCY OF LEFT LOWER EXTREMITY: ICD-10-CM

## 2020-01-08 DIAGNOSIS — Z98.84 STATUS POST GASTRIC BYPASS FOR OBESITY: ICD-10-CM

## 2020-01-08 DIAGNOSIS — R60.0 EDEMA OF BOTH LOWER EXTREMITIES: Primary | ICD-10-CM

## 2020-01-08 LAB
ALBUMIN SERPL BCP-MCNC: 3.7 G/DL (ref 3.5–5.2)
ALP SERPL-CCNC: 89 U/L (ref 55–135)
ALT SERPL W/O P-5'-P-CCNC: 26 U/L (ref 10–44)
ANION GAP SERPL CALC-SCNC: 7 MMOL/L (ref 8–16)
AST SERPL-CCNC: 24 U/L (ref 10–40)
BILIRUB SERPL-MCNC: 0.7 MG/DL (ref 0.1–1)
BUN SERPL-MCNC: 11 MG/DL (ref 6–20)
CALCIUM SERPL-MCNC: 9.4 MG/DL (ref 8.7–10.5)
CHLORIDE SERPL-SCNC: 103 MMOL/L (ref 95–110)
CO2 SERPL-SCNC: 30 MMOL/L (ref 23–29)
CREAT SERPL-MCNC: 1 MG/DL (ref 0.5–1.4)
EST. GFR  (AFRICAN AMERICAN): >60 ML/MIN/1.73 M^2
EST. GFR  (NON AFRICAN AMERICAN): >60 ML/MIN/1.73 M^2
GLUCOSE SERPL-MCNC: 100 MG/DL (ref 70–110)
POTASSIUM SERPL-SCNC: 4.1 MMOL/L (ref 3.5–5.1)
PROT SERPL-MCNC: 6.7 G/DL (ref 6–8.4)
SODIUM SERPL-SCNC: 140 MMOL/L (ref 136–145)

## 2020-01-08 PROCEDURE — 36415 COLL VENOUS BLD VENIPUNCTURE: CPT | Mod: PO

## 2020-01-08 PROCEDURE — 3008F BODY MASS INDEX DOCD: CPT | Mod: CPTII,S$GLB,, | Performed by: INTERNAL MEDICINE

## 2020-01-08 PROCEDURE — 3008F PR BODY MASS INDEX (BMI) DOCUMENTED: ICD-10-PCS | Mod: CPTII,S$GLB,, | Performed by: INTERNAL MEDICINE

## 2020-01-08 PROCEDURE — 99999 PR PBB SHADOW E&M-EST. PATIENT-LVL V: ICD-10-PCS | Mod: PBBFAC,,, | Performed by: INTERNAL MEDICINE

## 2020-01-08 PROCEDURE — 99215 OFFICE O/P EST HI 40 MIN: CPT | Mod: S$GLB,,, | Performed by: INTERNAL MEDICINE

## 2020-01-08 PROCEDURE — 99215 PR OFFICE/OUTPT VISIT, EST, LEVL V, 40-54 MIN: ICD-10-PCS | Mod: S$GLB,,, | Performed by: INTERNAL MEDICINE

## 2020-01-08 PROCEDURE — 3074F SYST BP LT 130 MM HG: CPT | Mod: CPTII,S$GLB,, | Performed by: INTERNAL MEDICINE

## 2020-01-08 PROCEDURE — 80053 COMPREHEN METABOLIC PANEL: CPT

## 2020-01-08 PROCEDURE — 3074F PR MOST RECENT SYSTOLIC BLOOD PRESSURE < 130 MM HG: ICD-10-PCS | Mod: CPTII,S$GLB,, | Performed by: INTERNAL MEDICINE

## 2020-01-08 PROCEDURE — 3078F DIAST BP <80 MM HG: CPT | Mod: CPTII,S$GLB,, | Performed by: INTERNAL MEDICINE

## 2020-01-08 PROCEDURE — 99999 PR PBB SHADOW E&M-EST. PATIENT-LVL V: CPT | Mod: PBBFAC,,, | Performed by: INTERNAL MEDICINE

## 2020-01-08 PROCEDURE — 3078F PR MOST RECENT DIASTOLIC BLOOD PRESSURE < 80 MM HG: ICD-10-PCS | Mod: CPTII,S$GLB,, | Performed by: INTERNAL MEDICINE

## 2020-01-08 NOTE — PATIENT INSTRUCTIONS
Assessment/Plan:  Shiraz Jha is a 59 y.o. male with a past medical history of Afib s/p ablation 7/14, HTN, DM2, morbid obesity s/p gastric bypass, who presents for a follow up appointment.      1. BLE Edema- BLE edema now improving.  Continue bumex 1 mg daily.  Check cmp today to monitor renal fxn.  Exercise SURINDER study from 11/25/2019 showed normal rest and exercise SURINDER bilaterally.  Normal PVR waveforms bilaterally. Continue graduated compression hose, leg elevation and low salt diet.  Limit fluid intake to 2 liters a day.      2. Morbid Obesity- Pt referred back to Bariatric Surgery.     Check cmp today   Otherwise, follow up in 3 months

## 2020-01-08 NOTE — PROGRESS NOTES
"[facility]    CC: Lower Extremity Edema    Patient ID: Shiraz Jha is a 59 y.o. male with a past medical history of Afib s/p ablation 7/14, HTN, DM2, morbid obesity s/p gastric bypass, who presents for a follow up appointment.  Pertinent history/events are as follows:    -Pt kindly referred by Twyla Bahena and Dr Scott for evaluation of lower extremity edema (per Dr. Scott's note on 9/24/2019:  "Mr. Jha is in clinic today for continued LE edema and discoloration of his legs.  He had a venous ultrasound on 8/6/19 for the swelling and mild reflux was noted in the left popliteal.  Patient denies chest pain with exertion or at rest, palpitations, SOB, DAI, dizziness, syncope, orthopnea, PND, or claudication.  Instructed to elevate legs when seated, low salt diet, increase walking and compression stockings. No venous reflux noted on US.  Refer to Dr. Ureña in interventional cardiology."    -At our initial clinic visit on 10/16/2019, Mr. Jha reported BLE edema for the past 1 year.  States LE edema is improved with graduated compression hose.  He has no LE pain, claudication symptoms, rest pain, or tissue loss.  No smoking history.  States he has gained over 50 pounds in the past year, despite gastric bypass surgery.  BLE Venous Reflux Study from 8/6/2019 showed no evidence of lower extremity DVT bilaterally.  There is mild left popliteal (deep venous) reflux.  Plan:   BLE Edema- BLE edema likely due to a component of venous insufficiency and morbid obesity.  Check cmp today.  If potassium and creatinine are appropriate, increase lasix to 40 mg bid for 7 days, then resume at 40 mg daily.  Pt to continue this cycle/regimen for lasix.  Pt will benefit from significant weight loss.  Continue graduated compression hose, leg elevation and low salt diet.  Limit fluid intake to 2 liters a day.  Check exercise SURINDER to evaluate for significant PAD.   Morbid Obesity- Refer back to Bariatric Surgery.     -At " follow up clinic visit on 11/27/2019, Mr. Jha reported no significant improvement in BLE edema with lasix regimen of 40 mg bid for 7 days, then resuming at 40 mg daily.  Exercise SURINDER from 11/15/2019 showed normal rest and exercise SURINDER bilaterally with normal PVR waveforms bilaterally.  Exam shows 1+ BLE pitting edema.  Plan:   BLE Edema- BLE edema likely due to a component of venous insufficiency and morbid obesity.  Check cmp today.  If potassium and creatinine are appropriate, discontinue lasix and start bumex 1 mg bid.  Pt will benefit from significant weight loss.  Continue graduated compression hose, leg elevation and low salt diet.  Limit fluid intake to 2 liters a day.  Check exercise SURINDER to evaluate for significant PAD.   Morbid Obesity- Pt referred back to Bariatric Surgery.     HPI:  Mr. Jha reports significant improvement in BLE edema since starting bumex 1 mg daily.  Exercise SURINDER study from 11/25/2019 showed normal rest and exercise SURINDER bilaterally.  Normal PVR waveforms bilaterally.    Past Medical History:   Diagnosis Date    Allergy     Asthma     Atrial fibrillation     Cataract     Chronic rhinitis 9/26/2013    DDD (degenerative disc disease) 4/3/2015    Depression     Diabetes mellitus     Fibromyalgia     Gastroesophageal reflux disease without esophagitis 7/14/2017    Hypertension     Sinusitis, acute, maxillary 12/20/2012    Thyroid disease      Past Surgical History:   Procedure Laterality Date    CERVICAL SPINE SURGERY      EPIDURAL STEROID INJECTION INTO LUMBAR SPINE N/A 5/29/2018    Procedure: INJECTION-STEROID-EPIDURAL-LUMBAR- L4-5;  Surgeon: Roman Lyman MD;  Location: [facility];  Service: Pain Management;  Laterality: N/A;  Patient is diabetic and Xarleto     EPIDURAL STEROID INJECTION INTO LUMBAR SPINE N/A 7/3/2018    Procedure: INJECTION, STEROID, SPINE, LUMBAR, EPIDURAL- L4-5;  Surgeon: Roman Lyman MD;  Location: [facility];  Service: Pain  Management;  Laterality: N/A;  Patient takes Xarelto and ASA     GASTRIC BYPASS      SINUS SURGERY  2000    [facility]    TONSILLECTOMY       Social History     Socioeconomic History    Marital status: Single     Spouse name: Not on file    Number of children: Not on file    Years of education: Not on file    Highest education level: Not on file   Occupational History    Not on file   Social Needs    Financial resource strain: Not on file    Food insecurity:     Worry: Not on file     Inability: Not on file    Transportation needs:     Medical: Not on file     Non-medical: Not on file   Tobacco Use    Smoking status: Never Smoker    Smokeless tobacco: Never Used   Substance and Sexual Activity    Alcohol use: No     Comment: rare    Drug use: No    Sexual activity: Yes     Partners: Female   Lifestyle    Physical activity:     Days per week: Not on file     Minutes per session: Not on file    Stress: Not on file   Relationships    Social connections:     Talks on phone: Not on file     Gets together: Not on file     Attends [employer] service: Not on file     Active member of club or organization: Not on file     Attends meetings of clubs or organizations: Not on file     Relationship status: Not on file   Other Topics Concern    Not on file   Social History Narrative    [place], lives alone w/2 dogs.    Dogs are his kids.    Works PT --  at [employer], on ssdi from neck and back/depression.     Family History   Problem Relation Age of Onset    Heart disease Father     Cancer Mother         lung    Hypertension Sister     Heart disease Brother     Cirrhosis Brother     Diabetes Brother        Review of patient's allergies indicates:   Allergen Reactions    Iodinated contrast media Other (See Comments)     Raises BP         Medication List with Changes/Refills   Current Medications    ACETAMINOPHEN (TYLENOL) 500 MG TABLET    Take 500 mg by mouth every 12 (twelve) hours.     ASPIRIN 81 MG CHEW    Take 1 tablet (81 mg total) by mouth once daily.    BUMETANIDE (BUMEX) 1 MG TABLET    Take 1 tablet (1 mg total) by mouth 2 (two) times daily.    BUPROPION (WELLBUTRIN XL) 300 MG 24 HR TABLET    Take 300 mg by mouth once daily.      DESOXIMETASONE (TOPICORT) 0.05 % CREAM    Apply topically 2 (two) times daily.    DEXTROAMPHETAMINE-AMPHETAMINE 30 MG TAB    Adderall    DOXYCYCLINE (VIBRA-TABS) 100 MG TABLET    Take 1 tablet (100 mg total) by mouth 2 (two) times daily.    GABAPENTIN (NEURONTIN) 300 MG CAPSULE    Take 1 capsule (300 mg total) by mouth 3 (three) times daily.    IBUPROFEN (ADVIL,MOTRIN) 200 MG TABLET    Take 200 mg by mouth every 12 (twelve) hours.    IMPOYZ 0.025 % CREA    Apply topically as needed.     LUZU 1 % CREA    Apply topically as needed.     METOPROLOL TARTRATE (LOPRESSOR) 25 MG TABLET    Take 1 tablet (25 mg total) by mouth 2 (two) times daily.    NAFTIN 2 % GEL    daily as needed.     NITROGLYCERIN (NITROSTAT) 0.4 MG SL TABLET    Please dispense 25 pills in 4 bottles.    OMEPRAZOLE (PRILOSEC) 20 MG CAPSULE    Take 1 capsule (20 mg total) by mouth once daily.    RIVAROXABAN (XARELTO) 20 MG TAB    Take 1 tablet (20 mg total) by mouth once daily.    SOTALOL (BETAPACE) 80 MG TABLET    Take 1 tablet (80 mg total) by mouth 2 (two) times daily.    TAMSULOSIN (FLOMAX) 0.4 MG CAP    Take 1 capsule (0.4 mg total) by mouth 2 (two) times daily.    VORTIOXETINE (TRINTELLIX) 20 MG TAB    Take 20 mg by mouth once daily.    Discontinued Medications    METHYLPREDNISOLONE (MEDROL DOSEPACK) 4 MG TABLET    use as directed       Review of Systems  Constitution: Denies chills, fever, and sweats.  HENT: Denies headaches or blurry vision.  Cardiovascular: Denies chest pain or irregular heart beat.  Respiratory: Denies cough or shortness of breath.  Gastrointestinal: Denies abdominal pain, nausea, or vomiting.  Musculoskeletal: Positive for BLE edema.  Neurological: Denies dizziness or focal  "weakness.  Psychiatric/Behavioral: Normal mental status.  Hematologic/Lymphatic: Denies bleeding problem or easy bruising/bleeding.  Skin: Denies rash or suspicious lesions    Physical Examination  /66   Pulse 72   Ht 5' 8" (1.727 m)   Wt 125.9 kg (277 lb 9 oz)   BMI 42.20 kg/m²     Constitutional: No acute distress, conversant  HEENT: Sclera anicteric, Pupils equal, round and reactive to light, extraocular motions intact, Oropharynx clear  Neck: No JVD, no carotid bruits  Cardiovascular: regular rate and rhythm, no murmur, rubs or gallops, normal S1/S2  Pulmonary: Clear to auscultation bilaterally  Abdominal: Abdomen soft, nontender, nondistended, positive bowel sounds  Extremities: 1 pitting BLE edema   Pulses:  Carotid pulses are 2+ on the right side, and 2+ on the left side.  Radial pulses are 2+ on the right side, and 2+ on the left side.   Femoral pulses are 2+ on the right side, and 2+ on the left side.  Popliteal pulses are 2+ on the right side, and 2+ on the left side.   Dorsalis pedis pulses are 2+ on the right side, and 2+ on the left side.   Posterior tibial pulses are 2+ on the right side, and 2+ on the left side.    Skin: No ecchymosis, erythema, or ulcers  Psych: Alert and oriented x 3, appropriate affect  Neuro: CNII-XII intact, no focal deficits    Labs:  Most Recent Data  CBC:   Lab Results   Component Value Date    WBC 4.69 04/23/2019    HGB 11.9 (L) 04/23/2019    HCT 38.3 (L) 04/23/2019     04/23/2019    MCV 89 04/23/2019    RDW 14.6 (H) 04/23/2019     BMP:   Lab Results   Component Value Date     11/29/2019    K 4.3 11/29/2019     11/29/2019    CO2 30 (H) 11/29/2019    BUN 17 11/29/2019    CREATININE 1.1 11/29/2019     11/29/2019    CALCIUM 9.8 11/29/2019    MG 1.9 07/15/2017    PHOS 3.7 07/15/2017     LFTS;   Lab Results   Component Value Date    PROT 6.7 11/29/2019    ALBUMIN 3.9 11/29/2019    BILITOT 0.5 11/29/2019    AST 21 11/29/2019    ALKPHOS 75 " 11/29/2019    ALT 22 11/29/2019     COAGS:   Lab Results   Component Value Date    INR 1.0 07/14/2017     FLP:   Lab Results   Component Value Date    CHOL 188 07/23/2019    HDL 44 07/23/2019    LDLCALC 115.4 07/23/2019    TRIG 143 07/23/2019    CHOLHDL 23.4 07/23/2019     CARDIAC:   Lab Results   Component Value Date    TROPONINI 0.015 07/15/2017    BNP 11 07/14/2017       Echo 7/23/2019:  · Normal left ventricular systolic function. The estimated ejection fraction is 60%  · Normal right ventricular systolic function.  · Normal LV diastolic function.  · Moderate left atrial enlargement.  · Mild right atrial enlargement.  · The ascending aorta is mildly dilated (42mm in diameter, unchanged since Feb 2018).  · The estimated PA systolic pressure is 23 mm Hg  · Normal central venous pressure (3 mm Hg).       Exercise SURINDER 11/25/2019:  Normal rest and exercise SURINDER bilaterally.  Normal PVR waveforms bilaterally.    BLE Venous Reflux Study 8/6/2019:  No evidence of lower extremity DVT bilaterally.  Mild left popliteal (deep venous) reflux.      Assessment/Plan:  Shiraz Jha is a 59 y.o. male with a past medical history of Afib s/p ablation 7/14, HTN, DM2, morbid obesity s/p gastric bypass, who presents for a follow up appointment.      1. BLE Edema- BLE edema now improving.  Continue bumex 1 mg daily.  Check cmp today to monitor renal fxn.  Exercise SURINDER study from 11/25/2019 showed normal rest and exercise SURINDER bilaterally.  Normal PVR waveforms bilaterally. Continue graduated compression hose, leg elevation and low salt diet.  Limit fluid intake to 2 liters a day.      2. Morbid Obesity- Pt referred back to Bariatric Surgery.     Check cmp today   Otherwise, follow up in 3 months    Total duration of face to face visit time 30 minutes.  Total time spent counseling greater than fifty percent of total visit time.  Counseling included discussion regarding imaging findings, diagnosis, possibilities, treatment options, risks  and benefits.  The patient had many questions regarding the options and long-term effects.    Randolph Ureña MD, PhD  Interventional Cardiology

## 2020-01-13 ENCOUNTER — PATIENT OUTREACH (OUTPATIENT)
Dept: ADMINISTRATIVE | Facility: OTHER | Age: 60
End: 2020-01-13

## 2020-01-14 ENCOUNTER — INITIAL CONSULT (OUTPATIENT)
Dept: BARIATRICS | Facility: CLINIC | Age: 60
End: 2020-01-14
Payer: COMMERCIAL

## 2020-01-14 ENCOUNTER — LAB VISIT (OUTPATIENT)
Dept: LAB | Facility: HOSPITAL | Age: 60
End: 2020-01-14
Payer: COMMERCIAL

## 2020-01-14 VITALS
BODY MASS INDEX: 43.6 KG/M2 | HEIGHT: 67 IN | SYSTOLIC BLOOD PRESSURE: 108 MMHG | WEIGHT: 277.75 LBS | HEART RATE: 65 BPM | DIASTOLIC BLOOD PRESSURE: 64 MMHG

## 2020-01-14 DIAGNOSIS — E11.9 TYPE 2 DIABETES MELLITUS WITHOUT COMPLICATION, WITHOUT LONG-TERM CURRENT USE OF INSULIN: ICD-10-CM

## 2020-01-14 DIAGNOSIS — I10 ESSENTIAL HYPERTENSION: ICD-10-CM

## 2020-01-14 DIAGNOSIS — E66.01 MORBID OBESITY WITH BMI OF 40.0-44.9, ADULT: ICD-10-CM

## 2020-01-14 DIAGNOSIS — Z98.84 S/P GASTRIC BYPASS: ICD-10-CM

## 2020-01-14 DIAGNOSIS — E66.01 MORBID OBESITY WITH BMI OF 40.0-44.9, ADULT: Primary | ICD-10-CM

## 2020-01-14 LAB
25(OH)D3+25(OH)D2 SERPL-MCNC: 93 NG/ML (ref 30–96)
IRON SERPL-MCNC: 89 UG/DL (ref 45–160)
SATURATED IRON: 23 % (ref 20–50)
TOTAL IRON BINDING CAPACITY: 380 UG/DL (ref 250–450)
TRANSFERRIN SERPL-MCNC: 257 MG/DL (ref 200–375)
VIT B12 SERPL-MCNC: >2000 PG/ML (ref 210–950)

## 2020-01-14 PROCEDURE — 3008F PR BODY MASS INDEX (BMI) DOCUMENTED: ICD-10-PCS | Mod: CPTII,S$GLB,, | Performed by: PHYSICIAN ASSISTANT

## 2020-01-14 PROCEDURE — 36415 COLL VENOUS BLD VENIPUNCTURE: CPT

## 2020-01-14 PROCEDURE — 99999 PR PBB SHADOW E&M-EST. PATIENT-LVL IV: CPT | Mod: PBBFAC,,, | Performed by: PHYSICIAN ASSISTANT

## 2020-01-14 PROCEDURE — 3074F SYST BP LT 130 MM HG: CPT | Mod: CPTII,S$GLB,, | Performed by: PHYSICIAN ASSISTANT

## 2020-01-14 PROCEDURE — 82607 VITAMIN B-12: CPT

## 2020-01-14 PROCEDURE — 3078F DIAST BP <80 MM HG: CPT | Mod: CPTII,S$GLB,, | Performed by: PHYSICIAN ASSISTANT

## 2020-01-14 PROCEDURE — 99999 PR PBB SHADOW E&M-EST. PATIENT-LVL IV: ICD-10-PCS | Mod: PBBFAC,,, | Performed by: PHYSICIAN ASSISTANT

## 2020-01-14 PROCEDURE — 99205 PR OFFICE/OUTPT VISIT, NEW, LEVL V, 60-74 MIN: ICD-10-PCS | Mod: S$GLB,,, | Performed by: PHYSICIAN ASSISTANT

## 2020-01-14 PROCEDURE — 3078F PR MOST RECENT DIASTOLIC BLOOD PRESSURE < 80 MM HG: ICD-10-PCS | Mod: CPTII,S$GLB,, | Performed by: PHYSICIAN ASSISTANT

## 2020-01-14 PROCEDURE — 3074F PR MOST RECENT SYSTOLIC BLOOD PRESSURE < 130 MM HG: ICD-10-PCS | Mod: CPTII,S$GLB,, | Performed by: PHYSICIAN ASSISTANT

## 2020-01-14 PROCEDURE — 83540 ASSAY OF IRON: CPT

## 2020-01-14 PROCEDURE — 3008F BODY MASS INDEX DOCD: CPT | Mod: CPTII,S$GLB,, | Performed by: PHYSICIAN ASSISTANT

## 2020-01-14 PROCEDURE — 99205 OFFICE O/P NEW HI 60 MIN: CPT | Mod: S$GLB,,, | Performed by: PHYSICIAN ASSISTANT

## 2020-01-14 PROCEDURE — 82306 VITAMIN D 25 HYDROXY: CPT

## 2020-01-14 PROCEDURE — 84425 ASSAY OF VITAMIN B-1: CPT

## 2020-01-14 NOTE — PATIENT INSTRUCTIONS
Vitamin/Mineral Supplements    1. Multi-vitamin with Iron + extra B-complex:  ? Examples of a complete MVI with iron: Lomita Light Pre Marleni One (take 1 tablet twice daily) OR [facility] Ultra Derick Green Women's with iron  o Your multi vitamin should have the following:  - Vitamin A, vitamin C, vitamin D, vitamin E, thiamin (B1), riboflavin (B2), niacin, folic acid, vitamin B12, biotin, magnesium,  zinc, copper, manganese, chromium, molybdenum, and iron.   - Not all Multi Vitamins are created equally.  Most do NOT have all B vitamins included.  If yours does not, just add a daily B Complex or Super B 50.    ? Why take a MVI after I have lost my weight?  o You will ALWAYS be in a state of mal-absorption.  No matter how healthy you eat or however big your portions may or may not become, your absorption of vitamins and minerals will forever be altered.  In order to avoid problems with vitamin deficiencies just take your MVI.  - Vitamin A: loss of night vision, eye dryness, total blindness, dry skin, dry hair, broken nails, rough skin, poor wound healing, kidney stones,   - Vitamin B1 (thiamin): weakness (lethargy) and nerve function.  Described as Beri-Beri.  - Folic acid (folate): neural tube defects, glossitis (sore tongue), loss of appetite, fatigue, weakness, irritability, depression, etc.  - Vitamin D: a silent condition.  Only picked up from blood test and/ or bone density revealing bone loss.  - Vitamin K: unexplained bruising, bleeding gums.  - Vitamin E: rare, memory changes, muscular weakness, loss of balance, night blindness, changes in visual fields, loss of deep tendon reflexes.  - Iron: anemia (fatigue, pica, hair loss, shortness of breath)  - Zinc: loss of smell, poor wound healing, hair loss/ thinning, poor appetite, lethargy, etc.     2. Calcium CITRATE with Vitamin D:  ? 1,500-2000 mg per day split up in 2-3 doses.   Citracal (petites or regular.)   Generic of above   Chewable option: Calcet chewy bites  (3 daily), Bariatric Advantage chewy bites, Celebrate chewy bites.  Look for 500 mg chews.   There ARE liquid calcium citrate formulas at your local pharmacy.    ? Why is calcium important to take:   Keep bones strong and PREVENT osteoporosis.   After weight loss surgery, you DO NOT absorb calcium as easily from the foods and supplements that you take.  Over time, your body will start to break down your bones to keep a healthy amount of calcium in your bloodstream.  If you do not take calcium on a regular basis, it is only a matter of time before you develop osteoporosis and break a hip or fracture vertebrae.   Other complications from chronic calcium deficiency: poor dental health, brittle hair and nails, and dry skin.   Promotes weight loss.    3. Vitamin B12  ? Sublingual: 500mcg per day OR 2500mcg per week OR 1000mcg every other day.  ? Monthly injection with your PCP.   Vitamin B12: permanent neuropathy of the extremities.    4. Vitamin B1      Please take 50mg of thiamine once a day.     Remember all of these problems can be avoided through simply taking your vitamins!!!!    Meal Ideas for Regular Bariatric Diet  *Recipes and products available at [email or web address]      Breakfast: (15-20g protein)    - Egg white omelet: 2 egg whites or ½ cup Egg Beaters. (Optional proteins: cheese, shrimp, black beans, chicken, sliced turkey) (Optional veggies: tomatoes, salsa, spinach, mushrooms, onions, green peppers, or small slice avocado)     - Egg and sausage: 1 egg or ¼ cup Egg Beaters (any variety), with 1 yunior or 2 links of Turkey sausage or Veggie breakfast sausage (Ramen or [employer])    - Crust-less breakfast quiche: To make a glass pie dish, mix 4oz part skim Ricotta, 1 cup skim milk, and 2 eggs as your base. Add protein: shredded cheese, sliced lean ham or turkey, turkey morrissey/sausage. Add veggies: tomato, onion, green onion, mushroom, green pepper, spinach, etc.    - Yogurt parfait: Mix 1 -  6oz container Dannon Light N Fit vanilla yogurt, with ¼ cup crushed unsalted nuts    - Cottage cheese and fruit: ½ cup part-skim cottage cheese or ricotta cheese topped with fresh fruit or sugar free preserves     - Katarzyna Camp's Vanilla Egg custard* (add 2 Tbsp instant coffee granules to make Cappuccino Custard*)    - Hi-Protein café latte (skim milk, decaf coffee, 1 scoop protein powder). Optional to add Sugar free syrup or extract flavoring.    - Breakfast Lox: spread fat free cream cheese on slices of smoked salmon. Serve over scrambled or egg over easy (sauteed with nonstick cookspray) OR on a cucumber slice    - Eggwhich: Scramble or cook 1 large egg over easy using nonstick cookspray. Place between 2 slices of Vincentian morrissey and low fat cheese.     Lunch: (20-30g protein)    - ½ cup Black bean soup (Homemade or Progresso), with ¼ cup shredded low-fat cheese. Top with chopped tomato or fresh salsa.     - Lean deli turkey breast and low-fat sliced cheese, mustard or light barry to moisten, rolled up together, or wrapped in a Miguel lettuce leaf    - Chicken salad made from dinner leftovers, moisten with low-fat salad dressing or light barry. Also try leftover salmon, shrimp, tuna or boiled eggs. Serve ½ cup over dark green salad    - Fat-free canned refried beans, topped with ¼ cup shredded low-fat cheese. Top with chopped tomato or fresh salsa.     - Greek salad: Top mixed greens with 1-2oz grilled chicken, tomatoes, red onions, 2-3 kalamata olives, and sprinkle lightly with feta cheese. Spritz with Balsamic vinegar to taste.     - Crust-less lunch quiche: To make a glass pie dish, mix 4oz part skim Ricotta, 1 cup skim milk, and 2 eggs as your base. Add protein: shredded cheese, sliced lean ham or turkey, shrimp, chicken. Add veggies: tomato, onion, green onion, mushroom, green pepper, spinach, artichoke, broccoli, etc.    - Pizza bake: spread a  colette allyssa mushroom with tomato sauce, low-fat shredded  mozzarella and turkey pepperoni or Montserratian morrissey. Add any veggies. Roast for 10-15 minutes, until cheese melted.     - Cucumber crab bites: Spread ¼ cup crab dip (lump crabmeat + light cream cheese and green onions) over sliced cucumber.     - Chicken with light spinach and artichoke dip*: Puree in : 6oz cooked and drained spinach, 2 cloves garlic, 1 can cannelloni beans, ½ cup chopped green onions, 1 can drained artichoke hearts (not marinated in oil), lemon juice and basil. Mix in 2oz chopped up chicken.    Supper: (20-30g protein)    - Serve grilled fish over dark green salad tossed with low-fat dressing, served with grilled asparagus moran     - Rotisserie chicken salad: served with sliced strawberries, walnuts, fat-free feta cheese crumbles and 1 tbsp Villasenors Own Light Raspberry Bethalto Vinaigrette    - Shrimp cocktail: Dip cold boiled shrimp in homemade low-sugar cocktail sauce (1/2 cup Steven One Carb ketchup, 2 tbsp horseradish, 1/4 tsp hot sauce, 1 tsp Worcestershire sauce, 1 tbsp freshly-squeezed lemon juice). Serve with dark green salad, walnuts, and crumbled blue cheese drizzled with olive oil and Balsamic vinegar    - Tuna Melt: Spread tuna salad onto 2 thick slices of tomato. Top with low-fat cheese and broil until cheese is melted. May also be made with chicken salad of shrimp salad. [place] with different types of cheeses.    - Chicken or beef fajitas (no tortilla, rice, beans, chips). Top meat and veggies w/ fresh salsa, fat free sour cream.     - Homemade low-fat Chili using extra lean ground beef or ground turkey. Top with shredded cheese and salsa as desired. May add dollop fat-free sour cream if desired    - Chicken parmesan: Top chicken breast w/ low sugar marinara sauce, mozzarella cheese and bake until chicken reaches 165*.  Serve w/ spaghetti SQUASH or Fijian cut green beans    - Dinner Omelet with shrimp or chicken and onion, green peppers and chives.    - No noodle  lasagna: Use sliced zucchini or eggplant in place of noodles.  Layer with part skim ricotta cheese and low sugar meat sauce (use very lean ground beef or ground turkey).    - Mexican chicken bake: Bake chunks of chicken breast or thigh with taco seasoning, Pace brand enchilada sauce, green onions and low-fat cheese. Serve with ¼ cup black beans or fat free refried beans topped with chopped tomatoes or salsa.    - Adolfo frozen meatballs, simmered in Classico Marinara sauce. Different flavors of salsa or spaghetti sauce create different dishes! Sprinkle with parmesan cheese. Serve with grilled or steamed veggies, or a dark green salad.    - Simmer boneless skinless chicken thigh chunks in Classico Marinara sauce or roasted salsa until tender with chopped onion, bell pepper, garlic, mushrooms, spinach, etc.     - Hamburger or veggie burger, without the bun, dressed the way you like. Served with grilled or steamed veggies.    - Eggplant parmesan: Bake slices of eggplant at 350 degrees for 15 minutes. Layer tomato sauce, sliced eggplant and low-fat mozzarella cheese in a baking dish and cover with foil. Bake 30-40 more minutes or until bubbly. Uncover and bake at 400 degrees for about 15 more minutes, or until top is slightly crisp.    - Fish tacos: grilled/baked white fish, wrapped in Miguel lettuce leaf, topped with salsa, shredded low-fat cheese, and light coleslaw.    - Chicken bon: Sprinkle chicken w/ 1 tsp of hidden valley ranch dip mix. Then grill chicken and top with black beans, salsa and 1 tsp fat free sour cream.     - Cauliflower pizza crust: Use cauliflower as crust (see recipe on celestine, no flour!). Top w/ low fat cheese, turkey pepperoni and veggies and bake again    - chicken or turkey crust pizza: use ground chicken or turkey instead of cauliflower, spread in [place] and bake at 350 for about 20-30 minutes(may want to add garlic, black pepper, oregano and other herbs to ground meat mixture).   Remove and top w/ low fat cheese, turkey pepperoni and veggies and bake again for another 10 minutes or until cheese is browned.     Snacks: (100-200 calories; >5g protein)    - 1 low-fat cheese stick with 8 cherry tomatoes or 1 serving fresh fruit  - 4 thin slices fat-free turkey breast and 1 slice low-fat cheese  - 4 thin slices fat-free honey ham with wedge of melon  - 6-8 edamame pods (equivalent to about 1/4 cup edamame without pods).   - 1/4 cup unsalted nuts with ½ cup fruit  - 6-oz container Dannon Light n Fit vanilla yogurt, topped with 1oz unsalted nuts         - apple, celery or baby carrots spread with 2 Tbsp PB2  - apple slices with 1 oz slice low-fat cheese  - Apple slices dipped in 2 Tbsp of PB2  - celery, cucumber, bell pepper or baby carrots dipped in ¼ cup hummus bean spread or light spinach and artichoke dip (*recipe in lunch section)  - celery, cucumber, baby carrots dipped in high protein greek yogurt (Mix 16 oz plain greek yogurt + 1 packet of hidden valley ranch dip mix)  - Dmitriy Links Beef Steak - 14g protein! (similar to beef jerky)  - 2 wedges Laughing Cow - Light Herb & Garlic Cheese with sliced cucumber or green bell pepper  - 1/2 cup low-fat cottage cheese with ¼ cup fruit or ¼ cup salsa  - RTD Protein drinks: Atkins, Low Carb Slim Fast, EAS light, Muscle Milk Light, etc.  - Homemade Protein drinks: GN Soy95, Isopure, Nectar, UNJURY, Whey Gourmet, etc. Mix 1 scoop powder with 8oz skim/1% milk or light soymilk.  - Protein bars: Atkins, EAS, Pure Protein, Think Thin, Detour, etc. Must have 0-4 grams sugar - Read the label.    Takeout Options: No more than twice/week  Deli - Salads (no pasta or rice), meats, cheeses. Roasted chicken. Lox (salmon)    Mexican - Platters which don't include tortillas, chips, or rice. Go easy on the beans. Example: Fajitas without the tortillas. Ask the  not to bring chips to the table if they are too tempting.    Greek - Meat or fish and vegetable, but  no bread or rice. Including hummus, baba ganoush, etc, is OK. Most sit-down Greek restaurants can provide you with cucumber slices for dipping instead of jose bread.    Fast Food (Avoid as much as possible) - Salads (no croutons and limit salad dressing to 2 tbsp), grilled chicken sandwich without the bun and ask for no barry. Yis low fat chili or Taco Bell pintos and cheese.    BBQ - The meats are fine if you ask for sauces on the side, but most of the traditional side dishes are loaded with carbs. Sebastian slaw, baked beans and BBQ sauce are typically made with sugar.    Chinese - Nothing deep-fried, no rice or noodles. Many Chinese sauces have starch and sugar in them, so you'll have to use your judgement. If you find that these sauces trigger cravings, or cause Dumping, you can ask for the sauce to be made without sugar or just use soy sauce.    No NSAIDS after surgery due to increased risk of stomach ulcers. Talk to your prescribing provider about alternative options for your pain.

## 2020-01-14 NOTE — PROGRESS NOTES
Subjective:       Patient ID: Shiraz Jha is a 59 y.o. male    Chief Complaint: Consult    HPI: Patient presents to establish care of s/p gastric bypass. [facility]pass completed at [facility] by Dr. Adalberto Rivera in 2010. Pt states that the surgery went smoothly and he had no post operative complications. Highest weight prior to surgery was 350 lbs and lowest weight after surgery was 210 lbs. States that he kept the weight off for about one year. Started gaining weight after stopping gym. Currently at 277 lb, regain 67 lbs over 9 years. Here today to get back on track, with a goal of becoming more active. Will consider revision if needed.     Mild intermittent diarrhea based on food intake. Denies globus sensation dysphagia abdominal pain vomiting nausea GERD                                                                       EXERCISE and VITAMINS:   Reviewed in bariatric assessment.   B12, MVC, folic acid, no calcium b1                                                                               DIET:  Regular bariatric diet                                                                                                                              One meal per day  acoids starches (rice pasta potatoes)  Red beans  Chicken stew/gumbo  Issues with white meat but dark meat goes down fine  States that sweets are his downfall: chocolate soft drinks (2-4 times a day) large amts     Review of Systems   Constitutional: Positive for fever (flu last week ). Negative for fatigue.   HENT: Positive for sinus pressure, sinus pain and sore throat (flu ). Negative for tinnitus.    Respiratory: Negative for shortness of breath.    Cardiovascular: Positive for leg swelling. Negative for chest pain and palpitations.   Gastrointestinal: Positive for diarrhea (intermittent food dependent ). Negative for abdominal pain.   Genitourinary: Negative for decreased urine volume, dysuria and urgency.   Musculoskeletal: Negative for myalgias.    Skin: Negative for rash.   Neurological: Positive for headaches. Negative for seizures, light-headedness and numbness.     Objective:      Physical Exam   Constitutional: He is oriented to person, place, and time and well-developed, well-nourished, and in no distress.   HENT:   Head: Normocephalic and atraumatic.   Eyes: Pupils are equal, round, and reactive to light. Conjunctivae and EOM are normal.   Neck: Normal range of motion. Neck supple. No JVD present. No thyromegaly present.   Cardiovascular: Normal rate, regular rhythm, normal heart sounds and intact distal pulses.   No murmur heard.  Abdominal: Soft. Bowel sounds are normal. There is no tenderness.   Musculoskeletal: Normal range of motion. He exhibits no edema or tenderness.   Neurological: He is alert and oriented to person, place, and time. Gait normal. Coordination normal. GCS score is 15.   Skin: Skin is warm and dry. No rash noted. He is not diaphoretic.   Psychiatric: Mood, memory, affect and judgment normal.      Assessment:    - weight gain s/p gastric bypass      Plan:       1. The patient was given a copy of nutrition booklet. I reviewed moon points with her, including following a high-protein diet with multiple small meals daily, low carbohydrates, no snacking, and avoiding sugary foods.  2. Reviewed appropriate vitamin supplementation, including complete multi-vitamin with iron and B complex twice daily, calcium citrate 1500 mg daily in split doses, and daily sublingual vitamin B12 supplement.  3. EGD if deciding to move forward with revision   4. Encouraged the patient to begin regular exercise, as this is an important part of weight loss and weight loss maintenance. Referral to [facility]  5. Consult bariatric dietician.  6. The patient will keep detailed food diary and bring back for review with bariatric dietician.  7. The patient if interested in a surgical revision of his gastric bypass. However,  diet currently is not  appropriate, and needs to complete a three- to six-month supervised diet.  8. Continue regular medications.  9. Prior to any surgical revision, she will require psychological evaluation and attendance at a support group for surgical weight loss.

## 2020-01-14 NOTE — LETTER
January 15, 2020      GEORGES Theodore MD  [address] [facility] [place] [address]           [facility]  [address]  [facility] [address]  Phone: [phone]  Fax: [phone]          Patient: Shiraz Jha   MR Number: [id number]   YOB: 1960   Date of Visit: 1/14/2020       Dear Dr. GEORGES Theodore:    Thank you for referring Shiraz Jha to me for evaluation. Attached you will find relevant portions of my assessment and plan of care.    If you have questions, please do not hesitate to call me. I look forward to following Shiraz Jha along with you.    Sincerely,    Aly Mnoge PA-C    Enclosure  CC:  No Recipients    If you would like to receive this communication electronically, please contact [email or web address] or [phone] to request more information on [facility] access.    For providers and/or their staff who would like to refer a patient to [facility], please contact us through our one-stop-shop provider referral line, Josselyn Zhu, at [phone].    If you feel you have received this communication in error or would no longer like to receive these types of communications, please e-mail [email or web address]

## 2020-01-20 ENCOUNTER — PATIENT OUTREACH (OUTPATIENT)
Dept: ADMINISTRATIVE | Facility: OTHER | Age: 60
End: 2020-01-20

## 2020-01-20 ENCOUNTER — CLINICAL SUPPORT (OUTPATIENT)
Dept: BARIATRICS | Facility: CLINIC | Age: 60
End: 2020-01-20
Payer: COMMERCIAL

## 2020-01-20 VITALS — BODY MASS INDEX: 43.84 KG/M2 | HEIGHT: 67 IN | WEIGHT: 279.31 LBS

## 2020-01-20 DIAGNOSIS — E66.01 MORBID OBESITY WITH BMI OF 40.0-44.9, ADULT: ICD-10-CM

## 2020-01-20 DIAGNOSIS — Z71.3 DIETARY COUNSELING: ICD-10-CM

## 2020-01-20 DIAGNOSIS — I10 ESSENTIAL HYPERTENSION: ICD-10-CM

## 2020-01-20 DIAGNOSIS — E11.9 TYPE 2 DIABETES MELLITUS WITHOUT COMPLICATION, WITHOUT LONG-TERM CURRENT USE OF INSULIN: ICD-10-CM

## 2020-01-20 PROCEDURE — 99999 PR PBB SHADOW E&M-EST. PATIENT-LVL II: ICD-10-PCS | Mod: PBBFAC,,, | Performed by: DIETITIAN, REGISTERED

## 2020-01-20 PROCEDURE — 97802 PR MED NUTR THER, 1ST, INDIV, EA 15 MIN: ICD-10-PCS | Mod: S$GLB,,, | Performed by: DIETITIAN, REGISTERED

## 2020-01-20 PROCEDURE — 99999 PR PBB SHADOW E&M-EST. PATIENT-LVL II: CPT | Mod: PBBFAC,,, | Performed by: DIETITIAN, REGISTERED

## 2020-01-20 PROCEDURE — 97802 MEDICAL NUTRITION INDIV IN: CPT | Mod: S$GLB,,, | Performed by: DIETITIAN, REGISTERED

## 2020-01-20 NOTE — PROGRESS NOTES
NUTRITION NOTE      PAST MEDICAL HISTORY:  Patient presents to establish care of s/p gastric bypass. Bypass completed at [facility] by Dr. Adalberto Rivera in 2010. Pt states that the surgery went smoothly and he had no post operative complications. Highest weight prior to surgery was 350 lbs and lowest weight after surgery was 210 lbs. States that he kept the weight off for about one year. Started gaining weight after stopping gym. Currently at 277 lb, regain 67 lbs over 9 years. Here today to get back on track, with a goal of becoming more active. Will consider revision if needed.      Mild intermittent diarrhea based on food intake.        Denies nausea, vomiting and constipation.  Reports  diarrhea when drinksing milk.    Past Medical History:   Diagnosis Date    Allergy     Asthma     Atrial fibrillation     Cataract     Chronic rhinitis 9/26/2013    DDD (degenerative disc disease) 4/3/2015    Depression     Diabetes mellitus     Fibromyalgia     Gastroesophageal reflux disease without esophagitis 7/14/2017    Hypertension     Sinusitis, acute, maxillary 12/20/2012    Thyroid disease        CLINICAL DATA:  60 y.o. male.      Current Weight: 279 lbs  BMI: 43.75      LABS:  Reviewed.    CURRENT DIET:  Regular Diet.  Diet Recall: ~40 grams of protein/day; < 64  oz of water. CL a day    Breakfast: Coffee with sweet n low and  fairlife  Milk, sometimes a yogurt but mostly skips    Snacking during day: nuts, fruit    Dinner: Beans, bell pepper, ham     2- 3 coke a day, water     Has not had protein shakes in a long time    Meal Pattern:  1-2meal(s) +  1-2 snack(s) +  0protein supplement(s)  Inadequate protein supplement intake.  Adequate dairy intake.  Inadequate vegetable intake. Tolerates raw vegetables and lettuce.  Adequate fruit intake.  Starchy CHO: none      EXERCISE:  None.    Restrictions to Exercise: None. looking to join a gym        VITAMINS / MINERALS:  Multivitamins: daily  With  iron  B-Complex:  none  Calcium Citrate + Vitamin D: daily Vit D no calcium   Vitamin B12: Sublingual.    ASSESSMENT:  Doing poorly overall.  Weight gain.  Excessive calorie intake from coke  Inadequate protein intake.  Inadequate fluid intake.  Following diet inappropriately.  Not exercising.  Inadequate vitamins & minerals.    BARIATRIC DIET DISCUSSION:  Instructed and provided written materials on bariatric diet plan.  Reinforced post-op nutrition guidelines.    PLAN / RECOMMENDATIONS:    Back on track with diet plan.  Adjust diet by  Eliminate  Coke, 4- 6 small meals/ day focusing on lean protein and non starchy Vegetables  64 ounces water or crystal light/ day  Increase protein intake.  Increase fluid intake.  Increase exercise.  Begin appropriate vitamins & minerals.  Adjust vitamins & minerals by  500 mg calcium TID, 50 mg  B 1 daily     Return to clinic as needed    SESSION TIME: 25 minutes

## 2020-01-20 NOTE — PATIENT INSTRUCTIONS
Protein Content of Foods Recommended after                   Weight Loss Surgery    Food Name Portion Calories Protein (gms)   Almonds (unsalted) 1/4 cup 160 6   [place] milk, unsweetened 1 cup 30  1   Beef, Roast 1 oz 46 8   Beef, Steak, sirloin, trimmed 1 oz 55 9   Catfish, broiled or baked 1 oz 30 5   Cheese, American FF 1 oz 40 6   Cheese, Cottage 1% fat ¼ cup 41 7   Cheese, Parmesan, grated ¼ cup 128 12   Cheese, Mozzarella, part skim 1 oz 78 8   Cheese, part skim Ricotta ¼ cup 90 8   Chicken, white breast w/o skin 1 oz 46 9   Chicken, leg w/o skin 1 oz 54 7   Crab, steamed ¼ cup  40 9   Crawfish tails, boiled ¼ cup 35 8   Edamame, shelled ¼ cup 50 4   Egg 1 78 6   Ham, lean 5% 1 oz 44 7   Hamburger, lean 1 oz 56 7   Hummus ¼ cup 100 5   Lobster, steamed 1 oz 26 5   Milk, skim or 1%, soy  1 cup 90 8   Pork Tenderloin 1 oz 46 7   Pudding, SF 1 serv 60 2   Red beans ¼ cup 56 4   Refried beans, fat free ¼ cup 65 4   [place], baked 1 oz 52 7   Shrimp, steamed 1 oz 28 6   Soymilk, plain ½ cup 40 3   Tilapia, white fish, cooked 1 oz 36 8   Tofu ¼ cup 47 5   Trout 1 oz 48 7   Tuna, canned in water 1 oz 37 8   Turkey, white meat 1 oz 35 7   Veal Loin 1 oz 50 7   Yogurt, SF, frozen vanilla 3 oz 72 3.5   Yogurt, Fruit, FF, light 3 oz 40 2.5   Yogurt, Greek 3 oz 70 8     *Abbreviations: SF=sugar free, LF=low fat, FF= fat free, gms=grams  *3oz of cooked meat/protein = size of deck of cards or ladies palm   *1oz cheese = 1inch cube or 1 slice American cheese      Menu Plan: 800-1000 Calories;  grams of Protein    DAY 1     Breakfast  ½ cup 2% cottage cheese  ¼ cup fruit (no sugar added)    Snack  2% mozzarella string cheese  10 grapes    Lunch  2oz Lean hamburger or turkey devon  1 slice low-fat cheese  ¼ cup green beans    Snack  200 calorie low-carb protein drink (4 grams sugar or less)    Dinner  2oz chicken thigh  ¼ cup cooked spinach     Snack  Atkins bar (15g protein)      DAY 2    Breakfast  1 egg with 1oz  shredded cheddar cheese and 2T salsa    Snack  200 calorie low-carb protein drink (4 grams sugar or less)    Lunch  Lettuce Wraps: 2oz sliced turkey, 1 slice low-fat Swiss cheese, tomato, and mustard wrapped in a Miguel lettuce leaf    Snack  ½ cup low-fat cottage cheese  Pear cup (no sugar added)    Dinner  2oz baked fish  ½ cup cooked broccoli    Snack  Sugar-free pudding cup      DAY 3    Breakfast   ½ cup low-fat ricotta cheese w/ Splenda to ranjan  ½ scoop Vanilla protein powder   ¼ cup fresh fruit    Snack  2% string cheese  6 unsalted almonds    Lunch  Tuna/Chicken Salad: 2oz canned tuna/chicken, 1 egg white, and 1 tsp light barry  Pineapple cup (no sugar added)    Snack  200 calorie low-carb protein drink (4 grams sugar or less)    Dinner  ½ baked pork chop   ¼ cup beans      DAY 4    Breakfast  200 calorie low-carb protein drink (4 grams sugar or less)    Snack  Boiled egg    Lunch  ½ cup grilled shrimp  Salad w/ 2 tbsp crumbled fat-free feta  1 tbsp light vinaigrette    Snack  200 calorie low-carb protein drink (4 grams sugar or less)    Dinner  ¾ cup red beans    Snack  Mini Babybell light      DAY 5    Breakfast  [place] pie: 3oz Greek yogurt, 1 tbsp Splenda, ½ individual pack Crystal Light lemonade. Top with ¼ cup chopped walnuts     Snack  3-4 lean ham or turkey slices, ¼ - ½ cup fruit    Lunch  Fiesta Chicken: 2oz canned chicken, 1oz shredded cheddar cheese, ¼ cup black beans  Top with 2 tbsp salsa and a small dollop light sour cream    Snack  200 calorie low-carb protein drink (4 grams sugar or less)    Dinner  Omelette: ¼ cup Egg Beaters, 4 large (1oz) shrimp, 1oz shredded low-fat cheese. Add bell pepper, onion, mushrooms, green onions, or salsa, optional.      DAY 6    Breakfast  1 yunior or 2 links turkey sausage  ½ cup fruit    Snack  200 calorie low-carb protein drink (4 grams sugar or less)    Lunch  Grilled tilapia  Salad of baby spinach leaves with light dressing    Snack  200 calorie  low-carb protein drink (4 grams sugar or less)    Dinner  Chicken thigh simmered in 98% fat free cream of mushroom soup  ½ cup cooked green beans      Meal Ideas for Regular Bariatric Diet  *Recipes and products available at [email or web address]      Breakfast: (15-20g protein)    - Egg white omelet: 2 egg whites or ½ cup Egg Beaters. (Optional proteins: cheese, shrimp, black beans, chicken, sliced turkey) (Optional veggies: tomatoes, salsa, spinach, mushrooms, onions, green peppers, or small slice avocado)     - Egg and sausage: 1 egg or ¼ cup Egg Beaters (any variety), with 1 yunior or 2 links of Turkey sausage or Veggie breakfast sausage (AntCor or Resource Data)    - Crust-less breakfast quiche: To make a glass pie dish, mix 4oz part skim Ricotta, 1 cup skim milk, and 2 eggs as your base. Add protein: shredded cheese, sliced lean ham or turkey, turkey morrissey/sausage. Add veggies: tomato, onion, green onion, mushroom, green pepper, spinach, etc.    - Yogurt parfait: Mix 1 - 6oz container Dannon Light N Fit vanilla yogurt, with ¼ cup crushed unsalted nuts    - Cottage cheese and fruit: ½ cup part-skim cottage cheese or ricotta cheese topped with fresh fruit or sugar free preserves     - Katarzyna Camp's Vanilla Egg custard* (add 2 Tbsp instant coffee granules to make Cappuccino Custard*)    - Hi-Protein café latte (skim milk, decaf coffee, 1 scoop protein powder). Optional to add Sugar free syrup or extract flavoring.    - Breakfast Lox: spread fat free cream cheese on slices of smoked salmon. Serve over scrambled or egg over easy (sauteed with nonstick cookspray) OR on a cucumber slice    - Eggwhich: Scramble or cook 1 large egg over easy using nonstick cookspray. Place between 2 slices of [place] morrissey and low fat cheese.     Lunch: (20-30g protein)    - ½ cup Black bean soup (Homemade or Progresso), with ¼ cup shredded low-fat cheese. Top with chopped tomato or fresh salsa.     - Lean deli turkey breast and  low-fat sliced cheese, mustard or light barry to moisten, rolled up together, or wrapped in a Miguel lettuce leaf    - Chicken salad made from dinner leftovers, moisten with low-fat salad dressing or light barry. Also try leftover salmon, shrimp, tuna or boiled eggs. Serve ½ cup over dark green salad    - Fat-free canned refried beans, topped with ¼ cup shredded low-fat cheese. Top with chopped tomato or fresh salsa.     - Greek salad: Top mixed greens with 1-2oz grilled chicken, tomatoes, red onions, 2-3 kalamata olives, and sprinkle lightly with feta cheese. Spritz with Balsamic vinegar to taste.     - Crust-less lunch quiche: To make a glass pie dish, mix 4oz part skim Ricotta, 1 cup skim milk, and 2 eggs as your base. Add protein: shredded cheese, sliced lean ham or turkey, shrimp, chicken. Add veggies: tomato, onion, green onion, mushroom, green pepper, spinach, artichoke, broccoli, etc.    - Pizza bake: spread a  colette allyssa mushroom with tomato sauce, low-fat shredded mozzarella and turkey pepperoni or Turks and [place] Islander morrissey. Add any veggies. Roast for 10-15 minutes, until cheese melted.     - Cucumber crab bites: Spread ¼ cup crab dip (lump crabmeat + light cream cheese and green onions) over sliced cucumber.     - Chicken with light spinach and artichoke dip*: Puree in : 6oz cooked and drained spinach, 2 cloves garlic, 1 can cannelloni beans, ½ cup chopped green onions, 1 can drained artichoke hearts (not marinated in oil), lemon juice and basil. Mix in 2oz chopped up chicken.    Supper: (20-30g protein)    - Serve grilled fish over dark green salad tossed with low-fat dressing, served with grilled asparagus moran     - Rotisserie chicken salad: served with sliced strawberries, walnuts, fat-free feta cheese crumbles and 1 tbsp Villasenors Own Light Raspberry [place] Vinaigrette    - Shrimp cocktail: Dip cold boiled shrimp in homemade low-sugar cocktail sauce (1/2 cup Steven One Carb ketchup, 2 tbsp  horseradish, 1/4 tsp hot sauce, 1 tsp Worcestershire sauce, 1 tbsp freshly-squeezed lemon juice). Serve with dark green salad, walnuts, and crumbled blue cheese drizzled with olive oil and Balsamic vinegar    - Tuna Melt: Spread tuna salad onto 2 thick slices of tomato. Top with low-fat cheese and broil until cheese is melted. May also be made with chicken salad of shrimp salad. [place] with different types of cheeses.    - Chicken or beef fajitas (no tortilla, rice, beans, chips). Top meat and veggies w/ fresh salsa, fat free sour cream.     - Homemade low-fat Chili using extra lean ground beef or ground turkey. Top with shredded cheese and salsa as desired. May add dollop fat-free sour cream if desired    - Chicken parmesan: Top chicken breast w/ low sugar marinara sauce, mozzarella cheese and bake until chicken reaches 165*.  Serve w/ spaghetti SQUASH or Romanian cut green beans    - Dinner Omelet with shrimp or chicken and onion, green peppers and chives.    - No noodle lasagna: Use sliced zucchini or eggplant in place of noodles.  Layer with part skim ricotta cheese and low sugar meat sauce (use very lean ground beef or ground turkey).    - Mexican chicken bake: Bake chunks of chicken breast or thigh with taco seasoning, Pace brand enchilada sauce, green onions and low-fat cheese. Serve with ¼ cup black beans or fat free refried beans topped with chopped tomatoes or salsa.    - Adolfo frozen meatballs, simmered in Classico Marinara sauce. Different flavors of salsa or spaghetti sauce create different dishes! Sprinkle with parmesan cheese. Serve with grilled or steamed veggies, or a dark green salad.    - Simmer boneless skinless chicken thigh chunks in Classico Marinara sauce or roasted salsa until tender with chopped onion, bell pepper, garlic, mushrooms, spinach, etc.     - Hamburger or veggie burger, without the bun, dressed the way you like. Served with grilled or steamed veggies.    - Eggplant  parmesan: Bake slices of eggplant at 350 degrees for 15 minutes. Layer tomato sauce, sliced eggplant and low-fat mozzarella cheese in a baking dish and cover with foil. Bake 30-40 more minutes or until bubbly. Uncover and bake at 400 degrees for about 15 more minutes, or until top is slightly crisp.    - Fish tacos: grilled/baked white fish, wrapped in Miguel lettuce leaf, topped with salsa, shredded low-fat cheese, and light coleslaw.    - Chicken bon: Sprinkle chicken w/ 1 tsp of hidden [place] ranch dip mix. Then grill chicken and top with black beans, salsa and 1 tsp fat free sour cream.     - Cauliflower pizza crust: Use cauliflower as crust (see recipe on pinterest, no flour!). Top w/ low fat cheese, turkey pepperoni and veggies and bake again    - chicken or turkey crust pizza: use ground chicken or turkey instead of cauliflower, spread in [place] and bake at 350 for about 20-30 minutes(may want to add garlic, black pepper, oregano and other herbs to ground meat mixture).  Remove and top w/ low fat cheese, turkey pepperoni and veggies and bake again for another 10 minutes or until cheese is browned.     Snacks: (100-200 calories; >5g protein)    - 1 low-fat cheese stick with 8 cherry tomatoes or 1 serving fresh fruit  - 4 thin slices fat-free turkey breast and 1 slice low-fat cheese  - 4 thin slices fat-free honey ham with wedge of melon  - 6-8 edamame pods (equivalent to about 1/4 cup edamame without pods).   - 1/4 cup unsalted nuts with ½ cup fruit  - 6-oz container Dannon Light n Fit vanilla yogurt, topped with 1oz unsalted nuts         - apple, celery or baby carrots spread with 2 Tbsp PB2  - apple slices with 1 oz slice low-fat cheese  - Apple slices dipped in 2 Tbsp of PB2  - celery, cucumber, bell pepper or baby carrots dipped in ¼ cup hummus bean spread or light spinach and artichoke dip (*recipe in lunch section)  - celery, cucumber, baby carrots dipped in high protein greek yogurt (Mix 16 oz  plain greek yogurt + 1 packet of hidden [place] ranch dip mix)  - Dmitriy Links Beef Steak - 14g protein! (similar to beef jerky)  - 2 wedges [place] - Light Herb & Garlic Cheese with sliced cucumber or green bell pepper  - 1/2 cup low-fat cottage cheese with ¼ cup fruit or ¼ cup salsa  - RTD Protein drinks: Atkins, Low Carb Slim Fast, EAS light, Muscle Milk Light, etc.  - Homemade Protein drinks: GNC Soy95, Isopure, Nectar, UNJURY, Whey Gourmet, etc. Mix 1 scoop powder with 8oz skim/1% milk or light soymilk.  - Protein bars: Atkins, EAS, Pure Protein, Think Thin, Detour, etc. Must have 0-4 grams sugar - Read the label.    Takeout Options: No more than twice/week  Deli - Salads (no pasta or rice), meats, cheeses. Roasted chicken. Lox (salmon)    Mexican - Platters which don't include tortillas, chips, or rice. Go easy on the beans. Example: Fajitas without the tortillas. Ask the  not to bring chips to the table if they are too tempting.    Greek - Meat or fish and vegetable, but no bread or rice. Including hummus, baba ganoush, etc, is OK. Most sit-down Greek restaurants can provide you with cucumber slices for dipping instead of jose bread.    Fast Food (Avoid as much as possible) - Salads (no croutons and limit salad dressing to 2 tbsp), grilled chicken sandwich without the bun and ask for no barry. Yis low fat chili or Taco Bell pintos and cheese.    BBQ - The meats are fine if you ask for sauces on the side, but most of the traditional side dishes are loaded with carbs. Sebastian slaw, baked beans and BBQ sauce are typically made with sugar.    Chinese - Nothing deep-fried, no rice or noodles. Many Chinese sauces have starch and sugar in them, so you'll have to use your judgement. If you find that these sauces trigger cravings, or cause Dumping, you can ask for the sauce to be made without sugar or just use soy sauce.      Lactose-free & Artificial Sweetener-free Protein Powders    Remember, your protein  shake should have less than 4 grams of sugar per serving. For whey powders, choose 100% whey pro isolate, not a blend. Blends add creatine in as a filler.    Natural Zero-calorie sweeteners  ? Stevia  ? Truvia  ? Swerve (http://www.nWay/about-swerve/)     Lactose-free Protein Powders  ? [facility] 100% Egg Protein (Vanilla & Chocolate)  ? [facility] Pro Performance 100% Soy Isolate (Vanilla & Chocolate)  ? Bluebonnet Protein Powder  ? Garden of Life ® raw organic protein  ? Isopure ® low carb protein powder  ? NutraBio ® 100% whey protein isolate  ? NutraBio ® organic plant protein - vegan  ? Now ® pea protein - vegan  ? Orgain ® Organic protein powder  ? Syntrax ® Nectar    Lactose-free Ready to drink Protein Shakes  ? Muscle Milk  ? [facility] Total Lean Lean Shake 25  ? Isopure ® zero carb   ? Protein 2 O    Artificial Sweetener-free Protein Powders  ? Pure Protein Natural Whey Protein Powder (Vanilla, Chocolate & Strawberry)  ? Martínez Del Valle Whey Pro Isolate (sweetened w/ stevia) (Vanilla, Chocolate, Strawberry, Pina Coloda & Tropical Dreamsicle)  ? Nature's Best Natural Isopure - Unflavored (zero carb, found at [facility])    Lactose & Artificial-Sweetener-Free Protein Powders  ? Mart[facility] Egg White Protein (sweetened w/ stevia)  ? Garden of Life ® raw organic protein  ? Syntrax ® Nectar         Required Vitamin/Mineral Supplements:    1. Multivitamins - one taken twice a day       2. Iron- 18 mg total daily (may be included in multivitamin)     3. Super B-complex with 50 mg Thiamine (Vitamin B1)- once daily  4. Calcium Citrate + Vitamin D- 500 mg three times per day  5. Vitamin B12- 500 mcg sublingual daily or monthly injections     Sleeve Gastrectomy: No swallowing large whole vitamins/minerals for 2 weeks after surgery  Gastric Bypass: No swallowing large whole vitamins/minerals for 1 month after surgery    *NO gummy multivitamins due to poor quality and sugar content.  *Do NOT take calcium citrate and Iron within 2 hours of  each other due to poor absorption.  *Pills may be swallowed if the size of a No. 2 pencil eraser (or smaller). They may be cut to this size with a pill cutter and swallowed if tolerated. Compare to this size:        Suggested vitamins and where to find them:    [facility]:   o Flintstones Complete (chewables, not gummies) or Centrum Adult Multivitamins  o Super B-Complex tablets with 50 mg Thiamine  o Calcium Citrate petite tablets (NOT CALTRATE brand)  o Sublingual Vitamin B12    Order from www.[email or web address].com  o Chewables-Complete OR Ultra multi formula w/ iron capsules  o Iron chewables  o Calcium Citrate Chewy bites-500 (chocolate, peanut butter and caramel flavored) OR unflavored powder mix OR tablets  o Sublingual Vitamin B12 (black cherry or peppermint flavored)    Order from www.celebratevitamins.com  o Multi-complete chewable OR capsules  o Calcium citrate: Celebrate soft chews OR Calcet creamy bites OR Calcium PLUS tablets  o B-12 sublingual quick melt  *Available for purchase in [facility]    [facility]:  o Flintstones complete   o Wellesse Liquid calcium Citrate  o B-1, 100mg tablet  o Sublingual B12  *Celebrate protein powders, snacks and vitamins are available for purchase as well.

## 2020-01-21 LAB — VIT B1 BLD-MCNC: 76 UG/L (ref 38–122)

## 2020-01-27 ENCOUNTER — PATIENT OUTREACH (OUTPATIENT)
Dept: ADMINISTRATIVE | Facility: OTHER | Age: 60
End: 2020-01-27

## 2020-01-29 ENCOUNTER — OFFICE VISIT (OUTPATIENT)
Dept: SLEEP MEDICINE | Facility: CLINIC | Age: 60
End: 2020-01-29
Payer: COMMERCIAL

## 2020-01-29 VITALS
WEIGHT: 278 LBS | HEIGHT: 67 IN | BODY MASS INDEX: 43.63 KG/M2 | HEART RATE: 60 BPM | DIASTOLIC BLOOD PRESSURE: 70 MMHG | SYSTOLIC BLOOD PRESSURE: 104 MMHG

## 2020-01-29 DIAGNOSIS — Z98.890 STATUS POST CATHETER ABLATION OF ATRIAL FIBRILLATION: ICD-10-CM

## 2020-01-29 DIAGNOSIS — E66.01 MORBID OBESITY WITH BMI OF 40.0-44.9, ADULT: Primary | ICD-10-CM

## 2020-01-29 DIAGNOSIS — I10 ESSENTIAL HYPERTENSION: ICD-10-CM

## 2020-01-29 PROCEDURE — 3078F PR MOST RECENT DIASTOLIC BLOOD PRESSURE < 80 MM HG: ICD-10-PCS | Mod: CPTII,S$GLB,, | Performed by: NURSE PRACTITIONER

## 2020-01-29 PROCEDURE — 99214 PR OFFICE/OUTPT VISIT, EST, LEVL IV, 30-39 MIN: ICD-10-PCS | Mod: S$GLB,,, | Performed by: NURSE PRACTITIONER

## 2020-01-29 PROCEDURE — 99999 PR PBB SHADOW E&M-EST. PATIENT-LVL III: CPT | Mod: PBBFAC,,, | Performed by: NURSE PRACTITIONER

## 2020-01-29 PROCEDURE — 99999 PR PBB SHADOW E&M-EST. PATIENT-LVL III: ICD-10-PCS | Mod: PBBFAC,,, | Performed by: NURSE PRACTITIONER

## 2020-01-29 PROCEDURE — 3074F PR MOST RECENT SYSTOLIC BLOOD PRESSURE < 130 MM HG: ICD-10-PCS | Mod: CPTII,S$GLB,, | Performed by: NURSE PRACTITIONER

## 2020-01-29 PROCEDURE — 99214 OFFICE O/P EST MOD 30 MIN: CPT | Mod: S$GLB,,, | Performed by: NURSE PRACTITIONER

## 2020-01-29 PROCEDURE — 3074F SYST BP LT 130 MM HG: CPT | Mod: CPTII,S$GLB,, | Performed by: NURSE PRACTITIONER

## 2020-01-29 PROCEDURE — 3008F BODY MASS INDEX DOCD: CPT | Mod: CPTII,S$GLB,, | Performed by: NURSE PRACTITIONER

## 2020-01-29 PROCEDURE — 3078F DIAST BP <80 MM HG: CPT | Mod: CPTII,S$GLB,, | Performed by: NURSE PRACTITIONER

## 2020-01-29 PROCEDURE — 3008F PR BODY MASS INDEX (BMI) DOCUMENTED: ICD-10-PCS | Mod: CPTII,S$GLB,, | Performed by: NURSE PRACTITIONER

## 2020-01-29 NOTE — PROGRESS NOTES
Shiraz Jha  was seen as f/u for mgt of QUINTIN    Since seen he underwent HST and got new machine apap 10-20cm. Continued qhs use. Likes new Eson mask. No chin strap and denies oral drying. Denies disrupted sleep. Denies snoring or apneic pauses. Already on Adderall 20mg am and 10mg afternoon per psychiatry. Sleepy only in afternoon, may occasionally nap and will use his apap machine. ESS=21.   Interrogation- 30d avg 7h/night. AHI 1.6. 0% PB. 100% mask fit. 90% tile 11cm  Hx gastric bypass  Tried in past- ambien 5mg  Afib, hx RFA No recurrence  BP stable    Denies symptoms of restless legs or kicking during sleep.   HST 2019 AHI 6(RDI 13)    EPWORTH SLEEPINESS SCALE 9/10/2019   Sitting and reading 3   Watching TV 0   Sitting, inactive in a public place (e.g. a theatre or a meeting) 3   As a passenger in a car for an hour without a break 3   Lying down to rest in the afternoon when circumstances permit 3   Sitting and talking to someone 0   Sitting quietly after a lunch without alcohol 3   In a car, while stopped for a few minutes in traffic 0   Total score 15     [facility] New Patient 9/10/2019   What time do you go to bed on a week day? (Give a range) 11:00 PM   What time do you go to bed on a day off? (Give a range) 11:00 PM   How long does it take you to fall asleep? (Give a range) Depends, on activities during the day. 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   On average, how many times per night do you wake up? 3 to 4 times a week   How long does it take you to fall back into sleep? (Give a range) A good while   What time do you wake up to start your day on a week day? (Give a range) My Alarm is set for 5:30 AM. When do I get out of bed, 6 - 8 AM and I need to start the day at 7:00 am for work   What time do you wake up to start your day on a day off? (Give a range) Between 8:00 AM - 9:00 AM   What time do you get out of bed? (Give a range) My Alarm is set for 5:30 AM. When do I get out of bed, 6 - 7 AM   On  "average, how many hours do you sleep? 6-7 hours   On average, how many naps do you take per day? 0-1   Rate your sleep quality from 0 to 5 (0-poor, 5-great). 2   Have you experienced:  Weight gain?   How much weight have you lost or gained (in lbs.) in the last year? 100 lbs   On average, how many times per night do you go to the bathroom?  0-1   Have you ever had a sleep study/CPAP machine/surgery for sleep apnea? Yes   Have you ever had a CPAP machine for sleep apnea? Yes   Have you ever had surgery for sleep apnea? No     FAMILY HISTORY: No known sleep disorders. Mom snore    SOCIAL HISTORy: Single, route     REVIEW OF SYSTEMS:  1/29/20 Sleep related symptoms as per HPI  Difficulty breathing through the nose?  Yes   Sore throat or dry mouth in the morning? Yes   Irregular or very fast heart beat?  Sometimes   Shortness of breath?  Sometimes   Acid reflux? No   Body aches and pains?  Yes   Morning headaches? Sometimes   Dizziness? No   Mood changes?  Sometimes   Do you exercise?  Sometimes   Do you feel like moving your legs a lot?  No       PHYSICAL EXAM:   /70   Pulse 60   Ht 5' 7" (1.702 m)   Wt 126.1 kg (278 lb)   BMI 43.54 kg/m²   GENERAL:  Morbid obese body habitus, well groomed, W/D     ASSESSMENT:     QUINTIN, dx'd 2009. Remains adherent with pap benefiting from therapy. AHI<5  He has medical comorbidities of morbid obesity, hypertension,paroxysmal AFibrillation s/p successful RFA, hypersomnia afternoon, low mood. Warrants continued treatment      PLAN:   1. Continue apap 10-20cm. THS DME. Discuss XR dosing or 20mg bid with psychiatry. Do not drive sleepy and if/when nap use PAP   2. Discussed effectiveness of therapy and potential ramifications of untreated QUINTIN, including stroke, heart disease, HTN  3. See cards as advised/continue med and PCP re HTN mgt/continue meds  4. rtc annually      Thank you for allowing me the opportunity to participate in the care of your patient    "

## 2020-02-13 ENCOUNTER — PATIENT MESSAGE (OUTPATIENT)
Dept: ADMINISTRATIVE | Facility: OTHER | Age: 60
End: 2020-02-13

## 2020-02-13 ENCOUNTER — PATIENT OUTREACH (OUTPATIENT)
Dept: ADMINISTRATIVE | Facility: OTHER | Age: 60
End: 2020-02-13

## 2020-02-13 DIAGNOSIS — Z12.11 COLON CANCER SCREENING: Primary | ICD-10-CM

## 2020-02-28 ENCOUNTER — PATIENT MESSAGE (OUTPATIENT)
Dept: ADMINISTRATIVE | Facility: OTHER | Age: 60
End: 2020-02-28

## 2020-03-10 ENCOUNTER — PATIENT OUTREACH (OUTPATIENT)
Dept: ADMINISTRATIVE | Facility: OTHER | Age: 60
End: 2020-03-10

## 2020-03-17 ENCOUNTER — LAB VISIT (OUTPATIENT)
Dept: LAB | Facility: HOSPITAL | Age: 60
End: 2020-03-17
Attending: INTERNAL MEDICINE
Payer: COMMERCIAL

## 2020-03-17 DIAGNOSIS — Z00.00 ROUTINE GENERAL MEDICAL EXAMINATION AT A HEALTH CARE FACILITY: ICD-10-CM

## 2020-03-17 DIAGNOSIS — Z12.5 SCREENING PSA (PROSTATE SPECIFIC ANTIGEN): ICD-10-CM

## 2020-03-17 DIAGNOSIS — E11.9 TYPE 2 DIABETES MELLITUS WITHOUT COMPLICATION, WITHOUT LONG-TERM CURRENT USE OF INSULIN: ICD-10-CM

## 2020-03-17 LAB
ALBUMIN SERPL BCP-MCNC: 3.8 G/DL (ref 3.5–5.2)
ALP SERPL-CCNC: 87 U/L (ref 55–135)
ALT SERPL W/O P-5'-P-CCNC: 17 U/L (ref 10–44)
ANION GAP SERPL CALC-SCNC: 8 MMOL/L (ref 8–16)
AST SERPL-CCNC: 25 U/L (ref 10–40)
BASOPHILS # BLD AUTO: 0.06 K/UL (ref 0–0.2)
BASOPHILS NFR BLD: 1 % (ref 0–1.9)
BILIRUB SERPL-MCNC: 0.7 MG/DL (ref 0.1–1)
BUN SERPL-MCNC: 11 MG/DL (ref 6–20)
CALCIUM SERPL-MCNC: 9 MG/DL (ref 8.7–10.5)
CHLORIDE SERPL-SCNC: 103 MMOL/L (ref 95–110)
CHOLEST SERPL-MCNC: 180 MG/DL (ref 120–199)
CHOLEST/HDLC SERPL: 4.6 {RATIO} (ref 2–5)
CO2 SERPL-SCNC: 29 MMOL/L (ref 23–29)
COMPLEXED PSA SERPL-MCNC: 0.37 NG/ML (ref 0–4)
CREAT SERPL-MCNC: 1 MG/DL (ref 0.5–1.4)
DIFFERENTIAL METHOD: ABNORMAL
EOSINOPHIL # BLD AUTO: 0.1 K/UL (ref 0–0.5)
EOSINOPHIL NFR BLD: 2 % (ref 0–8)
ERYTHROCYTE [DISTWIDTH] IN BLOOD BY AUTOMATED COUNT: 13.2 % (ref 11.5–14.5)
EST. GFR  (AFRICAN AMERICAN): >60 ML/MIN/1.73 M^2
EST. GFR  (NON AFRICAN AMERICAN): >60 ML/MIN/1.73 M^2
ESTIMATED AVG GLUCOSE: 117 MG/DL (ref 68–131)
GLUCOSE SERPL-MCNC: 101 MG/DL (ref 70–110)
HBA1C MFR BLD HPLC: 5.7 % (ref 4–5.6)
HCT VFR BLD AUTO: 40.7 % (ref 40–54)
HDLC SERPL-MCNC: 39 MG/DL (ref 40–75)
HDLC SERPL: 21.7 % (ref 20–50)
HGB BLD-MCNC: 13.5 G/DL (ref 14–18)
IMM GRANULOCYTES # BLD AUTO: 0.01 K/UL (ref 0–0.04)
IMM GRANULOCYTES NFR BLD AUTO: 0.2 % (ref 0–0.5)
LDLC SERPL CALC-MCNC: 111.6 MG/DL (ref 63–159)
LYMPHOCYTES # BLD AUTO: 1.7 K/UL (ref 1–4.8)
LYMPHOCYTES NFR BLD: 27.9 % (ref 18–48)
MCH RBC QN AUTO: 33 PG (ref 27–31)
MCHC RBC AUTO-ENTMCNC: 33.2 G/DL (ref 32–36)
MCV RBC AUTO: 100 FL (ref 82–98)
MONOCYTES # BLD AUTO: 0.5 K/UL (ref 0.3–1)
MONOCYTES NFR BLD: 8.9 % (ref 4–15)
NEUTROPHILS # BLD AUTO: 3.6 K/UL (ref 1.8–7.7)
NEUTROPHILS NFR BLD: 60 % (ref 38–73)
NONHDLC SERPL-MCNC: 141 MG/DL
NRBC BLD-RTO: 0 /100 WBC
PLATELET # BLD AUTO: 210 K/UL (ref 150–350)
PMV BLD AUTO: 11 FL (ref 9.2–12.9)
POTASSIUM SERPL-SCNC: 3.5 MMOL/L (ref 3.5–5.1)
PROT SERPL-MCNC: 6.7 G/DL (ref 6–8.4)
RBC # BLD AUTO: 4.09 M/UL (ref 4.6–6.2)
SODIUM SERPL-SCNC: 140 MMOL/L (ref 136–145)
TRIGL SERPL-MCNC: 147 MG/DL (ref 30–150)
TSH SERPL DL<=0.005 MIU/L-ACNC: 1.64 UIU/ML (ref 0.4–4)
WBC # BLD AUTO: 6.06 K/UL (ref 3.9–12.7)

## 2020-03-17 PROCEDURE — 84443 ASSAY THYROID STIM HORMONE: CPT

## 2020-03-17 PROCEDURE — 85025 COMPLETE CBC W/AUTO DIFF WBC: CPT

## 2020-03-17 PROCEDURE — 80053 COMPREHEN METABOLIC PANEL: CPT

## 2020-03-17 PROCEDURE — 84153 ASSAY OF PSA TOTAL: CPT

## 2020-03-17 PROCEDURE — 80061 LIPID PANEL: CPT

## 2020-03-17 PROCEDURE — 36415 COLL VENOUS BLD VENIPUNCTURE: CPT | Mod: PO

## 2020-03-17 PROCEDURE — 83036 HEMOGLOBIN GLYCOSYLATED A1C: CPT

## 2020-03-24 ENCOUNTER — OFFICE VISIT (OUTPATIENT)
Dept: INTERNAL MEDICINE | Facility: CLINIC | Age: 60
End: 2020-03-24
Payer: COMMERCIAL

## 2020-03-24 VITALS
SYSTOLIC BLOOD PRESSURE: 128 MMHG | DIASTOLIC BLOOD PRESSURE: 70 MMHG | BODY MASS INDEX: 43.67 KG/M2 | WEIGHT: 278.25 LBS | HEART RATE: 68 BPM | HEIGHT: 67 IN | TEMPERATURE: 97 F | RESPIRATION RATE: 16 BRPM

## 2020-03-24 DIAGNOSIS — E66.01 MORBID OBESITY WITH BMI OF 40.0-44.9, ADULT: ICD-10-CM

## 2020-03-24 DIAGNOSIS — I48.91 ATRIAL FIBRILLATION, UNSPECIFIED TYPE: ICD-10-CM

## 2020-03-24 DIAGNOSIS — R73.01 IMPAIRED FASTING BLOOD SUGAR: ICD-10-CM

## 2020-03-24 DIAGNOSIS — K13.0 LIP LESION: ICD-10-CM

## 2020-03-24 DIAGNOSIS — Z00.00 ENCOUNTER FOR PREVENTIVE HEALTH EXAMINATION: Primary | ICD-10-CM

## 2020-03-24 DIAGNOSIS — I10 ESSENTIAL HYPERTENSION: ICD-10-CM

## 2020-03-24 DIAGNOSIS — G47.33 OBSTRUCTIVE SLEEP APNEA: ICD-10-CM

## 2020-03-24 PROCEDURE — 3078F PR MOST RECENT DIASTOLIC BLOOD PRESSURE < 80 MM HG: ICD-10-PCS | Mod: CPTII,S$GLB,, | Performed by: INTERNAL MEDICINE

## 2020-03-24 PROCEDURE — 99999 PR PBB SHADOW E&M-EST. PATIENT-LVL III: ICD-10-PCS | Mod: PBBFAC,,, | Performed by: INTERNAL MEDICINE

## 2020-03-24 PROCEDURE — 99999 PR PBB SHADOW E&M-EST. PATIENT-LVL III: CPT | Mod: PBBFAC,,, | Performed by: INTERNAL MEDICINE

## 2020-03-24 PROCEDURE — 3074F PR MOST RECENT SYSTOLIC BLOOD PRESSURE < 130 MM HG: ICD-10-PCS | Mod: CPTII,S$GLB,, | Performed by: INTERNAL MEDICINE

## 2020-03-24 PROCEDURE — 99396 PREV VISIT EST AGE 40-64: CPT | Mod: S$GLB,,, | Performed by: INTERNAL MEDICINE

## 2020-03-24 PROCEDURE — 3078F DIAST BP <80 MM HG: CPT | Mod: CPTII,S$GLB,, | Performed by: INTERNAL MEDICINE

## 2020-03-24 PROCEDURE — 99396 PR PREVENTIVE VISIT,EST,40-64: ICD-10-PCS | Mod: S$GLB,,, | Performed by: INTERNAL MEDICINE

## 2020-03-24 PROCEDURE — 3074F SYST BP LT 130 MM HG: CPT | Mod: CPTII,S$GLB,, | Performed by: INTERNAL MEDICINE

## 2020-03-24 NOTE — PROGRESS NOTES
History of present illness:  60-year-old gentleman in today for general health assessment.    Current medications:  All medications noted and reviewed in the electronic medical record medication list.    Review of systems:  General: no fever, chills, generalized body aches. No unexpected weight loss.  Eyes:  No visual disturbances.  HEENT:  No hoarseness, dysphagia, ear pain.  He does report for several years couple of days a week he has some localized swelling in his left lower lip.  He has had a hemangioma in that region for quite a long time.  He does not associated with any other symptomatologies.  Respiratory:  No cough, no shortness of breath.  Cardiovascular: no chest pain, palpitations, cough, exertional limb pain.  Chronic mild distal lower extremity edema is reported as stable.  GI: no nausea, vomiting.  No abdominal pain. No change in bowel habits.  No melena, no hematochezia.  : no dysuria. No change in the color or character of the urine. No urinary frequency.  Musculoskeletal: no joint pain or swelling.  Neurologic:  No focal neurological complaints.  No headaches.  Skin:  No rashes or other concerns.  Psych:  No emotional issues    Past medical history, past surgical history, family medical history social history is are all noted and reviewed in the electronic medical record history sections.    Health screenings:  He has not yet had colonoscopy those ordered last year he reports [email or web address] company requiring 3000 dollar payment.  He does have a 50 CT but has not submitted as of yet.  Tetanus immunization 2019.  Eye exam is up-to-date.    Physical examination:  GENERAL:  Alert, appropriately groomed, no acute distress.  VS:  Blood pressure taken manually is 128/70.  EYES: sclerae white ,nonicteric. PERRL.  HEENT:  Normocephalic. Ear canals and tympanic membranes normal. Mouth and pharynx normal. No thyromegaly. Trachea midline and freely mobile.  LUNGS:  Clear to ascultation and normal to  percussion.  CARDIOVASCULAR:  Normal heart sounds.  No significant murmur. Carotids full bilaterally without bruit.  Pedal pulses intact .  No abdominal bruit.  No peripheral extremity edema.  GI: the abdomen is obese, soft, no distension. No masses , tenderness, organomegaly.    : scrotum, testicles and penis normal.  Deferred in light of plan urology visit in August of this year.  LYMPHATIC:  No axillary, inguinal , cervical adenopathy.  MUSCULOSKELETAL:  Range of motion, stability and strength of the right and left upper and lower extremities normal. No swollen or tender joints  NEUROLOGIC:  DTR's normal. No gross motor or sensory deficits apparent, gait normal.  SKIN:  No rashes.   MS:  Alert, oriented , affect and mood all appropriate    Data:  Laboratory data noted reviewed from March 17, 2020. All reasonable.  Glycohemoglobin is 5.7.    Impression:  60-year-old gentleman with several chronic medical conditions.  Hypertension is controlled.  Impaired fasting blood sugar.  History of atrial fibrillation chronically anticoagulated.  Morbid obesity BMI 43.5 a status post bariatric surgery.  BPH followed by urology.  Recurring localize lip swelling in the area of his chronic angioma.    Plan:  Discussed colonoscopy.  At this time is agreeable to stool testing and has his kit at home and was submit.  Referral to ENT regarding the lip issue/lesion.  Return clinic 6 months follow-up.

## 2020-03-30 ENCOUNTER — TELEPHONE (OUTPATIENT)
Dept: CARDIOLOGY | Facility: CLINIC | Age: 60
End: 2020-03-30

## 2020-03-30 NOTE — TELEPHONE ENCOUNTER
----- Message from Meena Braga sent at 3/30/2020 11:53 AM CDT -----  Contact: pt      ----- Message -----  From: Coretta Case LPN  Sent: 3/30/2020  11:50 AM CDT  To: Meena Braga    This is for Erika/.  ----- Message -----  From: Meena Braga  Sent: 3/30/2020  11:47 AM CDT  To: Sonia CRUZ Staff    Pt is returning your call and can be reached at [phone].    Thank you

## 2020-04-03 NOTE — TELEPHONE ENCOUNTER
Spoke with the patient sister Ms. Lisandra Snyder, she gave the number that is on file for the patient to call. She states she has not talked to the patient. She states her mother in law is in the hospital and she wanted a direct line to reach my. Advised the pt that I do not have a direct line. She states she will try to get in contact with the patient and she will tell him to give us a call back.

## 2020-04-03 NOTE — TELEPHONE ENCOUNTER
Made multiple attempt to cll the patient regarding his appt on 4/8/2020/ Called emergency contact, Lisandra Snyder, she was not available. Left message with daughter to have patient call the office. Also sent pt a [employer] message, he has not read the message   no

## 2020-04-07 ENCOUNTER — PATIENT MESSAGE (OUTPATIENT)
Dept: BARIATRICS | Facility: CLINIC | Age: 60
End: 2020-04-07

## 2020-04-08 ENCOUNTER — OFFICE VISIT (OUTPATIENT)
Dept: CARDIOLOGY | Facility: CLINIC | Age: 60
End: 2020-04-08
Payer: COMMERCIAL

## 2020-04-08 ENCOUNTER — PATIENT OUTREACH (OUTPATIENT)
Dept: ADMINISTRATIVE | Facility: OTHER | Age: 60
End: 2020-04-08

## 2020-04-08 VITALS — WEIGHT: 274 LBS | HEIGHT: 66 IN | BODY MASS INDEX: 44.03 KG/M2

## 2020-04-08 DIAGNOSIS — E11.9 TYPE 2 DIABETES MELLITUS WITHOUT COMPLICATION, UNSPECIFIED WHETHER LONG TERM INSULIN USE: Primary | ICD-10-CM

## 2020-04-08 DIAGNOSIS — R60.0 EDEMA OF BOTH LOWER EXTREMITIES: ICD-10-CM

## 2020-04-08 DIAGNOSIS — I87.2 VENOUS INSUFFICIENCY OF LEFT LOWER EXTREMITY: Primary | ICD-10-CM

## 2020-04-08 DIAGNOSIS — E66.01 MORBID OBESITY WITH BMI OF 40.0-44.9, ADULT: ICD-10-CM

## 2020-04-08 PROCEDURE — 99215 OFFICE O/P EST HI 40 MIN: CPT | Mod: 95,,, | Performed by: INTERNAL MEDICINE

## 2020-04-08 PROCEDURE — 3008F BODY MASS INDEX DOCD: CPT | Mod: 95,CPTII,, | Performed by: INTERNAL MEDICINE

## 2020-04-08 PROCEDURE — 99215 PR OFFICE/OUTPT VISIT, EST, LEVL V, 40-54 MIN: ICD-10-PCS | Mod: 95,,, | Performed by: INTERNAL MEDICINE

## 2020-04-08 PROCEDURE — 3008F PR BODY MASS INDEX (BMI) DOCUMENTED: ICD-10-PCS | Mod: 95,CPTII,, | Performed by: INTERNAL MEDICINE

## 2020-04-08 NOTE — PATIENT INSTRUCTIONS
Assessment/Plan:  Shiraz Jha is a 60 y.o. male with a past medical history of Afib s/p ablation 7/14, HTN, DM2, morbid obesity s/p gastric bypass, who presents for a follow up appointment.      1. BLE Edema- Currently doing well.  Continue bumex 1 mg daily.  Continue graduated compression hose, leg elevation and low salt diet.  Limit fluid intake to 2 liters a day.      2. Morbid Obesity- Pt referred back to Bariatric Surgery.     Follow up in 3 months

## 2020-04-08 NOTE — PROGRESS NOTES
Care Everywhere: updated  Immunization: updated  Health Maintenance: updated  Media Review: reviewed for possible outside colon cancer and eye exam report  Legacy Review: n/a  Order placed: Diabetic eye screening photo   Upcoming appts:n/a

## 2020-04-08 NOTE — PROGRESS NOTES
"[facility]    CC: Lower Extremity Edema    Patient ID: Shiraz Jha is a 60 y.o. male with a past medical history of Afib s/p ablation 7/14, HTN, DM2, morbid obesity s/p gastric bypass, who presents for a follow up appointment.  Pertinent history/events are as follows:    -Pt kindly referred by Twyla Bahena and Dr Scott for evaluation of lower extremity edema (per Dr. Scott's note on 9/24/2019:  "Mr. Jha is in clinic today for continued LE edema and discoloration of his legs.  He had a venous ultrasound on 8/6/19 for the swelling and mild reflux was noted in the left popliteal.  Patient denies chest pain with exertion or at rest, palpitations, SOB, DAI, dizziness, syncope, orthopnea, PND, or claudication.  Instructed to elevate legs when seated, low salt diet, increase walking and compression stockings. No venous reflux noted on US.  Refer to Dr. Ureña in interventional cardiology."    -At our initial clinic visit on 10/16/2019, Mr. Jha reported BLE edema for the past 1 year.  States LE edema is improved with graduated compression hose.  He has no LE pain, claudication symptoms, rest pain, or tissue loss.  No smoking history.  States he has gained over 50 pounds in the past year, despite gastric bypass surgery.  BLE Venous Reflux Study from 8/6/2019 showed no evidence of lower extremity DVT bilaterally.  There is mild left popliteal (deep venous) reflux.  Plan:   BLE Edema- BLE edema likely due to a component of venous insufficiency and morbid obesity.  Check cmp today.  If potassium and creatinine are appropriate, increase lasix to 40 mg bid for 7 days, then resume at 40 mg daily.  Pt to continue this cycle/regimen for lasix.  Pt will benefit from significant weight loss.  Continue graduated compression hose, leg elevation and low salt diet.  Limit fluid intake to 2 liters a day.  Check exercise SURINDER to evaluate for significant PAD.   Morbid Obesity- Refer back to Bariatric Surgery.     -At " follow up clinic visit on 11/27/2019, Mr. Jha reported no significant improvement in BLE edema with lasix regimen of 40 mg bid for 7 days, then resuming at 40 mg daily.  Exercise SURINDER from 11/15/2019 showed normal rest and exercise SURINDER bilaterally with normal PVR waveforms bilaterally.  Exam shows 1+ BLE pitting edema.  Plan:   BLE Edema- BLE edema likely due to a component of venous insufficiency and morbid obesity.  Check cmp today.  If potassium and creatinine are appropriate, discontinue lasix and start bumex 1 mg bid.  Pt will benefit from significant weight loss.  Continue graduated compression hose, leg elevation and low salt diet.  Limit fluid intake to 2 liters a day.  Check exercise SURINDER to evaluate for significant PAD.   Morbid Obesity- Pt referred back to Bariatric Surgery.     -At follow up clinic visit on 1/8/2020, Mr. Jha reported significant improvement in BLE edema since starting bumex 1 mg daily.  Exercise SURINDER study from 11/25/2019 showed normal rest and exercise SURINDER bilaterally.  Normal PVR waveforms bilaterally.  Plan:   BLE Edema- BLE edema now improving.  Continue bumex 1 mg daily.  Check cmp today to monitor renal fxn.  Exercise SURINDER study from 11/25/2019 showed normal rest and exercise SURINDER bilaterally.  Normal PVR waveforms bilaterally. Continue graduated compression hose, leg elevation and low salt diet.  Limit fluid intake to 2 liters a day.    Morbid Obesity- Pt referred back to Bariatric Surgery.     HPI:  Mr. Jha reports doing well with no significant LE edema.  Continues to wear graduated compression hose, low salt diet, and limiting fluid intake to 2 liters a day.      Past Medical History:   Diagnosis Date    Allergy     Asthma     Atrial fibrillation     Cataract     Chronic rhinitis 9/26/2013    DDD (degenerative disc disease) 4/3/2015    Depression     Diabetes mellitus     Fibromyalgia     Gastroesophageal reflux disease without esophagitis 7/14/2017    Hypertension      Sinusitis, acute, maxillary 12/20/2012    Thyroid disease      Past Surgical History:   Procedure Laterality Date    CERVICAL SPINE SURGERY      EPIDURAL STEROID INJECTION INTO LUMBAR SPINE N/A 5/29/2018    Procedure: INJECTION-STEROID-EPIDURAL-LUMBAR- L4-5;  Surgeon: Roman Lyman MD;  Location: [facility];  Service: Pain Management;  Laterality: N/A;  Patient is diabetic and Xarleto     EPIDURAL STEROID INJECTION INTO LUMBAR SPINE N/A 7/3/2018    Procedure: INJECTION, STEROID, SPINE, LUMBAR, EPIDURAL- L4-5;  Surgeon: Roman Lyman MD;  Location: [facility];  Service: Pain Management;  Laterality: N/A;  Patient takes Xarelto and ASA     GASTRIC BYPASS      SINUS SURGERY  2000    [facility]    TONSILLECTOMY       Social History     Socioeconomic History    Marital status: Single     Spouse name: Not on file    Number of children: Not on file    Years of education: Not on file    Highest education level: Not on file   Occupational History    Not on file   Social Needs    Financial resource strain: Very hard    Food insecurity:     Worry: Sometimes true     Inability: Sometimes true    Transportation needs:     Medical: No     Non-medical: No   Tobacco Use    Smoking status: Never Smoker    Smokeless tobacco: Never Used   Substance and Sexual Activity    Alcohol use: No     Frequency: Never     Drinks per session: Patient refused     Binge frequency: Never     Comment: rare    Drug use: No    Sexual activity: Yes     Partners: Female   Lifestyle    Physical activity:     Days per week: 1 day     Minutes per session: 10 min    Stress: To some extent   Relationships    Social connections:     Talks on phone: Three times a week     Gets together: Twice a week     Attends Orthodoxy service: Not on file     Active member of club or organization: No     Attends meetings of clubs or organizations: 1 to 4 times per year     Relationship status: Never    Other Topics  Concern    Not on file   Social History Narrative    [place], lives alone w/2 dogs.    Dogs are his kids.    Works PT --  at [employer], on ssdi from neck and back/depression.     Family History   Problem Relation Age of Onset    Heart disease Father     Cancer Mother         lung    Hypertension Sister     Heart disease Brother     Cirrhosis Brother     Diabetes Brother        Review of patient's allergies indicates:   Allergen Reactions    Iodinated contrast media Other (See Comments)     Raises BP         Medication List with Changes/Refills   Current Medications    ACETAMINOPHEN (TYLENOL) 500 MG TABLET    Take 500 mg by mouth every 12 (twelve) hours.    ASPIRIN 81 MG CHEW    Take 1 tablet (81 mg total) by mouth once daily.    BUMETANIDE (BUMEX) 1 MG TABLET    Take 1 tablet (1 mg total) by mouth 2 (two) times daily.    BUPROPION (WELLBUTRIN XL) 300 MG 24 HR TABLET    Take 300 mg by mouth once daily.      DESOXIMETASONE (TOPICORT) 0.05 % CREAM    Apply topically 2 (two) times daily.    DEXTROAMPHETAMINE-AMPHETAMINE 30 MG TAB    30 mg once daily. Adderall    GABAPENTIN (NEURONTIN) 300 MG CAPSULE    Take 1 capsule (300 mg total) by mouth 3 (three) times daily.    IBUPROFEN (ADVIL,MOTRIN) 200 MG TABLET    Take 200 mg by mouth every 12 (twelve) hours.    IMPOYZ 0.025 % CREA    Apply topically as needed.     LUZU 1 % CREA    Apply topically as needed.     METOPROLOL TARTRATE (LOPRESSOR) 25 MG TABLET    Take 1 tablet (25 mg total) by mouth 2 (two) times daily.    NAFTIN 2 % GEL    daily as needed.     NITROGLYCERIN (NITROSTAT) 0.4 MG SL TABLET    Please dispense 25 pills in 4 bottles.    OMEPRAZOLE (PRILOSEC) 20 MG CAPSULE    Take 1 capsule (20 mg total) by mouth once daily.    RIVAROXABAN (XARELTO) 20 MG TAB    Take 1 tablet (20 mg total) by mouth once daily.    SOTALOL (BETAPACE) 80 MG TABLET    Take 1 tablet (80 mg total) by mouth 2 (two) times daily.    TAMSULOSIN (FLOMAX) 0.4 MG CAP    Take 1  "capsule (0.4 mg total) by mouth 2 (two) times daily.    VORTIOXETINE (TRINTELLIX) 20 MG TAB    Take 20 mg by mouth once daily.        Review of Systems  Constitution: Denies chills, fever, and sweats.  HENT: Denies headaches or blurry vision.  Cardiovascular: Denies chest pain or irregular heart beat.  Respiratory: Denies cough or shortness of breath.  Gastrointestinal: Denies abdominal pain, nausea, or vomiting.  Musculoskeletal: Positive for BLE edema.  Neurological: Denies dizziness or focal weakness.  Psychiatric/Behavioral: Normal mental status.  Hematologic/Lymphatic: Denies bleeding problem or easy bruising/bleeding.  Skin: Denies rash or suspicious lesions    Physical Examination  Ht 5' 6" (1.676 m)   Wt 124.3 kg (274 lb)   BMI 44.22 kg/m²     Constitutional: No acute distress, conversant  HEENT: Sclera anicteric, Pupils equal, round and reactive to light, extraocular motions intact, Oropharynx clear  Neck: No JVD, no carotid bruits  Cardiovascular: regular rate and rhythm, no murmur, rubs or gallops, normal S1/S2  Pulmonary: Clear to auscultation bilaterally  Abdominal: Abdomen soft, nontender, nondistended, positive bowel sounds  Extremities: 1 pitting BLE edema   Pulses:  Carotid pulses are 2+ on the right side, and 2+ on the left side.  Radial pulses are 2+ on the right side, and 2+ on the left side.   Femoral pulses are 2+ on the right side, and 2+ on the left side.  Popliteal pulses are 2+ on the right side, and 2+ on the left side.   Dorsalis pedis pulses are 2+ on the right side, and 2+ on the left side.   Posterior tibial pulses are 2+ on the right side, and 2+ on the left side.    Skin: No ecchymosis, erythema, or ulcers  Psych: Alert and oriented x 3, appropriate affect  Neuro: CNII-XII intact, no focal deficits    Labs:  Most Recent Data  CBC:   Lab Results   Component Value Date    WBC 6.06 03/17/2020    HGB 13.5 (L) 03/17/2020    HCT 40.7 03/17/2020     03/17/2020     (H) " 03/17/2020    RDW 13.2 03/17/2020     BMP:   Lab Results   Component Value Date     03/17/2020    K 3.5 03/17/2020     03/17/2020    CO2 29 03/17/2020    BUN 11 03/17/2020    CREATININE 1.0 03/17/2020     03/17/2020    CALCIUM 9.0 03/17/2020    MG 1.9 07/15/2017    PHOS 3.7 07/15/2017     LFTS;   Lab Results   Component Value Date    PROT 6.7 03/17/2020    ALBUMIN 3.8 03/17/2020    BILITOT 0.7 03/17/2020    AST 25 03/17/2020    ALKPHOS 87 03/17/2020    ALT 17 03/17/2020     COAGS:   Lab Results   Component Value Date    INR 1.0 07/14/2017     FLP:   Lab Results   Component Value Date    CHOL 180 03/17/2020    HDL 39 (L) 03/17/2020    LDLCALC 111.6 03/17/2020    TRIG 147 03/17/2020    CHOLHDL 21.7 03/17/2020     CARDIAC:   Lab Results   Component Value Date    TROPONINI 0.015 07/15/2017    BNP 11 07/14/2017       Echo 7/23/2019:  · Normal left ventricular systolic function. The estimated ejection fraction is 60%  · Normal right ventricular systolic function.  · Normal LV diastolic function.  · Moderate left atrial enlargement.  · Mild right atrial enlargement.  · The ascending aorta is mildly dilated (42mm in diameter, unchanged since Feb 2018).  · The estimated PA systolic pressure is 23 mm Hg  · Normal central venous pressure (3 mm Hg).       Exercise SURINDER 11/25/2019:  Normal rest and exercise SURINDER bilaterally.  Normal PVR waveforms bilaterally.    BLE Venous Reflux Study 8/6/2019:  No evidence of lower extremity DVT bilaterally.  Mild left popliteal (deep venous) reflux.      Assessment/Plan:  Shiraz Jha is a 60 y.o. male with a past medical history of Afib s/p ablation 7/14, HTN, DM2, morbid obesity s/p gastric bypass, who presents for a follow up appointment.      1. BLE Edema- Currently doing well.  Continue bumex 1 mg daily.  Continue graduated compression hose, leg elevation and low salt diet.  Limit fluid intake to 2 liters a day.      2. Morbid Obesity- Pt referred back to Bariatric  Surgery.     Follow up in 3 months    Total duration of visit time 45 minutes.  Total time spent counseling greater than fifty percent of total visit time.  Counseling included discussion regarding imaging findings, diagnosis, possibilities, treatment options, risks and benefits.  The patient had many questions regarding the options and long-term effects.    Randolph Ureña MD, PhD  Interventional Cardiology

## 2020-04-09 ENCOUNTER — OFFICE VISIT (OUTPATIENT)
Dept: OTOLARYNGOLOGY | Facility: CLINIC | Age: 60
End: 2020-04-09
Payer: COMMERCIAL

## 2020-04-09 DIAGNOSIS — D37.01 NEOPLASM OF UNCERTAIN BEHAVIOR OF LIP: ICD-10-CM

## 2020-04-09 DIAGNOSIS — K13.0 LIP LESION: ICD-10-CM

## 2020-04-09 PROCEDURE — 99204 OFFICE O/P NEW MOD 45 MIN: CPT | Mod: 95,,, | Performed by: OTOLARYNGOLOGY

## 2020-04-09 PROCEDURE — 99204 PR OFFICE/OUTPT VISIT, NEW, LEVL IV, 45-59 MIN: ICD-10-PCS | Mod: 95,,, | Performed by: OTOLARYNGOLOGY

## 2020-04-09 NOTE — PROGRESS NOTES
"[facility]    The patient location is: home  The chief complaint leading to consultation is: left lip lesion  Visit type: audiovisual  Total time spent with patient: 15 min  Each patient to whom he or she provides medical services by telemedicine is:  (1) informed of the relationship between the physician and patient and the respective role of any other health care provider with respect to management of the patient; and (2) notified that he or she may decline to receive medical services by telemedicine and may withdraw from such care at any time.    Subjective:       Patient ID: Shiraz Jha is a 60 y.o. male.    Chief Complaint: Mass (left lower lip)    HPI    Shiraz Jha is a 60 y.o. male who presents with a left lower lip lesion that has been enlarging for several years. He has a long history of swelling in this region, dating back to cryotherapy about 20 years ago at the [facility]. He thinks this lesion fluctuates in size, and it will hurt when it "swells". It has gotten harder, but it does not bleed. He denies masses or lumps in his neck. He is a nonsmoker. He has never had surgery of the lip. He denies sunburns and wears hats and sunscreen. He is not immunocompromised. There is no prior history of skin cancer.     Past Medical History:   Diagnosis Date    Allergy     Asthma     Atrial fibrillation     Cataract     Chronic rhinitis 9/26/2013    DDD (degenerative disc disease) 4/3/2015    Depression     Diabetes mellitus     Fibromyalgia     Gastroesophageal reflux disease without esophagitis 7/14/2017    Hypertension     Sinusitis, acute, maxillary 12/20/2012    Thyroid disease        Past Surgical History:   Procedure Laterality Date    CERVICAL SPINE SURGERY      EPIDURAL STEROID INJECTION INTO LUMBAR SPINE N/A 5/29/2018    Procedure: INJECTION-STEROID-EPIDURAL-LUMBAR- L4-5;  Surgeon: Roman Lyman MD;  Location: [facility];  Service: Pain " Management;  Laterality: N/A;  Patient is diabetic and Xarleto     EPIDURAL STEROID INJECTION INTO LUMBAR SPINE N/A 7/3/2018    Procedure: INJECTION, STEROID, SPINE, LUMBAR, EPIDURAL- L4-5;  Surgeon: Roman Lyman MD;  Location: [facility];  Service: Pain Management;  Laterality: N/A;  Patient takes Xarelto and ASA     GASTRIC BYPASS      SINUS SURGERY  2000    Dr. Watt at [facility]    TONSILLECTOMY           Current Outpatient Medications:     acetaminophen (TYLENOL) 500 MG tablet, Take 500 mg by mouth every 12 (twelve) hours., Disp: , Rfl:     aspirin 81 MG Chew, Take 1 tablet (81 mg total) by mouth once daily., Disp: , Rfl: 0    bumetanide (BUMEX) 1 MG tablet, Take 1 tablet (1 mg total) by mouth 2 (two) times daily., Disp: 60 tablet, Rfl: 11    buPROPion (WELLBUTRIN XL) 300 MG 24 hr tablet, Take 300 mg by mouth once daily.  , Disp: , Rfl:     desoximetasone (TOPICORT) 0.05 % cream, Apply topically 2 (two) times daily. (Patient taking differently: Apply topically 2 (two) times daily. Pt using prn.), Disp: 60 g, Rfl: 11    dextroamphetamine-amphetamine 30 mg Tab, 30 mg once daily. Adderall, Disp: , Rfl: 0    gabapentin (NEURONTIN) 300 MG capsule, Take 1 capsule (300 mg total) by mouth 3 (three) times daily., Disp: 270 capsule, Rfl: 3    ibuprofen (ADVIL,MOTRIN) 200 MG tablet, Take 200 mg by mouth every 12 (twelve) hours., Disp: , Rfl:     IMPOYZ 0.025 % Crea, Apply topically as needed. , Disp: , Rfl:     LUZU 1 % Crea, Apply topically as needed. , Disp: , Rfl:     metoprolol tartrate (LOPRESSOR) 25 MG tablet, Take 1 tablet (25 mg total) by mouth 2 (two) times daily., Disp: 180 tablet, Rfl: 3    NAFTIN 2 % Gel, daily as needed. , Disp: , Rfl:     nitroGLYCERIN (NITROSTAT) 0.4 MG SL tablet, Please dispense 25 pills in 4 bottles. (Patient not taking: Reported on 4/21/2020), Disp: 100 tablet, Rfl: 0    omeprazole (PRILOSEC) 20 MG capsule, Take 1 capsule (20 mg total) by mouth once daily., Disp: 90  capsule, Rfl: 3    rivaroxaban (XARELTO) 20 mg Tab, Take 1 tablet (20 mg total) by mouth once daily. (Patient not taking: Reported on 4/21/2020), Disp: 90 tablet, Rfl: 3    sotalol (BETAPACE) 80 MG tablet, Take 1 tablet (80 mg total) by mouth 2 (two) times daily., Disp: 180 tablet, Rfl: 3    tamsulosin (FLOMAX) 0.4 mg Cap, Take 1 capsule (0.4 mg total) by mouth 2 (two) times daily., Disp: 180 capsule, Rfl: 3    vortioxetine (TRINTELLIX) 20 mg Tab, Take 20 mg by mouth once daily. , Disp: , Rfl:     Review of patient's allergies indicates:   Allergen Reactions    Iodinated contrast media Other (See Comments)     Raises BP         Social History     Socioeconomic History    Marital status: Single     Spouse name: Not on file    Number of children: Not on file    Years of education: Not on file    Highest education level: Not on file   Occupational History    Not on file   Social Needs    Financial resource strain: Very hard    Food insecurity:     Worry: Sometimes true     Inability: Sometimes true    Transportation needs:     Medical: No     Non-medical: No   Tobacco Use    Smoking status: Never Smoker    Smokeless tobacco: Never Used   Substance and Sexual Activity    Alcohol use: No     Frequency: Never     Drinks per session: Patient refused     Binge frequency: Never     Comment: rare    Drug use: No    Sexual activity: Yes     Partners: Female   Lifestyle    Physical activity:     Days per week: 1 day     Minutes per session: 10 min    Stress: To some extent   Relationships    Social connections:     Talks on phone: Three times a week     Gets together: Twice a week     Attends Zoroastrianism service: Not on file     Active member of club or organization: No     Attends meetings of clubs or organizations: 1 to 4 times per year     Relationship status: Never    Other Topics Concern    Not on file   Social History Narrative    From [place], lives alone w/2 dogs.    Dogs are his kids.    Works PT  --  at [employer], on ssdi from neck and back/depression.       Family History   Problem Relation Age of Onset    Heart disease Father     Cancer Mother         lung    Hypertension Sister     Heart disease Brother     Cirrhosis Brother     Diabetes Brother        Review of Systems   Constitutional: Negative for fatigue, fever and unexpected weight change.   HENT: Positive for mouth sores. Negative for ear discharge, facial swelling, hearing loss, rhinorrhea, sore throat, tinnitus, trouble swallowing and voice change.    Eyes: Negative for pain and visual disturbance.   Respiratory: Negative for cough and shortness of breath.    Cardiovascular: Negative for chest pain and palpitations.   Gastrointestinal: Negative for abdominal pain, constipation and diarrhea.   Genitourinary: Negative for difficulty urinating and dysuria.   Musculoskeletal: Positive for arthralgias. Negative for back pain and neck pain.   Skin: Negative for color change and rash.   Neurological: Negative for dizziness, seizures and headaches.   Hematological: Negative for adenopathy. Does not bruise/bleed easily.   Psychiatric/Behavioral: Negative for agitation. The patient is not nervous/anxious.        Objective:     Physical Exam   Constitutional: He is oriented to person, place, and time. He appears well-developed and well-nourished. He is cooperative.   HENT:   Head: Normocephalic and atraumatic.   Right Ear: Hearing and external ear normal.   Left Ear: Hearing and external ear normal.   Nose: Nose normal. No rhinorrhea or nasal deformity.   Mouth/Throat: Oropharynx is clear and moist and mucous membranes are normal. No oropharyngeal exudate.       .   Eyes: Pupils are equal, round, and reactive to light. Conjunctivae and EOM are normal. Right eye exhibits no chemosis. Left eye exhibits no chemosis.   Neck: Trachea normal, normal range of motion and phonation normal. Neck supple. No JVD present. No tracheal tenderness present. No  tracheal deviation and no edema present. No thyroid mass and no thyromegaly present.   Salivary glands - there are no lesions, and there is no asymmetry of the parotid and submandibular glands   Cardiovascular: Normal rate, regular rhythm and intact distal pulses.   Pulmonary/Chest: Effort normal. No accessory muscle usage or stridor. No tachypnea. No respiratory distress.   Abdominal: Normal appearance. He exhibits no distension. There is no guarding.   Lymphadenopathy:     He has no cervical adenopathy.        Right cervical: No deep cervical and no posterior cervical adenopathy present.       Left cervical: No deep cervical and no posterior cervical adenopathy present.        Right: No supraclavicular adenopathy present.        Left: No supraclavicular adenopathy present.   Neurological: He is alert and oriented to person, place, and time. No cranial nerve deficit. Gait normal.   Skin: Skin is warm and dry. No lesion noted.   Psychiatric: He has a normal mood and affect. His speech is normal.        Assessment & Plan:       Problem List Items Addressed This Visit     Neoplasm of uncertain behavior of lip     This lip lesion is concerning, particularly in light of its appearance. I would like to see him in person, and we will arrange that for sometime in the next few weeks as the COVID19 crisis slows down/abates. He is reticent to come to clinic anyway these days. We will get him ASAISR (as soon as it is reasonable). He expressed understanding.

## 2020-04-09 NOTE — Clinical Note
Can you get him an in-person appointment sometime in May? We should be opening schedules for May 1.Thanks

## 2020-04-15 ENCOUNTER — TELEPHONE (OUTPATIENT)
Dept: BARIATRICS | Facility: CLINIC | Age: 60
End: 2020-04-15

## 2020-04-15 NOTE — TELEPHONE ENCOUNTER
Attempted to contact patient to convert upcoming appointment to mychart video visit. No answer. LVM requesting call back or portal message if we can change his visit to a video visit.

## 2020-04-16 ENCOUNTER — TELEPHONE (OUTPATIENT)
Dept: BARIATRICS | Facility: CLINIC | Age: 60
End: 2020-04-16

## 2020-04-16 NOTE — TELEPHONE ENCOUNTER
Phoned patient to convert upcoming appointment to televisit. No answer. LVM on both numbers requesting call back.

## 2020-04-20 ENCOUNTER — PATIENT OUTREACH (OUTPATIENT)
Dept: ADMINISTRATIVE | Facility: OTHER | Age: 60
End: 2020-04-20

## 2020-04-21 ENCOUNTER — OFFICE VISIT (OUTPATIENT)
Dept: BARIATRICS | Facility: CLINIC | Age: 60
End: 2020-04-21
Payer: COMMERCIAL

## 2020-04-21 ENCOUNTER — TELEPHONE (OUTPATIENT)
Dept: OTOLARYNGOLOGY | Facility: CLINIC | Age: 60
End: 2020-04-21

## 2020-04-21 DIAGNOSIS — E66.01 MORBID OBESITY WITH BMI OF 40.0-44.9, ADULT: ICD-10-CM

## 2020-04-21 DIAGNOSIS — Z98.84 HISTORY OF ROUX-EN-Y GASTRIC BYPASS: Primary | ICD-10-CM

## 2020-04-21 DIAGNOSIS — R63.5 WEIGHT GAIN: ICD-10-CM

## 2020-04-21 DIAGNOSIS — Z71.9 HEALTH COUNSELING: ICD-10-CM

## 2020-04-21 PROBLEM — D37.01 NEOPLASM OF UNCERTAIN BEHAVIOR OF LIP: Status: ACTIVE | Noted: 2020-04-21

## 2020-04-21 PROCEDURE — 99213 OFFICE O/P EST LOW 20 MIN: CPT | Mod: 95,,, | Performed by: NURSE PRACTITIONER

## 2020-04-21 PROCEDURE — 99213 PR OFFICE/OUTPT VISIT, EST, LEVL III, 20-29 MIN: ICD-10-PCS | Mod: 95,,, | Performed by: NURSE PRACTITIONER

## 2020-04-21 NOTE — PROGRESS NOTES
Subjective      The patient location is: [place]  The chief complaint leading to consultation is: below  Visit type: Virtual visit with synchronous audio only  Total time spent with patient: 30 min  Each patient to whom he or she provides medical services by telemedicine is:  (1) informed of the relationship between the physician and patient and the respective role of any other health care provider with respect to management of the patient; and (2) notified that he or she may decline to receive medical services by telemedicine and may withdraw from such care at any time.     Patient ID: Shiraz Jha is a 60 y.o. male    Chief Complaint:     HPI: Patient presents to establish care of s/p gastric bypass. Bypass completed at [facility] by Dr. Adalberto Rivera in 2010. Pt states that the surgery went smoothly and he had no post operative complications. Highest weight prior to surgery was 350 lbs and lowest weight after surgery was 210 lbs. States that he kept the weight off for about one year. Started gaining weight after stopping gym. Currently at 275 lb 1 month ago, regain 65 lbs over 9 years. BMI 44.22 on 4/8/2020.I     He is not interested in revision. Saw RD in January, declines additional. States he is here today to learn about an exercise plan/[facility] referral to help with wt loss. Notes he has physical limitations.    Reports he continues to have mild intermittent diarrhea based on food intake, no issues if he avoids triggers.  Denies globus sensation dysphagia abdominal pain vomiting nausea GERD    Endorses NSAIDS use. Has not discussed alternates with PCP.                                                                       EXERCISE and VITAMINS:   1 protein shake daily.  1 meal: red bean soup  64+oz  liquids day    Review of Systems   Constitutional: Negative for fever.   HENT: Negative for sinus pressure, sinus pain, sore throat and trouble swallowing.    Eyes: Negative for visual disturbance.   Respiratory:  Negative for shortness of breath.    Cardiovascular: Positive for leg swelling. Negative for chest pain and palpitations.   Gastrointestinal: Positive for diarrhea (intermittent food dependent - beans, greasy foods). Negative for abdominal pain, constipation and vomiting.        No heartburn   Skin: Negative for rash.   Neurological: Positive for headaches. Negative for light-headedness and numbness.   Psychiatric/Behavioral: Negative for dysphoric mood. The patient is not nervous/anxious.      Objective:      Physical Exam      Assessment:   - NSAID use   - weight gain s/p gastric bypass, BMI 44.22 on 4/8/2020  - Not interested in revision      Plan:       - No NSAIDS after surgery due to increased risk of stomach ulcers. Talk to your prescribing provider about alternative options for your pain. He verbalizes understanding.   - Increase protein, at least 80g daily.  - Full bariatric vitamin regimen. Start gustabo citrate per bariatric recommendations.  - Declines RD for additional diet counseling.  - Regarding [facility] Referral advised him that referral is already in. Call for scheduling, though there may be COVID delays.  - Referral to Dr. Myers  - RTC yearly with labs, sooner for any problems

## 2020-04-21 NOTE — TELEPHONE ENCOUNTER
----- Message from Caden Navarro MD sent at 4/21/2020  3:27 PM CDT -----  Can you get him an in-person appointment sometime in May? We should be opening schedules for May 1.  Thanks

## 2020-04-21 NOTE — ASSESSMENT & PLAN NOTE
This lip lesion is concerning, particularly in light of its appearance. I would like to see him in person, and we will arrange that for sometime in the next few weeks as the COVID19 crisis slows down/abates. He is reticent to come to clinic anyway these days. We will get him ASAISR (as soon as it is reasonable). He expressed understanding.

## 2020-04-24 RX ORDER — MULTIVITAMIN
1 TABLET ORAL DAILY
COMMUNITY

## 2020-04-24 RX ORDER — THIAMINE HCL 250 MG
250 TABLET ORAL DAILY
COMMUNITY

## 2020-04-24 RX ORDER — UBIDECARENONE 75 MG
500 CAPSULE ORAL DAILY
COMMUNITY

## 2020-04-24 RX ORDER — FERROUS SULFATE 325(65) MG
325 TABLET, DELAYED RELEASE (ENTERIC COATED) ORAL DAILY
COMMUNITY
End: 2021-09-23

## 2020-04-24 RX ORDER — CHOLECALCIFEROL (VITAMIN D3) 25 MCG
1000 TABLET ORAL DAILY
COMMUNITY

## 2020-05-12 ENCOUNTER — OFFICE VISIT (OUTPATIENT)
Dept: OTOLARYNGOLOGY | Facility: CLINIC | Age: 60
End: 2020-05-12
Payer: COMMERCIAL

## 2020-05-12 VITALS
SYSTOLIC BLOOD PRESSURE: 106 MMHG | HEART RATE: 56 BPM | WEIGHT: 286.38 LBS | BODY MASS INDEX: 46.22 KG/M2 | DIASTOLIC BLOOD PRESSURE: 56 MMHG

## 2020-05-12 DIAGNOSIS — D37.01 NEOPLASM OF UNCERTAIN BEHAVIOR OF LIP: Primary | ICD-10-CM

## 2020-05-12 PROCEDURE — 3078F DIAST BP <80 MM HG: CPT | Mod: CPTII,S$GLB,, | Performed by: OTOLARYNGOLOGY

## 2020-05-12 PROCEDURE — 3074F PR MOST RECENT SYSTOLIC BLOOD PRESSURE < 130 MM HG: ICD-10-PCS | Mod: CPTII,S$GLB,, | Performed by: OTOLARYNGOLOGY

## 2020-05-12 PROCEDURE — 3008F PR BODY MASS INDEX (BMI) DOCUMENTED: ICD-10-PCS | Mod: CPTII,S$GLB,, | Performed by: OTOLARYNGOLOGY

## 2020-05-12 PROCEDURE — 99999 PR PBB SHADOW E&M-EST. PATIENT-LVL IV: ICD-10-PCS | Mod: PBBFAC,,, | Performed by: OTOLARYNGOLOGY

## 2020-05-12 PROCEDURE — 99999 PR PBB SHADOW E&M-EST. PATIENT-LVL IV: CPT | Mod: PBBFAC,,, | Performed by: OTOLARYNGOLOGY

## 2020-05-12 PROCEDURE — 99213 OFFICE O/P EST LOW 20 MIN: CPT | Mod: S$GLB,,, | Performed by: OTOLARYNGOLOGY

## 2020-05-12 PROCEDURE — 99213 PR OFFICE/OUTPT VISIT, EST, LEVL III, 20-29 MIN: ICD-10-PCS | Mod: S$GLB,,, | Performed by: OTOLARYNGOLOGY

## 2020-05-12 PROCEDURE — 3074F SYST BP LT 130 MM HG: CPT | Mod: CPTII,S$GLB,, | Performed by: OTOLARYNGOLOGY

## 2020-05-12 PROCEDURE — 3008F BODY MASS INDEX DOCD: CPT | Mod: CPTII,S$GLB,, | Performed by: OTOLARYNGOLOGY

## 2020-05-12 PROCEDURE — 3078F PR MOST RECENT DIASTOLIC BLOOD PRESSURE < 80 MM HG: ICD-10-PCS | Mod: CPTII,S$GLB,, | Performed by: OTOLARYNGOLOGY

## 2020-05-12 RX ORDER — LIDOCAINE HYDROCHLORIDE 10 MG/ML
1 INJECTION, SOLUTION EPIDURAL; INFILTRATION; INTRACAUDAL; PERINEURAL ONCE
Status: CANCELLED | OUTPATIENT
Start: 2020-05-12 | End: 2020-05-12

## 2020-05-12 RX ORDER — SODIUM CHLORIDE 0.9 % (FLUSH) 0.9 %
10 SYRINGE (ML) INJECTION
Status: CANCELLED | OUTPATIENT
Start: 2020-05-12

## 2020-05-12 RX ORDER — BUPROPION HCL 150 MG
150 TABLET, EXTENDED RELEASE 24 HR ORAL DAILY
COMMUNITY
Start: 2020-04-17 | End: 2021-02-09

## 2020-05-12 NOTE — ASSESSMENT & PLAN NOTE
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 It fluctuates in size but does not resolve. It is compressible and bluish in hue. I have recommended excision. Because he is on Xarelto and because of the importance of the red line on the lip in this region, I have recommended that we excise it in the operating room under local with some sedation. He is in agreement with this.

## 2020-05-12 NOTE — PROGRESS NOTES
"[facility]    Subjective:       Patient ID: Shiraz Jha is a 60 y.o. male.    Chief Complaint: lip lesion    HPI    Shiraz Jha is a 60 y.o. male who presents with a left lower lip lesion that has been enlarging for several years. He has a long history of swelling in this region, dating back to cryotherapy about 20 years ago at the [facility]. He thinks this lesion fluctuates in size, and it will hurt when it "swells". It has gotten harder, but it does not bleed. He denies masses or lumps in his neck. He is a nonsmoker. He has never had surgery of the lip. He denies sunburns and wears hats and sunscreen. He is not immunocompromised. There is no prior history of skin cancer. The lesion fluctuates in size, but it never goes away. It is essentially unchanged since our last encounter. He reports that he remains on his Xarelto.    Past Medical History:   Diagnosis Date    Allergy     Asthma     Atrial fibrillation     Cataract     Chronic rhinitis 9/26/2013    DDD (degenerative disc disease) 4/3/2015    Depression     Diabetes mellitus     Fibromyalgia     Gastroesophageal reflux disease without esophagitis 7/14/2017    Hypertension     Sinusitis, acute, maxillary 12/20/2012    Thyroid disease        Past Surgical History:   Procedure Laterality Date    CERVICAL SPINE SURGERY      EPIDURAL STEROID INJECTION INTO LUMBAR SPINE N/A 5/29/2018    Procedure: INJECTION-STEROID-EPIDURAL-LUMBAR- L4-5;  Surgeon: Roman Lyman MD;  Location: [facility];  Service: Pain Management;  Laterality: N/A;  Patient is diabetic and Xarleto     EPIDURAL STEROID INJECTION INTO LUMBAR SPINE N/A 7/3/2018    Procedure: INJECTION, STEROID, SPINE, LUMBAR, EPIDURAL- L4-5;  Surgeon: Roman Lyman MD;  Location: [facility];  Service: Pain Management;  Laterality: N/A;  Patient takes Xarelto and ASA     GASTRIC BYPASS      SINUS SURGERY  2000    Dr. Watt at [facility]    " TONSILLECTOMY           Current Outpatient Medications:     acetaminophen (TYLENOL) 500 MG tablet, Take 500 mg by mouth every 12 (twelve) hours., Disp: , Rfl:     aspirin 81 MG Chew, Take 1 tablet (81 mg total) by mouth once daily., Disp: , Rfl: 0    bumetanide (BUMEX) 1 MG tablet, Take 1 tablet (1 mg total) by mouth 2 (two) times daily., Disp: 60 tablet, Rfl: 11    buPROPion (WELLBUTRIN XL) 300 MG 24 hr tablet, Take 300 mg by mouth once daily.  , Disp: , Rfl:     cyanocobalamin 500 MCG tablet, Take 500 mcg by mouth once daily., Disp: , Rfl:     desoximetasone (TOPICORT) 0.05 % cream, Apply topically 2 (two) times daily. (Patient taking differently: Apply topically 2 (two) times daily. Pt using prn.), Disp: 60 g, Rfl: 11    dextroamphetamine-amphetamine 30 mg Tab, 30 mg once daily. Adderall, Disp: , Rfl: 0    ferrous sulfate 325 (65 FE) MG EC tablet, Take 325 mg by mouth 3 (three) times daily with meals., Disp: , Rfl:     gabapentin (NEURONTIN) 300 MG capsule, Take 1 capsule (300 mg total) by mouth 3 (three) times daily., Disp: 270 capsule, Rfl: 3    IMPOYZ 0.025 % Crea, Apply topically as needed. , Disp: , Rfl:     LUZU 1 % Crea, Apply topically as needed. , Disp: , Rfl:     metoprolol tartrate (LOPRESSOR) 25 MG tablet, Take 1 tablet (25 mg total) by mouth 2 (two) times daily., Disp: 180 tablet, Rfl: 3    multivitamin (ONE DAILY MULTIVITAMIN) per tablet, Take 1 tablet by mouth 2 (two) times daily., Disp: , Rfl:     NAFTIN 2 % Gel, daily as needed. , Disp: , Rfl:     nitroGLYCERIN (NITROSTAT) 0.4 MG SL tablet, Please dispense 25 pills in 4 bottles. (Patient not taking: Reported on 4/21/2020), Disp: 100 tablet, Rfl: 0    omeprazole (PRILOSEC) 20 MG capsule, Take 1 capsule (20 mg total) by mouth once daily., Disp: 90 capsule, Rfl: 3    rivaroxaban (XARELTO) 20 mg Tab, Take 1 tablet (20 mg total) by mouth once daily. (Patient not taking: Reported on 4/21/2020), Disp: 90 tablet, Rfl: 3    sotalol  (BETAPACE) 80 MG tablet, Take 1 tablet (80 mg total) by mouth 2 (two) times daily., Disp: 180 tablet, Rfl: 3    tamsulosin (FLOMAX) 0.4 mg Cap, Take 1 capsule (0.4 mg total) by mouth 2 (two) times daily., Disp: 180 capsule, Rfl: 3    thiamine 250 MG tablet, Take 250 mg by mouth once daily., Disp: , Rfl:     vitamin D (VITAMIN D3) 1000 units Tab, Take 1,000 Units by mouth once daily., Disp: , Rfl:     vortioxetine (TRINTELLIX) 20 mg Tab, Take 20 mg by mouth once daily. , Disp: , Rfl:     WELLBUTRIN  mg TB24 tablet, , Disp: , Rfl:     Review of patient's allergies indicates:   Allergen Reactions    Iodinated contrast media Other (See Comments)     Raises BP         Social History     Socioeconomic History    Marital status: Single     Spouse name: Not on file    Number of children: Not on file    Years of education: Not on file    Highest education level: Not on file   Occupational History    Not on file   Social Needs    Financial resource strain: Very hard    Food insecurity:     Worry: Sometimes true     Inability: Sometimes true    Transportation needs:     Medical: No     Non-medical: No   Tobacco Use    Smoking status: Never Smoker    Smokeless tobacco: Never Used   Substance and Sexual Activity    Alcohol use: No     Frequency: Never     Drinks per session: Patient refused     Binge frequency: Never     Comment: rare    Drug use: No    Sexual activity: Yes     Partners: Female   Lifestyle    Physical activity:     Days per week: 1 day     Minutes per session: 10 min    Stress: To some extent   Relationships    Social connections:     Talks on phone: Three times a week     Gets together: Twice a week     Attends [employer] service: Not on file     Active member of club or organization: No     Attends meetings of clubs or organizations: 1 to 4 times per year     Relationship status: Never    Other Topics Concern    Not on file   Social History Narrative    [place], lives alone  w/2 dogs.    Dogs are his kids.    Works PT --  at [employer], on ssdi from neck and back/depression.       Family History   Problem Relation Age of Onset    Heart disease Father     Cancer Mother         lung    Hypertension Sister     Heart disease Brother     Cirrhosis Brother     Diabetes Brother        Review of Systems   Constitutional: Negative for fatigue, fever and unexpected weight change.   HENT: Positive for mouth sores. Negative for ear discharge, facial swelling, hearing loss, rhinorrhea, sore throat, tinnitus, trouble swallowing and voice change.    Eyes: Negative for pain and visual disturbance.   Respiratory: Negative for cough and shortness of breath.    Cardiovascular: Negative for chest pain and palpitations.   Gastrointestinal: Negative for abdominal pain, constipation and diarrhea.   Genitourinary: Negative for difficulty urinating and dysuria.   Musculoskeletal: Positive for arthralgias. Negative for back pain and neck pain.   Skin: Negative for color change and rash.   Neurological: Negative for dizziness, seizures and headaches.   Hematological: Negative for adenopathy. Does not bruise/bleed easily.   Psychiatric/Behavioral: Negative for agitation. The patient is not nervous/anxious.        Answers for HPI/ROS submitted by the patient on 5/12/2020   Fatigue (Tiredness)?: Yes  Tooth/Dental Problems?: Yes  Visual changes / Changes in eyesight?: Yes  Snoring?: Yes  Sleep disturbances due to breathing?: Yes  Irregular heartbeat?: Yes  Swollen (Varicose) veins?: Yes  Sexual problems / dysfunction?: Yes  Feeling depressed?: Yes    Objective:     Physical Exam   Constitutional: He is oriented to person, place, and time. He appears well-developed and well-nourished. He is cooperative.   HENT:   Head: Normocephalic and atraumatic.   Right Ear: Hearing and external ear normal.   Left Ear: Hearing and external ear normal.   Nose: Nose normal. No rhinorrhea or nasal deformity.    Mouth/Throat: Oropharynx is clear and moist and mucous membranes are normal. No oropharyngeal exudate.       .   Eyes: Pupils are equal, round, and reactive to light. Conjunctivae and EOM are normal. Right eye exhibits no chemosis. Left eye exhibits no chemosis.   Neck: Trachea normal, normal range of motion and phonation normal. Neck supple. No JVD present. No tracheal tenderness present. No tracheal deviation and no edema present. No thyroid mass and no thyromegaly present.   Salivary glands - there are no lesions, and there is no asymmetry of the parotid and submandibular glands   Cardiovascular: Normal rate, regular rhythm and intact distal pulses.   Pulmonary/Chest: Effort normal. No accessory muscle usage or stridor. No tachypnea. No respiratory distress.   Abdominal: Normal appearance. He exhibits no distension. There is no guarding.   Lymphadenopathy:     He has no cervical adenopathy.        Right cervical: No deep cervical and no posterior cervical adenopathy present.       Left cervical: No deep cervical and no posterior cervical adenopathy present.        Right: No supraclavicular adenopathy present.        Left: No supraclavicular adenopathy present.   Neurological: He is alert and oriented to person, place, and time. No cranial nerve deficit. Gait normal.   Skin: Skin is warm and dry. No lesion noted.   Psychiatric: He has a normal mood and affect. His speech is normal.        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 It fluctuates in size but does not resolve. It is compressible and bluish in hue. I have recommended excision. Because he is on Xarelto and because of the importance of the red line on the lip in this region, I have recommended that we excise it in the operating room under local with some sedation. He is in agreement with  this.              Relevant Orders    Case Request Operating Room: EXCISION, LESION, LIP (Completed)

## 2020-05-15 DIAGNOSIS — E11.9 TYPE 2 DIABETES MELLITUS WITHOUT COMPLICATION: ICD-10-CM

## 2020-05-27 ENCOUNTER — NURSE TRIAGE (OUTPATIENT)
Dept: ADMINISTRATIVE | Facility: CLINIC | Age: 60
End: 2020-05-27

## 2020-05-27 NOTE — TELEPHONE ENCOUNTER
Spoke with pt:  has concerns over COVID 19 exposure and Works in grocery story. Has lips/mouth  blisters    having blisters on bottom lip. (Concerned about is this Covid 19). Not inside mouth. No fever. No SOB or CP. Not taking any med for the pain. Will not give me pain rating but states is  starting to spread around mouth and on lips. Gave anywhere care advice and instructed protocol recommends visit today for evaluation. Verbalizes understanding.     Reason for Disposition   Painful rash with multiple small blisters grouped together (i.e., dermatomal distribution or 'band' or 'stripe')    Additional Information   Negative: Sounds like a life-threatening emergency to the triager   Negative: Fever and localized purple or blood-colored spots or dots that are not from injury or friction   Negative: Fever and localized rash is very painful   Negative: Patient sounds very sick or weak to the triager   Negative: Looks like a boil, infected sore, deep ulcer, or other infected rash (spreading redness, pus)    Protocols used: RASH OR REDNESS - [id number]

## 2020-05-28 ENCOUNTER — TELEPHONE (OUTPATIENT)
Dept: INTERNAL MEDICINE | Facility: CLINIC | Age: 60
End: 2020-05-28

## 2020-05-28 NOTE — TELEPHONE ENCOUNTER
MD Karrie Mancilla MA             Make sure this is going into record, please.    Previous Messages            Attached Notes     Outside Note - [facility] signed by Flora Enriquez MD at 5/27/2020  5:47 PM     Author: Flora Enriquez MD Service: -- Author Type: Physician   Filed: 5/27/2020  5:47 PM Encounter Date: 5/27/2020 Note Type: Outside Note - [facility]   Status: Signed : Flora Enriquez MD (Physician)      Visit Summary for CAPRI GHOSH - Gender: Male - Date of Birth: 1960  Date: [id number] - Duration: 3 minutes  Patient: CAPRI GHOSH  Provider: Flora Enriquez     Patient Contact Information  Address  [address] [place] [id number]  [phone]     Visit Topics  Fever Blisters Around my Lips: other [Added By: Self - 2020-05-27]  Health History  Diastolic Blood Pressure: N/A  Systolic Blood Pressure: N/A  Body Weight: N/A  Body Temperature: N/A  Allergies: N/A     Conversation Transcripts         [Notification] You are connected with Flora Enriquez Family Physician.  [Notification] Patient has shared 1 file  [Notification] CAPRI GHOSH is located in [place].  [Notification] CAPRI GHOSH has shared health history...     Diagnosis  Herpesviral vesicular dermatitis  Value: B00.1  Code: ICD-10-CM     Procedures  Value: 84945 Code: CPT-4 OL DIG E/M SVC 11-20 MIN     Medications Prescribed  Valtrex  Strength : 1 gram  Frequency :   Patient Instructions :  2 tabs bid   Refills : 0  Instructions to the Pharmacist :  2 tabs bid . Substitutions allowed     Medications Entered by the Patient during intake  tamsulosin [Added By: Self - 2020-05-27]  sotalol [Added By: Self - 2020-05-27]  bumetanide [Added By: Self - 2020-05-27]  metoprolol tartrate [Added By: Self - 2020-05-27]  omeprazole [Added By: Self - 2020-05-27]  Adderall [Added By: Self - 2020-05-27]  Xarelto [Added By: Self - 2020-05-27]  Trintellix [Added By: Self - 2020-05-27]  Wellbutrin XL [Added By: Self -  2020-05-27]  gabapentin [Added By: Self - 2020-05-27]     Provider Notes           Contact phone number:            Mode of Communication:            HPI: liper fever blister for 2 to 3 days spreading                  PMH: Essential (primary) [id number] depressive disorder, single   episode, [id number], unspecified2020Attention-deficit   hyperactivity disorder, unspecified [id number] atrial   [id number]   MedicationsDateStatusomeprazole----tamsulosin----gabapentin----bumetanide----met  oprolol tartrate----Xarelto----sotalol----Trintellix----Wellbutrin   XL----Adderall     Allergies:NKDA            Exam:      Gen: Alert, normal mental status and interaction, no visible distress, non-   toxic appearance. lowerlip blister           Assessment: fever blister           Plan:       1.        I am sending you a prescription as described below.        2.        Discussed precautions.            Follow up:     1.        If there are any questions or problems with the prescription, call   [phone] anytime for assistance.        2.        Please re-connect for another online visit or see an in-person provider   should your symptoms worsen or persist.       3.        Taking a probiotic (either in pill form or by eating yogurt that contains   probiotics) while using antibiotics can help prevent some of the troublesome   side effects that antibiotics can sometimes cause.     4.        Please print a copy of this note and send it to your regular doctor, or take   it to your next visit so it may be included in your medical record.            Patient voiced understanding and agrees to plan.           Please see your PCP on an annual basis.     Follow-Up Suggestions  Reminder     Patients can choose an [facility] within 20 miles of drop-off location   for FREE Same Day Prescription Delivery (coordinated by [facility] outside the   platform). Only for participating [facility]  pharmacies. Available Monday â   Friday 8am â 4pm. Saturday: [facility] only 8am â 7pm. Patient can   expect a pharmacy call after visit for over the phone payment prior to live   tracking delivery.      Electronically signed by: Flora Enriquez(NPI [id number])

## 2020-06-18 ENCOUNTER — TELEPHONE (OUTPATIENT)
Dept: INTERNAL MEDICINE | Facility: CLINIC | Age: 60
End: 2020-06-18

## 2020-06-18 NOTE — TELEPHONE ENCOUNTER
----- Message from Rachael Atkins RN sent at 6/18/2020  2:56 PM CDT -----  Dr. Theodore,    This patient is scheduled for surgery (Excision lip lesion) on 7/7/20 with Dr. Navarro. This will last approximately 75 min under MAC anesthesia. Requesting medical clearance prior to this procedure, along with medication (Xarelto & Aspirin) instructions. Please advise. Will await your response.                                                                                   Thanks so much,                                                                            Rachael Atkins RN BSN                                              [facility]

## 2020-06-18 NOTE — TELEPHONE ENCOUNTER
Pt was last seen in March 2020.    Please advise if you can clear pt for surgery using data from March visit.    Thanks.

## 2020-06-18 NOTE — TELEPHONE ENCOUNTER
----- Message from GEORGES Theodore MD sent at 6/18/2020  4:03 PM CDT -----  If needs clearance will need ov.   pwb  ----- Message -----  From: Rachael Atkins RN  Sent: 6/18/2020   2:56 PM CDT  To: GEORGES Theodore MD, Arben Dickson Staff    Dr. Theodore,    This patient is scheduled for surgery (Excision lip lesion) on 7/7/20 with Dr. Navarro. This will last approximately 75 min under MAC anesthesia. Requesting medical clearance prior to this procedure, along with medication (Xarelto & Aspirin) instructions. Please advise. Will await your response.                                                                                   Thanks so much,                                                                            Rachael Atkins RN BSN                                              [facility]

## 2020-06-18 NOTE — TELEPHONE ENCOUNTER
Pt is scheduled with Dr. Theodore and resident (Dr. Reyes) on 7/2/20 at 3 pm for preop.    Dr. Theodore advises that pt contact Dr. Webb regarding holding Xarelto.    Thanks.

## 2020-06-19 ENCOUNTER — TELEPHONE (OUTPATIENT)
Dept: ELECTROPHYSIOLOGY | Facility: CLINIC | Age: 60
End: 2020-06-19

## 2020-06-19 ENCOUNTER — TELEPHONE (OUTPATIENT)
Dept: PREADMISSION TESTING | Facility: HOSPITAL | Age: 60
End: 2020-06-19

## 2020-06-19 NOTE — TELEPHONE ENCOUNTER
----- Message from Rachael Atkins RN sent at 6/19/2020  1:26 PM CDT -----  7/7/20-PLEASE SCHEDULE LABS

## 2020-06-19 NOTE — TELEPHONE ENCOUNTER
Per perioperative management of direct oral anticoagulants recommendations, Pt is cleared to hold Xarelto for 2 days prior to procedure and resume when doctor performing procedure deems safe.         ----- Message from Britt Katz MA sent at 6/19/2020  9:31 AM CDT -----    ----- Message -----  From: Rachael Atkins RN  Sent: 6/19/2020   9:26 AM CDT  To: All Webb MD, Jon Carrizales Staff    Dr. Webb,    This patient is scheduled for surgery (Excision lip lesion) on 7/7/20 with Dr. Navarro. This will last approximately 75 min under MAC anesthesia. Requesting medication (Xarelto & Aspirin) instructions prior to this procedure. Please advise. Will await your response.                                                                                    Thanks so much,                                                                             Rachael Atkins RN BSN                                                                            [facility]

## 2020-06-19 NOTE — ANESTHESIA PAT ROS NOTE
06/19/2020  Shiraz Jha is a 60 y.o., male.      Pre-op Assessment          Review of Systems  Anesthesia Hx:  Hx of Anesthetic complications  History of prior surgery of interest to airway management or planning: cervical fusion, gastric bypass. Previous anesthesia: MAC  7/16/14-ABLATION with MAC.  Denies Family Hx of Anesthesia complications.  Personal Hx of Anesthesia complications, Post-Operative Nausea/Vomiting, in the past, but not with recent anesthetics / prophylaxis   Social:  Non-Smoker, No Alcohol Use    Hematology/Oncology:  Hematology Normal   Oncology Normal     EENT/Dental:   chronic allergic rhinitis NEOPLASM OF UNCERTAIN BEHAVIOR, LEFT LOWER LIP. CATARACT.   Cardiovascular:   Hypertension  Denies Angina.  Functional Capacity 3 METS  Disorder of Cardiac Rhythm, Atrial Fibrillation, S/P surgical ablation    Pulmonary:   Asthma Denies Shortness of breath.  Denies Recent URI. Sleep Apnea, CPAP    Renal/:  Renal/ Normal     Hepatic/GI:   GERD H/O GASTRIC BYPASS   Musculoskeletal:  Joint Disease:  Arthritis DDD   Neurological:  Pain Syndrome  Pain Etiology/Diagnosis, Arthritis, Fibromayalgia    Endocrine:   THYROID DISEASE Diabetes, Type 2 Diabetes , controlled by diet.  Metabolic Disorders, Morbid Obesity / BMI > 40  Psych:   depression             Anesthesia Assessment: Preoperative EQUATION    Planned Procedure: Procedure(s) (LRB):  EXCISION, LESION, LIP (N/A)  Requested Anesthesia Type:Local MAC  Surgeon: Caden Navarro MD  Service: ENT  Known or anticipated Date of Surgery:7/7/2020    Surgeon notes: reviewed    Electronic QUestionnaire Assessment completed via nurse interview with patient.        Triage considerations:     The patient has no apparent active cardiac condition (No unstable coronary Syndrome such as severe unstable angina or recent [<1 month] myocardial infarction,  decompensated CHF, severe valvular   disease or significant arrhythmia)    Previous anesthesia records:MAC and Hx PONV  Airway/Jaw/Neck:  Airway Findings: Mouth Opening: Normal Tongue: Normal  General Airway Assessment: Adult  Mallampati: III  Improves to II with phonation.  TM Distance: 4 - 6 cm  Jaw/Neck Findings:     Neck ROM: Normal ROM      Dental:  Dental Findings: In tact      Last PCP note: within 3 months , within [facility]   Subspecialty notes: Cardiology: General, ENT, BARIATRICS, SLEEP MED, SPINE SERVICES    Other important co-morbidities: A FIB, DM2, GERD, HTN, Hypothyroid, Morbid Obesity and QUINTIN      Tests already available:  Available tests,  6-12 months ago , within [facility] .7/25/19-EKG             Instructions given. (See in Nurse's note)    Optimization:  Anesthesia Preop Clinic Assessment  Indicated-NOT INDICATED    Medical Opinion Indicated       Sub-specialist consult indicated:   TBCB PCP, MED INSTRUCTIONS FROM CARDIOLOGY      Plan:    Testing:  BMP and TSH     Consultation:  TBCB PCP, MED INSTRUCTIONS FROM CARDIOLOGY     Patient  has previously scheduled Medical Appointment:NOT AT THIS TIME    Navigation: Tests Scheduled.              Consults scheduled.             Results will be tracked by Preop Clinic.        ANA Dixon RN   Caller: Unspecified (Today,  9:26 AM)             Per perioperative management of direct oral anticoagulant recommendations, the pt is cleared to hold Xarelto for 2 days prior to procedure and resume when doctor performing procedure deems safe.

## 2020-06-22 ENCOUNTER — TELEPHONE (OUTPATIENT)
Dept: ELECTROPHYSIOLOGY | Facility: CLINIC | Age: 60
End: 2020-06-22

## 2020-06-22 NOTE — TELEPHONE ENCOUNTER
----- Message from Britt Katz MA sent at 6/22/2020 11:38 AM CDT -----    ----- Message -----  From: All Webb MD  Sent: 6/21/2020   3:09 PM CDT  To: Rachael Atkins RN, Jon All Staff    Hold Xarelto x 3 days.  Not clear as to why he is on aspirin, it is not for atrial fibrillation.  ----- Message -----  From: Rachael Atkins RN  Sent: 6/19/2020   9:26 AM CDT  To: All Webb MD, Jon [facility] Staff    Dr. Webb,    This patient is scheduled for surgery (Excision lip lesion) on 7/7/20 with Dr. Navarro. This will last approximately 75 min under MAC anesthesia. Requesting medication (Xarelto & Aspirin) instructions prior to this procedure. Please advise. Will await your response.                                                                                    Thanks so much,                                                                             Rachael Atkins RN BSN                                                                            [facility]

## 2020-07-01 ENCOUNTER — TELEPHONE (OUTPATIENT)
Dept: OTOLARYNGOLOGY | Facility: CLINIC | Age: 60
End: 2020-07-01

## 2020-07-02 ENCOUNTER — LAB VISIT (OUTPATIENT)
Dept: LAB | Facility: HOSPITAL | Age: 60
End: 2020-07-02
Attending: ANESTHESIOLOGY
Payer: COMMERCIAL

## 2020-07-02 ENCOUNTER — OFFICE VISIT (OUTPATIENT)
Dept: INTERNAL MEDICINE | Facility: CLINIC | Age: 60
End: 2020-07-02
Payer: COMMERCIAL

## 2020-07-02 VITALS
TEMPERATURE: 99 F | DIASTOLIC BLOOD PRESSURE: 62 MMHG | BODY MASS INDEX: 45.21 KG/M2 | HEIGHT: 66 IN | RESPIRATION RATE: 18 BRPM | WEIGHT: 281.31 LBS | HEART RATE: 61 BPM | SYSTOLIC BLOOD PRESSURE: 100 MMHG | OXYGEN SATURATION: 97 %

## 2020-07-02 DIAGNOSIS — E07.9 THYROID DISEASE: ICD-10-CM

## 2020-07-02 DIAGNOSIS — E66.01 MORBID OBESITY WITH BMI OF 40.0-44.9, ADULT: ICD-10-CM

## 2020-07-02 DIAGNOSIS — Z01.818 PREOP EXAMINATION: Primary | ICD-10-CM

## 2020-07-02 DIAGNOSIS — Z79.01 CURRENT USE OF LONG TERM ANTICOAGULATION: ICD-10-CM

## 2020-07-02 DIAGNOSIS — Z01.818 PREOPERATIVE TESTING: ICD-10-CM

## 2020-07-02 LAB
ANION GAP SERPL CALC-SCNC: 9 MMOL/L (ref 8–16)
BUN SERPL-MCNC: 14 MG/DL (ref 6–20)
CALCIUM SERPL-MCNC: 9.5 MG/DL (ref 8.7–10.5)
CHLORIDE SERPL-SCNC: 103 MMOL/L (ref 95–110)
CO2 SERPL-SCNC: 27 MMOL/L (ref 23–29)
CREAT SERPL-MCNC: 1.1 MG/DL (ref 0.5–1.4)
EST. GFR  (AFRICAN AMERICAN): >60 ML/MIN/1.73 M^2
EST. GFR  (NON AFRICAN AMERICAN): >60 ML/MIN/1.73 M^2
GLUCOSE SERPL-MCNC: 183 MG/DL (ref 70–110)
POTASSIUM SERPL-SCNC: 3.7 MMOL/L (ref 3.5–5.1)
SODIUM SERPL-SCNC: 139 MMOL/L (ref 136–145)
TSH SERPL DL<=0.005 MIU/L-ACNC: 0.93 UIU/ML (ref 0.4–4)

## 2020-07-02 PROCEDURE — 3078F PR MOST RECENT DIASTOLIC BLOOD PRESSURE < 80 MM HG: ICD-10-PCS | Mod: CPTII,S$GLB,, | Performed by: STUDENT IN AN ORGANIZED HEALTH CARE EDUCATION/TRAINING PROGRAM

## 2020-07-02 PROCEDURE — 84443 ASSAY THYROID STIM HORMONE: CPT

## 2020-07-02 PROCEDURE — 3074F PR MOST RECENT SYSTOLIC BLOOD PRESSURE < 130 MM HG: ICD-10-PCS | Mod: CPTII,S$GLB,, | Performed by: STUDENT IN AN ORGANIZED HEALTH CARE EDUCATION/TRAINING PROGRAM

## 2020-07-02 PROCEDURE — 99213 PR OFFICE/OUTPT VISIT, EST, LEVL III, 20-29 MIN: ICD-10-PCS | Mod: S$GLB,,, | Performed by: STUDENT IN AN ORGANIZED HEALTH CARE EDUCATION/TRAINING PROGRAM

## 2020-07-02 PROCEDURE — 3078F DIAST BP <80 MM HG: CPT | Mod: CPTII,S$GLB,, | Performed by: STUDENT IN AN ORGANIZED HEALTH CARE EDUCATION/TRAINING PROGRAM

## 2020-07-02 PROCEDURE — 3074F SYST BP LT 130 MM HG: CPT | Mod: CPTII,S$GLB,, | Performed by: STUDENT IN AN ORGANIZED HEALTH CARE EDUCATION/TRAINING PROGRAM

## 2020-07-02 PROCEDURE — 36415 COLL VENOUS BLD VENIPUNCTURE: CPT | Mod: PO

## 2020-07-02 PROCEDURE — 3008F PR BODY MASS INDEX (BMI) DOCUMENTED: ICD-10-PCS | Mod: CPTII,S$GLB,, | Performed by: STUDENT IN AN ORGANIZED HEALTH CARE EDUCATION/TRAINING PROGRAM

## 2020-07-02 PROCEDURE — 80048 BASIC METABOLIC PNL TOTAL CA: CPT

## 2020-07-02 PROCEDURE — 99213 OFFICE O/P EST LOW 20 MIN: CPT | Mod: S$GLB,,, | Performed by: STUDENT IN AN ORGANIZED HEALTH CARE EDUCATION/TRAINING PROGRAM

## 2020-07-02 PROCEDURE — 99999 PR PBB SHADOW E&M-EST. PATIENT-LVL V: CPT | Mod: PBBFAC,,, | Performed by: STUDENT IN AN ORGANIZED HEALTH CARE EDUCATION/TRAINING PROGRAM

## 2020-07-02 PROCEDURE — 99999 PR PBB SHADOW E&M-EST. PATIENT-LVL V: ICD-10-PCS | Mod: PBBFAC,,, | Performed by: STUDENT IN AN ORGANIZED HEALTH CARE EDUCATION/TRAINING PROGRAM

## 2020-07-02 PROCEDURE — 3008F BODY MASS INDEX DOCD: CPT | Mod: CPTII,S$GLB,, | Performed by: STUDENT IN AN ORGANIZED HEALTH CARE EDUCATION/TRAINING PROGRAM

## 2020-07-02 NOTE — PROGRESS NOTES
Internal Medicine Clinic Note  7/2/20      Subjective:       Patient ID: Shiraz Jha is a 60 y.o. male being seen for an established visit.    Chief Complaint: Pre-op Exam (lip surgery w/ Dr. Navarro 7-7-2020)      HPI  Patient is a 60 year old male with history of atrial fibrillation/flutter s/p ablation (on Xarelto), DMII, venous insufficiency, and morbid obesity who presents to clinic for pre-op clearance for lip lesion excisional surgery with Dr. Navarro on 7-7. Patient has no particular health concerns at this time. He reports that he has no issues with his day to day function. He is able to climb multiple flights of stairs without SOB or chest discomfort. He denies any history of MI, TIA, or stroke. He is currently not insulin dependent. He reports that he is bradycardic at baseline as he is taking 2 beta blockers; this is followed by cardiology Dr. Webb. He also reports his BP is low at baseline but denies any symptoms of lightheadedness or dizziness. Patient reports that he has had general anaesthesia for multiple procedures in the past without complication. Currently takes ASA and Xarelto. ECG done last December without signs of ischemia. Echo done last December without systolic or diastolic heart failure. At the time of my exam, patient denies headache, dizziness, chest pain, SOB, cough, abdominal pain, n/v/d, fevers or chills.     Past Medical History:   Diagnosis Date    Allergy     Asthma     Atrial fibrillation     Cataract     Chronic rhinitis 9/26/2013    DDD (degenerative disc disease) 4/3/2015    Depression     Diabetes mellitus     Fibromyalgia     Gastroesophageal reflux disease without esophagitis 7/14/2017    Hypertension     Sinusitis, acute, maxillary 12/20/2012    Thyroid disease        Past Surgical History:   Procedure Laterality Date    CERVICAL SPINE SURGERY      EPIDURAL STEROID INJECTION INTO LUMBAR SPINE N/A 5/29/2018    Procedure: INJECTION-STEROID-EPIDURAL-LUMBAR-  L4-5;  Surgeon: Roman Lyman MD;  Location: [facility];  Service: Pain Management;  Laterality: N/A;  Patient is diabetic and Xarleto     EPIDURAL STEROID INJECTION INTO LUMBAR SPINE N/A 7/3/2018    Procedure: INJECTION, STEROID, SPINE, LUMBAR, EPIDURAL- L4-5;  Surgeon: Roman Lyman MD;  Location: [facility];  Service: Pain Management;  Laterality: N/A;  Patient takes Xarelto and ASA     GASTRIC BYPASS      SINUS SURGERY  2000    [facility]    TONSILLECTOMY         Family History   Problem Relation Age of Onset    Heart disease Father     Cancer Mother         lung    Hypertension Sister     Heart disease Brother     Cirrhosis Brother     Diabetes Brother        Social History     Socioeconomic History    Marital status: Single     Spouse name: Not on file    Number of children: Not on file    Years of education: Not on file    Highest education level: Not on file   Occupational History    Not on file   Social Needs    Financial resource strain: Very hard    Food insecurity     Worry: Sometimes true     Inability: Sometimes true    Transportation needs     Medical: No     Non-medical: No   Tobacco Use    Smoking status: Never Smoker    Smokeless tobacco: Never Used   Substance and Sexual Activity    Alcohol use: No     Frequency: Never     Drinks per session: Patient refused     Binge frequency: Never     Comment: rare    Drug use: No    Sexual activity: Yes     Partners: Female   Lifestyle    Physical activity     Days per week: 1 day     Minutes per session: 10 min    Stress: To some extent   Relationships    Social connections     Talks on phone: Three times a week     Gets together: Twice a week     Attends Nondenominational service: Not on file     Active member of club or organization: No     Attends meetings of clubs or organizations: 1 to 4 times per year     Relationship status: Never    Other Topics Concern    Not on file   Social History Narrative    From  [place], lives alone w/2 dogs.    Dogs are his kids.    Works PT --  at [employer], on ssdi from neck and back/depression.       Review of Systems   Constitutional: Negative for activity change, chills, fatigue and fever.   HENT: Negative for congestion and sore throat.    Respiratory: Negative for cough, chest tightness, shortness of breath and wheezing.    Cardiovascular: Negative for chest pain, palpitations and leg swelling.   Gastrointestinal: Negative for abdominal pain, constipation, diarrhea, nausea and vomiting.   Genitourinary: Negative for dysuria and flank pain.   Musculoskeletal: Negative for arthralgias, back pain and myalgias.   Skin: Negative for color change and pallor.   Neurological: Negative for dizziness, weakness and headaches.       Patient's Medications   New Prescriptions    No medications on file   Previous Medications    ACETAMINOPHEN (TYLENOL) 500 MG TABLET    Take 500 mg by mouth every 12 (twelve) hours.    ASPIRIN 81 MG CHEW    Take 1 tablet (81 mg total) by mouth once daily.    BUMETANIDE (BUMEX) 1 MG TABLET    Take 1 tablet (1 mg total) by mouth 2 (two) times daily.    BUPROPION (WELLBUTRIN XL) 300 MG 24 HR TABLET    Take 300 mg by mouth once daily.     CYANOCOBALAMIN 500 MCG TABLET    Take 500 mcg by mouth once daily.    DESOXIMETASONE (TOPICORT) 0.05 % CREAM    Apply topically 2 (two) times daily.    DEXTROAMPHETAMINE-AMPHETAMINE 30 MG TAB    30 mg once daily. Adderall    FERROUS SULFATE 325 (65 FE) MG EC TABLET    Take 325 mg by mouth 3 (three) times daily with meals.    GABAPENTIN (NEURONTIN) 300 MG CAPSULE    Take 1 capsule (300 mg total) by mouth 3 (three) times daily.    IMPOYZ 0.025 % CREA    Apply topically as needed.     LUZU 1 % CREA    Apply topically as needed.     METOPROLOL TARTRATE (LOPRESSOR) 25 MG TABLET    Take 1 tablet (25 mg total) by mouth 2 (two) times daily.    MULTIVITAMIN (ONE DAILY MULTIVITAMIN) PER TABLET    Take 1 tablet by mouth 2 (two) times daily.     NAFTIN 2 % GEL    daily as needed.     NITROGLYCERIN (NITROSTAT) 0.4 MG SL TABLET    Please dispense 25 pills in 4 bottles.    OMEPRAZOLE (PRILOSEC) 20 MG CAPSULE    Take 1 capsule (20 mg total) by mouth once daily.    RIVAROXABAN (XARELTO) 20 MG TAB    Take 1 tablet (20 mg total) by mouth once daily.    SOTALOL (BETAPACE) 80 MG TABLET    Take 1 tablet (80 mg total) by mouth 2 (two) times daily.    TAMSULOSIN (FLOMAX) 0.4 MG CAP    Take 1 capsule (0.4 mg total) by mouth 2 (two) times daily.    THIAMINE 250 MG TABLET    Take 250 mg by mouth once daily.    VITAMIN D (VITAMIN D3) 1000 UNITS TAB    Take 1,000 Units by mouth once daily.    VORTIOXETINE (TRINTELLIX) 20 MG TAB    Take 20 mg by mouth once daily.     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    Impaired fasting blood sugar 03/24/2020    Venous insufficiency of left lower extremity 10/16/2019    Edema of both lower extremities 10/16/2019    Status post gastric bypass for obesity 09/19/2019    Osteoarthritis of spine 12/18/2018    DDD (degenerative disc disease), lumbar 12/18/2018    Mobility impaired 11/28/2018    Weakness 11/28/2018    Enlarged thoracic aorta 03/05/2018    Status post catheter ablation of atrial fibrillation 08/01/2017    Status post catheter ablation of atrial flutter 08/01/2017    Current use of long term anticoagulation 08/01/2017    Gastroesophageal reflux disease without esophagitis 07/14/2017    Benign prostatic hyperplasia with lower urinary tract symptoms 07/14/2017    Stable angina pectoris 07/14/2017    Spondylosis without myelopathy 04/03/2015    Persistent atrial fibrillation 07/16/2014    Chronic rhinitis 09/26/2013    Morbid obesity with BMI of 40.0-44.9, adult 01/03/2013    Type 2 diabetes mellitus  "without complication, without long-term current use of insulin 01/03/2013     Diet controlled      Obstructive sleep apnea 01/03/2013     On CPAP      Essential hypertension 11/15/2012    Depression 11/15/2012    Atrial flutter 11/15/2012           Objective:      /62 (BP Location: Right arm, Patient Position: Sitting, BP Method: Large (Manual))   Pulse 61   Temp 98.8 °F (37.1 °C) (Temporal)   Resp 18   Ht 5' 6" (1.676 m)   Wt 127.6 kg (281 lb 4.9 oz)   SpO2 97%   BMI 45.40 kg/m²   Estimated body mass index is 45.4 kg/m² as calculated from the following:    Height as of this encounter: 5' 6" (1.676 m).    Weight as of this encounter: 127.6 kg (281 lb 4.9 oz).    Physical Exam  Constitutional:       General: He is not in acute distress.     Appearance: Normal appearance. He is well-developed. He is obese. He is not ill-appearing.   HENT:      Head: Normocephalic and atraumatic.      Nose: Nose normal.   Eyes:      General: No scleral icterus.     Conjunctiva/sclera: Conjunctivae normal.   Neck:      Musculoskeletal: Normal range of motion and neck supple.   Cardiovascular:      Rate and Rhythm: Normal rate and regular rhythm.      Heart sounds: Normal heart sounds. No murmur.   Pulmonary:      Effort: Pulmonary effort is normal.      Breath sounds: Normal breath sounds. No wheezing or rales.   Abdominal:      General: Bowel sounds are normal. There is no distension.      Palpations: Abdomen is soft.      Tenderness: There is no abdominal tenderness.   Musculoskeletal: Normal range of motion.         General: Swelling (bilateral lower extremity) present.      Right lower leg: Edema present.      Left lower leg: Edema present.   Skin:     General: Skin is warm and dry.   Neurological:      Mental Status: He is alert and oriented to person, place, and time.   Psychiatric:         Behavior: Behavior normal.         RESULT SUMMARY:  0 points  Class I Risk    3.9 %  30-day risk of death, MI, or cardiac " arrest    From [facility] 2017, based on pooled data from 5 high quality external validations (4 prospective). These numbers are higher than those often quoted from the now-outdated original study (Jeremías 1999). See Evidence for details.      INPUTS:  High-risk surgery --> 0 = No  History of ischemic heart disease --> 0 = No  History of congestive heart failure --> 0 = No  History of cerebrovascular disease --> 0 = No  Pre-operative treatment with insulin --> 0 = No  Pre-operative creatinine >2 mg/dL / 176.8 µmol/L --> 0 = No     Assessment:         1. Preop examination     2. Morbid obesity with BMI of 40.0-44.9, adult     3. Current use of long term anticoagulation           Plan:       1. Preop examination  Morbid obesity  Current use of long term anticoagulation  - Patient with RCRI of 3.9%.   - ECG and echo done last December without concerning findings  - Patient has had multiple procedures under general anaesthesia in the past without issue  - Normal functional capacity without SOB or CP  - Cleared from medicine perspective for procedure  - Reiterated instructions to stop ASA and Xarelto prior to procedure          Patient seen and plan of care discussed with Dr. Arben Reyes  Internal Medicine, PGY-3  [phone]

## 2020-07-06 ENCOUNTER — TELEPHONE (OUTPATIENT)
Dept: OTOLARYNGOLOGY | Facility: CLINIC | Age: 60
End: 2020-07-06

## 2020-07-06 ENCOUNTER — ANESTHESIA EVENT (OUTPATIENT)
Dept: SURGERY | Facility: HOSPITAL | Age: 60
End: 2020-07-06
Payer: COMMERCIAL

## 2020-07-07 ENCOUNTER — ANESTHESIA (OUTPATIENT)
Dept: SURGERY | Facility: HOSPITAL | Age: 60
End: 2020-07-07
Payer: COMMERCIAL

## 2020-07-07 ENCOUNTER — HOSPITAL ENCOUNTER (OUTPATIENT)
Facility: HOSPITAL | Age: 60
Discharge: HOME | End: 2020-07-07
Attending: OTOLARYNGOLOGY | Admitting: OTOLARYNGOLOGY
Payer: COMMERCIAL

## 2020-07-07 VITALS
SYSTOLIC BLOOD PRESSURE: 119 MMHG | WEIGHT: 280 LBS | HEART RATE: 55 BPM | DIASTOLIC BLOOD PRESSURE: 58 MMHG | RESPIRATION RATE: 17 BRPM | BODY MASS INDEX: 42.44 KG/M2 | OXYGEN SATURATION: 100 % | TEMPERATURE: 98 F | HEIGHT: 68 IN

## 2020-07-07 DIAGNOSIS — D37.01 NEOPLASM OF UNCERTAIN BEHAVIOR OF LIP: ICD-10-CM

## 2020-07-07 LAB
POCT GLUCOSE: 114 MG/DL (ref 70–110)
SARS-COV-2 RDRP RESP QL NAA+PROBE: NEGATIVE

## 2020-07-07 PROCEDURE — D9220A PRA ANESTHESIA: Mod: CRNA,,, | Performed by: NURSE ANESTHETIST, CERTIFIED REGISTERED

## 2020-07-07 PROCEDURE — 11442 PR EXC SKIN BENIG 1.1-2 CM FACE,FACIAL: ICD-10-PCS | Mod: ,,, | Performed by: OTOLARYNGOLOGY

## 2020-07-07 PROCEDURE — 88305 TISSUE EXAM BY PATHOLOGIST: CPT | Mod: 26,,, | Performed by: PATHOLOGY

## 2020-07-07 PROCEDURE — 25000003 PHARM REV CODE 250: Performed by: OTOLARYNGOLOGY

## 2020-07-07 PROCEDURE — 36000707: Performed by: OTOLARYNGOLOGY

## 2020-07-07 PROCEDURE — 88341 IMHCHEM/IMCYTCHM EA ADD ANTB: CPT | Performed by: PATHOLOGY

## 2020-07-07 PROCEDURE — 88342 IMHCHEM/IMCYTCHM 1ST ANTB: CPT | Performed by: PATHOLOGY

## 2020-07-07 PROCEDURE — 88341 IMHCHEM/IMCYTCHM EA ADD ANTB: CPT | Mod: 26,,, | Performed by: PATHOLOGY

## 2020-07-07 PROCEDURE — D9220A PRA ANESTHESIA: ICD-10-PCS | Mod: CRNA,,, | Performed by: NURSE ANESTHETIST, CERTIFIED REGISTERED

## 2020-07-07 PROCEDURE — 37000008 HC ANESTHESIA 1ST 15 MINUTES: Performed by: OTOLARYNGOLOGY

## 2020-07-07 PROCEDURE — U0002 COVID-19 LAB TEST NON-CDC: HCPCS

## 2020-07-07 PROCEDURE — 88342 IMHCHEM/IMCYTCHM 1ST ANTB: CPT | Mod: 26,,, | Performed by: PATHOLOGY

## 2020-07-07 PROCEDURE — 63600175 PHARM REV CODE 636 W HCPCS: Performed by: OTOLARYNGOLOGY

## 2020-07-07 PROCEDURE — 88342 CHG IMMUNOCYTOCHEMISTRY: ICD-10-PCS | Mod: 26,,, | Performed by: PATHOLOGY

## 2020-07-07 PROCEDURE — 88341 PR IHC OR ICC EACH ADD'L SINGLE ANTIBODY  STAINPR: ICD-10-PCS | Mod: 26,,, | Performed by: PATHOLOGY

## 2020-07-07 PROCEDURE — 36000706: Performed by: OTOLARYNGOLOGY

## 2020-07-07 PROCEDURE — D9220A PRA ANESTHESIA: Mod: ANES,,, | Performed by: ANESTHESIOLOGY

## 2020-07-07 PROCEDURE — 88342 IMHCHEM/IMCYTCHM 1ST ANTB: CPT | Mod: 91 | Performed by: PATHOLOGY

## 2020-07-07 PROCEDURE — 11442 EXC FACE-MM B9+MARG 1.1-2 CM: CPT | Mod: ,,, | Performed by: OTOLARYNGOLOGY

## 2020-07-07 PROCEDURE — 63600175 PHARM REV CODE 636 W HCPCS: Performed by: NURSE ANESTHETIST, CERTIFIED REGISTERED

## 2020-07-07 PROCEDURE — 88305 TISSUE EXAM BY PATHOLOGIST: CPT | Performed by: PATHOLOGY

## 2020-07-07 PROCEDURE — 25000003 PHARM REV CODE 250: Performed by: NURSE ANESTHETIST, CERTIFIED REGISTERED

## 2020-07-07 PROCEDURE — 37000009 HC ANESTHESIA EA ADD 15 MINS: Performed by: OTOLARYNGOLOGY

## 2020-07-07 PROCEDURE — 88305 TISSUE EXAM BY PATHOLOGIST: ICD-10-PCS | Mod: 26,,, | Performed by: PATHOLOGY

## 2020-07-07 PROCEDURE — 71000015 HC POSTOP RECOV 1ST HR: Performed by: OTOLARYNGOLOGY

## 2020-07-07 PROCEDURE — D9220A PRA ANESTHESIA: ICD-10-PCS | Mod: ANES,,, | Performed by: ANESTHESIOLOGY

## 2020-07-07 PROCEDURE — 71000044 HC DOSC ROUTINE RECOVERY FIRST HOUR: Performed by: OTOLARYNGOLOGY

## 2020-07-07 PROCEDURE — 82962 GLUCOSE BLOOD TEST: CPT | Performed by: OTOLARYNGOLOGY

## 2020-07-07 RX ORDER — CEFAZOLIN SODIUM 1 G/3ML
2 INJECTION, POWDER, FOR SOLUTION INTRAMUSCULAR; INTRAVENOUS
Status: COMPLETED | OUTPATIENT
Start: 2020-07-07 | End: 2020-07-07

## 2020-07-07 RX ORDER — LIDOCAINE HYDROCHLORIDE 20 MG/ML
INJECTION INTRAVENOUS
Status: DISCONTINUED | OUTPATIENT
Start: 2020-07-07 | End: 2020-07-07

## 2020-07-07 RX ORDER — ONDANSETRON 2 MG/ML
INJECTION INTRAMUSCULAR; INTRAVENOUS
Status: DISCONTINUED | OUTPATIENT
Start: 2020-07-07 | End: 2020-07-07

## 2020-07-07 RX ORDER — FENTANYL CITRATE 50 UG/ML
INJECTION, SOLUTION INTRAMUSCULAR; INTRAVENOUS
Status: DISCONTINUED | OUTPATIENT
Start: 2020-07-07 | End: 2020-07-07

## 2020-07-07 RX ORDER — KETAMINE HCL IN 0.9 % NACL 50 MG/5 ML
SYRINGE (ML) INTRAVENOUS
Status: DISCONTINUED | OUTPATIENT
Start: 2020-07-07 | End: 2020-07-07

## 2020-07-07 RX ORDER — PROPOFOL 10 MG/ML
VIAL (ML) INTRAVENOUS
Status: DISCONTINUED | OUTPATIENT
Start: 2020-07-07 | End: 2020-07-07

## 2020-07-07 RX ORDER — LIDOCAINE HYDROCHLORIDE 10 MG/ML
1 INJECTION, SOLUTION EPIDURAL; INFILTRATION; INTRACAUDAL; PERINEURAL ONCE
Status: DISCONTINUED | OUTPATIENT
Start: 2020-07-07 | End: 2020-07-07 | Stop reason: HOSPADM

## 2020-07-07 RX ORDER — MIDAZOLAM HYDROCHLORIDE 1 MG/ML
INJECTION, SOLUTION INTRAMUSCULAR; INTRAVENOUS
Status: DISCONTINUED | OUTPATIENT
Start: 2020-07-07 | End: 2020-07-07

## 2020-07-07 RX ORDER — SODIUM CHLORIDE 9 MG/ML
INJECTION, SOLUTION INTRAVENOUS CONTINUOUS PRN
Status: DISCONTINUED | OUTPATIENT
Start: 2020-07-07 | End: 2020-07-07

## 2020-07-07 RX ORDER — SODIUM CHLORIDE 0.9 % (FLUSH) 0.9 %
10 SYRINGE (ML) INJECTION
Status: DISCONTINUED | OUTPATIENT
Start: 2020-07-07 | End: 2020-07-07 | Stop reason: HOSPADM

## 2020-07-07 RX ORDER — PROPOFOL 10 MG/ML
VIAL (ML) INTRAVENOUS CONTINUOUS PRN
Status: DISCONTINUED | OUTPATIENT
Start: 2020-07-07 | End: 2020-07-07

## 2020-07-07 RX ORDER — LIDOCAINE HYDROCHLORIDE AND EPINEPHRINE 10; 10 MG/ML; UG/ML
INJECTION, SOLUTION INFILTRATION; PERINEURAL
Status: DISCONTINUED | OUTPATIENT
Start: 2020-07-07 | End: 2020-07-07 | Stop reason: HOSPADM

## 2020-07-07 RX ADMIN — ONDANSETRON 4 MG: 2 INJECTION, SOLUTION INTRAMUSCULAR; INTRAVENOUS at 03:07

## 2020-07-07 RX ADMIN — FENTANYL CITRATE 50 MCG: 50 INJECTION, SOLUTION INTRAMUSCULAR; INTRAVENOUS at 03:07

## 2020-07-07 RX ADMIN — Medication 30 MG: at 03:07

## 2020-07-07 RX ADMIN — MIDAZOLAM HYDROCHLORIDE 2 MG: 1 INJECTION, SOLUTION INTRAMUSCULAR; INTRAVENOUS at 03:07

## 2020-07-07 RX ADMIN — PROPOFOL 100 MCG/KG/MIN: 10 INJECTION, EMULSION INTRAVENOUS at 03:07

## 2020-07-07 RX ADMIN — SODIUM CHLORIDE: 0.9 INJECTION, SOLUTION INTRAVENOUS at 02:07

## 2020-07-07 RX ADMIN — CEFAZOLIN 2 G: 330 INJECTION, POWDER, FOR SOLUTION INTRAMUSCULAR; INTRAVENOUS at 03:07

## 2020-07-07 RX ADMIN — LIDOCAINE HYDROCHLORIDE 60 MG: 20 INJECTION, SOLUTION INTRAVENOUS at 03:07

## 2020-07-07 RX ADMIN — PROPOFOL 40 MG: 10 INJECTION, EMULSION INTRAVENOUS at 03:07

## 2020-07-07 NOTE — ANESTHESIA POSTPROCEDURE EVALUATION
Anesthesia Post Evaluation    Patient: Shiraz Jha    Procedure(s) Performed: Procedure(s) (LRB):  EXCISION, LESION, LIP (N/A)    Final Anesthesia Type: general    Patient location during evaluation: PACU  Patient participation: Yes- Able to Participate  Level of consciousness: awake and alert  Post-procedure vital signs: reviewed and stable  Pain management: adequate  Airway patency: patent    PONV status at discharge: No PONV  Anesthetic complications: no      Cardiovascular status: blood pressure returned to baseline  Respiratory status: unassisted  Hydration status: euvolemic  Follow-up not needed.          Vitals Value Taken Time   /58 07/07/20 1700   Temp 36.6 °C (97.9 °F) 07/07/20 1700   Pulse 55 07/07/20 1700   Resp 17 07/07/20 1615   SpO2 100 % 07/07/20 1700         No case tracking events are documented in the log.      Pain/Dandy Score: Dandy Score: 10 (7/7/2020  5:00 PM)

## 2020-07-07 NOTE — TRANSFER OF CARE
"Anesthesia Transfer of Care Note    Patient: Shiraz Jha    Procedure(s) Performed: Procedure(s) (LRB):  EXCISION, LESION, LIP (N/A)    Patient location: PACU    Anesthesia Type: general    Transport from OR: Transported from OR on 2-3 L/min O2 by NC with adequate spontaneous ventilation    Post pain: adequate analgesia    Post assessment: no apparent anesthetic complications and tolerated procedure well    Post vital signs: stable    Level of consciousness: awake, oriented and alert    Nausea/Vomiting: no nausea/vomiting    Complications: none    Transfer of care protocol was followed      Last vitals:   Visit Vitals  /65 (BP Location: Right arm, Patient Position: Lying)   Pulse (!) 57   Temp 36.4 °C (97.5 °F) (Oral)   Resp 18   Ht 5' 8" (1.727 m)   Wt 127 kg (280 lb)   SpO2 99%   BMI 42.57 kg/m²     "

## 2020-07-07 NOTE — ANESTHESIA PREPROCEDURE EVALUATION
07/07/2020  Shiraz Jha is a 60 y.o., male with lesion on lower lip scheduled for excision. Patient with PMH of afib s/p ablation, fibromyalgia, HTN, DM, DDD s/p cervical fusion, GERD, Obesity s/p gastric bypass.     Anesthesia Evaluation    I have reviewed the Patient Summary Reports.    I have reviewed the Nursing Notes.    I have reviewed the Medications.     Review of Systems  Anesthesia Hx:  Hx of Anesthetic complications PONV History of prior surgery of interest to airway management or planning: (cervical surgery (15-20 yrs ago)) gastric bypass. Previous anesthesia: General gastric bypass 3-4 yrs ago with general anesthesia.  Denies Family Hx of Anesthesia complications.   Denies Personal Hx of Anesthesia complications.   Social:  Non-Smoker, No Alcohol Use    Hematology/Oncology:  Hematology Normal   Oncology Normal     EENT/Dental:EENT/Dental Normal   Cardiovascular:   Exercise tolerance: good Hypertension, well controlled Denies MI.  Denies CAD.   Dysrhythmias atrial fibrillation  Denies Angina.    Pulmonary:   Asthma mild Sleep Apnea, CPAP    Renal/:  Renal/ Normal     Hepatic/GI:  Hepatic/GI Normal    Musculoskeletal:  Musculoskeletal Normal    Neurological:   Neuromuscular Disease, +dizziness, lightheadedness (2/2 afib)  Denies Chronic Pain Syndrome   Endocrine:   Diabetes (controlled with diet), well controlled, type 2    Dermatological:  Skin Normal    Psych:   Psychiatric History depression          Physical Exam  General:  Well nourished    Airway/Jaw/Neck:  Airway Findings: Mouth Opening: Normal Tongue: Normal  General Airway Assessment: Adult  Mallampati: III  Improves to II with phonation.  TM Distance: 4 - 6 cm  Jaw/Neck Findings:     Neck ROM: Normal ROM      Dental:  Dental Findings: In tact    Chest/Lungs:  Chest/Lungs Findings: Clear to auscultation, Normal Respiratory Rate      Heart/Vascular:  Heart Findings: Rate: Normal  Rhythm: Regular Rhythm     Abdomen:  Abdomen Findings:  Normal, Soft, Nontender       Mental Status:  Mental Status Findings:  Cooperative, Alert and Oriented         Anesthesia Plan  Type of Anesthesia, risks & benefits discussed:  Anesthesia Type:  general, MAC  Patient's Preference:   Intra-op Monitoring Plan: standard ASA monitors  Intra-op Monitoring Plan Comments:   Post Op Pain Control Plan: multimodal analgesia  Post Op Pain Control Plan Comments:   Induction:   IV  Beta Blocker:         Informed Consent: Patient understands risks and agrees with Anesthesia plan.  Questions answered. Anesthesia consent signed with patient.  ASA Score: 3     Day of Surgery Review of History & Physical: I have interviewed and examined the patient. I have reviewed the patient's H&P dated:  There are no significant changes.          Ready For Surgery From Anesthesia Perspective.

## 2020-07-07 NOTE — BRIEF OP NOTE
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    DISPOSITION: Home or Self Care    FINAL DIAGNOSIS:  Neoplasm of uncertain behavior of lip    FOLLOWUP: In clinic    DISCHARGE INSTRUCTIONS:    Discharge Procedure Orders   Diet Adult Regular     No dressing needed   Order Comments: Apply Vaseline ointment to lip twice daily. Ok to shower. Small amount of bleeding is expected, if you notice this, can gently apply pressure. Sutures will fall off by self.     Activity as tolerated   Order Comments: Tylenol and motrin for pain as needed

## 2020-07-07 NOTE — PLAN OF CARE
Pt tolerated procedure well.  Discharge paperwork printed and reviewed with pt and cousin.  Copies of discharge paperwork printed and given to patient.  No complaints of pain or nausea.  Pt tolerating PO liquids well.  VSS.  Safety maintained throughout care.

## 2020-07-07 NOTE — H&P
"[facility]     Subjective:       Patient ID: Shiraz Jha is a 60 y.o. male.     Chief Complaint: lip lesion    HPI     Shiraz Jha is a 60 y.o. male who presents with a left lower lip lesion that has been enlarging for several years. He has a long history of swelling in this region, dating back to cryotherapy about 20 years ago at the [facility]. He thinks this lesion fluctuates in size, and it will hurt when it "swells". It has gotten harder, but it does not bleed. He denies masses or lumps in his neck. He is a nonsmoker. He has never had surgery of the lip. He denies sunburns and wears hats and sunscreen. He is not immunocompromised. There is no prior history of skin cancer. The lesion fluctuates in size, but it never goes away. It is essentially unchanged since our last encounter. He reports that he remains on his Xarelto.          Past Medical History:   Diagnosis Date    Allergy      Asthma      Atrial fibrillation      Cataract      Chronic rhinitis 9/26/2013    DDD (degenerative disc disease) 4/3/2015    Depression      Diabetes mellitus      Fibromyalgia      Gastroesophageal reflux disease without esophagitis 7/14/2017    Hypertension      Sinusitis, acute, maxillary 12/20/2012    Thyroid disease                Past Surgical History:   Procedure Laterality Date    CERVICAL SPINE SURGERY        EPIDURAL STEROID INJECTION INTO LUMBAR SPINE N/A 5/29/2018     Procedure: INJECTION-STEROID-EPIDURAL-LUMBAR- L4-5;  Surgeon: Roman Lyman MD;  [place] [facility] [place] [facility] [place]  Service: Pain Management;  Laterality: N/A;  Patient is diabetic and Xarleto     EPIDURAL STEROID INJECTION INTO LUMBAR SPINE N/A 7/3/2018     Procedure: INJECTION, STEROID, SPINE, LUMBAR, EPIDURAL- L4-5;  Surgeon: Roman Lyman MD;  Location: [facility];  Service: Pain Management;  Laterality: N/A;  Patient takes Xarelto and ASA     GASTRIC BYPASS        SINUS SURGERY   2000     " Dr. Watt at [facility]    TONSILLECTOMY              Current Outpatient Medications:     acetaminophen (TYLENOL) 500 MG tablet, Take 500 mg by mouth every 12 (twelve) hours., Disp: , Rfl:     aspirin 81 MG Chew, Take 1 tablet (81 mg total) by mouth once daily., Disp: , Rfl: 0    bumetanide (BUMEX) 1 MG tablet, Take 1 tablet (1 mg total) by mouth 2 (two) times daily., Disp: 60 tablet, Rfl: 11    buPROPion (WELLBUTRIN XL) 300 MG 24 hr tablet, Take 300 mg by mouth once daily.  , Disp: , Rfl:     cyanocobalamin 500 MCG tablet, Take 500 mcg by mouth once daily., Disp: , Rfl:     desoximetasone (TOPICORT) 0.05 % cream, Apply topically 2 (two) times daily. (Patient taking differently: Apply topically 2 (two) times daily. Pt using prn.), Disp: 60 g, Rfl: 11    dextroamphetamine-amphetamine 30 mg Tab, 30 mg once daily. Adderall, Disp: , Rfl: 0    ferrous sulfate 325 (65 FE) MG EC tablet, Take 325 mg by mouth 3 (three) times daily with meals., Disp: , Rfl:     gabapentin (NEURONTIN) 300 MG capsule, Take 1 capsule (300 mg total) by mouth 3 (three) times daily., Disp: 270 capsule, Rfl: 3    IMPOYZ 0.025 % Crea, Apply topically as needed. , Disp: , Rfl:     LUZU 1 % Crea, Apply topically as needed. , Disp: , Rfl:     metoprolol tartrate (LOPRESSOR) 25 MG tablet, Take 1 tablet (25 mg total) by mouth 2 (two) times daily., Disp: 180 tablet, Rfl: 3    multivitamin (ONE DAILY MULTIVITAMIN) per tablet, Take 1 tablet by mouth 2 (two) times daily., Disp: , Rfl:     NAFTIN 2 % Gel, daily as needed. , Disp: , Rfl:     nitroGLYCERIN (NITROSTAT) 0.4 MG SL tablet, Please dispense 25 pills in 4 bottles. (Patient not taking: Reported on 4/21/2020), Disp: 100 tablet, Rfl: 0    omeprazole (PRILOSEC) 20 MG capsule, Take 1 capsule (20 mg total) by mouth once daily., Disp: 90 capsule, Rfl: 3    rivaroxaban (XARELTO) 20 mg Tab, Take 1 tablet (20 mg total) by mouth once daily. (Patient not taking: Reported on 4/21/2020), Disp: 90 tablet,  Rfl: 3    sotalol (BETAPACE) 80 MG tablet, Take 1 tablet (80 mg total) by mouth 2 (two) times daily., Disp: 180 tablet, Rfl: 3    tamsulosin (FLOMAX) 0.4 mg Cap, Take 1 capsule (0.4 mg total) by mouth 2 (two) times daily., Disp: 180 capsule, Rfl: 3    thiamine 250 MG tablet, Take 250 mg by mouth once daily., Disp: , Rfl:     vitamin D (VITAMIN D3) 1000 units Tab, Take 1,000 Units by mouth once daily., Disp: , Rfl:     vortioxetine (TRINTELLIX) 20 mg Tab, Take 20 mg by mouth once daily. , Disp: , Rfl:     WELLBUTRIN  mg TB24 tablet, , Disp: , Rfl:            Review of patient's allergies indicates:   Allergen Reactions    Iodinated contrast media Other (See Comments)       Raises BP           Social History               Socioeconomic History    Marital status: Single       Spouse name: Not on file    Number of children: Not on file    Years of education: Not on file    Highest education level: Not on file   Occupational History    Not on file   Social Needs    Financial resource strain: Very hard    Food insecurity:       Worry: Sometimes true       Inability: Sometimes true    Transportation needs:       Medical: No       Non-medical: No   Tobacco Use    Smoking status: Never Smoker    Smokeless tobacco: Never Used   Substance and Sexual Activity    Alcohol use: No       Frequency: Never       Drinks per session: Patient refused       Binge frequency: Never       Comment: rare    Drug use: No    Sexual activity: Yes       Partners: Female   Lifestyle    Physical activity:       Days per week: 1 day       Minutes per session: 10 min    Stress: To some extent   Relationships    Social connections:       Talks on phone: Three times a week       Gets together: Twice a week       Attends Hinduism service: Not on file       Active member of club or organization: No       Attends meetings of clubs or organizations: 1 to 4 times per year       Relationship status: Never    Other Topics  Concern    Not on file   Social History Narrative     From [place], lives alone w/2 dogs.     Dogs are his kids.     Works PT --  at [employer], on ssdi from neck and back/depression.                 Family History   Problem Relation Age of Onset    Heart disease Father      Cancer Mother           lung    Hypertension Sister      Heart disease Brother      Cirrhosis Brother      Diabetes Brother          Review of Systems   Constitutional: Negative for fatigue, fever and unexpected weight change.   HENT: Positive for mouth sores. Negative for ear discharge, facial swelling, hearing loss, rhinorrhea, sore throat, tinnitus, trouble swallowing and voice change.    Eyes: Negative for pain and visual disturbance.   Respiratory: Negative for cough and shortness of breath.    Cardiovascular: Negative for chest pain and palpitations.   Gastrointestinal: Negative for abdominal pain, constipation and diarrhea.   Genitourinary: Negative for difficulty urinating and dysuria.   Musculoskeletal: Positive for arthralgias. Negative for back pain and neck pain.   Skin: Negative for color change and rash.   Neurological: Negative for dizziness, seizures and headaches.   Hematological: Negative for adenopathy. Does not bruise/bleed easily.   Psychiatric/Behavioral: Negative for agitation. The patient is not nervous/anxious.        Answers for HPI/ROS submitted by the patient on 5/12/2020   Fatigue (Tiredness)?: Yes  Tooth/Dental Problems?: Yes  Visual changes / Changes in eyesight?: Yes  Snoring?: Yes  Sleep disturbances due to breathing?: Yes  Irregular heartbeat?: Yes  Swollen (Varicose) veins?: Yes  Sexual problems / dysfunction?: Yes  Feeling depressed?: Yes     Objective:     Physical Exam   Constitutional: He is oriented to person, place, and time. He appears well-developed and well-nourished. He is cooperative.   HENT:   Head: Normocephalic and atraumatic.   Right Ear: Hearing and external ear normal.   Left Ear:  Hearing and external ear normal.   Nose: Nose normal. No rhinorrhea or nasal deformity.   Mouth/Throat: Oropharynx is clear and moist and mucous membranes are normal. No oropharyngeal exudate.       .   Eyes: Pupils are equal, round, and reactive to light. Conjunctivae and EOM are normal. Right eye exhibits no chemosis. Left eye exhibits no chemosis.   Neck: Trachea normal, normal range of motion and phonation normal. Neck supple. No JVD present. No tracheal tenderness present. No tracheal deviation and no edema present. No thyroid mass and no thyromegaly present.   Salivary glands - there are no lesions, and there is no asymmetry of the parotid and submandibular glands   Cardiovascular: Normal rate, regular rhythm and intact distal pulses.   Pulmonary/Chest: Effort normal. No accessory muscle usage or stridor. No tachypnea. No respiratory distress.   Abdominal: Normal appearance. He exhibits no distension. There is no guarding.   Lymphadenopathy:     He has no cervical adenopathy.        Right cervical: No deep cervical and no posterior cervical adenopathy present.       Left cervical: No deep cervical and no posterior cervical adenopathy present.        Right: No supraclavicular adenopathy present.        Left: No supraclavicular adenopathy present.   Neurological: He is alert and oriented to person, place, and time. No cranial nerve deficit. Gait normal.   Skin: Skin is warm and dry. No lesion noted.   Psychiatric: He has a normal mood and affect. His speech is normal.        Assessment & Plan:            Problem List Items Addressed This Visit           Neoplasm of uncertain behavior of lip - Primary        He has what appears to be a venous angioma of the red lip, just to the left of midline. It fluctuates in size but does not resolve. It is compressible and bluish in hue. I have recommended excision. Because he is on Xarelto and because of the importance of the red line on the lip in this region, I have  recommended that we excise it in the operating room under local with some sedation. He is in agreement with this.             To OR today

## 2020-07-08 NOTE — OP NOTE
Date of Operation: 7/7/2020    Surgeon: FREDI KHAN     Assistants: Martin Avendaño (RES)    Anesthesia: General endotracheal anesthesia    ASA Class: 2    Preoperative Diagnosis:   1) Lesion of lower lip of uncertain significance    Postoperative diagnosis:  2) Vascular malformation of lower lip    Procedures performed:  1) Excision of lip lesion, including muscle, with primary closure    Closing Set: No    Indications for operation:  Shiraz Jha is a 60 y.o. male who presented with a longstanding and increasingly problematic lesion of his lower lip, to the left of midline. Because he is on Xarelto and because of the importance of the red line on the lip in this region, I recommended that we excise it in the operating room under local with some sedation.     The risks, benefits, indications, and alternatives to this procedure were thoroughly discussed with the patient preoperatively, and informed consent was obtained. Please see my detailed preoperative notes for a thorough account of this discussion.    Findings: Abnormal venous conglomeration focally infiltrating into orbicularis oris muscle    EBL: 2mL    IVF:  May be found in the operative record    Detailed Account of Technique Employed:    The patient was brought into the operating room and placed supine on the operating table. The patient was sedated by the anesthesiology service, and an adequate level of sedation was obtained. The patient's face was prepped and draped in the standard, sterile fashion. A timeout was performed according to the universal protocol.     The lesion was identified, and, with the lips extended, it was noted than an ellipse could be fashioned around it that did not violate the vermillion border. It was injected with 1% lidocaine with 1:100,000 epinephrine. The mucosa was incised, the submucosa was divided and the lesion was dissected off and out of the orbicularis oris muscle, sacrificing a small amount of muscle where it was  infiltrated. The wound was then closed with interrupted vertical mattress sutures of 4-0 chromic. The procedure was terminated.    The patient was allowed to awaken and taken to the PACU in stable condition.     All sponge, needle, and instrument counts were correct at the end of the procedure x 2.     I was present for and performed all portions of the operation as noted above.

## 2020-07-20 LAB
FINAL PATHOLOGIC DIAGNOSIS: NORMAL
GROSS: NORMAL
MICROSCOPIC EXAM: NORMAL

## 2020-07-21 ENCOUNTER — PATIENT MESSAGE (OUTPATIENT)
Dept: OTOLARYNGOLOGY | Facility: CLINIC | Age: 60
End: 2020-07-21

## 2020-08-28 ENCOUNTER — PATIENT OUTREACH (OUTPATIENT)
Dept: ADMINISTRATIVE | Facility: OTHER | Age: 60
End: 2020-08-28

## 2020-08-28 NOTE — PROGRESS NOTES
LINKS immunization registry updated  Care Everywhere updated  Health Maintenance updated  Chart reviewed for overdue Proactive [facility] Encounters ([facility]) health maintenance testing (CRS, Breast Ca, Diabetic Eye Exam)   Orders entered:N/A

## 2020-09-01 ENCOUNTER — OFFICE VISIT (OUTPATIENT)
Dept: UROLOGY | Facility: CLINIC | Age: 60
End: 2020-09-01
Payer: COMMERCIAL

## 2020-09-01 ENCOUNTER — LAB VISIT (OUTPATIENT)
Dept: LAB | Facility: HOSPITAL | Age: 60
End: 2020-09-01
Attending: UROLOGY
Payer: COMMERCIAL

## 2020-09-01 VITALS
WEIGHT: 286.81 LBS | BODY MASS INDEX: 43.47 KG/M2 | DIASTOLIC BLOOD PRESSURE: 68 MMHG | SYSTOLIC BLOOD PRESSURE: 113 MMHG | HEART RATE: 69 BPM | HEIGHT: 68 IN

## 2020-09-01 DIAGNOSIS — E66.01 MORBID OBESITY WITH BMI OF 40.0-44.9, ADULT: ICD-10-CM

## 2020-09-01 DIAGNOSIS — Z98.84 STATUS POST GASTRIC BYPASS FOR OBESITY: ICD-10-CM

## 2020-09-01 DIAGNOSIS — Z80.42 FAMILY HISTORY OF PROSTATE CANCER: ICD-10-CM

## 2020-09-01 DIAGNOSIS — N40.0 BENIGN NON-NODULAR PROSTATIC HYPERPLASIA WITHOUT LOWER URINARY TRACT SYMPTOMS: ICD-10-CM

## 2020-09-01 DIAGNOSIS — R60.0 EDEMA OF BOTH LOWER EXTREMITIES: ICD-10-CM

## 2020-09-01 DIAGNOSIS — N40.1 BENIGN NON-NODULAR PROSTATIC HYPERPLASIA WITH LOWER URINARY TRACT SYMPTOMS: ICD-10-CM

## 2020-09-01 DIAGNOSIS — E66.01 MORBID OBESITY: ICD-10-CM

## 2020-09-01 DIAGNOSIS — I87.2 VENOUS INSUFFICIENCY OF LEFT LOWER EXTREMITY: ICD-10-CM

## 2020-09-01 DIAGNOSIS — N40.1 BENIGN NON-NODULAR PROSTATIC HYPERPLASIA WITH LOWER URINARY TRACT SYMPTOMS: Primary | ICD-10-CM

## 2020-09-01 LAB
ALBUMIN SERPL BCP-MCNC: 4.1 G/DL (ref 3.5–5.2)
ALP SERPL-CCNC: 86 U/L (ref 55–135)
ALT SERPL W/O P-5'-P-CCNC: 22 U/L (ref 10–44)
ANION GAP SERPL CALC-SCNC: 7 MMOL/L (ref 8–16)
AST SERPL-CCNC: 27 U/L (ref 10–40)
BILIRUB SERPL-MCNC: 0.6 MG/DL (ref 0.1–1)
BUN SERPL-MCNC: 13 MG/DL (ref 6–20)
CALCIUM SERPL-MCNC: 9.3 MG/DL (ref 8.7–10.5)
CHLORIDE SERPL-SCNC: 101 MMOL/L (ref 95–110)
CO2 SERPL-SCNC: 32 MMOL/L (ref 23–29)
COMPLEXED PSA SERPL-MCNC: 0.46 NG/ML (ref 0–4)
CREAT SERPL-MCNC: 1.3 MG/DL (ref 0.5–1.4)
EST. GFR  (AFRICAN AMERICAN): >60 ML/MIN/1.73 M^2
EST. GFR  (NON AFRICAN AMERICAN): >60 ML/MIN/1.73 M^2
GLUCOSE SERPL-MCNC: 169 MG/DL (ref 70–110)
POTASSIUM SERPL-SCNC: 3.9 MMOL/L (ref 3.5–5.1)
PROT SERPL-MCNC: 6.9 G/DL (ref 6–8.4)
SODIUM SERPL-SCNC: 140 MMOL/L (ref 136–145)

## 2020-09-01 PROCEDURE — 3078F PR MOST RECENT DIASTOLIC BLOOD PRESSURE < 80 MM HG: ICD-10-PCS | Mod: CPTII,S$GLB,, | Performed by: UROLOGY

## 2020-09-01 PROCEDURE — 99999 PR PBB SHADOW E&M-EST. PATIENT-LVL IV: ICD-10-PCS | Mod: PBBFAC,,, | Performed by: UROLOGY

## 2020-09-01 PROCEDURE — 3074F SYST BP LT 130 MM HG: CPT | Mod: CPTII,S$GLB,, | Performed by: UROLOGY

## 2020-09-01 PROCEDURE — 3074F PR MOST RECENT SYSTOLIC BLOOD PRESSURE < 130 MM HG: ICD-10-PCS | Mod: CPTII,S$GLB,, | Performed by: UROLOGY

## 2020-09-01 PROCEDURE — 99214 OFFICE O/P EST MOD 30 MIN: CPT | Mod: S$GLB,,, | Performed by: UROLOGY

## 2020-09-01 PROCEDURE — 3078F DIAST BP <80 MM HG: CPT | Mod: CPTII,S$GLB,, | Performed by: UROLOGY

## 2020-09-01 PROCEDURE — 80053 COMPREHEN METABOLIC PANEL: CPT

## 2020-09-01 PROCEDURE — 99999 PR PBB SHADOW E&M-EST. PATIENT-LVL IV: CPT | Mod: PBBFAC,,, | Performed by: UROLOGY

## 2020-09-01 PROCEDURE — 84153 ASSAY OF PSA TOTAL: CPT

## 2020-09-01 PROCEDURE — 3008F PR BODY MASS INDEX (BMI) DOCUMENTED: ICD-10-PCS | Mod: CPTII,S$GLB,, | Performed by: UROLOGY

## 2020-09-01 PROCEDURE — 36415 COLL VENOUS BLD VENIPUNCTURE: CPT

## 2020-09-01 PROCEDURE — 99214 PR OFFICE/OUTPT VISIT, EST, LEVL IV, 30-39 MIN: ICD-10-PCS | Mod: S$GLB,,, | Performed by: UROLOGY

## 2020-09-01 PROCEDURE — 3008F BODY MASS INDEX DOCD: CPT | Mod: CPTII,S$GLB,, | Performed by: UROLOGY

## 2020-09-01 RX ORDER — TAMSULOSIN HYDROCHLORIDE 0.4 MG/1
CAPSULE ORAL
Qty: 180 CAPSULE | Refills: 3 | Status: SHIPPED | OUTPATIENT
Start: 2020-09-01 | End: 2021-01-04 | Stop reason: SDUPTHER

## 2020-09-01 NOTE — PROGRESS NOTES
Subjective:      Patient ID: Shiraz Jha is a 60 y.o. male.    Chief Complaint: bph f/u    Medication Refill      Patient is a 60 y.o. male who is established to our clinic and was initially referred by their PCP, Dr. Theodore for evaluation of BPH.     Benign Prostatic Hypertrophy  Patient complains of lower urinary tract symptoms. He reports intermittency, straining and weak stream---particularly when he does not take the flomax.  However, when he takes the flomax, his symptoms are minimal. He denies incomplete emptying, nocturia and urgency. Patient states symptoms are of moderate severity. Onset of symptoms was several years ago and was gradual in onset. . He has no personal history but does have a family history of prostate cancer--father. He reports a history of no complicating symptoms. He denies flank pain, gross hematuria, kidney stones and recurrent UTI.   He takes flomax which helps his urinating symptoms considerably.  He takes this 2x/day.  His father had prostate cancer.     Lab Results   Component Value Date    PSA 0.37 03/17/2020    PSA 0.26 01/02/2014    PSADIAG 0.48 05/21/2018    PSADIAG 0.30 12/08/2014         Review of Systems  All other systems reviewed and negative except pertinent positives noted in HPI.    Objective:     Physical Exam   Nursing note and vitals reviewed.  Constitutional: He is oriented to person, place, and time. He appears well-developed. He is cooperative.   HENT:   Head: Normocephalic.   Neck: No tracheal deviation present.   Cardiovascular: Normal rate.    Pulmonary/Chest: Effort normal. No accessory muscle usage. No tachypnea. No respiratory distress.   Abdominal: Soft. Normal appearance. He exhibits no distension, no fluid wave and no mass. There is no abdominal tenderness. There is no rebound. No hernia. Hernia confirmed negative in the right inguinal area and confirmed negative in the left inguinal area.   Liver/spleen non-palpable.  Morbidly obese   Genitourinary:     Prostate, testes and penis normal.   Rectum:      No rectal mass, tenderness, external hemorrhoid or abnormal anal tone.   Prostate is not tender. Right testis shows no mass and no tenderness. Right testis is descended. Left testis shows no mass and no tenderness. Left testis is descended. Circumcised.    Genitourinary Comments: Scrotum showed no rashes or lesions.  Anus/perineum without lesion.  Epididymis showed no masses or tenderness. No meatal stenosis, meatus in normal location. No penile discharge/plaques. Prostate smooth, no nodules, 20 grams.  Seminal vesicles non-palpable.  Normal sphincter tone.          Musculoskeletal: Normal range of motion.   Lymphadenopathy: No inguinal adenopathy noted on the right or left side.   Neurological: He is alert and oriented to person, place, and time.   Skin: No bruising and no rash noted.     Psychiatric: His speech is normal and behavior is normal. Thought content normal.     Assessment:     1. Benign non-nodular prostatic hyperplasia with lower urinary tract symptoms    2. Benign non-nodular prostatic hyperplasia without lower urinary tract symptoms    3. Morbid obesity    4. Family history of prostate cancer      Plan:     1. BPH:    -Avoid bladder irritants including but not limited to caffeine, alcohol, smoking, spicy foods, acidic foods, tomato-based products, citrus, artificial sweeteners, chocolate, coffee or tea.    -prostate meds: flomax; continue.  Refill sent    -Morbid obesity:  -discussed diet and exercise for weight loss        -psa today

## 2020-10-05 ENCOUNTER — PATIENT MESSAGE (OUTPATIENT)
Dept: ADMINISTRATIVE | Facility: HOSPITAL | Age: 60
End: 2020-10-05

## 2020-10-08 DIAGNOSIS — E11.9 TYPE 2 DIABETES MELLITUS WITHOUT COMPLICATION: ICD-10-CM

## 2020-11-23 ENCOUNTER — PATIENT OUTREACH (OUTPATIENT)
Dept: ADMINISTRATIVE | Facility: OTHER | Age: 60
End: 2020-11-23

## 2020-11-23 NOTE — PROGRESS NOTES
LINKS immunization registry not responding  Care Everywhere updated  Health Maintenance updated  Chart reviewed for overdue [facility] ([facility]) health maintenance testing (CRS, Breast Ca, Diabetic Eye Exam)   Orders entered:N/A

## 2020-12-01 ENCOUNTER — OFFICE VISIT (OUTPATIENT)
Dept: CARDIOLOGY | Facility: CLINIC | Age: 60
End: 2020-12-01
Payer: COMMERCIAL

## 2020-12-01 ENCOUNTER — TELEPHONE (OUTPATIENT)
Dept: ELECTROPHYSIOLOGY | Facility: CLINIC | Age: 60
End: 2020-12-01

## 2020-12-01 VITALS
HEART RATE: 64 BPM | HEIGHT: 68 IN | DIASTOLIC BLOOD PRESSURE: 62 MMHG | WEIGHT: 280.44 LBS | BODY MASS INDEX: 42.5 KG/M2 | SYSTOLIC BLOOD PRESSURE: 110 MMHG

## 2020-12-01 DIAGNOSIS — I87.2 VENOUS INSUFFICIENCY OF LEFT LOWER EXTREMITY: Primary | ICD-10-CM

## 2020-12-01 DIAGNOSIS — E11.9 TYPE 2 DIABETES MELLITUS WITHOUT COMPLICATION, WITHOUT LONG-TERM CURRENT USE OF INSULIN: ICD-10-CM

## 2020-12-01 DIAGNOSIS — I48.19 PERSISTENT ATRIAL FIBRILLATION: ICD-10-CM

## 2020-12-01 DIAGNOSIS — J31.0 CHRONIC RHINITIS: ICD-10-CM

## 2020-12-01 DIAGNOSIS — N40.1 BENIGN PROSTATIC HYPERPLASIA WITH LOWER URINARY TRACT SYMPTOMS, SYMPTOM DETAILS UNSPECIFIED: ICD-10-CM

## 2020-12-01 DIAGNOSIS — G47.33 OBSTRUCTIVE SLEEP APNEA: ICD-10-CM

## 2020-12-01 DIAGNOSIS — I89.0 LYMPHEDEMA OF BOTH LOWER EXTREMITIES: ICD-10-CM

## 2020-12-01 DIAGNOSIS — K21.9 GASTROESOPHAGEAL REFLUX DISEASE WITHOUT ESOPHAGITIS: ICD-10-CM

## 2020-12-01 DIAGNOSIS — I10 ESSENTIAL HYPERTENSION: ICD-10-CM

## 2020-12-01 DIAGNOSIS — Z98.84 STATUS POST GASTRIC BYPASS FOR OBESITY: ICD-10-CM

## 2020-12-01 DIAGNOSIS — M51.36 DDD (DEGENERATIVE DISC DISEASE), LUMBAR: ICD-10-CM

## 2020-12-01 DIAGNOSIS — Z98.890 STATUS POST CATHETER ABLATION OF ATRIAL FIBRILLATION: ICD-10-CM

## 2020-12-01 DIAGNOSIS — E66.01 MORBID OBESITY WITH BMI OF 40.0-44.9, ADULT: ICD-10-CM

## 2020-12-01 PROCEDURE — 99999 PR PBB SHADOW E&M-EST. PATIENT-LVL V: ICD-10-PCS | Mod: PBBFAC,,, | Performed by: INTERNAL MEDICINE

## 2020-12-01 PROCEDURE — 99999 PR PBB SHADOW E&M-EST. PATIENT-LVL V: CPT | Mod: PBBFAC,,, | Performed by: INTERNAL MEDICINE

## 2020-12-01 RX ORDER — BUMETANIDE 1 MG/1
1 TABLET ORAL 2 TIMES DAILY
Qty: 60 TABLET | Refills: 11 | Status: SHIPPED | OUTPATIENT
Start: 2020-12-01 | End: 2021-02-09 | Stop reason: SDUPTHER

## 2020-12-01 NOTE — PROGRESS NOTES
"[facility]    CC: Lower Extremity Edema    Patient ID: Shiraz Jha is a 60 y.o. male with a past medical history of Afib s/p ablation 7/14, HTN, DM2, morbid obesity s/p gastric bypass (2010), who presents for a follow up appointment.  Pertinent history/events are as follows:    -Pt kindly referred by Twyla Bahena and Dr Scott for evaluation of lower extremity edema (per Dr. Scott's note on 9/24/2019:  "Mr. Jha is in clinic today for continued LE edema and discoloration of his legs.  He had a venous ultrasound on 8/6/19 for the swelling and mild reflux was noted in the left popliteal.  Patient denies chest pain with exertion or at rest, palpitations, SOB, DAI, dizziness, syncope, orthopnea, PND, or claudication.  Instructed to elevate legs when seated, low salt diet, increase walking and compression stockings. No venous reflux noted on US.  Refer to Dr. Ureña in interventional cardiology."    -At our initial clinic visit on 10/16/2019, Mr. Jha reported BLE edema for the past 1 year.  States LE edema is improved with graduated compression hose.  He has no LE pain, claudication symptoms, rest pain, or tissue loss.  No smoking history.  States he has gained over 50 pounds in the past year, despite gastric bypass surgery.  BLE Venous Reflux Study from 8/6/2019 showed no evidence of lower extremity DVT bilaterally.  There is mild left popliteal (deep venous) reflux.  Plan:   BLE Edema- BLE edema likely due to a component of venous insufficiency and morbid obesity.  Check cmp today.  If potassium and creatinine are appropriate, increase lasix to 40 mg bid for 7 days, then resume at 40 mg daily.  Pt to continue this cycle/regimen for lasix.  Pt will benefit from significant weight loss.  Continue graduated compression hose, leg elevation and low salt diet.  Limit fluid intake to 2 liters a day.  Check exercise SURINDER to evaluate for significant PAD.   Morbid Obesity- Refer back to Bariatric Surgery. "     -At follow up clinic visit on 11/27/2019, Mr. Jha reported no significant improvement in BLE edema with lasix regimen of 40 mg bid for 7 days, then resuming at 40 mg daily.  Exercise SURINDER from 11/15/2019 showed normal rest and exercise SURINDER bilaterally with normal PVR waveforms bilaterally.  Exam shows 1+ BLE pitting edema.  Plan:   BLE Edema- BLE edema likely due to a component of venous insufficiency and morbid obesity.  Check cmp today.  If potassium and creatinine are appropriate, discontinue lasix and start bumex 1 mg bid.  Pt will benefit from significant weight loss.  Continue graduated compression hose, leg elevation and low salt diet.  Limit fluid intake to 2 liters a day.  Check exercise SURINDER to evaluate for significant PAD.   Morbid Obesity- Pt referred back to Bariatric Surgery.     -At follow up clinic visit on 1/8/2020, Mr. Jha reported significant improvement in BLE edema since starting bumex 1 mg daily.  Exercise SURINDER study from 11/25/2019 showed normal rest and exercise SURINDER bilaterally.  Normal PVR waveforms bilaterally.  Plan:   BLE Edema- BLE edema now improving.  Continue bumex 1 mg daily.  Check cmp today to monitor renal fxn.  Exercise SURINDER study from 11/25/2019 showed normal rest and exercise SURINDER bilaterally.  Normal PVR waveforms bilaterally. Continue graduated compression hose, leg elevation and low salt diet.  Limit fluid intake to 2 liters a day.    Morbid Obesity- Pt referred back to Bariatric Surgery.     - At follow up clinic visit on 04/08/20, Mr. Jha reported doing well with no significant LE edema.  Continues to wear graduated compression hose, low salt diet, and limiting fluid intake to 2 liters a day.    Plan  BLE Edema- Currently doing well.  Continue bumex 1 mg daily.  Continue graduated compression hose, leg elevation and low salt diet.  Limit fluid intake to 2 liters a day.    Morbid Obesity- Pt referred back to Bariatric Surgery    HPI    Mr. Jha reports worsening swelling  of his bilateral lower extremities. He stopped wearing compression hose a month ago, however, he continues to take bumex twice a day. He reports no rest pain, claudication, CLI or tissue atrophy.       Past Medical History:   Diagnosis Date    Allergy     Asthma     Atrial fibrillation     Cataract     Chronic rhinitis 9/26/2013    DDD (degenerative disc disease) 4/3/2015    Depression     Diabetes mellitus     Fibromyalgia     Gastroesophageal reflux disease without esophagitis 7/14/2017    Hypertension     Sinusitis, acute, maxillary 12/20/2012    Thyroid disease        Past Surgical History:   Procedure Laterality Date    CERVICAL SPINE SURGERY      EPIDURAL STEROID INJECTION INTO LUMBAR SPINE N/A 5/29/2018    Procedure: INJECTION-STEROID-EPIDURAL-LUMBAR- L4-5;  Surgeon: Roman Lyman MD;  Location: [facility];  Service: Pain Management;  Laterality: N/A;  Patient is diabetic and Xarleto     EPIDURAL STEROID INJECTION INTO LUMBAR SPINE N/A 7/3/2018    Procedure: INJECTION, STEROID, SPINE, LUMBAR, EPIDURAL- L4-5;  Surgeon: Roman Lyman MD;  Location: [facility];  Service: Pain Management;  Laterality: N/A;  Patient takes Xarelto and ASA     EXCISION OF LESION OF LIP N/A 7/7/2020    Procedure: EXCISION, LESION, LIP;  Surgeon: Caden Navarro MD;  Location: [facility] OR [address] [place] [address];  Service: ENT;  Laterality: N/A;    GASTRIC BYPASS      SINUS SURGERY  [facility]    [facility]    TONSILLECTOMY         Social History     Socioeconomic History    Marital status: Single     Spouse name: Not on file    Number of children: Not on file    Years of education: Not on file    Highest education level: Not on file   Occupational History    Not on file   Social Needs    Financial resource strain: Very hard    Food insecurity     Worry: Sometimes true     Inability: Sometimes true    Transportation needs     Medical: No     Non-medical: No   Tobacco Use    Smoking status: Never Smoker     Smokeless tobacco: Never Used   Substance and Sexual Activity    Alcohol use: No     Frequency: Never     Drinks per session: Patient refused     Binge frequency: Never     Comment: rare    Drug use: No    Sexual activity: Yes     Partners: Female   Lifestyle    Physical activity     Days per week: 1 day     Minutes per session: 10 min    Stress: To some extent   Relationships    Social connections     Talks on phone: Three times a week     Gets together: Twice a week     Attends Buddhism service: Not on file     Active member of club or organization: No     Attends meetings of clubs or organizations: 1 to 4 times per year     Relationship status: Never    Other Topics Concern    Not on file   Social History Narrative    [place], lives alone w/2 dogs.    Dogs are his kids.    Works PT --  at [employer], on ssdi from neck and back/depression.       Family History   Problem Relation Age of Onset    Heart disease Father     Cancer Mother         lung    Hypertension Sister     Heart disease Brother     Cirrhosis Brother     Diabetes Brother        Review of patient's allergies indicates:   Allergen Reactions    Iodinated contrast media Other (See Comments)     Raises BP         Medication List with Changes/Refills   Current Medications    ACETAMINOPHEN (TYLENOL) 500 MG TABLET    Take 500 mg by mouth every 12 (twelve) hours.    ASPIRIN 81 MG CHEW    Take 1 tablet (81 mg total) by mouth once daily.    BUMETANIDE (BUMEX) 1 MG TABLET    Take 1 tablet (1 mg total) by mouth 2 (two) times daily.    BUPROPION (WELLBUTRIN XL) 300 MG 24 HR TABLET    Take 300 mg by mouth once daily.     CYANOCOBALAMIN 500 MCG TABLET    Take 500 mcg by mouth once daily.    DESOXIMETASONE (TOPICORT) 0.05 % CREAM    Apply topically 2 (two) times daily.    DEXTROAMPHETAMINE-AMPHETAMINE 30 MG TAB    30 mg once daily. Adderall (10mg TID)    FERROUS SULFATE 325 (65 FE) MG EC TABLET    Take 325 mg by mouth 3 (three) times  "daily with meals.    GABAPENTIN (NEURONTIN) 300 MG CAPSULE    Take 1 capsule (300 mg total) by mouth 3 (three) times daily.    IMPOYZ 0.025 % CREA    Apply topically as needed.     LUZU 1 % CREA    Apply topically as needed.     METOPROLOL TARTRATE (LOPRESSOR) 25 MG TABLET    Take 1 tablet (25 mg total) by mouth 2 (two) times daily.    MULTIVITAMIN (ONE DAILY MULTIVITAMIN) PER TABLET    Take 1 tablet by mouth 2 (two) times daily.    NAFTIN 2 % GEL    daily as needed.     NITROGLYCERIN (NITROSTAT) 0.4 MG SL TABLET    Please dispense 25 pills in 4 bottles.    OMEPRAZOLE (PRILOSEC) 20 MG CAPSULE    Take 1 capsule (20 mg total) by mouth once daily.    SOTALOL (BETAPACE) 80 MG TABLET    Take 1 tablet (80 mg total) by mouth 2 (two) times daily.    TAMSULOSIN (FLOMAX) 0.4 MG CAP    TAKE ONE BY MOUTH TWICE DAILY    THIAMINE 250 MG TABLET    Take 250 mg by mouth once daily.    VITAMIN D (VITAMIN D3) 1000 UNITS TAB    Take 1,000 Units by mouth once daily.    VORTIOXETINE (TRINTELLIX) 20 MG TAB    Take 20 mg by mouth once daily.     WELLBUTRIN  MG TB24 TABLET        XARELTO 20 MG TAB    TAKE ONE DAILY       Review of Systems  Constitution: Denies chills, fever, and sweats.  HENT: Denies headaches or blurry vision.  Cardiovascular: Denies chest pain or irregular heart beat.  Respiratory: Denies cough or shortness of breath.  Gastrointestinal: Denies abdominal pain, nausea, or vomiting.  Musculoskeletal: Positive for BLE swelling  Neurological: Denies dizziness or focal weakness.  Psychiatric/Behavioral: Normal mental status.  Hematologic/Lymphatic: Denies bleeding problem or easy bruising/bleeding.  Skin: Denies rash or suspicious lesions    Physical Examination  /62   Pulse 64   Ht 5' 8" (1.727 m)   Wt 127.2 kg (280 lb 6.8 oz)   BMI 42.64 kg/m²     Constitutional: No acute distress, conversant  HEENT: Sclera anicteric, Pupils equal, round and reactive to light, extraocular motions intact, Oropharynx " clear  Neck: No JVD, no carotid bruits  Cardiovascular: regular rate and rhythm, no murmur, rubs or gallops, normal S1/S2  Pulmonary: Clear to auscultation bilaterally  Abdominal: Abdomen soft, nontender, nondistended, positive bowel sounds  Extremities: 1 pitting BLE edema, cuffing and swelling consistent with lymphedema   Pulses:  Carotid pulses are 2+ on the right side, and 2+ on the left side.  Radial pulses are 2+ on the right side, and 2+ on the left side.   Femoral pulses are 2+ on the right side, and 2+ on the left side.  Popliteal pulses are 2+ on the right side, and 2+ on the left side.   Dorsalis pedis pulses are 2+ on the right side, and 2+ on the left side.   Posterior tibial pulses are 2+ on the right side, and 2+ on the left side.    Skin: No ecchymosis, erythema, or ulcers  Psych: Alert and oriented x 3, appropriate affect  Neuro: CNII-XII intact, no focal deficits    Labs:  Most Recent Data  CBC:   Lab Results   Component Value Date    WBC 6.06 03/17/2020    HGB 13.5 (L) 03/17/2020    HCT 40.7 03/17/2020     03/17/2020     (H) 03/17/2020    RDW 13.2 03/17/2020     BMP:   Lab Results   Component Value Date     09/01/2020    K 3.9 09/01/2020     09/01/2020    CO2 32 (H) 09/01/2020    BUN 13 09/01/2020    CREATININE 1.3 09/01/2020     (H) 09/01/2020    CALCIUM 9.3 09/01/2020    MG 1.9 07/15/2017    PHOS 3.7 07/15/2017     LFTS;   Lab Results   Component Value Date    PROT 6.9 09/01/2020    ALBUMIN 4.1 09/01/2020    BILITOT 0.6 09/01/2020    AST 27 09/01/2020    ALKPHOS 86 09/01/2020    ALT 22 09/01/2020     COAGS:   Lab Results   Component Value Date    INR 1.0 07/14/2017     FLP:   Lab Results   Component Value Date    CHOL 180 03/17/2020    HDL 39 (L) 03/17/2020    LDLCALC 111.6 03/17/2020    TRIG 147 03/17/2020    CHOLHDL 21.7 03/17/2020     CARDIAC:   Lab Results   Component Value Date    TROPONINI 0.015 07/15/2017    BNP 11 07/14/2017       Echo  7/23/2019:  · Normal left ventricular systolic function. The estimated ejection fraction is 60%  · Normal right ventricular systolic function.  · Normal LV diastolic function.  · Moderate left atrial enlargement.  · Mild right atrial enlargement.  · The ascending aorta is mildly dilated (42mm in diameter, unchanged since Feb 2018).  · The estimated PA systolic pressure is 23 mm Hg  · Normal central venous pressure (3 mm Hg).       Exercise SURINDER 11/25/2019:  Normal rest and exercise SURINDER bilaterally.  Normal PVR waveforms bilaterally.    BLE Venous Reflux Study 8/6/2019:  No evidence of lower extremity DVT bilaterally.  Mild left popliteal (deep venous) reflux.      Assessment/Plan:  Shiraz Jha is a 60 y.o. male with a past medical history of Afib s/p ablation 7/14, HTN, DM2, morbid obesity s/p gastric bypass (2010), who presents for a follow up appointment.      1. BLE Edema- Due to lymphedema and venous insufficiency. Patient notices worsening of bilateral lower extremity edema after he stopped using compression hose for the past month.  Pt instructed to resume graduated compression hose as prescribed.  Continue bumex 1 mg twice a day.  Continue leg elevation and low salt diet.  Limit fluid intake to 2 liters a day.      2. Morbid Obesity- Pt is following up with Bariatric Surgery.     Follow up in 1 month    Total duration of visit time 30 minutes  Total time spent counseling greater than fifty percent of total visit time.  Counseling included discussion regarding imaging findings, diagnosis, possibilities, treatment options, risks and benefits.  The patient had many questions regarding the options and long-term effects.    Patient seen and discussed with Dr. Ureña. Further recommendations per the attending addendum.        Miguel Mclaughlin MD  Vascular Medicine Fellow PGY IV  Department of Vascular Medicine  [facility]

## 2020-12-01 NOTE — PATIENT INSTRUCTIONS
BLE Edema- Continue bumex 1 mg twice a day.  Continue graduated compression hose, leg elevation and low salt diet.  Limit fluid intake to 2 liters a day.        Follow up in 1 month.

## 2020-12-04 ENCOUNTER — TELEPHONE (OUTPATIENT)
Dept: PAIN MEDICINE | Facility: CLINIC | Age: 60
End: 2020-12-04

## 2020-12-04 NOTE — TELEPHONE ENCOUNTER
Patient was contacted staff left a message on VM informing patient of his appt he has 12/07/20 at 3:15pm.

## 2020-12-07 ENCOUNTER — OFFICE VISIT (OUTPATIENT)
Dept: PAIN MEDICINE | Facility: CLINIC | Age: 60
End: 2020-12-07
Attending: ANESTHESIOLOGY
Payer: COMMERCIAL

## 2020-12-07 VITALS
WEIGHT: 285.69 LBS | HEIGHT: 68 IN | TEMPERATURE: 98 F | HEART RATE: 63 BPM | SYSTOLIC BLOOD PRESSURE: 119 MMHG | BODY MASS INDEX: 43.3 KG/M2 | DIASTOLIC BLOOD PRESSURE: 69 MMHG

## 2020-12-07 DIAGNOSIS — M47.9 OSTEOARTHRITIS OF SPINE, UNSPECIFIED SPINAL OSTEOARTHRITIS COMPLICATION STATUS, UNSPECIFIED SPINAL REGION: ICD-10-CM

## 2020-12-07 DIAGNOSIS — M47.819 SPONDYLOSIS WITHOUT MYELOPATHY: ICD-10-CM

## 2020-12-07 DIAGNOSIS — M53.3 SACROILIAC JOINT PAIN: ICD-10-CM

## 2020-12-07 DIAGNOSIS — M54.17 LUMBOSACRAL RADICULOPATHY: ICD-10-CM

## 2020-12-07 DIAGNOSIS — M46.1 SACROILIITIS: ICD-10-CM

## 2020-12-07 DIAGNOSIS — M51.36 DDD (DEGENERATIVE DISC DISEASE), LUMBAR: Primary | ICD-10-CM

## 2020-12-07 DIAGNOSIS — G89.4 CHRONIC PAIN SYNDROME: ICD-10-CM

## 2020-12-07 PROCEDURE — 1125F AMNT PAIN NOTED PAIN PRSNT: CPT | Mod: S$GLB,,, | Performed by: ANESTHESIOLOGY

## 2020-12-07 PROCEDURE — 3078F DIAST BP <80 MM HG: CPT | Mod: CPTII,S$GLB,, | Performed by: ANESTHESIOLOGY

## 2020-12-07 PROCEDURE — 3074F PR MOST RECENT SYSTOLIC BLOOD PRESSURE < 130 MM HG: ICD-10-PCS | Mod: CPTII,S$GLB,, | Performed by: ANESTHESIOLOGY

## 2020-12-07 PROCEDURE — 99214 PR OFFICE/OUTPT VISIT, EST, LEVL IV, 30-39 MIN: ICD-10-PCS | Mod: S$GLB,,, | Performed by: ANESTHESIOLOGY

## 2020-12-07 PROCEDURE — 3072F LOW RISK FOR RETINOPATHY: CPT | Mod: S$GLB,,, | Performed by: ANESTHESIOLOGY

## 2020-12-07 PROCEDURE — 99214 OFFICE O/P EST MOD 30 MIN: CPT | Mod: S$GLB,,, | Performed by: ANESTHESIOLOGY

## 2020-12-07 PROCEDURE — 3078F PR MOST RECENT DIASTOLIC BLOOD PRESSURE < 80 MM HG: ICD-10-PCS | Mod: CPTII,S$GLB,, | Performed by: ANESTHESIOLOGY

## 2020-12-07 PROCEDURE — 3074F SYST BP LT 130 MM HG: CPT | Mod: CPTII,S$GLB,, | Performed by: ANESTHESIOLOGY

## 2020-12-07 PROCEDURE — 3008F BODY MASS INDEX DOCD: CPT | Mod: CPTII,S$GLB,, | Performed by: ANESTHESIOLOGY

## 2020-12-07 PROCEDURE — 3008F PR BODY MASS INDEX (BMI) DOCUMENTED: ICD-10-PCS | Mod: CPTII,S$GLB,, | Performed by: ANESTHESIOLOGY

## 2020-12-07 PROCEDURE — 99999 PR PBB SHADOW E&M-EST. PATIENT-LVL V: ICD-10-PCS | Mod: PBBFAC,,, | Performed by: ANESTHESIOLOGY

## 2020-12-07 PROCEDURE — 99999 PR PBB SHADOW E&M-EST. PATIENT-LVL V: CPT | Mod: PBBFAC,,, | Performed by: ANESTHESIOLOGY

## 2020-12-07 PROCEDURE — 1125F PR PAIN SEVERITY QUANTIFIED, PAIN PRESENT: ICD-10-PCS | Mod: S$GLB,,, | Performed by: ANESTHESIOLOGY

## 2020-12-07 PROCEDURE — 3072F PR LOW RISK FOR RETINOPATHY: ICD-10-PCS | Mod: S$GLB,,, | Performed by: ANESTHESIOLOGY

## 2020-12-07 RX ORDER — GABAPENTIN 300 MG/1
300 CAPSULE ORAL 3 TIMES DAILY
Qty: 90 CAPSULE | Refills: 11 | Status: SHIPPED | OUTPATIENT
Start: 2020-12-07 | End: 2020-12-07

## 2020-12-07 RX ORDER — GABAPENTIN 300 MG/1
300 CAPSULE ORAL 3 TIMES DAILY
Qty: 270 CAPSULE | Refills: 3 | Status: SHIPPED | OUTPATIENT
Start: 2020-12-07 | End: 2022-03-17 | Stop reason: SDUPTHER

## 2020-12-07 NOTE — PROGRESS NOTES
Chronic patient Established Note (Follow up visit)      SUBJECTIVE:    Shiraz Jha presents to the clinic for a follow-up appointment for low back pain. Since the last visit, Shiraz Jha states the pain has been stable. Current pain intensity is 2/10.    Patient is here for a f/u and request of medication refill, states his pain is fairly controlled with gabapentin.    Pain Disability Index Review:  Last 3 PDI Scores 12/7/2020 12/13/2018 7/17/2018   Pain Disability Index (PDI) 23 7 40       Pain Medications:    - Adjuvant Medications: Neurontin (Gabapentin)  - Anti-Coagulants: Xaralto    Opioid Contract: no     report:  Reviewed and consistent with medication use as prescribed.    Pain Procedures:  7/3/18 L4-5 INTERLAMINAR EPIDURAL STEROID INJECTION - LUMBAR  5/29/18 L4-5 INTERLAMINAR EPIDURAL STEROID INJECTION - LUMBAR  5/1/18 BILATERAL L3-4-5 LUMBAR FACET MEDIAL BRANCH NERVE BLOCK  C7-T1 CERVICAL EPIDURAL STEROID INJECTION   03/24/15    Physical Therapy/Home Exercise: no    Imaging:   No new imaging    Allergies:   Review of patient's allergies indicates:   Allergen Reactions    Iodinated contrast media Other (See Comments)     Raises BP         Current Medications:   Current Outpatient Medications   Medication Sig Dispense Refill    acetaminophen (TYLENOL) 500 MG tablet Take 500 mg by mouth every 12 (twelve) hours.      aspirin 81 MG Chew Take 1 tablet (81 mg total) by mouth once daily.  0    bumetanide (BUMEX) 1 MG tablet Take 1 tablet (1 mg total) by mouth 2 (two) times daily. 60 tablet 11    buPROPion (WELLBUTRIN XL) 300 MG 24 hr tablet Take 300 mg by mouth once daily.       cyanocobalamin 500 MCG tablet Take 500 mcg by mouth once daily.      dextroamphetamine-amphetamine 30 mg Tab 30 mg once daily. Adderall (10mg TID)  0    ferrous sulfate 325 (65 FE) MG EC tablet Take 325 mg by mouth 3 (three) times daily with meals.      gabapentin (NEURONTIN) 300 MG capsule Take 1 capsule (300 mg total) by  mouth 3 (three) times daily. 270 capsule 3    IMPOYZ 0.025 % Crea Apply topically as needed.       LUZU 1 % Crea Apply topically as needed.       metoprolol tartrate (LOPRESSOR) 25 MG tablet Take 1 tablet (25 mg total) by mouth 2 (two) times daily. 180 tablet 3    multivitamin (ONE DAILY MULTIVITAMIN) per tablet Take 1 tablet by mouth 2 (two) times daily.      NAFTIN 2 % Gel daily as needed.       nitroGLYCERIN (NITROSTAT) 0.4 MG SL tablet Please dispense 25 pills in 4 bottles. 100 tablet 0    sotalol (BETAPACE) 80 MG tablet Take 1 tablet (80 mg total) by mouth 2 (two) times daily. 180 tablet 3    tamsulosin (FLOMAX) 0.4 mg Cap TAKE ONE BY MOUTH TWICE DAILY 180 capsule 3    thiamine 250 MG tablet Take 250 mg by mouth once daily.      vitamin D (VITAMIN D3) 1000 units Tab Take 1,000 Units by mouth once daily.      vortioxetine (TRINTELLIX) 20 mg Tab Take 20 mg by mouth once daily.       WELLBUTRIN  mg TB24 tablet       XARELTO 20 mg Tab TAKE ONE DAILY 30 tablet 6    desoximetasone (TOPICORT) 0.05 % cream Apply topically 2 (two) times daily. (Patient taking differently: Apply topically 2 (two) times daily. Pt using prn.) 60 g 11    omeprazole (PRILOSEC) 20 MG capsule Take 1 capsule (20 mg total) by mouth once daily. 90 capsule 3     No current facility-administered medications for this visit.        REVIEW OF SYSTEMS:    GENERAL:  No weight loss, malaise or fevers.  HEENT:  Negative for frequent or significant headaches.  NECK:  Negative for lumps, goiter, pain and significant neck swelling.  RESPIRATORY:  Negative for cough, wheezing or shortness of breath.  CARDIOVASCULAR:  Negative for chest pain, leg swelling or palpitations.  GI:  Negative for abdominal discomfort, blood in stools or black stools or change in bowel habits.  MUSCULOSKELETAL:  See HPI.  SKIN:  Negative for lesions, rash, and itching.  PSYCH:  Negative for sleep disturbance, mood disorder and recent psychosocial  stressors.  HEMATOLOGY/LYMPHOLOGY:  Negative for prolonged bleeding, bruising easily or swollen nodes.  NEURO:   No history of headaches, syncope, paralysis, seizures or tremors.  All other reviewed and negative other than HPI.    Past Medical History:  Past Medical History:   Diagnosis Date    Allergy     Asthma     Atrial fibrillation     Cataract     Chronic rhinitis 9/26/2013    DDD (degenerative disc disease) 4/3/2015    Depression     Diabetes mellitus     Fibromyalgia     Gastroesophageal reflux disease without esophagitis 7/14/2017    Hypertension     Sinusitis, acute, maxillary 12/20/2012    Thyroid disease        Past Surgical History:  Past Surgical History:   Procedure Laterality Date    CERVICAL SPINE SURGERY      EPIDURAL STEROID INJECTION INTO LUMBAR SPINE N/A 5/29/2018    Procedure: INJECTION-STEROID-EPIDURAL-LUMBAR- L4-5;  Surgeon: Roman Lyman MD;  Location: [facility];  Service: Pain Management;  Laterality: N/A;  Patient is diabetic and Xarleto     EPIDURAL STEROID INJECTION INTO LUMBAR SPINE N/A 7/3/2018    Procedure: INJECTION, STEROID, SPINE, LUMBAR, EPIDURAL- L4-5;  Surgeon: Roman Lyman MD;  Location: [facility];  Service: Pain Management;  Laterality: N/A;  Patient takes Xarelto and ASA     EXCISION OF LESION OF LIP N/A 7/7/2020    Procedure: EXCISION, LESION, LIP;  Surgeon: Caden Navarro MD;  Location: [facility] OR [address] [place] [address];  Service: ENT;  Laterality: N/A;    GASTRIC BYPASS      SINUS SURGERY  2000    [facility]    TONSILLECTOMY         Family History:  Family History   Problem Relation Age of Onset    Heart disease Father     Cancer Mother         lung    Hypertension Sister     Heart disease Brother     Cirrhosis Brother     Diabetes Brother        Social History:  Social History     Socioeconomic History    Marital status: Single     Spouse name: Not on file    Number of children: Not on file    Years of education: Not on file     "Highest education level: Not on file   Occupational History    Not on file   Social Needs    Financial resource strain: Very hard    Food insecurity     Worry: Sometimes true     Inability: Sometimes true    Transportation needs     Medical: No     Non-medical: No   Tobacco Use    Smoking status: Never Smoker    Smokeless tobacco: Never Used   Substance and Sexual Activity    Alcohol use: No     Frequency: Never     Drinks per session: Patient refused     Binge frequency: Never     Comment: rare    Drug use: No    Sexual activity: Yes     Partners: Female   Lifestyle    Physical activity     Days per week: 1 day     Minutes per session: 10 min    Stress: To some extent   Relationships    Social connections     Talks on phone: Three times a week     Gets together: Twice a week     Attends Bahai service: Not on file     Active member of club or organization: No     Attends meetings of clubs or organizations: 1 to 4 times per year     Relationship status: Never    Other Topics Concern    Not on file   Social History Narrative    [place], lives alone w/2 dogs.    Dogs are his kids.    Works PT --  at [employer], on ssdi from neck and back/depression.       OBJECTIVE:    /69   Pulse 63   Temp 98.4 °F (36.9 °C) (Oral)   Ht 5' 8" (1.727 m)   Wt 129.6 kg (285 lb 11.5 oz)   BMI 43.44 kg/m²     PHYSICAL EXAMINATION:    General appearance: Well appearing, in no acute distress, alert and oriented x3.  Psych:  Mood and affect appropriate.  Skin: Skin color, texture, turgor normal, no rashes or lesions, in both upper and lower body.  Head/face:  Atraumatic, normocephalic. No palpable lymph nodes  Neck: No pain to palpation over the cervical paraspinous muscles. Spurling Negative. No pain with neck flexion, extension, or lateral flexion. .  Cor: RRR  Pulm: CTA  GI: Abdomen soft and non-tender.  Back: Straight leg raising in the sitting and supine positions is negative to radicular pain. " Mild pain to palpation over the spine or costovertebral angles. Normal range of motion without pain reproduction. Positive axial loading test bilateral. -ve FABERE,Ganselin and Yeoman's test on the both side.negative FADIR  Extremities: Peripheral joint ROM is full and pain free without obvious instability or laxity in all four extremities. No deformities, edema, or skin discoloration. Good capillary refill.  Musculoskeletal: Shoulder, hip, sacroiliac and knee provocative maneuvers are negative. Bilateral upper and lower extremity strength is normal and symmetric.  No atrophy or tone abnormalities are noted.  Neuro: Bilateral upper and lower extremity coordination and muscle stretch reflexes are physiologic and symmetric.  Plantar response are downgoing. No loss of sensation is noted.  Gait: Normal.    ASSESSMENT: 60 y.o. year old male with low back pain pain, consistent with     1. DDD (degenerative disc disease), lumbar  Ambulatory referral/consult to [facility]    gabapentin (NEURONTIN) 300 MG capsule   2. Chronic pain syndrome  Ambulatory referral/consult to [facility]   3. Osteoarthritis of spine, unspecified spinal osteoarthritis complication status, unspecified spinal region  Ambulatory referral/consult to [facility]    gabapentin (NEURONTIN) 300 MG capsule   4. Spondylosis without myelopathy  gabapentin (NEURONTIN) 300 MG capsule   5. Sacroiliitis  gabapentin (NEURONTIN) 300 MG capsule   6. Sacroiliac joint pain  gabapentin (NEURONTIN) 300 MG capsule   7. Lumbosacral radiculopathy  gabapentin (NEURONTIN) 300 MG capsule         PLAN:     - I have stressed the importance of physical activity and a home exercise plan to help with pain and improve health.  - Patient can continue with medications for now since they are providing benefits, using them appropriately, and without side effects.  -refill gabapentin 300 mg TID  - RTC 3-6 months  - Counseled patient regarding the importance  of activity modification, constant sleeping habits and physical therapy.    The above plan and management options were discussed at length with patient. Patient is in agreement with the above and verbalized understanding.      Bobby Davis I have personally reviewed the history and exam of this patient and agree with the resident/fellow/NPs note as stated above.    Roman Lyman MD    12/07/2020

## 2020-12-08 DIAGNOSIS — Z12.11 SPECIAL SCREENING FOR MALIGNANT NEOPLASMS, COLON: Primary | ICD-10-CM

## 2021-01-04 ENCOUNTER — OFFICE VISIT (OUTPATIENT)
Dept: UROLOGY | Facility: CLINIC | Age: 61
End: 2021-01-04
Payer: COMMERCIAL

## 2021-01-04 ENCOUNTER — PATIENT MESSAGE (OUTPATIENT)
Dept: ADMINISTRATIVE | Facility: HOSPITAL | Age: 61
End: 2021-01-04

## 2021-01-04 VITALS — HEART RATE: 65 BPM | DIASTOLIC BLOOD PRESSURE: 71 MMHG | SYSTOLIC BLOOD PRESSURE: 114 MMHG

## 2021-01-04 DIAGNOSIS — N52.1 ERECTILE DYSFUNCTION DUE TO DISEASES CLASSIFIED ELSEWHERE: ICD-10-CM

## 2021-01-04 DIAGNOSIS — N40.1 BENIGN NON-NODULAR PROSTATIC HYPERPLASIA WITH LOWER URINARY TRACT SYMPTOMS: Primary | ICD-10-CM

## 2021-01-04 DIAGNOSIS — N40.0 BENIGN NON-NODULAR PROSTATIC HYPERPLASIA WITHOUT LOWER URINARY TRACT SYMPTOMS: ICD-10-CM

## 2021-01-04 PROCEDURE — 3074F SYST BP LT 130 MM HG: CPT | Mod: CPTII,S$GLB,, | Performed by: UROLOGY

## 2021-01-04 PROCEDURE — 3078F DIAST BP <80 MM HG: CPT | Mod: CPTII,S$GLB,, | Performed by: UROLOGY

## 2021-01-04 PROCEDURE — 99999 PR PBB SHADOW E&M-EST. PATIENT-LVL II: CPT | Mod: PBBFAC,,, | Performed by: UROLOGY

## 2021-01-04 PROCEDURE — 99999 PR PBB SHADOW E&M-EST. PATIENT-LVL II: ICD-10-PCS | Mod: PBBFAC,,, | Performed by: UROLOGY

## 2021-01-04 PROCEDURE — 3078F PR MOST RECENT DIASTOLIC BLOOD PRESSURE < 80 MM HG: ICD-10-PCS | Mod: CPTII,S$GLB,, | Performed by: UROLOGY

## 2021-01-04 PROCEDURE — 3074F PR MOST RECENT SYSTOLIC BLOOD PRESSURE < 130 MM HG: ICD-10-PCS | Mod: CPTII,S$GLB,, | Performed by: UROLOGY

## 2021-01-04 PROCEDURE — 1126F PR PAIN SEVERITY QUANTIFIED, NO PAIN PRESENT: ICD-10-PCS | Mod: S$GLB,,, | Performed by: UROLOGY

## 2021-01-04 PROCEDURE — 99213 OFFICE O/P EST LOW 20 MIN: CPT | Mod: S$GLB,,, | Performed by: UROLOGY

## 2021-01-04 PROCEDURE — 99213 PR OFFICE/OUTPT VISIT, EST, LEVL III, 20-29 MIN: ICD-10-PCS | Mod: S$GLB,,, | Performed by: UROLOGY

## 2021-01-04 PROCEDURE — 1126F AMNT PAIN NOTED NONE PRSNT: CPT | Mod: S$GLB,,, | Performed by: UROLOGY

## 2021-01-04 RX ORDER — TAMSULOSIN HYDROCHLORIDE 0.4 MG/1
CAPSULE ORAL
Qty: 180 CAPSULE | Refills: 3 | Status: SHIPPED | OUTPATIENT
Start: 2021-01-04 | End: 2022-03-07

## 2021-01-07 ENCOUNTER — OFFICE VISIT (OUTPATIENT)
Dept: CARDIOLOGY | Facility: CLINIC | Age: 61
End: 2021-01-07
Payer: COMMERCIAL

## 2021-01-07 VITALS
HEART RATE: 64 BPM | SYSTOLIC BLOOD PRESSURE: 128 MMHG | OXYGEN SATURATION: 97 % | DIASTOLIC BLOOD PRESSURE: 60 MMHG | WEIGHT: 284.38 LBS | BODY MASS INDEX: 43.1 KG/M2 | HEIGHT: 68 IN

## 2021-01-07 DIAGNOSIS — M51.36 DDD (DEGENERATIVE DISC DISEASE), LUMBAR: ICD-10-CM

## 2021-01-07 DIAGNOSIS — Z79.01 CURRENT USE OF LONG TERM ANTICOAGULATION: ICD-10-CM

## 2021-01-07 DIAGNOSIS — E11.9 TYPE 2 DIABETES MELLITUS WITHOUT COMPLICATION, WITHOUT LONG-TERM CURRENT USE OF INSULIN: ICD-10-CM

## 2021-01-07 DIAGNOSIS — E66.01 MORBID OBESITY WITH BMI OF 40.0-44.9, ADULT: ICD-10-CM

## 2021-01-07 DIAGNOSIS — I87.2 VENOUS INSUFFICIENCY OF LEFT LOWER EXTREMITY: Primary | ICD-10-CM

## 2021-01-07 DIAGNOSIS — I89.0 LYMPHEDEMA OF BOTH LOWER EXTREMITIES: ICD-10-CM

## 2021-01-07 DIAGNOSIS — I10 ESSENTIAL HYPERTENSION: ICD-10-CM

## 2021-01-07 DIAGNOSIS — Z98.890 STATUS POST CATHETER ABLATION OF ATRIAL FIBRILLATION: ICD-10-CM

## 2021-01-07 DIAGNOSIS — I48.19 PERSISTENT ATRIAL FIBRILLATION: ICD-10-CM

## 2021-01-07 DIAGNOSIS — G47.33 OBSTRUCTIVE SLEEP APNEA: ICD-10-CM

## 2021-01-07 DIAGNOSIS — R60.0 EDEMA OF BOTH LOWER EXTREMITIES: ICD-10-CM

## 2021-01-07 PROCEDURE — 99215 PR OFFICE/OUTPT VISIT, EST, LEVL V, 40-54 MIN: ICD-10-PCS | Mod: S$GLB,,, | Performed by: INTERNAL MEDICINE

## 2021-01-07 PROCEDURE — 99999 PR PBB SHADOW E&M-EST. PATIENT-LVL V: CPT | Mod: PBBFAC,,, | Performed by: INTERNAL MEDICINE

## 2021-01-07 PROCEDURE — 1126F AMNT PAIN NOTED NONE PRSNT: CPT | Mod: S$GLB,,, | Performed by: INTERNAL MEDICINE

## 2021-01-07 PROCEDURE — 1126F PR PAIN SEVERITY QUANTIFIED, NO PAIN PRESENT: ICD-10-PCS | Mod: S$GLB,,, | Performed by: INTERNAL MEDICINE

## 2021-01-07 PROCEDURE — 3074F SYST BP LT 130 MM HG: CPT | Mod: CPTII,S$GLB,, | Performed by: INTERNAL MEDICINE

## 2021-01-07 PROCEDURE — 99215 OFFICE O/P EST HI 40 MIN: CPT | Mod: S$GLB,,, | Performed by: INTERNAL MEDICINE

## 2021-01-07 PROCEDURE — 3078F DIAST BP <80 MM HG: CPT | Mod: CPTII,S$GLB,, | Performed by: INTERNAL MEDICINE

## 2021-01-07 PROCEDURE — 3078F PR MOST RECENT DIASTOLIC BLOOD PRESSURE < 80 MM HG: ICD-10-PCS | Mod: CPTII,S$GLB,, | Performed by: INTERNAL MEDICINE

## 2021-01-07 PROCEDURE — 3008F BODY MASS INDEX DOCD: CPT | Mod: CPTII,S$GLB,, | Performed by: INTERNAL MEDICINE

## 2021-01-07 PROCEDURE — 3074F PR MOST RECENT SYSTOLIC BLOOD PRESSURE < 130 MM HG: ICD-10-PCS | Mod: CPTII,S$GLB,, | Performed by: INTERNAL MEDICINE

## 2021-01-07 PROCEDURE — 99999 PR PBB SHADOW E&M-EST. PATIENT-LVL V: ICD-10-PCS | Mod: PBBFAC,,, | Performed by: INTERNAL MEDICINE

## 2021-01-07 PROCEDURE — 3008F PR BODY MASS INDEX (BMI) DOCUMENTED: ICD-10-PCS | Mod: CPTII,S$GLB,, | Performed by: INTERNAL MEDICINE

## 2021-01-07 PROCEDURE — 3044F PR MOST RECENT HEMOGLOBIN A1C LEVEL <7.0%: ICD-10-PCS | Mod: CPTII,S$GLB,, | Performed by: INTERNAL MEDICINE

## 2021-01-07 PROCEDURE — 3044F HG A1C LEVEL LT 7.0%: CPT | Mod: CPTII,S$GLB,, | Performed by: INTERNAL MEDICINE

## 2021-01-11 ENCOUNTER — PATIENT OUTREACH (OUTPATIENT)
Dept: ADMINISTRATIVE | Facility: OTHER | Age: 61
End: 2021-01-11

## 2021-01-13 ENCOUNTER — HOSPITAL ENCOUNTER (OUTPATIENT)
Dept: CARDIOLOGY | Facility: CLINIC | Age: 61
Discharge: HOME | End: 2021-01-13
Payer: COMMERCIAL

## 2021-01-13 ENCOUNTER — OFFICE VISIT (OUTPATIENT)
Dept: ELECTROPHYSIOLOGY | Facility: CLINIC | Age: 61
End: 2021-01-13
Payer: COMMERCIAL

## 2021-01-13 VITALS
HEIGHT: 68 IN | HEART RATE: 61 BPM | OXYGEN SATURATION: 97 % | DIASTOLIC BLOOD PRESSURE: 71 MMHG | BODY MASS INDEX: 43.14 KG/M2 | WEIGHT: 284.63 LBS | SYSTOLIC BLOOD PRESSURE: 120 MMHG

## 2021-01-13 DIAGNOSIS — E66.01 MORBID OBESITY WITH BMI OF 40.0-44.9, ADULT: ICD-10-CM

## 2021-01-13 DIAGNOSIS — Z98.890 STATUS POST CATHETER ABLATION OF ATRIAL FLUTTER: Primary | ICD-10-CM

## 2021-01-13 DIAGNOSIS — I48.91 ATRIAL FIBRILLATION, UNSPECIFIED TYPE: ICD-10-CM

## 2021-01-13 DIAGNOSIS — G47.33 OBSTRUCTIVE SLEEP APNEA: ICD-10-CM

## 2021-01-13 DIAGNOSIS — I10 ESSENTIAL HYPERTENSION: ICD-10-CM

## 2021-01-13 DIAGNOSIS — Z98.890 STATUS POST CATHETER ABLATION OF ATRIAL FIBRILLATION: ICD-10-CM

## 2021-01-13 DIAGNOSIS — I48.19 PERSISTENT ATRIAL FIBRILLATION: ICD-10-CM

## 2021-01-13 DIAGNOSIS — I48.4 ATYPICAL ATRIAL FLUTTER: ICD-10-CM

## 2021-01-13 PROCEDURE — 3008F BODY MASS INDEX DOCD: CPT | Mod: CPTII,S$GLB,, | Performed by: NURSE PRACTITIONER

## 2021-01-13 PROCEDURE — 3008F PR BODY MASS INDEX (BMI) DOCUMENTED: ICD-10-PCS | Mod: CPTII,S$GLB,, | Performed by: NURSE PRACTITIONER

## 2021-01-13 PROCEDURE — 1126F AMNT PAIN NOTED NONE PRSNT: CPT | Mod: S$GLB,,, | Performed by: NURSE PRACTITIONER

## 2021-01-13 PROCEDURE — 99214 PR OFFICE/OUTPT VISIT, EST, LEVL IV, 30-39 MIN: ICD-10-PCS | Mod: S$GLB,,, | Performed by: NURSE PRACTITIONER

## 2021-01-13 PROCEDURE — 93005 ELECTROCARDIOGRAM TRACING: CPT | Mod: S$GLB,,, | Performed by: INTERNAL MEDICINE

## 2021-01-13 PROCEDURE — 99999 PR PBB SHADOW E&M-EST. PATIENT-LVL IV: CPT | Mod: PBBFAC,,, | Performed by: NURSE PRACTITIONER

## 2021-01-13 PROCEDURE — 1126F PR PAIN SEVERITY QUANTIFIED, NO PAIN PRESENT: ICD-10-PCS | Mod: S$GLB,,, | Performed by: NURSE PRACTITIONER

## 2021-01-13 PROCEDURE — 3074F PR MOST RECENT SYSTOLIC BLOOD PRESSURE < 130 MM HG: ICD-10-PCS | Mod: CPTII,S$GLB,, | Performed by: NURSE PRACTITIONER

## 2021-01-13 PROCEDURE — 99214 OFFICE O/P EST MOD 30 MIN: CPT | Mod: S$GLB,,, | Performed by: NURSE PRACTITIONER

## 2021-01-13 PROCEDURE — 3078F PR MOST RECENT DIASTOLIC BLOOD PRESSURE < 80 MM HG: ICD-10-PCS | Mod: CPTII,S$GLB,, | Performed by: NURSE PRACTITIONER

## 2021-01-13 PROCEDURE — 93005 RHYTHM STRIP: ICD-10-PCS | Mod: S$GLB,,, | Performed by: INTERNAL MEDICINE

## 2021-01-13 PROCEDURE — 93010 ELECTROCARDIOGRAM REPORT: CPT | Mod: S$GLB,,, | Performed by: INTERNAL MEDICINE

## 2021-01-13 PROCEDURE — 3074F SYST BP LT 130 MM HG: CPT | Mod: CPTII,S$GLB,, | Performed by: NURSE PRACTITIONER

## 2021-01-13 PROCEDURE — 3078F DIAST BP <80 MM HG: CPT | Mod: CPTII,S$GLB,, | Performed by: NURSE PRACTITIONER

## 2021-01-13 PROCEDURE — 99999 PR PBB SHADOW E&M-EST. PATIENT-LVL IV: ICD-10-PCS | Mod: PBBFAC,,, | Performed by: NURSE PRACTITIONER

## 2021-01-13 PROCEDURE — 93010 RHYTHM STRIP: ICD-10-PCS | Mod: S$GLB,,, | Performed by: INTERNAL MEDICINE

## 2021-01-13 RX ORDER — METOPROLOL TARTRATE 25 MG/1
25 TABLET, FILM COATED ORAL 2 TIMES DAILY
Qty: 180 TABLET | Refills: 3 | Status: SHIPPED | OUTPATIENT
Start: 2021-01-13 | End: 2022-03-07

## 2021-01-13 RX ORDER — RIVAROXABAN 20 MG/1
1 TABLET, FILM COATED ORAL DAILY
Qty: 90 TABLET | Refills: 3 | Status: SHIPPED | OUTPATIENT
Start: 2021-01-13 | End: 2021-09-28

## 2021-01-13 RX ORDER — SOTALOL HYDROCHLORIDE 80 MG/1
80 TABLET ORAL 2 TIMES DAILY
Qty: 180 TABLET | Refills: 3 | Status: SHIPPED | OUTPATIENT
Start: 2021-01-13 | End: 2022-03-07

## 2021-01-29 ENCOUNTER — PATIENT MESSAGE (OUTPATIENT)
Dept: ADMINISTRATIVE | Facility: HOSPITAL | Age: 61
End: 2021-01-29

## 2021-02-09 ENCOUNTER — LAB VISIT (OUTPATIENT)
Dept: LAB | Facility: HOSPITAL | Age: 61
End: 2021-02-09
Attending: INTERNAL MEDICINE
Payer: COMMERCIAL

## 2021-02-09 ENCOUNTER — OFFICE VISIT (OUTPATIENT)
Dept: CARDIOLOGY | Facility: CLINIC | Age: 61
End: 2021-02-09
Payer: COMMERCIAL

## 2021-02-09 VITALS
BODY MASS INDEX: 43.35 KG/M2 | SYSTOLIC BLOOD PRESSURE: 128 MMHG | DIASTOLIC BLOOD PRESSURE: 62 MMHG | HEART RATE: 71 BPM | OXYGEN SATURATION: 97 % | HEIGHT: 68 IN | WEIGHT: 286 LBS

## 2021-02-09 DIAGNOSIS — R60.0 BILATERAL LEG EDEMA: ICD-10-CM

## 2021-02-09 DIAGNOSIS — R60.0 EDEMA OF BOTH LOWER EXTREMITIES: ICD-10-CM

## 2021-02-09 DIAGNOSIS — I87.2 VENOUS INSUFFICIENCY OF LEFT LOWER EXTREMITY: ICD-10-CM

## 2021-02-09 DIAGNOSIS — G47.33 OBSTRUCTIVE SLEEP APNEA: ICD-10-CM

## 2021-02-09 DIAGNOSIS — I87.2 VENOUS INSUFFICIENCY OF LEFT LOWER EXTREMITY: Primary | ICD-10-CM

## 2021-02-09 DIAGNOSIS — I10 ESSENTIAL HYPERTENSION: ICD-10-CM

## 2021-02-09 DIAGNOSIS — I48.92 ATRIAL FLUTTER, UNSPECIFIED TYPE: ICD-10-CM

## 2021-02-09 DIAGNOSIS — E66.01 MORBID OBESITY WITH BMI OF 40.0-44.9, ADULT: ICD-10-CM

## 2021-02-09 DIAGNOSIS — Z74.09 MOBILITY IMPAIRED: ICD-10-CM

## 2021-02-09 DIAGNOSIS — I77.89 ENLARGED THORACIC AORTA: ICD-10-CM

## 2021-02-09 DIAGNOSIS — M51.36 DDD (DEGENERATIVE DISC DISEASE), LUMBAR: ICD-10-CM

## 2021-02-09 LAB
ALBUMIN SERPL BCP-MCNC: 3.9 G/DL (ref 3.5–5.2)
ALP SERPL-CCNC: 94 U/L (ref 55–135)
ALT SERPL W/O P-5'-P-CCNC: 20 U/L (ref 10–44)
ANION GAP SERPL CALC-SCNC: 7 MMOL/L (ref 8–16)
AST SERPL-CCNC: 21 U/L (ref 10–40)
BILIRUB SERPL-MCNC: 0.6 MG/DL (ref 0.1–1)
BUN SERPL-MCNC: 11 MG/DL (ref 6–20)
CALCIUM SERPL-MCNC: 8.9 MG/DL (ref 8.7–10.5)
CHLORIDE SERPL-SCNC: 102 MMOL/L (ref 95–110)
CO2 SERPL-SCNC: 31 MMOL/L (ref 23–29)
CREAT SERPL-MCNC: 1.1 MG/DL (ref 0.5–1.4)
EST. GFR  (AFRICAN AMERICAN): >60 ML/MIN/1.73 M^2
EST. GFR  (NON AFRICAN AMERICAN): >60 ML/MIN/1.73 M^2
GLUCOSE SERPL-MCNC: 94 MG/DL (ref 70–110)
POTASSIUM SERPL-SCNC: 4 MMOL/L (ref 3.5–5.1)
PROT SERPL-MCNC: 6.4 G/DL (ref 6–8.4)
SODIUM SERPL-SCNC: 140 MMOL/L (ref 136–145)

## 2021-02-09 PROCEDURE — 3008F BODY MASS INDEX DOCD: CPT | Mod: CPTII,S$GLB,, | Performed by: INTERNAL MEDICINE

## 2021-02-09 PROCEDURE — 3074F PR MOST RECENT SYSTOLIC BLOOD PRESSURE < 130 MM HG: ICD-10-PCS | Mod: CPTII,S$GLB,, | Performed by: INTERNAL MEDICINE

## 2021-02-09 PROCEDURE — 99215 OFFICE O/P EST HI 40 MIN: CPT | Mod: S$GLB,,, | Performed by: INTERNAL MEDICINE

## 2021-02-09 PROCEDURE — 80053 COMPREHEN METABOLIC PANEL: CPT

## 2021-02-09 PROCEDURE — 3074F SYST BP LT 130 MM HG: CPT | Mod: CPTII,S$GLB,, | Performed by: INTERNAL MEDICINE

## 2021-02-09 PROCEDURE — 3008F PR BODY MASS INDEX (BMI) DOCUMENTED: ICD-10-PCS | Mod: CPTII,S$GLB,, | Performed by: INTERNAL MEDICINE

## 2021-02-09 PROCEDURE — 99999 PR PBB SHADOW E&M-EST. PATIENT-LVL V: CPT | Mod: PBBFAC,,, | Performed by: INTERNAL MEDICINE

## 2021-02-09 PROCEDURE — 1126F PR PAIN SEVERITY QUANTIFIED, NO PAIN PRESENT: ICD-10-PCS | Mod: S$GLB,,, | Performed by: INTERNAL MEDICINE

## 2021-02-09 PROCEDURE — 1126F AMNT PAIN NOTED NONE PRSNT: CPT | Mod: S$GLB,,, | Performed by: INTERNAL MEDICINE

## 2021-02-09 PROCEDURE — 99215 PR OFFICE/OUTPT VISIT, EST, LEVL V, 40-54 MIN: ICD-10-PCS | Mod: S$GLB,,, | Performed by: INTERNAL MEDICINE

## 2021-02-09 PROCEDURE — 3078F DIAST BP <80 MM HG: CPT | Mod: CPTII,S$GLB,, | Performed by: INTERNAL MEDICINE

## 2021-02-09 PROCEDURE — 99999 PR PBB SHADOW E&M-EST. PATIENT-LVL V: ICD-10-PCS | Mod: PBBFAC,,, | Performed by: INTERNAL MEDICINE

## 2021-02-09 PROCEDURE — 3078F PR MOST RECENT DIASTOLIC BLOOD PRESSURE < 80 MM HG: ICD-10-PCS | Mod: CPTII,S$GLB,, | Performed by: INTERNAL MEDICINE

## 2021-02-09 PROCEDURE — 36415 COLL VENOUS BLD VENIPUNCTURE: CPT

## 2021-02-09 RX ORDER — OMEPRAZOLE 10 MG/1
10 CAPSULE, DELAYED RELEASE ORAL DAILY
COMMUNITY
End: 2021-10-05 | Stop reason: SDUPTHER

## 2021-02-09 RX ORDER — BUMETANIDE 1 MG/1
1 TABLET ORAL 2 TIMES DAILY
Qty: 180 TABLET | Refills: 3 | Status: SHIPPED | OUTPATIENT
Start: 2021-02-09 | End: 2021-04-20 | Stop reason: SDUPTHER

## 2021-02-09 RX ORDER — ARIPIPRAZOLE 5 MG/1
5 TABLET ORAL DAILY
COMMUNITY
End: 2021-04-01

## 2021-02-25 ENCOUNTER — OFFICE VISIT (OUTPATIENT)
Dept: INTERNAL MEDICINE | Facility: CLINIC | Age: 61
End: 2021-02-25
Payer: COMMERCIAL

## 2021-02-25 VITALS
SYSTOLIC BLOOD PRESSURE: 114 MMHG | TEMPERATURE: 98 F | DIASTOLIC BLOOD PRESSURE: 64 MMHG | RESPIRATION RATE: 16 BRPM | HEIGHT: 68 IN | OXYGEN SATURATION: 97 % | WEIGHT: 287.25 LBS | BODY MASS INDEX: 43.53 KG/M2 | HEART RATE: 59 BPM

## 2021-02-25 DIAGNOSIS — J30.2 SEASONAL ALLERGIES: ICD-10-CM

## 2021-02-25 DIAGNOSIS — G44.209 TENSION-TYPE HEADACHE, NOT INTRACTABLE, UNSPECIFIED CHRONICITY PATTERN: ICD-10-CM

## 2021-02-25 DIAGNOSIS — Z91.89 AT INCREASED RISK OF EXPOSURE TO COVID-19 VIRUS: ICD-10-CM

## 2021-02-25 DIAGNOSIS — J06.9 URTI (ACUTE UPPER RESPIRATORY INFECTION): Primary | ICD-10-CM

## 2021-02-25 LAB — SARS-COV-2 RNA RESP QL NAA+PROBE: NOT DETECTED

## 2021-02-25 PROCEDURE — 99214 PR OFFICE/OUTPT VISIT, EST, LEVL IV, 30-39 MIN: ICD-10-PCS | Mod: S$GLB,,, | Performed by: NURSE PRACTITIONER

## 2021-02-25 PROCEDURE — U0005 INFEC AGEN DETEC AMPLI PROBE: HCPCS

## 2021-02-25 PROCEDURE — 3008F PR BODY MASS INDEX (BMI) DOCUMENTED: ICD-10-PCS | Mod: CPTII,S$GLB,, | Performed by: NURSE PRACTITIONER

## 2021-02-25 PROCEDURE — 1126F PR PAIN SEVERITY QUANTIFIED, NO PAIN PRESENT: ICD-10-PCS | Mod: S$GLB,,, | Performed by: NURSE PRACTITIONER

## 2021-02-25 PROCEDURE — 99999 PR PBB SHADOW E&M-EST. PATIENT-LVL IV: ICD-10-PCS | Mod: PBBFAC,,, | Performed by: NURSE PRACTITIONER

## 2021-02-25 PROCEDURE — U0003 INFECTIOUS AGENT DETECTION BY NUCLEIC ACID (DNA OR RNA); SEVERE ACUTE RESPIRATORY SYNDROME CORONAVIRUS 2 (SARS-COV-2) (CORONAVIRUS DISEASE [COVID-19]), AMPLIFIED PROBE TECHNIQUE, MAKING USE OF HIGH THROUGHPUT TECHNOLOGIES AS DESCRIBED BY CMS-2020-01-R: HCPCS

## 2021-02-25 PROCEDURE — 99214 OFFICE O/P EST MOD 30 MIN: CPT | Mod: S$GLB,,, | Performed by: NURSE PRACTITIONER

## 2021-02-25 PROCEDURE — 3008F BODY MASS INDEX DOCD: CPT | Mod: CPTII,S$GLB,, | Performed by: NURSE PRACTITIONER

## 2021-02-25 PROCEDURE — 99999 PR PBB SHADOW E&M-EST. PATIENT-LVL IV: CPT | Mod: PBBFAC,,, | Performed by: NURSE PRACTITIONER

## 2021-02-25 PROCEDURE — 1126F AMNT PAIN NOTED NONE PRSNT: CPT | Mod: S$GLB,,, | Performed by: NURSE PRACTITIONER

## 2021-02-25 RX ORDER — LEVOCETIRIZINE DIHYDROCHLORIDE 5 MG/1
5 TABLET, FILM COATED ORAL NIGHTLY
Qty: 30 TABLET | Refills: 11 | Status: SHIPPED | OUTPATIENT
Start: 2021-02-25 | End: 2022-02-28

## 2021-02-25 RX ORDER — AMOXICILLIN AND CLAVULANATE POTASSIUM 875; 125 MG/1; MG/1
1 TABLET, FILM COATED ORAL EVERY 12 HOURS
Qty: 14 TABLET | Refills: 0 | Status: SHIPPED | OUTPATIENT
Start: 2021-02-25 | End: 2021-03-04

## 2021-02-26 ENCOUNTER — PATIENT OUTREACH (OUTPATIENT)
Dept: ADMINISTRATIVE | Facility: HOSPITAL | Age: 61
End: 2021-02-26

## 2021-02-26 ENCOUNTER — PATIENT MESSAGE (OUTPATIENT)
Dept: INTERNAL MEDICINE | Facility: CLINIC | Age: 61
End: 2021-02-26

## 2021-03-24 ENCOUNTER — PATIENT OUTREACH (OUTPATIENT)
Dept: ADMINISTRATIVE | Facility: OTHER | Age: 61
End: 2021-03-24

## 2021-03-25 ENCOUNTER — LAB VISIT (OUTPATIENT)
Dept: LAB | Facility: HOSPITAL | Age: 61
End: 2021-03-25
Attending: INTERNAL MEDICINE
Payer: COMMERCIAL

## 2021-03-25 ENCOUNTER — OFFICE VISIT (OUTPATIENT)
Dept: CARDIOLOGY | Facility: CLINIC | Age: 61
End: 2021-03-25
Payer: COMMERCIAL

## 2021-03-25 VITALS
OXYGEN SATURATION: 97 % | SYSTOLIC BLOOD PRESSURE: 110 MMHG | BODY MASS INDEX: 43.47 KG/M2 | HEART RATE: 63 BPM | HEIGHT: 68 IN | DIASTOLIC BLOOD PRESSURE: 70 MMHG | WEIGHT: 286.81 LBS

## 2021-03-25 DIAGNOSIS — I89.0 LYMPHEDEMA OF BOTH LOWER EXTREMITIES: Primary | ICD-10-CM

## 2021-03-25 DIAGNOSIS — Z74.09 MOBILITY IMPAIRED: ICD-10-CM

## 2021-03-25 DIAGNOSIS — R60.0 EDEMA OF BOTH LOWER EXTREMITIES: ICD-10-CM

## 2021-03-25 DIAGNOSIS — E66.01 MORBID OBESITY WITH BMI OF 40.0-44.9, ADULT: ICD-10-CM

## 2021-03-25 DIAGNOSIS — I87.2 VENOUS INSUFFICIENCY OF LEFT LOWER EXTREMITY: ICD-10-CM

## 2021-03-25 LAB
ALBUMIN SERPL BCP-MCNC: 4 G/DL (ref 3.5–5.2)
ALP SERPL-CCNC: 106 U/L (ref 55–135)
ALT SERPL W/O P-5'-P-CCNC: 18 U/L (ref 10–44)
ANION GAP SERPL CALC-SCNC: 9 MMOL/L (ref 8–16)
AST SERPL-CCNC: 23 U/L (ref 10–40)
BILIRUB SERPL-MCNC: 0.8 MG/DL (ref 0.1–1)
BUN SERPL-MCNC: 14 MG/DL (ref 6–20)
CALCIUM SERPL-MCNC: 9 MG/DL (ref 8.7–10.5)
CHLORIDE SERPL-SCNC: 103 MMOL/L (ref 95–110)
CO2 SERPL-SCNC: 29 MMOL/L (ref 23–29)
CREAT SERPL-MCNC: 1.3 MG/DL (ref 0.5–1.4)
EST. GFR  (AFRICAN AMERICAN): >60 ML/MIN/1.73 M^2
EST. GFR  (NON AFRICAN AMERICAN): 59.7 ML/MIN/1.73 M^2
GLUCOSE SERPL-MCNC: 96 MG/DL (ref 70–110)
POTASSIUM SERPL-SCNC: 3.6 MMOL/L (ref 3.5–5.1)
PROT SERPL-MCNC: 6.9 G/DL (ref 6–8.4)
SODIUM SERPL-SCNC: 141 MMOL/L (ref 136–145)

## 2021-03-25 PROCEDURE — 99999 PR PBB SHADOW E&M-EST. PATIENT-LVL V: CPT | Mod: PBBFAC,,, | Performed by: INTERNAL MEDICINE

## 2021-03-25 PROCEDURE — 3074F PR MOST RECENT SYSTOLIC BLOOD PRESSURE < 130 MM HG: ICD-10-PCS | Mod: CPTII,S$GLB,, | Performed by: INTERNAL MEDICINE

## 2021-03-25 PROCEDURE — 1126F AMNT PAIN NOTED NONE PRSNT: CPT | Mod: S$GLB,,, | Performed by: INTERNAL MEDICINE

## 2021-03-25 PROCEDURE — 99215 OFFICE O/P EST HI 40 MIN: CPT | Mod: S$GLB,,, | Performed by: INTERNAL MEDICINE

## 2021-03-25 PROCEDURE — 80053 COMPREHEN METABOLIC PANEL: CPT | Performed by: INTERNAL MEDICINE

## 2021-03-25 PROCEDURE — 99215 PR OFFICE/OUTPT VISIT, EST, LEVL V, 40-54 MIN: ICD-10-PCS | Mod: S$GLB,,, | Performed by: INTERNAL MEDICINE

## 2021-03-25 PROCEDURE — 1126F PR PAIN SEVERITY QUANTIFIED, NO PAIN PRESENT: ICD-10-PCS | Mod: S$GLB,,, | Performed by: INTERNAL MEDICINE

## 2021-03-25 PROCEDURE — 3078F PR MOST RECENT DIASTOLIC BLOOD PRESSURE < 80 MM HG: ICD-10-PCS | Mod: CPTII,S$GLB,, | Performed by: INTERNAL MEDICINE

## 2021-03-25 PROCEDURE — 3008F BODY MASS INDEX DOCD: CPT | Mod: CPTII,S$GLB,, | Performed by: INTERNAL MEDICINE

## 2021-03-25 PROCEDURE — 36415 COLL VENOUS BLD VENIPUNCTURE: CPT | Performed by: INTERNAL MEDICINE

## 2021-03-25 PROCEDURE — 99999 PR PBB SHADOW E&M-EST. PATIENT-LVL V: ICD-10-PCS | Mod: PBBFAC,,, | Performed by: INTERNAL MEDICINE

## 2021-03-25 PROCEDURE — 3074F SYST BP LT 130 MM HG: CPT | Mod: CPTII,S$GLB,, | Performed by: INTERNAL MEDICINE

## 2021-03-25 PROCEDURE — 3008F PR BODY MASS INDEX (BMI) DOCUMENTED: ICD-10-PCS | Mod: CPTII,S$GLB,, | Performed by: INTERNAL MEDICINE

## 2021-03-25 PROCEDURE — 3078F DIAST BP <80 MM HG: CPT | Mod: CPTII,S$GLB,, | Performed by: INTERNAL MEDICINE

## 2021-03-29 ENCOUNTER — TELEPHONE (OUTPATIENT)
Dept: CARDIOLOGY | Facility: CLINIC | Age: 61
End: 2021-03-29

## 2021-03-29 ENCOUNTER — PATIENT MESSAGE (OUTPATIENT)
Dept: CARDIOLOGY | Facility: CLINIC | Age: 61
End: 2021-03-29

## 2021-04-01 ENCOUNTER — OFFICE VISIT (OUTPATIENT)
Dept: INTERNAL MEDICINE | Facility: CLINIC | Age: 61
End: 2021-04-01
Payer: COMMERCIAL

## 2021-04-01 VITALS
BODY MASS INDEX: 42.73 KG/M2 | TEMPERATURE: 97 F | HEART RATE: 62 BPM | HEIGHT: 68 IN | SYSTOLIC BLOOD PRESSURE: 102 MMHG | DIASTOLIC BLOOD PRESSURE: 52 MMHG | WEIGHT: 281.94 LBS | OXYGEN SATURATION: 100 %

## 2021-04-01 DIAGNOSIS — R73.03 PREDIABETES: ICD-10-CM

## 2021-04-01 DIAGNOSIS — I10 ESSENTIAL HYPERTENSION: ICD-10-CM

## 2021-04-01 DIAGNOSIS — I89.0 LYMPHEDEMA OF BOTH LOWER EXTREMITIES: ICD-10-CM

## 2021-04-01 DIAGNOSIS — Z00.00 ENCOUNTER FOR PREVENTIVE HEALTH EXAMINATION: Primary | ICD-10-CM

## 2021-04-01 DIAGNOSIS — Z86.79 S/P ABLATION OF ATRIAL FIBRILLATION: ICD-10-CM

## 2021-04-01 DIAGNOSIS — Z98.890 S/P ABLATION OF ATRIAL FIBRILLATION: ICD-10-CM

## 2021-04-01 DIAGNOSIS — Z12.11 COLON CANCER SCREENING: ICD-10-CM

## 2021-04-01 DIAGNOSIS — E66.01 MORBID OBESITY WITH BMI OF 40.0-44.9, ADULT: ICD-10-CM

## 2021-04-01 DIAGNOSIS — Z79.01 CURRENT USE OF LONG TERM ANTICOAGULATION: ICD-10-CM

## 2021-04-01 DIAGNOSIS — G47.33 OBSTRUCTIVE SLEEP APNEA: ICD-10-CM

## 2021-04-01 PROCEDURE — 99396 PR PREVENTIVE VISIT,EST,40-64: ICD-10-PCS | Mod: S$GLB,,, | Performed by: INTERNAL MEDICINE

## 2021-04-01 PROCEDURE — 1126F PR PAIN SEVERITY QUANTIFIED, NO PAIN PRESENT: ICD-10-PCS | Mod: S$GLB,,, | Performed by: INTERNAL MEDICINE

## 2021-04-01 PROCEDURE — 3074F SYST BP LT 130 MM HG: CPT | Mod: CPTII,S$GLB,, | Performed by: INTERNAL MEDICINE

## 2021-04-01 PROCEDURE — 99999 PR PBB SHADOW E&M-EST. PATIENT-LVL V: CPT | Mod: PBBFAC,,, | Performed by: INTERNAL MEDICINE

## 2021-04-01 PROCEDURE — 3008F BODY MASS INDEX DOCD: CPT | Mod: CPTII,S$GLB,, | Performed by: INTERNAL MEDICINE

## 2021-04-01 PROCEDURE — 3078F DIAST BP <80 MM HG: CPT | Mod: CPTII,S$GLB,, | Performed by: INTERNAL MEDICINE

## 2021-04-01 PROCEDURE — 99999 PR PBB SHADOW E&M-EST. PATIENT-LVL V: ICD-10-PCS | Mod: PBBFAC,,, | Performed by: INTERNAL MEDICINE

## 2021-04-01 PROCEDURE — 3074F PR MOST RECENT SYSTOLIC BLOOD PRESSURE < 130 MM HG: ICD-10-PCS | Mod: CPTII,S$GLB,, | Performed by: INTERNAL MEDICINE

## 2021-04-01 PROCEDURE — 1126F AMNT PAIN NOTED NONE PRSNT: CPT | Mod: S$GLB,,, | Performed by: INTERNAL MEDICINE

## 2021-04-01 PROCEDURE — 3008F PR BODY MASS INDEX (BMI) DOCUMENTED: ICD-10-PCS | Mod: CPTII,S$GLB,, | Performed by: INTERNAL MEDICINE

## 2021-04-01 PROCEDURE — 3078F PR MOST RECENT DIASTOLIC BLOOD PRESSURE < 80 MM HG: ICD-10-PCS | Mod: CPTII,S$GLB,, | Performed by: INTERNAL MEDICINE

## 2021-04-01 PROCEDURE — 99396 PREV VISIT EST AGE 40-64: CPT | Mod: S$GLB,,, | Performed by: INTERNAL MEDICINE

## 2021-04-01 RX ORDER — ARIPIPRAZOLE 15 MG/1
10 TABLET ORAL DAILY
COMMUNITY
Start: 2021-02-22 | End: 2022-05-06

## 2021-04-05 ENCOUNTER — PATIENT MESSAGE (OUTPATIENT)
Dept: ADMINISTRATIVE | Facility: HOSPITAL | Age: 61
End: 2021-04-05

## 2021-04-06 ENCOUNTER — LAB VISIT (OUTPATIENT)
Dept: LAB | Facility: HOSPITAL | Age: 61
End: 2021-04-06
Attending: INTERNAL MEDICINE
Payer: COMMERCIAL

## 2021-04-06 DIAGNOSIS — Z00.00 ENCOUNTER FOR PREVENTIVE HEALTH EXAMINATION: ICD-10-CM

## 2021-04-06 DIAGNOSIS — R73.03 PREDIABETES: ICD-10-CM

## 2021-04-06 LAB
BASOPHILS # BLD AUTO: 0.06 K/UL (ref 0–0.2)
BASOPHILS NFR BLD: 1.1 % (ref 0–1.9)
CHOLEST SERPL-MCNC: 168 MG/DL (ref 120–199)
CHOLEST/HDLC SERPL: 4.4 {RATIO} (ref 2–5)
DIFFERENTIAL METHOD: ABNORMAL
EOSINOPHIL # BLD AUTO: 0.1 K/UL (ref 0–0.5)
EOSINOPHIL NFR BLD: 1.6 % (ref 0–8)
ERYTHROCYTE [DISTWIDTH] IN BLOOD BY AUTOMATED COUNT: 13.9 % (ref 11.5–14.5)
ESTIMATED AVG GLUCOSE: 111 MG/DL (ref 68–131)
HBA1C MFR BLD: 5.5 % (ref 4–5.6)
HCT VFR BLD AUTO: 42 % (ref 40–54)
HCV AB SERPL QL IA: NEGATIVE
HDLC SERPL-MCNC: 38 MG/DL (ref 40–75)
HDLC SERPL: 22.6 % (ref 20–50)
HGB BLD-MCNC: 14 G/DL (ref 14–18)
IMM GRANULOCYTES # BLD AUTO: 0.01 K/UL (ref 0–0.04)
IMM GRANULOCYTES NFR BLD AUTO: 0.2 % (ref 0–0.5)
LDLC SERPL CALC-MCNC: 104.4 MG/DL (ref 63–159)
LYMPHOCYTES # BLD AUTO: 1.7 K/UL (ref 1–4.8)
LYMPHOCYTES NFR BLD: 30 % (ref 18–48)
MCH RBC QN AUTO: 32.8 PG (ref 27–31)
MCHC RBC AUTO-ENTMCNC: 33.3 G/DL (ref 32–36)
MCV RBC AUTO: 98 FL (ref 82–98)
MONOCYTES # BLD AUTO: 0.5 K/UL (ref 0.3–1)
MONOCYTES NFR BLD: 8.3 % (ref 4–15)
NEUTROPHILS # BLD AUTO: 3.3 K/UL (ref 1.8–7.7)
NEUTROPHILS NFR BLD: 58.8 % (ref 38–73)
NONHDLC SERPL-MCNC: 130 MG/DL
NRBC BLD-RTO: 0 /100 WBC
PLATELET # BLD AUTO: 246 K/UL (ref 150–450)
PMV BLD AUTO: 10.6 FL (ref 9.2–12.9)
RBC # BLD AUTO: 4.27 M/UL (ref 4.6–6.2)
TRIGL SERPL-MCNC: 128 MG/DL (ref 30–150)
WBC # BLD AUTO: 5.66 K/UL (ref 3.9–12.7)

## 2021-04-06 PROCEDURE — 83036 HEMOGLOBIN GLYCOSYLATED A1C: CPT | Performed by: INTERNAL MEDICINE

## 2021-04-06 PROCEDURE — 85025 COMPLETE CBC W/AUTO DIFF WBC: CPT | Performed by: INTERNAL MEDICINE

## 2021-04-06 PROCEDURE — 86803 HEPATITIS C AB TEST: CPT | Performed by: INTERNAL MEDICINE

## 2021-04-06 PROCEDURE — 80061 LIPID PANEL: CPT | Performed by: INTERNAL MEDICINE

## 2021-04-06 PROCEDURE — 36415 COLL VENOUS BLD VENIPUNCTURE: CPT | Mod: PO | Performed by: INTERNAL MEDICINE

## 2021-04-19 ENCOUNTER — TELEPHONE (OUTPATIENT)
Dept: CARDIOLOGY | Facility: CLINIC | Age: 61
End: 2021-04-19

## 2021-04-20 RX ORDER — BUMETANIDE 1 MG/1
1 TABLET ORAL 2 TIMES DAILY
Qty: 180 TABLET | Refills: 3 | Status: SHIPPED | OUTPATIENT
Start: 2021-04-20 | End: 2021-05-25 | Stop reason: SDUPTHER

## 2021-04-22 ENCOUNTER — LAB VISIT (OUTPATIENT)
Dept: LAB | Facility: HOSPITAL | Age: 61
End: 2021-04-22
Attending: STUDENT IN AN ORGANIZED HEALTH CARE EDUCATION/TRAINING PROGRAM
Payer: COMMERCIAL

## 2021-04-22 ENCOUNTER — OFFICE VISIT (OUTPATIENT)
Dept: INTERNAL MEDICINE | Facility: CLINIC | Age: 61
End: 2021-04-22
Payer: COMMERCIAL

## 2021-04-22 VITALS
OXYGEN SATURATION: 98 % | TEMPERATURE: 98 F | HEART RATE: 57 BPM | WEIGHT: 291 LBS | SYSTOLIC BLOOD PRESSURE: 120 MMHG | RESPIRATION RATE: 18 BRPM | DIASTOLIC BLOOD PRESSURE: 84 MMHG | BODY MASS INDEX: 44.1 KG/M2 | HEIGHT: 68 IN

## 2021-04-22 DIAGNOSIS — M79.10 MUSCLE PAIN: ICD-10-CM

## 2021-04-22 DIAGNOSIS — M79.10 MYALGIA: ICD-10-CM

## 2021-04-22 DIAGNOSIS — M79.10 MYALGIA: Primary | ICD-10-CM

## 2021-04-22 LAB
ALBUMIN SERPL BCP-MCNC: 3.6 G/DL (ref 3.5–5.2)
ALP SERPL-CCNC: 88 U/L (ref 55–135)
ALT SERPL W/O P-5'-P-CCNC: 16 U/L (ref 10–44)
ANION GAP SERPL CALC-SCNC: 7 MMOL/L (ref 8–16)
AST SERPL-CCNC: 21 U/L (ref 10–40)
BILIRUB SERPL-MCNC: 0.5 MG/DL (ref 0.1–1)
BUN SERPL-MCNC: 11 MG/DL (ref 6–20)
CALCIUM SERPL-MCNC: 8.7 MG/DL (ref 8.7–10.5)
CHLORIDE SERPL-SCNC: 106 MMOL/L (ref 95–110)
CO2 SERPL-SCNC: 29 MMOL/L (ref 23–29)
CREAT SERPL-MCNC: 1 MG/DL (ref 0.5–1.4)
EST. GFR  (AFRICAN AMERICAN): >60 ML/MIN/1.73 M^2
EST. GFR  (NON AFRICAN AMERICAN): >60 ML/MIN/1.73 M^2
GLUCOSE SERPL-MCNC: 89 MG/DL (ref 70–110)
POTASSIUM SERPL-SCNC: 4 MMOL/L (ref 3.5–5.1)
PROT SERPL-MCNC: 6.3 G/DL (ref 6–8.4)
SODIUM SERPL-SCNC: 142 MMOL/L (ref 136–145)

## 2021-04-22 PROCEDURE — 36415 COLL VENOUS BLD VENIPUNCTURE: CPT | Mod: PO | Performed by: STUDENT IN AN ORGANIZED HEALTH CARE EDUCATION/TRAINING PROGRAM

## 2021-04-22 PROCEDURE — 1125F AMNT PAIN NOTED PAIN PRSNT: CPT | Mod: S$GLB,,, | Performed by: STUDENT IN AN ORGANIZED HEALTH CARE EDUCATION/TRAINING PROGRAM

## 2021-04-22 PROCEDURE — 3008F PR BODY MASS INDEX (BMI) DOCUMENTED: ICD-10-PCS | Mod: CPTII,S$GLB,, | Performed by: STUDENT IN AN ORGANIZED HEALTH CARE EDUCATION/TRAINING PROGRAM

## 2021-04-22 PROCEDURE — U0005 INFEC AGEN DETEC AMPLI PROBE: HCPCS | Performed by: STUDENT IN AN ORGANIZED HEALTH CARE EDUCATION/TRAINING PROGRAM

## 2021-04-22 PROCEDURE — 99999 PR PBB SHADOW E&M-EST. PATIENT-LVL V: CPT | Mod: PBBFAC,,, | Performed by: STUDENT IN AN ORGANIZED HEALTH CARE EDUCATION/TRAINING PROGRAM

## 2021-04-22 PROCEDURE — 99214 PR OFFICE/OUTPT VISIT, EST, LEVL IV, 30-39 MIN: ICD-10-PCS | Mod: S$GLB,,, | Performed by: STUDENT IN AN ORGANIZED HEALTH CARE EDUCATION/TRAINING PROGRAM

## 2021-04-22 PROCEDURE — 1125F PR PAIN SEVERITY QUANTIFIED, PAIN PRESENT: ICD-10-PCS | Mod: S$GLB,,, | Performed by: STUDENT IN AN ORGANIZED HEALTH CARE EDUCATION/TRAINING PROGRAM

## 2021-04-22 PROCEDURE — 80053 COMPREHEN METABOLIC PANEL: CPT | Performed by: STUDENT IN AN ORGANIZED HEALTH CARE EDUCATION/TRAINING PROGRAM

## 2021-04-22 PROCEDURE — 99999 PR PBB SHADOW E&M-EST. PATIENT-LVL V: ICD-10-PCS | Mod: PBBFAC,,, | Performed by: STUDENT IN AN ORGANIZED HEALTH CARE EDUCATION/TRAINING PROGRAM

## 2021-04-22 PROCEDURE — 99214 OFFICE O/P EST MOD 30 MIN: CPT | Mod: S$GLB,,, | Performed by: STUDENT IN AN ORGANIZED HEALTH CARE EDUCATION/TRAINING PROGRAM

## 2021-04-22 PROCEDURE — 3008F BODY MASS INDEX DOCD: CPT | Mod: CPTII,S$GLB,, | Performed by: STUDENT IN AN ORGANIZED HEALTH CARE EDUCATION/TRAINING PROGRAM

## 2021-04-22 PROCEDURE — U0003 INFECTIOUS AGENT DETECTION BY NUCLEIC ACID (DNA OR RNA); SEVERE ACUTE RESPIRATORY SYNDROME CORONAVIRUS 2 (SARS-COV-2) (CORONAVIRUS DISEASE [COVID-19]), AMPLIFIED PROBE TECHNIQUE, MAKING USE OF HIGH THROUGHPUT TECHNOLOGIES AS DESCRIBED BY CMS-2020-01-R: HCPCS | Performed by: STUDENT IN AN ORGANIZED HEALTH CARE EDUCATION/TRAINING PROGRAM

## 2021-04-22 RX ORDER — DEXTROAMPHETAMINE SACCHARATE, AMPHETAMINE ASPARTATE, DEXTROAMPHETAMINE SULFATE AND AMPHETAMINE SULFATE 5; 5; 5; 5 MG/1; MG/1; MG/1; MG/1
TABLET ORAL
COMMUNITY
Start: 2021-04-08

## 2021-04-23 LAB — SARS-COV-2 RNA RESP QL NAA+PROBE: NOT DETECTED

## 2021-04-28 ENCOUNTER — PATIENT OUTREACH (OUTPATIENT)
Dept: ADMINISTRATIVE | Facility: HOSPITAL | Age: 61
End: 2021-04-28

## 2021-05-03 ENCOUNTER — CLINICAL SUPPORT (OUTPATIENT)
Dept: REHABILITATION | Facility: HOSPITAL | Age: 61
End: 2021-05-03
Payer: COMMERCIAL

## 2021-05-03 DIAGNOSIS — I87.2 VENOUS INSUFFICIENCY OF LEFT LOWER EXTREMITY: Primary | ICD-10-CM

## 2021-05-03 DIAGNOSIS — I89.0 LYMPHEDEMA OF BOTH LOWER EXTREMITIES: ICD-10-CM

## 2021-05-03 DIAGNOSIS — R60.0 EDEMA OF BOTH LOWER EXTREMITIES: ICD-10-CM

## 2021-05-03 PROCEDURE — 97162 PT EVAL MOD COMPLEX 30 MIN: CPT

## 2021-05-03 PROCEDURE — 97535 SELF CARE MNGMENT TRAINING: CPT

## 2021-05-21 ENCOUNTER — PATIENT OUTREACH (OUTPATIENT)
Dept: ADMINISTRATIVE | Facility: OTHER | Age: 61
End: 2021-05-21

## 2021-05-21 ENCOUNTER — CLINICAL SUPPORT (OUTPATIENT)
Dept: REHABILITATION | Facility: HOSPITAL | Age: 61
End: 2021-05-21
Payer: COMMERCIAL

## 2021-05-21 DIAGNOSIS — I89.0 LYMPHEDEMA OF BOTH LOWER EXTREMITIES: ICD-10-CM

## 2021-05-21 DIAGNOSIS — E11.9 TYPE 2 DIABETES MELLITUS WITHOUT COMPLICATION, WITHOUT LONG-TERM CURRENT USE OF INSULIN: Primary | ICD-10-CM

## 2021-05-21 DIAGNOSIS — I87.2 VENOUS INSUFFICIENCY OF LEFT LOWER EXTREMITY: ICD-10-CM

## 2021-05-21 DIAGNOSIS — R60.0 EDEMA OF BOTH LOWER EXTREMITIES: ICD-10-CM

## 2021-05-21 PROCEDURE — 97140 MANUAL THERAPY 1/> REGIONS: CPT | Mod: CQ

## 2021-05-24 ENCOUNTER — CLINICAL SUPPORT (OUTPATIENT)
Dept: REHABILITATION | Facility: HOSPITAL | Age: 61
End: 2021-05-24
Payer: COMMERCIAL

## 2021-05-24 DIAGNOSIS — R60.0 EDEMA OF BOTH LOWER EXTREMITIES: ICD-10-CM

## 2021-05-24 DIAGNOSIS — I89.0 LYMPHEDEMA OF BOTH LOWER EXTREMITIES: ICD-10-CM

## 2021-05-24 DIAGNOSIS — I87.2 VENOUS INSUFFICIENCY OF LEFT LOWER EXTREMITY: ICD-10-CM

## 2021-05-24 PROCEDURE — 97140 MANUAL THERAPY 1/> REGIONS: CPT

## 2021-05-25 ENCOUNTER — OFFICE VISIT (OUTPATIENT)
Dept: CARDIOLOGY | Facility: CLINIC | Age: 61
End: 2021-05-25
Payer: COMMERCIAL

## 2021-05-25 ENCOUNTER — LAB VISIT (OUTPATIENT)
Dept: LAB | Facility: HOSPITAL | Age: 61
End: 2021-05-25
Attending: STUDENT IN AN ORGANIZED HEALTH CARE EDUCATION/TRAINING PROGRAM
Payer: COMMERCIAL

## 2021-05-25 VITALS
BODY MASS INDEX: 46.35 KG/M2 | SYSTOLIC BLOOD PRESSURE: 116 MMHG | WEIGHT: 288.38 LBS | DIASTOLIC BLOOD PRESSURE: 72 MMHG | HEART RATE: 58 BPM | HEIGHT: 66 IN

## 2021-05-25 DIAGNOSIS — Z98.890 S/P ABLATION OF ATRIAL FIBRILLATION: ICD-10-CM

## 2021-05-25 DIAGNOSIS — E66.01 MORBID OBESITY WITH BMI OF 40.0-44.9, ADULT: ICD-10-CM

## 2021-05-25 DIAGNOSIS — R60.0 EDEMA OF BOTH LOWER EXTREMITIES: ICD-10-CM

## 2021-05-25 DIAGNOSIS — Z86.79 S/P ABLATION OF ATRIAL FIBRILLATION: ICD-10-CM

## 2021-05-25 DIAGNOSIS — I89.0 LYMPHEDEMA OF BOTH LOWER EXTREMITIES: ICD-10-CM

## 2021-05-25 DIAGNOSIS — I89.0 LYMPHEDEMA OF BOTH LOWER EXTREMITIES: Primary | ICD-10-CM

## 2021-05-25 DIAGNOSIS — I87.2 VENOUS INSUFFICIENCY OF LEFT LOWER EXTREMITY: ICD-10-CM

## 2021-05-25 DIAGNOSIS — E11.9 TYPE 2 DIABETES MELLITUS WITHOUT COMPLICATION, WITHOUT LONG-TERM CURRENT USE OF INSULIN: ICD-10-CM

## 2021-05-25 DIAGNOSIS — M51.36 DDD (DEGENERATIVE DISC DISEASE), LUMBAR: ICD-10-CM

## 2021-05-25 DIAGNOSIS — K21.9 GASTROESOPHAGEAL REFLUX DISEASE WITHOUT ESOPHAGITIS: ICD-10-CM

## 2021-05-25 DIAGNOSIS — I10 ESSENTIAL HYPERTENSION: ICD-10-CM

## 2021-05-25 LAB
ALBUMIN SERPL BCP-MCNC: 3.8 G/DL (ref 3.5–5.2)
ALP SERPL-CCNC: 108 U/L (ref 55–135)
ALT SERPL W/O P-5'-P-CCNC: 16 U/L (ref 10–44)
ANION GAP SERPL CALC-SCNC: 9 MMOL/L (ref 8–16)
AST SERPL-CCNC: 19 U/L (ref 10–40)
BILIRUB SERPL-MCNC: 0.7 MG/DL (ref 0.1–1)
BUN SERPL-MCNC: 10 MG/DL (ref 6–20)
CALCIUM SERPL-MCNC: 9.4 MG/DL (ref 8.7–10.5)
CHLORIDE SERPL-SCNC: 103 MMOL/L (ref 95–110)
CO2 SERPL-SCNC: 29 MMOL/L (ref 23–29)
CREAT SERPL-MCNC: 1.1 MG/DL (ref 0.5–1.4)
EST. GFR  (AFRICAN AMERICAN): >60 ML/MIN/1.73 M^2
EST. GFR  (NON AFRICAN AMERICAN): >60 ML/MIN/1.73 M^2
GLUCOSE SERPL-MCNC: 86 MG/DL (ref 70–110)
POTASSIUM SERPL-SCNC: 3.7 MMOL/L (ref 3.5–5.1)
PROT SERPL-MCNC: 6.7 G/DL (ref 6–8.4)
SODIUM SERPL-SCNC: 141 MMOL/L (ref 136–145)

## 2021-05-25 PROCEDURE — 3008F BODY MASS INDEX DOCD: CPT | Mod: CPTII,S$GLB,, | Performed by: INTERNAL MEDICINE

## 2021-05-25 PROCEDURE — 3044F PR MOST RECENT HEMOGLOBIN A1C LEVEL <7.0%: ICD-10-PCS | Mod: CPTII,S$GLB,, | Performed by: INTERNAL MEDICINE

## 2021-05-25 PROCEDURE — 99999 PR PBB SHADOW E&M-EST. PATIENT-LVL III: ICD-10-PCS | Mod: PBBFAC,,, | Performed by: INTERNAL MEDICINE

## 2021-05-25 PROCEDURE — 80053 COMPREHEN METABOLIC PANEL: CPT | Performed by: STUDENT IN AN ORGANIZED HEALTH CARE EDUCATION/TRAINING PROGRAM

## 2021-05-25 PROCEDURE — 99999 PR PBB SHADOW E&M-EST. PATIENT-LVL III: CPT | Mod: PBBFAC,,, | Performed by: INTERNAL MEDICINE

## 2021-05-25 PROCEDURE — 99215 OFFICE O/P EST HI 40 MIN: CPT | Mod: S$GLB,,, | Performed by: INTERNAL MEDICINE

## 2021-05-25 PROCEDURE — 3008F PR BODY MASS INDEX (BMI) DOCUMENTED: ICD-10-PCS | Mod: CPTII,S$GLB,, | Performed by: INTERNAL MEDICINE

## 2021-05-25 PROCEDURE — 3044F HG A1C LEVEL LT 7.0%: CPT | Mod: CPTII,S$GLB,, | Performed by: INTERNAL MEDICINE

## 2021-05-25 PROCEDURE — 36415 COLL VENOUS BLD VENIPUNCTURE: CPT | Performed by: STUDENT IN AN ORGANIZED HEALTH CARE EDUCATION/TRAINING PROGRAM

## 2021-05-25 PROCEDURE — 99215 PR OFFICE/OUTPT VISIT, EST, LEVL V, 40-54 MIN: ICD-10-PCS | Mod: S$GLB,,, | Performed by: INTERNAL MEDICINE

## 2021-05-25 RX ORDER — BUMETANIDE 1 MG/1
1 TABLET ORAL 2 TIMES DAILY
Qty: 180 TABLET | Refills: 3 | Status: SHIPPED | OUTPATIENT
Start: 2021-05-25 | End: 2021-06-17 | Stop reason: SDUPTHER

## 2021-05-26 ENCOUNTER — CLINICAL SUPPORT (OUTPATIENT)
Dept: REHABILITATION | Facility: HOSPITAL | Age: 61
End: 2021-05-26
Payer: COMMERCIAL

## 2021-05-26 DIAGNOSIS — I89.0 LYMPHEDEMA OF BOTH LOWER EXTREMITIES: ICD-10-CM

## 2021-05-26 DIAGNOSIS — I87.2 VENOUS INSUFFICIENCY OF LEFT LOWER EXTREMITY: ICD-10-CM

## 2021-05-26 DIAGNOSIS — R60.0 EDEMA OF BOTH LOWER EXTREMITIES: ICD-10-CM

## 2021-05-26 PROCEDURE — 97140 MANUAL THERAPY 1/> REGIONS: CPT

## 2021-05-31 ENCOUNTER — CLINICAL SUPPORT (OUTPATIENT)
Dept: REHABILITATION | Facility: HOSPITAL | Age: 61
End: 2021-05-31
Payer: COMMERCIAL

## 2021-05-31 DIAGNOSIS — I87.2 VENOUS INSUFFICIENCY OF LEFT LOWER EXTREMITY: ICD-10-CM

## 2021-05-31 DIAGNOSIS — I89.0 LYMPHEDEMA OF BOTH LOWER EXTREMITIES: ICD-10-CM

## 2021-05-31 DIAGNOSIS — R60.0 EDEMA OF BOTH LOWER EXTREMITIES: ICD-10-CM

## 2021-05-31 PROCEDURE — 97140 MANUAL THERAPY 1/> REGIONS: CPT

## 2021-06-01 ENCOUNTER — OFFICE VISIT (OUTPATIENT)
Dept: PODIATRY | Facility: CLINIC | Age: 61
End: 2021-06-01
Payer: COMMERCIAL

## 2021-06-01 VITALS
BODY MASS INDEX: 47.27 KG/M2 | HEART RATE: 57 BPM | WEIGHT: 294.13 LBS | DIASTOLIC BLOOD PRESSURE: 78 MMHG | HEIGHT: 66 IN | SYSTOLIC BLOOD PRESSURE: 129 MMHG

## 2021-06-01 DIAGNOSIS — E11.9 TYPE 2 DIABETES MELLITUS WITHOUT COMPLICATION, WITHOUT LONG-TERM CURRENT USE OF INSULIN: Primary | ICD-10-CM

## 2021-06-01 PROCEDURE — 99999 PR PBB SHADOW E&M-EST. PATIENT-LVL IV: ICD-10-PCS | Mod: PBBFAC,,, | Performed by: PODIATRIST

## 2021-06-01 PROCEDURE — 3074F PR MOST RECENT SYSTOLIC BLOOD PRESSURE < 130 MM HG: ICD-10-PCS | Mod: CPTII,S$GLB,, | Performed by: PODIATRIST

## 2021-06-01 PROCEDURE — 3008F BODY MASS INDEX DOCD: CPT | Mod: CPTII,S$GLB,, | Performed by: PODIATRIST

## 2021-06-01 PROCEDURE — 3044F HG A1C LEVEL LT 7.0%: CPT | Mod: CPTII,S$GLB,, | Performed by: PODIATRIST

## 2021-06-01 PROCEDURE — 1126F AMNT PAIN NOTED NONE PRSNT: CPT | Mod: S$GLB,,, | Performed by: PODIATRIST

## 2021-06-01 PROCEDURE — 3044F PR MOST RECENT HEMOGLOBIN A1C LEVEL <7.0%: ICD-10-PCS | Mod: CPTII,S$GLB,, | Performed by: PODIATRIST

## 2021-06-01 PROCEDURE — 3078F DIAST BP <80 MM HG: CPT | Mod: CPTII,S$GLB,, | Performed by: PODIATRIST

## 2021-06-01 PROCEDURE — 99999 PR PBB SHADOW E&M-EST. PATIENT-LVL IV: CPT | Mod: PBBFAC,,, | Performed by: PODIATRIST

## 2021-06-01 PROCEDURE — 99203 OFFICE O/P NEW LOW 30 MIN: CPT | Mod: S$GLB,,, | Performed by: PODIATRIST

## 2021-06-01 PROCEDURE — 3074F SYST BP LT 130 MM HG: CPT | Mod: CPTII,S$GLB,, | Performed by: PODIATRIST

## 2021-06-01 PROCEDURE — 3008F PR BODY MASS INDEX (BMI) DOCUMENTED: ICD-10-PCS | Mod: CPTII,S$GLB,, | Performed by: PODIATRIST

## 2021-06-01 PROCEDURE — 1126F PR PAIN SEVERITY QUANTIFIED, NO PAIN PRESENT: ICD-10-PCS | Mod: S$GLB,,, | Performed by: PODIATRIST

## 2021-06-01 PROCEDURE — 3078F PR MOST RECENT DIASTOLIC BLOOD PRESSURE < 80 MM HG: ICD-10-PCS | Mod: CPTII,S$GLB,, | Performed by: PODIATRIST

## 2021-06-01 PROCEDURE — 99203 PR OFFICE/OUTPT VISIT, NEW, LEVL III, 30-44 MIN: ICD-10-PCS | Mod: S$GLB,,, | Performed by: PODIATRIST

## 2021-06-04 ENCOUNTER — CLINICAL SUPPORT (OUTPATIENT)
Dept: REHABILITATION | Facility: HOSPITAL | Age: 61
End: 2021-06-04
Payer: COMMERCIAL

## 2021-06-04 DIAGNOSIS — I87.2 VENOUS INSUFFICIENCY OF LEFT LOWER EXTREMITY: ICD-10-CM

## 2021-06-04 DIAGNOSIS — I89.0 LYMPHEDEMA OF BOTH LOWER EXTREMITIES: ICD-10-CM

## 2021-06-04 DIAGNOSIS — R60.0 EDEMA OF BOTH LOWER EXTREMITIES: ICD-10-CM

## 2021-06-04 PROCEDURE — 97140 MANUAL THERAPY 1/> REGIONS: CPT | Mod: CQ

## 2021-06-04 PROCEDURE — 97016 VASOPNEUMATIC DEVICE THERAPY: CPT | Mod: CQ

## 2021-06-07 ENCOUNTER — DOCUMENTATION ONLY (OUTPATIENT)
Dept: REHABILITATION | Facility: HOSPITAL | Age: 61
End: 2021-06-07

## 2021-06-09 ENCOUNTER — CLINICAL SUPPORT (OUTPATIENT)
Dept: REHABILITATION | Facility: HOSPITAL | Age: 61
End: 2021-06-09
Payer: COMMERCIAL

## 2021-06-09 DIAGNOSIS — I87.2 VENOUS INSUFFICIENCY OF LEFT LOWER EXTREMITY: ICD-10-CM

## 2021-06-09 DIAGNOSIS — R60.0 EDEMA OF BOTH LOWER EXTREMITIES: ICD-10-CM

## 2021-06-09 DIAGNOSIS — I89.0 LYMPHEDEMA OF BOTH LOWER EXTREMITIES: ICD-10-CM

## 2021-06-09 PROCEDURE — 97140 MANUAL THERAPY 1/> REGIONS: CPT

## 2021-06-11 ENCOUNTER — CLINICAL SUPPORT (OUTPATIENT)
Dept: REHABILITATION | Facility: HOSPITAL | Age: 61
End: 2021-06-11
Payer: COMMERCIAL

## 2021-06-11 DIAGNOSIS — R60.0 EDEMA OF BOTH LOWER EXTREMITIES: ICD-10-CM

## 2021-06-11 DIAGNOSIS — I89.0 LYMPHEDEMA OF BOTH LOWER EXTREMITIES: ICD-10-CM

## 2021-06-11 DIAGNOSIS — I87.2 VENOUS INSUFFICIENCY OF LEFT LOWER EXTREMITY: ICD-10-CM

## 2021-06-11 PROCEDURE — 97535 SELF CARE MNGMENT TRAINING: CPT

## 2021-06-17 ENCOUNTER — TELEPHONE (OUTPATIENT)
Dept: CARDIOLOGY | Facility: CLINIC | Age: 61
End: 2021-06-17

## 2021-06-17 RX ORDER — BUMETANIDE 1 MG/1
1 TABLET ORAL 2 TIMES DAILY
Qty: 180 TABLET | Refills: 3 | Status: SHIPPED | OUTPATIENT
Start: 2021-06-17 | End: 2021-06-21 | Stop reason: SDUPTHER

## 2021-06-21 RX ORDER — BUMETANIDE 1 MG/1
1 TABLET ORAL 2 TIMES DAILY
Qty: 180 TABLET | Refills: 3 | Status: SHIPPED | OUTPATIENT
Start: 2021-06-21 | End: 2022-02-03

## 2021-06-24 ENCOUNTER — TELEPHONE (OUTPATIENT)
Dept: CARDIOLOGY | Facility: CLINIC | Age: 61
End: 2021-06-24

## 2021-07-02 ENCOUNTER — CLINICAL SUPPORT (OUTPATIENT)
Dept: REHABILITATION | Facility: HOSPITAL | Age: 61
End: 2021-07-02
Payer: COMMERCIAL

## 2021-07-02 ENCOUNTER — DOCUMENTATION ONLY (OUTPATIENT)
Dept: REHABILITATION | Facility: HOSPITAL | Age: 61
End: 2021-07-02

## 2021-07-02 ENCOUNTER — TELEPHONE (OUTPATIENT)
Dept: REHABILITATION | Facility: HOSPITAL | Age: 61
End: 2021-07-02

## 2021-07-02 DIAGNOSIS — I87.2 VENOUS INSUFFICIENCY OF LEFT LOWER EXTREMITY: ICD-10-CM

## 2021-07-02 DIAGNOSIS — I89.0 LYMPHEDEMA OF BOTH LOWER EXTREMITIES: ICD-10-CM

## 2021-07-02 DIAGNOSIS — R60.0 EDEMA OF BOTH LOWER EXTREMITIES: ICD-10-CM

## 2021-07-06 ENCOUNTER — PATIENT MESSAGE (OUTPATIENT)
Dept: ADMINISTRATIVE | Facility: HOSPITAL | Age: 61
End: 2021-07-06

## 2021-07-10 ENCOUNTER — PATIENT OUTREACH (OUTPATIENT)
Dept: ADMINISTRATIVE | Facility: OTHER | Age: 61
End: 2021-07-10

## 2021-07-13 ENCOUNTER — OFFICE VISIT (OUTPATIENT)
Dept: CARDIOLOGY | Facility: CLINIC | Age: 61
End: 2021-07-13
Payer: COMMERCIAL

## 2021-07-13 ENCOUNTER — LAB VISIT (OUTPATIENT)
Dept: LAB | Facility: HOSPITAL | Age: 61
End: 2021-07-13
Attending: INTERNAL MEDICINE
Payer: COMMERCIAL

## 2021-07-13 VITALS
OXYGEN SATURATION: 97 % | DIASTOLIC BLOOD PRESSURE: 66 MMHG | SYSTOLIC BLOOD PRESSURE: 110 MMHG | HEIGHT: 68 IN | HEART RATE: 53 BPM | WEIGHT: 291.69 LBS | BODY MASS INDEX: 44.21 KG/M2

## 2021-07-13 DIAGNOSIS — R60.0 EDEMA OF BOTH LOWER EXTREMITIES: ICD-10-CM

## 2021-07-13 DIAGNOSIS — I89.0 LYMPHEDEMA OF BOTH LOWER EXTREMITIES: ICD-10-CM

## 2021-07-13 DIAGNOSIS — I87.2 VENOUS INSUFFICIENCY OF LEFT LOWER EXTREMITY: ICD-10-CM

## 2021-07-13 DIAGNOSIS — I87.2 VENOUS INSUFFICIENCY OF LEFT LOWER EXTREMITY: Primary | ICD-10-CM

## 2021-07-13 DIAGNOSIS — E66.01 MORBID OBESITY WITH BMI OF 40.0-44.9, ADULT: ICD-10-CM

## 2021-07-13 LAB
ALBUMIN SERPL BCP-MCNC: 3.9 G/DL (ref 3.5–5.2)
ALP SERPL-CCNC: 113 U/L (ref 55–135)
ALT SERPL W/O P-5'-P-CCNC: 14 U/L (ref 10–44)
ANION GAP SERPL CALC-SCNC: 9 MMOL/L (ref 8–16)
AST SERPL-CCNC: 19 U/L (ref 10–40)
BILIRUB SERPL-MCNC: 0.9 MG/DL (ref 0.1–1)
BUN SERPL-MCNC: 10 MG/DL (ref 6–20)
CALCIUM SERPL-MCNC: 9.5 MG/DL (ref 8.7–10.5)
CHLORIDE SERPL-SCNC: 101 MMOL/L (ref 95–110)
CO2 SERPL-SCNC: 30 MMOL/L (ref 23–29)
CREAT SERPL-MCNC: 1.1 MG/DL (ref 0.5–1.4)
EST. GFR  (AFRICAN AMERICAN): >60 ML/MIN/1.73 M^2
EST. GFR  (NON AFRICAN AMERICAN): >60 ML/MIN/1.73 M^2
GLUCOSE SERPL-MCNC: 102 MG/DL (ref 70–110)
POTASSIUM SERPL-SCNC: 3.8 MMOL/L (ref 3.5–5.1)
PROT SERPL-MCNC: 6.9 G/DL (ref 6–8.4)
SODIUM SERPL-SCNC: 140 MMOL/L (ref 136–145)

## 2021-07-13 PROCEDURE — 3008F PR BODY MASS INDEX (BMI) DOCUMENTED: ICD-10-PCS | Mod: CPTII,S$GLB,, | Performed by: INTERNAL MEDICINE

## 2021-07-13 PROCEDURE — 3044F PR MOST RECENT HEMOGLOBIN A1C LEVEL <7.0%: ICD-10-PCS | Mod: CPTII,S$GLB,, | Performed by: INTERNAL MEDICINE

## 2021-07-13 PROCEDURE — 3044F HG A1C LEVEL LT 7.0%: CPT | Mod: CPTII,S$GLB,, | Performed by: INTERNAL MEDICINE

## 2021-07-13 PROCEDURE — 99999 PR PBB SHADOW E&M-EST. PATIENT-LVL V: ICD-10-PCS | Mod: PBBFAC,,, | Performed by: INTERNAL MEDICINE

## 2021-07-13 PROCEDURE — 99215 OFFICE O/P EST HI 40 MIN: CPT | Mod: S$GLB,,, | Performed by: INTERNAL MEDICINE

## 2021-07-13 PROCEDURE — 1126F AMNT PAIN NOTED NONE PRSNT: CPT | Mod: S$GLB,,, | Performed by: INTERNAL MEDICINE

## 2021-07-13 PROCEDURE — 3008F BODY MASS INDEX DOCD: CPT | Mod: CPTII,S$GLB,, | Performed by: INTERNAL MEDICINE

## 2021-07-13 PROCEDURE — 1126F PR PAIN SEVERITY QUANTIFIED, NO PAIN PRESENT: ICD-10-PCS | Mod: S$GLB,,, | Performed by: INTERNAL MEDICINE

## 2021-07-13 PROCEDURE — 99999 PR PBB SHADOW E&M-EST. PATIENT-LVL V: CPT | Mod: PBBFAC,,, | Performed by: INTERNAL MEDICINE

## 2021-07-13 PROCEDURE — 36415 COLL VENOUS BLD VENIPUNCTURE: CPT | Performed by: INTERNAL MEDICINE

## 2021-07-13 PROCEDURE — 80053 COMPREHEN METABOLIC PANEL: CPT | Performed by: INTERNAL MEDICINE

## 2021-07-13 PROCEDURE — 99215 PR OFFICE/OUTPT VISIT, EST, LEVL V, 40-54 MIN: ICD-10-PCS | Mod: S$GLB,,, | Performed by: INTERNAL MEDICINE

## 2021-07-14 ENCOUNTER — OFFICE VISIT (OUTPATIENT)
Dept: PODIATRY | Facility: CLINIC | Age: 61
End: 2021-07-14
Payer: COMMERCIAL

## 2021-07-14 VITALS
BODY MASS INDEX: 45.21 KG/M2 | WEIGHT: 298.31 LBS | HEART RATE: 57 BPM | SYSTOLIC BLOOD PRESSURE: 120 MMHG | DIASTOLIC BLOOD PRESSURE: 72 MMHG | HEIGHT: 68 IN

## 2021-07-14 DIAGNOSIS — S93.401A SPRAIN OF RIGHT ANKLE, UNSPECIFIED LIGAMENT, INITIAL ENCOUNTER: Primary | ICD-10-CM

## 2021-07-14 PROCEDURE — 3074F SYST BP LT 130 MM HG: CPT | Mod: CPTII,S$GLB,, | Performed by: PODIATRIST

## 2021-07-14 PROCEDURE — 3008F PR BODY MASS INDEX (BMI) DOCUMENTED: ICD-10-PCS | Mod: CPTII,S$GLB,, | Performed by: PODIATRIST

## 2021-07-14 PROCEDURE — 99213 OFFICE O/P EST LOW 20 MIN: CPT | Mod: S$GLB,,, | Performed by: PODIATRIST

## 2021-07-14 PROCEDURE — 3078F DIAST BP <80 MM HG: CPT | Mod: CPTII,S$GLB,, | Performed by: PODIATRIST

## 2021-07-14 PROCEDURE — 3008F BODY MASS INDEX DOCD: CPT | Mod: CPTII,S$GLB,, | Performed by: PODIATRIST

## 2021-07-14 PROCEDURE — 3074F PR MOST RECENT SYSTOLIC BLOOD PRESSURE < 130 MM HG: ICD-10-PCS | Mod: CPTII,S$GLB,, | Performed by: PODIATRIST

## 2021-07-14 PROCEDURE — 99999 PR PBB SHADOW E&M-EST. PATIENT-LVL IV: CPT | Mod: PBBFAC,,, | Performed by: PODIATRIST

## 2021-07-14 PROCEDURE — 1125F AMNT PAIN NOTED PAIN PRSNT: CPT | Mod: CPTII,S$GLB,, | Performed by: PODIATRIST

## 2021-07-14 PROCEDURE — 3078F PR MOST RECENT DIASTOLIC BLOOD PRESSURE < 80 MM HG: ICD-10-PCS | Mod: CPTII,S$GLB,, | Performed by: PODIATRIST

## 2021-07-14 PROCEDURE — 3044F HG A1C LEVEL LT 7.0%: CPT | Mod: CPTII,S$GLB,, | Performed by: PODIATRIST

## 2021-07-14 PROCEDURE — 3044F PR MOST RECENT HEMOGLOBIN A1C LEVEL <7.0%: ICD-10-PCS | Mod: CPTII,S$GLB,, | Performed by: PODIATRIST

## 2021-07-14 PROCEDURE — 99999 PR PBB SHADOW E&M-EST. PATIENT-LVL IV: ICD-10-PCS | Mod: PBBFAC,,, | Performed by: PODIATRIST

## 2021-07-14 PROCEDURE — 1125F PR PAIN SEVERITY QUANTIFIED, PAIN PRESENT: ICD-10-PCS | Mod: CPTII,S$GLB,, | Performed by: PODIATRIST

## 2021-07-14 PROCEDURE — 99213 PR OFFICE/OUTPT VISIT, EST, LEVL III, 20-29 MIN: ICD-10-PCS | Mod: S$GLB,,, | Performed by: PODIATRIST

## 2021-07-14 RX ORDER — MELOXICAM 15 MG/1
15 TABLET ORAL DAILY
Qty: 30 TABLET | Refills: 0 | Status: SHIPPED | OUTPATIENT
Start: 2021-07-14 | End: 2021-09-23

## 2021-07-15 ENCOUNTER — TELEPHONE (OUTPATIENT)
Dept: PODIATRY | Facility: CLINIC | Age: 61
End: 2021-07-15

## 2021-07-16 ENCOUNTER — TELEPHONE (OUTPATIENT)
Dept: PODIATRY | Facility: CLINIC | Age: 61
End: 2021-07-16

## 2021-07-20 ENCOUNTER — OFFICE VISIT (OUTPATIENT)
Dept: URGENT CARE | Facility: CLINIC | Age: 61
End: 2021-07-20
Payer: COMMERCIAL

## 2021-07-20 VITALS
DIASTOLIC BLOOD PRESSURE: 69 MMHG | HEIGHT: 68 IN | BODY MASS INDEX: 45.47 KG/M2 | HEART RATE: 64 BPM | TEMPERATURE: 98 F | RESPIRATION RATE: 14 BRPM | SYSTOLIC BLOOD PRESSURE: 118 MMHG | WEIGHT: 300 LBS | OXYGEN SATURATION: 97 %

## 2021-07-20 DIAGNOSIS — J01.90 ACUTE NON-RECURRENT SINUSITIS, UNSPECIFIED LOCATION: Primary | ICD-10-CM

## 2021-07-20 LAB
CTP QC/QA: NORMAL
CTP QC/QA: YES
MOLECULAR STREP A: NORMAL
SARS-COV-2 RDRP RESP QL NAA+PROBE: NEGATIVE

## 2021-07-20 PROCEDURE — 87651 STREP A DNA AMP PROBE: CPT | Mod: QW,S$GLB,, | Performed by: STUDENT IN AN ORGANIZED HEALTH CARE EDUCATION/TRAINING PROGRAM

## 2021-07-20 PROCEDURE — 3044F PR MOST RECENT HEMOGLOBIN A1C LEVEL <7.0%: ICD-10-PCS | Mod: CPTII,S$GLB,, | Performed by: STUDENT IN AN ORGANIZED HEALTH CARE EDUCATION/TRAINING PROGRAM

## 2021-07-20 PROCEDURE — 3074F PR MOST RECENT SYSTOLIC BLOOD PRESSURE < 130 MM HG: ICD-10-PCS | Mod: CPTII,S$GLB,, | Performed by: STUDENT IN AN ORGANIZED HEALTH CARE EDUCATION/TRAINING PROGRAM

## 2021-07-20 PROCEDURE — 3074F SYST BP LT 130 MM HG: CPT | Mod: CPTII,S$GLB,, | Performed by: STUDENT IN AN ORGANIZED HEALTH CARE EDUCATION/TRAINING PROGRAM

## 2021-07-20 PROCEDURE — 3044F HG A1C LEVEL LT 7.0%: CPT | Mod: CPTII,S$GLB,, | Performed by: STUDENT IN AN ORGANIZED HEALTH CARE EDUCATION/TRAINING PROGRAM

## 2021-07-20 PROCEDURE — 99214 OFFICE O/P EST MOD 30 MIN: CPT | Mod: S$GLB,,, | Performed by: STUDENT IN AN ORGANIZED HEALTH CARE EDUCATION/TRAINING PROGRAM

## 2021-07-20 PROCEDURE — 3008F BODY MASS INDEX DOCD: CPT | Mod: CPTII,S$GLB,, | Performed by: STUDENT IN AN ORGANIZED HEALTH CARE EDUCATION/TRAINING PROGRAM

## 2021-07-20 PROCEDURE — 87651 POCT STREP A MOLECULAR: ICD-10-PCS | Mod: QW,S$GLB,, | Performed by: STUDENT IN AN ORGANIZED HEALTH CARE EDUCATION/TRAINING PROGRAM

## 2021-07-20 PROCEDURE — 3078F PR MOST RECENT DIASTOLIC BLOOD PRESSURE < 80 MM HG: ICD-10-PCS | Mod: CPTII,S$GLB,, | Performed by: STUDENT IN AN ORGANIZED HEALTH CARE EDUCATION/TRAINING PROGRAM

## 2021-07-20 PROCEDURE — U0002 COVID-19 LAB TEST NON-CDC: HCPCS | Mod: QW,S$GLB,, | Performed by: STUDENT IN AN ORGANIZED HEALTH CARE EDUCATION/TRAINING PROGRAM

## 2021-07-20 PROCEDURE — U0002: ICD-10-PCS | Mod: QW,S$GLB,, | Performed by: STUDENT IN AN ORGANIZED HEALTH CARE EDUCATION/TRAINING PROGRAM

## 2021-07-20 PROCEDURE — 3078F DIAST BP <80 MM HG: CPT | Mod: CPTII,S$GLB,, | Performed by: STUDENT IN AN ORGANIZED HEALTH CARE EDUCATION/TRAINING PROGRAM

## 2021-07-20 PROCEDURE — 99214 PR OFFICE/OUTPT VISIT, EST, LEVL IV, 30-39 MIN: ICD-10-PCS | Mod: S$GLB,,, | Performed by: STUDENT IN AN ORGANIZED HEALTH CARE EDUCATION/TRAINING PROGRAM

## 2021-07-20 PROCEDURE — 3008F PR BODY MASS INDEX (BMI) DOCUMENTED: ICD-10-PCS | Mod: CPTII,S$GLB,, | Performed by: STUDENT IN AN ORGANIZED HEALTH CARE EDUCATION/TRAINING PROGRAM

## 2021-07-20 RX ORDER — AMOXICILLIN AND CLAVULANATE POTASSIUM 875; 125 MG/1; MG/1
1 TABLET, FILM COATED ORAL EVERY 12 HOURS
Qty: 20 TABLET | Refills: 0 | Status: SHIPPED | OUTPATIENT
Start: 2021-07-20 | End: 2021-07-30

## 2021-07-20 RX ORDER — PREDNISONE 20 MG/1
20 TABLET ORAL DAILY
Qty: 3 TABLET | Refills: 0 | Status: SHIPPED | OUTPATIENT
Start: 2021-07-20 | End: 2021-07-23

## 2021-07-22 ENCOUNTER — OFFICE VISIT (OUTPATIENT)
Dept: INTERNAL MEDICINE | Facility: CLINIC | Age: 61
End: 2021-07-22
Payer: COMMERCIAL

## 2021-07-22 VITALS
DIASTOLIC BLOOD PRESSURE: 74 MMHG | OXYGEN SATURATION: 98 % | BODY MASS INDEX: 44.84 KG/M2 | SYSTOLIC BLOOD PRESSURE: 136 MMHG | HEART RATE: 62 BPM | TEMPERATURE: 98 F | HEIGHT: 68 IN | WEIGHT: 295.88 LBS

## 2021-07-22 DIAGNOSIS — R05.9 COUGH: ICD-10-CM

## 2021-07-22 DIAGNOSIS — J32.9 SINUSITIS, UNSPECIFIED CHRONICITY, UNSPECIFIED LOCATION: Primary | ICD-10-CM

## 2021-07-22 PROCEDURE — 3078F DIAST BP <80 MM HG: CPT | Mod: CPTII,S$GLB,, | Performed by: NURSE PRACTITIONER

## 2021-07-22 PROCEDURE — 3075F PR MOST RECENT SYSTOLIC BLOOD PRESS GE 130-139MM HG: ICD-10-PCS | Mod: CPTII,S$GLB,, | Performed by: NURSE PRACTITIONER

## 2021-07-22 PROCEDURE — 3044F HG A1C LEVEL LT 7.0%: CPT | Mod: CPTII,S$GLB,, | Performed by: NURSE PRACTITIONER

## 2021-07-22 PROCEDURE — 3008F BODY MASS INDEX DOCD: CPT | Mod: CPTII,S$GLB,, | Performed by: NURSE PRACTITIONER

## 2021-07-22 PROCEDURE — 1126F AMNT PAIN NOTED NONE PRSNT: CPT | Mod: CPTII,S$GLB,, | Performed by: NURSE PRACTITIONER

## 2021-07-22 PROCEDURE — 99999 PR PBB SHADOW E&M-EST. PATIENT-LVL V: CPT | Mod: PBBFAC,,, | Performed by: NURSE PRACTITIONER

## 2021-07-22 PROCEDURE — 99999 PR PBB SHADOW E&M-EST. PATIENT-LVL V: ICD-10-PCS | Mod: PBBFAC,,, | Performed by: NURSE PRACTITIONER

## 2021-07-22 PROCEDURE — 3075F SYST BP GE 130 - 139MM HG: CPT | Mod: CPTII,S$GLB,, | Performed by: NURSE PRACTITIONER

## 2021-07-22 PROCEDURE — 1126F PR PAIN SEVERITY QUANTIFIED, NO PAIN PRESENT: ICD-10-PCS | Mod: CPTII,S$GLB,, | Performed by: NURSE PRACTITIONER

## 2021-07-22 PROCEDURE — U0005 INFEC AGEN DETEC AMPLI PROBE: HCPCS | Performed by: NURSE PRACTITIONER

## 2021-07-22 PROCEDURE — 3008F PR BODY MASS INDEX (BMI) DOCUMENTED: ICD-10-PCS | Mod: CPTII,S$GLB,, | Performed by: NURSE PRACTITIONER

## 2021-07-22 PROCEDURE — 3044F PR MOST RECENT HEMOGLOBIN A1C LEVEL <7.0%: ICD-10-PCS | Mod: CPTII,S$GLB,, | Performed by: NURSE PRACTITIONER

## 2021-07-22 PROCEDURE — 99214 OFFICE O/P EST MOD 30 MIN: CPT | Mod: S$GLB,,, | Performed by: NURSE PRACTITIONER

## 2021-07-22 PROCEDURE — 99214 PR OFFICE/OUTPT VISIT, EST, LEVL IV, 30-39 MIN: ICD-10-PCS | Mod: S$GLB,,, | Performed by: NURSE PRACTITIONER

## 2021-07-22 PROCEDURE — 3078F PR MOST RECENT DIASTOLIC BLOOD PRESSURE < 80 MM HG: ICD-10-PCS | Mod: CPTII,S$GLB,, | Performed by: NURSE PRACTITIONER

## 2021-07-22 PROCEDURE — U0003 INFECTIOUS AGENT DETECTION BY NUCLEIC ACID (DNA OR RNA); SEVERE ACUTE RESPIRATORY SYNDROME CORONAVIRUS 2 (SARS-COV-2) (CORONAVIRUS DISEASE [COVID-19]), AMPLIFIED PROBE TECHNIQUE, MAKING USE OF HIGH THROUGHPUT TECHNOLOGIES AS DESCRIBED BY CMS-2020-01-R: HCPCS | Performed by: NURSE PRACTITIONER

## 2021-07-22 RX ORDER — BENZONATATE 100 MG/1
100 CAPSULE ORAL 3 TIMES DAILY PRN
Qty: 30 CAPSULE | Refills: 0 | Status: SHIPPED | OUTPATIENT
Start: 2021-07-22 | End: 2021-08-01

## 2021-07-22 RX ORDER — FLUTICASONE PROPIONATE 50 MCG
1 SPRAY, SUSPENSION (ML) NASAL DAILY
Qty: 16 G | Refills: 0 | Status: SHIPPED | OUTPATIENT
Start: 2021-07-22 | End: 2022-11-11

## 2021-07-24 LAB
SARS-COV-2 RNA RESP QL NAA+PROBE: NOT DETECTED
SARS-COV-2- CYCLE NUMBER: -1

## 2021-08-03 ENCOUNTER — PATIENT OUTREACH (OUTPATIENT)
Dept: ADMINISTRATIVE | Facility: HOSPITAL | Age: 61
End: 2021-08-03

## 2021-08-10 ENCOUNTER — PATIENT OUTREACH (OUTPATIENT)
Dept: ADMINISTRATIVE | Facility: OTHER | Age: 61
End: 2021-08-10

## 2021-09-22 ENCOUNTER — PATIENT OUTREACH (OUTPATIENT)
Dept: ADMINISTRATIVE | Facility: OTHER | Age: 61
End: 2021-09-22

## 2021-09-23 ENCOUNTER — OFFICE VISIT (OUTPATIENT)
Dept: CARDIOLOGY | Facility: CLINIC | Age: 61
End: 2021-09-23
Payer: COMMERCIAL

## 2021-09-23 VITALS
WEIGHT: 294.63 LBS | SYSTOLIC BLOOD PRESSURE: 117 MMHG | HEIGHT: 68 IN | HEART RATE: 71 BPM | BODY MASS INDEX: 44.65 KG/M2 | OXYGEN SATURATION: 98 % | DIASTOLIC BLOOD PRESSURE: 64 MMHG

## 2021-09-23 DIAGNOSIS — R07.89 CHEST DISCOMFORT: Primary | ICD-10-CM

## 2021-09-23 DIAGNOSIS — R06.02 SHORT OF BREATH ON EXERTION: ICD-10-CM

## 2021-09-23 DIAGNOSIS — G47.33 OBSTRUCTIVE SLEEP APNEA: ICD-10-CM

## 2021-09-23 DIAGNOSIS — Z91.89 MULTIPLE RISK FACTORS FOR CORONARY ARTERY DISEASE: ICD-10-CM

## 2021-09-23 DIAGNOSIS — I10 ESSENTIAL HYPERTENSION: ICD-10-CM

## 2021-09-23 DIAGNOSIS — Z71.89 EDUCATED ABOUT COVID-19 VIRUS INFECTION: ICD-10-CM

## 2021-09-23 DIAGNOSIS — I48.0 PAROXYSMAL ATRIAL FIBRILLATION: ICD-10-CM

## 2021-09-23 DIAGNOSIS — Z98.890 S/P ABLATION OF ATRIAL FIBRILLATION: ICD-10-CM

## 2021-09-23 DIAGNOSIS — Z86.79 S/P ABLATION OF ATRIAL FIBRILLATION: ICD-10-CM

## 2021-09-23 DIAGNOSIS — I89.0 LYMPHEDEMA OF BOTH LOWER EXTREMITIES: ICD-10-CM

## 2021-09-23 DIAGNOSIS — I77.89 ENLARGED THORACIC AORTA: ICD-10-CM

## 2021-09-23 PROCEDURE — 3074F PR MOST RECENT SYSTOLIC BLOOD PRESSURE < 130 MM HG: ICD-10-PCS | Mod: CPTII,S$GLB,, | Performed by: INTERNAL MEDICINE

## 2021-09-23 PROCEDURE — 93010 EKG 12-LEAD: ICD-10-PCS | Mod: S$GLB,,, | Performed by: INTERNAL MEDICINE

## 2021-09-23 PROCEDURE — 3008F PR BODY MASS INDEX (BMI) DOCUMENTED: ICD-10-PCS | Mod: CPTII,S$GLB,, | Performed by: INTERNAL MEDICINE

## 2021-09-23 PROCEDURE — 3008F BODY MASS INDEX DOCD: CPT | Mod: CPTII,S$GLB,, | Performed by: INTERNAL MEDICINE

## 2021-09-23 PROCEDURE — 93005 EKG 12-LEAD: ICD-10-PCS | Mod: S$GLB,,, | Performed by: INTERNAL MEDICINE

## 2021-09-23 PROCEDURE — 3061F NEG MICROALBUMINURIA REV: CPT | Mod: CPTII,S$GLB,, | Performed by: INTERNAL MEDICINE

## 2021-09-23 PROCEDURE — 3061F PR NEG MICROALBUMINURIA RESULT DOCUMENTED/REVIEW: ICD-10-PCS | Mod: CPTII,S$GLB,, | Performed by: INTERNAL MEDICINE

## 2021-09-23 PROCEDURE — 99999 PR PBB SHADOW E&M-EST. PATIENT-LVL V: CPT | Mod: PBBFAC,,, | Performed by: INTERNAL MEDICINE

## 2021-09-23 PROCEDURE — 3044F PR MOST RECENT HEMOGLOBIN A1C LEVEL <7.0%: ICD-10-PCS | Mod: CPTII,S$GLB,, | Performed by: INTERNAL MEDICINE

## 2021-09-23 PROCEDURE — 93005 ELECTROCARDIOGRAM TRACING: CPT | Mod: S$GLB,,, | Performed by: INTERNAL MEDICINE

## 2021-09-23 PROCEDURE — 3078F PR MOST RECENT DIASTOLIC BLOOD PRESSURE < 80 MM HG: ICD-10-PCS | Mod: CPTII,S$GLB,, | Performed by: INTERNAL MEDICINE

## 2021-09-23 PROCEDURE — 3066F PR DOCUMENTATION OF TREATMENT FOR NEPHROPATHY: ICD-10-PCS | Mod: CPTII,S$GLB,, | Performed by: INTERNAL MEDICINE

## 2021-09-23 PROCEDURE — 99214 PR OFFICE/OUTPT VISIT, EST, LEVL IV, 30-39 MIN: ICD-10-PCS | Mod: S$GLB,,, | Performed by: INTERNAL MEDICINE

## 2021-09-23 PROCEDURE — 3078F DIAST BP <80 MM HG: CPT | Mod: CPTII,S$GLB,, | Performed by: INTERNAL MEDICINE

## 2021-09-23 PROCEDURE — 3066F NEPHROPATHY DOC TX: CPT | Mod: CPTII,S$GLB,, | Performed by: INTERNAL MEDICINE

## 2021-09-23 PROCEDURE — 99999 PR PBB SHADOW E&M-EST. PATIENT-LVL V: ICD-10-PCS | Mod: PBBFAC,,, | Performed by: INTERNAL MEDICINE

## 2021-09-23 PROCEDURE — 93010 ELECTROCARDIOGRAM REPORT: CPT | Mod: S$GLB,,, | Performed by: INTERNAL MEDICINE

## 2021-09-23 PROCEDURE — 3074F SYST BP LT 130 MM HG: CPT | Mod: CPTII,S$GLB,, | Performed by: INTERNAL MEDICINE

## 2021-09-23 PROCEDURE — 99214 OFFICE O/P EST MOD 30 MIN: CPT | Mod: S$GLB,,, | Performed by: INTERNAL MEDICINE

## 2021-09-23 PROCEDURE — 3044F HG A1C LEVEL LT 7.0%: CPT | Mod: CPTII,S$GLB,, | Performed by: INTERNAL MEDICINE

## 2021-09-23 RX ORDER — ARIPIPRAZOLE 10 MG/1
10 TABLET ORAL DAILY
COMMUNITY
End: 2022-05-17

## 2021-09-29 ENCOUNTER — TELEPHONE (OUTPATIENT)
Dept: INTERNAL MEDICINE | Facility: CLINIC | Age: 61
End: 2021-09-29

## 2021-09-29 DIAGNOSIS — Z12.5 SCREENING PSA (PROSTATE SPECIFIC ANTIGEN): ICD-10-CM

## 2021-09-29 DIAGNOSIS — Z00.00 ENCOUNTER FOR PREVENTIVE HEALTH EXAMINATION: Primary | ICD-10-CM

## 2021-09-30 DIAGNOSIS — I48.19 PERSISTENT ATRIAL FIBRILLATION: ICD-10-CM

## 2021-10-05 ENCOUNTER — OFFICE VISIT (OUTPATIENT)
Dept: INTERNAL MEDICINE | Facility: CLINIC | Age: 61
End: 2021-10-05
Payer: COMMERCIAL

## 2021-10-05 VITALS
TEMPERATURE: 98 F | RESPIRATION RATE: 18 BRPM | HEIGHT: 68 IN | SYSTOLIC BLOOD PRESSURE: 108 MMHG | HEART RATE: 67 BPM | DIASTOLIC BLOOD PRESSURE: 64 MMHG | OXYGEN SATURATION: 97 % | BODY MASS INDEX: 45.07 KG/M2 | WEIGHT: 297.38 LBS

## 2021-10-05 DIAGNOSIS — Z86.79 S/P ABLATION OF ATRIAL FIBRILLATION: ICD-10-CM

## 2021-10-05 DIAGNOSIS — G47.33 OBSTRUCTIVE SLEEP APNEA: ICD-10-CM

## 2021-10-05 DIAGNOSIS — I87.2 VENOUS INSUFFICIENCY OF LEFT LOWER EXTREMITY: ICD-10-CM

## 2021-10-05 DIAGNOSIS — Z98.890 S/P ABLATION OF ATRIAL FIBRILLATION: ICD-10-CM

## 2021-10-05 DIAGNOSIS — I10 ESSENTIAL HYPERTENSION: Primary | ICD-10-CM

## 2021-10-05 PROCEDURE — 90686 FLU VACCINE (QUAD) GREATER THAN OR EQUAL TO 3YO PRESERVATIVE FREE IM: ICD-10-PCS | Mod: S$GLB,,, | Performed by: INTERNAL MEDICINE

## 2021-10-05 PROCEDURE — 90686 IIV4 VACC NO PRSV 0.5 ML IM: CPT | Mod: S$GLB,,, | Performed by: INTERNAL MEDICINE

## 2021-10-05 PROCEDURE — 3066F NEPHROPATHY DOC TX: CPT | Mod: CPTII,S$GLB,, | Performed by: INTERNAL MEDICINE

## 2021-10-05 PROCEDURE — 99213 OFFICE O/P EST LOW 20 MIN: CPT | Mod: 25,S$GLB,, | Performed by: INTERNAL MEDICINE

## 2021-10-05 PROCEDURE — 3078F DIAST BP <80 MM HG: CPT | Mod: CPTII,S$GLB,, | Performed by: INTERNAL MEDICINE

## 2021-10-05 PROCEDURE — 90471 FLU VACCINE (QUAD) GREATER THAN OR EQUAL TO 3YO PRESERVATIVE FREE IM: ICD-10-PCS | Mod: S$GLB,,, | Performed by: INTERNAL MEDICINE

## 2021-10-05 PROCEDURE — 3008F BODY MASS INDEX DOCD: CPT | Mod: CPTII,S$GLB,, | Performed by: INTERNAL MEDICINE

## 2021-10-05 PROCEDURE — 3061F PR NEG MICROALBUMINURIA RESULT DOCUMENTED/REVIEW: ICD-10-PCS | Mod: CPTII,S$GLB,, | Performed by: INTERNAL MEDICINE

## 2021-10-05 PROCEDURE — 3044F PR MOST RECENT HEMOGLOBIN A1C LEVEL <7.0%: ICD-10-PCS | Mod: CPTII,S$GLB,, | Performed by: INTERNAL MEDICINE

## 2021-10-05 PROCEDURE — 3074F PR MOST RECENT SYSTOLIC BLOOD PRESSURE < 130 MM HG: ICD-10-PCS | Mod: CPTII,S$GLB,, | Performed by: INTERNAL MEDICINE

## 2021-10-05 PROCEDURE — 90471 IMMUNIZATION ADMIN: CPT | Mod: S$GLB,,, | Performed by: INTERNAL MEDICINE

## 2021-10-05 PROCEDURE — 99213 PR OFFICE/OUTPT VISIT, EST, LEVL III, 20-29 MIN: ICD-10-PCS | Mod: 25,S$GLB,, | Performed by: INTERNAL MEDICINE

## 2021-10-05 PROCEDURE — 99999 PR PBB SHADOW E&M-EST. PATIENT-LVL IV: ICD-10-PCS | Mod: PBBFAC,,, | Performed by: INTERNAL MEDICINE

## 2021-10-05 PROCEDURE — 1159F PR MEDICATION LIST DOCUMENTED IN MEDICAL RECORD: ICD-10-PCS | Mod: CPTII,S$GLB,, | Performed by: INTERNAL MEDICINE

## 2021-10-05 PROCEDURE — 99999 PR PBB SHADOW E&M-EST. PATIENT-LVL IV: CPT | Mod: PBBFAC,,, | Performed by: INTERNAL MEDICINE

## 2021-10-05 PROCEDURE — 1159F MED LIST DOCD IN RCRD: CPT | Mod: CPTII,S$GLB,, | Performed by: INTERNAL MEDICINE

## 2021-10-05 PROCEDURE — 3078F PR MOST RECENT DIASTOLIC BLOOD PRESSURE < 80 MM HG: ICD-10-PCS | Mod: CPTII,S$GLB,, | Performed by: INTERNAL MEDICINE

## 2021-10-05 PROCEDURE — 3008F PR BODY MASS INDEX (BMI) DOCUMENTED: ICD-10-PCS | Mod: CPTII,S$GLB,, | Performed by: INTERNAL MEDICINE

## 2021-10-05 PROCEDURE — 3066F PR DOCUMENTATION OF TREATMENT FOR NEPHROPATHY: ICD-10-PCS | Mod: CPTII,S$GLB,, | Performed by: INTERNAL MEDICINE

## 2021-10-05 PROCEDURE — 3061F NEG MICROALBUMINURIA REV: CPT | Mod: CPTII,S$GLB,, | Performed by: INTERNAL MEDICINE

## 2021-10-05 PROCEDURE — 3074F SYST BP LT 130 MM HG: CPT | Mod: CPTII,S$GLB,, | Performed by: INTERNAL MEDICINE

## 2021-10-05 PROCEDURE — 3044F HG A1C LEVEL LT 7.0%: CPT | Mod: CPTII,S$GLB,, | Performed by: INTERNAL MEDICINE

## 2021-10-05 RX ORDER — OMEPRAZOLE 10 MG/1
20 CAPSULE, DELAYED RELEASE ORAL DAILY
Qty: 30 CAPSULE | Refills: 6 | Status: SHIPPED | OUTPATIENT
Start: 2021-10-05

## 2021-10-06 ENCOUNTER — OFFICE VISIT (OUTPATIENT)
Dept: PODIATRY | Facility: CLINIC | Age: 61
End: 2021-10-06
Payer: COMMERCIAL

## 2021-10-06 VITALS
BODY MASS INDEX: 45.07 KG/M2 | WEIGHT: 297.38 LBS | DIASTOLIC BLOOD PRESSURE: 70 MMHG | SYSTOLIC BLOOD PRESSURE: 120 MMHG | HEART RATE: 62 BPM | HEIGHT: 68 IN

## 2021-10-06 DIAGNOSIS — M79.671 RIGHT FOOT PAIN: Primary | ICD-10-CM

## 2021-10-06 PROCEDURE — 99999 PR PBB SHADOW E&M-EST. PATIENT-LVL IV: ICD-10-PCS | Mod: PBBFAC,,, | Performed by: PODIATRIST

## 2021-10-06 PROCEDURE — 99213 PR OFFICE/OUTPT VISIT, EST, LEVL III, 20-29 MIN: ICD-10-PCS | Mod: S$GLB,,, | Performed by: PODIATRIST

## 2021-10-06 PROCEDURE — 3066F NEPHROPATHY DOC TX: CPT | Mod: CPTII,S$GLB,, | Performed by: PODIATRIST

## 2021-10-06 PROCEDURE — 99213 OFFICE O/P EST LOW 20 MIN: CPT | Mod: S$GLB,,, | Performed by: PODIATRIST

## 2021-10-06 PROCEDURE — 3044F HG A1C LEVEL LT 7.0%: CPT | Mod: CPTII,S$GLB,, | Performed by: PODIATRIST

## 2021-10-06 PROCEDURE — 3061F NEG MICROALBUMINURIA REV: CPT | Mod: CPTII,S$GLB,, | Performed by: PODIATRIST

## 2021-10-06 PROCEDURE — 3074F PR MOST RECENT SYSTOLIC BLOOD PRESSURE < 130 MM HG: ICD-10-PCS | Mod: CPTII,S$GLB,, | Performed by: PODIATRIST

## 2021-10-06 PROCEDURE — 3066F PR DOCUMENTATION OF TREATMENT FOR NEPHROPATHY: ICD-10-PCS | Mod: CPTII,S$GLB,, | Performed by: PODIATRIST

## 2021-10-06 PROCEDURE — 3061F PR NEG MICROALBUMINURIA RESULT DOCUMENTED/REVIEW: ICD-10-PCS | Mod: CPTII,S$GLB,, | Performed by: PODIATRIST

## 2021-10-06 PROCEDURE — 3008F BODY MASS INDEX DOCD: CPT | Mod: CPTII,S$GLB,, | Performed by: PODIATRIST

## 2021-10-06 PROCEDURE — 3044F PR MOST RECENT HEMOGLOBIN A1C LEVEL <7.0%: ICD-10-PCS | Mod: CPTII,S$GLB,, | Performed by: PODIATRIST

## 2021-10-06 PROCEDURE — 99999 PR PBB SHADOW E&M-EST. PATIENT-LVL IV: CPT | Mod: PBBFAC,,, | Performed by: PODIATRIST

## 2021-10-06 PROCEDURE — 3078F DIAST BP <80 MM HG: CPT | Mod: CPTII,S$GLB,, | Performed by: PODIATRIST

## 2021-10-06 PROCEDURE — 3074F SYST BP LT 130 MM HG: CPT | Mod: CPTII,S$GLB,, | Performed by: PODIATRIST

## 2021-10-06 PROCEDURE — 3078F PR MOST RECENT DIASTOLIC BLOOD PRESSURE < 80 MM HG: ICD-10-PCS | Mod: CPTII,S$GLB,, | Performed by: PODIATRIST

## 2021-10-06 PROCEDURE — 3008F PR BODY MASS INDEX (BMI) DOCUMENTED: ICD-10-PCS | Mod: CPTII,S$GLB,, | Performed by: PODIATRIST

## 2021-10-14 ENCOUNTER — OFFICE VISIT (OUTPATIENT)
Dept: CARDIOLOGY | Facility: CLINIC | Age: 61
End: 2021-10-14
Payer: COMMERCIAL

## 2021-10-14 VITALS
SYSTOLIC BLOOD PRESSURE: 108 MMHG | WEIGHT: 293.63 LBS | DIASTOLIC BLOOD PRESSURE: 66 MMHG | BODY MASS INDEX: 44.5 KG/M2 | HEART RATE: 54 BPM | OXYGEN SATURATION: 97 % | HEIGHT: 68 IN

## 2021-10-14 DIAGNOSIS — E66.01 MORBID OBESITY WITH BMI OF 40.0-44.9, ADULT: ICD-10-CM

## 2021-10-14 DIAGNOSIS — Z74.09 MOBILITY IMPAIRED: ICD-10-CM

## 2021-10-14 DIAGNOSIS — I89.0 LYMPHEDEMA OF BOTH LOWER EXTREMITIES: Primary | ICD-10-CM

## 2021-10-14 DIAGNOSIS — R60.0 EDEMA OF BOTH LOWER EXTREMITIES: ICD-10-CM

## 2021-10-14 DIAGNOSIS — I87.2 VENOUS INSUFFICIENCY OF LEFT LOWER EXTREMITY: ICD-10-CM

## 2021-10-14 PROCEDURE — 3074F PR MOST RECENT SYSTOLIC BLOOD PRESSURE < 130 MM HG: ICD-10-PCS | Mod: CPTII,S$GLB,, | Performed by: INTERNAL MEDICINE

## 2021-10-14 PROCEDURE — 3061F NEG MICROALBUMINURIA REV: CPT | Mod: CPTII,S$GLB,, | Performed by: INTERNAL MEDICINE

## 2021-10-14 PROCEDURE — 3078F DIAST BP <80 MM HG: CPT | Mod: CPTII,S$GLB,, | Performed by: INTERNAL MEDICINE

## 2021-10-14 PROCEDURE — 3008F BODY MASS INDEX DOCD: CPT | Mod: CPTII,S$GLB,, | Performed by: INTERNAL MEDICINE

## 2021-10-14 PROCEDURE — 1159F MED LIST DOCD IN RCRD: CPT | Mod: CPTII,S$GLB,, | Performed by: INTERNAL MEDICINE

## 2021-10-14 PROCEDURE — 3044F HG A1C LEVEL LT 7.0%: CPT | Mod: CPTII,S$GLB,, | Performed by: INTERNAL MEDICINE

## 2021-10-14 PROCEDURE — 3066F PR DOCUMENTATION OF TREATMENT FOR NEPHROPATHY: ICD-10-PCS | Mod: CPTII,S$GLB,, | Performed by: INTERNAL MEDICINE

## 2021-10-14 PROCEDURE — 3078F PR MOST RECENT DIASTOLIC BLOOD PRESSURE < 80 MM HG: ICD-10-PCS | Mod: CPTII,S$GLB,, | Performed by: INTERNAL MEDICINE

## 2021-10-14 PROCEDURE — 1159F PR MEDICATION LIST DOCUMENTED IN MEDICAL RECORD: ICD-10-PCS | Mod: CPTII,S$GLB,, | Performed by: INTERNAL MEDICINE

## 2021-10-14 PROCEDURE — 99215 OFFICE O/P EST HI 40 MIN: CPT | Mod: S$GLB,,, | Performed by: INTERNAL MEDICINE

## 2021-10-14 PROCEDURE — 99999 PR PBB SHADOW E&M-EST. PATIENT-LVL V: ICD-10-PCS | Mod: PBBFAC,,, | Performed by: INTERNAL MEDICINE

## 2021-10-14 PROCEDURE — 3074F SYST BP LT 130 MM HG: CPT | Mod: CPTII,S$GLB,, | Performed by: INTERNAL MEDICINE

## 2021-10-14 PROCEDURE — 99999 PR PBB SHADOW E&M-EST. PATIENT-LVL V: CPT | Mod: PBBFAC,,, | Performed by: INTERNAL MEDICINE

## 2021-10-14 PROCEDURE — 99215 PR OFFICE/OUTPT VISIT, EST, LEVL V, 40-54 MIN: ICD-10-PCS | Mod: S$GLB,,, | Performed by: INTERNAL MEDICINE

## 2021-10-14 PROCEDURE — 3066F NEPHROPATHY DOC TX: CPT | Mod: CPTII,S$GLB,, | Performed by: INTERNAL MEDICINE

## 2021-10-14 PROCEDURE — 3061F PR NEG MICROALBUMINURIA RESULT DOCUMENTED/REVIEW: ICD-10-PCS | Mod: CPTII,S$GLB,, | Performed by: INTERNAL MEDICINE

## 2021-10-14 PROCEDURE — 3044F PR MOST RECENT HEMOGLOBIN A1C LEVEL <7.0%: ICD-10-PCS | Mod: CPTII,S$GLB,, | Performed by: INTERNAL MEDICINE

## 2021-10-14 PROCEDURE — 3008F PR BODY MASS INDEX (BMI) DOCUMENTED: ICD-10-PCS | Mod: CPTII,S$GLB,, | Performed by: INTERNAL MEDICINE

## 2021-10-19 ENCOUNTER — TELEPHONE (OUTPATIENT)
Dept: CARDIOLOGY | Facility: CLINIC | Age: 61
End: 2021-10-19

## 2021-10-20 ENCOUNTER — TELEPHONE (OUTPATIENT)
Dept: SLEEP MEDICINE | Facility: CLINIC | Age: 61
End: 2021-10-20

## 2021-10-21 ENCOUNTER — HOSPITAL ENCOUNTER (OUTPATIENT)
Dept: CARDIOLOGY | Facility: HOSPITAL | Age: 61
Discharge: HOME | End: 2021-10-21
Attending: INTERNAL MEDICINE
Payer: COMMERCIAL

## 2021-10-21 VITALS
SYSTOLIC BLOOD PRESSURE: 106 MMHG | HEART RATE: 56 BPM | HEIGHT: 68 IN | DIASTOLIC BLOOD PRESSURE: 61 MMHG | WEIGHT: 293 LBS | BODY MASS INDEX: 44.41 KG/M2

## 2021-10-21 DIAGNOSIS — R07.89 CHEST DISCOMFORT: ICD-10-CM

## 2021-10-21 LAB
CV PHARM DOSE: 60 MG
CV STRESS BASE HR: 56 BPM
DIASTOLIC BLOOD PRESSURE: 61 MMHG
EJECTION FRACTION- HIGH: 65 %
END DIASTOLIC INDEX-HIGH: 153 ML/M2
END DIASTOLIC INDEX-LOW: 93 ML/M2
END SYSTOLIC INDEX-HIGH: 71 ML/M2
END SYSTOLIC INDEX-LOW: 31 ML/M2
NUC REST DIASTOLIC VOLUME INDEX: 139
NUC REST EJECTION FRACTION: 53
NUC REST SYSTOLIC VOLUME INDEX: 66
NUC STRESS DIASTOLIC VOLUME INDEX: 169
NUC STRESS EJECTION FRACTION: 67 %
NUC STRESS SYSTOLIC VOLUME INDEX: 55
OHS CV CPX 85 PERCENT MAX PREDICTED HEART RATE MALE: 137
OHS CV CPX MAX PREDICTED HEART RATE: 161
OHS CV CPX PATIENT IS FEMALE: 0
OHS CV CPX PATIENT IS MALE: 1
OHS CV CPX PEAK DIASTOLIC BLOOD PRESSURE: 56 MMHG
OHS CV CPX PEAK HEAR RATE: 63 BPM
OHS CV CPX PEAK RATE PRESSURE PRODUCT: 6426
OHS CV CPX PEAK SYSTOLIC BLOOD PRESSURE: 102 MMHG
OHS CV CPX PERCENT MAX PREDICTED HEART RATE ACHIEVED: 39
OHS CV CPX RATE PRESSURE PRODUCT PRESENTING: 5936
RETIRED EF AND QEF - SEE NOTES: 53 %
SYSTOLIC BLOOD PRESSURE: 106 MMHG

## 2021-10-21 PROCEDURE — 93018 CARDIAC PET SCAN STRESS (CUPID ONLY): ICD-10-PCS | Mod: ,,, | Performed by: INTERNAL MEDICINE

## 2021-10-21 PROCEDURE — 93018 CV STRESS TEST I&R ONLY: CPT | Mod: ,,, | Performed by: INTERNAL MEDICINE

## 2021-10-21 PROCEDURE — 93017 CV STRESS TEST TRACING ONLY: CPT

## 2021-10-21 PROCEDURE — 93016 CARDIAC PET SCAN STRESS (CUPID ONLY): ICD-10-PCS | Mod: ,,, | Performed by: INTERNAL MEDICINE

## 2021-10-21 PROCEDURE — 63600175 PHARM REV CODE 636 W HCPCS: Performed by: INTERNAL MEDICINE

## 2021-10-21 PROCEDURE — 93016 CV STRESS TEST SUPVJ ONLY: CPT | Mod: ,,, | Performed by: INTERNAL MEDICINE

## 2021-10-21 PROCEDURE — 78431 MYOCRD IMG PET RST&STRS CT: CPT

## 2021-10-21 PROCEDURE — 78431 MYOCRD IMG PET RST&STRS CT: CPT | Mod: 26,,, | Performed by: INTERNAL MEDICINE

## 2021-10-21 PROCEDURE — 78431 CARDIAC PET SCAN STRESS (CUPID ONLY): ICD-10-PCS | Mod: 26,,, | Performed by: INTERNAL MEDICINE

## 2021-10-21 RX ORDER — DIPYRIDAMOLE 5 MG/ML
60 INJECTION INTRAVENOUS ONCE
Status: COMPLETED | OUTPATIENT
Start: 2021-10-21 | End: 2021-10-21

## 2021-10-21 RX ADMIN — DIPYRIDAMOLE 60 MG: 5 INJECTION INTRAVENOUS at 12:10

## 2021-11-02 ENCOUNTER — OFFICE VISIT (OUTPATIENT)
Dept: SLEEP MEDICINE | Facility: CLINIC | Age: 61
End: 2021-11-02
Payer: COMMERCIAL

## 2021-11-02 VITALS
DIASTOLIC BLOOD PRESSURE: 66 MMHG | SYSTOLIC BLOOD PRESSURE: 111 MMHG | HEART RATE: 53 BPM | BODY MASS INDEX: 44.41 KG/M2 | HEIGHT: 68 IN | WEIGHT: 293 LBS

## 2021-11-02 DIAGNOSIS — I10 ESSENTIAL HYPERTENSION: ICD-10-CM

## 2021-11-02 DIAGNOSIS — G47.33 OBSTRUCTIVE SLEEP APNEA: Primary | ICD-10-CM

## 2021-11-02 PROCEDURE — 3074F SYST BP LT 130 MM HG: CPT | Mod: CPTII,S$GLB,, | Performed by: NURSE PRACTITIONER

## 2021-11-02 PROCEDURE — 3078F DIAST BP <80 MM HG: CPT | Mod: CPTII,S$GLB,, | Performed by: NURSE PRACTITIONER

## 2021-11-02 PROCEDURE — 3008F BODY MASS INDEX DOCD: CPT | Mod: CPTII,S$GLB,, | Performed by: NURSE PRACTITIONER

## 2021-11-02 PROCEDURE — 99214 PR OFFICE/OUTPT VISIT, EST, LEVL IV, 30-39 MIN: ICD-10-PCS | Mod: S$GLB,,, | Performed by: NURSE PRACTITIONER

## 2021-11-02 PROCEDURE — 3044F HG A1C LEVEL LT 7.0%: CPT | Mod: CPTII,S$GLB,, | Performed by: NURSE PRACTITIONER

## 2021-11-02 PROCEDURE — 3044F PR MOST RECENT HEMOGLOBIN A1C LEVEL <7.0%: ICD-10-PCS | Mod: CPTII,S$GLB,, | Performed by: NURSE PRACTITIONER

## 2021-11-02 PROCEDURE — 1159F MED LIST DOCD IN RCRD: CPT | Mod: CPTII,S$GLB,, | Performed by: NURSE PRACTITIONER

## 2021-11-02 PROCEDURE — 99999 PR PBB SHADOW E&M-EST. PATIENT-LVL IV: ICD-10-PCS | Mod: PBBFAC,,, | Performed by: NURSE PRACTITIONER

## 2021-11-02 PROCEDURE — 3074F PR MOST RECENT SYSTOLIC BLOOD PRESSURE < 130 MM HG: ICD-10-PCS | Mod: CPTII,S$GLB,, | Performed by: NURSE PRACTITIONER

## 2021-11-02 PROCEDURE — 3061F PR NEG MICROALBUMINURIA RESULT DOCUMENTED/REVIEW: ICD-10-PCS | Mod: CPTII,S$GLB,, | Performed by: NURSE PRACTITIONER

## 2021-11-02 PROCEDURE — 3078F PR MOST RECENT DIASTOLIC BLOOD PRESSURE < 80 MM HG: ICD-10-PCS | Mod: CPTII,S$GLB,, | Performed by: NURSE PRACTITIONER

## 2021-11-02 PROCEDURE — 99214 OFFICE O/P EST MOD 30 MIN: CPT | Mod: S$GLB,,, | Performed by: NURSE PRACTITIONER

## 2021-11-02 PROCEDURE — 99999 PR PBB SHADOW E&M-EST. PATIENT-LVL IV: CPT | Mod: PBBFAC,,, | Performed by: NURSE PRACTITIONER

## 2021-11-02 PROCEDURE — 3066F NEPHROPATHY DOC TX: CPT | Mod: CPTII,S$GLB,, | Performed by: NURSE PRACTITIONER

## 2021-11-02 PROCEDURE — 3066F PR DOCUMENTATION OF TREATMENT FOR NEPHROPATHY: ICD-10-PCS | Mod: CPTII,S$GLB,, | Performed by: NURSE PRACTITIONER

## 2021-11-02 PROCEDURE — 3008F PR BODY MASS INDEX (BMI) DOCUMENTED: ICD-10-PCS | Mod: CPTII,S$GLB,, | Performed by: NURSE PRACTITIONER

## 2021-11-02 PROCEDURE — 3061F NEG MICROALBUMINURIA REV: CPT | Mod: CPTII,S$GLB,, | Performed by: NURSE PRACTITIONER

## 2021-11-02 PROCEDURE — 1159F PR MEDICATION LIST DOCUMENTED IN MEDICAL RECORD: ICD-10-PCS | Mod: CPTII,S$GLB,, | Performed by: NURSE PRACTITIONER

## 2021-11-09 ENCOUNTER — OFFICE VISIT (OUTPATIENT)
Dept: INTERNAL MEDICINE | Facility: CLINIC | Age: 61
End: 2021-11-09
Payer: COMMERCIAL

## 2021-11-09 VITALS
TEMPERATURE: 98 F | SYSTOLIC BLOOD PRESSURE: 114 MMHG | RESPIRATION RATE: 16 BRPM | HEIGHT: 68 IN | BODY MASS INDEX: 44.94 KG/M2 | HEART RATE: 58 BPM | DIASTOLIC BLOOD PRESSURE: 72 MMHG | OXYGEN SATURATION: 97 % | WEIGHT: 296.5 LBS

## 2021-11-09 DIAGNOSIS — J32.0 MAXILLARY SINUSITIS, UNSPECIFIED CHRONICITY: Primary | ICD-10-CM

## 2021-11-09 PROCEDURE — 3066F NEPHROPATHY DOC TX: CPT | Mod: CPTII,S$GLB,, | Performed by: STUDENT IN AN ORGANIZED HEALTH CARE EDUCATION/TRAINING PROGRAM

## 2021-11-09 PROCEDURE — 3074F SYST BP LT 130 MM HG: CPT | Mod: CPTII,S$GLB,, | Performed by: STUDENT IN AN ORGANIZED HEALTH CARE EDUCATION/TRAINING PROGRAM

## 2021-11-09 PROCEDURE — 3061F NEG MICROALBUMINURIA REV: CPT | Mod: CPTII,S$GLB,, | Performed by: STUDENT IN AN ORGANIZED HEALTH CARE EDUCATION/TRAINING PROGRAM

## 2021-11-09 PROCEDURE — 3078F PR MOST RECENT DIASTOLIC BLOOD PRESSURE < 80 MM HG: ICD-10-PCS | Mod: CPTII,S$GLB,, | Performed by: STUDENT IN AN ORGANIZED HEALTH CARE EDUCATION/TRAINING PROGRAM

## 2021-11-09 PROCEDURE — 99999 PR PBB SHADOW E&M-EST. PATIENT-LVL V: ICD-10-PCS | Mod: PBBFAC,,, | Performed by: STUDENT IN AN ORGANIZED HEALTH CARE EDUCATION/TRAINING PROGRAM

## 2021-11-09 PROCEDURE — 3078F DIAST BP <80 MM HG: CPT | Mod: CPTII,S$GLB,, | Performed by: STUDENT IN AN ORGANIZED HEALTH CARE EDUCATION/TRAINING PROGRAM

## 2021-11-09 PROCEDURE — 3044F HG A1C LEVEL LT 7.0%: CPT | Mod: CPTII,S$GLB,, | Performed by: STUDENT IN AN ORGANIZED HEALTH CARE EDUCATION/TRAINING PROGRAM

## 2021-11-09 PROCEDURE — 3008F BODY MASS INDEX DOCD: CPT | Mod: CPTII,S$GLB,, | Performed by: STUDENT IN AN ORGANIZED HEALTH CARE EDUCATION/TRAINING PROGRAM

## 2021-11-09 PROCEDURE — 99214 PR OFFICE/OUTPT VISIT, EST, LEVL IV, 30-39 MIN: ICD-10-PCS | Mod: S$GLB,,, | Performed by: STUDENT IN AN ORGANIZED HEALTH CARE EDUCATION/TRAINING PROGRAM

## 2021-11-09 PROCEDURE — 3066F PR DOCUMENTATION OF TREATMENT FOR NEPHROPATHY: ICD-10-PCS | Mod: CPTII,S$GLB,, | Performed by: STUDENT IN AN ORGANIZED HEALTH CARE EDUCATION/TRAINING PROGRAM

## 2021-11-09 PROCEDURE — 99214 OFFICE O/P EST MOD 30 MIN: CPT | Mod: S$GLB,,, | Performed by: STUDENT IN AN ORGANIZED HEALTH CARE EDUCATION/TRAINING PROGRAM

## 2021-11-09 PROCEDURE — 1159F MED LIST DOCD IN RCRD: CPT | Mod: CPTII,S$GLB,, | Performed by: STUDENT IN AN ORGANIZED HEALTH CARE EDUCATION/TRAINING PROGRAM

## 2021-11-09 PROCEDURE — 3008F PR BODY MASS INDEX (BMI) DOCUMENTED: ICD-10-PCS | Mod: CPTII,S$GLB,, | Performed by: STUDENT IN AN ORGANIZED HEALTH CARE EDUCATION/TRAINING PROGRAM

## 2021-11-09 PROCEDURE — 99999 PR PBB SHADOW E&M-EST. PATIENT-LVL V: CPT | Mod: PBBFAC,,, | Performed by: STUDENT IN AN ORGANIZED HEALTH CARE EDUCATION/TRAINING PROGRAM

## 2021-11-09 PROCEDURE — 1159F PR MEDICATION LIST DOCUMENTED IN MEDICAL RECORD: ICD-10-PCS | Mod: CPTII,S$GLB,, | Performed by: STUDENT IN AN ORGANIZED HEALTH CARE EDUCATION/TRAINING PROGRAM

## 2021-11-09 PROCEDURE — 3061F PR NEG MICROALBUMINURIA RESULT DOCUMENTED/REVIEW: ICD-10-PCS | Mod: CPTII,S$GLB,, | Performed by: STUDENT IN AN ORGANIZED HEALTH CARE EDUCATION/TRAINING PROGRAM

## 2021-11-09 PROCEDURE — 3074F PR MOST RECENT SYSTOLIC BLOOD PRESSURE < 130 MM HG: ICD-10-PCS | Mod: CPTII,S$GLB,, | Performed by: STUDENT IN AN ORGANIZED HEALTH CARE EDUCATION/TRAINING PROGRAM

## 2021-11-09 PROCEDURE — 3044F PR MOST RECENT HEMOGLOBIN A1C LEVEL <7.0%: ICD-10-PCS | Mod: CPTII,S$GLB,, | Performed by: STUDENT IN AN ORGANIZED HEALTH CARE EDUCATION/TRAINING PROGRAM

## 2021-11-09 RX ORDER — AMOXICILLIN AND CLAVULANATE POTASSIUM 875; 125 MG/1; MG/1
1 TABLET, FILM COATED ORAL EVERY 12 HOURS
Qty: 20 TABLET | Refills: 0 | Status: SHIPPED | OUTPATIENT
Start: 2021-11-09 | End: 2021-11-19

## 2021-11-19 ENCOUNTER — TELEPHONE (OUTPATIENT)
Dept: CARDIOLOGY | Facility: CLINIC | Age: 61
End: 2021-11-19
Payer: COMMERCIAL

## 2022-01-18 ENCOUNTER — PATIENT MESSAGE (OUTPATIENT)
Dept: ADMINISTRATIVE | Facility: HOSPITAL | Age: 62
End: 2022-01-18
Payer: COMMERCIAL

## 2022-01-18 ENCOUNTER — OFFICE VISIT (OUTPATIENT)
Dept: CARDIOLOGY | Facility: CLINIC | Age: 62
End: 2022-01-18
Payer: COMMERCIAL

## 2022-01-18 VITALS
BODY MASS INDEX: 44.64 KG/M2 | SYSTOLIC BLOOD PRESSURE: 130 MMHG | WEIGHT: 294.56 LBS | HEIGHT: 68 IN | OXYGEN SATURATION: 96 % | DIASTOLIC BLOOD PRESSURE: 78 MMHG | HEART RATE: 56 BPM

## 2022-01-18 DIAGNOSIS — I48.92 ATRIAL FLUTTER, UNSPECIFIED TYPE: ICD-10-CM

## 2022-01-18 DIAGNOSIS — I77.89 ENLARGED THORACIC AORTA: ICD-10-CM

## 2022-01-18 DIAGNOSIS — I10 ESSENTIAL HYPERTENSION: ICD-10-CM

## 2022-01-18 DIAGNOSIS — Z98.890 S/P ABLATION OF ATRIAL FIBRILLATION: ICD-10-CM

## 2022-01-18 DIAGNOSIS — I48.19 PERSISTENT ATRIAL FIBRILLATION: ICD-10-CM

## 2022-01-18 DIAGNOSIS — I87.2 VENOUS INSUFFICIENCY OF LEFT LOWER EXTREMITY: ICD-10-CM

## 2022-01-18 DIAGNOSIS — Z86.79 S/P ABLATION OF ATRIAL FIBRILLATION: ICD-10-CM

## 2022-01-18 DIAGNOSIS — G47.33 OBSTRUCTIVE SLEEP APNEA: ICD-10-CM

## 2022-01-18 DIAGNOSIS — I89.0 LYMPHEDEMA OF BOTH LOWER EXTREMITIES: Primary | ICD-10-CM

## 2022-01-18 DIAGNOSIS — E66.01 MORBID OBESITY WITH BMI OF 40.0-44.9, ADULT: ICD-10-CM

## 2022-01-18 PROCEDURE — 3075F PR MOST RECENT SYSTOLIC BLOOD PRESS GE 130-139MM HG: ICD-10-PCS | Mod: CPTII,S$GLB,, | Performed by: INTERNAL MEDICINE

## 2022-01-18 PROCEDURE — 99999 PR PBB SHADOW E&M-EST. PATIENT-LVL V: ICD-10-PCS | Mod: PBBFAC,,, | Performed by: INTERNAL MEDICINE

## 2022-01-18 PROCEDURE — 3078F DIAST BP <80 MM HG: CPT | Mod: CPTII,S$GLB,, | Performed by: INTERNAL MEDICINE

## 2022-01-18 PROCEDURE — 3078F PR MOST RECENT DIASTOLIC BLOOD PRESSURE < 80 MM HG: ICD-10-PCS | Mod: CPTII,S$GLB,, | Performed by: INTERNAL MEDICINE

## 2022-01-18 PROCEDURE — 99999 PR PBB SHADOW E&M-EST. PATIENT-LVL V: CPT | Mod: PBBFAC,,, | Performed by: INTERNAL MEDICINE

## 2022-01-18 PROCEDURE — 99215 PR OFFICE/OUTPT VISIT, EST, LEVL V, 40-54 MIN: ICD-10-PCS | Mod: S$GLB,,, | Performed by: INTERNAL MEDICINE

## 2022-01-18 PROCEDURE — 3075F SYST BP GE 130 - 139MM HG: CPT | Mod: CPTII,S$GLB,, | Performed by: INTERNAL MEDICINE

## 2022-01-18 PROCEDURE — 3008F BODY MASS INDEX DOCD: CPT | Mod: CPTII,S$GLB,, | Performed by: INTERNAL MEDICINE

## 2022-01-18 PROCEDURE — 1159F PR MEDICATION LIST DOCUMENTED IN MEDICAL RECORD: ICD-10-PCS | Mod: CPTII,S$GLB,, | Performed by: INTERNAL MEDICINE

## 2022-01-18 PROCEDURE — 99215 OFFICE O/P EST HI 40 MIN: CPT | Mod: S$GLB,,, | Performed by: INTERNAL MEDICINE

## 2022-01-18 PROCEDURE — 1159F MED LIST DOCD IN RCRD: CPT | Mod: CPTII,S$GLB,, | Performed by: INTERNAL MEDICINE

## 2022-01-18 PROCEDURE — 3008F PR BODY MASS INDEX (BMI) DOCUMENTED: ICD-10-PCS | Mod: CPTII,S$GLB,, | Performed by: INTERNAL MEDICINE

## 2022-01-18 NOTE — PROGRESS NOTES
"[facility]    CC: Lower Extremity Edema    Patient ID: Mr. Shiraz Jha is a 61 y.o. male with a past medical history of Afib s/p ablation 7/14, HTN, DM2, morbid obesity s/p gastric bypass (2010), who presents for a follow up appointment.  Pertinent history/events are as follows:    -Pt kindly referred by Twyla Bahena and Dr Scott for evaluation of lower extremity edema (per Dr. Scott's note on 9/24/2019:  "Mr. Jha is in clinic today for continued LE edema and discoloration of his legs.  He had a venous ultrasound on 8/6/19 for the swelling and mild reflux was noted in the left popliteal.  Patient denies chest pain with exertion or at rest, palpitations, SOB, DAI, dizziness, syncope, orthopnea, PND, or claudication.  Instructed to elevate legs when seated, low salt diet, increase walking and compression stockings. No venous reflux noted on US.  Refer to Dr. Ureña in interventional cardiology."    -At our initial clinic visit on 10/16/2019, Mr. Jha reported BLE edema for the past 1 year.  States LE edema is improved with graduated compression hose.  He has no LE pain, claudication symptoms, rest pain, or tissue loss.  No smoking history.  States he has gained over 50 pounds in the past year, despite gastric bypass surgery.  BLE Venous Reflux Study from 8/6/2019 showed no evidence of lower extremity DVT bilaterally.  There is mild left popliteal (deep venous) reflux.  Plan:   BLE Edema- BLE edema likely due to a component of venous insufficiency and morbid obesity.  Check cmp today.  If potassium and creatinine are appropriate, increase lasix to 40 mg bid for 7 days, then resume at 40 mg daily.  Pt to continue this cycle/regimen for lasix.  Pt will benefit from significant weight loss.  Continue graduated compression hose, leg elevation and low salt diet.  Limit fluid intake to 2 liters a day.  Check exercise SURINDER to evaluate for significant PAD.   Morbid Obesity- Refer back to Bariatric " Surgery.     -At follow up clinic visit on 11/27/2019, Mr. Jha reported no significant improvement in BLE edema with lasix regimen of 40 mg bid for 7 days, then resuming at 40 mg daily.  Exercise SURINDER from 11/15/2019 showed normal rest and exercise SURINDER bilaterally with normal PVR waveforms bilaterally.  Exam shows 1+ BLE pitting edema.  Plan:   BLE Edema- BLE edema likely due to a component of venous insufficiency and morbid obesity.  Check cmp today.  If potassium and creatinine are appropriate, discontinue lasix and start bumex 1 mg bid.  Pt will benefit from significant weight loss.  Continue graduated compression hose, leg elevation and low salt diet.  Limit fluid intake to 2 liters a day.  Check exercise SURINDER to evaluate for significant PAD.   Morbid Obesity- Pt referred back to Bariatric Surgery.     -At clinic visit on 1/8/2020, Mr. Jha reported significant improvement in BLE edema since starting bumex 1 mg daily.  Exercise SURINDER study from 11/25/2019 showed normal rest and exercise SURINDER bilaterally.  Normal PVR waveforms bilaterally.  Plan:   BLE Edema- BLE edema now improving.  Continue bumex 1 mg daily.  Check cmp today to monitor renal fxn.  Exercise SURINDER study from 11/25/2019 showed normal rest and exercise SURINDER bilaterally.  Normal PVR waveforms bilaterally. Continue graduated compression hose, leg elevation and low salt diet.  Limit fluid intake to 2 liters a day.    Morbid Obesity- Pt referred back to Bariatric Surgery.     - At clinic visit on 04/08/20, Mr. Jha reported doing well with no significant LE edema.  Continues to wear graduated compression hose, low salt diet, and limiting fluid intake to 2 liters a day.    Plan  BLE Edema- Currently doing well.  Continue bumex 1 mg daily.  Continue graduated compression hose, leg elevation and low salt diet.  Limit fluid intake to 2 liters a day.      - At clinic visit on 12/01/20, Mr. Jha reported worsening swelling of his bilateral lower extremities. He  stopped wearing compression hose a month ago, however, he continues to take bumex twice a day. He reports no rest pain, claudication, CLI or tissue atrophy.   Plan:  BLE Edema- Due to lymphedema and venous insufficiency. Patient notices worsening of bilateral lower extremity edema after he stopped using compression hose for the past month.  Pt instructed to resume graduated compression hose as prescribed.  Continue bumex 1 mg twice a day.  Continue leg elevation and low salt diet.  Limit fluid intake to 2 liters a day.      -At clinic visit on 1/7/2021, Mr. Jha reported improvement in bilateral lower extremity edema compared to previous visit on 12/01/20.  He reports wearing graduated compression hose.  He is taking Bumex 1 mg twice a day.  He is not following sodium restricted diet, and diet includes soups and gumbo.  He reports no claudication, rest pain, or tissue loss.      Plan:   BLE Edema- Due to lymphedema and venous insufficiency. Patient notices improvement of bilateral lower extremity edema. Continue graduated compression hose.  Continue bumex 1 mg twice a day.  Continue leg elevation when resting.  Encourage sodium restriction to 2000 mg/day and limit fluid intake to 2 liters a day.  Obesity- Pt is following up with Bariatric Surgery.  Encourage dietary modification, exercise and weight loss.      -At clinic visit on 2/9/2021, Mr. Jha reports mild improvement in BLE edema since clinic visit on 1/7/2021.  He has no claudication or tissue loss.   Plan:   BLE Edema- Due to lymphedema and venous insufficiency.   Edema is improving.  Check cmp today.  If creatinine and potassium are appropriate, increase bumex to 2 mg bid for 5 days, then decrease to 1 mg bid for 5 days.  Pt to continue this cycle/ergiemen of lasix.  Continue leg elevation when resting.  Limit sodium restriction to 2000 mg/day and limit fluid intake to 2 liters a day.  Obesity- Pt is following up with Bariatric Surgery.  Encourage dietary  modification, exercise and weight loss.      - At clinic visit on 03/25/21, Mr. Jha reported no new symptoms.  Exam shows BLE edema has improved significantly.  Plan:  BLE Edema- Due to lymphedema and venous insufficiency.   Edema has improved significantly.  Refer to lymphedema clinic.  Recheck cmp today.  If creatinine and potassium are appropriate, continue bumex 2 mg bid for 5 days, then decrease to 1 mg bid for 5 days.  Pt to continue this cycle/ergiemen of lasix.  Continue leg elevation when resting.  Limit sodium restriction to 2000 mg/day and limit fluid intake to 2 liters a day.    Obesity- Pt is following up with Bariatric Surgery.  Encourage dietary modification, exercise and weight loss.      -At clinic visit on 5/25/2021, Mr. Jha reported significant improvement in lower extremity edema since initiation of lymphedema clinic therapy.   He reports no claudication, rest pain or tissue loss.   Plan:    BLE Edema- Due to lymphedema and venous insufficiency.   Edema has improved significantly.  Continue lymphedema clinic.  Check CMP today.  If creatinine and potassium are appropriate, continue bumex 2 mg bid for 5 days, then decrease to 1 mg bid for 5 days.  Pt to continue this cycle/ergiemen of lasix.  Continue leg elevation when resting.  Limit sodium restriction to 2000 mg/day and limit fluid intake to 2 liters a day.    Obesity- Pt is following up with Bariatric Surgery.  Encourage dietary modification, exercise and weight loss.       -At clinic visit on 7/13/2021, Mr. Jha reported significant improvement in BLE edema following lymphedema clinic therapy.  He has no claudication or tissue loss.   Plan:   BLE Edema due to lymphedema and venous insufficiency- Now significantly improved following lymphedema clinic therapy.  Check cmp today.  If potassium and creatinine are appropriate, continue bumex 2 mg bid for 1 week, then reduce to 1 mg bid for 1 week.  Pt to continue this regimen/cycle of bumex.     Obesity- Pt is following up with Bariatric Surgery.  Encourage dietary modification, exercise and weight loss.     10/14/2021 clinic visit: Mr. Jha reports continued improvement in BLE edema.  He has no claudication or tissue loss.    Plan:  BLE Edema due to lymphedema and venous insufficiency- Remains significantly improved.  Continue bumex 2 mg bid for 1 week, then reduce to 1 mg bid for 1 week.  Pt to continue this regimen/cycle of bumex.    Obesity- Pt is following up with Bariatric Surgery.  Encourage dietary modification, exercise and weight loss.      HPI:  Mr. Jha reports feeling well and swelling improved. He has no other complaints.     Past Medical History:   Diagnosis Date    Allergy     Asthma     Atrial fibrillation     Cataract     Chronic rhinitis 9/26/2013    DDD (degenerative disc disease) 4/3/2015    Depression     Diabetes mellitus     Fibromyalgia     Gastroesophageal reflux disease without esophagitis 7/14/2017    Hypertension     Sinusitis, acute, maxillary 12/20/2012    Thyroid disease        Past Surgical History:   Procedure Laterality Date    CERVICAL SPINE SURGERY      EPIDURAL STEROID INJECTION INTO LUMBAR SPINE N/A 5/29/2018    Procedure: INJECTION-STEROID-EPIDURAL-LUMBAR- L4-5;  Surgeon: Roman Lyman MD;  Location: [facility];  Service: Pain Management;  Laterality: N/A;  Patient is diabetic and Xarleto     EPIDURAL STEROID INJECTION INTO LUMBAR SPINE N/A 7/3/2018    Procedure: INJECTION, STEROID, SPINE, LUMBAR, EPIDURAL- L4-5;  Surgeon: Roman Lyman MD;  Location: [facility];  Service: Pain Management;  Laterality: N/A;  Patient takes Xarelto and ASA     EXCISION OF LESION OF LIP N/A 7/7/2020    Procedure: EXCISION, LESION, LIP;  Surgeon: Caden Navarro MD;  Location: [facility] OR [address] [place] [address];  Service: ENT;  Laterality: N/A;    GASTRIC BYPASS      SINUS SURGERY  2000    [facility] Watt [facility]    TONSILLECTOMY         Social History     Socioeconomic  History    Marital status: Single   Tobacco Use    Smoking status: Never Smoker    Smokeless tobacco: Never Used   Substance and Sexual Activity    Alcohol use: No     Comment: rare    Drug use: No    Sexual activity: Yes     Partners: Female   Social History Narrative    [place], lives alone w/2 dogs.    Dogs are his kids.    Works PT --  at [employer], on ssdi from neck and back/depression.     Social Determinants of Health     Financial Resource Strain: High Risk    Difficulty of Paying Living Expenses: Very hard   Food Insecurity: Food Insecurity Present    Worried About Running Out of Food in the Last Year: Often true    Ran Out of Food in the Last Year: Often true   Transportation Needs: Unmet Transportation Needs    Lack of Transportation (Medical): Yes    Lack of Transportation (Non-Medical): No   Physical Activity: Sufficiently Active    Days of Exercise per Week: 5 days    Minutes of Exercise per Session: 30 min   Stress: No Stress Concern Present    Feeling of Stress : Only a little   Social Connections: Unknown    Frequency of Communication with Friends and Family: Once a week    Frequency of Social Gatherings with Friends and Family: Once a week    Active Member of Clubs or Organizations: No    Attends Club or Organization Meetings: Never    Marital Status: Never    Housing Stability: High Risk    Unable to Pay for Housing in the Last Year: Yes    Number of Places Lived in the Last Year: 1    Unstable Housing in the Last Year: No       Family History   Problem Relation Age of Onset    Heart disease Father     Cancer Mother         lung    Hypertension Sister     Heart disease Brother     Cirrhosis Brother     Diabetes Brother        Review of patient's allergies indicates:   Allergen Reactions    Iodinated contrast media Other (See Comments)     Raises BP         Medication List with Changes/Refills   Current Medications    ARIPIPRAZOLE (ABILIFY) 10 MG TAB     Take 10 mg by mouth once daily.    ARIPIPRAZOLE (ABILIFY) 15 MG TAB    10 mg once daily. Take 1/2 tab once a day    ASPIRIN 81 MG CHEW    Take 1 tablet (81 mg total) by mouth once daily.    BUMETANIDE (BUMEX) 1 MG TABLET    Take 1 tablet (1 mg total) by mouth 2 (two) times daily.    BUPROPION (WELLBUTRIN XL) 300 MG 24 HR TABLET    Take 300 mg by mouth once daily. Pt states he only takes 300mg    CYANOCOBALAMIN 500 MCG TABLET    Take 500 mcg by mouth once daily.    DEXTROAMPHETAMINE-AMPHETAMINE (ADDERALL) 20 MG TABLET    Take 20 mg by mouth.     FLUTICASONE PROPIONATE (FLONASE) 50 MCG/ACTUATION NASAL SPRAY    1 spray (50 mcg total) by Each Nostril route once daily.    GABAPENTIN (NEURONTIN) 300 MG CAPSULE    Take 1 capsule (300 mg total) by mouth 3 (three) times daily.    IMPOYZ 0.025 % CREA    Apply topically as needed.     LEVOCETIRIZINE (XYZAL) 5 MG TABLET    Take 1 tablet (5 mg total) by mouth every evening.    LUZU 1 % CREA    Apply topically as needed.     METOPROLOL TARTRATE (LOPRESSOR) 25 MG TABLET    Take 1 tablet (25 mg total) by mouth 2 (two) times daily.    MULTIVITAMIN (THERAGRAN) PER TABLET    Take 1 tablet by mouth once daily.     NAFTIN 2 % GEL    daily as needed.     NITROGLYCERIN (NITROSTAT) 0.4 MG SL TABLET    Please dispense 25 pills in 4 bottles.    OMEPRAZOLE (PRILOSEC) 10 MG CAPSULE    Take 2 capsules (20 mg total) by mouth once daily.    SOTALOL (BETAPACE) 80 MG TABLET    Take 1 tablet (80 mg total) by mouth 2 (two) times daily.    TAMSULOSIN (FLOMAX) 0.4 MG CAP    TAKE ONE BY MOUTH TWICE DAILY    THIAMINE 250 MG TABLET    Take 250 mg by mouth once daily.    VITAMIN D (VITAMIN D3) 1000 UNITS TAB    Take 1,000 Units by mouth once daily.    VORTIOXETINE (TRINTELLIX) 20 MG TAB    Take 20 mg by mouth once daily.     XARELTO 20 MG TAB    TAKE ONE DAILY       Review of Systems  Constitution: Denies chills, fever, and sweats.  HENT: Denies headaches or blurry vision.  Cardiovascular: Denies chest  "pain or irregular heart beat.  Respiratory: Denies cough or shortness of breath.  Gastrointestinal: Denies abdominal pain, nausea, or vomiting.  Musculoskeletal: Positive for BLE swelling  improved  Neurological: Denies dizziness or focal weakness.  Psychiatric/Behavioral: Normal mental status.  Hematologic/Lymphatic: Denies bleeding problem or easy bruising/bleeding.  Skin: Denies rash or suspicious lesions    Physical Examination  /78   Pulse (!) 56   Ht 5' 8" (1.727 m)   Wt 133.6 kg (294 lb 8.6 oz)   SpO2 96%   BMI 44.78 kg/m²     Constitutional: No acute distress, conversant  HEENT: Sclera anicteric, Pupils equal, round and reactive to light, extraocular motions intact, Oropharynx clear  Neck: No JVD, no carotid bruits  Cardiovascular: regular rate and rhythm, no murmur, rubs or gallops, normal S1/S2  Pulmonary: Clear to auscultation bilaterally  Abdominal: Abdomen soft, nontender, nondistended, positive bowel sounds  Extremities: BLE's with minimal pitting edema   Pulses:  Carotid pulses are 2+ on the right side, and 2+ on the left side.  Radial pulses are 2+ on the right side, and 2+ on the left side.   Femoral pulses are 2+ on the right side, and 2+ on the left side.  Popliteal pulses are 2+ on the right side, and 2+ on the left side.   Dorsalis pedis pulses are 2+ on the right side, and 2+ on the left side.   Posterior tibial pulses are 2+ on the right side, and 2+ on the left side.    Skin: No ecchymosis, erythema, or ulcers  Psych: Alert and oriented x 3, appropriate affect  Neuro: CNII-XII intact, no focal deficits    Labs:  Most Recent Data  CBC:   Lab Results   Component Value Date    WBC 5.66 04/06/2021    HGB 14.0 04/06/2021    HCT 42.0 04/06/2021     04/06/2021    MCV 98 04/06/2021    RDW 13.9 04/06/2021     BMP:   Lab Results   Component Value Date     07/13/2021    K 3.8 07/13/2021     07/13/2021    CO2 30 (H) 07/13/2021    BUN 10 07/13/2021    CREATININE 1.1 " 07/13/2021     07/13/2021    CALCIUM 9.5 07/13/2021    MG 1.9 07/15/2017    PHOS 3.7 07/15/2017     LFTS;   Lab Results   Component Value Date    PROT 6.9 07/13/2021    ALBUMIN 3.9 07/13/2021    BILITOT 0.9 07/13/2021    AST 19 07/13/2021    ALKPHOS 113 07/13/2021    ALT 14 07/13/2021     COAGS:   Lab Results   Component Value Date    INR 1.0 07/14/2017     FLP:   Lab Results   Component Value Date    CHOL 168 04/06/2021    HDL 38 (L) 04/06/2021    LDLCALC 104.4 04/06/2021    TRIG 128 04/06/2021    CHOLHDL 22.6 04/06/2021     CARDIAC:   Lab Results   Component Value Date    TROPONINI 0.015 07/15/2017    BNP 11 07/14/2017       Echo 7/23/2019:  · Normal left ventricular systolic function. The estimated ejection fraction is 60%  · Normal right ventricular systolic function.  · Normal LV diastolic function.  · Moderate left atrial enlargement.  · Mild right atrial enlargement.  · The ascending aorta is mildly dilated (42mm in diameter, unchanged since Feb 2018).  · The estimated PA systolic pressure is 23 mm Hg  · Normal central venous pressure (3 mm Hg).       Exercise SURINDER 11/25/2019:  Normal rest and exercise SURINDER bilaterally.  Normal PVR waveforms bilaterally.    BLE Venous Reflux Study 8/6/2019:  No evidence of lower extremity DVT bilaterally.  Mild left popliteal (deep venous) reflux.    Assessment/Plan:  Mr. Shiraz Jha is a 61 y.o. male with Afib s/p ablation 7/14, HTN, DM2, morbid obesity s/p gastric bypass (2010), who presents for a follow up appointment.      1. BLE Edema due to lymphedema and venous insufficiency- Continues to improve. Continue bumex, compression stockings, elevate legs when resting, limit fluid intake to 1.5L daily, sodium intake to 2000mg daily.    2. Morbid Obesity- Pt is following up with Bariatric Surgery. Stable. Encourage dietary modification, exercise and weight loss.      Follow up in 4 months    Total duration of face to face visit time 30 minutes.  Total time spent  counseling greater than fifty percent of total visit time.  Counseling included discussion regarding imaging findings, diagnosis, possibilities, treatment options, risks and benefits.  The patient had many questions regarding the options and long-term effects.    Veto Pompa MD  Vascular Medicine Fellow    Patient seen and discussed with Dr. Ureña

## 2022-01-18 NOTE — PATIENT INSTRUCTIONS
Assessment/Plan:  Mr. Shiraz Jha is a 61 y.o. male with Afib s/p ablation 7/14, HTN, DM2, morbid obesity s/p gastric bypass (2010), who presents for a follow up appointment.      1. BLE Edema due to lymphedema and venous insufficiency- Continues to improve. Continue bumex, compression stockings, elevate legs when resting, limit fluid intake to 1.5L daily, sodium intake to 2000mg daily.    2. Morbid Obesity- Pt is following up with Bariatric Surgery. Stable. Encourage dietary modification, exercise and weight loss.      Follow up in 4 months

## 2022-02-14 ENCOUNTER — PATIENT MESSAGE (OUTPATIENT)
Dept: RESEARCH | Facility: HOSPITAL | Age: 62
End: 2022-02-14
Payer: COMMERCIAL

## 2022-02-15 ENCOUNTER — LAB VISIT (OUTPATIENT)
Dept: LAB | Facility: HOSPITAL | Age: 62
End: 2022-02-15
Attending: INTERNAL MEDICINE
Payer: COMMERCIAL

## 2022-02-15 DIAGNOSIS — Z00.00 ENCOUNTER FOR PREVENTIVE HEALTH EXAMINATION: ICD-10-CM

## 2022-02-15 DIAGNOSIS — Z12.5 SCREENING PSA (PROSTATE SPECIFIC ANTIGEN): ICD-10-CM

## 2022-02-15 LAB
ALBUMIN SERPL BCP-MCNC: 3.7 G/DL (ref 3.5–5.2)
ALP SERPL-CCNC: 137 U/L (ref 55–135)
ALT SERPL W/O P-5'-P-CCNC: 14 U/L (ref 10–44)
ANION GAP SERPL CALC-SCNC: 13 MMOL/L (ref 8–16)
AST SERPL-CCNC: 16 U/L (ref 10–40)
BASOPHILS # BLD AUTO: 0.06 K/UL (ref 0–0.2)
BASOPHILS NFR BLD: 0.9 % (ref 0–1.9)
BILIRUB SERPL-MCNC: 0.8 MG/DL (ref 0.1–1)
BUN SERPL-MCNC: 10 MG/DL (ref 6–20)
CALCIUM SERPL-MCNC: 9.2 MG/DL (ref 8.7–10.5)
CHLORIDE SERPL-SCNC: 101 MMOL/L (ref 95–110)
CHOLEST SERPL-MCNC: 186 MG/DL (ref 120–199)
CHOLEST/HDLC SERPL: 5 {RATIO} (ref 2–5)
CO2 SERPL-SCNC: 26 MMOL/L (ref 23–29)
COMPLEXED PSA SERPL-MCNC: 0.41 NG/ML (ref 0–4)
CREAT SERPL-MCNC: 1.1 MG/DL (ref 0.5–1.4)
DIFFERENTIAL METHOD: ABNORMAL
EOSINOPHIL # BLD AUTO: 0.1 K/UL (ref 0–0.5)
EOSINOPHIL NFR BLD: 1.9 % (ref 0–8)
ERYTHROCYTE [DISTWIDTH] IN BLOOD BY AUTOMATED COUNT: 14.1 % (ref 11.5–14.5)
EST. GFR  (AFRICAN AMERICAN): >60 ML/MIN/1.73 M^2
EST. GFR  (NON AFRICAN AMERICAN): >60 ML/MIN/1.73 M^2
GLUCOSE SERPL-MCNC: 121 MG/DL (ref 70–110)
HCT VFR BLD AUTO: 45.1 % (ref 40–54)
HDLC SERPL-MCNC: 37 MG/DL (ref 40–75)
HDLC SERPL: 19.9 % (ref 20–50)
HGB BLD-MCNC: 14.9 G/DL (ref 14–18)
IMM GRANULOCYTES # BLD AUTO: 0.01 K/UL (ref 0–0.04)
IMM GRANULOCYTES NFR BLD AUTO: 0.2 % (ref 0–0.5)
LDLC SERPL CALC-MCNC: 115.6 MG/DL (ref 63–159)
LYMPHOCYTES # BLD AUTO: 1.6 K/UL (ref 1–4.8)
LYMPHOCYTES NFR BLD: 25 % (ref 18–48)
MCH RBC QN AUTO: 31.8 PG (ref 27–31)
MCHC RBC AUTO-ENTMCNC: 33 G/DL (ref 32–36)
MCV RBC AUTO: 96 FL (ref 82–98)
MONOCYTES # BLD AUTO: 0.5 K/UL (ref 0.3–1)
MONOCYTES NFR BLD: 7.7 % (ref 4–15)
NEUTROPHILS # BLD AUTO: 4.2 K/UL (ref 1.8–7.7)
NEUTROPHILS NFR BLD: 64.3 % (ref 38–73)
NONHDLC SERPL-MCNC: 149 MG/DL
NRBC BLD-RTO: 0 /100 WBC
PLATELET # BLD AUTO: 247 K/UL (ref 150–450)
PMV BLD AUTO: 11.3 FL (ref 9.2–12.9)
POTASSIUM SERPL-SCNC: 3.6 MMOL/L (ref 3.5–5.1)
PROT SERPL-MCNC: 6.8 G/DL (ref 6–8.4)
RBC # BLD AUTO: 4.68 M/UL (ref 4.6–6.2)
SODIUM SERPL-SCNC: 140 MMOL/L (ref 136–145)
TRIGL SERPL-MCNC: 167 MG/DL (ref 30–150)
TSH SERPL DL<=0.005 MIU/L-ACNC: 1.54 UIU/ML (ref 0.4–4)
WBC # BLD AUTO: 6.48 K/UL (ref 3.9–12.7)

## 2022-02-15 PROCEDURE — 80061 LIPID PANEL: CPT | Performed by: INTERNAL MEDICINE

## 2022-02-15 PROCEDURE — 80053 COMPREHEN METABOLIC PANEL: CPT | Performed by: INTERNAL MEDICINE

## 2022-02-15 PROCEDURE — 84153 ASSAY OF PSA TOTAL: CPT | Performed by: INTERNAL MEDICINE

## 2022-02-15 PROCEDURE — 85025 COMPLETE CBC W/AUTO DIFF WBC: CPT | Performed by: INTERNAL MEDICINE

## 2022-02-15 PROCEDURE — 84443 ASSAY THYROID STIM HORMONE: CPT | Performed by: INTERNAL MEDICINE

## 2022-02-15 PROCEDURE — 36415 COLL VENOUS BLD VENIPUNCTURE: CPT | Mod: PO | Performed by: INTERNAL MEDICINE

## 2022-02-18 ENCOUNTER — OFFICE VISIT (OUTPATIENT)
Dept: INTERNAL MEDICINE | Facility: CLINIC | Age: 62
End: 2022-02-18
Payer: COMMERCIAL

## 2022-02-18 VITALS
OXYGEN SATURATION: 99 % | BODY MASS INDEX: 44.87 KG/M2 | WEIGHT: 296.06 LBS | HEIGHT: 68 IN | HEART RATE: 52 BPM | DIASTOLIC BLOOD PRESSURE: 84 MMHG | SYSTOLIC BLOOD PRESSURE: 138 MMHG | TEMPERATURE: 98 F

## 2022-02-18 DIAGNOSIS — I89.0 LYMPHEDEMA OF BOTH LOWER EXTREMITIES: ICD-10-CM

## 2022-02-18 DIAGNOSIS — R73.03 PREDIABETES: ICD-10-CM

## 2022-02-18 DIAGNOSIS — E66.01 MORBID OBESITY: ICD-10-CM

## 2022-02-18 DIAGNOSIS — E11.9 TYPE 2 DIABETES MELLITUS WITHOUT COMPLICATION: ICD-10-CM

## 2022-02-18 DIAGNOSIS — F32.5 MAJOR DEPRESSIVE DISORDER IN REMISSION, UNSPECIFIED WHETHER RECURRENT: ICD-10-CM

## 2022-02-18 DIAGNOSIS — Z79.01 CURRENT USE OF LONG TERM ANTICOAGULATION: ICD-10-CM

## 2022-02-18 DIAGNOSIS — I10 ESSENTIAL HYPERTENSION: ICD-10-CM

## 2022-02-18 DIAGNOSIS — G47.33 OBSTRUCTIVE SLEEP APNEA: ICD-10-CM

## 2022-02-18 DIAGNOSIS — I48.19 PERSISTENT ATRIAL FIBRILLATION: ICD-10-CM

## 2022-02-18 DIAGNOSIS — Z12.11 COLON CANCER SCREENING: ICD-10-CM

## 2022-02-18 DIAGNOSIS — Z00.00 ENCOUNTER FOR PREVENTIVE HEALTH EXAMINATION: Primary | ICD-10-CM

## 2022-02-18 PROCEDURE — 99999 PR PBB SHADOW E&M-EST. PATIENT-LVL V: ICD-10-PCS | Mod: PBBFAC,,, | Performed by: INTERNAL MEDICINE

## 2022-02-18 PROCEDURE — 3079F DIAST BP 80-89 MM HG: CPT | Mod: CPTII,S$GLB,, | Performed by: INTERNAL MEDICINE

## 2022-02-18 PROCEDURE — 3008F PR BODY MASS INDEX (BMI) DOCUMENTED: ICD-10-PCS | Mod: CPTII,S$GLB,, | Performed by: INTERNAL MEDICINE

## 2022-02-18 PROCEDURE — 3075F SYST BP GE 130 - 139MM HG: CPT | Mod: CPTII,S$GLB,, | Performed by: INTERNAL MEDICINE

## 2022-02-18 PROCEDURE — 3008F BODY MASS INDEX DOCD: CPT | Mod: CPTII,S$GLB,, | Performed by: INTERNAL MEDICINE

## 2022-02-18 PROCEDURE — 99396 PREV VISIT EST AGE 40-64: CPT | Mod: S$GLB,,, | Performed by: INTERNAL MEDICINE

## 2022-02-18 PROCEDURE — 3075F PR MOST RECENT SYSTOLIC BLOOD PRESS GE 130-139MM HG: ICD-10-PCS | Mod: CPTII,S$GLB,, | Performed by: INTERNAL MEDICINE

## 2022-02-18 PROCEDURE — 1159F MED LIST DOCD IN RCRD: CPT | Mod: CPTII,S$GLB,, | Performed by: INTERNAL MEDICINE

## 2022-02-18 PROCEDURE — 3079F PR MOST RECENT DIASTOLIC BLOOD PRESSURE 80-89 MM HG: ICD-10-PCS | Mod: CPTII,S$GLB,, | Performed by: INTERNAL MEDICINE

## 2022-02-18 PROCEDURE — 99396 PR PREVENTIVE VISIT,EST,40-64: ICD-10-PCS | Mod: S$GLB,,, | Performed by: INTERNAL MEDICINE

## 2022-02-18 PROCEDURE — 99999 PR PBB SHADOW E&M-EST. PATIENT-LVL V: CPT | Mod: PBBFAC,,, | Performed by: INTERNAL MEDICINE

## 2022-02-18 PROCEDURE — 1159F PR MEDICATION LIST DOCUMENTED IN MEDICAL RECORD: ICD-10-PCS | Mod: CPTII,S$GLB,, | Performed by: INTERNAL MEDICINE

## 2022-02-18 NOTE — PROGRESS NOTES
History of present illness:  62-year-old gentleman in today for general health assessment.    Current medications:  All medications noted reviewed.    Review of systems:  General: no fever, chills, generalized body aches. No unexpected weight loss.  Eyes:  No visual disturbances.  HEENT:  No hoarseness, dysphagia, ear pain.  Respiratory:  No cough, no shortness of breath.  Cardiovascular: no chest pain, palpitations, cough, exertional limb pain. No edema.  GI: no nausea, vomiting.  No abdominal pain. No change in bowel habits.  No melena, no hematochezia.  : no dysuria. No change in the color or character of the urine. No urinary frequency.  Musculoskeletal: no joint pain or swelling.  Neurologic:  No focal neurological complaints.  No headaches.  Skin:  No rashes or other concerns.  Psych:  No emotional issues.  He is followed by outside psychiatry in all is stable in that regard.    Health screenings:  Has declined colonoscopy previously.  He has had the COVID vaccine.  Tetanus immunization 2019.      Physical examination:  GENERAL:  Alert, appropriately groomed, no acute distress.  VS:  Blood pressure 122/80 taken by this examiner  EYES: sclerae white ,nonicteric. PERRL.  HEENT:  Normocephalic. Ear canals and tympanic membranes normal. Mouth and pharynx normal. No thyromegaly. Trachea midline and freely mobile.  LUNGS:  Clear to ascultation and normal to percussion.  CARDIOVASCULAR:  Normal heart sounds.  No significant murmur. Carotids full bilaterally without bruit.  Pedal pulses intact .  No abdominal bruit.  1+ distal lower extremity edema.  GI: the abdomen is obese, soft, no distension. No masses , tenderness, organomegaly.  : scrotum, testicles and penis normal.   LYMPHATIC:  No axillary, inguinal , cervical adenopathy.  MUSCULOSKELETAL:  Range of motion, stability and strength of the right and left upper and lower extremities normal. No swollen or tender joints  NEUROLOGIC:  DTR's normal. No gross  motor or sensory deficits apparent, gait normal.  SKIN:  No rashes.   MS:  Alert, oriented , affect and mood all appropriate  Diabetic foot exam:    Visual Inspection:  Normal -  Bilateral    Pedal Pulses:   Right: Present  Left: Present    Posterior tibialis:   Right:Present  Left: Present         Data:  Recent health maintenance laboratory data all noted reviewed.  All reasonable.  Ten year cardiovascular risk estimate calculated at 7.2%.        Impression:  62-year-old gentleman with several chronic medical issues.    Hypertension is well controlled.    Morbid obesity BMI 45.02.    Prediabetes.    Atrial fibrillation chronically anticoagulated followed by Cardiology.    Lymphedema lower extremities.    Long-term anticoagulation.    Depressive disorder followed by outside psychiatry appears to be appropriately controlled in remission--Dr Barajas.          Plan:  Again discussed colonoscopy, he declines but is agreeable to Cologuard.  Update glycohemoglobin today.  Encouraged increased physical activity exercise and weight loss.  Continue follow-up with other subspecialties.  Follow-up in six months.

## 2022-02-21 ENCOUNTER — PATIENT MESSAGE (OUTPATIENT)
Dept: ADMINISTRATIVE | Facility: HOSPITAL | Age: 62
End: 2022-02-21
Payer: COMMERCIAL

## 2022-03-07 ENCOUNTER — PATIENT MESSAGE (OUTPATIENT)
Dept: BARIATRICS | Facility: CLINIC | Age: 62
End: 2022-03-07
Payer: COMMERCIAL

## 2022-03-17 ENCOUNTER — OFFICE VISIT (OUTPATIENT)
Dept: PAIN MEDICINE | Facility: CLINIC | Age: 62
End: 2022-03-17
Payer: COMMERCIAL

## 2022-03-17 VITALS
DIASTOLIC BLOOD PRESSURE: 71 MMHG | RESPIRATION RATE: 18 BRPM | TEMPERATURE: 98 F | WEIGHT: 297.63 LBS | HEART RATE: 62 BPM | HEIGHT: 68 IN | BODY MASS INDEX: 45.11 KG/M2 | SYSTOLIC BLOOD PRESSURE: 103 MMHG | OXYGEN SATURATION: 98 %

## 2022-03-17 DIAGNOSIS — M54.17 LUMBOSACRAL RADICULOPATHY: ICD-10-CM

## 2022-03-17 DIAGNOSIS — M53.3 SACROILIAC JOINT PAIN: ICD-10-CM

## 2022-03-17 DIAGNOSIS — M47.816 LUMBAR SPONDYLOSIS: Primary | ICD-10-CM

## 2022-03-17 DIAGNOSIS — M51.36 DDD (DEGENERATIVE DISC DISEASE), LUMBAR: ICD-10-CM

## 2022-03-17 PROCEDURE — 1160F PR REVIEW ALL MEDS BY PRESCRIBER/CLIN PHARMACIST DOCUMENTED: ICD-10-PCS | Mod: CPTII,S$GLB,, | Performed by: NURSE PRACTITIONER

## 2022-03-17 PROCEDURE — 99213 OFFICE O/P EST LOW 20 MIN: CPT | Mod: S$GLB,,, | Performed by: NURSE PRACTITIONER

## 2022-03-17 PROCEDURE — 1159F PR MEDICATION LIST DOCUMENTED IN MEDICAL RECORD: ICD-10-PCS | Mod: CPTII,S$GLB,, | Performed by: NURSE PRACTITIONER

## 2022-03-17 PROCEDURE — 3008F PR BODY MASS INDEX (BMI) DOCUMENTED: ICD-10-PCS | Mod: CPTII,S$GLB,, | Performed by: NURSE PRACTITIONER

## 2022-03-17 PROCEDURE — 99999 PR PBB SHADOW E&M-EST. PATIENT-LVL V: ICD-10-PCS | Mod: PBBFAC,,, | Performed by: NURSE PRACTITIONER

## 2022-03-17 PROCEDURE — 3008F BODY MASS INDEX DOCD: CPT | Mod: CPTII,S$GLB,, | Performed by: NURSE PRACTITIONER

## 2022-03-17 PROCEDURE — 3074F SYST BP LT 130 MM HG: CPT | Mod: CPTII,S$GLB,, | Performed by: NURSE PRACTITIONER

## 2022-03-17 PROCEDURE — 3074F PR MOST RECENT SYSTOLIC BLOOD PRESSURE < 130 MM HG: ICD-10-PCS | Mod: CPTII,S$GLB,, | Performed by: NURSE PRACTITIONER

## 2022-03-17 PROCEDURE — 3078F DIAST BP <80 MM HG: CPT | Mod: CPTII,S$GLB,, | Performed by: NURSE PRACTITIONER

## 2022-03-17 PROCEDURE — 1160F RVW MEDS BY RX/DR IN RCRD: CPT | Mod: CPTII,S$GLB,, | Performed by: NURSE PRACTITIONER

## 2022-03-17 PROCEDURE — 99999 PR PBB SHADOW E&M-EST. PATIENT-LVL V: CPT | Mod: PBBFAC,,, | Performed by: NURSE PRACTITIONER

## 2022-03-17 PROCEDURE — 1159F MED LIST DOCD IN RCRD: CPT | Mod: CPTII,S$GLB,, | Performed by: NURSE PRACTITIONER

## 2022-03-17 PROCEDURE — 99213 PR OFFICE/OUTPT VISIT, EST, LEVL III, 20-29 MIN: ICD-10-PCS | Mod: S$GLB,,, | Performed by: NURSE PRACTITIONER

## 2022-03-17 PROCEDURE — 3078F PR MOST RECENT DIASTOLIC BLOOD PRESSURE < 80 MM HG: ICD-10-PCS | Mod: CPTII,S$GLB,, | Performed by: NURSE PRACTITIONER

## 2022-03-17 RX ORDER — GABAPENTIN 300 MG/1
300 CAPSULE ORAL 3 TIMES DAILY
Qty: 270 CAPSULE | Refills: 3 | Status: SHIPPED | OUTPATIENT
Start: 2022-03-17 | End: 2024-02-20

## 2022-03-17 NOTE — PROGRESS NOTES
Chronic patient Established Note (Follow up visit)      SUBJECTIVE:    Interval History 3/17/2022:  The patient returns to clinic today for follow up of low back pain. He has not been seen a year. He continues to report low back pain. He denies any radicular leg pain. His pain is tolerable with current regimen. He is currently taking Gabapentin with significant relief. He denies any adverse effects. He continues to perform a home exercise routine. He would like to try the [facility] program. He denies any weakness, bowel, or bladder incontinence. His pain today is 0/10.    Interval History 12/7/2020:  Shiraz Jha presents to the clinic for a follow-up appointment for low back pain. Since the last visit, Shiraz Jha states the pain has been stable. Current pain intensity is 2/10.    Patient is here for a f/u and request of medication refill, states his pain is fairly controlled with gabapentin.    Pain Disability Index Review:  Last 3 PDI Scores 3/17/2022 12/7/2020 12/13/2018   Pain Disability Index (PDI) 0 23 7       Pain Medications:  Gabapentin 300 mg TID    Opioid Contract: no     report:  Reviewed and consistent with medication use as prescribed.    Pain Procedures:  3/24/2015- C7/T1 IL ALE  5/1/2018- Bilateral L3,4,5 MBB  5/29/2018- L4/5 IL ALE  7/3/2018- L4/IL ALE    Physical Therapy/Home Exercise: yes    Imaging:   MRI Lumbar Spine 4/24/2018:  FINDINGS:  There is normal lumbar lordosis.  No spondylolisthesis or spondylolysis.  There is no marrow edema throughout the vertebral bodies to suggest acute fractures or marrow replacing processes throughout the lumbar spine.  The tip of the conus is at T12-L1 disc space.  Paraspinal soft tissues are unremarkable.  On the sagittal images there is suggestion of a distended urinary bladder.     L5-S1: There is normal intervertebral disc height no disc herniations or significant central canal spinal stenosis.  There is mild bilateral facet arthropathy.  No  significant foraminal stenosis.     L4-5: Normal intervertebral disc height without soft tissue disc herniations.  There is enlarged lamina and the facet joints and mild hypertrophic changes of the facet joints.  This results in at least a mild central canal spinal stenosis.     L3-4: Normal intervertebral disc height without soft tissue disc herniations.  There is enlarged facet joints and the lamina and mild hypertrophic changes of the ligamentum flava resulting in at least a moderate central canal spinal stenosis.     L2-3: Normal intervertebral disc height without soft tissue disc herniations.  There is hypertrophic changes of the dorsal elements including the lamina and the facet joints and mild hypertrophic changes of the ligamentum flava resulting in moderate central canal spinal stenosis.     L1-2: No disc herniations.  Hypertrophic changes of the dorsal elements including the lamina and the facet joints resulting in mild central canal stenosis.     T12-L1: No disc herniations or spinal stenosis or foraminal stenosis.  There is mild marginal anterior spondylotic osteophytes.     It should be noted that the CSF space within the thecal sac at the L1-2, L2-3 L3-4 disc spaces is compromised from the spinal stenotic changes.     IMPRESSION:      1. At least moderate stenotic changes at the L2-3 and L3-4 disc spaces and mild stenosis at the L1-2 and L4-5 disc spaces, mainly from hypertrophic changes of the dorsal elements.  It is possible that these findings most likely on a congenital basis.  2. No significant disc herniations.  3. Distended urinary bladder.    Allergies:   Review of patient's allergies indicates:   Allergen Reactions    Iodinated contrast media Other (See Comments)     Raises BP         Current Medications:   Current Outpatient Medications   Medication Sig Dispense Refill    ARIPiprazole (ABILIFY) 10 MG Tab Take 10 mg by mouth once daily.      ARIPiprazole (ABILIFY) 15 MG Tab 10 mg once  daily. Take 1/2 tab once a day      aspirin 81 MG Chew Take 1 tablet (81 mg total) by mouth once daily.  0    bumetanide (BUMEX) 1 MG tablet TAKE 1 TABLET (1 MG TOTAL) BY MOUTH 2 (TWO) TIMES DAILY. 180 tablet 3    buPROPion (WELLBUTRIN XL) 300 MG 24 hr tablet Take 300 mg by mouth once daily. Pt states he only takes 300mg      cyanocobalamin 500 MCG tablet Take 500 mcg by mouth once daily.      dextroamphetamine-amphetamine (ADDERALL) 20 mg tablet Take 20 mg by mouth.       fluticasone propionate (FLONASE) 50 mcg/actuation nasal spray 1 spray (50 mcg total) by Each Nostril route once daily. (Patient taking differently: 1 spray by Each Nostril route daily as needed.) 16 g 0    IMPOYZ 0.025 % Crea Apply topically as needed.       levocetirizine (XYZAL) 5 MG tablet TAKE 1 TABLET (5 MG TOTAL) BY MOUTH EVERY EVENING. 30 tablet 11    LUZU 1 % Crea Apply topically as needed.       metoprolol tartrate (LOPRESSOR) 25 MG tablet TAKE ONE BY MOUTH TWICE DAILY 180 tablet 3    multivitamin (THERAGRAN) per tablet Take 1 tablet by mouth once daily.       NAFTIN 2 % Gel daily as needed.       nitroGLYCERIN (NITROSTAT) 0.4 MG SL tablet Please dispense 25 pills in 4 bottles. 100 tablet 0    omeprazole (PRILOSEC) 10 MG capsule Take 2 capsules (20 mg total) by mouth once daily. 30 capsule 6    sotaloL (BETAPACE) 80 MG tablet TAKE ONE BY MOUTH TWICE DAILY 180 tablet 3    tamsulosin (FLOMAX) 0.4 mg Cap TAKE ONE BY MOUTH TWICE DAILY 180 capsule 3    thiamine 250 MG tablet Take 250 mg by mouth once daily.      vitamin D (VITAMIN D3) 1000 units Tab Take 1,000 Units by mouth once daily.      vortioxetine (TRINTELLIX) 20 mg Tab Take 20 mg by mouth once daily.       XARELTO 20 mg Tab TAKE ONE DAILY 30 tablet 11    gabapentin (NEURONTIN) 300 MG capsule Take 1 capsule (300 mg total) by mouth 3 (three) times daily. 270 capsule 3     No current facility-administered medications for this visit.       REVIEW OF  SYSTEMS:    GENERAL:  No weight loss, malaise or fevers.  HEENT:  Negative for frequent or significant headaches.  NECK:  Negative for lumps, goiter, pain and significant neck swelling.  RESPIRATORY:  Negative for cough, wheezing or shortness of breath.  CARDIOVASCULAR:  Negative for chest pain, leg swelling or palpitations. HTN, A fib  GI:  Negative for abdominal discomfort, blood in stools or black stools or change in bowel habits. GERD  MUSCULOSKELETAL:  See HPI.  SKIN:  Negative for lesions, rash, and itching.  PSYCH:  Negative for sleep disturbance, mood disorder and recent psychosocial stressors.  HEMATOLOGY/LYMPHOLOGY:  Negative for swollen nodes. Xarelto.   NEURO:   No history of headaches, syncope, paralysis, seizures or tremors.  ENDO: Diabetes, thyroid disorder  All other reviewed and negative other than HPI.    Past Medical History:  Past Medical History:   Diagnosis Date    Allergy     Asthma     Atrial fibrillation     Cataract     Chronic rhinitis 9/26/2013    DDD (degenerative disc disease) 4/3/2015    Depression     Diabetes mellitus     Fibromyalgia     Gastroesophageal reflux disease without esophagitis 7/14/2017    Hypertension     Sinusitis, acute, maxillary 12/20/2012    Thyroid disease        Past Surgical History:  Past Surgical History:   Procedure Laterality Date    CERVICAL SPINE SURGERY      EPIDURAL STEROID INJECTION INTO LUMBAR SPINE N/A 5/29/2018    Procedure: INJECTION-STEROID-EPIDURAL-LUMBAR- L4-5;  Surgeon: Roman Lyman MD;  Location: [facility] PAIN T;  Service: Pain Management;  Laterality: N/A;  Patient is diabetic and Xarleto     EPIDURAL STEROID INJECTION INTO LUMBAR SPINE N/A 7/3/2018    Procedure: INJECTION, STEROID, SPINE, LUMBAR, EPIDURAL- L4-5;  Surgeon: Roman Lyman MD;  Location: [facility] PAIN MGT;  Service: Pain Management;  Laterality: N/A;  Patient takes Xarelto and ASA     EXCISION OF LESION OF LIP N/A 7/7/2020    Procedure: EXCISION, LESION,  LIP;  Surgeon: Caden Navarro MD;  Location: [address];  Service: ENT;  Laterality: N/A;    GASTRIC BYPASS      SINUS SURGERY  2000    [facility]    TONSILLECTOMY         Family History:  Family History   Problem Relation Age of Onset    Heart disease Father     Cancer Mother         lung    Hypertension Sister     Heart disease Brother     Cirrhosis Brother     Diabetes Brother        Social History:  Social History     Socioeconomic History    Marital status: Single   Tobacco Use    Smoking status: Never Smoker    Smokeless tobacco: Never Used   Substance and Sexual Activity    Alcohol use: No     Comment: rare    Drug use: No    Sexual activity: Yes     Partners: Female   Social History Narrative    [place], lives alone w/2 dogs.    Dogs are his kids.    Works PT --  at [employer], on ssdi from neck and back/depression.     Social Determinants of Health     Financial Resource Strain: High Risk    Difficulty of Paying Living Expenses: Very hard   Food Insecurity: Food Insecurity Present    Worried About Running Out of Food in the Last Year: Sometimes true    Ran Out of Food in the Last Year: Sometimes true   Transportation Needs: Unmet Transportation Needs    Lack of Transportation (Medical): Yes    Lack of Transportation (Non-Medical): Yes   Physical Activity: Sufficiently Active    Days of Exercise per Week: 3 days    Minutes of Exercise per Session: 60 min   Stress: Stress Concern Present    Feeling of Stress : To some extent   Social Connections: Unknown    Frequency of Communication with Friends and Family: More than three times a week    Frequency of Social Gatherings with Friends and Family: Once a week    Active Member of Clubs or Organizations: Yes    Attends Club or Organization Meetings: More than 4 times per year    Marital Status: Never    Housing Stability: High Risk    Unable to Pay for Housing in the Last Year: Yes    Number of Places Lived  "in the Last Year: 1    Unstable Housing in the Last Year: No       OBJECTIVE:    /71 (BP Location: Left arm, Patient Position: Sitting, BP Method: X-Large (Automatic))   Pulse 62   Temp 97.5 °F (36.4 °C)   Resp 18   Ht 5' 8" (1.727 m)   Wt 135 kg (297 lb 9.9 oz)   SpO2 98%   BMI 45.25 kg/m²     PHYSICAL EXAMINATION:    General appearance: Well appearing, in no acute distress, alert and oriented x3.  Psych:  Mood and affect appropriate.  Skin: Skin color, texture, turgor normal, no rashes or lesions, in both upper and lower body.  Head/face:  Atraumatic, normocephalic  Cor: RRR  Pulm: Symmetric chest rise, no respiratory distress noted.   Back: Straight leg raising in the sitting position is negative to radicular pain bilaterally. There is no pain with palpation over lumbar facet joints bilaterally. Limited ROM with mild pain on extension. Positive facet loading bilaterally.   Extremities: No deformities, edema, or skin discoloration. Good capillary refill.  Musculoskeletal: Sacroiliac provocative maneuvers are negative. Bilateral lower extremity strength is normal and symmetric.  No atrophy or tone abnormalities are noted.  Neuro: Bilateral lower extremity coordination and muscle stretch reflexes are physiologic and symmetric.  Plantar response are downgoing. No loss of sensation is noted.  Gait: Normal.    ASSESSMENT: 62 y.o. year old male with low back pain pain, consistent with     1. Lumbar spondylosis  Ambulatory referral/consult to [facility]   2. Lumbosacral radiculopathy  gabapentin (NEURONTIN) 300 MG capsule   3. DDD (degenerative disc disease), lumbar  gabapentin (NEURONTIN) 300 MG capsule   4. Sacroiliac joint pain  gabapentin (NEURONTIN) 300 MG capsule         PLAN:     - Previous imaging reivewed today.    - We can repeat lumbar procedures as needed. His pain is tolerable at this time.     - I have stressed the importance of physical activity and a home exercise plan to help with " pain and improve health.    - Consult [facility].     - Continue Gabapentin 300 mg TID. Refill provided today.     - RTC PRN.     - Counseled patient regarding the importance of activity modification, constant sleeping habits and physical therapy.    - Dr. Lyman was consulted on the patient and agrees with this plan.    The above plan and management options were discussed at length with patient. Patient is in agreement with the above and verbalized understanding.    Yadira Brody NP  03/17/2022

## 2022-03-22 ENCOUNTER — PATIENT OUTREACH (OUTPATIENT)
Dept: ADMINISTRATIVE | Facility: HOSPITAL | Age: 62
End: 2022-03-22
Payer: COMMERCIAL

## 2022-03-22 ENCOUNTER — PATIENT MESSAGE (OUTPATIENT)
Dept: ADMINISTRATIVE | Facility: HOSPITAL | Age: 62
End: 2022-03-22
Payer: COMMERCIAL

## 2022-04-07 ENCOUNTER — PATIENT MESSAGE (OUTPATIENT)
Dept: BARIATRICS | Facility: CLINIC | Age: 62
End: 2022-04-07
Payer: COMMERCIAL

## 2022-04-12 ENCOUNTER — CLINICAL SUPPORT (OUTPATIENT)
Dept: REHABILITATION | Facility: OTHER | Age: 62
End: 2022-04-12
Attending: NURSE PRACTITIONER
Payer: COMMERCIAL

## 2022-04-12 ENCOUNTER — PATIENT MESSAGE (OUTPATIENT)
Dept: BARIATRICS | Facility: CLINIC | Age: 62
End: 2022-04-12
Payer: COMMERCIAL

## 2022-04-12 DIAGNOSIS — R29.3 POOR POSTURE: ICD-10-CM

## 2022-04-12 DIAGNOSIS — M47.816 LUMBAR SPONDYLOSIS: ICD-10-CM

## 2022-04-12 DIAGNOSIS — R29.898 DECREASED STRENGTH OF TRUNK AND BACK: ICD-10-CM

## 2022-04-12 PROCEDURE — 97110 THERAPEUTIC EXERCISES: CPT

## 2022-04-12 PROCEDURE — 97162 PT EVAL MOD COMPLEX 30 MIN: CPT

## 2022-04-12 NOTE — PROGRESS NOTES
[facility] - PHYSICAL THERAPY EVALUATION     Name: Shiraz RUBI [place]  Clinic Number: [id number]    Therapy Diagnosis:   Encounter Diagnoses   Name Primary?    Lumbar spondylosis     Poor posture     Decreased strength of trunk and back      Physician: Yadira Brody NP    Physician Orders: PT Eval and Treat   Medical Diagnosis from Referral: M47.816 (ICD-10-CM) - Lumbar spondylosis  Evaluation Date: 4/12/2022  Authorization Period Expiration: 03/17/23  Plan of Care Expiration: 07/12/22  Reassessment Due: 05/12/22  Visit # / Visits authorized: 01 / 01    Time In: 2:00 pm  Time Out: 3:30 pm  Total Billable Time: 90 minutes    Precautions: Standard cervical fusion     Pattern of pain determined: 1PEP       Subjective   Date of onset: > 1 yr   History of current condition - Shiraz reports consistent/constant R side buttock discomfort that does not progress superiorly or inferiorly.   Pt reports this pain constant presentation and sx progress as the day goes on.  Pt endorse some LB stiffness in AM.  Pt deny N/T BLE.  Pt report sitting down offers immediate sx relief.  Pt report gabepentin /c sig relief taking 2 x day  Pt report sx exacerbated /c bending/lifting and extended walking.   Pt report dec amb tolerance < 1/2 mile.    Pt report difficulty getting up off the floor, ascend/descend stairs (he lives on 2nd floor /c 20 steps).     Prior HB episode lead to mod relief but he did not complete full 20 visits and admits to limited HEP compliance.  Pt verbalize knowledge of stretching to reduce sx and believes his non compliance has lead to sx inc.      Per MD report:   Interval History 3/17/2022:  The patient returns to clinic today for follow up of low back pain. He has not been seen a year. He continues to report low back pain. He denies any radicular leg pain. His pain is tolerable with current regimen. He is currently taking Gabapentin with significant relief. He denies any adverse effects. He continues to  perform a home exercise routine. He would like to try the [facility] program. He denies any weakness, bowel, or bladder incontinence. His pain today is 0/10.     Medical History:   Past Medical History:   Diagnosis Date    Allergy     Asthma     Atrial fibrillation     Cataract     Chronic rhinitis 9/26/2013    DDD (degenerative disc disease) 4/3/2015    Depression     Diabetes mellitus     Fibromyalgia     Gastroesophageal reflux disease without esophagitis 7/14/2017    Hypertension     Sinusitis, acute, maxillary 12/20/2012    Thyroid disease        Surgical History:   Shiraz Jha  has a past surgical history that includes Tonsillectomy; Cervical spine surgery; Gastric bypass; Sinus surgery (2000); Epidural steroid injection into lumbar spine (N/A, 5/29/2018); Epidural steroid injection into lumbar spine (N/A, 7/3/2018); and Excision of lesion of lip (N/A, 7/7/2020).    Medications:   Shiraz has a current medication list which includes the following prescription(s): aripiprazole, aripiprazole, aspirin, bumetanide, bupropion, cyanocobalamin, dextroamphetamine-amphetamine, fluticasone propionate, gabapentin, impoyz, levocetirizine, luzu, metoprolol tartrate, multivitamin, naftin, nitroglycerin, omeprazole, sotalol, tamsulosin, thiamine, vitamin d, vortioxetine, and xarelto.    Allergies:   Review of patient's allergies indicates:   Allergen Reactions    Iodinated contrast media Other (See Comments)     Raises BP          Imaging:    Per MD note:   MRI lumbar 4/2018  There is normal lumbar lordosis.  No spondylolisthesis or spondylolysis.  There is no marrow edema throughout the vertebral bodies to suggest acute fractures or marrow replacing processes throughout the lumbar spine.  The tip of the conus is at T12-L1 disc space.  Paraspinal soft tissues are unremarkable.  On the sagittal images there is suggestion of a distended urinary bladder.     L5-S1: There is normal intervertebral disc height no  disc herniations or significant central canal spinal stenosis.  There is mild bilateral facet arthropathy.  No significant foraminal stenosis.     L4-5: Normal intervertebral disc height without soft tissue disc herniations.  There is enlarged lamina and the facet joints and mild hypertrophic changes of the facet joints.  This results in at least a mild central canal spinal stenosis.     L3-4: Normal intervertebral disc height without soft tissue disc herniations.  There is enlarged facet joints and the lamina and mild hypertrophic changes of the ligamentum flava resulting in at least a moderate central canal spinal stenosis.     L2-3: Normal intervertebral disc height without soft tissue disc herniations.  There is hypertrophic changes of the dorsal elements including the lamina and the facet joints and mild hypertrophic changes of the ligamentum flava resulting in moderate central canal spinal stenosis.     L1-2: No disc herniations.  Hypertrophic changes of the dorsal elements including the lamina and the facet joints resulting in mild central canal stenosis.     T12-L1: No disc herniations or spinal stenosis or foraminal stenosis.  There is mild marginal anterior spondylotic osteophytes.     It should be noted that the CSF space within the thecal sac at the L1-2, L2-3 L3-4 disc spaces is compromised from the spinal stenotic changes.     Impression                    1. At least moderate stenotic changes at the L2-3 and L3-4 disc spaces and mild stenosis at the L1-2 and L4-5 disc spaces, mainly from hypertrophic changes of the dorsal elements.  It is possible that these findings most likely on a congenital basis.  2. No significant disc herniations.  3. Distended urinary bladder.     X-ray lumbar 4/2018  The vertebral body heights and intervertebral disc spaces are fairly well maintained.  No fracture.  No marrow replacement process.  SI joints unremarkable.  L4-5 and L5-S1 facet arthropathy  "noted.  Impression                    Lumbar spondylosis.    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  Where is your pain the worst? R buttock  2.  Is your pain constant or intermittent? Constant   3.  Does bending forward make your typical pain worse? Y   4.  Since the start of your back pain, has there been a change in your bowel or bladder? N  5.  What can't you do now that you use to be able to do? Ease /c getting off of floor, ascend stairs      Pts goals: walk /s pain, carry things up the steps /s pain       Red Flag Screening:   Cough  Sneeze  Strain: (--)  Bladder/ bowel: (--)  Falls: (--)  Night pain: (--)  Unexplained weight loss: (--)  General health: pt report poor     OBJECTIVE     Postural examination/scapula alignment: Rounded shoulder, Head forward and Decreased lordosis  Joint integrity: Firm end feeling  Sitting: slouched  Standing: WBOS   Correction of posture: no effect with lumbar roll  But notes "I am in better posture"  Palpation:   TTP  R piriformis     Mild hypertonic B lumbar paraspinals    MOVEMENT LOSS    ROM Loss   Flexion minimal loss   Extension moderate loss "pressure"   Side glide Right major loss   Side glide Left major loss   Rotation Right major " "loss "pressure"   Rotation Left major loss  "pressure"     Lower Extremity Strength  Right LE  Left LE    Hip flexion: 4/5 P! Hip flexion: 4/5   Hip extension:  4-/5 Hip extension: 4-/5   Hip abduction: 3+/5 Hip abduction: 3+/5   Hip adduction:  4+/5 Hip adduction:  4+/5   Hip External Rotation 4+/5 Hip External Rotation 4+/5   Hip Internal rotation   4+/5 Hip Internal rotation 4+/5   Knee Flexion 5/5 Knee Flexion 5/5   Knee Extension 5/5 Knee Extension 5/5   Ankle dorsiflexion: 4+/5 Ankle dorsiflexion: 4+/5   Ankle plantarflexion: 4+/5 Ankle plantarflexion: 4+/5       GAIT:  Assistive Device used: none  Level of Assistance: independent  Patient displays the following gait deviations:  decreased step length and increased base of support.     Special Tests:   Test Name  Test Result   Prone Instability Test (--)   SI Joint Provocation Test (--)   Straight Leg Raise (+) R calf   Neural Tension Test (+) R calf   Crossed Straight Leg Raise (--)   Walking on toes (--)   Walking on heels  (--)       NEUROLOGICAL SCREENING     Sensory deficit:     BLE LT intact  L5 myotome intact   Saddle sensation intact     Reflexes:    Left Right   Patella Tendon absent absent   Achilles Tendon 1+ 1+   Clonus (--) (--)     REPEATED TEST MOVEMENTS:    Baseline symptoms: 1/10 R buttock   Repeated Flexion in Standing no effect calf pull   Repeated Extension in Standing no effect midpoint pressure    Repeated Flexion in lying no effect   Repeated Extension in lying  better       STATIC TESTS and other movements:   Baseline symptoms:  Prone lie better   Prone lie on elbows better   Sustained prone on elbows better   Sustained flexion no effect   Sitting slouched  no effect   Sitting erect no effect       Baseline Isometric Testing on Med X equipment: Testing administered by PT  Date of testin22  ROM 0 - 36  deg   Max Peak Torque 161   Min Peak Torque 68   Flex/Ext Ratio 2.38    % below normative data 30% "         Limitation/Restriction for FOTO Lumbar Survey    Therapist reviewed FOTO scores for Shiraz Jha on 4/12/2022.   FOTO documents entered into [employer] - see Media section.    Limitation Score: 30%      Goal: < 25%           Treatment   Treatment Time In: 3:00 pm  Treatment Time Out: 3:30 pm  Total Treatment time separate from Evaluation: 30 minutes      Shiraz received therapeutic exercises to develop/improve posture, lumbar/cervical ROM, strength and muscular endurance for 30 minutes including the following exercises:     HEP demo include  LTR x 10   DKTC x 5  Piriformis stretch /c towel x 2 20 sec   Prone on Elbow 1 min   Standing ext - NP    Next visit - SOC       Med x dynamic exercise and baseline IM test    [facility] 4/14/2022   Visit Number 1   VAS Pain Rating 1   Treadmill Time (in min.) -   Speed -   Extension in Lying 15   Extension in Standing 15   Flexion in Lying 10   Lumbar Extension Seat Pad 0   Femur Restraint 7   Top Dead Center 24   Counterweight 335   Lumbar Flexion 36   Lumbar Extension 0   Lumbar Peak Torque 161   Min Torque 68   Test Percent Below Normative Data 30   Lumbar Weight 60   Repetitions 5   Rating of Perceived Exertion -   Ice - Z Lie (in min.) 10         Written Home Exercises Provided: yes.  Exercises were reviewed and Shiraz was able to demonstrate them prior to the end of the session.  Shiraz demonstrated fair  understanding of the education provided.     See EMR under Patient Instructions for exercises provided 4/12/2022.      Education provided:   - Patient received education regarding proper posture and body mechanics.  Patient was given top [facility] Visit 1 handouts which discuss what to expect in therapy, the purpose and opportunity for health coaching, the program,  wellness when discharged from therapy, back education and care specifically for posture seated, standing, lifting correctly, components of exercise, importance of nutrition and hydration,  and importance of sleep.   Information on lumbar rolls provided.  - Raul roll tried, recommended, and purchase information was provided.  - Patient received a handout regarding anticipated muscular soreness following the isometric test and strategies for management were reviewed with patient including stretching, using ice and scheduled rest.   - Patient received education on the [facility] program, purpose of the isometric test, progression of back strengthening as well as wellness approach and systemic strengthening.  Details of the program were discussed.  Reviewed that patient should feel support/pressure from med ex restraints but no pain or discomfort and patient expressed understanding.    Shiraz received cold pack for 10 minutes to lumbar region in Z lying.    Assessment   Shiraz is a 62 y.o. male referred to [facility] with a medical diagnosis of M47.816 (ICD-10-CM) - Lumbar spondylosis. Pt presents with signs and sx consistent /c dx including dec lumbar paraspinal strength, dec lumbar ROM, (+) R neural sx provocation, soft tissue dysfunction, poor posture leading to dec functional mobility, strength and activity tolerance.  MedX IM testing indicate pt 30% below norms, however, pt /c min change in strength measure vs last test at HB.  Considering pt note dec sx presentation at end of last HB episode this may indicate sx ofelia may be have additional component than strength/stability.  Repeated motions testing indicate clear buttock sx resolution /c ext bias therefore HEP include YUMIKO and EIS.  However, DKTC included in HEP for general ROM and initial core activation specifically iin an unweighted position. ROM limited on MedX due to body habitus, therefore, goals established /c min change. Neural provocation (+) R warrants further attention to peripheral ofelia (piriformis) if directional preference lose sx relief component.  Pt is an excellent candidate for HB and his status as a return  patient having had success offers positive prognostic indicator.           Pain Pattern: 1PEP       Pt prognosis is Good.   Pt will benefit from skilled outpatient Physical Therapy to address the deficits stated above and in the chart below, provide pt/family education, and to maximize pt's level of independence. Based on the above history and physical examination an active physical therapy program is recommended.  Pt will continue to benefit from skilled outpatient physical therapy to address the deficits listed below in the chart, provide pt/family education and to maximize pt's level of independence in the home and community environment. .       Plan of care discussed with patient: Yes  Pt's spiritual, cultural and educational needs considered and patient is agreeable to the plan of care and goals as stated below:     Anticipated Barriers for therapy: none    PT Evaluation Completed? Yes    Medical necessity is demonstrated by the following problem list.    Pt presents with the following impairments:     History  Co-morbidities and personal factors that may impact the plan of care Co-morbidities:   depression, diabetes, high BMI, HTN and level of undertstanding of current condition, Afib, Fibromyalgia    Personal Factors:   coping style     moderate   Examination  Body Structures and Functions, activity limitations and participation restrictions that may impact the plan of care Body Regions:   back  trunk    Body Systems:    gross symmetry  ROM  strength  gross coordinated movement  gait  transitions  motor control    Participation Restrictions:   none    Activity limitations:   Learning and applying knowledge  no deficits    General Tasks and Commands  no deficits    Communication  no deficits    Mobility  lifting and carrying objects  walking    Self care  no deficits    Domestic Life  shopping  cooking  doing house work (cleaning house, washing dishes, laundry)    Interactions/Relationships  no  deficits    Life Areas  no deficits    Community and Social Life  community life  recreation and leisure         low   Clinical Presentation stable and uncomplicated low   Decision Making/ Complexity Score: moderate       GOALS: Pt is in agreement with the following goals.    Short term goals:  6 weeks or 10 visits   1.  Pt will demonstrate increased lumbar ROM by at least 3 degrees from the initial ROM value with improvements noted in functional ROM and ability to perform ADLs.  (approp and ongoing)  2.  Pt will demonstrate increased MedX average isometric strength value  by 10% from initial test resulting in improved ability to perform bending, lifting, and carrying activities safely, confidently.  (approp and ongoing)  3.  Patient report a reduction in worst pain score by 1-2 points for improved tolerance for household chores.  (approp and ongoing)  4.  Pt able to perform HEP correctly with minimal cueing or supervision from therapist to encourage independent management of symptoms. (approp and ongoing)      Long term goals: 10 weeks or 20 visits   1. Pt will demonstrate increased lumbar ROM by at least 6 degrees from initial ROM value, resulting in improved ability to perform functional fwd bending while standing and sitting. (approp and ongoing)  2. Pt will demonstrate increased MedX average isometric strength value  by 20% from initial test resulting in improved ability to perform bending, lifting, and carrying activities safely, confidently.  (approp and ongoing)  3. Pt to demonstrate ability to independently control and reduce their pain through posture positioning and mechanical movements throughout a typical day.  (approp and ongoing)  4.  Pt will demonstrate reduced pain and improved functional outcomes as reported on the FOTO by reaching a limitation score of < or = 25% or less in order to demonstrate subjective improvement in pt's condition.    (approp and ongoing)  5. Pt will demonstrate independence  "with the HEP at discharge  (approp and ongoing)  6. Pt will ascend/descend 2 flights of stairs carrying 20# /s inc LBP for inc tolerance to household chores (grocery return)(patient goal)  (approp and ongoing)  7. Pt will perform stand<>kneel x 3 in < 60 sec /s inc LBP for inc tolerance to household chores (approp and ongoing)    Plan   Outpatient physical therapy 2x week for 10 weeks or 20 visits to include the following:   - Patient education  - Therapeutic exercise  - Manual therapy  - Performance testing   - Neuromuscular Re-education  - Therapeutic activity   - Modalities    Pt may be seen by PTA as part of the rehabilitation team.     Therapist: Tang Stacy, PT  4/14/2022    "I certify the need for these services furnished under this plan of treatment and while under my care."    ____________________________________  Physician/Referring Practitioner    _______________  Date of Signature            "

## 2022-04-14 PROBLEM — R29.3 POOR POSTURE: Status: ACTIVE | Noted: 2022-04-14

## 2022-04-14 PROBLEM — R29.898 DECREASED STRENGTH OF TRUNK AND BACK: Status: ACTIVE | Noted: 2022-04-14

## 2022-04-15 NOTE — PLAN OF CARE
[facility] - PHYSICAL THERAPY EVALUATION     Name: Shiraz RUBI [place]  Clinic Number: [id number]    Therapy Diagnosis:   Encounter Diagnoses   Name Primary?    Lumbar spondylosis     Poor posture     Decreased strength of trunk and back      Physician: Yadira Brody NP    Physician Orders: PT Eval and Treat   Medical Diagnosis from Referral: M47.816 (ICD-10-CM) - Lumbar spondylosis  Evaluation Date: 4/12/2022  Authorization Period Expiration: 03/17/23  Plan of Care Expiration: 07/12/22  Reassessment Due: 05/12/22  Visit # / Visits authorized: 01 / 01    Time In: 2:00 pm  Time Out: 3:30 pm  Total Billable Time: 90 minutes    Precautions: Standard cervical fusion     Pattern of pain determined: 1PEP       Subjective   Date of onset: > 1 yr   History of current condition - Shiraz reports consistent/constant R side buttock discomfort that does not progress superiorly or inferiorly.   Pt reports this pain constant presentation and sx progress as the day goes on.  Pt endorse some LB stiffness in AM.  Pt deny N/T BLE.  Pt report sitting down offers immediate sx relief.  Pt report gabepentin /c sig relief taking 2 x day  Pt report sx exacerbated /c bending/lifting and extended walking.   Pt report dec amb tolerance < 1/2 mile.    Pt report difficulty getting up off the floor, ascend/descend stairs (he lives on 2nd floor /c 20 steps).     Prior HB episode lead to mod relief but he did not complete full 20 visits and admits to limited HEP compliance.  Pt verbalize knowledge of stretching to reduce sx and believes his non compliance has lead to sx inc.      Per MD report:   Interval History 3/17/2022:  The patient returns to clinic today for follow up of low back pain. He has not been seen a year. He continues to report low back pain. He denies any radicular leg pain. His pain is tolerable with current regimen. He is currently taking Gabapentin with significant relief. He denies any adverse effects. He continues to  perform a home exercise routine. He would like to try the [facility] program. He denies any weakness, bowel, or bladder incontinence. His pain today is 0/10.     Medical History:   Past Medical History:   Diagnosis Date    Allergy     Asthma     Atrial fibrillation     Cataract     Chronic rhinitis 9/26/2013    DDD (degenerative disc disease) 4/3/2015    Depression     Diabetes mellitus     Fibromyalgia     Gastroesophageal reflux disease without esophagitis 7/14/2017    Hypertension     Sinusitis, acute, maxillary 12/20/2012    Thyroid disease        Surgical History:   Shiraz Jha  has a past surgical history that includes Tonsillectomy; Cervical spine surgery; Gastric bypass; Sinus surgery (2000); Epidural steroid injection into lumbar spine (N/A, 5/29/2018); Epidural steroid injection into lumbar spine (N/A, 7/3/2018); and Excision of lesion of lip (N/A, 7/7/2020).    Medications:   Shiraz has a current medication list which includes the following prescription(s): aripiprazole, aripiprazole, aspirin, bumetanide, bupropion, cyanocobalamin, dextroamphetamine-amphetamine, fluticasone propionate, gabapentin, impoyz, levocetirizine, luzu, metoprolol tartrate, multivitamin, naftin, nitroglycerin, omeprazole, sotalol, tamsulosin, thiamine, vitamin d, vortioxetine, and xarelto.    Allergies:   Review of patient's allergies indicates:   Allergen Reactions    Iodinated contrast media Other (See Comments)     Raises BP          Imaging:    Per MD note:   MRI lumbar 4/2018  There is normal lumbar lordosis.  No spondylolisthesis or spondylolysis.  There is no marrow edema throughout the vertebral bodies to suggest acute fractures or marrow replacing processes throughout the lumbar spine.  The tip of the conus is at T12-L1 disc space.  Paraspinal soft tissues are unremarkable.  On the sagittal images there is suggestion of a distended urinary bladder.     L5-S1: There is normal intervertebral disc height no  disc herniations or significant central canal spinal stenosis.  There is mild bilateral facet arthropathy.  No significant foraminal stenosis.     L4-5: Normal intervertebral disc height without soft tissue disc herniations.  There is enlarged lamina and the facet joints and mild hypertrophic changes of the facet joints.  This results in at least a mild central canal spinal stenosis.     L3-4: Normal intervertebral disc height without soft tissue disc herniations.  There is enlarged facet joints and the lamina and mild hypertrophic changes of the ligamentum flava resulting in at least a moderate central canal spinal stenosis.     L2-3: Normal intervertebral disc height without soft tissue disc herniations.  There is hypertrophic changes of the dorsal elements including the lamina and the facet joints and mild hypertrophic changes of the ligamentum flava resulting in moderate central canal spinal stenosis.     L1-2: No disc herniations.  Hypertrophic changes of the dorsal elements including the lamina and the facet joints resulting in mild central canal stenosis.     T12-L1: No disc herniations or spinal stenosis or foraminal stenosis.  There is mild marginal anterior spondylotic osteophytes.     It should be noted that the CSF space within the thecal sac at the L1-2, L2-3 L3-4 disc spaces is compromised from the spinal stenotic changes.     Impression                    1. At least moderate stenotic changes at the L2-3 and L3-4 disc spaces and mild stenosis at the L1-2 and L4-5 disc spaces, mainly from hypertrophic changes of the dorsal elements.  It is possible that these findings most likely on a congenital basis.  2. No significant disc herniations.  3. Distended urinary bladder.     X-ray lumbar 4/2018  The vertebral body heights and intervertebral disc spaces are fairly well maintained.  No fracture.  No marrow replacement process.  SI joints unremarkable.  L4-5 and L5-S1 facet arthropathy  "noted.  Impression                    Lumbar spondylosis.    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  Where is your pain the worst? R buttock  2.  Is your pain constant or intermittent? Constant   3.  Does bending forward make your typical pain worse? Y   4.  Since the start of your back pain, has there been a change in your bowel or bladder? N  5.  What can't you do now that you use to be able to do? Ease /c getting off of floor, ascend stairs      Pts goals: walk /s pain, carry things up the steps /s pain       Red Flag Screening:   Cough  Sneeze  Strain: (--)  Bladder/ bowel: (--)  Falls: (--)  Night pain: (--)  Unexplained weight loss: (--)  General health: pt report poor     OBJECTIVE     Postural examination/scapula alignment: Rounded shoulder, Head forward and Decreased lordosis  Joint integrity: Firm end feeling  Sitting: slouched  Standing: WBOS   Correction of posture: no effect with lumbar roll  But notes "I am in better posture"  Palpation:   TTP  R piriformis     Mild hypertonic B lumbar paraspinals    MOVEMENT LOSS    ROM Loss   Flexion minimal loss   Extension moderate loss "pressure"   Side glide Right major loss   Side glide Left major loss   Rotation Right major " "loss "pressure"   Rotation Left major loss  "pressure"     Lower Extremity Strength  Right LE  Left LE    Hip flexion: 4/5 P! Hip flexion: 4/5   Hip extension:  4-/5 Hip extension: 4-/5   Hip abduction: 3+/5 Hip abduction: 3+/5   Hip adduction:  4+/5 Hip adduction:  4+/5   Hip External Rotation 4+/5 Hip External Rotation 4+/5   Hip Internal rotation   4+/5 Hip Internal rotation 4+/5   Knee Flexion 5/5 Knee Flexion 5/5   Knee Extension 5/5 Knee Extension 5/5   Ankle dorsiflexion: 4+/5 Ankle dorsiflexion: 4+/5   Ankle plantarflexion: 4+/5 Ankle plantarflexion: 4+/5       GAIT:  Assistive Device used: none  Level of Assistance: independent  Patient displays the following gait deviations:  decreased step length and increased base of support.     Special Tests:   Test Name  Test Result   Prone Instability Test (--)   SI Joint Provocation Test (--)   Straight Leg Raise (+) R calf   Neural Tension Test (+) R calf   Crossed Straight Leg Raise (--)   Walking on toes (--)   Walking on heels  (--)       NEUROLOGICAL SCREENING     Sensory deficit:     BLE LT intact  L5 myotome intact   Saddle sensation intact     Reflexes:    Left Right   Patella Tendon absent absent   Achilles Tendon 1+ 1+   Clonus (--) (--)     REPEATED TEST MOVEMENTS:    Baseline symptoms: 1/10 R buttock   Repeated Flexion in Standing no effect calf pull   Repeated Extension in Standing no effect midpoint pressure    Repeated Flexion in lying no effect   Repeated Extension in lying  better       STATIC TESTS and other movements:   Baseline symptoms:  Prone lie better   Prone lie on elbows better   Sustained prone on elbows better   Sustained flexion no effect   Sitting slouched  no effect   Sitting erect no effect       Baseline Isometric Testing on Med X equipment: Testing administered by PT  Date of testin22  ROM 0 - 36  deg   Max Peak Torque 161   Min Peak Torque 68   Flex/Ext Ratio 2.38    % below normative data 30% "         Limitation/Restriction for FOTO Lumbar Survey    Therapist reviewed FOTO scores for Shiraz Jha on 4/12/2022.   FOTO documents entered into [employer] - see Media section.    Limitation Score: 30%      Goal: < 25%           Treatment   Treatment Time In: 3:00 pm  Treatment Time Out: 3:30 pm  Total Treatment time separate from Evaluation: 30 minutes      Shiraz received therapeutic exercises to develop/improve posture, lumbar/cervical ROM, strength and muscular endurance for 30 minutes including the following exercises:     HEP demo include  LTR x 10   DKTC x 5  Piriformis stretch /c towel x 2 20 sec   Prone on Elbow 1 min   Standing ext - NP    Next visit - SOC       Med x dynamic exercise and baseline IM test    [facility] 4/14/2022   Visit Number 1   VAS Pain Rating 1   Treadmill Time (in min.) -   Speed -   Extension in Lying 15   Extension in Standing 15   Flexion in Lying 10   Lumbar Extension Seat Pad 0   Femur Restraint 7   Top Dead Center 24   Counterweight 335   Lumbar Flexion 36   Lumbar Extension 0   Lumbar Peak Torque 161   Min Torque 68   Test Percent Below Normative Data 30   Lumbar Weight 60   Repetitions 5   Rating of Perceived Exertion -   Ice - Z Lie (in min.) 10         Written Home Exercises Provided: yes.  Exercises were reviewed and Shiraz was able to demonstrate them prior to the end of the session.  Shiraz demonstrated fair  understanding of the education provided.     See EMR under Patient Instructions for exercises provided 4/12/2022.      Education provided:   - Patient received education regarding proper posture and body mechanics.  Patient was given top [facility] Visit 1 handouts which discuss what to expect in therapy, the purpose and opportunity for health coaching, the program,  wellness when discharged from therapy, back education and care specifically for posture seated, standing, lifting correctly, components of exercise, importance of nutrition and hydration,  and importance of sleep.   Information on lumbar rolls provided.  - Raul roll tried, recommended, and purchase information was provided.  - Patient received a handout regarding anticipated muscular soreness following the isometric test and strategies for management were reviewed with patient including stretching, using ice and scheduled rest.   - Patient received education on the [facility] program, purpose of the isometric test, progression of back strengthening as well as wellness approach and systemic strengthening.  Details of the program were discussed.  Reviewed that patient should feel support/pressure from med ex restraints but no pain or discomfort and patient expressed understanding.    Shiraz received cold pack for 10 minutes to lumbar region in Z lying.    Assessment   Shiraz is a 62 y.o. male referred to [facility] with a medical diagnosis of M47.816 (ICD-10-CM) - Lumbar spondylosis. Pt presents with signs and sx consistent /c dx including dec lumbar paraspinal strength, dec lumbar ROM, (+) R neural sx provocation, soft tissue dysfunction, poor posture leading to dec functional mobility, strength and activity tolerance.  MedX IM testing indicate pt 30% below norms, however, pt /c min change in strength measure vs last test at HB.  Considering pt note dec sx presentation at end of last HB episode this may indicate sx ofelia may be have additional component than strength/stability.  Repeated motions testing indicate clear buttock sx resolution /c ext bias therefore HEP include YUMIKO and EIS.  However, DKTC included in HEP for general ROM and initial core activation specifically iin an unweighted position. ROM limited on MedX due to body habitus, therefore, goals established /c min change. Neural provocation (+) R warrants further attention to peripheral ofelia (piriformis) if directional preference lose sx relief component.  Pt is an excellent candidate for HB and his status as a return  patient having had success offers positive prognostic indicator.           Pain Pattern: 1PEP       Pt prognosis is Good.   Pt will benefit from skilled outpatient Physical Therapy to address the deficits stated above and in the chart below, provide pt/family education, and to maximize pt's level of independence. Based on the above history and physical examination an active physical therapy program is recommended.  Pt will continue to benefit from skilled outpatient physical therapy to address the deficits listed below in the chart, provide pt/family education and to maximize pt's level of independence in the home and community environment. .       Plan of care discussed with patient: Yes  Pt's spiritual, cultural and educational needs considered and patient is agreeable to the plan of care and goals as stated below:     Anticipated Barriers for therapy: none    PT Evaluation Completed? Yes    Medical necessity is demonstrated by the following problem list.    Pt presents with the following impairments:     History  Co-morbidities and personal factors that may impact the plan of care Co-morbidities:   depression, diabetes, high BMI, HTN and level of undertstanding of current condition, Afib, Fibromyalgia    Personal Factors:   coping style     moderate   Examination  Body Structures and Functions, activity limitations and participation restrictions that may impact the plan of care Body Regions:   back  trunk    Body Systems:    gross symmetry  ROM  strength  gross coordinated movement  gait  transitions  motor control    Participation Restrictions:   none    Activity limitations:   Learning and applying knowledge  no deficits    General Tasks and Commands  no deficits    Communication  no deficits    Mobility  lifting and carrying objects  walking    Self care  no deficits    Domestic Life  shopping  cooking  doing house work (cleaning house, washing dishes, laundry)    Interactions/Relationships  no  deficits    Life Areas  no deficits    Community and Social Life  community life  recreation and leisure         low   Clinical Presentation stable and uncomplicated low   Decision Making/ Complexity Score: moderate       GOALS: Pt is in agreement with the following goals.    Short term goals:  6 weeks or 10 visits   1.  Pt will demonstrate increased lumbar ROM by at least 3 degrees from the initial ROM value with improvements noted in functional ROM and ability to perform ADLs.  (approp and ongoing)  2.  Pt will demonstrate increased MedX average isometric strength value  by 10% from initial test resulting in improved ability to perform bending, lifting, and carrying activities safely, confidently.  (approp and ongoing)  3.  Patient report a reduction in worst pain score by 1-2 points for improved tolerance for household chores.  (approp and ongoing)  4.  Pt able to perform HEP correctly with minimal cueing or supervision from therapist to encourage independent management of symptoms. (approp and ongoing)      Long term goals: 10 weeks or 20 visits   1. Pt will demonstrate increased lumbar ROM by at least 6 degrees from initial ROM value, resulting in improved ability to perform functional fwd bending while standing and sitting. (approp and ongoing)  2. Pt will demonstrate increased MedX average isometric strength value  by 20% from initial test resulting in improved ability to perform bending, lifting, and carrying activities safely, confidently.  (approp and ongoing)  3. Pt to demonstrate ability to independently control and reduce their pain through posture positioning and mechanical movements throughout a typical day.  (approp and ongoing)  4.  Pt will demonstrate reduced pain and improved functional outcomes as reported on the FOTO by reaching a limitation score of < or = 25% or less in order to demonstrate subjective improvement in pt's condition.    (approp and ongoing)  5. Pt will demonstrate independence  "with the HEP at discharge  (approp and ongoing)  6. Pt will ascend/descend 2 flights of stairs carrying 20# /s inc LBP for inc tolerance to household chores (grocery return)(patient goal)  (approp and ongoing)  7. Pt will perform stand<>kneel x 3 in < 60 sec /s inc LBP for inc tolerance to household chores (approp and ongoing)    Plan   Outpatient physical therapy 2x week for 10 weeks or 20 visits to include the following:   - Patient education  - Therapeutic exercise  - Manual therapy  - Performance testing   - Neuromuscular Re-education  - Therapeutic activity   - Modalities    Pt may be seen by PTA as part of the rehabilitation team.     Therapist: Tang Stacy, PT  4/14/2022    "I certify the need for these services furnished under this plan of treatment and while under my care."    ____________________________________  Physician/Referring Practitioner    _______________  Date of Signature              "

## 2022-04-18 ENCOUNTER — TELEPHONE (OUTPATIENT)
Dept: REHABILITATION | Facility: OTHER | Age: 62
End: 2022-04-18
Payer: COMMERCIAL

## 2022-04-18 NOTE — TELEPHONE ENCOUNTER
HC talked to Pt about his experience during evaluation; he reports that it was good.  I also talked to him about our health coaching service.

## 2022-04-19 ENCOUNTER — CLINICAL SUPPORT (OUTPATIENT)
Dept: REHABILITATION | Facility: OTHER | Age: 62
End: 2022-04-19
Attending: NURSE PRACTITIONER
Payer: COMMERCIAL

## 2022-04-19 DIAGNOSIS — R29.898 DECREASED STRENGTH OF TRUNK AND BACK: ICD-10-CM

## 2022-04-19 DIAGNOSIS — R29.3 POOR POSTURE: Primary | ICD-10-CM

## 2022-04-19 PROCEDURE — 97110 THERAPEUTIC EXERCISES: CPT

## 2022-04-19 NOTE — PROGRESS NOTES
[facility]      Name: Shiraz Jha  Clinic Number: [id number]    Therapy Diagnosis:   Encounter Diagnoses   Name Primary?    Poor posture Yes    Decreased strength of trunk and back      Physician: Yadira Brody NP    Visit Date: 2022  Physician Orders: PT Eval and Treat   Medical Diagnosis from Referral: M47.816 (ICD-10-CM) - Lumbar spondylosis  Evaluation Date: 2022  Authorization Period Expiration: 22  Plan of Care Expiration: 22  Reassessment Due: 22  Visit # / Visits authorized:      Time In: 10  Time Out: 1050  Total Billable Time: 40 minutes     Precautions: Standard cervical fusion      Pattern of pain determined: 1PEP           Subjective   Shiraz reports no pain this AM.  Pt reports no difficulty with HEP.  Reports he has a lumbar roll for when he is sitting and is working on his posture.    Patient reports tolerating previous visit well, with min soreness  Patient reports their pain to be n/a/10 on a 0-10 scale with 0 being no pain and 10 being the worst pain imaginable.  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   FOTO documents entered into [employer] - see Media section.     Limitation Score: 30%        Goal: < 25%             Treatment    Pt was instructed in and performed the following:     Shiraz  received therapeutic exercises to develop/improved posture, cardiovascular endurance, muscular endurance, lumbar/cervical ROM, strength and muscular endurance for 50 minutes including the following exercises:     [facility] 4/19/2022   Visit Number 2   VAS Pain Rating 0   Treadmill Time (in min.) 5   Speed -   Extension in Lying 1   Extension in Standing 10   Flexion in Lying 10   Lumbar Extension Seat Pad -   Femur Restraint -   Top Dead Center -   Counterweight -   Lumbar Flexion -   Lumbar Extension -   Lumbar Peak Torque -   Min Torque -   Test Percent Below Normative Data -   Lumbar Weight 80   Repetitions 15   Rating of Perceived Exertion 3   Ice - Z Lie (in min.) 5       LTR x 10   DKTC x 5 x 10 seconds  Piriformis stretch /c towel x 2 20 sec   Prone on Elbow 3 min   Standing ext  10x  N/P   SOC          Peripheral muscle strengthening which included 1 set of 15-20 repetitions at a slow, controlled 10-13 second per rep pace focused on strengthening supporting musculature for improved body mechanics and functional mobility.  Pt and therapist focused on proper form during treatment to ensure optimal strengthening of each targeted muscle group.  Machines were utilized including torso rotation, leg extension, leg curl, chest press, upright row. Tricep extension, bicep curl, leg press, and hip abduction added visit 3            Home Exercises Provided and Patient Education Provided   Home exercises include:LTR/DKTC/piriformis/YUMIKO/EIS  Cardio program:4/29/22  Lifting education date:TBD  Lumbar roll: yes    Education provided:   - exertion level/review of proper form for HEP    Written Home Exercises Provided: Patient instructed to cont prior HEP.  Exercises were reviewed and Shiraz was able to demonstrate them prior to the end of the session.  Shiraz demonstrated fair  understanding of the education provided.     See EMR under Patient Instructions for exercises provided prior visit.          Assessment      Pt arrives today without any pain.  Pt reports he has been performing HEP without difficulty.  Pt required cueing with HEP specifically with piriformis stretching and Extension in standing.  Cued pt to pull across to opp shoulder for piriformis and to avoid cervical extension with ext in standing.  Initiated Med X at 80ft/lbs with warm up at 60ft/lbs x 1 min.  Pt completed 15 reps at 80ft/lbs with 3/10 exertion. Pt able to maintain good pacing and limited use of LE's during exercise.  Patient is making good progress towards established goals.  Pt will continue to benefit from skilled outpatient physical therapy to address the deficits stated in the impairment chart, provide pt/family education and to maximize pt's level of independence in the home and community environment.     Anticipated Barriers for therapy: none  Pt's spiritual, cultural and educational needs considered and pt agreeable to plan of care and goals as stated below:         GOALS: Pt is in agreement with the following goals.     Short term goals:  6 weeks or 10 visits   1.  Pt will demonstrate increased lumbar ROM by at least 3 degrees from the initial ROM value with improvements noted in functional ROM and ability to perform ADLs.  (approp and ongoing)  2.  Pt will demonstrate increased MedX average isometric strength value  by 10% from initial test resulting in improved ability to perform bending, lifting, and carrying activities safely, confidently.  (approp and ongoing)  3.  Patient report a reduction in worst pain score by 1-2 points for improved tolerance for household chores.  (approp and ongoing)  4.  Pt able to perform HEP correctly with minimal cueing or supervision from therapist to encourage independent management of symptoms. (approp and ongoing)        Long term goals: 10 weeks or 20 visits   1. Pt will demonstrate increased lumbar ROM by at least 6 degrees from initial ROM value, resulting in improved ability to perform functional  fwd bending while standing and sitting. (approp and ongoing)  2. Pt will demonstrate increased MedX average isometric strength value  by 20% from initial test resulting in improved ability to perform bending, lifting, and carrying activities safely, confidently.  (approp and ongoing)  3. Pt to demonstrate ability to independently control and reduce their pain through posture positioning and mechanical movements throughout a typical day.  (approp and ongoing)  4.  Pt will demonstrate reduced pain and improved functional outcomes as reported on the FOTO by reaching a limitation score of < or = 25% or less in order to demonstrate subjective improvement in pt's condition.    (approp and ongoing)  5. Pt will demonstrate independence with the HEP at discharge  (approp and ongoing)  6. Pt will ascend/descend 2 flights of stairs carrying 20# /s inc LBP for inc tolerance to household chores (grocery return)(patient goal)  (approp and ongoing)  7. Pt will perform stand<>kneel x 3 in < 60 sec /s inc LBP for inc tolerance to household chores (approp and ongoing)           Plan   Continue with established Plan of Care towards established PT goals.

## 2022-04-21 ENCOUNTER — CLINICAL SUPPORT (OUTPATIENT)
Dept: REHABILITATION | Facility: OTHER | Age: 62
End: 2022-04-21
Attending: NURSE PRACTITIONER
Payer: COMMERCIAL

## 2022-04-21 DIAGNOSIS — R29.3 POOR POSTURE: Primary | ICD-10-CM

## 2022-04-21 DIAGNOSIS — R29.898 DECREASED STRENGTH OF TRUNK AND BACK: ICD-10-CM

## 2022-04-21 PROCEDURE — 97110 THERAPEUTIC EXERCISES: CPT | Mod: CQ

## 2022-04-21 NOTE — PROGRESS NOTES
[facility]      Name: Shiraz Jha  Clinic Number: [id number]    Therapy Diagnosis:   Encounter Diagnoses   Name Primary?    Poor posture Yes    Decreased strength of trunk and back      Physician: Yadira Brody NP    Visit Date: 2022  Physician Orders: PT Eval and Treat   Medical Diagnosis from Referral: M47.816 (ICD-10-CM) - Lumbar spondylosis  Evaluation Date: 2022  Authorization Period Expiration: 22  Plan of Care Expiration: 22  Reassessment Due: 22  Visit # / Visits authorized: 3/20     Time In: 9:40 AM  Time Out: 10:40 AM  Total Billable Time: 50 minutes     Precautions: Standard cervical fusion      Pattern of pain determined: 1PEP      Subjective   Shiraz reports only mild back pain presently and mild soreness after visit 2.    Patient reports tolerating previous visit well, with min soreness  Patient reports their pain to be 1/10 on a 0-10 scale with 0 being no pain and 10 being the worst pain imaginable.  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   FOTO documents entered into [employer] - see Media section.     Limitation Score: 30%        Goal: < 25%         Treatment    Pt was instructed in and performed the following:     Shiraz received therapeutic exercises to develop/improved posture, cardiovascular  endurance, muscular endurance, lumbar/cervical ROM, strength and muscular endurance for 50 minutes including the following exercises:     LTR x 10   DKTC c/T-ball [id number] x 5 sec    Piriformis stretch /c towel 3 x 20 sec   +PPT x 10 ( Cues to avoid valsalva and use of LE's)   Prone on Elbow 3 min   Standing ext  10x    N/P   SOC    [facility] - Short 4/21/2022   Visit Number 3   VAS Pain Rating 1   Treadmill Time (in min.) -   Speed -   Time 5   Extension in Lying 3   Extension in Standing 10   Flexion in Lying 10   Lumbar Extension - Seat Pad -   Femur Restraint -   Top Dead Center -   Counterweight -   Lumbar Flexion -   Lumbar Extension -   Lumbar Peak Torque -   Lumbar Weight 80   Repetitions 20   Rating of Perceived Exertion 3       Peripheral muscle strengthening which included 1 set of 15-20 repetitions at a slow, controlled 10-13 second per rep pace focused on strengthening supporting musculature for improved body mechanics and functional mobility.  Pt and therapist focused on proper form during treatment to ensure optimal strengthening of each targeted muscle group.  Machines were utilized including torso rotation, leg extension, leg curl, chest press, upright row. Tricep extension, bicep curl, leg press, and hip abduction added visit 3    Home Exercises Provided and Patient Education Provided   Home exercises include:LTR/DKTC/piriformis/YUMIKO/EIS  Cardio program:4/29/22  Lifting education date:TBD visit 11  Lumbar roll: yes    Education provided:   - exertion level/review of proper form for HEP    Written Home Exercises Provided: Patient instructed to cont prior HEP. Added PPT 4/21/22  Exercises were reviewed and Shiraz was able to demonstrate them prior to the end of the session.  Shiraz demonstrated fair  understanding of the education provided.     See EMR under Patient Instructions for exercises provided prior visit and today's visit    Assessment   Patient returned for his 3rd [facility]  visit. He completed guided flexibility and mobility exercises in prep for the Lumbar/ Medx without c/o.  Added PPT but he did require verbal and tactile cues for correct performance and to avoid Valsalva and use of LE's. Lumbar MedX resistance was maintained at 80# and he was able to progress to complete 20 reps with a RPE = 3/10.  He was also able to progress to perform the full circuit of peripheral strengthening ex's without c/o.  Will look to progress per HB protocol and HB tolerance.    Patient is making good progress towards established goals.  Pt will continue to benefit from skilled outpatient physical therapy to address the deficits stated in the impairment chart, provide pt/family education and to maximize pt's level of independence in the home and community environment.     Anticipated Barriers for therapy: none  Pt's spiritual, cultural and educational needs considered and pt agreeable to plan of care and goals as stated below:     GOALS: Pt is in agreement with the following goals.     Short term goals:  6 weeks or 10 visits   1.  Pt will demonstrate increased lumbar ROM by at least 3 degrees from the initial ROM value with improvements noted in functional ROM and ability to perform ADLs.  (approp and ongoing)  2.  Pt will demonstrate increased MedX average isometric strength value  by 10% from initial test resulting in improved ability to perform bending, lifting, and carrying activities safely, confidently.  (approp and ongoing)  3.  Patient report a reduction in worst pain score by 1-2 points for improved tolerance for household chores.  (approp and ongoing)  4.  Pt able to perform HEP correctly with minimal cueing or supervision from therapist to encourage independent management of symptoms. (approp and ongoing)        Long term goals: 10 weeks or 20 visits   1. Pt will demonstrate increased lumbar ROM by at least 6 degrees from initial ROM value, resulting in improved ability to perform functional  fwd bending while standing and sitting. (approp and ongoing)  2. Pt will demonstrate increased MedX average isometric strength value  by 20% from initial test resulting in improved ability to perform bending, lifting, and carrying activities safely, confidently.  (approp and ongoing)  3. Pt to demonstrate ability to independently control and reduce their pain through posture positioning and mechanical movements throughout a typical day.  (approp and ongoing)  4.  Pt will demonstrate reduced pain and improved functional outcomes as reported on the FOTO by reaching a limitation score of < or = 25% or less in order to demonstrate subjective improvement in pt's condition.    (approp and ongoing)  5. Pt will demonstrate independence with the HEP at discharge  (approp and ongoing)  6. Pt will ascend/descend 2 flights of stairs carrying 20# /s inc LBP for inc tolerance to household chores (grocery return)(patient goal)  (approp and ongoing)  7. Pt will perform stand<>kneel x 3 in < 60 sec /s inc LBP for inc tolerance to household chores (approp and ongoing)    Plan   Continue with established Plan of Care towards established PT goals.     Klaus White, PTA  4/21/2022

## 2022-04-25 NOTE — PROGRESS NOTES
[facility]      Name: Shiraz RUBI [facility] Number: [id number]    Therapy Diagnosis:   Encounter Diagnoses   Name Primary?    Poor posture Yes    Decreased strength of trunk and back      Physician: Yadira Brody NP    Visit Date: 2022  Physician Orders: PT Eval and Treat   Medical Diagnosis from Referral: M47.816 (ICD-10-CM) - Lumbar spondylosis  Evaluation Date: 2022  Authorization Period Expiration: 22  Plan of Care Expiration: 22  Reassessment Due: 22  Visit # / Visits authorized:      Time In: 11:00 am  Time Out: 12:00 pm  Total Billable Time: 60 minutes     Precautions: Standard cervical fusion      Pattern of pain determined: 1PEP      Subjective   Shiraz reports no LB discomfort over the weekend.  However, reports not performing any activities to challenge LB.  Pt report inc ease getting out of chair at end of day.  Pt report cont difficulty /c ascend stairs but notes it is his balance that concerns him vs LBP.  Pt note his buttock pain has not returned since eval but it has centralized.   Pt not yet obtain lumbar roll.        Patient reports tolerating previous visit well, with min soreness  Patient reports their pain to be 1/10 on a 0-10 scale with 0 being no pain and 10 being the worst pain imaginable.  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   FOTO documents entered into [employer] - see Media section.     Limitation Score: 30%        Goal: < 25%         Treatment    Pt was instructed in and performed the following:     Shiraz received therapeutic exercises to develop/improved posture, cardiovascular endurance, muscular endurance, lumbar/cervical ROM, strength and muscular endurance for 60 minutes including the following exercises:     LTR x 10   DKTC c/T-ball support 10 x 5 sec    Piriformis stretch /c towel 3 x 20 sec   Prone on Elbow 1 min -> prone press ups x 10      PPT x 10 max tactile cue for dec BLE use  Steps 2 flights x 2    Trial 1 - ascend hand touch for balance x 2, hand on rail descend x 4 steps, dec velocity    Trial 2 - ascend one R shoulder lean to wall, hand touch to rail descend x 3, dec velocity   Standing ext  10x    N/P  SOC when improve core activation     [facility] 4/26/2022   Visit Number 4   VAS Pain Rating 1   Treadmill Time (in min.) -   Speed -   Time 5   Extension in Lying 10   Extension in Standing 10   Flexion in Lying 10   Lumbar Extension Seat Pad -   Femur Restraint -   Top Dead Center -   Counterweight -   Lumbar Flexion -   Lumbar Extension -   Lumbar Peak Torque -   Min Torque -   Test Percent Below Normative Data -   Lumbar Weight 84   Repetitions 15   Rating of Perceived Exertion 4   Ice - Z Lie (in min.) 5          Peripheral muscle strengthening which included 1 set of 15-20 repetitions at a slow, controlled 10-13 second per rep pace focused on strengthening supporting musculature for improved body mechanics and functional mobility.  Pt and therapist focused on proper form during treatment to ensure optimal strengthening of each targeted muscle group.  Machines were utilized including torso rotation, leg extension, leg curl, chest press, upright row. Tricep extension, bicep curl, leg press, and hip abduction added visit 3    Home Exercises Provided and  Patient Education Provided   Home exercises include:  LTR/  DKTC/  Piriformis/  YUMIKO -> press ups (04/26/22)   EIS  PPT    Cardio program: TBD Visit 5  Lifting education date:TBD visit 11  Lumbar roll: not obtained as 04/26/22    Education provided:       Written Home Exercises Provided: Patient instructed to cont prior HEP. Added PPT 4/21/22  Exercises were reviewed and Shiraz was able to demonstrate them prior to the end of the session.  Shiraz demonstrated fair  understanding of the education provided.     See EMR under Patient Instructions for exercises provided prior visit and today's visit    Assessment     Pt subjective report indicate improved sx presentation, however, not translating into pt attempts at inc activity indicating likely cont dec self efficacy.  Pt /c improved mobility and progressing to prone press ups /s discomfort.  Added to HEP.  Pt performed stairs today to assess balance deficits.  Pt not demo vestibular or visual balance issues, therefore, cont POC for core stability and peripheral MedX machines for BLE strength appropriate to assist functional stair climbing.  Pt need sig cueing during PPT for TA activation.  Pt verbalize understanding of need for TA activation, therefore, added to HEP for at home practice.  Lumbar MedX weight increased per pt tolerance last visit.   Today pt perform 15 reps at 5 RPE indicating appropriateness of present weight for strength challenge.        Patient is making good progress towards established goals.  Pt will continue to benefit from skilled outpatient physical therapy to address the deficits stated in the impairment chart, provide pt/family education and to maximize pt's level of independence in the home and community environment.     Anticipated Barriers for therapy: none  Pt's spiritual, cultural and educational needs considered and pt agreeable to plan of care and goals as stated below:     GOALS: Pt is in agreement with the following goals.     Short  term goals:  6 weeks or 10 visits   1.  Pt will demonstrate increased lumbar ROM by at least 3 degrees from the initial ROM value with improvements noted in functional ROM and ability to perform ADLs.  (approp and ongoing)  2.  Pt will demonstrate increased MedX average isometric strength value  by 10% from initial test resulting in improved ability to perform bending, lifting, and carrying activities safely, confidently.  (approp and ongoing)  3.  Patient report a reduction in worst pain score by 1-2 points for improved tolerance for household chores.  (approp and ongoing)  4.  Pt able to perform HEP correctly with minimal cueing or supervision from therapist to encourage independent management of symptoms. (approp and ongoing)        Long term goals: 10 weeks or 20 visits   1. Pt will demonstrate increased lumbar ROM by at least 6 degrees from initial ROM value, resulting in improved ability to perform functional fwd bending while standing and sitting. (approp and ongoing)  2. Pt will demonstrate increased MedX average isometric strength value  by 20% from initial test resulting in improved ability to perform bending, lifting, and carrying activities safely, confidently.  (approp and ongoing)  3. Pt to demonstrate ability to independently control and reduce their pain through posture positioning and mechanical movements throughout a typical day.  (approp and ongoing)  4.  Pt will demonstrate reduced pain and improved functional outcomes as reported on the FOTO by reaching a limitation score of < or = 25% or less in order to demonstrate subjective improvement in pt's condition.    (approp and ongoing)  5. Pt will demonstrate independence with the HEP at discharge  (approp and ongoing)  6. Pt will ascend/descend 2 flights of stairs carrying 20# /s inc LBP for inc tolerance to household chores (grocery return)(patient goal)  (approp and ongoing)  7. Pt will perform stand<>kneel x 3 in < 60 sec /s inc LBP for inc  tolerance to household chores (approp and ongoing)    Plan   Continue with established Plan of Care towards established PT goals.     Tang Stacy, PT  4/26/2022

## 2022-04-26 ENCOUNTER — CLINICAL SUPPORT (OUTPATIENT)
Dept: REHABILITATION | Facility: OTHER | Age: 62
End: 2022-04-26
Attending: NURSE PRACTITIONER
Payer: COMMERCIAL

## 2022-04-26 DIAGNOSIS — R29.898 DECREASED STRENGTH OF TRUNK AND BACK: ICD-10-CM

## 2022-04-26 DIAGNOSIS — R29.3 POOR POSTURE: Primary | ICD-10-CM

## 2022-04-26 PROCEDURE — 97110 THERAPEUTIC EXERCISES: CPT

## 2022-04-29 ENCOUNTER — CLINICAL SUPPORT (OUTPATIENT)
Dept: REHABILITATION | Facility: OTHER | Age: 62
End: 2022-04-29
Attending: NURSE PRACTITIONER
Payer: COMMERCIAL

## 2022-04-29 DIAGNOSIS — R29.3 POOR POSTURE: Primary | ICD-10-CM

## 2022-04-29 DIAGNOSIS — R29.898 DECREASED STRENGTH OF TRUNK AND BACK: ICD-10-CM

## 2022-04-29 PROCEDURE — 97110 THERAPEUTIC EXERCISES: CPT | Mod: CQ

## 2022-04-29 NOTE — PROGRESS NOTES
"[facility]      Name: Shiraz Jha  Clinic Number: [id number]    Therapy Diagnosis:   Encounter Diagnoses   Name Primary?    Poor posture Yes    Decreased strength of trunk and back      Physician: Yadira Brody NP    Visit Date: 2022  Physician Orders: PT Eval and Treat   Medical Diagnosis from Referral: M47.816 (ICD-10-CM) - Lumbar spondylosis  Evaluation Date: 2022  Authorization Period Expiration: 22  Plan of Care Expiration: 22  Reassessment Due: 22  Visit # / Visits authorized:      Time In: 9:25 am (arrived late)  Time Out: 9:20  am  Total Billable Time: 50 minutes     Precautions: Standard cervical fusion      Pattern of pain determined: 1PEP      Subjective   Shiraz reports minimal "nagging" central lower back pain, that he states wouldn't stop him doing anything.  He reported he was exhausted following last visit.      Patient reports tolerating previous visit well,but was "exhausted"  Patient reports their pain to be 1/10 on a 0-10 scale with 0 being no pain and 10 being the worst pain imaginable.  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   FOTO documents entered into [employer] - see Media section.     Limitation Score: 30%        Goal: < 25%         Treatment    Pt was instructed in and performed the " "following:     hSiraz received therapeutic exercises to develop/improved posture, cardiovascular endurance, muscular endurance, lumbar/cervical ROM, strength and muscular endurance for 50 minutes including the following exercises:     LTR x 10   DKTC c/T-ball support 10 x 5 sec    Piriformis stretch /c towel 3 x 20 sec   PPT x 10 max tactile cue for dec BLE use  +PPT +Bridges x10 3" hold  Prone on Elbow 1 min -> prone press ups x 10    Standing ext  10x    N/P  SOC when improve core activation  Steps 2 flights x 2    Trial 1 - ascend hand touch for balance x 2, hand on rail descend x 4 steps, dec velocity    Trial 2 - ascend one R shoulder lean to wall, hand touch to rail descend x 3, dec velocity        [facility] 4/29/2022   Visit Number 5   VAS Pain Rating 1   Treadmill Time (in min.) -   Speed -   Time -   Extension in Lying 10   Extension in Standing 10   Flexion in Lying 10   Lumbar Extension - Seat Pad -   Femur Restraint -   Top Dead Center -   Counterweight -   Lumbar Flexion -   Lumbar Extension -   Lumbar Peak Torque -   Lumbar Weight 84   Repetitions 20   Rating of Perceived Exertion 5       Peripheral muscle strengthening which included 1 set of 15-20 repetitions at a slow, controlled 10-13 second per rep pace focused on strengthening supporting musculature for improved body mechanics and functional mobility.  Pt and therapist focused on proper form during treatment to ensure optimal strengthening of each targeted muscle group.  Machines were utilized including torso rotation, leg extension, leg curl, chest press, upright row. Tricep extension, bicep curl, leg press, and hip abduction added visit 3    Home Exercises Provided and Patient Education Provided   Home exercises include:  LTR/  DKTC/  Piriformis/  YUMIKO -> press ups (04/26/22)   EIS  PPT    Cardio program: TBD Visit 5  Lifting education date:TBD visit 11  Lumbar roll: not obtained as 04/26/22    Education provided:       Written " "Home Exercises Provided: Patient instructed to cont prior HEP. Added PPT 4/21/22  Exercises were reviewed and Shiraz was able to demonstrate them prior to the end of the session.  Shiraz demonstrated fair  understanding of the education provided.     See EMR under Patient Instructions for exercises provided prior visit and today's visit    Assessment   Shiraz arrived 10 minutes late to today's visit. Treatment altered by deferring aerobic warmup.  He continues to report minimal "nagging" central lower back pain and reported feeling "exhausted" following last visit.  Treatment progressed by adding bridges to begin strengthening glute/hip musculature.  VC required to decrease LE on PPT exercises. Visit 5 Cardio handout given to pt with Shiraz stating he prefers to walk for exercise but has been experiencing ankle pain recently while walking.  Medx resistance remained at 84#.  He completed 20 reps at a RPE of 5/10.  Consider a 5% increase in resistance NV per HB protocol.          Patient is making good progress towards established goals.  Pt will continue to benefit from skilled outpatient physical therapy to address the deficits stated in the impairment chart, provide pt/family education and to maximize pt's level of independence in the home and community environment.     Anticipated Barriers for therapy: none  Pt's spiritual, cultural and educational needs considered and pt agreeable to plan of care and goals as stated below:     GOALS: Pt is in agreement with the following goals.     Short term goals:  6 weeks or 10 visits   1.  Pt will demonstrate increased lumbar ROM by at least 3 degrees from the initial ROM value with improvements noted in functional ROM and ability to perform ADLs.  (approp and ongoing)  2.  Pt will demonstrate increased MedX average isometric strength value  by 10% from initial test resulting in improved ability to perform bending, lifting, and carrying activities safely, confidently.  (approp " and ongoing)  3.  Patient report a reduction in worst pain score by 1-2 points for improved tolerance for household chores.  (approp and ongoing)  4.  Pt able to perform HEP correctly with minimal cueing or supervision from therapist to encourage independent management of symptoms. (approp and ongoing)        Long term goals: 10 weeks or 20 visits   1. Pt will demonstrate increased lumbar ROM by at least 6 degrees from initial ROM value, resulting in improved ability to perform functional fwd bending while standing and sitting. (approp and ongoing)  2. Pt will demonstrate increased MedX average isometric strength value  by 20% from initial test resulting in improved ability to perform bending, lifting, and carrying activities safely, confidently.  (approp and ongoing)  3. Pt to demonstrate ability to independently control and reduce their pain through posture positioning and mechanical movements throughout a typical day.  (approp and ongoing)  4.  Pt will demonstrate reduced pain and improved functional outcomes as reported on the FOTO by reaching a limitation score of < or = 25% or less in order to demonstrate subjective improvement in pt's condition.    (approp and ongoing)  5. Pt will demonstrate independence with the HEP at discharge  (approp and ongoing)  6. Pt will ascend/descend 2 flights of stairs carrying 20# /s inc LBP for inc tolerance to household chores (grocery return)(patient goal)  (approp and ongoing)  7. Pt will perform stand<>kneel x 3 in < 60 sec /s inc LBP for inc tolerance to household chores (approp and ongoing)    Plan   Continue with established Plan of Care towards established PT goals.     Mendez Diego, PTA  4/29/2022

## 2022-05-03 ENCOUNTER — CLINICAL SUPPORT (OUTPATIENT)
Dept: REHABILITATION | Facility: OTHER | Age: 62
End: 2022-05-03
Attending: NURSE PRACTITIONER
Payer: COMMERCIAL

## 2022-05-03 DIAGNOSIS — R29.3 POOR POSTURE: Primary | ICD-10-CM

## 2022-05-03 DIAGNOSIS — R29.898 DECREASED STRENGTH OF TRUNK AND BACK: ICD-10-CM

## 2022-05-03 PROCEDURE — 97110 THERAPEUTIC EXERCISES: CPT | Mod: CQ

## 2022-05-03 NOTE — PROGRESS NOTES
[facility]      Name: Shiraz Jha  Clinic Number: [id number]    Therapy Diagnosis:   Encounter Diagnoses   Name Primary?    Poor posture Yes    Decreased strength of trunk and back      Physician: Yadira Brody NP    Visit Date: 5/3/2022  Physician Orders: PT Eval and Treat   Medical Diagnosis from Referral: M47.816 (ICD-10-CM) - Lumbar spondylosis  Evaluation Date: 2022  Authorization Period Expiration: 22  Plan of Care Expiration: 22  Reassessment Due: 22  Visit # / Visits authorized:      Time In: 9:15 am   Time Out: 10:15  am  Total Billable Time: 55 minutes     Precautions: Standard cervical fusion      Pattern of pain determined: 1PEP      Subjective   Shiraz reports experiencing no lower back pain at the moment. He reports he reported tolerating last visit well reporting feeling less exhausted compared to previous visits.    Patient reports tolerating previous visit well with no increase in soreness  Patient reports their pain to be 0/10 on a 0-10 scale with 0 being no pain and 10 being the worst pain imaginable.  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   FOTO documents entered into [employer] - see Media section.     Limitation Score: 30%  Visit 6 Score: 34%      Goal: < 25%         Treatment    Pt was  "instructed in and performed the following:     Shiraz received therapeutic exercises to develop/improved posture, cardiovascular endurance, muscular endurance, lumbar/cervical ROM, strength and muscular endurance for 55 minutes including the following exercises:     LTR x 10   DKTC c/T-ball support 10 x 5 sec    Piriformis stretch /c towel 3 x 20 sec   PPT x 10 mod tactile cue for dec BLE use  PPT +Bridges x10 3" hold  Prone on Elbow 1 min -> prone press ups x 10   SOC x10 3" hold  Standing ext  10x    N/P  Steps 2 flights x 2    Trial 1 - ascend hand touch for balance x 2, hand on rail descend x 4 steps, dec velocity    Trial 2 - ascend one R shoulder lean to wall, hand touch to rail descend x 3, dec velocity        [facility] 5/3/2022   Visit Number 6   VAS Pain Rating 0   Treadmill Time (in min.) -   Speed -   Time 5   Extension in Lying 10   Extension in Standing 10   Flexion in Lying 10   Lumbar Extension - Seat Pad -   Femur Restraint -   Top Dead Center -   Counterweight -   Lumbar Flexion -   Lumbar Extension -   Lumbar Peak Torque -   Lumbar Weight 88   Repetitions 18   Rating of Perceived Exertion 4       Peripheral muscle strengthening which included 1 set of 15-20 repetitions at a slow, controlled 10-13 second per rep pace focused on strengthening supporting musculature for improved body mechanics and functional mobility.  Pt and therapist focused on proper form during treatment to ensure optimal strengthening of each targeted muscle group.  Machines were utilized including torso rotation, leg extension, leg curl, chest press, upright row. Tricep extension, bicep curl, leg press, and hip abduction added visit 3    Home Exercises Provided and Patient Education Provided   Home exercises include:  LTR/  DKTC/  Piriformis/  YUMIKO -> press ups (04/26/22)   EIS  PPT    Cardio program: TBD Visit 5  Lifting education date:TBD visit 11  Lumbar roll: not obtained as 04/26/22    Education provided: " "      Written Home Exercises Provided: Patient instructed to cont prior HEP. Added PPT 4/21/22  Exercises were reviewed and Shiraz was able to demonstrate them prior to the end of the session.  Shiraz demonstrated fair  understanding of the education provided.     See EMR under Patient Instructions for exercises provided prior visit and today's visit    Assessment   Shiraz returned reporting no lower back pain today.  He states he was not as "exhausted" following last visit compared to previous visits.  Lumbopelvic mobility and core/glute strengthening exercises continued with Shiraz still requiring moderate amount of VC to decrease LE activation during PPT exercise.  Medx resistance increased to 88#.  He completed 18 reps at a RPE of 4/10.  Will continue to progress per pt's tolerance and HB protocol.  FOTO limitation score regressed to 34% despite reports of decreased pain and strength gains.    Patient is making good progress towards established goals.  Pt will continue to benefit from skilled outpatient physical therapy to address the deficits stated in the impairment chart, provide pt/family education and to maximize pt's level of independence in the home and community environment.     Anticipated Barriers for therapy: none  Pt's spiritual, cultural and educational needs considered and pt agreeable to plan of care and goals as stated below:     GOALS: Pt is in agreement with the following goals.     Short term goals:  6 weeks or 10 visits   1.  Pt will demonstrate increased lumbar ROM by at least 3 degrees from the initial ROM value with improvements noted in functional ROM and ability to perform ADLs.  (approp and ongoing)  2.  Pt will demonstrate increased MedX average isometric strength value  by 10% from initial test resulting in improved ability to perform bending, lifting, and carrying activities safely, confidently.  (approp and ongoing)  3.  Patient report a reduction in worst pain score by 1-2 points for " improved tolerance for household chores.  (approp and ongoing)  4.  Pt able to perform HEP correctly with minimal cueing or supervision from therapist to encourage independent management of symptoms. (approp and ongoing)        Long term goals: 10 weeks or 20 visits   1. Pt will demonstrate increased lumbar ROM by at least 6 degrees from initial ROM value, resulting in improved ability to perform functional fwd bending while standing and sitting. (approp and ongoing)  2. Pt will demonstrate increased MedX average isometric strength value  by 20% from initial test resulting in improved ability to perform bending, lifting, and carrying activities safely, confidently.  (approp and ongoing)  3. Pt to demonstrate ability to independently control and reduce their pain through posture positioning and mechanical movements throughout a typical day.  (approp and ongoing)  4.  Pt will demonstrate reduced pain and improved functional outcomes as reported on the FOTO by reaching a limitation score of < or = 25% or less in order to demonstrate subjective improvement in pt's condition.    (approp and ongoing)  5. Pt will demonstrate independence with the HEP at discharge  (approp and ongoing)  6. Pt will ascend/descend 2 flights of stairs carrying 20# /s inc LBP for inc tolerance to household chores (grocery return)(patient goal)  (approp and ongoing)  7. Pt will perform stand<>kneel x 3 in < 60 sec /s inc LBP for inc tolerance to household chores (approp and ongoing)    Plan   Continue with established Plan of Care towards established PT goals.     Mendez Diego, PTA  5/3/2022

## 2022-05-05 ENCOUNTER — CLINICAL SUPPORT (OUTPATIENT)
Dept: REHABILITATION | Facility: OTHER | Age: 62
End: 2022-05-05
Attending: NURSE PRACTITIONER
Payer: COMMERCIAL

## 2022-05-05 DIAGNOSIS — R29.898 DECREASED STRENGTH OF TRUNK AND BACK: ICD-10-CM

## 2022-05-05 DIAGNOSIS — R29.3 POOR POSTURE: Primary | ICD-10-CM

## 2022-05-05 PROCEDURE — 97110 THERAPEUTIC EXERCISES: CPT

## 2022-05-05 NOTE — PROGRESS NOTES
[facility] [place] [facility]      Name: Shiraz Jha  Clinic Number: [id number]    Therapy Diagnosis:   Encounter Diagnoses   Name Primary?    Poor posture Yes    Decreased strength of trunk and back      Physician: Yadira Brody NP    Visit Date: 2022  Physician Orders: PT Eval and Treat   Medical Diagnosis from Referral: M47.816 (ICD-10-CM) - Lumbar spondylosis  Evaluation Date: 2022  Authorization Period Expiration: 22  Plan of Care Expiration: 22  Reassessment Due: 22  Visit # / Visits authorized:      Time In: 1130  Time Out: 1230  Total Billable Time: 50 minutes     Precautions: Standard cervical fusion      Pattern of pain determined: 1PEP      Subjective   Shiraz reports experiencing no lower back pain at the moment.     Patient reports tolerating previous visit well with no increase in soreness  Patient reports their pain to be 0/10 on a 0-10 scale with 0 being no pain and 10 being the worst pain imaginable.  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   FOTO documents entered into [employer] - see Media section.     Limitation Score: 30%  Visit 6 Score: 34%      Goal: < 25%         Treatment    Pt was instructed in and performed the following:     Shiraz received therapeutic exercises to develop/improved posture,  "cardiovascular endurance, muscular endurance, lumbar/cervical ROM, strength and muscular endurance for 55 minutes including the following exercises:   [facility] 5/5/2022   Visit Number 7   VAS Pain Rating 0   Treadmill Time (in min.) -   Speed -   Time 5   Extension in Lying 10   Extension in Standing 10   Flexion in Lying 10   Lumbar Extension Seat Pad -   Femur Restraint -   Top Dead Center -   Counterweight -   Lumbar Flexion -   Lumbar Extension -   Lumbar Peak Torque -   Min Torque -   Test Percent Below Normative Data -   Lumbar Weight 88   Repetitions 20   Rating of Perceived Exertion 5   Ice - Z Lie (in min.) 5       LTR x 10   DKTC c/T-ball support 10 x 5 sec    Piriformis stretch /c towel 3 x 20 sec   PPT x 10 mod tactile cue for dec BLE use  PPT +Bridges x15 3" hold  Prone on Elbow 1 min -> prone press ups x 10   SOC x10 3" hold      N/P  Steps 2 flights x 2    Trial 1 - ascend hand touch for balance x 2, hand on rail descend x 4 steps, dec velocity    Trial 2 - ascend one R shoulder lean to wall, hand touch to rail descend x 3, dec velocity      Standing ext  10x    Peripheral muscle strengthening which included 1 set of 15-20 repetitions at a slow, controlled 10-13 second per rep pace focused on strengthening supporting musculature for improved body mechanics and functional mobility.  Pt and therapist focused on proper form during treatment to ensure optimal strengthening of each targeted muscle group.  Machines were utilized including torso rotation, leg extension, leg curl, chest press, upright row. Tricep extension, bicep curl, leg press, and hip abduction added visit 3    Home Exercises Provided and Patient Education Provided   Home exercises include:  LTR/  DKTC/  Piriformis/  YUMIKO -> press ups (04/26/22)   EIS  PPT    Cardio program: TBD Visit 5  Lifting education date:TBD visit 11  Lumbar roll: not obtained as 04/26/22    Education provided:       Written Home Exercises Provided: Patient " instructed to cont prior HEP. Added PPT 4/21/22  Exercises were reviewed and Shiraz was able to demonstrate them prior to the end of the session.  Shiraz demonstrated fair  understanding of the education provided.     See EMR under Patient Instructions for exercises provided prior visit and today's visit    Assessment   Shiraz returned reporting no lower back pain today. Pt reports he feels he is getting stronger, but still has some difficulty getting off the floor.  Pt completed 20 reps at 88ft/lbs with 5/10 exertion level. Increase 5% next visit  Patient is making good progress towards established goals.  Pt will continue to benefit from skilled outpatient physical therapy to address the deficits stated in the impairment chart, provide pt/family education and to maximize pt's level of independence in the home and community environment.     Anticipated Barriers for therapy: none  Pt's spiritual, cultural and educational needs considered and pt agreeable to plan of care and goals as stated below:     GOALS: Pt is in agreement with the following goals.     Short term goals:  6 weeks or 10 visits   1.  Pt will demonstrate increased lumbar ROM by at least 3 degrees from the initial ROM value with improvements noted in functional ROM and ability to perform ADLs.  (approp and ongoing)  2.  Pt will demonstrate increased MedX average isometric strength value  by 10% from initial test resulting in improved ability to perform bending, lifting, and carrying activities safely, confidently.  (approp and ongoing)  3.  Patient report a reduction in worst pain score by 1-2 points for improved tolerance for household chores.  (approp and ongoing)  4.  Pt able to perform HEP correctly with minimal cueing or supervision from therapist to encourage independent management of symptoms. (approp and ongoing)        Long term goals: 10 weeks or 20 visits   1. Pt will demonstrate increased lumbar ROM by at least 6 degrees from initial ROM  value, resulting in improved ability to perform functional fwd bending while standing and sitting. (approp and ongoing)  2. Pt will demonstrate increased MedX average isometric strength value  by 20% from initial test resulting in improved ability to perform bending, lifting, and carrying activities safely, confidently.  (approp and ongoing)  3. Pt to demonstrate ability to independently control and reduce their pain through posture positioning and mechanical movements throughout a typical day.  (approp and ongoing)  4.  Pt will demonstrate reduced pain and improved functional outcomes as reported on the FOTO by reaching a limitation score of < or = 25% or less in order to demonstrate subjective improvement in pt's condition.    (approp and ongoing)  5. Pt will demonstrate independence with the HEP at discharge  (approp and ongoing)  6. Pt will ascend/descend 2 flights of stairs carrying 20# /s inc LBP for inc tolerance to household chores (grocery return)(patient goal)  (approp and ongoing)  7. Pt will perform stand<>kneel x 3 in < 60 sec /s inc LBP for inc tolerance to household chores (approp and ongoing)    Plan   Continue with established Plan of Care towards established PT goals.     Lynda Ochoa, PT  5/5/2022

## 2022-05-06 ENCOUNTER — HOSPITAL ENCOUNTER (OUTPATIENT)
Facility: HOSPITAL | Age: 62
Discharge: HOME | End: 2022-05-10
Attending: EMERGENCY MEDICINE | Admitting: INTERNAL MEDICINE
Payer: COMMERCIAL

## 2022-05-06 DIAGNOSIS — D64.9 ANEMIA, UNSPECIFIED TYPE: ICD-10-CM

## 2022-05-06 DIAGNOSIS — K92.2 GASTROINTESTINAL HEMORRHAGE, UNSPECIFIED GASTROINTESTINAL HEMORRHAGE TYPE: ICD-10-CM

## 2022-05-06 DIAGNOSIS — K92.1 MELENA: Primary | ICD-10-CM

## 2022-05-06 DIAGNOSIS — Z79.01 CHRONIC ANTICOAGULATION: ICD-10-CM

## 2022-05-06 DIAGNOSIS — K92.1 GASTROINTESTINAL HEMORRHAGE WITH MELENA: ICD-10-CM

## 2022-05-06 LAB
ABO + RH BLD: NORMAL
ALBUMIN SERPL BCP-MCNC: 3.4 G/DL (ref 3.5–5.2)
ALP SERPL-CCNC: 87 U/L (ref 55–135)
ALT SERPL W/O P-5'-P-CCNC: 8 U/L (ref 10–44)
ANION GAP SERPL CALC-SCNC: 6 MMOL/L (ref 8–16)
APTT BLDCRRT: 27.4 SEC (ref 21–32)
AST SERPL-CCNC: 13 U/L (ref 10–40)
BASOPHILS # BLD AUTO: 0.05 K/UL (ref 0–0.2)
BASOPHILS # BLD AUTO: 0.05 K/UL (ref 0–0.2)
BASOPHILS NFR BLD: 0.7 % (ref 0–1.9)
BASOPHILS NFR BLD: 0.8 % (ref 0–1.9)
BILIRUB SERPL-MCNC: 0.7 MG/DL (ref 0.1–1)
BLD GP AB SCN CELLS X3 SERPL QL: NORMAL
BUN SERPL-MCNC: 28 MG/DL (ref 6–20)
CALCIUM SERPL-MCNC: 9 MG/DL (ref 8.7–10.5)
CHLORIDE SERPL-SCNC: 102 MMOL/L (ref 95–110)
CO2 SERPL-SCNC: 30 MMOL/L (ref 23–29)
CREAT SERPL-MCNC: 1 MG/DL (ref 0.5–1.4)
DIFFERENTIAL METHOD: ABNORMAL
DIFFERENTIAL METHOD: ABNORMAL
EOSINOPHIL # BLD AUTO: 0.1 K/UL (ref 0–0.5)
EOSINOPHIL # BLD AUTO: 0.2 K/UL (ref 0–0.5)
EOSINOPHIL NFR BLD: 2.1 % (ref 0–8)
EOSINOPHIL NFR BLD: 2.2 % (ref 0–8)
ERYTHROCYTE [DISTWIDTH] IN BLOOD BY AUTOMATED COUNT: 13.7 % (ref 11.5–14.5)
ERYTHROCYTE [DISTWIDTH] IN BLOOD BY AUTOMATED COUNT: 14.1 % (ref 11.5–14.5)
EST. GFR  (AFRICAN AMERICAN): >60 ML/MIN/1.73 M^2
EST. GFR  (NON AFRICAN AMERICAN): >60 ML/MIN/1.73 M^2
GLUCOSE SERPL-MCNC: 131 MG/DL (ref 70–110)
HCT VFR BLD AUTO: 28.5 % (ref 40–54)
HCT VFR BLD AUTO: 31 % (ref 40–54)
HGB BLD-MCNC: 10 G/DL (ref 14–18)
HGB BLD-MCNC: 9.2 G/DL (ref 14–18)
IMM GRANULOCYTES # BLD AUTO: 0.02 K/UL (ref 0–0.04)
IMM GRANULOCYTES # BLD AUTO: 0.02 K/UL (ref 0–0.04)
IMM GRANULOCYTES NFR BLD AUTO: 0.3 % (ref 0–0.5)
IMM GRANULOCYTES NFR BLD AUTO: 0.3 % (ref 0–0.5)
INR PPP: 1.1 (ref 0.8–1.2)
LYMPHOCYTES # BLD AUTO: 2.1 K/UL (ref 1–4.8)
LYMPHOCYTES # BLD AUTO: 2.1 K/UL (ref 1–4.8)
LYMPHOCYTES NFR BLD: 29.4 % (ref 18–48)
LYMPHOCYTES NFR BLD: 34.2 % (ref 18–48)
MCH RBC QN AUTO: 30.2 PG (ref 27–31)
MCH RBC QN AUTO: 30.8 PG (ref 27–31)
MCHC RBC AUTO-ENTMCNC: 32.3 G/DL (ref 32–36)
MCHC RBC AUTO-ENTMCNC: 32.3 G/DL (ref 32–36)
MCV RBC AUTO: 94 FL (ref 82–98)
MCV RBC AUTO: 95 FL (ref 82–98)
MONOCYTES # BLD AUTO: 0.4 K/UL (ref 0.3–1)
MONOCYTES # BLD AUTO: 0.5 K/UL (ref 0.3–1)
MONOCYTES NFR BLD: 6.5 % (ref 4–15)
MONOCYTES NFR BLD: 6.7 % (ref 4–15)
NEUTROPHILS # BLD AUTO: 3.5 K/UL (ref 1.8–7.7)
NEUTROPHILS # BLD AUTO: 4.3 K/UL (ref 1.8–7.7)
NEUTROPHILS NFR BLD: 55.8 % (ref 38–73)
NEUTROPHILS NFR BLD: 61 % (ref 38–73)
NRBC BLD-RTO: 0 /100 WBC
NRBC BLD-RTO: 0 /100 WBC
PLATELET # BLD AUTO: 198 K/UL (ref 150–450)
PLATELET # BLD AUTO: 231 K/UL (ref 150–450)
PMV BLD AUTO: 11 FL (ref 9.2–12.9)
PMV BLD AUTO: 11 FL (ref 9.2–12.9)
POTASSIUM SERPL-SCNC: 3.6 MMOL/L (ref 3.5–5.1)
PROT SERPL-MCNC: 5.9 G/DL (ref 6–8.4)
PROTHROMBIN TIME: 11.4 SEC (ref 9–12.5)
RBC # BLD AUTO: 2.99 M/UL (ref 4.6–6.2)
RBC # BLD AUTO: 3.31 M/UL (ref 4.6–6.2)
SODIUM SERPL-SCNC: 138 MMOL/L (ref 136–145)
WBC # BLD AUTO: 6.23 K/UL (ref 3.9–12.7)
WBC # BLD AUTO: 7.05 K/UL (ref 3.9–12.7)

## 2022-05-06 PROCEDURE — 63600175 PHARM REV CODE 636 W HCPCS: Performed by: PHYSICIAN ASSISTANT

## 2022-05-06 PROCEDURE — 93010 ELECTROCARDIOGRAM REPORT: CPT | Mod: ,,, | Performed by: INTERNAL MEDICINE

## 2022-05-06 PROCEDURE — 86901 BLOOD TYPING SEROLOGIC RH(D): CPT | Performed by: PHYSICIAN ASSISTANT

## 2022-05-06 PROCEDURE — 93005 ELECTROCARDIOGRAM TRACING: CPT

## 2022-05-06 PROCEDURE — 99285 EMERGENCY DEPT VISIT HI MDM: CPT | Mod: ,,, | Performed by: PHYSICIAN ASSISTANT

## 2022-05-06 PROCEDURE — 99285 EMERGENCY DEPT VISIT HI MDM: CPT | Mod: 25

## 2022-05-06 PROCEDURE — 25000003 PHARM REV CODE 250: Performed by: FAMILY MEDICINE

## 2022-05-06 PROCEDURE — G0378 HOSPITAL OBSERVATION PER HR: HCPCS

## 2022-05-06 PROCEDURE — 85610 PROTHROMBIN TIME: CPT | Performed by: PHYSICIAN ASSISTANT

## 2022-05-06 PROCEDURE — 93010 EKG 12-LEAD: ICD-10-PCS | Mod: ,,, | Performed by: INTERNAL MEDICINE

## 2022-05-06 PROCEDURE — 85025 COMPLETE CBC W/AUTO DIFF WBC: CPT | Performed by: PHYSICIAN ASSISTANT

## 2022-05-06 PROCEDURE — 99223 PR INITIAL HOSPITAL CARE,LEVL III: ICD-10-PCS | Mod: ,,, | Performed by: FAMILY MEDICINE

## 2022-05-06 PROCEDURE — 85730 THROMBOPLASTIN TIME PARTIAL: CPT | Performed by: PHYSICIAN ASSISTANT

## 2022-05-06 PROCEDURE — 96374 THER/PROPH/DIAG INJ IV PUSH: CPT

## 2022-05-06 PROCEDURE — 25000003 PHARM REV CODE 250: Performed by: PHYSICIAN ASSISTANT

## 2022-05-06 PROCEDURE — 99285 PR EMERGENCY DEPT VISIT,LEVEL V: ICD-10-PCS | Mod: ,,, | Performed by: PHYSICIAN ASSISTANT

## 2022-05-06 PROCEDURE — 99223 1ST HOSP IP/OBS HIGH 75: CPT | Mod: ,,, | Performed by: FAMILY MEDICINE

## 2022-05-06 PROCEDURE — C9113 INJ PANTOPRAZOLE SODIUM, VIA: HCPCS | Performed by: PHYSICIAN ASSISTANT

## 2022-05-06 PROCEDURE — 80053 COMPREHEN METABOLIC PANEL: CPT | Performed by: PHYSICIAN ASSISTANT

## 2022-05-06 PROCEDURE — 96361 HYDRATE IV INFUSION ADD-ON: CPT

## 2022-05-06 PROCEDURE — 85025 COMPLETE CBC W/AUTO DIFF WBC: CPT | Mod: 91 | Performed by: FAMILY MEDICINE

## 2022-05-06 RX ORDER — BUPROPION HYDROCHLORIDE 300 MG/1
300 TABLET ORAL DAILY
Status: DISCONTINUED | OUTPATIENT
Start: 2022-05-07 | End: 2022-05-10 | Stop reason: HOSPADM

## 2022-05-06 RX ORDER — GABAPENTIN 300 MG/1
300 CAPSULE ORAL 3 TIMES DAILY
Status: DISCONTINUED | OUTPATIENT
Start: 2022-05-06 | End: 2022-05-10 | Stop reason: HOSPADM

## 2022-05-06 RX ORDER — ASPIRIN 325 MG
325 TABLET ORAL DAILY
COMMUNITY
End: 2022-10-11

## 2022-05-06 RX ORDER — PANTOPRAZOLE SODIUM 40 MG/10ML
40 INJECTION, POWDER, LYOPHILIZED, FOR SOLUTION INTRAVENOUS 2 TIMES DAILY
Status: DISCONTINUED | OUTPATIENT
Start: 2022-05-07 | End: 2022-05-09

## 2022-05-06 RX ORDER — TAMSULOSIN HYDROCHLORIDE 0.4 MG/1
0.4 CAPSULE ORAL 2 TIMES DAILY
Status: DISCONTINUED | OUTPATIENT
Start: 2022-05-06 | End: 2022-05-10 | Stop reason: HOSPADM

## 2022-05-06 RX ORDER — PANTOPRAZOLE SODIUM 40 MG/10ML
80 INJECTION, POWDER, LYOPHILIZED, FOR SOLUTION INTRAVENOUS
Status: COMPLETED | OUTPATIENT
Start: 2022-05-06 | End: 2022-05-06

## 2022-05-06 RX ORDER — SOTALOL HYDROCHLORIDE 80 MG/1
80 TABLET ORAL 2 TIMES DAILY
Status: DISCONTINUED | OUTPATIENT
Start: 2022-05-06 | End: 2022-05-10 | Stop reason: HOSPADM

## 2022-05-06 RX ADMIN — PANTOPRAZOLE SODIUM 80 MG: 40 INJECTION, POWDER, FOR SOLUTION INTRAVENOUS at 03:05

## 2022-05-06 RX ADMIN — GABAPENTIN 300 MG: 300 CAPSULE ORAL at 09:05

## 2022-05-06 RX ADMIN — SOTALOL HYDROCHLORIDE 80 MG: 80 TABLET ORAL at 09:05

## 2022-05-06 RX ADMIN — SODIUM CHLORIDE 1000 ML: 0.9 INJECTION, SOLUTION INTRAVENOUS at 04:05

## 2022-05-06 RX ADMIN — TAMSULOSIN HYDROCHLORIDE 0.4 MG: 0.4 CAPSULE ORAL at 09:05

## 2022-05-06 NOTE — HPI
This is a 61yo male with a past medical history of AFib s/p ablation on Xarelto, HTN, T2DM, Gastric Bypass 2010, BLE lymphedema on Bumex presenting to the ED with chief complaint of rectal bleeding. Patient reports 4 days of black colored, loose stools. Estimates 2 episodes per day. Reports vague upper abdominal discomfort for the past few days that he ranks as a 1/10. No fever, nausea, chest pain, cough, urinary symptoms. Reports chronic SOB with exertion which is at its baseline currently. No NSAID use, iron supplementation, pepto-bismol use. Reports having a negative colonoscopy several years ago. Reports multiple family members with history of cancer. Denies personal history of cancer. He is compliant with his Xarelto.   In the ED patient afebrile and hemodynamically stable saturating well on room air. Patient Hb 10.5 (was 14 two months ago). FOBT+. Patient started on iv protonix and admitted to the care of medicine for further evaluation and management of GIB.

## 2022-05-06 NOTE — SUBJECTIVE & OBJECTIVE
Past Medical History:   Diagnosis Date    Allergy     Asthma     Atrial fibrillation     Cataract     Chronic rhinitis 9/26/2013    DDD (degenerative disc disease) 4/3/2015    Depression     Diabetes mellitus     Fibromyalgia     Gastroesophageal reflux disease without esophagitis 7/14/2017    Hypertension     Sinusitis, acute, maxillary 12/20/2012    Thyroid disease        Past Surgical History:   Procedure Laterality Date    CERVICAL SPINE SURGERY      EPIDURAL STEROID INJECTION INTO LUMBAR SPINE N/A 5/29/2018    Procedure: INJECTION-STEROID-EPIDURAL-LUMBAR- L4-5;  Surgeon: Roman Lyman MD;  Location: [facility];  Service: Pain Management;  Laterality: N/A;  Patient is diabetic and Xarleto     EPIDURAL STEROID INJECTION INTO LUMBAR SPINE N/A 7/3/2018    Procedure: INJECTION, STEROID, SPINE, LUMBAR, EPIDURAL- L4-5;  Surgeon: Roman Lyman MD;  Location: [facility];  Service: Pain Management;  Laterality: N/A;  Patient takes Xarelto and ASA     EXCISION OF LESION OF LIP N/A 7/7/2020    Procedure: EXCISION, LESION, LIP;  Surgeon: Caden Navarro MD;  Location: [facility] OR [address] [place] [address];  Service: ENT;  Laterality: N/A;    GASTRIC BYPASS      SINUS SURGERY  2000    Dr. Watt at [facility]    TONSILLECTOMY         Review of patient's allergies indicates:   Allergen Reactions    Iodinated contrast media Other (See Comments)     Raises BP         No current facility-administered medications on file prior to encounter.     Current Outpatient Medications on File Prior to Encounter   Medication Sig    aspirin 325 MG tablet Take 325 mg by mouth once daily.    bumetanide (BUMEX) 1 MG tablet TAKE 1 TABLET (1 MG TOTAL) BY MOUTH 2 (TWO) TIMES DAILY. (Patient taking differently: No sig reported)    buPROPion (WELLBUTRIN XL) 300 MG 24 hr tablet Take 300 mg by mouth once daily. Pt states he only takes 300mg    cyanocobalamin 500 MCG tablet Take 500 mcg by mouth once daily.    dextroamphetamine-amphetamine  (ADDERALL) 20 mg tablet Take 1 tablet by mouth every morning, take 1 tablet by mouth 3-4 hours later, and take 1/2 tablet every afternoon    fluticasone propionate (FLONASE) 50 mcg/actuation nasal spray 1 spray (50 mcg total) by Each Nostril route once daily. (Patient taking differently: 1 spray by Each Nostril route daily as needed.)    gabapentin (NEURONTIN) 300 MG capsule Take 1 capsule (300 mg total) by mouth 3 (three) times daily.    IMPOYZ 0.025 % Crea Apply topically as needed.     levocetirizine (XYZAL) 5 MG tablet TAKE 1 TABLET (5 MG TOTAL) BY MOUTH EVERY EVENING. (Patient taking differently: Take 5 mg by mouth daily as needed for Allergies.)    LUZU 1 % Crea Apply topically as needed.     metoprolol tartrate (LOPRESSOR) 25 MG tablet TAKE ONE BY MOUTH TWICE DAILY    multivitamin (THERAGRAN) per tablet Take 1 tablet by mouth once daily.     NAFTIN 2 % Gel daily as needed.     nitroGLYCERIN (NITROSTAT) 0.4 MG SL tablet Please dispense 25 pills in 4 bottles.    omeprazole (PRILOSEC) 10 MG capsule Take 2 capsules (20 mg total) by mouth once daily.    sotaloL (BETAPACE) 80 MG tablet TAKE ONE BY MOUTH TWICE DAILY    tamsulosin (FLOMAX) 0.4 mg Cap TAKE ONE BY MOUTH TWICE DAILY    thiamine 250 MG tablet Take 250 mg by mouth once daily.    vitamin D (VITAMIN D3) 1000 units Tab Take 1,000 Units by mouth once daily.    vortioxetine (TRINTELLIX) 20 mg Tab Take 20 mg by mouth once daily.     XARELTO 20 mg Tab TAKE ONE DAILY    ARIPiprazole (ABILIFY) 10 MG Tab Take 10 mg by mouth once daily.    [DISCONTINUED] ARIPiprazole (ABILIFY) 15 MG Tab 10 mg once daily. Take 1/2 tab once a day    [DISCONTINUED] aspirin 81 MG Chew Take 1 tablet (81 mg total) by mouth once daily.     Family History       Problem Relation (Age of Onset)    Cancer Mother    Cirrhosis Brother    Diabetes Brother    Heart disease Father, Brother    Hypertension Sister          Tobacco Use    Smoking status: Never Smoker    Smokeless  tobacco: Never Used   Substance and Sexual Activity    Alcohol use: No     Comment: rare    Drug use: No    Sexual activity: Yes     Partners: Female     Review of Systems   Constitutional:  Positive for fatigue. Negative for chills and fever.   HENT:  Negative for sore throat and trouble swallowing.    Eyes:  Negative for photophobia and visual disturbance.   Respiratory:  Negative for cough, shortness of breath and wheezing.    Cardiovascular:  Negative for chest pain, palpitations and leg swelling.   Gastrointestinal:  Negative for abdominal distention, abdominal pain, diarrhea, nausea and vomiting.        Early satiety. Melena.    Genitourinary:  Negative for dysuria and hematuria.   Musculoskeletal:  Negative for neck pain and neck stiffness.   Skin:  Negative for rash and wound.   Neurological:  Positive for weakness (generalized) and light-headedness. Negative for seizures, syncope and headaches.   Psychiatric/Behavioral:  Negative for confusion and decreased concentration.    Objective:     Vital Signs (Most Recent):  Temp: 98.5 °F (36.9 °C) (05/06/22 1705)  Pulse: 61 (05/06/22 1705)  Resp: 12 (05/06/22 1705)  BP: 117/61 (05/06/22 1705)  SpO2: 100 % (05/06/22 1705) Vital Signs (24h Range):  Temp:  [97.6 °F (36.4 °C)-98.5 °F (36.9 °C)] 98.5 °F (36.9 °C)  Pulse:  [61-69] 61  Resp:  [12-18] 12  SpO2:  [100 %] 100 %  BP: (107-135)/(51-68) 117/61     Weight: 136.1 kg (300 lb)  Body mass index is 45.61 kg/m².    Physical Exam  Constitutional:       General: He is not in acute distress.     Appearance: He is not toxic-appearing or diaphoretic.   HENT:      Head: Normocephalic and atraumatic.      Nose: Nose normal.   Eyes:      General: No scleral icterus.     Extraocular Movements: Extraocular movements intact.      Pupils: Pupils are equal, round, and reactive to light.   Cardiovascular:      Rate and Rhythm: Regular rhythm. Tachycardia present.   Pulmonary:      Effort: Pulmonary effort is normal. No  respiratory distress.      Breath sounds: No wheezing or rales.   Abdominal:      General: Abdomen is flat. There is no distension.      Palpations: Abdomen is soft.      Tenderness: There is no abdominal tenderness. There is no guarding.   Musculoskeletal:         General: Normal range of motion.      Cervical back: Normal range of motion and neck supple. No rigidity.      Right lower leg: No edema.      Left lower leg: No edema.   Skin:     General: Skin is warm and dry.      Coloration: Skin is pale. Skin is not jaundiced.   Neurological:      General: No focal deficit present.      Mental Status: He is alert and oriented to person, place, and time.      Cranial Nerves: No cranial nerve deficit.   Psychiatric:         Mood and Affect: Mood normal.         Behavior: Behavior normal.         CRANIAL NERVES     CN III, IV, VI   Pupils are equal, round, and reactive to light.     Significant Labs: All pertinent labs within the past 24 hours have been reviewed.  CBC:   Recent Labs   Lab 05/06/22  1529   WBC 7.05   HGB 10.0*   HCT 31.0*        CMP:   Recent Labs   Lab 05/06/22  1529      K 3.6      CO2 30*   *   BUN 28*   CREATININE 1.0   CALCIUM 9.0   PROT 5.9*   ALBUMIN 3.4*   BILITOT 0.7   ALKPHOS 87   AST 13   ALT 8*   ANIONGAP 6*   EGFRNONAA >60.0       Significant Imaging: I have reviewed all pertinent imaging results/findings within the past 24 hours.

## 2022-05-06 NOTE — ED TRIAGE NOTES
63 y/o M presents to ER with c/c black stools for 3 days. Pt. States that he is experiencing abd discomfort, SOB upon exertion. Pt. Denies c/p.

## 2022-05-06 NOTE — ED PROVIDER NOTES
Encounter Date: 5/6/2022       History     Chief Complaint   Patient presents with    Rectal Bleeding     Since Tuesday, states he is now having straight dark blood come from rectum several times daily got worse yesterday     The history is provided by the patient and medical records. No  was used.      Shiraz Jha is a 62 y.o. male with medical history of AFib s/p ablation on Xarelto, HTN, T2DM, Gastric Bypass 2010, BLE lymphedema on Bumex presenting to the ED with the chief complaint of rectal bleeding.     Patient reports 4 days of black colored, loose stools. Estimates 2 episodes per day. Reports vague upper abdominal discomfort for the past few days that he ranks as a 1/10. No fever, nausea, chest pain, cough, urinary symptoms. Reports chronic SOB with exertion which is at its baseline currently. No NSAID use, iron supplementation, pepto-bismol use. Reports having a negative colonoscopy several years ago. Reports multiple family members with history of cancer. Denies personal history of cancer. He is compliant with his Xarelto.     Review of patient's allergies indicates:   Allergen Reactions    Iodinated contrast media Other (See Comments)     Raises BP       Past Medical History:   Diagnosis Date    Allergy     Asthma     Atrial fibrillation     Cataract     Chronic rhinitis 9/26/2013    DDD (degenerative disc disease) 4/3/2015    Depression     Diabetes mellitus     Fibromyalgia     Gastroesophageal reflux disease without esophagitis 7/14/2017    Hypertension     Sinusitis, acute, maxillary 12/20/2012    Thyroid disease      Past Surgical History:   Procedure Laterality Date    CERVICAL SPINE SURGERY      EPIDURAL STEROID INJECTION INTO LUMBAR SPINE N/A 5/29/2018    Procedure: INJECTION-STEROID-EPIDURAL-LUMBAR- L4-5;  Surgeon: Roman Lyman MD;  [facility];  Service: Pain Management;  Laterality: N/A;  Patient is diabetic and Xarleto     EPIDURAL  STEROID INJECTION INTO LUMBAR SPINE N/A 7/3/2018    Procedure: INJECTION, STEROID, SPINE, LUMBAR, EPIDURAL- L4-5;  Surgeon: Roman Lyman MD;  Location: [facility];  Service: Pain Management;  Laterality: N/A;  Patient takes Xarelto and ASA     EXCISION OF LESION OF LIP N/A 7/7/2020    Procedure: EXCISION, LESION, LIP;  Surgeon: Caden Navarro MD;  Location: [facility] OR [address] [place] [address];  Service: ENT;  Laterality: N/A;    GASTRIC BYPASS      [facility]GERY  2000    [facility]    TONSILLECTOMY       Family History   Problem Relation Age of Onset    Heart disease Father     Cancer Mother         lung    Hypertension Sister     Heart disease Brother     Cirrhosis Brother     Diabetes Brother      Social History     Tobacco Use    Smoking status: Never Smoker    Smokeless tobacco: Never Used   Substance Use Topics    Alcohol use: No     Comment: rare    Drug use: No     Review of Systems   Constitutional: Negative for fever.   HENT: Negative for sore throat.    Respiratory: Positive for shortness of breath (chronic).    Cardiovascular: Negative for chest pain.   Gastrointestinal: Positive for abdominal pain and blood in stool. Negative for nausea.   Genitourinary: Negative for dysuria.   Musculoskeletal: Negative for back pain.   Skin: Negative for rash.   Neurological: Negative for weakness.   Hematological: Does not bruise/bleed easily.       Physical Exam     Initial Vitals   BP Pulse Resp Temp SpO2   05/06/22 1419 05/06/22 1419 05/06/22 1417 05/06/22 1417 05/06/22 1419   135/68 69 16 97.6 °F (36.4 °C) 100 %      MAP       --                Physical Exam    Constitutional: He appears well-developed and well-nourished. He is not diaphoretic. He is easily aroused.   Obese male   HENT:   Head: Normocephalic and atraumatic.   Mouth/Throat: Oropharynx is clear and moist. No oropharyngeal exudate.   Eyes: EOM and lids are normal. Pupils are equal, round, and reactive to light. No scleral icterus.   Neck:  Phonation normal. Neck supple. No stridor present.   Normal range of motion.  Cardiovascular: Normal rate and regular rhythm.   Pulmonary/Chest: Breath sounds normal. No stridor. No respiratory distress. He has no wheezes. He has no rales.   Abdominal: Abdomen is soft. He exhibits no distension. There is no abdominal tenderness. There is no rebound.   Genitourinary: Rectum:      Guaiac result positive.   Guaiac positive stool. : Acceptable.   Genitourinary Comments: Black colored stools on gloved finger. No visible hemorrhoid or fissure. FOBT positive     Musculoskeletal:         General: No tenderness or edema. Normal range of motion.      Cervical back: Normal range of motion and neck supple.     Neurological: He is alert, oriented to person, place, and time and easily aroused. He has normal strength. No sensory deficit.   Skin: Skin is warm and dry. No rash noted. No erythema.   Psychiatric: He has a normal mood and affect. His speech is normal.       ED Course   Procedures  Labs Reviewed   CBC W/ AUTO DIFFERENTIAL - Abnormal; Notable for the following components:       Result Value    RBC 3.31 (*)     Hemoglobin 10.0 (*)     Hematocrit 31.0 (*)     All other components within normal limits   COMPREHENSIVE METABOLIC PANEL - Abnormal; Notable for the following components:    CO2 30 (*)     Glucose 131 (*)     BUN 28 (*)     Total Protein 5.9 (*)     Albumin 3.4 (*)     ALT 8 (*)     Anion Gap 6 (*)     All other components within normal limits   CBC W/ AUTO DIFFERENTIAL - Abnormal; Notable for the following components:    RBC 2.99 (*)     Hemoglobin 9.2 (*)     Hematocrit 28.5 (*)     All other components within normal limits   PROTIME-INR   APTT   TYPE & SCREEN        ECG Results          EKG 12-lead (Final result)  Result time 05/06/22 16:34:21    Final result by Interface, Lab In [facility] (05/06/22 16:34:21)                 Narrative:    Test Reason : K92.1,    Vent. Rate : 061 BPM     Atrial  Rate : 061 BPM     P-R Int : 168 ms          QRS Dur : 086 ms      QT Int : 426 ms       P-R-T Axes : 048 008 -04 degrees     QTc Int : 428 ms    Normal sinus rhythm  Abnormal R wave progression in the precordial leads  Nonspecific T wave abnormality  Abnormal ECG  When compared with ECG of 21-OCT-2021 12:42,  Nonspecific T wave abnormality now evident in Inferior leads  Nonspecific T wave abnormality, worse in Anterior leads  Confirmed by Josiah HARP, David WYLIE ([id number]) on 5/6/2022 4:34:13 PM    Referred By: HELADIO   SELF           Confirmed By:David Rahman MD                            Imaging Results    None          Medications   buPROPion TB24 tablet 300 mg (has no administration in time range)   gabapentin capsule 300 mg (has no administration in time range)   sotaloL tablet 80 mg (has no administration in time range)   tamsulosin 24 hr capsule 0.4 mg (has no administration in time range)   pantoprazole injection 40 mg (has no administration in time range)   pantoprazole injection 80 mg (80 mg Intravenous Given 5/6/22 3784)   sodium chloride 0.9% bolus 1,000 mL (1,000 mLs Intravenous New Bag 5/6/22 1601)     Medical Decision Making:   History:   Old Medical Records: I decided to obtain old medical records.  Old Records Summarized: records from clinic visits.  Clinical Tests:   Lab Tests: Ordered and Reviewed  Medical Tests: Ordered and Reviewed  Other:   I have discussed this case with another health care provider.       <> Summary of the Discussion: Hospital Medicine       APC / Resident Notes:   62 y.o. male with medical history of AFib s/p ablation on Xarelto, HTN, T2DM, Gastric Bypass 2010, BLE lymphedema on Bumex presenting to the ED c/o 4 days of black, loose stools. DDx includes but not limited to upper GI bleed, PUD, symptomatic anemia, medication side effect, malignancy, slow transit lower GI bleed.    3:05 PM - Caden Dumont PA-C  Melena and FOBT positive on exam. Will give Protonix IV. T&S and blood  consent obtained.     HBG 10, decreased from 15 about 3 months ago. BUN elevated 28. Discussed with HM, admitted for further management. Patient expresses understanding and agreeable to the plan. I have discussed the care of this patient with my supervising physician.                 Clinical Impression:   Final diagnoses:  [K92.1] Melena (Primary)  [Z79.01] Chronic anticoagulation  [D64.9] Anemia, unspecified type          ED Disposition Condition    Observation               Caden Dumont PA-C  05/06/22 2946

## 2022-05-06 NOTE — ED NOTES
Patient identifiers for Shiraz Jha 62 y.o. male checked and correct.  Chief Complaint   Patient presents with    Rectal Bleeding     Since Tuesday, states he is now having straight dark blood come from rectum several times daily got worse yesterday     Past Medical History:   Diagnosis Date    Allergy     Asthma     Atrial fibrillation     Cataract     Chronic rhinitis 9/26/2013    DDD (degenerative disc disease) 4/3/2015    Depression     Diabetes mellitus     Fibromyalgia     Gastroesophageal reflux disease without esophagitis 7/14/2017    Hypertension     Sinusitis, acute, maxillary 12/20/2012    Thyroid disease      Allergies reported:   Review of patient's allergies indicates:   Allergen Reactions    Iodinated contrast media Other (See Comments)     Raises BP           LOC: Patient is awake, alert, and aware of environment with an appropriate affect. Patient is oriented x 4 and speaking appropriately.  APPEARANCE: Patient resting comfortably and in no acute distress. Patient is clean and well groomed, patient's clothing is properly fastened.  HEENT: - JVD, + midline trach. No lacerations or bruises noted on face or scalp.  SKIN: The skin is warm and dry. Patient has normal skin turgor and moist mucus membranes.   MUSKULOSKELETAL: Patient is moving all extremities well, no obvious deformities noted. Pulses intact.   RESPIRATORY: Airway is open and patent. Respirations are spontaneous and non-labored with normal effort and rate.  CARDIAC: Patient has a normal rate and rhythm. ** on cardiac monitor. No peripheral edema noted.   ABDOMEN: No distention noted. Soft and non-tender upon palpation.  NEUROLOGICAL: pupils 3 mm, PERRL. Facial expression is symmetrical. Hand grasps are equal bilaterally. Normal sensation in all extremities when touched with finger.

## 2022-05-07 LAB
ANION GAP SERPL CALC-SCNC: 8 MMOL/L (ref 8–16)
BASOPHILS # BLD AUTO: 0.04 K/UL (ref 0–0.2)
BASOPHILS # BLD AUTO: 0.05 K/UL (ref 0–0.2)
BASOPHILS # BLD AUTO: 0.05 K/UL (ref 0–0.2)
BASOPHILS NFR BLD: 0.7 % (ref 0–1.9)
BASOPHILS NFR BLD: 0.8 % (ref 0–1.9)
BASOPHILS NFR BLD: 0.9 % (ref 0–1.9)
BUN SERPL-MCNC: 27 MG/DL (ref 6–20)
CALCIUM SERPL-MCNC: 8.7 MG/DL (ref 8.7–10.5)
CHLORIDE SERPL-SCNC: 107 MMOL/L (ref 95–110)
CO2 SERPL-SCNC: 26 MMOL/L (ref 23–29)
CREAT SERPL-MCNC: 0.9 MG/DL (ref 0.5–1.4)
DIFFERENTIAL METHOD: ABNORMAL
EOSINOPHIL # BLD AUTO: 0.1 K/UL (ref 0–0.5)
EOSINOPHIL # BLD AUTO: 0.1 K/UL (ref 0–0.5)
EOSINOPHIL # BLD AUTO: 0.2 K/UL (ref 0–0.5)
EOSINOPHIL NFR BLD: 2.2 % (ref 0–8)
EOSINOPHIL NFR BLD: 2.3 % (ref 0–8)
EOSINOPHIL NFR BLD: 2.6 % (ref 0–8)
ERYTHROCYTE [DISTWIDTH] IN BLOOD BY AUTOMATED COUNT: 14.2 % (ref 11.5–14.5)
ERYTHROCYTE [DISTWIDTH] IN BLOOD BY AUTOMATED COUNT: 14.2 % (ref 11.5–14.5)
ERYTHROCYTE [DISTWIDTH] IN BLOOD BY AUTOMATED COUNT: 14.4 % (ref 11.5–14.5)
EST. GFR  (AFRICAN AMERICAN): >60 ML/MIN/1.73 M^2
EST. GFR  (NON AFRICAN AMERICAN): >60 ML/MIN/1.73 M^2
GLUCOSE SERPL-MCNC: 112 MG/DL (ref 70–110)
HCT VFR BLD AUTO: 25.4 % (ref 40–54)
HCT VFR BLD AUTO: 26.1 % (ref 40–54)
HCT VFR BLD AUTO: 27 % (ref 40–54)
HGB BLD-MCNC: 8.2 G/DL (ref 14–18)
HGB BLD-MCNC: 8.5 G/DL (ref 14–18)
HGB BLD-MCNC: 8.6 G/DL (ref 14–18)
IMM GRANULOCYTES # BLD AUTO: 0.01 K/UL (ref 0–0.04)
IMM GRANULOCYTES # BLD AUTO: 0.02 K/UL (ref 0–0.04)
IMM GRANULOCYTES # BLD AUTO: 0.02 K/UL (ref 0–0.04)
IMM GRANULOCYTES NFR BLD AUTO: 0.2 % (ref 0–0.5)
IMM GRANULOCYTES NFR BLD AUTO: 0.3 % (ref 0–0.5)
IMM GRANULOCYTES NFR BLD AUTO: 0.3 % (ref 0–0.5)
LYMPHOCYTES # BLD AUTO: 1.7 K/UL (ref 1–4.8)
LYMPHOCYTES # BLD AUTO: 1.7 K/UL (ref 1–4.8)
LYMPHOCYTES # BLD AUTO: 2 K/UL (ref 1–4.8)
LYMPHOCYTES NFR BLD: 28.7 % (ref 18–48)
LYMPHOCYTES NFR BLD: 30.9 % (ref 18–48)
LYMPHOCYTES NFR BLD: 33.2 % (ref 18–48)
MCH RBC QN AUTO: 30.7 PG (ref 27–31)
MCH RBC QN AUTO: 30.8 PG (ref 27–31)
MCH RBC QN AUTO: 30.8 PG (ref 27–31)
MCHC RBC AUTO-ENTMCNC: 31.5 G/DL (ref 32–36)
MCHC RBC AUTO-ENTMCNC: 32.3 G/DL (ref 32–36)
MCHC RBC AUTO-ENTMCNC: 33 G/DL (ref 32–36)
MCV RBC AUTO: 93 FL (ref 82–98)
MCV RBC AUTO: 96 FL (ref 82–98)
MCV RBC AUTO: 98 FL (ref 82–98)
MONOCYTES # BLD AUTO: 0.3 K/UL (ref 0.3–1)
MONOCYTES # BLD AUTO: 0.4 K/UL (ref 0.3–1)
MONOCYTES # BLD AUTO: 0.5 K/UL (ref 0.3–1)
MONOCYTES NFR BLD: 5.7 % (ref 4–15)
MONOCYTES NFR BLD: 6.1 % (ref 4–15)
MONOCYTES NFR BLD: 8.5 % (ref 4–15)
NEUTROPHILS # BLD AUTO: 3.3 K/UL (ref 1.8–7.7)
NEUTROPHILS # BLD AUTO: 3.4 K/UL (ref 1.8–7.7)
NEUTROPHILS # BLD AUTO: 3.6 K/UL (ref 1.8–7.7)
NEUTROPHILS NFR BLD: 54.6 % (ref 38–73)
NEUTROPHILS NFR BLD: 60.1 % (ref 38–73)
NEUTROPHILS NFR BLD: 61.9 % (ref 38–73)
NRBC BLD-RTO: 0 /100 WBC
PLATELET # BLD AUTO: 178 K/UL (ref 150–450)
PLATELET # BLD AUTO: 181 K/UL (ref 150–450)
PLATELET # BLD AUTO: 181 K/UL (ref 150–450)
PMV BLD AUTO: 10.7 FL (ref 9.2–12.9)
PMV BLD AUTO: 11.3 FL (ref 9.2–12.9)
PMV BLD AUTO: 11.4 FL (ref 9.2–12.9)
POTASSIUM SERPL-SCNC: 3.4 MMOL/L (ref 3.5–5.1)
RBC # BLD AUTO: 2.66 M/UL (ref 4.6–6.2)
RBC # BLD AUTO: 2.76 M/UL (ref 4.6–6.2)
RBC # BLD AUTO: 2.8 M/UL (ref 4.6–6.2)
SODIUM SERPL-SCNC: 141 MMOL/L (ref 136–145)
WBC # BLD AUTO: 5.43 K/UL (ref 3.9–12.7)
WBC # BLD AUTO: 5.75 K/UL (ref 3.9–12.7)
WBC # BLD AUTO: 6.15 K/UL (ref 3.9–12.7)

## 2022-05-07 PROCEDURE — 85025 COMPLETE CBC W/AUTO DIFF WBC: CPT | Mod: 91 | Performed by: FAMILY MEDICINE

## 2022-05-07 PROCEDURE — 99204 OFFICE O/P NEW MOD 45 MIN: CPT | Mod: ,,, | Performed by: INTERNAL MEDICINE

## 2022-05-07 PROCEDURE — 25000003 PHARM REV CODE 250: Performed by: FAMILY MEDICINE

## 2022-05-07 PROCEDURE — G0378 HOSPITAL OBSERVATION PER HR: HCPCS

## 2022-05-07 PROCEDURE — 99204 PR OFFICE/OUTPT VISIT, NEW, LEVL IV, 45-59 MIN: ICD-10-PCS | Mod: ,,, | Performed by: INTERNAL MEDICINE

## 2022-05-07 PROCEDURE — 99226 PR SUBSEQUENT OBSERVATION CARE,LEVEL III: ICD-10-PCS | Mod: ,,, | Performed by: PHYSICIAN ASSISTANT

## 2022-05-07 PROCEDURE — 63600175 PHARM REV CODE 636 W HCPCS: Performed by: FAMILY MEDICINE

## 2022-05-07 PROCEDURE — C9113 INJ PANTOPRAZOLE SODIUM, VIA: HCPCS | Performed by: FAMILY MEDICINE

## 2022-05-07 PROCEDURE — 36415 COLL VENOUS BLD VENIPUNCTURE: CPT | Performed by: FAMILY MEDICINE

## 2022-05-07 PROCEDURE — 96376 TX/PRO/DX INJ SAME DRUG ADON: CPT

## 2022-05-07 PROCEDURE — 80048 BASIC METABOLIC PNL TOTAL CA: CPT | Performed by: FAMILY MEDICINE

## 2022-05-07 PROCEDURE — 99226 PR SUBSEQUENT OBSERVATION CARE,LEVEL III: CPT | Mod: ,,, | Performed by: PHYSICIAN ASSISTANT

## 2022-05-07 RX ADMIN — GABAPENTIN 300 MG: 300 CAPSULE ORAL at 02:05

## 2022-05-07 RX ADMIN — TAMSULOSIN HYDROCHLORIDE 0.4 MG: 0.4 CAPSULE ORAL at 09:05

## 2022-05-07 RX ADMIN — GABAPENTIN 300 MG: 300 CAPSULE ORAL at 09:05

## 2022-05-07 RX ADMIN — TAMSULOSIN HYDROCHLORIDE 0.4 MG: 0.4 CAPSULE ORAL at 08:05

## 2022-05-07 RX ADMIN — GABAPENTIN 300 MG: 300 CAPSULE ORAL at 08:05

## 2022-05-07 RX ADMIN — PANTOPRAZOLE SODIUM 40 MG: 40 INJECTION, POWDER, FOR SOLUTION INTRAVENOUS at 09:05

## 2022-05-07 RX ADMIN — BUPROPION HYDROCHLORIDE 300 MG: 300 TABLET, FILM COATED, EXTENDED RELEASE ORAL at 09:05

## 2022-05-07 RX ADMIN — PANTOPRAZOLE SODIUM 40 MG: 40 INJECTION, POWDER, FOR SOLUTION INTRAVENOUS at 08:05

## 2022-05-07 RX ADMIN — SOTALOL HYDROCHLORIDE 80 MG: 80 TABLET ORAL at 08:05

## 2022-05-07 NOTE — PLAN OF CARE
Patient AAOX4 no acute distress noted VSS RA denies any complaints of pain or discomfort patient NPO since midnight patient ambulates independent in room without difficulties no injuries noted this shift will cont to monitor.    Problem: Adult Inpatient Plan of Care  Goal: Plan of Care Review  Outcome: Ongoing, Progressing     Problem: Adult Inpatient Plan of Care  Goal: Patient-Specific Goal (Individualized)  Outcome: Ongoing, Progressing     Problem: Adult Inpatient Plan of Care  Goal: Absence of Hospital-Acquired Illness or Injury  Outcome: Ongoing, Progressing     Problem: Adult Inpatient Plan of Care  Goal: Optimal Comfort and Wellbeing  Outcome: Ongoing, Progressing     Problem: Bleeding (Gastrointestinal Bleeding)  Goal: Hemostasis  Outcome: Ongoing, Progressing

## 2022-05-07 NOTE — CONSULTS
[facility]  Gastroenterology  Consult Note    Patient Name: Shiraz Jha  MRN: [id number]  Admission Date: 5/6/2022  Hospital Length of Stay: 0 days  Code Status: Full Code   Attending Provider: Vanessa Ham MD   Consulting Provider: Aram Lagunas MD  Primary Care Physician: GUSTAVO Theodore MD  Principal Problem:GIB (gastrointestinal bleeding)    Inpatient consult to Gastroenterology  Consult performed by: Aram Lagunas MD  Consult ordered by: Caden Garland MD        Subjective:     HPI:   Mr Jha is a 61yo PMHx afib s/p ablation on xarelto, HTN, T2DM, gastric bypass (2010) presented to [facility] ED on 05/06 with melena.    Reports melenic stools 3-4x/ day since 05/03. No hematemesis or coffee ground emesis. No abdominal pain. No NSAID use. Does use  daily for last 5 years--unclear indication. No nausea vomiting.    Reports negative colonoscopy a few years back. EGD not done recently--just prior to gastric bypass.     VS since arrival notable for AF, HR wnl, normotensive. CBC w/o leukocytosis, plts wnl. Hgb 10-->9.2-->8.5 from baseline 14.9 (02/15/22). BMP w/ BUN/ Cr 27/ 0.9. LFTs unremarkable. No abd imaging. Receiving IV PPI BID.      Past Medical History:   Diagnosis Date    Allergy     Asthma     Atrial fibrillation     Cataract     Chronic rhinitis 9/26/2013    DDD (degenerative disc disease) 4/3/2015    Depression     Diabetes mellitus     Fibromyalgia     Gastroesophageal reflux disease without esophagitis 7/14/2017    Hypertension     Sinusitis, acute, maxillary 12/20/2012    Thyroid disease        Past Surgical History:   Procedure Laterality Date    CERVICAL SPINE SURGERY      EPIDURAL STEROID INJECTION INTO LUMBAR SPINE N/A 5/29/2018    Procedure: INJECTION-STEROID-EPIDURAL-LUMBAR- L4-5;  Surgeon: Roman Lyman MD;  Location: [facility];  Service: Pain Management;  Laterality: N/A;  Patient is diabetic and Xarleto     EPIDURAL STEROID INJECTION INTO  LUMBAR SPINE N/A 7/3/2018    Procedure: INJECTION, STEROID, SPINE, LUMBAR, EPIDURAL- L4-5;  Surgeon: Roman Lyman MD;  Location: [facility];  Service: Pain Management;  Laterality: N/A;  Patient takes Xarelto and ASA     EXCISION OF LESION OF LIP N/A 7/7/2020    Procedure: EXCISION, LESION, LIP;  Surgeon: Caden Navarro MD;  Location: [facility] OR [address] [place] [address];  Service: ENT;  Laterality: N/A;    GASTRIC BYPASS      SINUS SURGERY  2000    [facility]    TONSILLECTOMY         Family History   Problem Relation Age of Onset    Heart disease Father     Cancer Mother         lung    Hypertension Sister     Heart disease Brother     Cirrhosis Brother     Diabetes Brother        Social History     Socioeconomic History    Marital status: Single   Tobacco Use    Smoking status: Never Smoker    Smokeless tobacco: Never Used   Substance and Sexual Activity    Alcohol use: No     Comment: rare    Drug use: No    Sexual activity: Yes     Partners: Female   Social History Narrative    [place], lives alone w/2 dogs.    Dogs are his kids.    Works PT --  at [employer], on ssdi from neck and back/depression.     Social Determinants of Health     Financial Resource Strain: High Risk    Difficulty of Paying Living Expenses: Very hard   Food Insecurity: Food Insecurity Present    Worried About Running Out of Food in the Last Year: Sometimes true    Ran Out of Food in the Last Year: Sometimes true   Transportation Needs: Unmet Transportation Needs    Lack of Transportation (Medical): Yes    Lack of Transportation (Non-Medical): Yes   Physical Activity: Sufficiently Active    Days of Exercise per Week: 3 days    Minutes of Exercise per Session: 60 min   Stress: Stress Concern Present    Feeling of Stress : To some extent   Social Connections: Unknown    Frequency of Communication with Friends and Family: More than three times a week    Frequency of Social Gatherings with Friends and Family: Once a  week    Active Member of Clubs or Organizations: Yes    Attends Club or Organization Meetings: More than 4 times per year    Marital Status: Never    Housing Stability: High Risk    Unable to Pay for Housing in the Last Year: Yes    Number of Places Lived in the Last Year: 1    Unstable Housing in the Last Year: No       No current facility-administered medications on file prior to encounter.     Current Outpatient Medications on File Prior to Encounter   Medication Sig Dispense Refill    aspirin 325 MG tablet Take 325 mg by mouth once daily.      bumetanide (BUMEX) 1 MG tablet TAKE 1 TABLET (1 MG TOTAL) BY MOUTH 2 (TWO) TIMES DAILY. (Patient taking differently: No sig reported) 180 tablet 3    buPROPion (WELLBUTRIN XL) 300 MG 24 hr tablet Take 300 mg by mouth once daily. Pt states he only takes 300mg      cyanocobalamin 500 MCG tablet Take 500 mcg by mouth once daily.      dextroamphetamine-amphetamine (ADDERALL) 20 mg tablet Take 1 tablet by mouth every morning, take 1 tablet by mouth 3-4 hours later, and take 1/2 tablet every afternoon      fluticasone propionate (FLONASE) 50 mcg/actuation nasal spray 1 spray (50 mcg total) by Each Nostril route once daily. (Patient taking differently: 1 spray by Each Nostril route daily as needed.) 16 g 0    gabapentin (NEURONTIN) 300 MG capsule Take 1 capsule (300 mg total) by mouth 3 (three) times daily. 270 capsule 3    IMPOYZ 0.025 % Crea Apply topically as needed.       levocetirizine (XYZAL) 5 MG tablet TAKE 1 TABLET (5 MG TOTAL) BY MOUTH EVERY EVENING. (Patient taking differently: Take 5 mg by mouth daily as needed for Allergies.) 30 tablet 11    LUZU 1 % Crea Apply topically as needed.       metoprolol tartrate (LOPRESSOR) 25 MG tablet TAKE ONE BY MOUTH TWICE DAILY 180 tablet 3    multivitamin (THERAGRAN) per tablet Take 1 tablet by mouth once daily.       NAFTIN 2 % Gel daily as needed.       nitroGLYCERIN (NITROSTAT) 0.4 MG SL tablet Please  dispense 25 pills in 4 bottles. 100 tablet 0    omeprazole (PRILOSEC) 10 MG capsule Take 2 capsules (20 mg total) by mouth once daily. 30 capsule 6    sotaloL (BETAPACE) 80 MG tablet TAKE ONE BY MOUTH TWICE DAILY 180 tablet 3    tamsulosin (FLOMAX) 0.4 mg Cap TAKE ONE BY MOUTH TWICE DAILY 180 capsule 3    thiamine 250 MG tablet Take 250 mg by mouth once daily.      vitamin D (VITAMIN D3) 1000 units Tab Take 1,000 Units by mouth once daily.      vortioxetine (TRINTELLIX) 20 mg Tab Take 20 mg by mouth once daily.       XARELTO 20 mg Tab TAKE ONE DAILY 30 tablet 11    ARIPiprazole (ABILIFY) 10 MG Tab Take 10 mg by mouth once daily.         Review of patient's allergies indicates:   Allergen Reactions    Iodinated contrast media Other (See Comments)     Raises BP         Review of Systems   Constitutional: Negative.    HENT: Negative.    Eyes: Negative.    Respiratory: Negative.    Cardiovascular: Negative.    Gastrointestinal: Positive for melena. Negative for abdominal pain, blood in stool, constipation, diarrhea, heartburn, nausea and vomiting.   Genitourinary: Negative.    Musculoskeletal: Negative.    Skin: Negative.    Endo/Heme/Allergies: Negative.    Psychiatric/Behavioral: Negative.         Objective:     Vitals:    05/07/22 0818   BP: (!) 104/52   Pulse: 65   Resp: 17   Temp: 98.1 °F (36.7 °C)       Constitutional:  not in acute distress and well developed  HENT: Head: Normal, normocephalic, atraumatic.  Eyes: conjunctiva clear and sclera nonicteric  Cardiovascular: regular rate and rhythm  Respiratory: normal chest expansion & respiratory effort   and no accessory muscle use  GI: soft, non-tender, without masses or organomegaly  Musculoskeletal: no muscular tenderness noted  Skin: normal color  Neurological: alert, oriented x3  Psychiatric: mood and affect are within normal limits, pt is a good historian; no memory problems were noted    Significant Labs:  Recent Labs   Lab 05/06/22  1529  05/06/22  1807 05/07/22  0323   HGB 10.0* 9.2* 8.5*       Lab Results   Component Value Date    WBC 6.15 05/07/2022    HGB 8.5 (L) 05/07/2022    HCT 27.0 (L) 05/07/2022    MCV 98 05/07/2022     05/07/2022       Lab Results   Component Value Date     05/07/2022    K 3.4 (L) 05/07/2022     05/07/2022    CO2 26 05/07/2022    BUN 27 (H) 05/07/2022    CREATININE 0.9 05/07/2022    CALCIUM 8.7 05/07/2022    ANIONGAP 8 05/07/2022    ESTGFRAFRICA >60.0 05/07/2022    EGFRNONAA >60.0 05/07/2022       Lab Results   Component Value Date    ALT 8 (L) 05/06/2022    AST 13 05/06/2022    ALKPHOS 87 05/06/2022    BILITOT 0.7 05/06/2022       Lab Results   Component Value Date    INR 1.1 05/06/2022    INR 1.0 07/14/2017    INR 1.1 07/15/2014       Significant Imaging:  Reviewed pertinent radiology findings.       Assessment/Plan:     63yo PMHx afib s/p ablation on xarelto, HTN, T2DM, gastric bypass (2010) [place] [facility] [place] with melena.    Suspect ulcer at anastamosis site      Problem List:  1. Melena/ anemia    Recommendations:  - Plan for EGD Monday 05/09  - Transfuse for Hgb <7, unless otherwise indicated  - Maintain IV access with 2 large bore Ivs  - Intravascular resuscitation/support with IVFs   - Hold all NSAIDs and anticoagulants, unless contraindicated  - Bolus IV pantoprazole 80mg followed by 40mg BID  - Please correct any coagulopathy with platelets and FFP for goal of platelets >50K and INR <2.0  - Please notify GI team if there is significant change in patient's clinical status      Thank you for involving us in the care of Shiraz Jha. Please call with any additional questions, concerns or changes in the patient's clinical status. We will continue to follow.     Aram Lagunas MD  Gastroenterology Fellow PGY V  [facility]

## 2022-05-07 NOTE — HOSPITAL COURSE
Mr. Jha admitted for GIB. Started on IV protonix BID. Xarelto held. GI consulted, plan for EGD Monday. Hgb 8.2>7.7 >8.5 >9.6> 7.7> 8.5> 8.1(baseline 14). EGD unremarkable. Colonoscopy incomplete due to torturous colon. Given stable hemoglobin and no need for transfusion patient stable for discharge. Will follow up with GI and discuss balloon enteroscopy for further evaluation of proximal colon. Patient given IV lasix prior to discharge for LE edema as bumex was held as precaution for GIB. Patient discharged on home medications. OK to resume xarelto. Patient verbalized understanding. All questions answered. Referral to GI placed.

## 2022-05-07 NOTE — PROGRESS NOTES
[facility] [facility]  Progress Note    Patient Name: Shiraz Jha  MRN: [id number]  Patient Class: OP- Observation   Admission Date: 5/6/2022  Length of Stay: 0 days  Attending Physician: Vanessa Ham MD  Primary Care Provider: GUSTAVO Theodore MD        Subjective:     Principal Problem:GIB (gastrointestinal bleeding)        HPI:  This is a 61yo male with a past medical history of AFib s/p ablation on Xarelto, HTN, T2DM, Gastric Bypass 2010, BLE lymphedema on Bumex presenting to the ED with chief complaint of rectal bleeding. Patient reports 4 days of black colored, loose stools. Estimates 2 episodes per day. Reports vague upper abdominal discomfort for the past few days that he ranks as a 1/10. No fever, nausea, chest pain, cough, urinary symptoms. Reports chronic SOB with exertion which is at its baseline currently. No NSAID use, iron supplementation, pepto-bismol use. Reports having a negative colonoscopy several years ago. Reports multiple family members with history of cancer. Denies personal history of cancer. He is compliant with his Xarelto.   In the ED patient afebrile and hemodynamically stable saturating well on room air. Patient Hb 10.5 (was 14 two months ago). FOBT+. Patient started on iv protonix and admitted to the care of medicine for further evaluation and management of GIB.         Overview/Hospital Course:  Mr. Jha admitted for GIB. Started on IV protonix BID. GI consulted, plan for EGD Monday. Hgb 8.2 (baseline 14).       Interval History: Patient seen at bedside. Reports two episodes of melena with mild abdominal discomfort. Plan for EGD Monday. Hgb 8.2, continue to monitor.     Review of Systems   Constitutional:  Negative for chills and fever.   HENT:  Positive for rhinorrhea. Negative for congestion.    Respiratory:  Negative for cough and shortness of breath.    Cardiovascular:  Positive for leg swelling (chronic). Negative for chest pain.    Gastrointestinal:  Positive for abdominal pain (discomfort) and blood in stool.   Genitourinary:  Negative for difficulty urinating, dysuria and hematuria.   Musculoskeletal:  Negative for arthralgias and back pain.   Skin:  Negative for color change and pallor.   Neurological:  Negative for weakness and numbness.   Psychiatric/Behavioral:  Negative for agitation and behavioral problems.    Objective:     Vital Signs (Most Recent):  Temp: 98.1 °F (36.7 °C) (05/07/22 0818)  Pulse: 65 (05/07/22 0818)  Resp: 17 (05/07/22 0818)  BP: (!) 104/52 (05/07/22 0818)  SpO2: (!) 94 % (05/07/22 0818)   Vital Signs (24h Range):  Temp:  [96.6 °F (35.9 °C)-98.5 °F (36.9 °C)] 98.1 °F (36.7 °C)  Pulse:  [57-69] 65  Resp:  [12-20] 17  SpO2:  [94 %-100 %] 94 %  BP: (104-135)/(51-68) 104/52     Weight: 136.1 kg (300 lb)  Body mass index is 45.61 kg/m².    Intake/Output Summary (Last 24 hours) at 5/7/2022 1120  Last data filed at 5/7/2022 0500  Gross per 24 hour   Intake --   Output 300 ml   Net -300 ml      Physical Exam  Vitals and nursing note reviewed.   Constitutional:       General: He is not in acute distress.     Appearance: Normal appearance. He is obese. He is not ill-appearing.   HENT:      Head: Normocephalic and atraumatic.      Mouth/Throat:      Mouth: Mucous membranes are moist.      Pharynx: Oropharynx is clear.   Eyes:      Extraocular Movements: Extraocular movements intact.      Pupils: Pupils are equal, round, and reactive to light.   Cardiovascular:      Rate and Rhythm: Normal rate. Rhythm irregular.      Pulses: Normal pulses.      Heart sounds: Normal heart sounds.   Pulmonary:      Effort: Pulmonary effort is normal.      Breath sounds: Normal breath sounds. No wheezing or rales.   Abdominal:      General: Abdomen is flat. Bowel sounds are normal. There is no distension.      Palpations: Abdomen is soft.      Tenderness: There is no abdominal tenderness. There is no guarding or rebound.   Musculoskeletal:          General: Normal range of motion.      Right lower leg: Edema (2+, chronic) present.      Left lower leg: Edema (2+ chronic) present.   Skin:     General: Skin is warm and dry.      Capillary Refill: Capillary refill takes less than 2 seconds.   Neurological:      General: No focal deficit present.      Mental Status: He is alert and oriented to person, place, and time.   Psychiatric:         Mood and Affect: Mood normal.         Behavior: Behavior normal.       Significant Labs: All pertinent labs within the past 24 hours have been reviewed.  CBC:   Recent Labs   Lab 05/06/22  1807 05/07/22  0323 05/07/22  0904   WBC 6.23 6.15 5.75   HGB 9.2* 8.5* 8.2*   HCT 28.5* 27.0* 25.4*    181 178     CMP:   Recent Labs   Lab 05/06/22  1529 05/07/22  0323    141   K 3.6 3.4*    107   CO2 30* 26   * 112*   BUN 28* 27*   CREATININE 1.0 0.9   CALCIUM 9.0 8.7   PROT 5.9*  --    ALBUMIN 3.4*  --    BILITOT 0.7  --    ALKPHOS 87  --    AST 13  --    ALT 8*  --    ANIONGAP 6* 8   EGFRNONAA >60.0 >60.0     Magnesium: No results for input(s): MG in the last 48 hours.  POCT Glucose: No results for input(s): POCTGLUCOSE in the last 48 hours.    Significant Imaging: I have reviewed all pertinent imaging results/findings within the past 24 hours.      Assessment/Plan:      * GIB (gastrointestinal bleeding)  - IP GIB Pathway initiated  - Hx of gastric bypass  - Hb 10.5>9>8.2 on presentation  ;  Baseline 14  - start protonix 80mg iv once followed by 40mg iv BID  - GI consulted, plan for EGD Monday  - serial CBC  - transfuse Hb<7  - holding home ASA and Xarelto  - monitor tele  - cardiac diet, NPO midnight 5/9    Lymphedema of both lower extremities  - holding home bumex while monitoring hemodynamics ; resume as appropriate      Atrial flutter  - holding home xarelto and metoprolol  - continue home sotalol  - monitor tele      Depression  - continue home wellbutrin        Essential hypertension  - holding home  metoprolol while monitoring hemodynamics        VTE Risk Mitigation (From admission, onward)         Ordered     IP VTE HIGH RISK PATIENT  Once         05/06/22 1748     Place sequential compression device  Until discontinued         05/06/22 1748                Discharge Planning   BERTHA: 5/9/2022     Code Status: Full Code   Is the patient medically ready for discharge?: No    Reason for patient still in hospital (select all that apply): Patient trending condition, Laboratory test, Treatment and Consult recommendations                     Kim England PA-C  Department of Hospital Medicine   [facility] - Tele[facility] ([place]

## 2022-05-07 NOTE — H&P
[place] - Emergency Dept  [facility] Medicine  History & Physical    Patient Name: Shiraz Jha  MRN: [id number]  Patient Class: OP- Observation  Admission Date: 5/6/2022  Attending Physician: Vanessa Ham MD   Primary Care Provider: GUSTAVO Theodore MD         Patient information was obtained from patient, past medical records and ER records.     Subjective:     Principal Problem:GIB (gastrointestinal bleeding)    Chief Complaint:   Chief Complaint   Patient presents with    Rectal Bleeding     Since Tuesday, states he is now having straight dark blood come from rectum several times daily got worse yesterday        HPI: This is a 61yo male with a past medical history of AFib s/p ablation on Xarelto, HTN, T2DM, Gastric Bypass 2010, BLE lymphedema on Bumex presenting to the ED with chief complaint of rectal bleeding. Patient reports 4 days of black colored, loose stools. Estimates 2 episodes per day. Reports vague upper abdominal discomfort for the past few days that he ranks as a 1/10. No fever, nausea, chest pain, cough, urinary symptoms. Reports chronic SOB with exertion which is at its baseline currently. No NSAID use, iron supplementation, pepto-bismol use. Reports having a negative colonoscopy several years ago. Reports multiple family members with history of cancer. Denies personal history of cancer. He is compliant with his Xarelto.   In the ED patient afebrile and hemodynamically stable saturating well on room air. Patient Hb 10.5 (was 14 two months ago). FOBT+. Patient started on iv protonix and admitted to the care of medicine for further evaluation and management of GIB.         Past Medical History:   Diagnosis Date    Allergy     Asthma     Atrial fibrillation     Cataract     Chronic rhinitis 9/26/2013    DDD (degenerative disc disease) 4/3/2015    Depression     Diabetes mellitus     Fibromyalgia     Gastroesophageal reflux disease without esophagitis 7/14/2017    Hypertension      Sinusitis, acute, maxillary 12/20/2012    Thyroid disease        Past Surgical History:   Procedure Laterality Date    CERVICAL SPINE SURGERY      EPIDURAL STEROID INJECTION INTO LUMBAR SPINE N/A 5/29/2018    Procedure: INJECTION-STEROID-EPIDURAL-LUMBAR- L4-5;  Surgeon: Roman Lyman MD;  Location: [facility] PAIN MGT;  Service: Pain Management;  Laterality: N/A;  Patient is diabetic and Xarleto     EPIDURAL STEROID INJECTION INTO LUMBAR SPINE N/A 7/3/2018    Procedure: INJECTION, STEROID, SPINE, LUMBAR, EPIDURAL- L4-5;  Surgeon: Roman Lyman MD;  Location: [facility] PAIN MGT;  Service: Pain Management;  Laterality: N/A;  Patient takes Xarelto and ASA     EXCISION OF LESION OF LIP N/A 7/7/2020    Procedure: EXCISION, LESION, LIP;  Surgeon: Caden Navarro MD;  Location: [facility] OR [address] [place] [address];  Service: ENT;  Laterality: N/A;    GASTRIC BYPASS      SINUS SURGERY  2000    Dr[facility] Watt at [facility]    TONSILLECTOMY         Review of patient's allergies indicates:   Allergen Reactions    Iodinated contrast media Other (See Comments)     Raises BP         No current facility-administered medications on file prior to encounter.     Current Outpatient Medications on File Prior to Encounter   Medication Sig    aspirin 325 MG tablet Take 325 mg by mouth once daily.    bumetanide (BUMEX) 1 MG tablet TAKE 1 TABLET (1 MG TOTAL) BY MOUTH 2 (TWO) TIMES DAILY. (Patient taking differently: No sig reported)    buPROPion (WELLBUTRIN XL) 300 MG 24 hr tablet Take 300 mg by mouth once daily. Pt states he only takes 300mg    cyanocobalamin 500 MCG tablet Take 500 mcg by mouth once daily.    dextroamphetamine-amphetamine (ADDERALL) 20 mg tablet Take 1 tablet by mouth every morning, take 1 tablet by mouth 3-4 hours later, and take 1/2 tablet every afternoon    fluticasone propionate (FLONASE) 50 mcg/actuation nasal spray 1 spray (50 mcg total) by Each Nostril route once daily. (Patient taking differently: 1 spray by Each Nostril route  daily as needed.)    gabapentin (NEURONTIN) 300 MG capsule Take 1 capsule (300 mg total) by mouth 3 (three) times daily.    IMPOYZ 0.025 % Crea Apply topically as needed.     levocetirizine (XYZAL) 5 MG tablet TAKE 1 TABLET (5 MG TOTAL) BY MOUTH EVERY EVENING. (Patient taking differently: Take 5 mg by mouth daily as needed for Allergies.)    LUZU 1 % Crea Apply topically as needed.     metoprolol tartrate (LOPRESSOR) 25 MG tablet TAKE ONE BY MOUTH TWICE DAILY    multivitamin (THERAGRAN) per tablet Take 1 tablet by mouth once daily.     NAFTIN 2 % Gel daily as needed.     nitroGLYCERIN (NITROSTAT) 0.4 MG SL tablet Please dispense 25 pills in 4 bottles.    omeprazole (PRILOSEC) 10 MG capsule Take 2 capsules (20 mg total) by mouth once daily.    sotaloL (BETAPACE) 80 MG tablet TAKE ONE BY MOUTH TWICE DAILY    tamsulosin (FLOMAX) 0.4 mg Cap TAKE ONE BY MOUTH TWICE DAILY    thiamine 250 MG tablet Take 250 mg by mouth once daily.    vitamin D (VITAMIN D3) 1000 units Tab Take 1,000 Units by mouth once daily.    vortioxetine (TRINTELLIX) 20 mg Tab Take 20 mg by mouth once daily.     XARELTO 20 mg Tab TAKE ONE DAILY    ARIPiprazole (ABILIFY) 10 MG Tab Take 10 mg by mouth once daily.    [DISCONTINUED] ARIPiprazole (ABILIFY) 15 MG Tab 10 mg once daily. Take 1/2 tab once a day    [DISCONTINUED] aspirin 81 MG Chew Take 1 tablet (81 mg total) by mouth once daily.     Family History       Problem Relation (Age of Onset)    Cancer Mother    Cirrhosis Brother    Diabetes Brother    Heart disease Father, Brother    Hypertension Sister          Tobacco Use    Smoking status: Never Smoker    Smokeless tobacco: Never Used   Substance and Sexual Activity    Alcohol use: No     Comment: rare    Drug use: No    Sexual activity: Yes     Partners: Female     Review of Systems   Constitutional:  Positive for fatigue. Negative for chills and fever.   HENT:  Negative for sore throat and trouble swallowing.    Eyes:   Negative for photophobia and visual disturbance.   Respiratory:  Negative for cough, shortness of breath and wheezing.    Cardiovascular:  Negative for chest pain, palpitations and leg swelling.   Gastrointestinal:  Negative for abdominal distention, abdominal pain, diarrhea, nausea and vomiting.        Early satiety. Melena.    Genitourinary:  Negative for dysuria and hematuria.   Musculoskeletal:  Negative for neck pain and neck stiffness.   Skin:  Negative for rash and wound.   Neurological:  Positive for weakness (generalized) and light-headedness. Negative for seizures, syncope and headaches.   Psychiatric/Behavioral:  Negative for confusion and decreased concentration.    Objective:     Vital Signs (Most Recent):  Temp: 98.5 °F (36.9 °C) (05/06/22 1705)  Pulse: 61 (05/06/22 1705)  Resp: 12 (05/06/22 1705)  BP: 117/61 (05/06/22 1705)  SpO2: 100 % (05/06/22 1705) Vital Signs (24h Range):  Temp:  [97.6 °F (36.4 °C)-98.5 °F (36.9 °C)] 98.5 °F (36.9 °C)  Pulse:  [61-69] 61  Resp:  [12-18] 12  SpO2:  [100 %] 100 %  BP: (107-135)/(51-68) 117/61     Weight: 136.1 kg (300 lb)  Body mass index is 45.61 kg/m².    Physical Exam  Constitutional:       General: He is not in acute distress.     Appearance: He is not toxic-appearing or diaphoretic.   HENT:      Head: Normocephalic and atraumatic.      Nose: Nose normal.   Eyes:      General: No scleral icterus.     Extraocular Movements: Extraocular movements intact.      Pupils: Pupils are equal, round, and reactive to light.   Cardiovascular:      Rate and Rhythm: Regular rhythm. Tachycardia present.   Pulmonary:      Effort: Pulmonary effort is normal. No respiratory distress.      Breath sounds: No wheezing or rales.   Abdominal:      General: Abdomen is flat. There is no distension.      Palpations: Abdomen is soft.      Tenderness: There is no abdominal tenderness. There is no guarding.   Musculoskeletal:         General: Normal range of motion.      Cervical back:  Normal range of motion and neck supple. No rigidity.      Right lower leg: No edema.      Left lower leg: No edema.   Skin:     General: Skin is warm and dry.      Coloration: Skin is pale. Skin is not jaundiced.   Neurological:      General: No focal deficit present.      Mental Status: He is alert and oriented to person, place, and time.      Cranial Nerves: No cranial nerve deficit.   Psychiatric:         Mood and Affect: Mood normal.         Behavior: Behavior normal.         CRANIAL NERVES     CN III, IV, VI   Pupils are equal, round, and reactive to light.     Significant Labs: All pertinent labs within the past 24 hours have been reviewed.  CBC:   Recent Labs   Lab 05/06/22  1529   WBC 7.05   HGB 10.0*   HCT 31.0*        CMP:   Recent Labs   Lab 05/06/22  1529      K 3.6      CO2 30*   *   BUN 28*   CREATININE 1.0   CALCIUM 9.0   PROT 5.9*   ALBUMIN 3.4*   BILITOT 0.7   ALKPHOS 87   AST 13   ALT 8*   ANIONGAP 6*   EGFRNONAA >60.0       Significant Imaging: I have reviewed all pertinent imaging results/findings within the past 24 hours.    Assessment/Plan:     * GIB (gastrointestinal bleeding)  - IP GIB Pathway initiated  - Hx of gastric bypass  - Hb 10.5 on presentation  ;  Baseline 14  - start protonix 80mg iv once followed by 40mg iv BID  - npo midnight  - serial CBC  - transfuse Hb<7  - holding home ASA and Xarelto  - monitor tele  - GI consulted  ;  Appreciate recs  - further management pending clinical course and future study review    Lymphedema of both lower extremities  - holding home bumex while monitoring hemodynamics ; resume as appropriate      Atrial flutter  - holding home xarelto and metoprolol  - continue home sotalol  - monitor tele      Depression  - continue home wellbutrin        Essential hypertension  - holding home metoprolol while monitoring hemodynamics        VTE Risk Mitigation (From admission, onward)         Ordered     IP VTE HIGH RISK PATIENT  Once          05/06/22 1748     Place sequential compression device  Until discontinued         05/06/22 1748                   Caden Garland MD  Department of Hospital Medicine   [facility] - Emergency Dept

## 2022-05-07 NOTE — ASSESSMENT & PLAN NOTE
- IP GIB Pathway initiated  - Hx of gastric bypass  - Hb 10.5 on presentation  ;  Baseline 14  - start protonix 80mg iv once followed by 40mg iv BID  - npo midnight  - serial CBC  - transfuse Hb<7  - holding home ASA and Xarelto  - monitor tele  - GI consulted  ;  Appreciate recs  - further management pending clinical course and future study review

## 2022-05-07 NOTE — SUBJECTIVE & OBJECTIVE
Interval History: Patient seen at bedside. Reports two episodes of melena with mild abdominal discomfort. Plan for EGD Monday. Hgb 8.2, continue to monitor.     Review of Systems   Constitutional:  Negative for chills and fever.   HENT:  Positive for rhinorrhea. Negative for congestion.    Respiratory:  Negative for cough and shortness of breath.    Cardiovascular:  Positive for leg swelling (chronic). Negative for chest pain.   Gastrointestinal:  Positive for abdominal pain (discomfort) and blood in stool.   Genitourinary:  Negative for difficulty urinating, dysuria and hematuria.   Musculoskeletal:  Negative for arthralgias and back pain.   Skin:  Negative for color change and pallor.   Neurological:  Negative for weakness and numbness.   Psychiatric/Behavioral:  Negative for agitation and behavioral problems.    Objective:     Vital Signs (Most Recent):  Temp: 98.1 °F (36.7 °C) (05/07/22 0818)  Pulse: 65 (05/07/22 0818)  Resp: 17 (05/07/22 0818)  BP: (!) 104/52 (05/07/22 0818)  SpO2: (!) 94 % (05/07/22 0818)   Vital Signs (24h Range):  Temp:  [96.6 °F (35.9 °C)-98.5 °F (36.9 °C)] 98.1 °F (36.7 °C)  Pulse:  [57-69] 65  Resp:  [12-20] 17  SpO2:  [94 %-100 %] 94 %  BP: (104-135)/(51-68) 104/52     Weight: 136.1 kg (300 lb)  Body mass index is 45.61 kg/m².    Intake/Output Summary (Last 24 hours) at 5/7/2022 1120  Last data filed at 5/7/2022 0500  Gross per 24 hour   Intake --   Output 300 ml   Net -300 ml      Physical Exam  Vitals and nursing note reviewed.   Constitutional:       General: He is not in acute distress.     Appearance: Normal appearance. He is obese. He is not ill-appearing.   HENT:      Head: Normocephalic and atraumatic.      Mouth/Throat:      Mouth: Mucous membranes are moist.      Pharynx: Oropharynx is clear.   Eyes:      Extraocular Movements: Extraocular movements intact.      Pupils: Pupils are equal, round, and reactive to light.   Cardiovascular:      Rate and Rhythm: Normal rate. Rhythm  irregular.      Pulses: Normal pulses.      Heart sounds: Normal heart sounds.   Pulmonary:      Effort: Pulmonary effort is normal.      Breath sounds: Normal breath sounds. No wheezing or rales.   Abdominal:      General: Abdomen is flat. Bowel sounds are normal. There is no distension.      Palpations: Abdomen is soft.      Tenderness: There is no abdominal tenderness. There is no guarding or rebound.   Musculoskeletal:         General: Normal range of motion.      Right lower leg: Edema (2+, chronic) present.      Left lower leg: Edema (2+ chronic) present.   Skin:     General: Skin is warm and dry.      Capillary Refill: Capillary refill takes less than 2 seconds.   Neurological:      General: No focal deficit present.      Mental Status: He is alert and oriented to person, place, and time.   Psychiatric:         Mood and Affect: Mood normal.         Behavior: Behavior normal.       Significant Labs: All pertinent labs within the past 24 hours have been reviewed.  CBC:   Recent Labs   Lab 05/06/22  1807 05/07/22  0323 05/07/22  0904   WBC 6.23 6.15 5.75   HGB 9.2* 8.5* 8.2*   HCT 28.5* 27.0* 25.4*    181 178     CMP:   Recent Labs   Lab 05/06/22  1529 05/07/22  0323    141   K 3.6 3.4*    107   CO2 30* 26   * 112*   BUN 28* 27*   CREATININE 1.0 0.9   CALCIUM 9.0 8.7   PROT 5.9*  --    ALBUMIN 3.4*  --    BILITOT 0.7  --    ALKPHOS 87  --    AST 13  --    ALT 8*  --    ANIONGAP 6* 8   EGFRNONAA >60.0 >60.0     Magnesium: No results for input(s): MG in the last 48 hours.  POCT Glucose: No results for input(s): POCTGLUCOSE in the last 48 hours.    Significant Imaging: I have reviewed all pertinent imaging results/findings within the past 24 hours.

## 2022-05-07 NOTE — H&P (VIEW-ONLY)
[facility]  Gastroenterology  Consult Note    Patient Name: Shiraz Jha  MRN: [id number]  Admission Date: 5/6/2022  Hospital Length of Stay: 0 days  Code Status: Full Code   Attending Provider: Vanessa Ham MD   Consulting Provider: Aram Lagunas MD  Primary Care Physician: GUSTAVO Theodore MD  Principal Problem:GIB (gastrointestinal bleeding)    Inpatient consult to Gastroenterology  Consult performed by: Aram Lagunas MD  Consult ordered by: Caden Garland MD        Subjective:     HPI:   Mr Jha is a 63yo PMHx afib s/p ablation on xarelto, HTN, T2DM, gastric bypass (2010) presented to [facility] ED on 05/06 with melena.    Reports melenic stools 3-4x/ day since 05/03. No hematemesis or coffee ground emesis. No abdominal pain. No NSAID use. Does use  daily for last 5 years--unclear indication. No nausea vomiting.    Reports negative colonoscopy a few years back. EGD not done recently--just prior to gastric bypass.     VS since arrival notable for AF, HR wnl, normotensive. CBC w/o leukocytosis, plts wnl. Hgb 10-->9.2-->8.5 from baseline 14.9 (02/15/22). BMP w/ BUN/ Cr 27/ 0.9. LFTs unremarkable. No abd imaging. Receiving IV PPI BID.      Past Medical History:   Diagnosis Date    Allergy     Asthma     Atrial fibrillation     Cataract     Chronic rhinitis 9/26/2013    DDD (degenerative disc disease) 4/3/2015    Depression     Diabetes mellitus     Fibromyalgia     Gastroesophageal reflux disease without esophagitis 7/14/2017    Hypertension     Sinusitis, acute, maxillary 12/20/2012    Thyroid disease        Past Surgical History:   Procedure Laterality Date    CERVICAL SPINE SURGERY      EPIDURAL STEROID INJECTION INTO LUMBAR SPINE N/A 5/29/2018    Procedure: INJECTION-STEROID-EPIDURAL-LUMBAR- L4-5;  Surgeon: Roman Lyman MD;  Location: [facility] PAIN T;  Service: Pain Management;  Laterality: N/A;  Patient is diabetic and Xarleto     EPIDURAL STEROID INJECTION INTO  LUMBAR SPINE N/A 7/3/2018    Procedure: INJECTION, STEROID, SPINE, LUMBAR, EPIDURAL- L4-5;  Surgeon: Roman Lyman MD;  Location: [facility];  Service: Pain Management;  Laterality: N/A;  Patient takes Xarelto and ASA     EXCISION OF LESION OF LIP N/A 7/7/2020    Procedure: EXCISION, LESION, LIP;  Surgeon: Caden Navarro MD;  Location: [facility] OR [address] [place] [address];  Service: ENT;  Laterality: N/A;    GASTRIC BYPASS      SINUS SURGERY  2000    [facility] at [facility]    TONSILLECTOMY         Family History   Problem Relation Age of Onset    Heart disease Father     Cancer Mother         lung    Hypertension Sister     Heart disease Brother     Cirrhosis Brother     Diabetes Brother        Social History     Socioeconomic History    Marital status: Single   Tobacco Use    Smoking status: Never Smoker    Smokeless tobacco: Never Used   Substance and Sexual Activity    Alcohol use: No     Comment: rare    Drug use: No    Sexual activity: Yes     Partners: Female   Social History Narrative    From [place], lives alone w/2 dogs.    Dogs are his kids.    Works PT --  at [employer], on ssdi from neck and back/depression.     Social Determinants of Health     Financial Resource Strain: High Risk    Difficulty of Paying Living Expenses: Very hard   Food Insecurity: Food Insecurity Present    Worried About Running Out of Food in the Last Year: Sometimes true    Ran Out of Food in the Last Year: Sometimes true   Transportation Needs: Unmet Transportation Needs    Lack of Transportation (Medical): Yes    Lack of Transportation (Non-Medical): Yes   Physical Activity: Sufficiently Active    Days of Exercise per Week: 3 days    Minutes of Exercise per Session: 60 min   Stress: Stress Concern Present    Feeling of Stress : To some extent   Social Connections: Unknown    Frequency of Communication with Friends and Family: More than three times a week    Frequency of Social Gatherings with Friends and Family: Once a  week    Active Member of Clubs or Organizations: Yes    Attends Club or Organization Meetings: More than 4 times per year    Marital Status: Never    Housing Stability: High Risk    Unable to Pay for Housing in the Last Year: Yes    Number of Places Lived in the Last Year: 1    Unstable Housing in the Last Year: No       No current facility-administered medications on file prior to encounter.     Current Outpatient Medications on File Prior to Encounter   Medication Sig Dispense Refill    aspirin 325 MG tablet Take 325 mg by mouth once daily.      bumetanide (BUMEX) 1 MG tablet TAKE 1 TABLET (1 MG TOTAL) BY MOUTH 2 (TWO) TIMES DAILY. (Patient taking differently: No sig reported) 180 tablet 3    buPROPion (WELLBUTRIN XL) 300 MG 24 hr tablet Take 300 mg by mouth once daily. Pt states he only takes 300mg      cyanocobalamin 500 MCG tablet Take 500 mcg by mouth once daily.      dextroamphetamine-amphetamine (ADDERALL) 20 mg tablet Take 1 tablet by mouth every morning, take 1 tablet by mouth 3-4 hours later, and take 1/2 tablet every afternoon      fluticasone propionate (FLONASE) 50 mcg/actuation nasal spray 1 spray (50 mcg total) by Each Nostril route once daily. (Patient taking differently: 1 spray by Each Nostril route daily as needed.) 16 g 0    gabapentin (NEURONTIN) 300 MG capsule Take 1 capsule (300 mg total) by mouth 3 (three) times daily. 270 capsule 3    IMPOYZ 0.025 % Crea Apply topically as needed.       levocetirizine (XYZAL) 5 MG tablet TAKE 1 TABLET (5 MG TOTAL) BY MOUTH EVERY EVENING. (Patient taking differently: Take 5 mg by mouth daily as needed for Allergies.) 30 tablet 11    LUZU 1 % Crea Apply topically as needed.       metoprolol tartrate (LOPRESSOR) 25 MG tablet TAKE ONE BY MOUTH TWICE DAILY 180 tablet 3    multivitamin (THERAGRAN) per tablet Take 1 tablet by mouth once daily.       NAFTIN 2 % Gel daily as needed.       nitroGLYCERIN (NITROSTAT) 0.4 MG SL tablet Please  dispense 25 pills in 4 bottles. 100 tablet 0    omeprazole (PRILOSEC) 10 MG capsule Take 2 capsules (20 mg total) by mouth once daily. 30 capsule 6    sotaloL (BETAPACE) 80 MG tablet TAKE ONE BY MOUTH TWICE DAILY 180 tablet 3    tamsulosin (FLOMAX) 0.4 mg Cap TAKE ONE BY MOUTH TWICE DAILY 180 capsule 3    thiamine 250 MG tablet Take 250 mg by mouth once daily.      vitamin D (VITAMIN D3) 1000 units Tab Take 1,000 Units by mouth once daily.      vortioxetine (TRINTELLIX) 20 mg Tab Take 20 mg by mouth once daily.       XARELTO 20 mg Tab TAKE ONE DAILY 30 tablet 11    ARIPiprazole (ABILIFY) 10 MG Tab Take 10 mg by mouth once daily.         Review of patient's allergies indicates:   Allergen Reactions    Iodinated contrast media Other (See Comments)     Raises BP         Review of Systems   Constitutional: Negative.    HENT: Negative.    Eyes: Negative.    Respiratory: Negative.    Cardiovascular: Negative.    Gastrointestinal: Positive for melena. Negative for abdominal pain, blood in stool, constipation, diarrhea, heartburn, nausea and vomiting.   Genitourinary: Negative.    Musculoskeletal: Negative.    Skin: Negative.    Endo/Heme/Allergies: Negative.    Psychiatric/Behavioral: Negative.         Objective:     Vitals:    05/07/22 0818   BP: (!) 104/52   Pulse: 65   Resp: 17   Temp: 98.1 °F (36.7 °C)       Constitutional:  not in acute distress and well developed  HENT: Head: Normal, normocephalic, atraumatic.  Eyes: conjunctiva clear and sclera nonicteric  Cardiovascular: regular rate and rhythm  Respiratory: normal chest expansion & respiratory effort   and no accessory muscle use  GI: soft, non-tender, without masses or organomegaly  Musculoskeletal: no muscular tenderness noted  Skin: normal color  Neurological: alert, oriented x3  Psychiatric: mood and affect are within normal limits, pt is a good historian; no memory problems were noted    Significant Labs:  Recent Labs   Lab 05/06/22  1529  05/06/22  1807 05/07/22  0323   HGB 10.0* 9.2* 8.5*       Lab Results   Component Value Date    WBC 6.15 05/07/2022    HGB 8.5 (L) 05/07/2022    HCT 27.0 (L) 05/07/2022    MCV 98 05/07/2022     05/07/2022       Lab Results   Component Value Date     05/07/2022    K 3.4 (L) 05/07/2022     05/07/2022    CO2 26 05/07/2022    BUN 27 (H) 05/07/2022    CREATININE 0.9 05/07/2022    CALCIUM 8.7 05/07/2022    ANIONGAP 8 05/07/2022    ESTGFRAFRICA >60.0 05/07/2022    EGFRNONAA >60.0 05/07/2022       Lab Results   Component Value Date    ALT 8 (L) 05/06/2022    AST 13 05/06/2022    ALKPHOS 87 05/06/2022    BILITOT 0.7 05/06/2022       Lab Results   Component Value Date    INR 1.1 05/06/2022    INR 1.0 07/14/2017    INR 1.1 07/15/2014       Significant Imaging:  Reviewed pertinent radiology findings.       Assessment/Plan:     61yo PMHx afib s/p ablation on xarelto, HTN, T2DM, gastric bypass (2010) presented to [facility] ED on 05/06 with melena.    Suspect ulcer at anastamosis site      Problem List:  1. Melena/ anemia    Recommendations:  - Plan for EGD Monday 05/09  - Transfuse for Hgb <7, unless otherwise indicated  - Maintain IV access with 2 large bore Ivs  - Intravascular resuscitation/support with IVFs   - Hold all NSAIDs and anticoagulants, unless contraindicated  - Bolus IV pantoprazole 80mg followed by 40mg BID  - Please correct any coagulopathy with platelets and FFP for goal of platelets >50K and INR <2.0  - Please notify GI team if there is significant change in patient's clinical status      Thank you for involving us in the care of Shiraz Jha. Please call with any additional questions, concerns or changes in the patient's clinical status. We will continue to follow.     Aram Lagunas MD  Gastroenterology Fellow PGY V  [facility]

## 2022-05-07 NOTE — ASSESSMENT & PLAN NOTE
- IP GIB Pathway initiated  - Hx of gastric bypass  - Hb 10.5>9>8.2 on presentation  ;  Baseline 14  - start protonix 80mg iv once followed by 40mg iv BID  - GI consulted, plan for EGD Monday  - serial CBC  - transfuse Hb<7  - holding home ASA and Xarelto  - monitor tele  - cardiac diet, NPO midnight 5/9

## 2022-05-08 PROBLEM — R73.03 PREDIABETES: Status: RESOLVED | Noted: 2022-02-18 | Resolved: 2022-05-08

## 2022-05-08 PROBLEM — E87.6 HYPOKALEMIA: Status: ACTIVE | Noted: 2022-05-08

## 2022-05-08 PROBLEM — K92.1 GASTROINTESTINAL HEMORRHAGE WITH MELENA: Status: ACTIVE | Noted: 2022-05-06

## 2022-05-08 LAB
ANION GAP SERPL CALC-SCNC: 8 MMOL/L (ref 8–16)
BASOPHILS # BLD AUTO: 0.03 K/UL (ref 0–0.2)
BASOPHILS NFR BLD: 0.4 % (ref 0–1.9)
BUN SERPL-MCNC: 18 MG/DL (ref 6–20)
CALCIUM SERPL-MCNC: 9 MG/DL (ref 8.7–10.5)
CHLORIDE SERPL-SCNC: 103 MMOL/L (ref 95–110)
CO2 SERPL-SCNC: 27 MMOL/L (ref 23–29)
CREAT SERPL-MCNC: 0.9 MG/DL (ref 0.5–1.4)
DIFFERENTIAL METHOD: ABNORMAL
EOSINOPHIL # BLD AUTO: 0.1 K/UL (ref 0–0.5)
EOSINOPHIL NFR BLD: 1.4 % (ref 0–8)
ERYTHROCYTE [DISTWIDTH] IN BLOOD BY AUTOMATED COUNT: 14.6 % (ref 11.5–14.5)
EST. GFR  (AFRICAN AMERICAN): >60 ML/MIN/1.73 M^2
EST. GFR  (NON AFRICAN AMERICAN): >60 ML/MIN/1.73 M^2
GLUCOSE SERPL-MCNC: 120 MG/DL (ref 70–110)
HCT VFR BLD AUTO: 28.5 % (ref 40–54)
HGB BLD-MCNC: 9.2 G/DL (ref 14–18)
IMM GRANULOCYTES # BLD AUTO: 0.03 K/UL (ref 0–0.04)
IMM GRANULOCYTES NFR BLD AUTO: 0.4 % (ref 0–0.5)
LYMPHOCYTES # BLD AUTO: 1.4 K/UL (ref 1–4.8)
LYMPHOCYTES NFR BLD: 16.9 % (ref 18–48)
MCH RBC QN AUTO: 30.9 PG (ref 27–31)
MCHC RBC AUTO-ENTMCNC: 32.3 G/DL (ref 32–36)
MCV RBC AUTO: 96 FL (ref 82–98)
MONOCYTES # BLD AUTO: 0.4 K/UL (ref 0.3–1)
MONOCYTES NFR BLD: 4.1 % (ref 4–15)
NEUTROPHILS # BLD AUTO: 6.5 K/UL (ref 1.8–7.7)
NEUTROPHILS NFR BLD: 76.8 % (ref 38–73)
NRBC BLD-RTO: 0 /100 WBC
PLATELET # BLD AUTO: 193 K/UL (ref 150–450)
PMV BLD AUTO: 11 FL (ref 9.2–12.9)
POTASSIUM SERPL-SCNC: 3.2 MMOL/L (ref 3.5–5.1)
RBC # BLD AUTO: 2.98 M/UL (ref 4.6–6.2)
SODIUM SERPL-SCNC: 138 MMOL/L (ref 136–145)
WBC # BLD AUTO: 8.45 K/UL (ref 3.9–12.7)

## 2022-05-08 PROCEDURE — 25000003 PHARM REV CODE 250: Performed by: PHYSICIAN ASSISTANT

## 2022-05-08 PROCEDURE — 99226 PR SUBSEQUENT OBSERVATION CARE,LEVEL III: ICD-10-PCS | Mod: ,,, | Performed by: PHYSICIAN ASSISTANT

## 2022-05-08 PROCEDURE — 80048 BASIC METABOLIC PNL TOTAL CA: CPT | Performed by: FAMILY MEDICINE

## 2022-05-08 PROCEDURE — 63600175 PHARM REV CODE 636 W HCPCS: Performed by: FAMILY MEDICINE

## 2022-05-08 PROCEDURE — 85025 COMPLETE CBC W/AUTO DIFF WBC: CPT | Performed by: FAMILY MEDICINE

## 2022-05-08 PROCEDURE — C9113 INJ PANTOPRAZOLE SODIUM, VIA: HCPCS | Performed by: FAMILY MEDICINE

## 2022-05-08 PROCEDURE — 36415 COLL VENOUS BLD VENIPUNCTURE: CPT | Performed by: FAMILY MEDICINE

## 2022-05-08 PROCEDURE — 96376 TX/PRO/DX INJ SAME DRUG ADON: CPT

## 2022-05-08 PROCEDURE — 25000003 PHARM REV CODE 250: Performed by: FAMILY MEDICINE

## 2022-05-08 PROCEDURE — 99226 PR SUBSEQUENT OBSERVATION CARE,LEVEL III: CPT | Mod: ,,, | Performed by: PHYSICIAN ASSISTANT

## 2022-05-08 PROCEDURE — G0378 HOSPITAL OBSERVATION PER HR: HCPCS

## 2022-05-08 RX ORDER — POTASSIUM CHLORIDE 20 MEQ/1
40 TABLET, EXTENDED RELEASE ORAL ONCE
Status: COMPLETED | OUTPATIENT
Start: 2022-05-08 | End: 2022-05-08

## 2022-05-08 RX ADMIN — TAMSULOSIN HYDROCHLORIDE 0.4 MG: 0.4 CAPSULE ORAL at 09:05

## 2022-05-08 RX ADMIN — POTASSIUM CHLORIDE 40 MEQ: 1500 TABLET, EXTENDED RELEASE ORAL at 09:05

## 2022-05-08 RX ADMIN — PANTOPRAZOLE SODIUM 40 MG: 40 INJECTION, POWDER, FOR SOLUTION INTRAVENOUS at 09:05

## 2022-05-08 RX ADMIN — PANTOPRAZOLE SODIUM 40 MG: 40 INJECTION, POWDER, FOR SOLUTION INTRAVENOUS at 08:05

## 2022-05-08 RX ADMIN — SOTALOL HYDROCHLORIDE 80 MG: 80 TABLET ORAL at 09:05

## 2022-05-08 RX ADMIN — TAMSULOSIN HYDROCHLORIDE 0.4 MG: 0.4 CAPSULE ORAL at 08:05

## 2022-05-08 RX ADMIN — GABAPENTIN 300 MG: 300 CAPSULE ORAL at 09:05

## 2022-05-08 RX ADMIN — GABAPENTIN 300 MG: 300 CAPSULE ORAL at 03:05

## 2022-05-08 RX ADMIN — BUPROPION HYDROCHLORIDE 300 MG: 300 TABLET, FILM COATED, EXTENDED RELEASE ORAL at 09:05

## 2022-05-08 RX ADMIN — SOTALOL HYDROCHLORIDE 80 MG: 80 TABLET ORAL at 08:05

## 2022-05-08 RX ADMIN — GABAPENTIN 300 MG: 300 CAPSULE ORAL at 08:05

## 2022-05-08 NOTE — PLAN OF CARE
Pt AAOx4. VSS, afebrile, on room air. Pt w/o complaints of pain. Pt on cardiac diet. 0 BM/shift. Unable to obtain accurate UOP d/t pt voiding in toilet. K on AM labs 3.2, PO replacements administered. Pt able to reposition self in bed, ambulating w/ stand-by assistance. Non-skid socks on pt when ambulating. Bed in lowest position, wheels locked, call light within pt reach. Plans for EGD tomorrow, pt to be NPO @ 0000. Pt updated on plan of care.

## 2022-05-08 NOTE — PLAN OF CARE
POC reviewed with patient. All questions and concerns reviewed. No acute changes noted this shift. Pt VSS and pt denies any further needs. Pt resting in bed in NAD at this time. Bed locked in lowest position. Call bell within reach.      Problem: Adult Inpatient Plan of Care  Goal: Plan of Care Review  Outcome: Ongoing, Progressing  Goal: Patient-Specific Goal (Individualized)  Outcome: Ongoing, Progressing  Goal: Absence of Hospital-Acquired Illness or Injury  Outcome: Ongoing, Progressing  Goal: Optimal Comfort and Wellbeing  Outcome: Ongoing, Progressing  Goal: Readiness for Transition of Care  Outcome: Ongoing, Progressing     Problem: Bariatric Environmental Safety  Goal: Safety Maintained with Care  Outcome: Ongoing, Progressing     Problem: Adjustment to Illness (Gastrointestinal Bleeding)  Goal: Optimal Coping with Acute Illness  Outcome: Ongoing, Progressing     Problem: Bleeding (Gastrointestinal Bleeding)  Goal: Hemostasis  Outcome: Ongoing, Progressing     Problem: Diabetes Comorbidity  Goal: Blood Glucose Level Within Targeted Range  Outcome: Ongoing, Progressing

## 2022-05-08 NOTE — SUBJECTIVE & OBJECTIVE
Interval History: Patient reports two episodes of melena overnight. Hgb stable. Continue protonix IV BID. NPO midnight for EGD in am.     Review of Systems   Constitutional:  Negative for chills and fever.   HENT:  Negative for congestion and rhinorrhea.    Respiratory:  Negative for cough and shortness of breath.    Cardiovascular:  Positive for leg swelling (chronic). Negative for chest pain.   Gastrointestinal:  Positive for blood in stool. Negative for abdominal distention and abdominal pain.   Genitourinary:  Negative for difficulty urinating, dysuria and hematuria.   Musculoskeletal:  Negative for arthralgias and back pain.   Skin:  Negative for color change and pallor.   Neurological:  Negative for weakness and numbness.   Psychiatric/Behavioral:  Negative for agitation and behavioral problems.    Objective:     Vital Signs (Most Recent):  Temp: 98.1 °F (36.7 °C) (05/08/22 1135)  Pulse: 65 (05/08/22 1135)  Resp: 16 (05/08/22 1135)  BP: (!) 106/57 (05/08/22 1135)  SpO2: 98 % (05/08/22 1135)   Vital Signs (24h Range):  Temp:  [97.8 °F (36.6 °C)-98.1 °F (36.7 °C)] 98.1 °F (36.7 °C)  Pulse:  [65-75] 65  Resp:  [16-20] 16  SpO2:  [94 %-98 %] 98 %  BP: (104-130)/(56-65) 106/57     Weight: 136.1 kg (300 lb)  Body mass index is 45.61 kg/m².  No intake or output data in the 24 hours ending 05/08/22 1213   Physical Exam  Vitals and nursing note reviewed.   Constitutional:       General: He is not in acute distress.     Appearance: Normal appearance. He is obese. He is not ill-appearing.   HENT:      Head: Normocephalic and atraumatic.      Mouth/Throat:      Mouth: Mucous membranes are moist.      Pharynx: Oropharynx is clear.   Eyes:      Extraocular Movements: Extraocular movements intact.      Pupils: Pupils are equal, round, and reactive to light.   Cardiovascular:      Rate and Rhythm: Normal rate. Rhythm irregular.      Pulses: Normal pulses.      Heart sounds: Normal heart sounds.   Pulmonary:      Effort:  Pulmonary effort is normal.      Breath sounds: Normal breath sounds. No wheezing or rales.   Abdominal:      General: Abdomen is flat. Bowel sounds are normal. There is no distension.      Palpations: Abdomen is soft.      Tenderness: There is no abdominal tenderness. There is no guarding or rebound.   Musculoskeletal:         General: Normal range of motion.      Right lower leg: Edema (2+, chronic) present.      Left lower leg: Edema (2+ chronic) present.   Skin:     General: Skin is warm and dry.      Capillary Refill: Capillary refill takes less than 2 seconds.   Neurological:      General: No focal deficit present.      Mental Status: He is alert and oriented to person, place, and time.   Psychiatric:         Mood and Affect: Mood normal.         Behavior: Behavior normal.       Significant Labs: All pertinent labs within the past 24 hours have been reviewed.  CBC:   Recent Labs   Lab 05/07/22  0904 05/07/22  1310 05/08/22  0323   WBC 5.75 5.43 8.45   HGB 8.2* 8.6* 9.2*   HCT 25.4* 26.1* 28.5*    181 193     CMP:   Recent Labs   Lab 05/06/22  1529 05/07/22  0323 05/08/22  0322    141 138   K 3.6 3.4* 3.2*    107 103   CO2 30* 26 27   * 112* 120*   BUN 28* 27* 18   CREATININE 1.0 0.9 0.9   CALCIUM 9.0 8.7 9.0   PROT 5.9*  --   --    ALBUMIN 3.4*  --   --    BILITOT 0.7  --   --    ALKPHOS 87  --   --    AST 13  --   --    ALT 8*  --   --    ANIONGAP 6* 8 8   EGFRNONAA >60.0 >60.0 >60.0       Significant Imaging: I have reviewed all pertinent imaging results/findings within the past 24 hours.

## 2022-05-08 NOTE — PLAN OF CARE
Pt aaox4, no c/o discomfort or pain. Reviewed POC and fall precautions w/ pt, verbalized understanding. Needs addressed. No events overnight.    Problem: Adult Inpatient Plan of Care  Goal: Optimal Comfort and Wellbeing  Outcome: Ongoing, Progressing     Problem: Adjustment to Illness (Gastrointestinal Bleeding)  Goal: Optimal Coping with Acute Illness  Outcome: Ongoing, Progressing

## 2022-05-08 NOTE — PLAN OF CARE
[facility] - Telemetry Stepdown ([place])  Initial Discharge Assessment       Primary Care Provider: GUSTAVO Theodore MD    Admission Diagnosis: Melena [K92.1]  Chronic anticoagulation [Z79.01]  Anemia, unspecified type [D64.9]    Admission Date: 5/6/2022  Expected Discharge Date: 5/9/2022    Discharge Barriers Identified: (P) None    Payor: BLUE CROSS BLUE SHIELD / Plan: BCBS ALL OUT OF STATE / Product Type: PPO /     Extended Emergency Contact Information  Primary Emergency Contact: Lisandra Snyder  Address: [place] [address] [facility]  Home Phone: [phone]  Work Phone: [phone]  Mobile Phone: [phone]  Relation: Sister    Discharge Plan A: (P) Home Health, Home with family (no hx, no preference)  Discharge Plan B: (P) Home      [facility] - [place] - [address]  [address] [place] [address]  Phone: [phone] Fax: [phone]      Initial Assessment (most recent)       Adult Discharge Assessment - 05/08/22 1644          Discharge Assessment    Assessment Type Discharge Planning Assessment (P)      Confirmed/corrected address, phone number and insurance Yes (P)      Confirmed Demographics Correct on Facesheet (P)      Source of Information patient (P)    completed with pt at BS    Does patient/caregiver understand observation status Yes (P)      Communicated BERTHA with patient/caregiver Yes (P)      Reason For Admission Melena (P)      Lives With alone (P)      Facility Arrived From: home (P)      Do you expect to return to your current living situation? Yes (P)      Do you have help at home or someone to help you manage your care at home? Yes (P)      Who are your caregiver(s) and their phone number(s)? Sam Snyder  (P)      Prior to hospitilization cognitive status: Alert/Oriented (P)      Current cognitive status: Alert/Oriented (P)      Walking or Climbing Stairs Difficulty other (see comments) (P)    a  little unsteady ambulating    Dressing/Bathing Difficulty none (P)      Home Accessibility wheelchair accessible (P)      Home Layout -- (P)    Lives on 2nd floor    Equipment Currently Used at Home none (P)      Readmission within 30 days? No (P)      Patient currently being followed by outpatient case management? No (P)      Do you currently have service(s) that help you manage your care at home? No (P)      Do you take prescription medications? Yes (P)      Do you have prescription coverage? Yes (P)      Do you have any problems affording any of your prescribed medications? No (P)      Is the patient taking medications as prescribed? yes (P)      Who is going to help you get home at discharge? Sister (P)      How do you get to doctors appointments? family or friend will provide (P)      Are you on dialysis? No (P)      Do you take coumadin? Yes (P)      Who monitors your labs? PCP monitors labs (P)      Discharge Plan A Home Health;Home with family (P)    no hx, no preference    Discharge Plan B Home (P)      DME Needed Upon Discharge  other (see comments) (P)    TBD    Discharge Plan discussed with: Patient (P)      Discharge Barriers Identified None (P)                    Odilia Coleman LMSW  Case Management Social Worker   [facility] [address]

## 2022-05-08 NOTE — PROGRESS NOTES
[facility] [facility]  Progress Note    Patient Name: Shiraz Jha  MRN: [id number]  Patient Class: OP- Observation   Admission Date: 5/6/2022  Length of Stay: 0 days  Attending Physician: Vanessa Ham MD  Primary Care Provider: GUSTAVO Theodore MD        Subjective:     Principal Problem:GIB (gastrointestinal bleeding)        HPI:  This is a 63yo male with a past medical history of AFib s/p ablation on Xarelto, HTN, T2DM, Gastric Bypass 2010, BLE lymphedema on Bumex presenting to the ED with chief complaint of rectal bleeding. Patient reports 4 days of black colored, loose stools. Estimates 2 episodes per day. Reports vague upper abdominal discomfort for the past few days that he ranks as a 1/10. No fever, nausea, chest pain, cough, urinary symptoms. Reports chronic SOB with exertion which is at its baseline currently. No NSAID use, iron supplementation, pepto-bismol use. Reports having a negative colonoscopy several years ago. Reports multiple family members with history of cancer. Denies personal history of cancer. He is compliant with his Xarelto.   In the ED patient afebrile and hemodynamically stable saturating well on room air. Patient Hb 10.5 (was 14 two months ago). FOBT+. Patient started on iv protonix and admitted to the care of medicine for further evaluation and management of GIB.         Overview/Hospital Course:  Mr. Jha admitted for GIB. Started on IV protonix BID. GI consulted, plan for EGD Monday. Hgb 8.2 (baseline 14).       Interval History: Patient reports two episodes of melena overnight. Hgb stable. Continue protonix IV BID. NPO midnight for EGD in am.     Review of Systems   Constitutional:  Negative for chills and fever.   HENT:  Negative for congestion and rhinorrhea.    Respiratory:  Negative for cough and shortness of breath.    Cardiovascular:  Positive for leg swelling (chronic). Negative for chest pain.   Gastrointestinal:  Positive for  blood in stool. Negative for abdominal distention and abdominal pain.   Genitourinary:  Negative for difficulty urinating, dysuria and hematuria.   Musculoskeletal:  Negative for arthralgias and back pain.   Skin:  Negative for color change and pallor.   Neurological:  Negative for weakness and numbness.   Psychiatric/Behavioral:  Negative for agitation and behavioral problems.    Objective:     Vital Signs (Most Recent):  Temp: 98.1 °F (36.7 °C) (05/08/22 1135)  Pulse: 65 (05/08/22 1135)  Resp: 16 (05/08/22 1135)  BP: (!) 106/57 (05/08/22 1135)  SpO2: 98 % (05/08/22 1135)   Vital Signs (24h Range):  Temp:  [97.8 °F (36.6 °C)-98.1 °F (36.7 °C)] 98.1 °F (36.7 °C)  Pulse:  [65-75] 65  Resp:  [16-20] 16  SpO2:  [94 %-98 %] 98 %  BP: (104-130)/(56-65) 106/57     Weight: 136.1 kg (300 lb)  Body mass index is 45.61 kg/m².  No intake or output data in the 24 hours ending 05/08/22 1213   Physical Exam  Vitals and nursing note reviewed.   Constitutional:       General: He is not in acute distress.     Appearance: Normal appearance. He is obese. He is not ill-appearing.   HENT:      Head: Normocephalic and atraumatic.      Mouth/Throat:      Mouth: Mucous membranes are moist.      Pharynx: Oropharynx is clear.   Eyes:      Extraocular Movements: Extraocular movements intact.      Pupils: Pupils are equal, round, and reactive to light.   Cardiovascular:      Rate and Rhythm: Normal rate. Rhythm irregular.      Pulses: Normal pulses.      Heart sounds: Normal heart sounds.   Pulmonary:      Effort: Pulmonary effort is normal.      Breath sounds: Normal breath sounds. No wheezing or rales.   Abdominal:      General: Abdomen is flat. Bowel sounds are normal. There is no distension.      Palpations: Abdomen is soft.      Tenderness: There is no abdominal tenderness. There is no guarding or rebound.   Musculoskeletal:         General: Normal range of motion.      Right lower leg: Edema (2+, chronic) present.      Left lower leg:  Edema (2+ chronic) present.   Skin:     General: Skin is warm and dry.      Capillary Refill: Capillary refill takes less than 2 seconds.   Neurological:      General: No focal deficit present.      Mental Status: He is alert and oriented to person, place, and time.   Psychiatric:         Mood and Affect: Mood normal.         Behavior: Behavior normal.       Significant Labs: All pertinent labs within the past 24 hours have been reviewed.  CBC:   Recent Labs   Lab 05/07/22  0904 05/07/22  1310 05/08/22  0323   WBC 5.75 5.43 8.45   HGB 8.2* 8.6* 9.2*   HCT 25.4* 26.1* 28.5*    181 193     CMP:   Recent Labs   Lab 05/06/22  1529 05/07/22  0323 05/08/22  0322    141 138   K 3.6 3.4* 3.2*    107 103   CO2 30* 26 27   * 112* 120*   BUN 28* 27* 18   CREATININE 1.0 0.9 0.9   CALCIUM 9.0 8.7 9.0   PROT 5.9*  --   --    ALBUMIN 3.4*  --   --    BILITOT 0.7  --   --    ALKPHOS 87  --   --    AST 13  --   --    ALT 8*  --   --    ANIONGAP 6* 8 8   EGFRNONAA >60.0 >60.0 >60.0       Significant Imaging: I have reviewed all pertinent imaging results/findings within the past 24 hours.      Assessment/Plan:      * GIB (gastrointestinal bleeding)  Anemia  Current use of long term anticoagulation  - IP GIB Pathway initiated  - Hx of gastric bypass  - Hb 10.5>9>8.2>9.2 on presentation  ;  Baseline 14  - start protonix 80mg iv once followed by 40mg iv BID  - GI consulted, plan for EGD Monday  - serial CBC  - transfuse Hb<7  - holding home ASA and Xarelto  - monitor tele  - cardiac diet, NPO midnight 5/9    Lymphedema of both lower extremities  - holding home bumex while monitoring hemodynamics ; resume as appropriate      Type 2 diabetes mellitus without complication, without long-term current use of insulin  Lab Results   Component Value Date    HGBA1C 5.5 04/06/2021     - diet controlled      Atrial flutter  - [id number] is 2  - holding home xarelto and metoprolol  - continue home sotalol  - monitor  tele    Depression  - continue home wellbutrin        Essential hypertension  - holding home metoprolol while monitoring hemodynamics        VTE Risk Mitigation (From admission, onward)         Ordered     IP VTE HIGH RISK PATIENT  Once         05/06/22 1748     Place sequential compression device  Until discontinued         05/06/22 1748                Discharge Planning   BERTHA: 5/9/2022     Code Status: Full Code   Is the patient medically ready for discharge?: No    Reason for patient still in hospital (select all that apply): Patient trending condition, Treatment, Imaging and Consult recommendations                     Kim England PA-C  Department of Hospital Medicine   [facility] - Telemetry Stepdown ([place])

## 2022-05-08 NOTE — ASSESSMENT & PLAN NOTE
Anemia  Current use of long term anticoagulation  - IP GIB Pathway initiated  - Hx of gastric bypass  - Hb 10.5>9>8.2>9.2 on presentation  ;  Baseline 14  - start protonix 80mg iv once followed by 40mg iv BID  - GI consulted, plan for EGD Monday  - serial CBC  - transfuse Hb<7  - holding home ASA and Xarelto  - monitor tele  - cardiac diet, NPO midnight 5/9

## 2022-05-09 ENCOUNTER — ANESTHESIA EVENT (OUTPATIENT)
Dept: ENDOSCOPY | Facility: HOSPITAL | Age: 62
End: 2022-05-09
Payer: COMMERCIAL

## 2022-05-09 ENCOUNTER — ANESTHESIA (OUTPATIENT)
Dept: ENDOSCOPY | Facility: HOSPITAL | Age: 62
End: 2022-05-09
Payer: COMMERCIAL

## 2022-05-09 LAB
ANION GAP SERPL CALC-SCNC: 7 MMOL/L (ref 8–16)
BASOPHILS # BLD AUTO: 0.02 K/UL (ref 0–0.2)
BASOPHILS # BLD AUTO: 0.03 K/UL (ref 0–0.2)
BASOPHILS NFR BLD: 0.3 % (ref 0–1.9)
BASOPHILS NFR BLD: 0.4 % (ref 0–1.9)
BUN SERPL-MCNC: 11 MG/DL (ref 6–20)
CALCIUM SERPL-MCNC: 8.6 MG/DL (ref 8.7–10.5)
CHLORIDE SERPL-SCNC: 108 MMOL/L (ref 95–110)
CO2 SERPL-SCNC: 26 MMOL/L (ref 23–29)
CREAT SERPL-MCNC: 0.9 MG/DL (ref 0.5–1.4)
DIFFERENTIAL METHOD: ABNORMAL
DIFFERENTIAL METHOD: ABNORMAL
EOSINOPHIL # BLD AUTO: 0.1 K/UL (ref 0–0.5)
EOSINOPHIL # BLD AUTO: 0.1 K/UL (ref 0–0.5)
EOSINOPHIL NFR BLD: 1 % (ref 0–8)
EOSINOPHIL NFR BLD: 1.4 % (ref 0–8)
ERYTHROCYTE [DISTWIDTH] IN BLOOD BY AUTOMATED COUNT: 14.5 % (ref 11.5–14.5)
ERYTHROCYTE [DISTWIDTH] IN BLOOD BY AUTOMATED COUNT: 14.6 % (ref 11.5–14.5)
EST. GFR  (AFRICAN AMERICAN): >60 ML/MIN/1.73 M^2
EST. GFR  (NON AFRICAN AMERICAN): >60 ML/MIN/1.73 M^2
GLUCOSE SERPL-MCNC: 105 MG/DL (ref 70–110)
HCT VFR BLD AUTO: 24.1 % (ref 40–54)
HCT VFR BLD AUTO: 25.9 % (ref 40–54)
HGB BLD-MCNC: 7.7 G/DL (ref 14–18)
HGB BLD-MCNC: 8.5 G/DL (ref 14–18)
IMM GRANULOCYTES # BLD AUTO: 0.02 K/UL (ref 0–0.04)
IMM GRANULOCYTES # BLD AUTO: 0.05 K/UL (ref 0–0.04)
IMM GRANULOCYTES NFR BLD AUTO: 0.3 % (ref 0–0.5)
IMM GRANULOCYTES NFR BLD AUTO: 0.7 % (ref 0–0.5)
LYMPHOCYTES # BLD AUTO: 1.1 K/UL (ref 1–4.8)
LYMPHOCYTES # BLD AUTO: 1.2 K/UL (ref 1–4.8)
LYMPHOCYTES NFR BLD: 17.7 % (ref 18–48)
LYMPHOCYTES NFR BLD: 19.3 % (ref 18–48)
MCH RBC QN AUTO: 30.8 PG (ref 27–31)
MCH RBC QN AUTO: 31.4 PG (ref 27–31)
MCHC RBC AUTO-ENTMCNC: 32 G/DL (ref 32–36)
MCHC RBC AUTO-ENTMCNC: 32.8 G/DL (ref 32–36)
MCV RBC AUTO: 96 FL (ref 82–98)
MCV RBC AUTO: 96 FL (ref 82–98)
MONOCYTES # BLD AUTO: 0.5 K/UL (ref 0.3–1)
MONOCYTES # BLD AUTO: 0.6 K/UL (ref 0.3–1)
MONOCYTES NFR BLD: 7.8 % (ref 4–15)
MONOCYTES NFR BLD: 8.5 % (ref 4–15)
NEUTROPHILS # BLD AUTO: 4.2 K/UL (ref 1.8–7.7)
NEUTROPHILS # BLD AUTO: 4.9 K/UL (ref 1.8–7.7)
NEUTROPHILS NFR BLD: 70.9 % (ref 38–73)
NEUTROPHILS NFR BLD: 71.7 % (ref 38–73)
NRBC BLD-RTO: 0 /100 WBC
NRBC BLD-RTO: 0 /100 WBC
PLATELET # BLD AUTO: 183 K/UL (ref 150–450)
PLATELET # BLD AUTO: 188 K/UL (ref 150–450)
PMV BLD AUTO: 11.3 FL (ref 9.2–12.9)
PMV BLD AUTO: 11.7 FL (ref 9.2–12.9)
POTASSIUM SERPL-SCNC: 3.3 MMOL/L (ref 3.5–5.1)
RBC # BLD AUTO: 2.5 M/UL (ref 4.6–6.2)
RBC # BLD AUTO: 2.71 M/UL (ref 4.6–6.2)
SODIUM SERPL-SCNC: 141 MMOL/L (ref 136–145)
WBC # BLD AUTO: 5.91 K/UL (ref 3.9–12.7)
WBC # BLD AUTO: 6.84 K/UL (ref 3.9–12.7)

## 2022-05-09 PROCEDURE — G0378 HOSPITAL OBSERVATION PER HR: HCPCS

## 2022-05-09 PROCEDURE — 25000003 PHARM REV CODE 250: Performed by: NURSE ANESTHETIST, CERTIFIED REGISTERED

## 2022-05-09 PROCEDURE — 63600175 PHARM REV CODE 636 W HCPCS: Performed by: FAMILY MEDICINE

## 2022-05-09 PROCEDURE — 37000008 HC ANESTHESIA 1ST 15 MINUTES: Performed by: INTERNAL MEDICINE

## 2022-05-09 PROCEDURE — 99226 PR SUBSEQUENT OBSERVATION CARE,LEVEL III: CPT | Mod: ,,, | Performed by: PHYSICIAN ASSISTANT

## 2022-05-09 PROCEDURE — 85025 COMPLETE CBC W/AUTO DIFF WBC: CPT | Performed by: FAMILY MEDICINE

## 2022-05-09 PROCEDURE — 94761 N-INVAS EAR/PLS OXIMETRY MLT: CPT

## 2022-05-09 PROCEDURE — D9220A PRA ANESTHESIA: Mod: CRNA,,, | Performed by: NURSE ANESTHETIST, CERTIFIED REGISTERED

## 2022-05-09 PROCEDURE — D9220A PRA ANESTHESIA: ICD-10-PCS | Mod: CRNA,,, | Performed by: NURSE ANESTHETIST, CERTIFIED REGISTERED

## 2022-05-09 PROCEDURE — 80048 BASIC METABOLIC PNL TOTAL CA: CPT | Performed by: FAMILY MEDICINE

## 2022-05-09 PROCEDURE — 25000003 PHARM REV CODE 250: Performed by: FAMILY MEDICINE

## 2022-05-09 PROCEDURE — 99226 PR SUBSEQUENT OBSERVATION CARE,LEVEL III: ICD-10-PCS | Mod: ,,, | Performed by: PHYSICIAN ASSISTANT

## 2022-05-09 PROCEDURE — 43235 EGD DIAGNOSTIC BRUSH WASH: CPT | Mod: ,,, | Performed by: INTERNAL MEDICINE

## 2022-05-09 PROCEDURE — 43235 PR EGD, FLEX, DIAGNOSTIC: ICD-10-PCS | Mod: ,,, | Performed by: INTERNAL MEDICINE

## 2022-05-09 PROCEDURE — 43235 EGD DIAGNOSTIC BRUSH WASH: CPT | Performed by: INTERNAL MEDICINE

## 2022-05-09 PROCEDURE — D9220A PRA ANESTHESIA: Mod: ANES,,, | Performed by: ANESTHESIOLOGY

## 2022-05-09 PROCEDURE — 37000009 HC ANESTHESIA EA ADD 15 MINS: Performed by: INTERNAL MEDICINE

## 2022-05-09 PROCEDURE — 63600175 PHARM REV CODE 636 W HCPCS: Performed by: NURSE ANESTHETIST, CERTIFIED REGISTERED

## 2022-05-09 PROCEDURE — 25000003 PHARM REV CODE 250: Performed by: STUDENT IN AN ORGANIZED HEALTH CARE EDUCATION/TRAINING PROGRAM

## 2022-05-09 PROCEDURE — C9113 INJ PANTOPRAZOLE SODIUM, VIA: HCPCS | Performed by: FAMILY MEDICINE

## 2022-05-09 PROCEDURE — D9220A PRA ANESTHESIA: ICD-10-PCS | Mod: ANES,,, | Performed by: ANESTHESIOLOGY

## 2022-05-09 PROCEDURE — 85025 COMPLETE CBC W/AUTO DIFF WBC: CPT | Mod: 91 | Performed by: PHYSICIAN ASSISTANT

## 2022-05-09 PROCEDURE — 96376 TX/PRO/DX INJ SAME DRUG ADON: CPT

## 2022-05-09 PROCEDURE — 36415 COLL VENOUS BLD VENIPUNCTURE: CPT | Performed by: FAMILY MEDICINE

## 2022-05-09 RX ORDER — PROPOFOL 10 MG/ML
VIAL (ML) INTRAVENOUS
Status: DISCONTINUED | OUTPATIENT
Start: 2022-05-09 | End: 2022-05-09

## 2022-05-09 RX ORDER — POLYETHYLENE GLYCOL 3350, SODIUM SULFATE ANHYDROUS, SODIUM BICARBONATE, SODIUM CHLORIDE, POTASSIUM CHLORIDE 236; 22.74; 6.74; 5.86; 2.97 G/4L; G/4L; G/4L; G/4L; G/4L
4000 POWDER, FOR SOLUTION ORAL ONCE
Status: COMPLETED | OUTPATIENT
Start: 2022-05-09 | End: 2022-05-09

## 2022-05-09 RX ORDER — FENTANYL CITRATE 50 UG/ML
INJECTION, SOLUTION INTRAMUSCULAR; INTRAVENOUS
Status: DISCONTINUED | OUTPATIENT
Start: 2022-05-09 | End: 2022-05-09

## 2022-05-09 RX ORDER — SODIUM CHLORIDE 0.9 % (FLUSH) 0.9 %
10 SYRINGE (ML) INJECTION
Status: DISCONTINUED | OUTPATIENT
Start: 2022-05-09 | End: 2022-05-09 | Stop reason: HOSPADM

## 2022-05-09 RX ORDER — PROPOFOL 10 MG/ML
VIAL (ML) INTRAVENOUS CONTINUOUS PRN
Status: DISCONTINUED | OUTPATIENT
Start: 2022-05-09 | End: 2022-05-09

## 2022-05-09 RX ORDER — LIDOCAINE HYDROCHLORIDE 20 MG/ML
INJECTION INTRAVENOUS
Status: DISCONTINUED | OUTPATIENT
Start: 2022-05-09 | End: 2022-05-09

## 2022-05-09 RX ADMIN — GABAPENTIN 300 MG: 300 CAPSULE ORAL at 09:05

## 2022-05-09 RX ADMIN — TAMSULOSIN HYDROCHLORIDE 0.4 MG: 0.4 CAPSULE ORAL at 09:05

## 2022-05-09 RX ADMIN — SODIUM CHLORIDE: 9 INJECTION, SOLUTION INTRAVENOUS at 09:05

## 2022-05-09 RX ADMIN — TAMSULOSIN HYDROCHLORIDE 0.4 MG: 0.4 CAPSULE ORAL at 11:05

## 2022-05-09 RX ADMIN — FENTANYL CITRATE 25 MCG: 50 INJECTION, SOLUTION INTRAMUSCULAR; INTRAVENOUS at 09:05

## 2022-05-09 RX ADMIN — SOTALOL HYDROCHLORIDE 80 MG: 80 TABLET ORAL at 11:05

## 2022-05-09 RX ADMIN — LIDOCAINE HYDROCHLORIDE 100 MG: 20 INJECTION, SOLUTION INTRAVENOUS at 09:05

## 2022-05-09 RX ADMIN — SOTALOL HYDROCHLORIDE 80 MG: 80 TABLET ORAL at 09:05

## 2022-05-09 RX ADMIN — PROPOFOL 80 MG: 10 INJECTION, EMULSION INTRAVENOUS at 09:05

## 2022-05-09 RX ADMIN — POLYETHYLENE GLYCOL 3350, SODIUM SULFATE ANHYDROUS, SODIUM BICARBONATE, SODIUM CHLORIDE, POTASSIUM CHLORIDE 4000 ML: 236; 22.74; 6.74; 5.86; 2.97 POWDER, FOR SOLUTION ORAL at 06:05

## 2022-05-09 RX ADMIN — PANTOPRAZOLE SODIUM 40 MG: 40 INJECTION, POWDER, FOR SOLUTION INTRAVENOUS at 11:05

## 2022-05-09 RX ADMIN — GABAPENTIN 300 MG: 300 CAPSULE ORAL at 03:05

## 2022-05-09 RX ADMIN — BUPROPION HYDROCHLORIDE 300 MG: 300 TABLET, FILM COATED, EXTENDED RELEASE ORAL at 11:05

## 2022-05-09 RX ADMIN — Medication 150 MCG/KG/MIN: at 09:05

## 2022-05-09 RX ADMIN — GABAPENTIN 300 MG: 300 CAPSULE ORAL at 11:05

## 2022-05-09 NOTE — ANESTHESIA PREPROCEDURE EVALUATION
[facility]  Anesthesia Pre-Operative Evaluation         Patient Name: Shiraz Jha  YOB: 1960  MRN: [id number]    SUBJECTIVE:     Pre-operative evaluation for Procedure(s) (LRB):  COLONOSCOPY (N/A)     05/09/2022    Shiraz Jha is a 62 y.o. male w/ a significant PMHx of AFib s/p ablation on Xarelto, HTN, T2DM, Gastric Bypass 2010, BLE lymphedema on Bumex presents with melena s/p normal EGD.    Patient now presents for the above procedure(s).    Cardiac PET 10/2021  The relative PET images are normal showing no clinically significant regional resting or stress induced perfusion defects.    The absolute flows are not reported due to low myocardial counts.    CT attenuation images demonstrate mild diffuse coronary calcifications in the LAD territory.    The gated perfusion images showed an ejection fraction of 53% at rest and 67% during stress. A normal ejection fraction is greater than 53%.    The wall motion is normal at rest and during stress.    The LV cavity size is normal at rest and stress.    The EKG portion of this study is negative for ischemia.    There were no arrhythmias during stress.    The patient reported no chest pain during the stress test.    There are no prior studies for comparison.    TTE 2019  · Normal left ventricular systolic function. The estimated ejection fraction is 60%  · Normal right ventricular systolic function.  · Normal LV diastolic function.  · Moderate left atrial enlargement.  · Mild right atrial enlargement.  · The ascending aorta is mildly dilated (42mm in diameter, unchanged since Feb 2018).  · The estimated PA systolic pressure is 23 mm Hg  · Normal central venous pressure (3 mm Hg).    LDA:         Peripheral IV - Single Lumen 05/06/22 1522 20 G Right Antecubital (Active)   Site Assessment Clean;Dry;No redness;No swelling;Intact 05/09/22 0935   Extremity Assessment Distal to IV No warmth;No swelling;No redness;No abnormal  discoloration 05/09/22 0935   Line Status Infusing 05/09/22 0935   Dressing Status Dry;Clean;Intact 05/09/22 0935   Dressing Intervention Integrity maintained 05/09/22 0935   Dressing Change Due 05/10/22 05/08/22 2000   Site Change Due 05/10/22 05/08/22 2000   Reason Not Rotated Not due 05/08/22 2000   Number of days: 2     Prev airway: None documented.    Drips: None documented.     Patient Active Problem List   Diagnosis    Essential hypertension    Depression    Atrial flutter    Severe obesity (BMI >= 40)    Type 2 diabetes mellitus without complication, without long-term current use of insulin    Obstructive sleep apnea    Chronic rhinitis    Persistent atrial fibrillation    Spondylosis without myelopathy    Gastroesophageal reflux disease without esophagitis    Benign prostatic hyperplasia with lower urinary tract symptoms    Stable angina pectoris    Status post catheter ablation of atrial fibrillation    Status post catheter ablation of atrial flutter    Current use of long term anticoagulation    Enlarged thoracic aorta    Mobility impaired    Weakness    Osteoarthritis of spine    DDD (degenerative disc disease), lumbar    Status post gastric bypass for obesity    Venous insufficiency of left lower extremity    Edema of both lower extremities    Impaired fasting blood sugar    Neoplasm of uncertain behavior of lip    Lymphedema of both lower extremities    S/P ablation of atrial fibrillation    Major depressive disorder in remission    Poor posture    Decreased strength of trunk and back    Gastrointestinal hemorrhage with melena    Acute blood loss anemia    Hypokalemia     Review of patient's allergies indicates:   Allergen Reactions    Iodinated contrast media Other (See Comments)     Raises BP       Current Inpatient Medications:   buPROPion  300 mg Oral Daily    gabapentin  300 mg Oral TID    pantoprazole  40 mg Intravenous BID    polyethylene glycol  4,000 mL  Oral Once    sotaloL  80 mg Oral BID    tamsulosin  0.4 mg Oral BID       No current facility-administered medications on file prior to encounter.     Current Outpatient Medications on File Prior to Encounter   Medication Sig Dispense Refill    aspirin 325 MG tablet Take 325 mg by mouth once daily.      bumetanide (BUMEX) 1 MG tablet TAKE 1 TABLET (1 MG TOTAL) BY MOUTH 2 (TWO) TIMES DAILY. (Patient taking differently: No sig reported) 180 tablet 3    buPROPion (WELLBUTRIN XL) 300 MG 24 hr tablet Take 300 mg by mouth once daily. Pt states he only takes 300mg      cyanocobalamin 500 MCG tablet Take 500 mcg by mouth once daily.      dextroamphetamine-amphetamine (ADDERALL) 20 mg tablet Take 1 tablet by mouth every morning, take 1 tablet by mouth 3-4 hours later, and take 1/2 tablet every afternoon      fluticasone propionate (FLONASE) 50 mcg/actuation nasal spray 1 spray (50 mcg total) by Each Nostril route once daily. (Patient taking differently: 1 spray by Each Nostril route daily as needed.) 16 g 0    gabapentin (NEURONTIN) 300 MG capsule Take 1 capsule (300 mg total) by mouth 3 (three) times daily. 270 capsule 3    IMPOYZ 0.025 % Crea Apply topically as needed.       levocetirizine (XYZAL) 5 MG tablet TAKE 1 TABLET (5 MG TOTAL) BY MOUTH EVERY EVENING. (Patient taking differently: Take 5 mg by mouth daily as needed for Allergies.) 30 tablet 11    LUZU 1 % Crea Apply topically as needed.       metoprolol tartrate (LOPRESSOR) 25 MG tablet TAKE ONE BY MOUTH TWICE DAILY 180 tablet 3    multivitamin (THERAGRAN) per tablet Take 1 tablet by mouth once daily.       NAFTIN 2 % Gel daily as needed.       nitroGLYCERIN (NITROSTAT) 0.4 MG SL tablet Please dispense 25 pills in 4 bottles. 100 tablet 0    omeprazole (PRILOSEC) 10 MG capsule Take 2 capsules (20 mg total) by mouth once daily. 30 capsule 6    sotaloL (BETAPACE) 80 MG tablet TAKE ONE BY MOUTH TWICE DAILY 180 tablet 3    tamsulosin (FLOMAX) 0.4 mg  Cap TAKE ONE BY MOUTH TWICE DAILY 180 capsule 3    thiamine 250 MG tablet Take 250 mg by mouth once daily.      vitamin D (VITAMIN D3) 1000 units Tab Take 1,000 Units by mouth once daily.      vortioxetine (TRINTELLIX) 20 mg Tab Take 20 mg by mouth once daily.       XARELTO 20 mg Tab TAKE ONE DAILY 30 tablet 11    ARIPiprazole (ABILIFY) 10 MG Tab Take 10 mg by mouth once daily.         Past Surgical History:   Procedure Laterality Date    CERVICAL SPINE SURGERY      EPIDURAL STEROID INJECTION INTO LUMBAR SPINE N/A 5/29/2018    Procedure: INJECTION-STEROID-EPIDURAL-LUMBAR- L4-5;  Surgeon: Roman Lyman MD;  Location: [facility]IN [facility]GT;  Service: Pain Management;  Laterality: N/A;  Patient is diabetic and Xarleto     EPIDURAL STEROID INJECTION INTO LUMBAR SPINE N/A 7/3/2018    Procedure: INJECTION, STEROID, SPINE, LUMBAR, EPIDURAL- L4-5;  Surgeon: Roman Lyman MD;  Location: [facility]IN [facility];  Service: Pain Management;  Laterality: N/A;  Patient takes Xarelto and ASA     EXCISION OF LESION OF LIP N/A 7/7/2020    Procedure: EXCISION, LESION, LIP;  Surgeon: Caden Navarro MD;  Location: [facility] OR [address] [place] [address];  Service: ENT;  Laterality: N/A;    GASTRIC BYPASS      SINUS SURGERY  2000    Dr. Watt at [facility]    TONSILLECTOMY         Social History     Socioeconomic History    Marital status: Single   Tobacco Use    Smoking status: Never Smoker    Smokeless tobacco: Never Used   Substance and Sexual Activity    Alcohol use: No     Comment: rare    Drug use: No    Sexual activity: Yes     Partners: Female   Social History Narrative    [place], lives alone w/2 dogs.    Dogs are his kids.    Works PT --  at [employer], on ssdi from neck and back/depression.     Social Determinants of Health     Financial Resource Strain: High Risk    Difficulty of Paying Living Expenses: Very hard   Food Insecurity: Food Insecurity Present    Worried About Running Out of Food in the Last Year: Sometimes true    Ran  Out of Food in the Last Year: Sometimes true   Transportation Needs: Unmet Transportation Needs    Lack of Transportation (Medical): Yes    Lack of Transportation (Non-Medical): Yes   Physical Activity: Sufficiently Active    Days of Exercise per Week: 3 days    Minutes of Exercise per Session: 60 min   Stress: Stress Concern Present    Feeling of Stress : To some extent   Social Connections: Unknown    Frequency of Communication with Friends and Family: More than three times a week    Frequency of Social Gatherings with Friends and Family: Once a week    Active Member of Clubs or Organizations: Yes    Attends Club or Organization Meetings: More than 4 times per year    Marital Status: Never    Housing Stability: High Risk    Unable to Pay for Housing in the Last Year: Yes    Number of Places Lived in the Last Year: 1    Unstable Housing in the Last Year: No       OBJECTIVE:     Vital Signs Range (Last 24H):  Temp:  [36.7 °C (98.1 °F)-37 °C (98.6 °F)]   Pulse:  [63-83]   Resp:  [15-20]   BP: ()/(55-77)   SpO2:  [96 %-100 %]       Significant Labs:  Lab Results   Component Value Date    WBC 5.91 05/09/2022    HGB 7.7 (L) 05/09/2022    HCT 24.1 (L) 05/09/2022     05/09/2022    CHOL 186 02/15/2022    TRIG 167 (H) 02/15/2022    HDL 37 (L) 02/15/2022    ALT 8 (L) 05/06/2022    AST 13 05/06/2022     05/09/2022    K 3.3 (L) 05/09/2022     05/09/2022    CREATININE 0.9 05/09/2022    BUN 11 05/09/2022    CO2 26 05/09/2022    TSH 1.544 02/15/2022    PSA 0.41 02/15/2022    INR 1.1 05/06/2022    HGBA1C 5.5 04/06/2021       Diagnostic Studies: No relevant studies.    EKG:   Results for orders placed or performed during the hospital encounter of 05/06/22   EKG 12-lead    Collection Time: 05/06/22  3:12 PM    Narrative    Test Reason : K92.1,    Vent. Rate : 061 BPM     Atrial Rate : 061 BPM     P-R Int : 168 ms          QRS Dur : 086 ms      QT Int : 426 ms       P-R-T Axes : 048 008  -04 degrees     QTc Int : 428 ms    Normal sinus rhythm  Abnormal R wave progression in the precordial leads  Nonspecific T wave abnormality  Abnormal ECG  When compared with ECG of 21-OCT-2021 12:42,  Nonspecific T wave abnormality now evident in Inferior leads  Nonspecific T wave abnormality, worse in Anterior leads  Confirmed by Josiah HARP, David WYLIE ([id number]) on 5/6/2022 4:34:13 PM    Referred By: [email or web address]   SELF           Confirmed By:David Rahman MD       2D ECHO:  TTE:  Results for orders placed or performed in visit on 07/23/19   Transthoracic echo (TTE) complete   Result Value Ref Range    Ascending aorta 4.20 cm    STJ 3.20 cm    AV mean gradient 5 mmHg    Ao peak shalonda 1.42 m/s    Ao VTI 33.95 cm    IVRT 0.11 msec    IVS 0.80 0.6 - 1.1 cm    LA size 4.60 cm    Left Atrium Major Axis 6.60 cm    Left Atrium Minor Axis 6.05 cm    LVIDd 5.77 3.5 - 6.0 cm    LVIDs 3.90 2.1 - 4.0 cm    LVOT diameter 2.48 cm    LVOT peak VTI 26.15 cm    Posterior Wall 0.81 0.6 - 1.1 cm    MV Peak A Shalonda 0.53 m/s    E wave deceleration time 240.34 msec    MV Peak E Shalonda 0.88 m/s    PV Peak D Shalonda 0.43 m/s    PV Peak S Shalonda 0.36 m/s    RA Major Axis 6.75 cm    RA Width 3.47 cm    RVDD 3.69 cm    Sinus 3.41 cm    TAPSE 3.26 cm    TR Max Shalonda 2.26 m/s    TDI LATERAL 0.09 m/s    TDI SEPTAL 0.09 m/s    LA WIDTH 4.40 cm    LV Diastolic Volume 164.50 mL    LV Systolic Volume 65.99 mL    LVOT peak shalonda 1.05 m/s    LV LATERAL E/E' RATIO 9.78 m/s    LV SEPTAL E/E' RATIO 9.78 m/s    FS 32 %    LA volume 108.61 cm3    LV mass 175.19 g    Left Ventricle Relative Wall Thickness 0.28 cm    AV valve area 3.72 cm2    AV Velocity Ratio 0.74     AV index (prosthetic) 0.77     E/A ratio 1.66     Mean e' 0.09 m/s    Pulm vein S/D ratio 0.84     LVOT area 4.8 cm2    LVOT stroke volume 126.25 cm3    AV peak gradient 8 mmHg    E/E' ratio 9.78 m/s    Triscuspid Valve Regurgitation Peak Gradient 20 mmHg    BSA 2.46 m2    LV Systolic Volume Index 28.1 mL/m2    LV  Diastolic Volume Index 70.08 mL/m2    LA Volume Index 46.3 mL/m2    LV Mass Index 75 g/m2    Right Atrial Pressure (from IVC) 3 mmHg    TV rest pulmonary artery pressure 23 mmHg    Narrative    · Normal left ventricular systolic function. The estimated ejection   fraction is 60%  · Normal right ventricular systolic function.  · Normal LV diastolic function.  · Moderate left atrial enlargement.  · Mild right atrial enlargement.  · The ascending aorta is mildly dilated (42mm in diameter, unchanged since   Feb 2018).  · The estimated PA systolic pressure is 23 mm Hg  · Normal central venous pressure (3 mm Hg).          CRUZ:  No results found for this or any previous visit.    ASSESSMENT/PLAN:           Pre-op Assessment    I have reviewed the Patient Summary Reports.     I have reviewed the Nursing Notes. I have reviewed the NPO Status.   I have reviewed the Medications.     Review of Systems  Anesthesia Hx:  No problems with previous Anesthesia  History of prior surgery of interest to airway management or planning: Denies Family Hx of Anesthesia complications.   Denies Personal Hx of Anesthesia complications.   Social:  Non-Smoker, No Alcohol Use    Hematology/Oncology:  Hematology Normal   Oncology Normal     EENT/Dental:EENT/Dental Normal   Cardiovascular:   Exercise tolerance: good Hypertension Denies CAD.   Dysrhythmias atrial fibrillation    Pulmonary:   Denies COPD.  Denies Sleep Apnea.    Hepatic/GI:   Denies GERD.    Neurological:   Denies Neuromuscular Disease.    Endocrine:   Diabetes    Psych:   Denies Psychiatric History.          Physical Exam  General: Well nourished, Cooperative, Alert and Oriented    Airway:  Mallampati: II   Mouth Opening: Normal  TM Distance: Normal  Tongue: Normal  Neck ROM: Normal ROM    Dental:  Intact    Chest/Lungs:  Clear to auscultation, Normal Respiratory Rate    Heart:  Rate: Normal  Rhythm: Regular Rhythm  Sounds: Normal    Abdomen:  Nontender, Soft,  Normal        Anesthesia Plan  Type of Anesthesia, risks & benefits discussed:    Anesthesia Type: Gen Natural Airway  Intra-op Monitoring Plan: Standard ASA Monitors  Post Op Pain Control Plan: multimodal analgesia  Induction:  IV  Airway Plan: Direct and Video, Post-Induction  Informed Consent: Informed consent signed with the Patient and all parties understand the risks and agree with anesthesia plan.  All questions answered.   ASA Score: 3  Day of Surgery Review of History & Physical: H&P Update referred to the surgeon/provider.    Ready For Surgery From Anesthesia Perspective.     .

## 2022-05-09 NOTE — TRANSFER OF CARE
"Anesthesia Transfer of Care Note    Patient: Shiraz Jha    Procedure(s) Performed: Procedure(s) (LRB):  EGD (ESOPHAGOGASTRODUODENOSCOPY) (N/A)    Patient location: PACU    Anesthesia Type: general    Transport from OR: Transported from OR on 2-3 L/min O2 by NC with adequate spontaneous ventilation    Post pain: adequate analgesia    Post assessment: no apparent anesthetic complications and tolerated procedure well    Post vital signs: stable    Level of consciousness: awake and alert    Nausea/Vomiting: no nausea/vomiting    Complications: none    Transfer of care protocol was followed      Last vitals:   Visit Vitals  BP (!) 99/55 (BP Location: Left arm, Patient Position: Lying)   Pulse 67   Temp 37 °C (98.6 °F) (Temporal)   Resp 20   Ht 5' 8" (1.727 m)   Wt 136.1 kg (300 lb)   SpO2 98%   BMI 45.61 kg/m²     "

## 2022-05-09 NOTE — SUBJECTIVE & OBJECTIVE
Interval History: Patient reports some intermittent abdominal discomfort but no further bowel movements since yesterday morning. Hgb 9.2>8.6. EGD unremarkable for source of bleed. Plan for colonoscopy tomorrow.     Review of Systems   Constitutional:  Negative for chills and fever.   HENT:  Negative for congestion and rhinorrhea.    Respiratory:  Negative for cough and shortness of breath.    Cardiovascular:  Positive for leg swelling (chronic). Negative for chest pain.   Gastrointestinal:  Negative for abdominal distention, abdominal pain and blood in stool.   Genitourinary:  Negative for difficulty urinating, dysuria and hematuria.   Musculoskeletal:  Negative for arthralgias and back pain.   Skin:  Negative for color change and pallor.   Neurological:  Negative for weakness and numbness.   Psychiatric/Behavioral:  Negative for agitation and behavioral problems.    Objective:     Vital Signs (Most Recent):  Temp: 98.6 °F (37 °C) (05/09/22 0931)  Pulse: 66 (05/09/22 0945)  Resp: 15 (05/09/22 0945)  BP: (!) 120/58 (05/09/22 1114)  SpO2: 100 % (05/09/22 0945)   Vital Signs (24h Range):  Temp:  [98.1 °F (36.7 °C)-98.6 °F (37 °C)] 98.6 °F (37 °C)  Pulse:  [63-83] 66  Resp:  [15-20] 15  SpO2:  [96 %-100 %] 100 %  BP: ()/(55-77) 120/58     Weight: 136.1 kg (300 lb)  Body mass index is 45.61 kg/m².    Intake/Output Summary (Last 24 hours) at 5/9/2022 1129  Last data filed at 5/9/2022 0932  Gross per 24 hour   Intake 340 ml   Output --   Net 340 ml      Physical Exam  Vitals and nursing note reviewed.   Constitutional:       General: He is not in acute distress.     Appearance: Normal appearance. He is obese. He is not ill-appearing.   HENT:      Head: Normocephalic and atraumatic.      Mouth/Throat:      Mouth: Mucous membranes are moist.      Pharynx: Oropharynx is clear.   Eyes:      Extraocular Movements: Extraocular movements intact.      Pupils: Pupils are equal, round, and reactive to light.   Cardiovascular:       Rate and Rhythm: Normal rate. Rhythm irregular.      Pulses: Normal pulses.      Heart sounds: Normal heart sounds.   Pulmonary:      Effort: Pulmonary effort is normal.      Breath sounds: Normal breath sounds. No wheezing or rales.   Abdominal:      General: Abdomen is flat. Bowel sounds are normal. There is no distension.      Palpations: Abdomen is soft.      Tenderness: There is no abdominal tenderness. There is no guarding or rebound.   Musculoskeletal:         General: Normal range of motion.      Right lower leg: Edema (2+, chronic) present.      Left lower leg: Edema (2+ chronic) present.   Skin:     General: Skin is warm and dry.      Capillary Refill: Capillary refill takes less than 2 seconds.   Neurological:      General: No focal deficit present.      Mental Status: He is alert and oriented to person, place, and time.   Psychiatric:         Mood and Affect: Mood normal.         Behavior: Behavior normal.       Significant Labs: All pertinent labs within the past 24 hours have been reviewed.  BMP:   Recent Labs   Lab 05/09/22  0443         K 3.3*      CO2 26   BUN 11   CREATININE 0.9   CALCIUM 8.6*     CBC:   Recent Labs   Lab 05/07/22  1310 05/08/22  0323 05/09/22  0443   WBC 5.43 8.45 5.91   HGB 8.6* 9.2* 7.7*   HCT 26.1* 28.5* 24.1*    193 183     Magnesium: No results for input(s): MG in the last 48 hours.    Significant Imaging: I have reviewed all pertinent imaging results/findings within the past 24 hours.

## 2022-05-09 NOTE — INTERVAL H&P NOTE
The patient has been examined and the H&P has been reviewed:    I concur with the findings and no changes have occurred since H&P was written.    Anesthesia/Surgery risks, benefits and alternative options discussed and understood by patient/family.          Active Hospital Problems    Diagnosis  POA    *Gastrointestinal hemorrhage with melena [K92.1]  Yes    Hypokalemia [E87.6]  Yes    Acute blood loss anemia [D62]  Yes    Lymphedema of both lower extremities [I89.0]  Yes    Status post gastric bypass for obesity [Z98.84]  Not Applicable    Current use of long term anticoagulation [Z79.01]  Not Applicable    Type 2 diabetes mellitus without complication, without long-term current use of insulin [E11.9]  Yes     Diet controlled        Severe obesity (BMI >= 40) [E66.01]  Yes    Obstructive sleep apnea [G47.33]  Yes     On CPAP        Essential hypertension [I10]  Yes    Atrial flutter [I48.92]  Yes    Depression [F32.A]  Yes      Resolved Hospital Problems   No resolved problems to display.

## 2022-05-09 NOTE — TREATMENT PLAN
Post-Procedure Gastroenterology Treatment Plan:     Shiraz Jha is status post EGD with the following findings:     -Status post gastric bypass with healthy appearing mucosa; no evidence of prior or active GI bleeding.       Our recommendations are as follows:     -Obtain colonoscopy for further evaluation of melena; patient in agreement with obtaining procedure.   -Okay for clear liquid diet today and then NPO at midnight on 05/10.   -Bowel prep ordered for this evening.   -Okay to discontinue PPI.   -Hold NSAID, anti-platelet, and anticoagulation products, if possible.   -Please call with questions.       Vickey Begum MD, PGY-IV  Gastroenterology Fellow  [facility]

## 2022-05-09 NOTE — ANESTHESIA PREPROCEDURE EVALUATION
05/09/2022  Pre-operative evaluation for Procedure(s) (LRB):  EGD (ESOPHAGOGASTRODUODENOSCOPY) (N/A)    Shiraz Jha is a 62 y.o. male hx of htn, obesity, dmii, [place], gerd, afib      The relative PET images are normal showing no clinically significant regional resting or stress induced perfusion defects.    The absolute flows are not reported due to low myocardial counts.    CT attenuation images demonstrate mild diffuse coronary calcifications in the LAD territory.    The gated perfusion images showed an ejection fraction of 53% at rest and 67% during stress. A normal ejection fraction is greater than 53%.    The wall motion is normal at rest and during stress.    The LV cavity size is normal at rest and stress.    The EKG portion of this study is negative for ischemia.    There were no arrhythmias during stress.    The patient reported no chest pain during the stress test.    There are no prior studies for comparison.     · Normal left ventricular systolic function. The estimated ejection fraction is 60%  · Normal right ventricular systolic function.  · Normal LV diastolic function.  · Moderate left atrial enlargement.  · Mild right atrial enlargement.  · The ascending aorta is mildly dilated (42mm in diameter, unchanged since Feb 2018).  · The estimated PA systolic pressure is 23 mm Hg  · Normal central venous pressure (3 mm Hg).        Patient Active Problem List   Diagnosis    Essential hypertension    Depression    Atrial flutter    Severe obesity (BMI >= 40)    Type 2 diabetes mellitus without complication, without long-term current use of insulin    Obstructive sleep apnea    Chronic rhinitis    Persistent atrial fibrillation    Spondylosis without myelopathy    Gastroesophageal reflux disease without esophagitis    Benign prostatic hyperplasia with lower urinary tract symptoms     Stable angina pectoris    Status post catheter ablation of atrial fibrillation    Status post catheter ablation of atrial flutter    Current use of long term anticoagulation    Enlarged thoracic aorta    Mobility impaired    Weakness    Osteoarthritis of spine    DDD (degenerative disc disease), lumbar    Status post gastric bypass for obesity    Venous insufficiency of left lower extremity    Edema of both lower extremities    Impaired fasting blood sugar    Neoplasm of uncertain behavior of lip    Lymphedema of both lower extremities    S/P ablation of atrial fibrillation    Major depressive disorder in remission    Poor posture    Decreased strength of trunk and back    Gastrointestinal hemorrhage with melena    Acute blood loss anemia    Hypokalemia       Review of patient's allergies indicates:   Allergen Reactions    Iodinated contrast media Other (See Comments)     Raises BP         No current facility-administered medications on file prior to encounter.     Current Outpatient Medications on File Prior to Encounter   Medication Sig Dispense Refill    aspirin 325 MG tablet Take 325 mg by mouth once daily.      bumetanide (BUMEX) 1 MG tablet TAKE 1 TABLET (1 MG TOTAL) BY MOUTH 2 (TWO) TIMES DAILY. (Patient taking differently: No sig reported) 180 tablet 3    buPROPion (WELLBUTRIN XL) 300 MG 24 hr tablet Take 300 mg by mouth once daily. Pt states he only takes 300mg      cyanocobalamin 500 MCG tablet Take 500 mcg by mouth once daily.      dextroamphetamine-amphetamine (ADDERALL) 20 mg tablet Take 1 tablet by mouth every morning, take 1 tablet by mouth 3-4 hours later, and take 1/2 tablet every afternoon      fluticasone propionate (FLONASE) 50 mcg/actuation nasal spray 1 spray (50 mcg total) by Each Nostril route once daily. (Patient taking differently: 1 spray by Each Nostril route daily as needed.) 16 g 0    gabapentin (NEURONTIN) 300 MG capsule Take 1 capsule (300 mg total) by  mouth 3 (three) times daily. 270 capsule 3    IMPOYZ 0.025 % Crea Apply topically as needed.       levocetirizine (XYZAL) 5 MG tablet TAKE 1 TABLET (5 MG TOTAL) BY MOUTH EVERY EVENING. (Patient taking differently: Take 5 mg by mouth daily as needed for Allergies.) 30 tablet 11    LUZU 1 % Crea Apply topically as needed.       metoprolol tartrate (LOPRESSOR) 25 MG tablet TAKE ONE BY MOUTH TWICE DAILY 180 tablet 3    multivitamin (THERAGRAN) per tablet Take 1 tablet by mouth once daily.       NAFTIN 2 % Gel daily as needed.       nitroGLYCERIN (NITROSTAT) 0.4 MG SL tablet Please dispense 25 pills in 4 bottles. 100 tablet 0    omeprazole (PRILOSEC) 10 MG capsule Take 2 capsules (20 mg total) by mouth once daily. 30 capsule 6    sotaloL (BETAPACE) 80 MG tablet TAKE ONE BY MOUTH TWICE DAILY 180 tablet 3    tamsulosin (FLOMAX) 0.4 mg Cap TAKE ONE BY MOUTH TWICE DAILY 180 capsule 3    thiamine 250 MG tablet Take 250 mg by mouth once daily.      vitamin D (VITAMIN D3) 1000 units Tab Take 1,000 Units by mouth once daily.      vortioxetine (TRINTELLIX) 20 mg Tab Take 20 mg by mouth once daily.       XARELTO 20 mg Tab TAKE ONE DAILY 30 tablet 11    ARIPiprazole (ABILIFY) 10 MG Tab Take 10 mg by mouth once daily.         Past Surgical History:   Procedure Laterality Date    CERVICAL SPINE SURGERY      EPIDURAL STEROID INJECTION INTO LUMBAR SPINE N/A 5/29/2018    Procedure: INJECTION-STEROID-EPIDURAL-LUMBAR- L4-5;  Surgeon: Roman Lyman MD;  Location: [facility]IN T;  Service: Pain Management;  Laterality: N/A;  Patient is diabetic and Xarleto     EPIDURAL STEROID INJECTION INTO LUMBAR SPINE N/A 7/3/2018    Procedure: INJECTION, STEROID, SPINE, LUMBAR, EPIDURAL- L4-5;  Surgeon: Roman Lyman MD;  Location: [facility] PAIN T;  Service: Pain Management;  Laterality: N/A;  Patient takes Xarelto and ASA     EXCISION OF LESION OF LIP N/A 7/7/2020    Procedure: EXCISION, LESION, LIP;  Surgeon: Caden Navarro,  MD;  [facility] OR [address] [place] [address];  Service: ENT;  Laterality: N/A;    GASTRIC BYPASS      SINUS SURGERY  2000    Dr[facility] W[facility] at [facility]    TONSILLECTOMY         Social History     Socioeconomic History    Marital status: Single   Tobacco Use    Smoking status: Never Smoker    Smokeless tobacco: Never Used   Substance and Sexual Activity    Alcohol use: No     Comment: rare    Drug use: No    Sexual activity: Yes     Partners: Female   Social History Narrative    From [place], lives alone w/2 dogs.    Dogs are his kids.    Works PT --  at [employer], on ssdi from neck and back/depression.     Social Determinants of Health     Financial Resource Strain: High Risk    Difficulty of Paying Living Expenses: Very hard   Food Insecurity: Food Insecurity Present    Worried About Running Out of Food in the Last Year: Sometimes true    Ran Out of Food in the Last Year: Sometimes true   Transportation Needs: Unmet Transportation Needs    Lack of Transportation (Medical): Yes    Lack of Transportation (Non-Medical): Yes   Physical Activity: Sufficiently Active    Days of Exercise per Week: 3 days    Minutes of Exercise per Session: 60 min   Stress: Stress Concern Present    Feeling of Stress : To some extent   Social Connections: Unknown    Frequency of Communication with Friends and Family: More than three times a week    Frequency of Social Gatherings with Friends and Family: Once a week    Active Member of Clubs or Organizations: Yes    Attends Club or Organization Meetings: More than 4 times per year    Marital Status: Never    Housing Stability: High Risk    Unable to Pay for Housing in the Last Year: Yes    Number of Places Lived in the Last Year: 1    Unstable Housing in the Last Year: No         CBC:   Recent Labs     05/08/22  0323 05/09/22  0443   WBC 8.45 5.91   RBC 2.98* 2.50*   HGB 9.2* 7.7*   HCT 28.5* 24.1*    183   MCV 96 96   MCH 30.9 30.8   MCHC 32.3 32.0       CMP:   Recent  Labs     22  1529 22  0323 22  0322 22  0443      < > 138 141   K 3.6   < > 3.2* 3.3*      < > 103 108   CO2 30*   < > 27 26   BUN 28*   < > 18 11   CREATININE 1.0   < > 0.9 0.9   *   < > 120* 105   CALCIUM 9.0   < > 9.0 8.6*   ALBUMIN 3.4*  --   --   --    PROT 5.9*  --   --   --    ALKPHOS 87  --   --   --    ALT 8*  --   --   --    AST 13  --   --   --    BILITOT 0.7  --   --   --     < > = values in this interval not displayed.       INR  Recent Labs     22  1529   INR 1.1   APTT 27.4           Diagnostic Studies:      EKD Echo:  Results for orders placed or performed in visit on 18   2D echo with color flow doppler   Result Value Ref Range    EF + QEF 55 55 - 65    Mitral Valve Regurgitation TRIVIAL     Diastolic Dysfunction No     Est. PA Systolic Pressure 30.04     Tricuspid Valve Regurgitation TRIVIAL            Pre-op Assessment    I have reviewed the Patient Summary Reports.     I have reviewed the Nursing Notes. I have reviewed the NPO Status.   I have reviewed the Medications.     Review of Systems  Anesthesia Hx:  No problems with previous Anesthesia  History of prior surgery of interest to airway management or planning: Denies Family Hx of Anesthesia complications.   Denies Personal Hx of Anesthesia complications.   Hematology/Oncology:         -- Anemia:   Cardiovascular:   Exercise tolerance: good Hypertension Dysrhythmias ECG has been reviewed.    Pulmonary:   Denies COPD. Asthma mild Sleep Apnea, CPAP    Renal/:   Denies Chronic Renal Disease.     Hepatic/GI:   GERD Denies Liver Disease.    Neurological:   Denies Seizures.    Endocrine:   Diabetes        Physical Exam  General: Well nourished and Cooperative    Airway:  Mallampati: III / II  Mouth Opening: Normal  TM Distance: Normal  Tongue: Normal  Neck ROM: Normal ROM    Dental:  Intact    Chest/Lungs:  Clear to auscultation, Normal Respiratory Rate    Heart:  Rate:  Normal  Sounds: Normal        Anesthesia Plan  Type of Anesthesia, risks & benefits discussed:    Anesthesia Type: Gen Natural Airway  Intra-op Monitoring Plan: Standard ASA Monitors  Post Op Pain Control Plan: multimodal analgesia  Induction:  IV  Informed Consent: Informed consent signed with the Patient and all parties understand the risks and agree with anesthesia plan.  All questions answered.   ASA Score: 3  Day of Surgery Review of History & Physical: H&P Update referred to the surgeon/provider.    Ready For Surgery From Anesthesia Perspective.     .

## 2022-05-09 NOTE — PROVATION PATIENT INSTRUCTIONS
Discharge Summary/Instructions after an Endoscopic Procedure  Patient Name: Shiraz Jha  Patient MRN: [id number]  Patient YOB: 1960  Monday, May 9, 2022  Conrad Diaz MD  Dear patient,  As a result of recent federal legislation (The Federal Cures Act), you may   receive lab or pathology results from your procedure in your [facility]   account before your physician is able to contact you. Your physician or   their representative will relay the results to you with their   recommendations at their soonest availability.  Thank you,  RESTRICTIONS:  During your procedure today, you received medications for sedation.  These   medications may affect your judgment, balance and coordination.  Therefore,   for 24 hours, you have the following restrictions:   - DO NOT drive a car, operate machinery, make legal/financial decisions,   sign important papers or drink alcohol.    ACTIVITY:  Today: no heavy lifting, straining or running due to procedural   sedation/anesthesia.  The following day: return to full activity including work.  DIET:  Eat and drink normally unless instructed otherwise.     TREATMENT FOR COMMON SIDE EFFECTS:  - Mild abdominal pain, nausea, belching, bloating or excessive gas:  rest,   eat lightly and use a heating pad.  - Sore Throat: treat with throat lozenges and/or gargle with warm salt   water.  - Because air was used during the procedure, expelling large amounts of air   from your rectum or belching is normal.  - If a bowel prep was taken, you may not have a bowel movement for 1-3 days.    This is normal.  SYMPTOMS TO WATCH FOR AND REPORT TO YOUR PHYSICIAN:  1. Abdominal pain or bloating, other than gas cramps.  2. Chest pain.  3. Back pain.  4. Signs of infection such as: chills or fever occurring within 24 hours   after the procedure.  5. Rectal bleeding, which would show as bright red, maroon, or black stools.   (A tablespoon of blood from the rectum is not serious, especially if   hemorrhoids  are present.)  6. Vomiting.  7. Weakness or dizziness.  GO DIRECTLY TO THE NEAREST EMERGENCY ROOM IF YOU HAVE ANY OF THE FOLLOWING:      Difficulty breathing              Chills and/or fever over 101 F   Persistent vomiting and/or vomiting blood   Severe abdominal pain   Severe chest pain   Black, tarry stools   Bleeding- more than one tablespoon   Any other symptom or condition that you feel may need urgent attention  Your doctor recommends these additional instructions:  If any biopsies were taken, your doctors clinic will contact you in 1 to 2   weeks with any results.  - Patient has a contact number available for emergencies.  The signs and   symptoms of potential delayed complications were discussed with the   patient.  Return to normal activities tomorrow.  Written discharge   instructions were provided to the patient.   - Continue present medications.   - [facility] for [facility].   - Clear liquid diet.   consider in patient colonoscopy  For questions, problems or results please call your physician - Conrad Diaz MD at Work:  [phone].  [facility], EMERGENCY ROOM PHONE NUMBER: [phone]  IF A COMPLICATION OR EMERGENCY SITUATION ARISES AND YOU ARE UNABLE TO REACH   YOUR PHYSICIAN - GO DIRECTLY TO THE EMERGENCY ROOM.  Conrad Diaz MD  5/9/2022 9:31:18 AM  This report has been verified and signed electronically.  Dear patient,  As a result of recent federal legislation (The Federal Cures Act), you may   receive lab or pathology results from your procedure in your [facility]   account before your physician is able to contact you. Your physician or   their representative will relay the results to you with their   recommendations at their soonest availability.  Thank you,  PROVATION

## 2022-05-09 NOTE — PROGRESS NOTES
[facility] [facility]  [facility] Note    Patient Name: Shiraz Jha  MRN: [id number]  Patient Class: OP- Observation   Admission Date: 5/6/2022  Length of Stay: 0 days  Attending Physician: Vanessa Ham MD  Primary Care Provider: GUSTAVO Theodore MD        Subjective:     Principal Problem:Gastrointestinal hemorrhage with melena        HPI:  This is a 61yo male with a past medical history of AFib s/p ablation on Xarelto, HTN, T2DM, Gastric Bypass 2010, BLE lymphedema on Bumex presenting to the ED with chief complaint of rectal bleeding. Patient reports 4 days of black colored, loose stools. Estimates 2 episodes per day. Reports vague upper abdominal discomfort for the past few days that he ranks as a 1/10. No fever, nausea, chest pain, cough, urinary symptoms. Reports chronic SOB with exertion which is at its baseline currently. No NSAID use, iron supplementation, pepto-bismol use. Reports having a negative colonoscopy several years ago. Reports multiple family members with history of cancer. Denies personal history of cancer. He is compliant with his Xarelto.   In the ED patient afebrile and hemodynamically stable saturating well on room air. Patient Hb 10.5 (was 14 two months ago). FOBT+. Patient started on iv protonix and admitted to the care of medicine for further evaluation and management of GIB.         Overview/Hospital Course:  Mr. Jha admitted for GIB. Started on IV protonix BID. GI consulted, plan for EGD Monday. Hgb 8.2>7.7 (baseline 14). EGD unremarkable. Plan for colonoscopy.       Interval History: Patient reports some intermittent abdominal discomfort but no further bowel movements since yesterday morning. Hgb 9.2>8.6. EGD unremarkable for source of bleed. Plan for colonoscopy tomorrow.     Review of Systems   Constitutional:  Negative for chills and fever.   HENT:  Negative for congestion and rhinorrhea.    Respiratory:  Negative for cough and shortness  of breath.    Cardiovascular:  Positive for leg swelling (chronic). Negative for chest pain.   Gastrointestinal:  Negative for abdominal distention, abdominal pain and blood in stool.   Genitourinary:  Negative for difficulty urinating, dysuria and hematuria.   Musculoskeletal:  Negative for arthralgias and back pain.   Skin:  Negative for color change and pallor.   Neurological:  Negative for weakness and numbness.   Psychiatric/Behavioral:  Negative for agitation and behavioral problems.    Objective:     Vital Signs (Most Recent):  Temp: 98.6 °F (37 °C) (05/09/22 0931)  Pulse: 66 (05/09/22 0945)  Resp: 15 (05/09/22 0945)  BP: (!) 120/58 (05/09/22 1114)  SpO2: 100 % (05/09/22 0945)   Vital Signs (24h Range):  Temp:  [98.1 °F (36.7 °C)-98.6 °F (37 °C)] 98.6 °F (37 °C)  Pulse:  [63-83] 66  Resp:  [15-20] 15  SpO2:  [96 %-100 %] 100 %  BP: ()/(55-77) 120/58     Weight: 136.1 kg (300 lb)  Body mass index is 45.61 kg/m².    Intake/Output Summary (Last 24 hours) at 5/9/2022 1129  Last data filed at 5/9/2022 0932  Gross per 24 hour   Intake 340 ml   Output --   Net 340 ml      Physical Exam  Vitals and nursing note reviewed.   Constitutional:       General: He is not in acute distress.     Appearance: Normal appearance. He is obese. He is not ill-appearing.   HENT:      Head: Normocephalic and atraumatic.      Mouth/Throat:      Mouth: Mucous membranes are moist.      Pharynx: Oropharynx is clear.   Eyes:      Extraocular Movements: Extraocular movements intact.      Pupils: Pupils are equal, round, and reactive to light.   Cardiovascular:      Rate and Rhythm: Normal rate. Rhythm irregular.      Pulses: Normal pulses.      Heart sounds: Normal heart sounds.   Pulmonary:      Effort: Pulmonary effort is normal.      Breath sounds: Normal breath sounds. No wheezing or rales.   Abdominal:      General: Abdomen is flat. Bowel sounds are normal. There is no distension.      Palpations: Abdomen is soft.      Tenderness:  There is no abdominal tenderness. There is no guarding or rebound.   Musculoskeletal:         General: Normal range of motion.      Right lower leg: Edema (2+, chronic) present.      Left lower leg: Edema (2+ chronic) present.   Skin:     General: Skin is warm and dry.      Capillary Refill: Capillary refill takes less than 2 seconds.   Neurological:      General: No focal deficit present.      Mental Status: He is alert and oriented to person, place, and time.   Psychiatric:         Mood and Affect: Mood normal.         Behavior: Behavior normal.       Significant Labs: All pertinent labs within the past 24 hours have been reviewed.  BMP:   Recent Labs   Lab 05/09/22  0443         K 3.3*      CO2 26   BUN 11   CREATININE 0.9   CALCIUM 8.6*     CBC:   Recent Labs   Lab 05/07/22  1310 05/08/22  0323 05/09/22  0443   WBC 5.43 8.45 5.91   HGB 8.6* 9.2* 7.7*   HCT 26.1* 28.5* 24.1*    193 183     Magnesium: No results for input(s): MG in the last 48 hours.    Significant Imaging: I have reviewed all pertinent imaging results/findings within the past 24 hours.      Assessment/Plan:      * Gastrointestinal hemorrhage with melena  Anemia  Current use of long term anticoagulation  - IP GIB Pathway initiated  - Hx of gastric bypass  - Hb 10.5>9>8.2>9.2 >7.7  ;  Baseline 14  - start protonix 80mg iv once followed by 40mg iv BID  - GI consulted, EGD unremarkable for source of bleed  - protonix discontinued  - colonoscopy tomorrow  - CLD, NPO midnight  - bowel prep ordered by GI  - daily CBC  - transfuse Hb<7  - holding home ASA and Xarelto  - monitor tele    Lymphedema of both lower extremities  - holding home bumex while monitoring hemodynamics ; resume as appropriate      Type 2 diabetes mellitus without complication, without long-term current use of insulin  Lab Results   Component Value Date    HGBA1C 5.5 04/06/2021     - diet controlled      Atrial flutter  - [id number] is 2  - holding home  xarelto and metoprolol  - continue home sotalol  - monitor tele    Depression  - continue home wellbutrin        Essential hypertension  - holding home metoprolol while monitoring hemodynamics        VTE Risk Mitigation (From admission, onward)         Ordered     IP VTE HIGH RISK PATIENT  Once         05/06/22 1748     Place sequential compression device  Until discontinued         05/06/22 1748                Discharge Planning   BERTHA: 5/10/2022     Code Status: Full Code   Is the patient medically ready for discharge?: No    Reason for patient still in hospital (select all that apply): Patient trending condition, Laboratory test, Treatment, Imaging and Consult recommendations  Discharge Plan A: Home Health, Home with family (no hx, no preference)                  Kim England PA-C  Department of Hospital Medicine   [facility] - Telemetry Stepdown ([place]-[facility])

## 2022-05-09 NOTE — ASSESSMENT & PLAN NOTE
Anemia  Current use of long term anticoagulation  - IP GIB Pathway initiated  - Hx of gastric bypass  - Hb 10.5>9>8.2>9.2 >7.7  ;  Baseline 14  - start protonix 80mg iv once followed by 40mg iv BID  - GI consulted, EGD unremarkable for source of bleed  - protonix discontinued  - colonoscopy tomorrow  - CLD, NPO midnight  - bowel prep ordered by GI  - daily CBC  - transfuse Hb<7  - holding home ASA and Xarelto  - monitor tele

## 2022-05-09 NOTE — NURSING TRANSFER
Nursing Transfer Note      5/9/2022     Reason patient is being transferred: postop    Transfer To: [id number]    Transfer via stretcher    Transfer with n/a    Transported by pacu pct    Medicines sent: n/a    Any special needs or follow-up needed: none    Chart send with patient: Yes    Notified: family via text    Patient reassessed at: 5/9/22    Upon arrival to floor:  Report given to Annetta Diehl

## 2022-05-10 ENCOUNTER — ANESTHESIA (OUTPATIENT)
Dept: ENDOSCOPY | Facility: HOSPITAL | Age: 62
End: 2022-05-10
Payer: COMMERCIAL

## 2022-05-10 ENCOUNTER — TELEPHONE (OUTPATIENT)
Dept: GASTROENTEROLOGY | Facility: CLINIC | Age: 62
End: 2022-05-10
Payer: COMMERCIAL

## 2022-05-10 VITALS
WEIGHT: 300.06 LBS | OXYGEN SATURATION: 98 % | HEIGHT: 68 IN | TEMPERATURE: 98 F | HEART RATE: 78 BPM | BODY MASS INDEX: 45.47 KG/M2 | DIASTOLIC BLOOD PRESSURE: 53 MMHG | SYSTOLIC BLOOD PRESSURE: 106 MMHG | RESPIRATION RATE: 18 BRPM

## 2022-05-10 LAB
ANION GAP SERPL CALC-SCNC: 6 MMOL/L (ref 8–16)
BASOPHILS # BLD AUTO: 0.02 K/UL (ref 0–0.2)
BASOPHILS NFR BLD: 0.3 % (ref 0–1.9)
BUN SERPL-MCNC: 9 MG/DL (ref 6–20)
CALCIUM SERPL-MCNC: 8.4 MG/DL (ref 8.7–10.5)
CHLORIDE SERPL-SCNC: 108 MMOL/L (ref 95–110)
CO2 SERPL-SCNC: 25 MMOL/L (ref 23–29)
CREAT SERPL-MCNC: 0.8 MG/DL (ref 0.5–1.4)
DIFFERENTIAL METHOD: ABNORMAL
EOSINOPHIL # BLD AUTO: 0.1 K/UL (ref 0–0.5)
EOSINOPHIL NFR BLD: 0.8 % (ref 0–8)
ERYTHROCYTE [DISTWIDTH] IN BLOOD BY AUTOMATED COUNT: 14.5 % (ref 11.5–14.5)
EST. GFR  (AFRICAN AMERICAN): >60 ML/MIN/1.73 M^2
EST. GFR  (NON AFRICAN AMERICAN): >60 ML/MIN/1.73 M^2
GLUCOSE SERPL-MCNC: 97 MG/DL (ref 70–110)
HCT VFR BLD AUTO: 25.5 % (ref 40–54)
HGB BLD-MCNC: 8.1 G/DL (ref 14–18)
IMM GRANULOCYTES # BLD AUTO: 0.02 K/UL (ref 0–0.04)
IMM GRANULOCYTES NFR BLD AUTO: 0.3 % (ref 0–0.5)
LYMPHOCYTES # BLD AUTO: 1.2 K/UL (ref 1–4.8)
LYMPHOCYTES NFR BLD: 18.9 % (ref 18–48)
MCH RBC QN AUTO: 30.5 PG (ref 27–31)
MCHC RBC AUTO-ENTMCNC: 31.8 G/DL (ref 32–36)
MCV RBC AUTO: 96 FL (ref 82–98)
MONOCYTES # BLD AUTO: 0.5 K/UL (ref 0.3–1)
MONOCYTES NFR BLD: 8 % (ref 4–15)
NEUTROPHILS # BLD AUTO: 4.5 K/UL (ref 1.8–7.7)
NEUTROPHILS NFR BLD: 71.7 % (ref 38–73)
NRBC BLD-RTO: 0 /100 WBC
PLATELET # BLD AUTO: 201 K/UL (ref 150–450)
PMV BLD AUTO: 11.5 FL (ref 9.2–12.9)
POCT GLUCOSE: 120 MG/DL (ref 70–110)
POTASSIUM SERPL-SCNC: 3.6 MMOL/L (ref 3.5–5.1)
RBC # BLD AUTO: 2.66 M/UL (ref 4.6–6.2)
SODIUM SERPL-SCNC: 139 MMOL/L (ref 136–145)
WBC # BLD AUTO: 6.25 K/UL (ref 3.9–12.7)

## 2022-05-10 PROCEDURE — D9220A PRA ANESTHESIA: ICD-10-PCS | Mod: CRNA,,, | Performed by: NURSE ANESTHETIST, CERTIFIED REGISTERED

## 2022-05-10 PROCEDURE — 63600175 PHARM REV CODE 636 W HCPCS: Performed by: NURSE ANESTHETIST, CERTIFIED REGISTERED

## 2022-05-10 PROCEDURE — 36415 COLL VENOUS BLD VENIPUNCTURE: CPT | Performed by: FAMILY MEDICINE

## 2022-05-10 PROCEDURE — D9220A PRA ANESTHESIA: Mod: CRNA,,, | Performed by: NURSE ANESTHETIST, CERTIFIED REGISTERED

## 2022-05-10 PROCEDURE — 25000003 PHARM REV CODE 250: Performed by: FAMILY MEDICINE

## 2022-05-10 PROCEDURE — 45378 PR COLONOSCOPY,DIAGNOSTIC: ICD-10-PCS | Mod: 53,,, | Performed by: INTERNAL MEDICINE

## 2022-05-10 PROCEDURE — 80048 BASIC METABOLIC PNL TOTAL CA: CPT | Performed by: FAMILY MEDICINE

## 2022-05-10 PROCEDURE — 63600175 PHARM REV CODE 636 W HCPCS: Performed by: PHYSICIAN ASSISTANT

## 2022-05-10 PROCEDURE — 99217 PR OBSERVATION CARE DISCHARGE: ICD-10-PCS | Mod: ,,, | Performed by: PHYSICIAN ASSISTANT

## 2022-05-10 PROCEDURE — 96375 TX/PRO/DX INJ NEW DRUG ADDON: CPT | Mod: 59

## 2022-05-10 PROCEDURE — 37000009 HC ANESTHESIA EA ADD 15 MINS: Performed by: INTERNAL MEDICINE

## 2022-05-10 PROCEDURE — 99217 PR OBSERVATION CARE DISCHARGE: CPT | Mod: ,,, | Performed by: PHYSICIAN ASSISTANT

## 2022-05-10 PROCEDURE — 45378 DIAGNOSTIC COLONOSCOPY: CPT | Mod: 74 | Performed by: INTERNAL MEDICINE

## 2022-05-10 PROCEDURE — G0378 HOSPITAL OBSERVATION PER HR: HCPCS

## 2022-05-10 PROCEDURE — 82962 GLUCOSE BLOOD TEST: CPT | Performed by: INTERNAL MEDICINE

## 2022-05-10 PROCEDURE — 37000008 HC ANESTHESIA 1ST 15 MINUTES: Performed by: INTERNAL MEDICINE

## 2022-05-10 PROCEDURE — 45378 DIAGNOSTIC COLONOSCOPY: CPT | Mod: 53,,, | Performed by: INTERNAL MEDICINE

## 2022-05-10 PROCEDURE — 85025 COMPLETE CBC W/AUTO DIFF WBC: CPT | Performed by: FAMILY MEDICINE

## 2022-05-10 PROCEDURE — 25000003 PHARM REV CODE 250: Performed by: NURSE ANESTHETIST, CERTIFIED REGISTERED

## 2022-05-10 PROCEDURE — D9220A PRA ANESTHESIA: ICD-10-PCS | Mod: ANES,,, | Performed by: ANESTHESIOLOGY

## 2022-05-10 PROCEDURE — D9220A PRA ANESTHESIA: Mod: ANES,,, | Performed by: ANESTHESIOLOGY

## 2022-05-10 RX ORDER — SODIUM CHLORIDE 9 MG/ML
INJECTION, SOLUTION INTRAVENOUS CONTINUOUS PRN
Status: DISCONTINUED | OUTPATIENT
Start: 2022-05-10 | End: 2022-05-10

## 2022-05-10 RX ORDER — FUROSEMIDE 10 MG/ML
40 INJECTION INTRAMUSCULAR; INTRAVENOUS ONCE
Status: DISCONTINUED | OUTPATIENT
Start: 2022-05-10 | End: 2022-05-10

## 2022-05-10 RX ORDER — PROPOFOL 10 MG/ML
VIAL (ML) INTRAVENOUS
Status: DISCONTINUED | OUTPATIENT
Start: 2022-05-10 | End: 2022-05-10

## 2022-05-10 RX ORDER — LIDOCAINE HCL/PF 100 MG/5ML
SYRINGE (ML) INTRAVENOUS
Status: DISCONTINUED | OUTPATIENT
Start: 2022-05-10 | End: 2022-05-10

## 2022-05-10 RX ORDER — PROPOFOL 10 MG/ML
VIAL (ML) INTRAVENOUS CONTINUOUS PRN
Status: DISCONTINUED | OUTPATIENT
Start: 2022-05-10 | End: 2022-05-10

## 2022-05-10 RX ORDER — FUROSEMIDE 10 MG/ML
60 INJECTION INTRAMUSCULAR; INTRAVENOUS ONCE
Status: COMPLETED | OUTPATIENT
Start: 2022-05-10 | End: 2022-05-10

## 2022-05-10 RX ADMIN — SOTALOL HYDROCHLORIDE 80 MG: 80 TABLET ORAL at 09:05

## 2022-05-10 RX ADMIN — BUPROPION HYDROCHLORIDE 300 MG: 300 TABLET, FILM COATED, EXTENDED RELEASE ORAL at 09:05

## 2022-05-10 RX ADMIN — GABAPENTIN 300 MG: 300 CAPSULE ORAL at 04:05

## 2022-05-10 RX ADMIN — GABAPENTIN 300 MG: 300 CAPSULE ORAL at 09:05

## 2022-05-10 RX ADMIN — SODIUM CHLORIDE: 0.9 INJECTION, SOLUTION INTRAVENOUS at 12:05

## 2022-05-10 RX ADMIN — PROPOFOL 70 MG: 10 INJECTION, EMULSION INTRAVENOUS at 01:05

## 2022-05-10 RX ADMIN — FUROSEMIDE 60 MG: 20 INJECTION, SOLUTION INTRAMUSCULAR; INTRAVENOUS at 04:05

## 2022-05-10 RX ADMIN — Medication 50 MG: at 01:05

## 2022-05-10 RX ADMIN — TAMSULOSIN HYDROCHLORIDE 0.4 MG: 0.4 CAPSULE ORAL at 09:05

## 2022-05-10 RX ADMIN — PROPOFOL 150 MCG/KG/MIN: 10 INJECTION, EMULSION INTRAVENOUS at 01:05

## 2022-05-10 NOTE — ASSESSMENT & PLAN NOTE
- holding home bumex while monitoring hemodynamics ; resume as appropriate  - given IV lasix prior to discharge for LE edema

## 2022-05-10 NOTE — ANESTHESIA POSTPROCEDURE EVALUATION
Anesthesia Post Evaluation    Patient: Shiraz Jha    Procedure(s) Performed: Procedure(s) (LRB):  COLONOSCOPY (N/A)    Final Anesthesia Type: general      Patient location during evaluation: PACU  Patient participation: Yes- Able to Participate  Level of consciousness: awake and alert  Post-procedure vital signs: reviewed and stable  Pain management: adequate  Airway patency: patent    PONV status at discharge: No PONV  Anesthetic complications: no      Cardiovascular status: blood pressure returned to baseline  Respiratory status: unassisted  Hydration status: euvolemic  Follow-up not needed.          Vitals Value Taken Time   /60 05/10/22 1416   Temp 36.8 °C (98.2 °F) 05/10/22 1345   Pulse 67 05/10/22 1418   Resp 18 05/10/22 1418   SpO2 100 % 05/10/22 1418   Vitals shown include unvalidated device data.      Event Time   Out of Recovery 14:19:00         Pain/Dandy Score: Pain Rating Prior to Med Admin: 0 (5/10/2022  2:00 PM)  Dandy Score: 10 (5/10/2022  2:15 PM)

## 2022-05-10 NOTE — TELEPHONE ENCOUNTER
Spoke to pt regarding message below. Offer pt an appt at the [facility] on 6/14. Pt denied and requesting to be seen by Dr. Conrad Diaz instead. Pt stated he was seen by DR. Diaz in the hospital.  informed pt I will send a message to Dr. Diaz staff because I was not able to find a soon appt for Dr. Diaz. Pt verbalize understanding and thank me.    Message routed to Dr. Diaz staff   ----- Message from Erickson Whiting sent at 5/10/2022  4:10 PM CDT -----  Contact: @[phone]  Caller Sandy from [facility] requesting the patient be called to schedule an appt as pt being discharged today and need to be seen by the 17th.  Pt has a referral in the system.

## 2022-05-10 NOTE — TRANSFER OF CARE
"Anesthesia Transfer of Care Note    Patient: Shiraz Jha    Procedure(s) Performed: Procedure(s) (LRB):  COLONOSCOPY (N/A)    Anesthesia Type: general    Transport from OR: Transported from OR on 6-10 L/min O2 by face mask with adequate spontaneous ventilation    Post pain: adequate analgesia    Post assessment: no apparent anesthetic complications and tolerated procedure well    Post vital signs: stable    Level of consciousness: awake    Nausea/Vomiting: no nausea/vomiting    Complications: none    Transfer of care protocol was followed      Last vitals:   Visit Vitals  BP (!) 92/54 (BP Location: Left arm, Patient Position: Lying)   Pulse 62   Temp 36.8 °C (98.2 °F) (Temporal)   Resp 18   Ht 5' 8" (1.727 m)   Wt (!) 136.1 kg (300 lb 0.7 oz)   SpO2 99%   BMI 45.62 kg/m²     "

## 2022-05-10 NOTE — ASSESSMENT & PLAN NOTE
Anemia  Current use of long term anticoagulation  - IP GIB Pathway initiated  - Hx of gastric bypass  - Hb 10.5>9>8.2>9.2 >7.7>8.5>8.1  ;  Baseline 14  - start protonix 80mg iv once followed by 40mg iv BID  - GI consulted, EGD unremarkable for source of bleed  - protonix discontinued  - colonoscopy with torturous colon, incomplete  - given stable hemoglobin and no need for transfusions since hospitalization, okay for discharge  - will follow up with GI and discuss balloon enteroscopy for further evaluation of proximal colon  - resume xarelto

## 2022-05-10 NOTE — PLAN OF CARE
Vital signs stable. Afebrile. Alert, oriented and following commands. Denies pain/nausea. POC reviewed and understanding verbalized.

## 2022-05-10 NOTE — NURSING TRANSFER
Nursing Transfer Note      5/10/2022     Reason patient is being transferred: per md order    Transfer to [id number]    Transfer via stretcher    Transfer with n/a    Transported by pt escort    Medicines sent: n/a    Any special needs or follow-up needed: na    Chart send with patient: yes     Notified: no family to notify

## 2022-05-10 NOTE — PLAN OF CARE
Problem: Adult Inpatient Plan of Care  Goal: Plan of Care Review  Outcome: Ongoing, Progressing  Goal: Patient-Specific Goal (Individualized)  Outcome: Ongoing, Progressing  Goal: Absence of Hospital-Acquired Illness or Injury  Outcome: Ongoing, Progressing  Goal: Optimal Comfort and Wellbeing  Outcome: Ongoing, Progressing  Goal: Readiness for Transition of Care  Outcome: Ongoing, Progressing     Problem: Bariatric Environmental Safety  Goal: Safety Maintained with Care  Outcome: Ongoing, Progressing     Problem: Adjustment to Illness (Gastrointestinal Bleeding)  Goal: Optimal Coping with Acute Illness  Outcome: Ongoing, Progressing     Problem: Bleeding (Gastrointestinal Bleeding)  Goal: Hemostasis  Outcome: Ongoing, Progressing     Problem: Diabetes Comorbidity  Goal: Blood Glucose Level Within Targeted Range  Outcome: Ongoing, Progressing

## 2022-05-10 NOTE — NURSING
Pt discharged to home.  He is awake alert and oriented X3.  No distress noted.  Pt diuresed after lasix admin.  He stated that he feels better.  Discharge instructions, med admin info and follow up appt info given to Pt.  Periph IV removed w/ pressure dressing applied. Pt leaving floor in w/c per transporter w/ belongings and accompanied by family members.

## 2022-05-10 NOTE — DISCHARGE SUMMARY
[facility] [id number]  [facility] Medicine  Discharge Summary      Patient Name: Shiraz Jha  MRN: [id number]  Patient Class: OP- Observation  Admission Date: 5/6/2022  Hospital Length of Stay: 0 days  Discharge Date and Time: 5/10/2022  6:57 PM  Attending Physician: Vanessa Ham MD   Discharging Provider: Kim England PA-C  Primary Care Provider: GUSTAVO Theodore MD  [facility] Medicine Team: [facility]OS[facility] MED E Kim England PA-C    HPI:   This is a 61yo male with a past medical history of AFib s/p ablation on Xarelto, HTN, T2DM, Gastric Bypass 2010, BLE lymphedema on Bumex presenting to the ED with chief complaint of rectal bleeding. Patient reports 4 days of black colored, loose stools. Estimates 2 episodes per day. Reports vague upper abdominal discomfort for the past few days that he ranks as a 1/10. No fever, nausea, chest pain, cough, urinary symptoms. Reports chronic SOB with exertion which is at its baseline currently. No NSAID use, iron supplementation, pepto-bismol use. Reports having a negative colonoscopy several years ago. Reports multiple family members with history of cancer. Denies personal history of cancer. He is compliant with his Xarelto.   In the ED patient afebrile and hemodynamically stable saturating well on room air. Patient Hb 10.5 (was 14 two months ago). FOBT+. Patient started on iv protonix and admitted to the care of medicine for further evaluation and management of GIB.         Procedure(s) (LRB):  COLONOSCOPY (N/A)      Hospital Course:   Mr. Jha admitted for GIB. Started on IV protonix BID. Xarelto held. GI consulted, plan for EGD Monday. Hgb 8.2>7.7 >8.5 >9.6> 7.7> 8.5> 8.1(baseline 14). EGD unremarkable. Colonoscopy incomplete due to torturous colon. Given stable hemoglobin and no need for transfusion patient stable for discharge. Will follow up with GI and discuss balloon enteroscopy for further evaluation of proximal colon. Patient given IV lasix  prior to discharge for LE edema as bumex was held as precaution for GIB. Patient discharged on home medications. OK to resume xarelto. Patient verbalized understanding. All questions answered. Referral to GI placed.        Goals of Care Treatment Preferences:  Code Status: Full Code      Consults:   Consults (From admission, onward)          Status Ordering Provider     Inpatient consult to Gastroenterology  Once        Provider:  (Not yet assigned)    Completed FREDI MÁRQUEZ            * Gastrointestinal hemorrhage with melena  Anemia  Current use of long term anticoagulation  - IP GIB Pathway initiated  - Hx of gastric bypass  - Hb 10.5>9>8.2>9.2 >7.7>8.5>8.1  ;  Baseline 14  - start protonix 80mg iv once followed by 40mg iv BID  - GI consulted, EGD unremarkable for source of bleed  - protonix discontinued  - colonoscopy with torturous colon, incomplete  - given stable hemoglobin and no need for transfusions since hospitalization, okay for discharge  - will follow up with GI and discuss balloon enteroscopy for further evaluation of proximal colon  - resume xarelto    Lymphedema of both lower extremities  - holding home bumex while monitoring hemodynamics ; resume as appropriate  - given IV lasix prior to discharge for LE edema        Final Active Diagnoses:    Diagnosis Date Noted POA    PRINCIPAL PROBLEM:  Gastrointestinal hemorrhage with melena [K92.1] 05/06/2022 Yes    Hypokalemia [E87.6] 05/08/2022 Yes    Acute blood loss anemia [D62]  Yes    Lymphedema of both lower extremities [I89.0] 03/25/2021 Yes    Status post gastric bypass for obesity [Z98.84] 09/19/2019 Not Applicable    Current use of long term anticoagulation [Z79.01] 08/01/2017 Not Applicable    Type 2 diabetes mellitus without complication, without long-term current use of insulin [E11.9] 01/03/2013 Yes    Severe obesity (BMI >= 40) [E66.01] 01/03/2013 Yes    Obstructive sleep apnea [G47.33] 01/03/2013 Yes    Essential hypertension [I10]  11/15/2012 Yes    Atrial flutter [I48.92] 11/15/2012 Yes    Depression [F32.A] 11/15/2012 Yes      Problems Resolved During this Admission:       Discharged Condition: good    Disposition: Home or Self Care    Follow Up:    Patient Instructions:      Ambulatory referral/consult to Gastroenterology   Standing Status: Future   Referral Priority: Routine Referral Type: Consultation   Referral Reason: Specialty Services Required   Requested Specialty: Gastroenterology   Number of Visits Requested: 1     Notify your health care provider if you experience any of the following:  temperature >100.4     Notify your health care provider if you experience any of the following:  severe uncontrolled pain     Notify your health care provider if you experience any of the following:  increased confusion or weakness     Activity as tolerated       Significant Diagnostic Studies: Labs:   CMP   Recent Labs   Lab 05/09/22 0443 05/10/22  0329    139   K 3.3* 3.6    108   CO2 26 25    97   BUN 11 9   CREATININE 0.9 0.8   CALCIUM 8.6* 8.4*   ANIONGAP 7* 6*   ESTGFRAFRICA >60.0 >60.0   EGFRNONAA >60.0 >60.0    and CBC   Recent Labs   Lab 05/09/22 0443 05/09/22  1916 05/10/22  0329   WBC 5.91 6.84 6.25   HGB 7.7* 8.5* 8.1*   HCT 24.1* 25.9* 25.5*    188 201   EGD:  -Status post gastric bypass with healthy appearing mucosa; no evidence of prior or active GI bleeding.     Colonoscopy:  - Tortuous colon. incomplete exam   - The rectum to transverse colon is normal    Pending Diagnostic Studies:       None           Medications:  Reconciled Home Medications:      Medication List        CHANGE how you take these medications      bumetanide 1 MG tablet  Commonly known as: BUMEX  TAKE 1 TABLET (1 MG TOTAL) BY MOUTH 2 (TWO) TIMES DAILY.  What changed:   how much to take  how to take this  when to take this  additional instructions     fluticasone propionate 50 mcg/actuation nasal spray  Commonly known as: FLONASE  1  spray (50 mcg total) by Each Nostril route once daily.  What changed:   when to take this  reasons to take this     levocetirizine 5 MG tablet  Commonly known as: XYZAL  TAKE 1 TABLET (5 MG TOTAL) BY MOUTH EVERY EVENING.  What changed:   when to take this  reasons to take this            CONTINUE taking these medications      ARIPiprazole 10 MG Tab  Commonly known as: ABILIFY  Take 10 mg by mouth once daily.     aspirin 325 MG tablet  Take 325 mg by mouth once daily.     buPROPion 300 MG 24 hr tablet  Commonly known as: WELLBUTRIN XL  Take 300 mg by mouth once daily. Pt states he only takes 300mg     cyanocobalamin 500 MCG tablet  Take 500 mcg by mouth once daily.     dextroamphetamine-amphetamine 20 mg tablet  Commonly known as: ADDERALL  Take 1 tablet by mouth every morning, take 1 tablet by mouth 3-4 hours later, and take 1/2 tablet every afternoon     gabapentin 300 MG capsule  Commonly known as: NEURONTIN  Take 1 capsule (300 mg total) by mouth 3 (three) times daily.     IMPOYZ 0.025 % Crea  Generic drug: clobetasoL  Apply topically as needed.     LUZU 1 % Crea  Generic drug: luliconazole  Apply topically as needed.     metoprolol tartrate 25 MG tablet  Commonly known as: LOPRESSOR  TAKE ONE BY MOUTH TWICE DAILY     multivitamin per tablet  Commonly known as: THERAGRAN  Take 1 tablet by mouth once daily.     NAFTIN 2 % Gel  Generic drug: naftifine  daily as needed.     nitroGLYCERIN 0.4 MG SL tablet  Commonly known as: NITROSTAT  Please dispense 25 pills in 4 bottles.     omeprazole 10 MG capsule  Commonly known as: PRILOSEC  Take 2 capsules (20 mg total) by mouth once daily.     sotaloL 80 MG tablet  Commonly known as: BETAPACE  TAKE ONE BY MOUTH TWICE DAILY     tamsulosin 0.4 mg Cap  Commonly known as: FLOMAX  TAKE ONE BY MOUTH TWICE DAILY     thiamine 250 MG tablet  Take 250 mg by mouth once daily.     vitamin D 1000 units Tab  Commonly known as: VITAMIN D3  Take 1,000 Units by mouth once daily.      vortioxetine 20 mg Tab  Commonly known as: TRINTELLIX  Take 20 mg by mouth once daily.     XARELTO 20 mg Tab  Generic drug: rivaroxaban  TAKE ONE DAILY              Indwelling Lines/Drains at time of discharge:   Lines/Drains/Airways       None                   Time spent on the discharge of patient: 36 minutes         Kim England PA-C  Department of Hospital Medicine  [facility] - Telemetry Stepdown ([place]-[facility])

## 2022-05-10 NOTE — TREATMENT PLAN
Post-Procedure Gastroenterology Treatment Plan:     Shiraz Jha is status post colonoscopy with the following findings:     - Tortuous colon. incomplete exam   - The rectum to transverse colon is normal.     Our recommendations are as follows:     -Okay to resume diet.   -Given Hgb has remained stable since discharge without PRBC required, okay for discharge from GI standpoint with outpatient follow-up with us to discuss obtaining balloon enteroscopy for further evaluation of proximal colon.   -No further endoscopic interventions planned. We will sign off. Please call with question.       Vickey Begum MD, PGY-IV  Gastroenterology Fellow  [facility]

## 2022-05-10 NOTE — PROVATION PATIENT INSTRUCTIONS
Discharge Summary/Instructions after an Endoscopic Procedure  Patient Name: Shiraz Jha  Patient MRN: [id number]  Patient YOB: 1960  Tuesday, May 10, 2022  Conrad Diaz MD  Dear patient,  As a result of recent federal legislation (The Federal Cures Act), you may   receive lab or pathology results from your procedure in your [facility]   account before your physician is able to contact you. Your physician or   their representative will relay the results to you with their   recommendations at their soonest availability.  Thank you,  RESTRICTIONS:  During your procedure today, you received medications for sedation.  These   medications may affect your judgment, balance and coordination.  Therefore,   for 24 hours, you have the following restrictions:   - DO NOT drive a car, operate machinery, make legal/financial decisions,   sign important papers or drink alcohol.    ACTIVITY:  Today: no heavy lifting, straining or running due to procedural   sedation/anesthesia.  The following day: return to full activity including work.  DIET:  Eat and drink normally unless instructed otherwise.     TREATMENT FOR COMMON SIDE EFFECTS:  - Mild abdominal pain, nausea, belching, bloating or excessive gas:  rest,   eat lightly and use a heating pad.  - Sore Throat: treat with throat lozenges and/or gargle with warm salt   water.  - Because air was used during the procedure, expelling large amounts of air   from your rectum or belching is normal.  - If a bowel prep was taken, you may not have a bowel movement for 1-3 days.    This is normal.  SYMPTOMS TO WATCH FOR AND REPORT TO YOUR PHYSICIAN:  1. Abdominal pain or bloating, other than gas cramps.  2. Chest pain.  3. Back pain.  4. Signs of infection such as: chills or fever occurring within 24 hours   after the procedure.  5. Rectal bleeding, which would show as bright red, maroon, or black stools.   (A tablespoon of blood from the rectum is not serious, especially if    hemorrhoids are present.)  6. Vomiting.  7. Weakness or dizziness.  GO DIRECTLY TO THE NEAREST EMERGENCY ROOM IF YOU HAVE ANY OF THE FOLLOWING:      Difficulty breathing              Chills and/or fever over 101 F   Persistent vomiting and/or vomiting blood   Severe abdominal pain   Severe chest pain   Black, tarry stools   Bleeding- more than one tablespoon   Any other symptom or condition that you feel may need urgent attention  Your doctor recommends these additional instructions:  If any biopsies were taken, your doctors clinic will contact you in 1 to 2   weeks with any results.  - [facility].   - Clear liquid diet.  there are a couple of options: we could keep in hospital and try again later   this week with balloon assisted colonoscopy  or we could discharge, get a copy of his past outside colonscopy, and then   decide if we want to schedule elective out-patient balloon assisted   procedure     - Continue present medications.   - Repeat colonoscopy (date not yet determined) for surveillance.  For questions, problems or results please call your physician - Conrad Diaz MD at Work:  [phone].  [facility], EMERGENCY ROOM PHONE NUMBER: [phone]  IF A COMPLICATION OR EMERGENCY SITUATION ARISES AND YOU ARE UNABLE TO REACH   YOUR PHYSICIAN - GO DIRECTLY TO THE EMERGENCY ROOM.  Conrad Diaz MD  5/10/2022 1:43:01 PM  This report has been verified and signed electronically.  Dear patient,  As a result of recent federal legislation (The Federal Cures Act), you may   receive lab or pathology results from your procedure in your [facility]   account before your physician is able to contact you. Your physician or   their representative will relay the results to you with their   recommendations at their soonest availability.  Thank you,  PROVATION

## 2022-05-11 ENCOUNTER — HOSPITAL ENCOUNTER (EMERGENCY)
Facility: HOSPITAL | Age: 62
Discharge: HOME | End: 2022-05-11
Attending: EMERGENCY MEDICINE
Payer: COMMERCIAL

## 2022-05-11 VITALS
TEMPERATURE: 98 F | DIASTOLIC BLOOD PRESSURE: 63 MMHG | HEART RATE: 78 BPM | WEIGHT: 300 LBS | SYSTOLIC BLOOD PRESSURE: 103 MMHG | RESPIRATION RATE: 16 BRPM | BODY MASS INDEX: 45.61 KG/M2 | OXYGEN SATURATION: 97 %

## 2022-05-11 DIAGNOSIS — M79.89 LEG SWELLING: ICD-10-CM

## 2022-05-11 DIAGNOSIS — I50.9 ACUTE DECOMPENSATED HEART FAILURE: Primary | ICD-10-CM

## 2022-05-11 DIAGNOSIS — R60.0 PERIPHERAL EDEMA: Primary | ICD-10-CM

## 2022-05-11 LAB
ALBUMIN SERPL BCP-MCNC: 2.8 G/DL (ref 3.5–5.2)
ALP SERPL-CCNC: 98 U/L (ref 55–135)
ALT SERPL W/O P-5'-P-CCNC: 10 U/L (ref 10–44)
ANION GAP SERPL CALC-SCNC: 9 MMOL/L (ref 8–16)
AST SERPL-CCNC: 13 U/L (ref 10–40)
BACTERIA #/AREA URNS AUTO: NORMAL /HPF
BASOPHILS # BLD AUTO: 0.01 K/UL (ref 0–0.2)
BASOPHILS NFR BLD: 0.1 % (ref 0–1.9)
BILIRUB SERPL-MCNC: 1.1 MG/DL (ref 0.1–1)
BILIRUB UR QL STRIP: NEGATIVE
BNP SERPL-MCNC: 248 PG/ML (ref 0–99)
BUN SERPL-MCNC: 9 MG/DL (ref 6–20)
CALCIUM SERPL-MCNC: 9 MG/DL (ref 8.7–10.5)
CHLORIDE SERPL-SCNC: 103 MMOL/L (ref 95–110)
CLARITY UR REFRACT.AUTO: CLEAR
CO2 SERPL-SCNC: 25 MMOL/L (ref 23–29)
COLOR UR AUTO: YELLOW
CREAT SERPL-MCNC: 1 MG/DL (ref 0.5–1.4)
DIFFERENTIAL METHOD: ABNORMAL
EOSINOPHIL # BLD AUTO: 0 K/UL (ref 0–0.5)
EOSINOPHIL NFR BLD: 0.4 % (ref 0–8)
ERYTHROCYTE [DISTWIDTH] IN BLOOD BY AUTOMATED COUNT: 14 % (ref 11.5–14.5)
EST. GFR  (AFRICAN AMERICAN): >60 ML/MIN/1.73 M^2
EST. GFR  (NON AFRICAN AMERICAN): >60 ML/MIN/1.73 M^2
GLUCOSE SERPL-MCNC: 130 MG/DL (ref 70–110)
GLUCOSE UR QL STRIP: NEGATIVE
HCT VFR BLD AUTO: 25.6 % (ref 40–54)
HGB BLD-MCNC: 8.1 G/DL (ref 14–18)
HGB UR QL STRIP: NEGATIVE
HYALINE CASTS UR QL AUTO: 0 /LPF
IMM GRANULOCYTES # BLD AUTO: 0.02 K/UL (ref 0–0.04)
IMM GRANULOCYTES NFR BLD AUTO: 0.2 % (ref 0–0.5)
KETONES UR QL STRIP: ABNORMAL
LEUKOCYTE ESTERASE UR QL STRIP: NEGATIVE
LYMPHOCYTES # BLD AUTO: 1 K/UL (ref 1–4.8)
LYMPHOCYTES NFR BLD: 12.4 % (ref 18–48)
MCH RBC QN AUTO: 29.8 PG (ref 27–31)
MCHC RBC AUTO-ENTMCNC: 31.6 G/DL (ref 32–36)
MCV RBC AUTO: 94 FL (ref 82–98)
MICROSCOPIC COMMENT: NORMAL
MONOCYTES # BLD AUTO: 0.7 K/UL (ref 0.3–1)
MONOCYTES NFR BLD: 8.9 % (ref 4–15)
NEUTROPHILS # BLD AUTO: 6.5 K/UL (ref 1.8–7.7)
NEUTROPHILS NFR BLD: 78 % (ref 38–73)
NITRITE UR QL STRIP: NEGATIVE
NRBC BLD-RTO: 0 /100 WBC
PH UR STRIP: 6 [PH] (ref 5–8)
PLATELET # BLD AUTO: 258 K/UL (ref 150–450)
PMV BLD AUTO: 10.6 FL (ref 9.2–12.9)
POTASSIUM SERPL-SCNC: 3.3 MMOL/L (ref 3.5–5.1)
PROT SERPL-MCNC: 5.9 G/DL (ref 6–8.4)
PROT UR QL STRIP: ABNORMAL
RBC # BLD AUTO: 2.72 M/UL (ref 4.6–6.2)
RBC #/AREA URNS AUTO: 1 /HPF (ref 0–4)
SODIUM SERPL-SCNC: 137 MMOL/L (ref 136–145)
SP GR UR STRIP: 1.02 (ref 1–1.03)
SQUAMOUS #/AREA URNS AUTO: 0 /HPF
TROPONIN I SERPL DL<=0.01 NG/ML-MCNC: 0.01 NG/ML (ref 0–0.03)
URN SPEC COLLECT METH UR: ABNORMAL
WBC # BLD AUTO: 8.3 K/UL (ref 3.9–12.7)
WBC #/AREA URNS AUTO: 2 /HPF (ref 0–5)

## 2022-05-11 PROCEDURE — 81001 URINALYSIS AUTO W/SCOPE: CPT | Performed by: PHYSICIAN ASSISTANT

## 2022-05-11 PROCEDURE — 96374 THER/PROPH/DIAG INJ IV PUSH: CPT

## 2022-05-11 PROCEDURE — 93010 ELECTROCARDIOGRAM REPORT: CPT | Mod: ,,, | Performed by: INTERNAL MEDICINE

## 2022-05-11 PROCEDURE — 86803 HEPATITIS C AB TEST: CPT | Performed by: PHYSICIAN ASSISTANT

## 2022-05-11 PROCEDURE — 87389 HIV-1 AG W/HIV-1&-2 AB AG IA: CPT | Performed by: PHYSICIAN ASSISTANT

## 2022-05-11 PROCEDURE — 84484 ASSAY OF TROPONIN QUANT: CPT | Performed by: PHYSICIAN ASSISTANT

## 2022-05-11 PROCEDURE — 99285 PR EMERGENCY DEPT VISIT,LEVEL V: ICD-10-PCS | Mod: ,,, | Performed by: PHYSICIAN ASSISTANT

## 2022-05-11 PROCEDURE — 99285 EMERGENCY DEPT VISIT HI MDM: CPT | Mod: 25

## 2022-05-11 PROCEDURE — 25000003 PHARM REV CODE 250: Performed by: PHYSICIAN ASSISTANT

## 2022-05-11 PROCEDURE — 93005 ELECTROCARDIOGRAM TRACING: CPT

## 2022-05-11 PROCEDURE — 85025 COMPLETE CBC W/AUTO DIFF WBC: CPT | Performed by: PHYSICIAN ASSISTANT

## 2022-05-11 PROCEDURE — 93010 EKG 12-LEAD: ICD-10-PCS | Mod: ,,, | Performed by: INTERNAL MEDICINE

## 2022-05-11 PROCEDURE — 63600175 PHARM REV CODE 636 W HCPCS: Performed by: PHYSICIAN ASSISTANT

## 2022-05-11 PROCEDURE — 83880 ASSAY OF NATRIURETIC PEPTIDE: CPT | Performed by: PHYSICIAN ASSISTANT

## 2022-05-11 PROCEDURE — 80053 COMPREHEN METABOLIC PANEL: CPT | Performed by: PHYSICIAN ASSISTANT

## 2022-05-11 PROCEDURE — 99285 EMERGENCY DEPT VISIT HI MDM: CPT | Mod: ,,, | Performed by: PHYSICIAN ASSISTANT

## 2022-05-11 RX ORDER — BUMETANIDE 2 MG/1
2 TABLET ORAL DAILY
Qty: 30 TABLET | Refills: 0 | Status: SHIPPED | OUTPATIENT
Start: 2022-05-11 | End: 2022-06-28

## 2022-05-11 RX ORDER — HYDROCODONE BITARTRATE AND ACETAMINOPHEN 5; 325 MG/1; MG/1
1 TABLET ORAL EVERY 6 HOURS PRN
Qty: 10 TABLET | Refills: 0 | Status: SHIPPED | OUTPATIENT
Start: 2022-05-11 | End: 2022-05-19

## 2022-05-11 RX ORDER — FUROSEMIDE 10 MG/ML
60 INJECTION INTRAMUSCULAR; INTRAVENOUS
Status: COMPLETED | OUTPATIENT
Start: 2022-05-11 | End: 2022-05-11

## 2022-05-11 RX ORDER — HYDROCODONE BITARTRATE AND ACETAMINOPHEN 10; 325 MG/1; MG/1
1 TABLET ORAL
Status: COMPLETED | OUTPATIENT
Start: 2022-05-11 | End: 2022-05-11

## 2022-05-11 RX ORDER — POTASSIUM CHLORIDE 20 MEQ/1
20 TABLET, EXTENDED RELEASE ORAL 2 TIMES DAILY
Qty: 60 TABLET | Refills: 3 | Status: SHIPPED | OUTPATIENT
Start: 2022-05-11 | End: 2022-10-11

## 2022-05-11 RX ADMIN — FUROSEMIDE 60 MG: 10 INJECTION, SOLUTION INTRAMUSCULAR; INTRAVENOUS at 06:05

## 2022-05-11 RX ADMIN — HYDROCODONE BITARTRATE AND ACETAMINOPHEN 1 TABLET: 10; 325 TABLET ORAL at 06:05

## 2022-05-11 RX ADMIN — POTASSIUM BICARBONATE 40 MEQ: 391 TABLET, EFFERVESCENT ORAL at 05:05

## 2022-05-11 NOTE — PROVIDER PROGRESS NOTES - EMERGENCY DEPT.
Encounter Date: 5/11/2022    ED Physician Progress Notes         EKG - STEMI Decision  Initial Reading: No STEMI present.  Response: No Action Needed.

## 2022-05-11 NOTE — ED NOTES
Shiraz Jha, a 62 y.o. male presents to the ED w/ complaint of bilateral lower leg swelling, pt reports d/c yesterday with swelling noted to legs. Pt reports difficulty walking, denies chest pain, SOB     Triage note:  Chief Complaint   Patient presents with    Leg Swelling     Discharged yesterday after being admitted for GI bleed. Swelling in legs.  Pale.      Review of patient's allergies indicates:   Allergen Reactions    Iodinated contrast media Other (See Comments)     Raises BP       Past Medical History:   Diagnosis Date    Allergy     Anemia     Asthma     Atrial fibrillation     Cataract     Chronic rhinitis 9/26/2013    DDD (degenerative disc disease) 4/3/2015    Depression     Diabetes mellitus     Fibromyalgia     Gastroesophageal reflux disease without esophagitis 7/14/2017    Hypertension     Sinusitis, acute, maxillary 12/20/2012    Thyroid disease

## 2022-05-11 NOTE — ED PROVIDER NOTES
Encounter Date: 5/11/2022       History     Chief Complaint   Patient presents with    Leg Swelling     Discharged yesterday after being admitted for GI bleed. Swelling in legs.  Pale.       61 yo male with a past medical history of AFib s/p ablation on Xarelto, HTN, T2DM, Gastric Bypass 2010, BLE lymphedema on Bumex presents ER for evaluation of leg swelling.  Patient reports he was discharged from the hospital yesterday for GI bleed workup.  He states that since discharge she has noticed worsening leg swelling to bilateral lower extremities.  He states he did not take his Bumex this morning as he was coming to the emergency room.  Patient reports worsening pain upon ambulation to his feet and lower legs.  He denies any associated shortness of breath or chest pain.  Denies any prior history of DVT or PE.  Patient denies any injury, trauma or fall.  No lightheadedness or dizziness.    The history is provided by the patient.     Review of patient's allergies indicates:   Allergen Reactions    Iodinated contrast media Other (See Comments)     Raises BP       Past Medical History:   Diagnosis Date    Allergy     Anemia     Asthma     Atrial fibrillation     Cataract     Chronic rhinitis 9/26/2013    DDD (degenerative disc disease) 4/3/2015    Depression     Diabetes mellitus     Fibromyalgia     Gastroesophageal reflux disease without esophagitis 7/14/2017    Hypertension     Sinusitis, acute, maxillary 12/20/2012    Thyroid disease      Past Surgical History:   Procedure Laterality Date    CERVICAL SPINE SURGERY      COLONOSCOPY N/A 5/10/2022    Procedure: COLONOSCOPY;  Surgeon: Conrad Diaz MD;  Location: [facility] ENDO [address]);  Service: Endoscopy;  Laterality: N/A;    EPIDURAL STEROID INJECTION INTO LUMBAR SPINE N/A 5/29/2018    Procedure: INJECTION-STEROID-EPIDURAL-LUMBAR- L4-5;  Surgeon: Roman Lyman MD;  Location: [facility] PAIN T;  Service: Pain Management;  Laterality: N/A;  Patient is  diabetic and Xarleto     EPIDURAL STEROID INJECTION INTO LUMBAR SPINE N/A 7/3/2018    Procedure: INJECTION, STEROID, SPINE, LUMBAR, EPIDURAL- L4-5;  Surgeon: Roman Lyman MD;  Location: [facility] PAIN T;  Service: Pain Management;  Laterality: N/A;  Patient takes Xarelto and ASA     ESOPHAGOGASTRODUODENOSCOPY N/A 5/9/2022    Procedure: EGD (ESOPHAGOGASTRODUODENOSCOPY);  Surgeon: Conrad Diaz MD;  Location: [facility] (2ND FLR);  Service: Endoscopy;  Laterality: N/A;    EXCISION OF LESION OF LIP N/A 7/7/2020    Procedure: EXCISION, LESION, LIP;  Surgeon: Caden Navarro MD;  Location: [facility] OR [address] [place] [address];  Service: ENT;  Laterality: N/A;    GASTRIC BYPASS      SINUS SURGERY  2000    Dr. Watt at [facility]    TONSILLECTOMY       Family History   Problem Relation Age of Onset    Heart disease Father     Cancer Mother         lung    Hypertension Sister     Heart disease Brother     Cirrhosis Brother     Diabetes Brother      Social History     Tobacco Use    Smoking status: Never Smoker    Smokeless tobacco: Never Used   Substance Use Topics    Alcohol use: No     Comment: rare    Drug use: No     Review of Systems   Constitutional: Negative for chills and fever.   Eyes: Negative for visual disturbance.   Respiratory: Negative for shortness of breath.    Cardiovascular: Positive for leg swelling. Negative for chest pain and palpitations.   Gastrointestinal: Negative for nausea and vomiting.   Genitourinary: Negative for dysuria and flank pain.   Musculoskeletal: Positive for myalgias.   Skin: Negative for rash.   Allergic/Immunologic: Negative for immunocompromised state.   Neurological: Negative for weakness and numbness.   Hematological: Does not bruise/bleed easily.   Psychiatric/Behavioral: Negative for confusion.       Physical Exam     Initial Vitals [05/11/22 1407]   BP Pulse Resp Temp SpO2   (!) 144/63 89 16 98.7 °F (37.1 °C) 100 %      MAP       --         Physical Exam    Vitals  reviewed.  Constitutional: He appears well-developed and well-nourished. He is not diaphoretic. No distress.   HENT:   Head: Normocephalic and atraumatic.   Eyes: Conjunctivae and EOM are normal.   Neck: Neck supple.   Cardiovascular: Normal rate, regular rhythm, normal heart sounds and intact distal pulses.   Pulmonary/Chest: Breath sounds normal. No respiratory distress.   Musculoskeletal:         General: Normal range of motion.      Cervical back: Neck supple.      Right lower le+ Pitting Edema present.      Left lower le+ Pitting Edema present.      Comments: Lower extremity edema extends to the mid calf     Neurological: He is alert and oriented to person, place, and time.   Skin: Skin is warm.         ED Course   Procedures  Labs Reviewed   COMPREHENSIVE METABOLIC PANEL - Abnormal; Notable for the following components:       Result Value    Potassium 3.3 (*)     Glucose 130 (*)     Total Protein 5.9 (*)     Albumin 2.8 (*)     Total Bilirubin 1.1 (*)     All other components within normal limits    Narrative:     Release to patient->Immediate   CBC W/ AUTO DIFFERENTIAL - Abnormal; Notable for the following components:    RBC 2.72 (*)     Hemoglobin 8.1 (*)     Hematocrit 25.6 (*)     MCHC 31.6 (*)     Gran % 78.0 (*)     Lymph % 12.4 (*)     All other components within normal limits    Narrative:     Release to patient->Immediate   B-TYPE NATRIURETIC PEPTIDE - Abnormal; Notable for the following components:     (*)     All other components within normal limits    Narrative:     Release to patient->Immediate   URINALYSIS, REFLEX TO URINE CULTURE - Abnormal; Notable for the following components:    Protein, UA 1+ (*)     Ketones, UA Trace (*)     All other components within normal limits    Narrative:     Specimen Source->Urine   TROPONIN I    Narrative:     Release to patient->Immediate   URINALYSIS MICROSCOPIC    Narrative:     Specimen Source->Urine   HIV 1 / 2 ANTIBODY   HEPATITIS C ANTIBODY           Imaging Results          US Lower Extremity Veins Bilateral (Final result)  Result time 05/11/22 16:41:20    Final result by Dante Mota MD (05/11/22 16:41:20)                 Impression:      No evidence of deep venous thrombosis in either lower extremity.    Electronically signed by resident: Corbin Grissom  Date:    05/11/2022  Time:    16:28    Electronically signed by: Dante Mota MD  Date:    05/11/2022  Time:    16:41             Narrative:    EXAMINATION:  US LOWER EXTREMITY VEINS BILATERAL    CLINICAL HISTORY:  Other specified soft tissue disorders    TECHNIQUE:  Duplex and color flow Doppler and dynamic compression was performed of the bilateral lower extremity veins was performed.    COMPARISON:  None    FINDINGS:  Right thigh veins: The common femoral, femoral, popliteal, and upper greater saphenous veins are patent and free of thrombus. The veins are normally compressible and have normal phasic flow and augmentation response.    Right calf veins: The visualized calf veins are patent.    Left thigh veins: The common femoral, femoral, popliteal, and upper greater saphenous veins are patent and free of thrombus. The veins are normally compressible and have normal phasic flow and augmentation response.    Left calf veins: The visualized calf veins are patent.    Miscellaneous: None                               X-Ray Chest AP Portable (Final result)  Result time 05/11/22 15:56:57    Final result by David Gale MD (05/11/22 15:56:57)                 Impression:      See above      Electronically signed by: David Gale MD  Date:    05/11/2022  Time:    15:56             Narrative:    EXAMINATION:  XR CHEST AP PORTABLE    CLINICAL HISTORY:  Other specified soft tissue disorders    TECHNIQUE:  Single frontal view of the chest was performed.    COMPARISON:  07/14/2017    FINDINGS:  Cardiac size is normal.  Lungs are clear.  No infiltrate is noted.                                  Medications   potassium bicarbonate disintegrating tablet 40 mEq (40 mEq Oral Given 5/11/22 1706)   furosemide injection 60 mg (60 mg Intravenous Given 5/11/22 1805)   HYDROcodone-acetaminophen  mg per tablet 1 tablet (1 tablet Oral Given 5/11/22 1841)           APC / Resident Notes:   Patient seen in the ER promptly upon arrival.  He is afebrile, no acute distress.  Physical examination reveals 4+ pitting edema to bilateral lower extremity.  The edema does extend to the mid calf.  Distal pulses intact and equal bilaterally.  Sensation and strength fully intact.    IV access established, labs ordered.  Laboratory studies show normal white count of 8.3.  Hemoglobin is stable.  8.1, similar to previous.  Chemistries reveal potassium 3.3.  Patient was given oral potassium in the ED.  kidney functions within normal limits.  .  Troponin unremarkable.  X-ray of chest does not reveal any acute pathology.    Chart review:  Office visit: BLE Edema due to lymphedema and venous insufficiency- Continue bumex 2 mg bid for 1 week, then reduce to 1 mg bid for 1 week.  Pt to continue this regimen/cycle of bumex.    On reassessment patient is resting comfortably.  I discussed case with cardiology who did evaluate patient in the ED.  Bedside echocardiogram was done by Cardiology.  EF approximately 50%.  Pulse the patient was stable for outpatient follow-up.  Recommended to increase Bumex.  Patient will require repeat lab work to check kidney functions this week which was placed by Cardiology.  Ambulatory referral to Cardiology also placed.  Patient was instructed to use compression stockings.  He was however given strict return precautions ED which was agreeable to.  He is stable for discharge and close follow-up at this time.    The care of this patient was overseen by attending physician who agrees with treatment, plan, and disposition.    Disclaimer: This note has been generated using voice-recognition software.  There may be typographical errors that have been missed during proof-reading.          ED Course as of 05/11/22 2052   Wed May 11, 2022   1839 Discussed case with cardiology who will see patient in the ED and do bedside echo.  Recommended to double patient's Bumex for a short period time.  Pending outpatient repeat labs in close follow-up with cardiology clinic this week. [AJ]      ED Course User Index  [AJ] Najma Whittaker PA-C             Clinical Impression:   Final diagnoses:  [M79.89] Leg swelling  [M79.89] Leg swelling - leg pain  [R60.9] Peripheral edema (Primary)          ED Disposition Condition    Discharge Stable        ED Prescriptions     Medication Sig Dispense Start Date End Date Auth. Provider    HYDROcodone-acetaminophen (NORCO) 5-325 mg per tablet Take 1 tablet by mouth every 6 (six) hours as needed for Pain. 10 tablet 5/11/2022  Najma Whittaker PA-C        Follow-up Information    None          Najma Whittaker PA-C  05/11/22 2052

## 2022-05-11 NOTE — PLAN OF CARE
[facility] - Telemetry Stepdown ([facility]-7)  Discharge Final Note    Primary Care Provider: GUSTAVO Theodore MD    Expected Discharge Date: 5/10/2022    Patient discharged to home via personal transportation.     Patient's bedside nurse and patient notified of the above.      Final Discharge Note (most recent)       Final Note - 05/11/22 0926          Final Note    Assessment Type Final Discharge Note (P)      Anticipated Discharge Disposition Home or Self Care (P)         Post-Acute Status    Post-Acute Authorization Other (P)      Other Status No Post-Acute Service Needs (P)                      Important Message from Medicare             Contact Info       [facility]   Specialty: Gastroenterology    [address]  [facility] [address]   Phone: [phone]       Next Steps: Follow up    Instructions: Nurse will call to schedule follow up appointment; however, if you do not hear from someone within 48 hours contact the number listed.            Future Appointments   Date Time Provider Department Center   5/17/2022  1:00 PM Giselle Tran MD [facility]   5/19/2022  2:00 PM [facility]        scheduled post-discharge follow-up appointment and information added to AVS.     Ofelia Elizabeth LMSW  [facility]  Ext. [phone]

## 2022-05-12 ENCOUNTER — TELEPHONE (OUTPATIENT)
Dept: ENDOSCOPY | Facility: HOSPITAL | Age: 62
End: 2022-05-12
Payer: COMMERCIAL

## 2022-05-12 LAB
HCV AB SERPL QL IA: NEGATIVE
HIV 1+2 AB+HIV1 P24 AG SERPL QL IA: NEGATIVE

## 2022-05-12 NOTE — ED NOTES
Report received from ANA Shaikh. Care of pt assumed. Pt aaox3, neuro intact. resp even and unlabored. Nadn. Pt c/o leg pain with standing. Pt currently sitting on side of bed using the urinal. Pt denies sob or any other complaints at this time. Awaiting provider recheck. Ccm/bp/o2 monitoring in place. Call light within reach.

## 2022-05-12 NOTE — CARE UPDATE
61 yo male with a past medical history of AFib s/p ablation on Xarelto, HTN, T2DM, Gastric Bypass 2010, BLE lymphedema on Bumex presents ER for evaluation of leg swelling and pain.  Patient was discharged from the hospital yesterday for GI bleed workup that was inconclusive with plans to follow up outpatient.  He states that since discharge she has noticed worsening bilateral lower extremity edema. He did not take his Bumex this morning bc he was concerned it would affect his kidneys after receiving Lasix during his hospitalization. He denies any shortness of breath, chest pain, palpitations.No lightheadedness or dizziness. Denies any prior history of DVT or PE.    Doppler US of LE done in the ED with no evidence of DVT. I personally did a bedside echo which showed EF approx 50% with no obvious WMA. IVC measured 2.2cm with less than 50% collapsibility suggesting CVP >15. Patient is hemodynamically stable and sating in RA.    Patient stable for discharge from ED with close outpatient follow up therefore recommend the following:    Recommendations:  · Double diuretic regimen until able to follow up on Monday  · Take bumex 4mg in the AM and 4mg in the PM on Mon, Wed, Fri  · Take 2mg in the AM and 2mg in the PM on Tues, Thurs, Sat/Sun.  · Follow up with Dr. Ureña or general cardiology clinic on Monday  · Order Labs to assess electrolytes and renal function for Monday as well as 2D echo  · Please give K at discharge given hypokalemia on labs and plans for increase in diuretic   · Discussed importance and recommended use of compression stockings.  · Recommend low sodium diet <2g daily    Edith Alvarado MD PGY V  Cardiology  Pager: [phone]  [facility]

## 2022-05-12 NOTE — DISCHARGE INSTRUCTIONS
On Monday/ Wednesday/ Friday take 4mg Bumex two times a day.    Tuesday/ Thursday/ Saturaday/ Sunday take 2mg Bumex two times a day.     You will need to get blood work repeated next week. The order has been placed by cardiology.     You will also need close follow up with Dr. Ureña this week. Referral has been placed.     Take norco only for breakthrough pain. Take tylenol otherwise. Use compression stockings.

## 2022-05-12 NOTE — TELEPHONE ENCOUNTER
----- Message from Lyle Edmond MD sent at 5/10/2022  5:16 PM CDT -----  I'll see him Thursday at 1 pm.  Can he come?    Donovan    ----- Message -----  From: Michela Samayoa MA  Sent: 5/10/2022   2:49 PM CDT  To: Lyle Edmond MD    Please review  ----- Message -----  From: Vickey Begum MD  Sent: 5/10/2022   2:44 PM CDT  To: [facility] Staff    Hey!    This patient needs assistance with scheduling follow-up with either myself or Dr. Edmond to discuss obtaining a retrograde balloon enteroscopy.     Thanks,     Vickey

## 2022-05-13 ENCOUNTER — TELEPHONE (OUTPATIENT)
Dept: INTERNAL MEDICINE | Facility: CLINIC | Age: 62
End: 2022-05-13
Payer: COMMERCIAL

## 2022-05-13 NOTE — TELEPHONE ENCOUNTER
----- Message from Trinidad Kelly sent at 5/13/2022 10:19 AM CDT -----  Contact: Pt [phone]  Patient had resent hospital stay and would like to be seen by you for his hospital F/U. Please call and advise.    Thank you and have a great day.

## 2022-05-17 ENCOUNTER — HOSPITAL ENCOUNTER (OUTPATIENT)
Dept: CARDIOLOGY | Facility: HOSPITAL | Age: 62
Discharge: HOME | End: 2022-05-17
Attending: STUDENT IN AN ORGANIZED HEALTH CARE EDUCATION/TRAINING PROGRAM
Payer: COMMERCIAL

## 2022-05-17 ENCOUNTER — OFFICE VISIT (OUTPATIENT)
Dept: CARDIOLOGY | Facility: CLINIC | Age: 62
End: 2022-05-17
Payer: COMMERCIAL

## 2022-05-17 VITALS
DIASTOLIC BLOOD PRESSURE: 72 MMHG | BODY MASS INDEX: 45 KG/M2 | HEIGHT: 66 IN | HEART RATE: 58 BPM | WEIGHT: 280 LBS | SYSTOLIC BLOOD PRESSURE: 108 MMHG

## 2022-05-17 VITALS
HEART RATE: 68 BPM | WEIGHT: 280 LBS | DIASTOLIC BLOOD PRESSURE: 64 MMHG | SYSTOLIC BLOOD PRESSURE: 123 MMHG | BODY MASS INDEX: 42.44 KG/M2 | HEIGHT: 68 IN

## 2022-05-17 DIAGNOSIS — Z74.09 MOBILITY IMPAIRED: ICD-10-CM

## 2022-05-17 DIAGNOSIS — I20.89 STABLE ANGINA PECTORIS: ICD-10-CM

## 2022-05-17 DIAGNOSIS — I10 ESSENTIAL HYPERTENSION: ICD-10-CM

## 2022-05-17 DIAGNOSIS — R60.0 EDEMA OF BOTH LOWER EXTREMITIES: Primary | ICD-10-CM

## 2022-05-17 DIAGNOSIS — Z86.79 S/P ABLATION OF ATRIAL FIBRILLATION: ICD-10-CM

## 2022-05-17 DIAGNOSIS — I48.19 PERSISTENT ATRIAL FIBRILLATION: ICD-10-CM

## 2022-05-17 DIAGNOSIS — I89.0 LYMPHEDEMA OF BOTH LOWER EXTREMITIES: ICD-10-CM

## 2022-05-17 DIAGNOSIS — I48.0 PAROXYSMAL ATRIAL FIBRILLATION: ICD-10-CM

## 2022-05-17 DIAGNOSIS — G47.33 OBSTRUCTIVE SLEEP APNEA: ICD-10-CM

## 2022-05-17 DIAGNOSIS — Z98.890 S/P ABLATION OF ATRIAL FIBRILLATION: ICD-10-CM

## 2022-05-17 DIAGNOSIS — I50.9 ACUTE DECOMPENSATED HEART FAILURE: ICD-10-CM

## 2022-05-17 LAB
ASCENDING AORTA: 3.6 CM
AV INDEX (PROSTH): 0.9
AV MEAN GRADIENT: 4 MMHG
AV PEAK GRADIENT: 8 MMHG
AV VALVE AREA: 4.35 CM2
AV VELOCITY RATIO: 0.91
BSA FOR ECHO PROCEDURE: 2.47 M2
CV ECHO LV RWT: 0.4 CM
DOP CALC AO PEAK VEL: 1.45 M/S
DOP CALC AO VTI: 25.9 CM
DOP CALC LVOT AREA: 4.8 CM2
DOP CALC LVOT DIAMETER: 2.48 CM
DOP CALC LVOT PEAK VEL: 1.32 M/S
DOP CALC LVOT STROKE VOLUME: 112.74 CM3
DOP CALCLVOT PEAK VEL VTI: 23.35 CM
E WAVE DECELERATION TIME: 235.44 MSEC
E/A RATIO: 1.15
E/E' RATIO: 9.38 M/S
ECHO LV POSTERIOR WALL: 1.09 CM (ref 0.6–1.1)
EJECTION FRACTION: 60 %
FRACTIONAL SHORTENING: 33 % (ref 28–44)
INTERVENTRICULAR SEPTUM: 1.16 CM (ref 0.6–1.1)
IVRT: 91.34 MSEC
LA MAJOR: 5.17 CM
LA MINOR: 6.28 CM
LA WIDTH: 2.21 CM
LEFT ATRIUM SIZE: 3.87 CM
LEFT ATRIUM VOLUME INDEX: 17.5 ML/M2
LEFT ATRIUM VOLUME: 41.23 CM3
LEFT INTERNAL DIMENSION IN SYSTOLE: 3.67 CM (ref 2.1–4)
LEFT VENTRICLE DIASTOLIC VOLUME INDEX: 61.11 ML/M2
LEFT VENTRICLE DIASTOLIC VOLUME: 144.23 ML
LEFT VENTRICLE MASS INDEX: 104 G/M2
LEFT VENTRICLE SYSTOLIC VOLUME INDEX: 24.2 ML/M2
LEFT VENTRICLE SYSTOLIC VOLUME: 57.14 ML
LEFT VENTRICULAR INTERNAL DIMENSION IN DIASTOLE: 5.45 CM (ref 3.5–6)
LEFT VENTRICULAR MASS: 245.75 G
LV LATERAL E/E' RATIO: 9.38 M/S
LV SEPTAL E/E' RATIO: 9.38 M/S
MV A" WAVE DURATION": 9.42 MSEC
MV PEAK A VEL: 0.65 M/S
MV PEAK E VEL: 0.75 M/S
MV STENOSIS PRESSURE HALF TIME: 68.28 MS
MV VALVE AREA P 1/2 METHOD: 3.22 CM2
PISA TR MAX VEL: 2.3 M/S
PULM VEIN S/D RATIO: 0.82
PV PEAK D VEL: 0.5 M/S
PV PEAK S VEL: 0.41 M/S
RA MAJOR: 4.79 CM
RA PRESSURE: 3 MMHG
RA WIDTH: 3.05 CM
RIGHT VENTRICULAR END-DIASTOLIC DIMENSION: 3.63 CM
RV TISSUE DOPPLER FREE WALL SYSTOLIC VELOCITY 1 (APICAL 4 CHAMBER VIEW): 16.73 CM/S
SINUS: 3.52 CM
STJ: 2.93 CM
TDI LATERAL: 0.08 M/S
TDI SEPTAL: 0.08 M/S
TDI: 0.08 M/S
TR MAX PG: 21 MMHG
TRICUSPID ANNULAR PLANE SYSTOLIC EXCURSION: 2.92 CM
TV REST PULMONARY ARTERY PRESSURE: 24 MMHG

## 2022-05-17 PROCEDURE — 1159F MED LIST DOCD IN RCRD: CPT | Mod: CPTII,S$GLB,, | Performed by: INTERNAL MEDICINE

## 2022-05-17 PROCEDURE — 93306 ECHO (CUPID ONLY): ICD-10-PCS | Mod: 26,,, | Performed by: INTERNAL MEDICINE

## 2022-05-17 PROCEDURE — 99215 OFFICE O/P EST HI 40 MIN: CPT | Mod: S$GLB,,, | Performed by: INTERNAL MEDICINE

## 2022-05-17 PROCEDURE — 3078F DIAST BP <80 MM HG: CPT | Mod: CPTII,S$GLB,, | Performed by: INTERNAL MEDICINE

## 2022-05-17 PROCEDURE — 3008F PR BODY MASS INDEX (BMI) DOCUMENTED: ICD-10-PCS | Mod: CPTII,S$GLB,, | Performed by: INTERNAL MEDICINE

## 2022-05-17 PROCEDURE — 99215 PR OFFICE/OUTPT VISIT, EST, LEVL V, 40-54 MIN: ICD-10-PCS | Mod: S$GLB,,, | Performed by: INTERNAL MEDICINE

## 2022-05-17 PROCEDURE — 3074F SYST BP LT 130 MM HG: CPT | Mod: CPTII,S$GLB,, | Performed by: INTERNAL MEDICINE

## 2022-05-17 PROCEDURE — 3078F PR MOST RECENT DIASTOLIC BLOOD PRESSURE < 80 MM HG: ICD-10-PCS | Mod: CPTII,S$GLB,, | Performed by: INTERNAL MEDICINE

## 2022-05-17 PROCEDURE — 93306 TTE W/DOPPLER COMPLETE: CPT

## 2022-05-17 PROCEDURE — 1159F PR MEDICATION LIST DOCUMENTED IN MEDICAL RECORD: ICD-10-PCS | Mod: CPTII,S$GLB,, | Performed by: INTERNAL MEDICINE

## 2022-05-17 PROCEDURE — 93306 TTE W/DOPPLER COMPLETE: CPT | Mod: 26,,, | Performed by: INTERNAL MEDICINE

## 2022-05-17 PROCEDURE — 3074F PR MOST RECENT SYSTOLIC BLOOD PRESSURE < 130 MM HG: ICD-10-PCS | Mod: CPTII,S$GLB,, | Performed by: INTERNAL MEDICINE

## 2022-05-17 PROCEDURE — 3008F BODY MASS INDEX DOCD: CPT | Mod: CPTII,S$GLB,, | Performed by: INTERNAL MEDICINE

## 2022-05-17 PROCEDURE — 99999 PR PBB SHADOW E&M-EST. PATIENT-LVL V: ICD-10-PCS | Mod: PBBFAC,,, | Performed by: INTERNAL MEDICINE

## 2022-05-17 PROCEDURE — 99999 PR PBB SHADOW E&M-EST. PATIENT-LVL V: CPT | Mod: PBBFAC,,, | Performed by: INTERNAL MEDICINE

## 2022-05-17 RX ORDER — BREXPIPRAZOLE 1 MG/1
1 TABLET ORAL DAILY
COMMUNITY
Start: 2022-05-16 | End: 2022-06-28

## 2022-05-17 NOTE — PROGRESS NOTES
"[facility]    CC: Lower Extremity Edema    Patient ID: Mr. Shiraz Jha is a 62 y.o. male with Afib s/p ablation 7/14, BLE lymphedema, venous insufficiency, HTN, DM2, morbid obesity s/p gastric bypass (2010), who presents for a follow up appointment.  Pertinent history/events are as follows:    -Pt kindly referred by Twyla Bahena and Dr Scott for evaluation of lower extremity edema (per Dr. Scott's note on 9/24/2019:  "Mr. Jha is in clinic today for continued LE edema and discoloration of his legs.  He had a venous ultrasound on 8/6/19 for the swelling and mild reflux was noted in the left popliteal.  Patient denies chest pain with exertion or at rest, palpitations, SOB, DAI, dizziness, syncope, orthopnea, PND, or claudication.  Instructed to elevate legs when seated, low salt diet, increase walking and compression stockings. No venous reflux noted on US.  Refer to Dr. Ureña in interventional cardiology."    -At our initial clinic visit on 10/16/2019, Mr. Jha reported BLE edema for the past 1 year.  States LE edema is improved with graduated compression hose.  He has no LE pain, claudication symptoms, rest pain, or tissue loss.  No smoking history.  States he has gained over 50 pounds in the past year, despite gastric bypass surgery.  BLE Venous Reflux Study from 8/6/2019 showed no evidence of lower extremity DVT bilaterally.  There is mild left popliteal (deep venous) reflux.  Plan:   BLE Edema- BLE edema likely due to a component of venous insufficiency and morbid obesity.  Check cmp today.  If potassium and creatinine are appropriate, increase lasix to 40 mg bid for 7 days, then resume at 40 mg daily.  Pt to continue this cycle/regimen for lasix.  Pt will benefit from significant weight loss.  Continue graduated compression hose, leg elevation and low salt diet.  Limit fluid intake to 2 liters a day.  Check exercise SURINDER to evaluate for significant PAD.   Morbid Obesity- Refer back to " Bariatric Surgery.     -At follow up clinic visit on 11/27/2019, Mr. Jha reported no significant improvement in BLE edema with lasix regimen of 40 mg bid for 7 days, then resuming at 40 mg daily.  Exercise SURINDER from 11/15/2019 showed normal rest and exercise SURINDER bilaterally with normal PVR waveforms bilaterally.  Exam shows 1+ BLE pitting edema.  Plan:   BLE Edema- BLE edema likely due to a component of venous insufficiency and morbid obesity.  Check cmp today.  If potassium and creatinine are appropriate, discontinue lasix and start bumex 1 mg bid.  Pt will benefit from significant weight loss.  Continue graduated compression hose, leg elevation and low salt diet.  Limit fluid intake to 2 liters a day.  Check exercise SURINDER to evaluate for significant PAD.   Morbid Obesity- Pt referred back to Bariatric Surgery.     -At clinic visit on 1/8/2020, Mr. Jha reported significant improvement in BLE edema since starting bumex 1 mg daily.  Exercise SURINDER study from 11/25/2019 showed normal rest and exercise SURINDER bilaterally.  Normal PVR waveforms bilaterally.  Plan:   BLE Edema- BLE edema now improving.  Continue bumex 1 mg daily.  Check cmp today to monitor renal fxn.  Exercise SURINDER study from 11/25/2019 showed normal rest and exercise SURINDER bilaterally.  Normal PVR waveforms bilaterally. Continue graduated compression hose, leg elevation and low salt diet.  Limit fluid intake to 2 liters a day.    Morbid Obesity- Pt referred back to Bariatric Surgery.     - At clinic visit on 04/08/20, Mr. Jha reported doing well with no significant LE edema.  Continues to wear graduated compression hose, low salt diet, and limiting fluid intake to 2 liters a day.    Plan  BLE Edema- Currently doing well.  Continue bumex 1 mg daily.  Continue graduated compression hose, leg elevation and low salt diet.  Limit fluid intake to 2 liters a day.      - At clinic visit on 12/01/20, Mr. Jha reported worsening swelling of his bilateral lower  extremities. He stopped wearing compression hose a month ago, however, he continues to take bumex twice a day. He reports no rest pain, claudication, CLI or tissue atrophy.   Plan:  BLE Edema- Due to lymphedema and venous insufficiency. Patient notices worsening of bilateral lower extremity edema after he stopped using compression hose for the past month.  Pt instructed to resume graduated compression hose as prescribed.  Continue bumex 1 mg twice a day.  Continue leg elevation and low salt diet.  Limit fluid intake to 2 liters a day.      -At clinic visit on 1/7/2021, Mr. Jha reported improvement in bilateral lower extremity edema compared to previous visit on 12/01/20.  He reports wearing graduated compression hose.  He is taking Bumex 1 mg twice a day.  He is not following sodium restricted diet, and diet includes soups and gumbo.  He reports no claudication, rest pain, or tissue loss.      Plan:   BLE Edema- Due to lymphedema and venous insufficiency. Patient notices improvement of bilateral lower extremity edema. Continue graduated compression hose.  Continue bumex 1 mg twice a day.  Continue leg elevation when resting.  Encourage sodium restriction to 2000 mg/day and limit fluid intake to 2 liters a day.  Obesity- Pt is following up with Bariatric Surgery.  Encourage dietary modification, exercise and weight loss.      -At clinic visit on 2/9/2021, Mr. Jha reports mild improvement in BLE edema since clinic visit on 1/7/2021.  He has no claudication or tissue loss.   Plan:   BLE Edema- Due to lymphedema and venous insufficiency.   Edema is improving.  Check cmp today.  If creatinine and potassium are appropriate, increase bumex to 2 mg bid for 5 days, then decrease to 1 mg bid for 5 days.  Pt to continue this cycle/ergiemen of lasix.  Continue leg elevation when resting.  Limit sodium restriction to 2000 mg/day and limit fluid intake to 2 liters a day.  Obesity- Pt is following up with Bariatric Surgery.   Encourage dietary modification, exercise and weight loss.      - At clinic visit on 03/25/21, Mr. Jha reported no new symptoms.  Exam shows BLE edema has improved significantly.  Plan:  BLE Edema- Due to lymphedema and venous insufficiency.   Edema has improved significantly.  Refer to lymphedema clinic.  Recheck cmp today.  If creatinine and potassium are appropriate, continue bumex 2 mg bid for 5 days, then decrease to 1 mg bid for 5 days.  Pt to continue this cycle/ergiemen of lasix.  Continue leg elevation when resting.  Limit sodium restriction to 2000 mg/day and limit fluid intake to 2 liters a day.    Obesity- Pt is following up with Bariatric Surgery.  Encourage dietary modification, exercise and weight loss.      -At clinic visit on 5/25/2021, Mr. Jha reported significant improvement in lower extremity edema since initiation of lymphedema clinic therapy.   He reports no claudication, rest pain or tissue loss.   Plan:    BLE Edema- Due to lymphedema and venous insufficiency.   Edema has improved significantly.  Continue lymphedema clinic.  Check CMP today.  If creatinine and potassium are appropriate, continue bumex 2 mg bid for 5 days, then decrease to 1 mg bid for 5 days.  Pt to continue this cycle/ergiemen of lasix.  Continue leg elevation when resting.  Limit sodium restriction to 2000 mg/day and limit fluid intake to 2 liters a day.    Obesity- Pt is following up with Bariatric Surgery.  Encourage dietary modification, exercise and weight loss.       -At clinic visit on 7/13/2021, Mr. Jha reported significant improvement in BLE edema following lymphedema clinic therapy.  He has no claudication or tissue loss.   Plan:   BLE Edema due to lymphedema and venous insufficiency- Now significantly improved following lymphedema clinic therapy.  Check cmp today.  If potassium and creatinine are appropriate, continue bumex 2 mg bid for 1 week, then reduce to 1 mg bid for 1 week.  Pt to continue this  regimen/cycle of bumex.    Obesity- Pt is following up with Bariatric Surgery.  Encourage dietary modification, exercise and weight loss.     10/14/2021 clinic visit: Mr. Jha reports continued improvement in BLE edema.  He has no claudication or tissue loss.    Plan:  BLE Edema due to lymphedema and venous insufficiency- Remains significantly improved.  Continue bumex 2 mg bid for 1 week, then reduce to 1 mg bid for 1 week.  Pt to continue this regimen/cycle of bumex.    Obesity- Pt is following up with Bariatric Surgery.  Encourage dietary modification, exercise and weight loss.      1/18/2022 clinic visit: Mr. Jha reports feeling well and swelling improved. He has no other complaints.   Plan:   BLE Edema due to lymphedema and venous insufficiency- Continues to improve. Continue bumex, compression stockings, elevate legs when resting, limit fluid intake to 1.5L daily, sodium intake to 2000mg daily.  Morbid Obesity- Pt is following up with Bariatric Surgery. Stable. Encourage dietary modification, exercise and weight loss.      HPI:  Mr. Jha reports worsening leg swelling.  On 5/10/2022, he was admitted for GI bleeding.  During the hospitalization, bumex was held.  He was subsequently restarted on bumex after discharge.   on 5/11/2022.  Currently taking bumex 2 mg bid on Tues/Thur/Sat/Sun.  Taking 4 mg bid on Mon/Wed/Fri.       Past Medical History:   Diagnosis Date    Allergy     Anemia     Asthma     Atrial fibrillation     Cataract     Chronic rhinitis 9/26/2013    DDD (degenerative disc disease) 4/3/2015    Depression     Diabetes mellitus     Fibromyalgia     Gastroesophageal reflux disease without esophagitis 7/14/2017    Hypertension     Sinusitis, acute, maxillary 12/20/2012    Thyroid disease        Past Surgical History:   Procedure Laterality Date    CERVICAL SPINE SURGERY      COLONOSCOPY N/A 5/10/2022    Procedure: COLONOSCOPY;  Surgeon: Conrad Diaz MD;  Location: [facility]  (2ND FLR);  Service: Endoscopy;  Laterality: N/A;    EPIDURAL STEROID INJECTION INTO LUMBAR SPINE N/A 5/29/2018    Procedure: INJECTION-STEROID-EPIDURAL-LUMBAR- L4-5;  Surgeon: Roman Lyman MD;  Location: [facility] PAIN MGT;  Service: Pain Management;  Laterality: N/A;  Patient is diabetic and Xarleto     EPIDURAL STEROID INJECTION INTO LUMBAR SPINE N/A 7/3/2018    Procedure: INJECTION, STEROID, SPINE, LUMBAR, EPIDURAL- L4-5;  Surgeon: Roman Lyman MD;  Location: [facility]IN [facility]GT;  Service: Pain Management;  Laterality: N/A;  Patient takes Xarelto and ASA     ESOPHAGOGASTRODUODENOSCOPY N/A 5/9/2022    Procedure: EGD (ESOPHAGOGASTRODUODENOSCOPY);  Surgeon: Conrad Diaz MD;  Location: [facility] (2ND FLR);  Service: Endoscopy;  Laterality: N/A;    EXCISION OF LESION OF LIP N/A 7/7/2020    Procedure: EXCISION, LESION, LIP;  Surgeon: Caden Navarro MD;  [facility];  Service: ENT;  Laterality: N/A;    GASTRIC BYPASS      SINUS SURGERY  2000    Dr[facility] Watt at [facility]    TONSILLECTOMY         Social History     Socioeconomic History    Marital status: Single   Tobacco Use    Smoking status: Never Smoker    Smokeless tobacco: Never Used   Substance and Sexual Activity    Alcohol use: No     Comment: rare    Drug use: No    Sexual activity: Yes     Partners: Female   Social History Narrative    [place], lives alone w/2 dogs.    Dogs are his kids.    Works PT --  at [email or web address], on ssdi from neck and back/depression.     Social Determinants of Health     Financial Resource Strain: High Risk    Difficulty of Paying Living Expenses: Very hard   Food Insecurity: Food Insecurity Present    Worried About Running Out of Food in the Last Year: Often true    Ran Out of Food in the Last Year: Sometimes true   Transportation Needs: No Transportation Needs    Lack of Transportation (Medical): No    Lack of Transportation (Non-Medical): No   Physical Activity: Insufficiently Active    Days of  Exercise per Week: 2 days    Minutes of Exercise per Session: 50 min   Stress: No Stress Concern Present    Feeling of Stress : Only a little   Social Connections: Unknown    Frequency of Communication with Friends and Family: More than three times a week    Frequency of Social Gatherings with Friends and Family: More than three times a week    Active Member of Clubs or Organizations: Yes    Attends Club or Organization Meetings: More than 4 times per year    Marital Status: Never    Housing Stability: High Risk    Unable to Pay for Housing in the Last Year: Yes    Number of Places Lived in the Last Year: 1    Unstable Housing in the Last Year: No       Family History   Problem Relation Age of Onset    Heart disease Father     Cancer Mother         lung    Hypertension Sister     Heart disease Brother     Cirrhosis Brother     Diabetes Brother        Review of patient's allergies indicates:   Allergen Reactions    Iodinated contrast media Other (See Comments)     Raises BP         Medication List with Changes/Refills   Current Medications    ASPIRIN 325 MG TABLET    Take 325 mg by mouth once daily.    BUMETANIDE (BUMEX) 1 MG TABLET    TAKE 1 TABLET (1 MG TOTAL) BY MOUTH 2 (TWO) TIMES DAILY.    BUMETANIDE (BUMEX) 2 MG TABLET    Take 1 tablet (2 mg total) by mouth once daily.    BUPROPION (WELLBUTRIN XL) 300 MG 24 HR TABLET    Take 300 mg by mouth once daily. Pt states he only takes 300mg    CYANOCOBALAMIN 500 MCG TABLET    Take 500 mcg by mouth once daily.    DEXTROAMPHETAMINE-AMPHETAMINE (ADDERALL) 20 MG TABLET    Take 1 tablet by mouth every morning, take 1 tablet by mouth 3-4 hours later, and take 1/2 tablet every afternoon    FLUTICASONE PROPIONATE (FLONASE) 50 MCG/ACTUATION NASAL SPRAY    1 spray (50 mcg total) by Each Nostril route once daily.    GABAPENTIN (NEURONTIN) 300 MG CAPSULE    Take 1 capsule (300 mg total) by mouth 3 (three) times daily.    HYDROCODONE-ACETAMINOPHEN (NORCO)  "5-325 MG PER TABLET    Take 1 tablet by mouth every 6 (six) hours as needed for Pain.    IMPOYZ 0.025 % CREA    Apply topically as needed.     LEVOCETIRIZINE (XYZAL) 5 MG TABLET    TAKE 1 TABLET (5 MG TOTAL) BY MOUTH EVERY EVENING.    LUZU 1 % CREA    Apply topically as needed.     METOPROLOL TARTRATE (LOPRESSOR) 25 MG TABLET    TAKE ONE BY MOUTH TWICE DAILY    MULTIVITAMIN (THERAGRAN) PER TABLET    Take 1 tablet by mouth once daily.     NAFTIN 2 % GEL    daily as needed.     NITROGLYCERIN (NITROSTAT) 0.4 MG SL TABLET    Please dispense 25 pills in 4 bottles.    OMEPRAZOLE (PRILOSEC) 10 MG CAPSULE    Take 2 capsules (20 mg total) by mouth once daily.    POTASSIUM CHLORIDE SA (K-DUR,KLOR-CON) 20 MEQ TABLET    Take 1 tablet (20 mEq total) by mouth 2 (two) times daily.    REXULTI 1 MG TAB    Take 1 tablet by mouth once daily.    SOTALOL (BETAPACE) 80 MG TABLET    TAKE ONE BY MOUTH TWICE DAILY    TAMSULOSIN (FLOMAX) 0.4 MG CAP    TAKE ONE BY MOUTH TWICE DAILY    THIAMINE 250 MG TABLET    Take 250 mg by mouth once daily.    VITAMIN D (VITAMIN D3) 1000 UNITS TAB    Take 1,000 Units by mouth once daily.    VORTIOXETINE (TRINTELLIX) 20 MG TAB    Take 20 mg by mouth once daily.     XARELTO 20 MG TAB    TAKE ONE DAILY   Discontinued Medications    ARIPIPRAZOLE (ABILIFY) 10 MG TAB    Take 10 mg by mouth once daily.       Review of Systems  Constitution: Denies chills, fever, and sweats.  HENT: Denies headaches or blurry vision.  Cardiovascular: Denies chest pain or irregular heart beat.  Respiratory: Denies cough or shortness of breath.  Gastrointestinal: Denies abdominal pain, nausea, or vomiting.  Musculoskeletal: Positive for BLE swelling  improved  Neurological: Denies dizziness or focal weakness.  Psychiatric/Behavioral: Normal mental status.  Hematologic/Lymphatic: Denies bleeding problem or easy bruising/bleeding.  Skin: Denies rash or suspicious lesions    Physical Examination  /72   Pulse (!) 58   Ht 5' 6" " (1.676 m)   Wt 127 kg (279 lb 15.8 oz)   BMI 45.19 kg/m²     Constitutional: No acute distress, conversant  HEENT: Sclera anicteric, Pupils equal, round and reactive to light, extraocular motions intact, Oropharynx clear  Neck: No JVD, no carotid bruits  Cardiovascular: regular rate and rhythm, no murmur, rubs or gallops, normal S1/S2  Pulmonary: Clear to auscultation bilaterally  Abdominal: Abdomen soft, nontender, nondistended, positive bowel sounds  Extremities: BLE's with minimal pitting edema   Pulses:  Carotid pulses are 2+ on the right side, and 2+ on the left side.  Radial pulses are 2+ on the right side, and 2+ on the left side.   Femoral pulses are 2+ on the right side, and 2+ on the left side.  Popliteal pulses are 2+ on the right side, and 2+ on the left side.   Dorsalis pedis pulses are 2+ on the right side, and 2+ on the left side.   Posterior tibial pulses are 2+ on the right side, and 2+ on the left side.    Skin: No ecchymosis, erythema, or ulcers  Psych: Alert and oriented x 3, appropriate affect  Neuro: CNII-XII intact, no focal deficits    Labs:  Most Recent Data  CBC:   Lab Results   Component Value Date    WBC 8.30 05/11/2022    HGB 8.1 (L) 05/11/2022    HCT 25.6 (L) 05/11/2022     05/11/2022    MCV 94 05/11/2022    RDW 14.0 05/11/2022     BMP:   Lab Results   Component Value Date     05/11/2022    K 3.3 (L) 05/11/2022     05/11/2022    CO2 25 05/11/2022    BUN 9 05/11/2022    CREATININE 1.0 05/11/2022     (H) 05/11/2022    CALCIUM 9.0 05/11/2022    MG 1.9 07/15/2017    PHOS 3.7 07/15/2017     LFTS;   Lab Results   Component Value Date    PROT 5.9 (L) 05/11/2022    ALBUMIN 2.8 (L) 05/11/2022    BILITOT 1.1 (H) 05/11/2022    AST 13 05/11/2022    ALKPHOS 98 05/11/2022    ALT 10 05/11/2022     COAGS:   Lab Results   Component Value Date    INR 1.1 05/06/2022     FLP:   Lab Results   Component Value Date    CHOL 186 02/15/2022    HDL 37 (L) 02/15/2022    LDLCALC 115.6  02/15/2022    TRIG 167 (H) 02/15/2022    CHOLHDL 19.9 (L) 02/15/2022     CARDIAC:   Lab Results   Component Value Date    TROPONINI 0.007 05/11/2022     (H) 05/11/2022       Echo 7/23/2019:  · Normal left ventricular systolic function. The estimated ejection fraction is 60%  · Normal right ventricular systolic function.  · Normal LV diastolic function.  · Moderate left atrial enlargement.  · Mild right atrial enlargement.  · The ascending aorta is mildly dilated (42mm in diameter, unchanged since Feb 2018).  · The estimated PA systolic pressure is 23 mm Hg  · Normal central venous pressure (3 mm Hg).     Exercise SURINDER 11/25/2019:  Normal rest and exercise SURINDER bilaterally.  Normal PVR waveforms bilaterally.    BLE Venous Reflux Study 8/6/2019:  No evidence of lower extremity DVT bilaterally.  Mild left popliteal (deep venous) reflux.    Assessment/Plan:  Mr. Shiraz Jha is a 62 y.o. male with Afib s/p ablation 7/14, BLE lymphedema, venous insufficiency, HTN, DM2, morbid obesity s/p gastric bypass (2010), who presents for a follow up appointment.      1. BLE Edema due to lymphedema and venous insufficiency- Pt with worsening leg swelling since hospitalization on 5/10/2022.  Improving on current regimen.  Refer back to lymphedema clinic.  Continue bumex 2 mg bid on Tues/Thur/Sat/Sun and 4 mg bid on Mon/Wed/Fri.   Continue graduated compression hose; elevate legs when resting, limit fluid intake to 1.5L daily, sodium intake to 2000mg daily.  Check cmp today.      2. Morbid Obesity- Pt is following up with Bariatric Surgery. Stable. Encourage dietary modification, exercise and weight loss.      Follow up in 6 weeks    Total duration of face to face visit time 45 minutes.  Total time spent counseling greater than fifty percent of total visit time.  Counseling included discussion regarding imaging findings, diagnosis, possibilities, treatment options, risks and benefits.  The patient had many questions regarding the  options and long-term effects.    Randolph Ureña MD, PhD  Interventional Cardiology

## 2022-05-17 NOTE — PATIENT INSTRUCTIONS
Assessment/Plan:  Mr. Shiraz Jha is a 62 y.o. male with Afib s/p ablation 7/14, BLE lymphedema, venous insufficiency, HTN, DM2, morbid obesity s/p gastric bypass (2010), who presents for a follow up appointment.      1. BLE Edema due to lymphedema and venous insufficiency- Pt with worsening leg swelling since hospitalization on 5/10/2022.  Improving on current regimen.  Refer back to lymphedema clinic.  Continue bumex 2 mg bid on Tues/Thur/Sat/Sun and 4 mg bid on Mon/Wed/Fri.   Continue graduated compression hose; elevate legs when resting, limit fluid intake to 1.5L daily, sodium intake to 2000mg daily.  Check cmp today.      2. Morbid Obesity- Pt is following up with Bariatric Surgery. Stable. Encourage dietary modification, exercise and weight loss.      Follow up in 6 weeks

## 2022-05-18 ENCOUNTER — PATIENT OUTREACH (OUTPATIENT)
Dept: ADMINISTRATIVE | Facility: OTHER | Age: 62
End: 2022-05-18
Payer: COMMERCIAL

## 2022-05-19 ENCOUNTER — LAB VISIT (OUTPATIENT)
Dept: LAB | Facility: HOSPITAL | Age: 62
End: 2022-05-19
Attending: INTERNAL MEDICINE
Payer: COMMERCIAL

## 2022-05-19 ENCOUNTER — OFFICE VISIT (OUTPATIENT)
Dept: GASTROENTEROLOGY | Facility: CLINIC | Age: 62
End: 2022-05-19
Payer: COMMERCIAL

## 2022-05-19 VITALS
BODY MASS INDEX: 42.03 KG/M2 | HEART RATE: 60 BPM | HEIGHT: 68 IN | DIASTOLIC BLOOD PRESSURE: 70 MMHG | SYSTOLIC BLOOD PRESSURE: 115 MMHG | WEIGHT: 277.31 LBS

## 2022-05-19 DIAGNOSIS — Z98.84 STATUS POST GASTRIC BYPASS FOR OBESITY: ICD-10-CM

## 2022-05-19 DIAGNOSIS — D62 ACUTE BLOOD LOSS ANEMIA: ICD-10-CM

## 2022-05-19 DIAGNOSIS — Z79.01 CURRENT USE OF LONG TERM ANTICOAGULATION: ICD-10-CM

## 2022-05-19 DIAGNOSIS — Z86.39 HISTORY OF IRON DEFICIENCY: ICD-10-CM

## 2022-05-19 DIAGNOSIS — K92.1 GASTROINTESTINAL HEMORRHAGE WITH MELENA: Primary | ICD-10-CM

## 2022-05-19 DIAGNOSIS — K92.1 GASTROINTESTINAL HEMORRHAGE WITH MELENA: ICD-10-CM

## 2022-05-19 LAB
ERYTHROCYTE [DISTWIDTH] IN BLOOD BY AUTOMATED COUNT: 14.3 % (ref 11.5–14.5)
FERRITIN SERPL-MCNC: 27 NG/ML (ref 20–300)
HCT VFR BLD AUTO: 28.9 % (ref 40–54)
HGB BLD-MCNC: 9.3 G/DL (ref 14–18)
IRON SERPL-MCNC: 33 UG/DL (ref 45–160)
MCH RBC QN AUTO: 28.5 PG (ref 27–31)
MCHC RBC AUTO-ENTMCNC: 32.2 G/DL (ref 32–36)
MCV RBC AUTO: 89 FL (ref 82–98)
PLATELET # BLD AUTO: 556 K/UL (ref 150–450)
PMV BLD AUTO: 9.9 FL (ref 9.2–12.9)
RBC # BLD AUTO: 3.26 M/UL (ref 4.6–6.2)
SATURATED IRON: 7 % (ref 20–50)
TOTAL IRON BINDING CAPACITY: 488 UG/DL (ref 250–450)
TRANSFERRIN SERPL-MCNC: 330 MG/DL (ref 200–375)
WBC # BLD AUTO: 8.97 K/UL (ref 3.9–12.7)

## 2022-05-19 PROCEDURE — 3074F PR MOST RECENT SYSTOLIC BLOOD PRESSURE < 130 MM HG: ICD-10-PCS | Mod: CPTII,S$GLB,, | Performed by: INTERNAL MEDICINE

## 2022-05-19 PROCEDURE — 3078F DIAST BP <80 MM HG: CPT | Mod: CPTII,S$GLB,, | Performed by: INTERNAL MEDICINE

## 2022-05-19 PROCEDURE — 3008F PR BODY MASS INDEX (BMI) DOCUMENTED: ICD-10-PCS | Mod: CPTII,S$GLB,, | Performed by: INTERNAL MEDICINE

## 2022-05-19 PROCEDURE — 84466 ASSAY OF TRANSFERRIN: CPT | Performed by: INTERNAL MEDICINE

## 2022-05-19 PROCEDURE — 99214 PR OFFICE/OUTPT VISIT, EST, LEVL IV, 30-39 MIN: ICD-10-PCS | Mod: S$GLB,,, | Performed by: INTERNAL MEDICINE

## 2022-05-19 PROCEDURE — 99999 PR PBB SHADOW E&M-EST. PATIENT-LVL V: CPT | Mod: PBBFAC,,, | Performed by: INTERNAL MEDICINE

## 2022-05-19 PROCEDURE — 1160F RVW MEDS BY RX/DR IN RCRD: CPT | Mod: CPTII,S$GLB,, | Performed by: INTERNAL MEDICINE

## 2022-05-19 PROCEDURE — 85027 COMPLETE CBC AUTOMATED: CPT | Performed by: INTERNAL MEDICINE

## 2022-05-19 PROCEDURE — 36415 COLL VENOUS BLD VENIPUNCTURE: CPT | Performed by: INTERNAL MEDICINE

## 2022-05-19 PROCEDURE — 3078F PR MOST RECENT DIASTOLIC BLOOD PRESSURE < 80 MM HG: ICD-10-PCS | Mod: CPTII,S$GLB,, | Performed by: INTERNAL MEDICINE

## 2022-05-19 PROCEDURE — 3074F SYST BP LT 130 MM HG: CPT | Mod: CPTII,S$GLB,, | Performed by: INTERNAL MEDICINE

## 2022-05-19 PROCEDURE — 82728 ASSAY OF FERRITIN: CPT | Performed by: INTERNAL MEDICINE

## 2022-05-19 PROCEDURE — 1160F PR REVIEW ALL MEDS BY PRESCRIBER/CLIN PHARMACIST DOCUMENTED: ICD-10-PCS | Mod: CPTII,S$GLB,, | Performed by: INTERNAL MEDICINE

## 2022-05-19 PROCEDURE — 3008F BODY MASS INDEX DOCD: CPT | Mod: CPTII,S$GLB,, | Performed by: INTERNAL MEDICINE

## 2022-05-19 PROCEDURE — 99214 OFFICE O/P EST MOD 30 MIN: CPT | Mod: S$GLB,,, | Performed by: INTERNAL MEDICINE

## 2022-05-19 PROCEDURE — 1159F PR MEDICATION LIST DOCUMENTED IN MEDICAL RECORD: ICD-10-PCS | Mod: CPTII,S$GLB,, | Performed by: INTERNAL MEDICINE

## 2022-05-19 PROCEDURE — 99999 PR PBB SHADOW E&M-EST. PATIENT-LVL V: ICD-10-PCS | Mod: PBBFAC,,, | Performed by: INTERNAL MEDICINE

## 2022-05-19 PROCEDURE — 1159F MED LIST DOCD IN RCRD: CPT | Mod: CPTII,S$GLB,, | Performed by: INTERNAL MEDICINE

## 2022-05-19 NOTE — H&P (VIEW-ONLY)
[facility] Established Patient Visit    Reason for Visit:    Chief Complaint   Patient presents with    GI Problem       PCP: GUSTAVO Theodore      HPI:  Shiraz Jha is a 62 y.o. male here for hospital follow-up for GI bleeding.  This is my 1st visit with him.  Records reviewed in detail.  He was admitted 5/6 - 5/10 for reported melena, and he also admits to some red blood per rectum.  He denies having significant abdominal pain, nausea, or vomiting.  He was taking aspirin 325 mg daily and Xarelto.  These were held in the hospital.  He was seen by our inpatient GI consult service.  EGD done 05/09/2022 revealed a 4-5 cm gastric pouch, 4 cm blind jejunal limb, and normal upper GI tract examined up to 40 cm into the jejunum.  The jejuno jejunal anastomosis was not reached.  He subsequently had a colonoscopy 05/10/2022 that was incomplete.  The exam was only able to reach the transverse colon due to significant looping.  The bowel preparation was excellent.  No significant findings were seen in the viewed areas of colon.  He reports a colonoscopy during childhood, but none since.    He is no longer seeing blood in the stool.  He is back on his Xarelto, but the aspirin was not restarted.  He denies taking any other NSAIDs.  He is using OTC Prilosec on a daily basis, which he was previously on prior to his admission.  This had been started by an ENT doctor for suspected reflux symptoms.  He is not on iron therapy; however, I do note a history of iron deficiency 3 years ago.  Celiac testing was negative at the time.  He had been seen by GI at [facility] in [place] in February of 2019 for diarrhea, incontinence, and history of GERD with intermittent dysphagia.    He is followed closely by Cardiology, last seen 2 days ago for management of diuretics.          ROS:  Constitutional: No fevers, chills, No weight loss, normal appetite  GI: see HPI  Derm: No rash or lesions        PMHX:  has a past medical  history of Allergy, Anemia, Asthma, Atrial fibrillation, Cataract, Chronic rhinitis (9/26/2013), DDD (degenerative disc disease) (4/3/2015), Depression, Diabetes mellitus, Fibromyalgia, Gastroesophageal reflux disease without esophagitis (7/14/2017), Hypertension, Sinusitis, acute, maxillary (12/20/2012), and Thyroid disease.    PSHX:  has a past surgical history that includes Tonsillectomy; Cervical spine surgery; Gastric bypass; Sinus surgery (2000); Epidural steroid injection into lumbar spine (N/A, 5/29/2018); Epidural steroid injection into lumbar spine (N/A, 7/3/2018); Excision of lesion of lip (N/A, 7/7/2020); Esophagogastroduodenoscopy (N/A, 5/9/2022); and Colonoscopy (N/A, 5/10/2022).    The patient's social and family histories were reviewed by me and updated in the appropriate section of the electronic medical record.    Review of patient's allergies indicates:   Allergen Reactions    Iodinated contrast media Other (See Comments)     Raises BP         Prior to Admission medications    Medication Sig Start Date End Date Taking? Authorizing Provider   aspirin 325 MG tablet Take 325 mg by mouth once daily.   Yes Historical Provider   bumetanide (BUMEX) 1 MG tablet TAKE 1 TABLET (1 MG TOTAL) BY MOUTH 2 (TWO) TIMES DAILY.  Patient taking differently: On Monday, Wednesday, and Friday-Take 2 tablets by mouth twice daily  On Tuesday, Thursday, Saturday, and Sunday- take 1 tablet twice daily 2/3/22 2/3/23 Yes Randolph Ureña MD PhD   bumetanide (BUMEX) 2 MG tablet Take 1 tablet (2 mg total) by mouth once daily. 5/11/22 5/11/23 Yes Edith Alvarado MD   buPROPion (WELLBUTRIN XL) 300 MG 24 hr tablet Take 300 mg by mouth once daily. Pt states he only takes 300mg   Yes Historical Provider   cyanocobalamin 500 MCG tablet Take 500 mcg by mouth once daily.   Yes Historical Provider   dextroamphetamine-amphetamine (ADDERALL) 20 mg tablet Take 1 tablet by mouth every morning, take 1 tablet by mouth 3-4 hours  later, and take 1/2 tablet every afternoon 4/8/21  Yes Historical Provider   fluticasone propionate (FLONASE) 50 mcg/actuation nasal spray 1 spray (50 mcg total) by Each Nostril route once daily.  Patient taking differently: 1 spray by Each Nostril route daily as needed. 7/22/21  Yes ZAIDA Parra   gabapentin (NEURONTIN) 300 MG capsule Take 1 capsule (300 mg total) by mouth 3 (three) times daily. 3/17/22 3/17/23 Yes Yadira Brody NP   IMPOYZ 0.025 % Crea Apply topically as needed.  7/23/18  Yes Historical Provider   levocetirizine (XYZAL) 5 MG tablet TAKE 1 TABLET (5 MG TOTAL) BY MOUTH EVERY EVENING.  Patient taking differently: Take 5 mg by mouth daily as needed for Allergies. 2/28/22 2/28/23 Yes Tanna James NP   LUZU 1 % Crea Apply topically as needed.  7/26/18  Yes Historical Provider   metoprolol tartrate (LOPRESSOR) 25 MG tablet TAKE ONE BY MOUTH TWICE DAILY 3/7/22  Yes All Webb MD   multivitamin (THERAGRAN) per tablet Take 1 tablet by mouth once daily.    Yes Historical Provider   NAFTIN 2 % Gel daily as needed.  5/25/18  Yes Historical Provider   nitroGLYCERIN (NITROSTAT) 0.4 MG SL tablet Please dispense 25 pills in 4 bottles. 9/19/19  Yes Twyla Bahena NP   omeprazole (PRILOSEC) 10 MG capsule Take 2 capsules (20 mg total) by mouth once daily. 10/5/21  Yes GEORGES Theodore MD   potassium chloride SA (K-DUR,KLOR-CON) 20 MEQ tablet Take 1 tablet (20 mEq total) by mouth 2 (two) times daily. 5/11/22  Yes Edith Alvarado MD   REXULTI 1 mg Tab Take 1 tablet by mouth once daily. 5/16/22  Yes Historical Provider   sotaloL (BETAPACE) 80 MG tablet TAKE ONE BY MOUTH TWICE DAILY 3/7/22  Yes All Webb MD   tamsulosin (FLOMAX) 0.4 mg Cap TAKE ONE BY MOUTH TWICE DAILY 3/7/22  Yes David Escobedo MD   thiamine 250 MG tablet Take 250 mg by mouth once daily.   Yes Historical Provider   vitamin D (VITAMIN D3) 1000 units Tab Take 1,000 Units by mouth once daily.   Yes Historical Provider  "  vortioxetine (TRINTELLIX) 20 mg Tab Take 20 mg by mouth once daily.    Yes Historical Provider   XARELTO 20 mg Tab TAKE ONE DAILY 10/22/21  Yes All Webb MD   HYDROcodone-acetaminophen (NORCO) 5-325 mg per tablet Take 1 tablet by mouth every 6 (six) hours as needed for Pain. 5/11/22 5/19/22  Najma Whittaker PA-C         Objective Findings:  Vital Signs:  /70   Pulse 60   Ht 5' 8" (1.727 m)   Wt 125.8 kg (277 lb 5.4 oz)   BMI 42.17 kg/m²  Body mass index is 42.17 kg/m².      Physical Exam:  General Appearance:  Well appearing in no acute distress, appears stated age          Labs:  Lab Results   Component Value Date    WBC 8.30 05/11/2022    HGB 8.1 (L) 05/11/2022    HCT 25.6 (L) 05/11/2022    MCV 94 05/11/2022    RDW 14.0 05/11/2022     05/11/2022    GRAN 6.5 05/11/2022    GRAN 78.0 (H) 05/11/2022    LYMPH 1.0 05/11/2022    LYMPH 12.4 (L) 05/11/2022    MONO 0.7 05/11/2022    MONO 8.9 05/11/2022    EOS 0.0 05/11/2022    BASO 0.01 05/11/2022     Lab Results   Component Value Date     05/17/2022    K 3.5 05/17/2022     05/17/2022    CO2 29 05/17/2022     (H) 05/17/2022    BUN 19 05/17/2022    CREATININE 1.2 05/17/2022    CALCIUM 9.2 05/17/2022    PROT 5.9 (L) 05/11/2022    ALBUMIN 2.8 (L) 05/11/2022    BILITOT 1.1 (H) 05/11/2022    ALKPHOS 98 05/11/2022    AST 13 05/11/2022    ALT 10 05/11/2022             Imaging:  CT abdomen and pelvis without contrast, but with oral contrast 03/29/2019:  IMPRESSION:   1. Distal sigmoid colonic and rectal liquid stool suggesting a nonspecific diarrheal illness.  No evidence of bowel obstruction.  2. Remote operative changes of prior gastric bypass without obvious complication.  3. Few additional findings as above.              Assessment:  Shiraz Jha is a 62 y.o. male here with:  1. Gastrointestinal hemorrhage with melena    2. Status post gastric bypass for obesity    3. Acute blood loss anemia    4. Current use of long term " anticoagulation    5. History of iron deficiency      Recent hospital admission for GI bleeding with melena, though he also reported an episode of bright red blood per rectum.  His BUN/creatinine ratio was slightly elevated upon admission suggestive of an upper GI bleeding source.  No source found during upper endoscopy; although, he has had a gastric bypass.  The jejuno jejunal anastomosis, duodenal limb, and remnant stomach were not seen during that exam.  The colonoscopy was incomplete, only reaching the transverse colon due to looping.  No significant findings were seen in the viewed areas of the colon.  He has a history of iron deficiency anemia which had resolved.  I suspect this is due to his history of bypass.  Ongoing iron deficiency could result in difficulty recovering from acute blood loss.  He is back on his Xarelto, and fortunately has not seen any other overt signs of GI bleeding.  He is no longer using aspirin, and not using any other NSAIDs.  This bleeding event could have been caused by NSAID use.  He had been on Prilosec regularly for suspected GERD; although, I would expect him to not have much GERD due to his gastric bypass.      Recommendations:  1. I will check a CBC and iron level today.  2. I will arrange for antegrade single balloon enteroscopy to evaluate his jejuno-jejunal anastomosis, and hopefully also reach into the duodenal limb if not the remnant stomach.  During this same visit, I will also do a single balloon device assisted colonoscopy to complete his exam of the colon.      Follow-up as needed in GI clinic.      Order summary:  Orders Placed This Encounter    CBC Without Differential    Ferritin    Iron and TIBC    Case Request Endoscopy: ENTEROSCOPY, SINGLE BALLOON, ANTEGRADE, COLONOSCOPY           Lyle Edmond MD

## 2022-05-19 NOTE — PROGRESS NOTES
[facility] Established Patient Visit    Reason for Visit:    Chief Complaint   Patient presents with    GI Problem       PCP: GUSTAVO Theodore      HPI:  Shiraz Jha is a 62 y.o. male here for hospital follow-up for GI bleeding.  This is my 1st visit with him.  Records reviewed in detail.  He was admitted 5/6 - 5/10 for reported melena, and he also admits to some red blood per rectum.  He denies having significant abdominal pain, nausea, or vomiting.  He was taking aspirin 325 mg daily and Xarelto.  These were held in the hospital.  He was seen by our inpatient GI consult service.  EGD done 05/09/2022 revealed a 4-5 cm gastric pouch, 4 cm blind jejunal limb, and normal upper GI tract examined up to 40 cm into the jejunum.  The jejuno jejunal anastomosis was not reached.  He subsequently had a colonoscopy 05/10/2022 that was incomplete.  The exam was only able to reach the transverse colon due to significant looping.  The bowel preparation was excellent.  No significant findings were seen in the viewed areas of colon.  He reports a colonoscopy during childhood, but none since.    He is no longer seeing blood in the stool.  He is back on his Xarelto, but the aspirin was not restarted.  He denies taking any other NSAIDs.  He is using OTC Prilosec on a daily basis, which he was previously on prior to his admission.  This had been started by an ENT doctor for suspected reflux symptoms.  He is not on iron therapy; however, I do note a history of iron deficiency 3 years ago.  Celiac testing was negative at the time.  He had been seen by GI at [facility] in [place] in February of 2019 for diarrhea, incontinence, and history of GERD with intermittent dysphagia.    He is followed closely by Cardiology, last seen 2 days ago for management of diuretics.          ROS:  Constitutional: No fevers, chills, No weight loss, normal appetite  GI: see HPI  Derm: No rash or lesions        PMHX:  has a past medical  history of Allergy, Anemia, Asthma, Atrial fibrillation, Cataract, Chronic rhinitis (9/26/2013), DDD (degenerative disc disease) (4/3/2015), Depression, Diabetes mellitus, Fibromyalgia, Gastroesophageal reflux disease without esophagitis (7/14/2017), Hypertension, Sinusitis, acute, maxillary (12/20/2012), and Thyroid disease.    PSHX:  has a past surgical history that includes Tonsillectomy; Cervical spine surgery; Gastric bypass; Sinus surgery (2000); Epidural steroid injection into lumbar spine (N/A, 5/29/2018); Epidural steroid injection into lumbar spine (N/A, 7/3/2018); Excision of lesion of lip (N/A, 7/7/2020); Esophagogastroduodenoscopy (N/A, 5/9/2022); and Colonoscopy (N/A, 5/10/2022).    The patient's social and family histories were reviewed by me and updated in the appropriate section of the electronic medical record.    Review of patient's allergies indicates:   Allergen Reactions    Iodinated contrast media Other (See Comments)     Raises BP         Prior to Admission medications    Medication Sig Start Date End Date Taking? Authorizing Provider   aspirin 325 MG tablet Take 325 mg by mouth once daily.   Yes Historical Provider   bumetanide (BUMEX) 1 MG tablet TAKE 1 TABLET (1 MG TOTAL) BY MOUTH 2 (TWO) TIMES DAILY.  Patient taking differently: On Monday, Wednesday, and Friday-Take 2 tablets by mouth twice daily  On Tuesday, Thursday, Saturday, and Sunday- take 1 tablet twice daily 2/3/22 2/3/23 Yes Randolph Ureña MD PhD   bumetanide (BUMEX) 2 MG tablet Take 1 tablet (2 mg total) by mouth once daily. 5/11/22 5/11/23 Yes Edith Alvarado MD   buPROPion (WELLBUTRIN XL) 300 MG 24 hr tablet Take 300 mg by mouth once daily. Pt states he only takes 300mg   Yes Historical Provider   cyanocobalamin 500 MCG tablet Take 500 mcg by mouth once daily.   Yes Historical Provider   dextroamphetamine-amphetamine (ADDERALL) 20 mg tablet Take 1 tablet by mouth every morning, take 1 tablet by mouth 3-4 hours  later, and take 1/2 tablet every afternoon 4/8/21  Yes Historical Provider   fluticasone propionate (FLONASE) 50 mcg/actuation nasal spray 1 spray (50 mcg total) by Each Nostril route once daily.  Patient taking differently: 1 spray by Each Nostril route daily as needed. 7/22/21  Yes ZAIDA Parra   gabapentin (NEURONTIN) 300 MG capsule Take 1 capsule (300 mg total) by mouth 3 (three) times daily. 3/17/22 3/17/23 Yes Yadira Brody NP   IMPOYZ 0.025 % Crea Apply topically as needed.  7/23/18  Yes Historical Provider   levocetirizine (XYZAL) 5 MG tablet TAKE 1 TABLET (5 MG TOTAL) BY MOUTH EVERY EVENING.  Patient taking differently: Take 5 mg by mouth daily as needed for Allergies. 2/28/22 2/28/23 Yes Tanna James NP   LUZU 1 % Crea Apply topically as needed.  7/26/18  Yes Historical Provider   metoprolol tartrate (LOPRESSOR) 25 MG tablet TAKE ONE BY MOUTH TWICE DAILY 3/7/22  Yes All Webb MD   multivitamin (THERAGRAN) per tablet Take 1 tablet by mouth once daily.    Yes Historical Provider   NAFTIN 2 % Gel daily as needed.  5/25/18  Yes Historical Provider   nitroGLYCERIN (NITROSTAT) 0.4 MG SL tablet Please dispense 25 pills in 4 bottles. 9/19/19  Yes Twyla Bahena NP   omeprazole (PRILOSEC) 10 MG capsule Take 2 capsules (20 mg total) by mouth once daily. 10/5/21  Yes GEORGES Theodore MD   potassium chloride SA (K-DUR,KLOR-CON) 20 MEQ tablet Take 1 tablet (20 mEq total) by mouth 2 (two) times daily. 5/11/22  Yes Edith Alvarado MD   REXULTI 1 mg Tab Take 1 tablet by mouth once daily. 5/16/22  Yes Historical Provider   sotaloL (BETAPACE) 80 MG tablet TAKE ONE BY MOUTH TWICE DAILY 3/7/22  Yes All Webb MD   tamsulosin (FLOMAX) 0.4 mg Cap TAKE ONE BY MOUTH TWICE DAILY 3/7/22  Yes David Escobedo MD   thiamine 250 MG tablet Take 250 mg by mouth once daily.   Yes Historical Provider   vitamin D (VITAMIN D3) 1000 units Tab Take 1,000 Units by mouth once daily.   Yes Historical Provider  "  vortioxetine (TRINTELLIX) 20 mg Tab Take 20 mg by mouth once daily.    Yes Historical Provider   XARELTO 20 mg Tab TAKE ONE DAILY 10/22/21  Yes All Webb MD   HYDROcodone-acetaminophen (NORCO) 5-325 mg per tablet Take 1 tablet by mouth every 6 (six) hours as needed for Pain. 5/11/22 5/19/22  Najma Whittaker PA-C         Objective Findings:  Vital Signs:  /70   Pulse 60   Ht 5' 8" (1.727 m)   Wt 125.8 kg (277 lb 5.4 oz)   BMI 42.17 kg/m²  Body mass index is 42.17 kg/m².      Physical Exam:  General Appearance:  Well appearing in no acute distress, appears stated age          Labs:  Lab Results   Component Value Date    WBC 8.30 05/11/2022    HGB 8.1 (L) 05/11/2022    HCT 25.6 (L) 05/11/2022    MCV 94 05/11/2022    RDW 14.0 05/11/2022     05/11/2022    GRAN 6.5 05/11/2022    GRAN 78.0 (H) 05/11/2022    LYMPH 1.0 05/11/2022    LYMPH 12.4 (L) 05/11/2022    MONO 0.7 05/11/2022    MONO 8.9 05/11/2022    EOS 0.0 05/11/2022    BASO 0.01 05/11/2022     Lab Results   Component Value Date     05/17/2022    K 3.5 05/17/2022     05/17/2022    CO2 29 05/17/2022     (H) 05/17/2022    BUN 19 05/17/2022    CREATININE 1.2 05/17/2022    CALCIUM 9.2 05/17/2022    PROT 5.9 (L) 05/11/2022    ALBUMIN 2.8 (L) 05/11/2022    BILITOT 1.1 (H) 05/11/2022    ALKPHOS 98 05/11/2022    AST 13 05/11/2022    ALT 10 05/11/2022             Imaging:  CT abdomen and pelvis without contrast, but with oral contrast 03/29/2019:  IMPRESSION:   1. Distal sigmoid colonic and rectal liquid stool suggesting a nonspecific diarrheal illness.  No evidence of bowel obstruction.  2. Remote operative changes of prior gastric bypass without obvious complication.  3. Few additional findings as above.              Assessment:  Shiraz Jha is a 62 y.o. male here with:  1. Gastrointestinal hemorrhage with melena    2. Status post gastric bypass for obesity    3. Acute blood loss anemia    4. Current use of long term " anticoagulation    5. History of iron deficiency      Recent hospital admission for GI bleeding with melena, though he also reported an episode of bright red blood per rectum.  His BUN/creatinine ratio was slightly elevated upon admission suggestive of an upper GI bleeding source.  No source found during upper endoscopy; although, he has had a gastric bypass.  The jejuno jejunal anastomosis, duodenal limb, and remnant stomach were not seen during that exam.  The colonoscopy was incomplete, only reaching the transverse colon due to looping.  No significant findings were seen in the viewed areas of the colon.  He has a history of iron deficiency anemia which had resolved.  I suspect this is due to his history of bypass.  Ongoing iron deficiency could result in difficulty recovering from acute blood loss.  He is back on his Xarelto, and fortunately has not seen any other overt signs of GI bleeding.  He is no longer using aspirin, and not using any other NSAIDs.  This bleeding event could have been caused by NSAID use.  He had been on Prilosec regularly for suspected GERD; although, I would expect him to not have much GERD due to his gastric bypass.      Recommendations:  1. I will check a CBC and iron level today.  2. I will arrange for antegrade single balloon enteroscopy to evaluate his jejuno-jejunal anastomosis, and hopefully also reach into the duodenal limb if not the remnant stomach.  During this same visit, I will also do a single balloon device assisted colonoscopy to complete his exam of the colon.      Follow-up as needed in GI clinic.      Order summary:  Orders Placed This Encounter    CBC Without Differential    Ferritin    Iron and TIBC    Case Request Endoscopy: ENTEROSCOPY, SINGLE BALLOON, ANTEGRADE, COLONOSCOPY           Lyle Edmond MD

## 2022-05-20 ENCOUNTER — TELEPHONE (OUTPATIENT)
Dept: ENDOSCOPY | Facility: HOSPITAL | Age: 62
End: 2022-05-20
Payer: COMMERCIAL

## 2022-05-20 ENCOUNTER — PATIENT MESSAGE (OUTPATIENT)
Dept: ENDOSCOPY | Facility: HOSPITAL | Age: 62
End: 2022-05-20
Payer: COMMERCIAL

## 2022-05-20 DIAGNOSIS — Z12.11 SCREEN FOR COLON CANCER: Primary | ICD-10-CM

## 2022-05-20 RX ORDER — POLYETHYLENE GLYCOL 3350, SODIUM SULFATE ANHYDROUS, SODIUM BICARBONATE, SODIUM CHLORIDE, POTASSIUM CHLORIDE 236; 22.74; 6.74; 5.86; 2.97 G/4L; G/4L; G/4L; G/4L; G/4L
4 POWDER, FOR SOLUTION ORAL ONCE
Qty: 4000 ML | Refills: 0 | Status: SHIPPED | OUTPATIENT
Start: 2022-05-20 | End: 2022-05-20

## 2022-05-20 NOTE — TELEPHONE ENCOUNTER
Per current guideline recommendations, patient can be cleared to hold Xarelto for 2 days prior to procedure and resume at MD discretion (per Dr Webb).

## 2022-05-20 NOTE — TELEPHONE ENCOUNTER
Good morning,    Pt is scheduled for a device-assisted enteroscopy and colonoscopy on 5/26/22 with Dr. Edmond.  Can pt hold his Xarelto for 2 days prior to the procedure per endoscopy protocol?  Please advise.    Thank you

## 2022-05-20 NOTE — TELEPHONE ENCOUNTER
Telephoned pt to schedule device-assisted enteroscopy and colonoscopy.  Spoke with pt and procedures scheduled for 5/26/22 at 12:30pm.  Pt is fully vaccinated.  Reviewed medical history and medications.  Pt is currently on Xarelto.  Approval to hold Xarelto requested from Dr. Webb-pending approval (see telephone encounter 5/20/22).  Instructed on procedure and prep.  Patient verbalized understanding of instructions.  Copy of instructions to be sent via portal upon receipt of approval to hold Xarelto.

## 2022-05-20 NOTE — TELEPHONE ENCOUNTER
Approval to hold Xarelto received from Dr. Webb (see telephone encounter 5/20/22).  Copy of instructions sent via portal-DS

## 2022-05-23 ENCOUNTER — CLINICAL SUPPORT (OUTPATIENT)
Dept: REHABILITATION | Facility: OTHER | Age: 62
End: 2022-05-23
Attending: NURSE PRACTITIONER
Payer: COMMERCIAL

## 2022-05-23 ENCOUNTER — PATIENT MESSAGE (OUTPATIENT)
Dept: ADMINISTRATIVE | Facility: HOSPITAL | Age: 62
End: 2022-05-23
Payer: COMMERCIAL

## 2022-05-23 DIAGNOSIS — R29.3 POOR POSTURE: Primary | ICD-10-CM

## 2022-05-23 DIAGNOSIS — R29.898 DECREASED STRENGTH OF TRUNK AND BACK: ICD-10-CM

## 2022-05-23 PROCEDURE — 97110 THERAPEUTIC EXERCISES: CPT

## 2022-05-23 NOTE — PROGRESS NOTES
[facility]      Name: Shiraz Jha  Clinic Number: [id number]    Therapy Diagnosis:   Encounter Diagnoses   Name Primary?    Poor posture Yes    Decreased strength of trunk and back      Physician: Yadira Brody NP    Visit Date: 2022  Physician Orders: PT Eval and Treat   Medical Diagnosis from Referral: M47.816 (ICD-10-CM) - Lumbar spondylosis  Evaluation Date: 2022  Authorization Period Expiration: 22  Plan of Care Expiration: 22  Reassessment Due: 22  Visit # / Visits authorized:         Time In: 1015 am  Time Out: 11:10 am  Total Billable Time: 50 minutes     Precautions: Standard cervical fusion      Pattern of pain determined: 1PEP      Subjective   Shiraz reports he has been unable to come for a few weeks because he was hospitalized, but he is doing better now with no back pain currently.     Patient reports tolerating previous visit well with no increase in soreness  Patient reports their pain to be 0/10 on a 0-10 scale with 0 being no pain and 10 being the worst pain imaginable.  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   FOTO documents entered into [employer] - see Media section.     Limitation Score: 30%  Visit 6 Score: 34%      Goal: < 25%         Treatment    Pt was instructed in and performed the  "following:     Shiraz received therapeutic exercises to develop/improved posture, cardiovascular endurance, muscular endurance, lumbar/cervical ROM, strength and muscular endurance for 55 minutes including the following exercises:     [facility] - Short 5/23/2022   Visit Number 8   VAS Pain Rating 0   Treadmill Time (in min.) -   Speed -   Time 5   Extension in Lying 10   Extension in Standing 10   Flexion in Lying 10   Lumbar Extension - Seat Pad -   Femur Restraint -   Top Dead Center -   Counterweight -   Lumbar Flexion -   Lumbar Extension -   Lumbar Peak Torque -   Lumbar Weight 88   Repetitions 20   Rating of Perceived Exertion 5       LTR x 10   DKTC c/T-ball support 10 x 5 sec    Piriformis stretch /c towel 3 x 20 sec   PPT x 15 mod tactile cue for dec BLE use  PPT +Bridges GTB x15 3" hold  BKFO GTB x10  Prone on Elbow 1 min -> prone press ups x 10         N/P  Steps 2 flights x 2    Trial 1 - ascend hand touch for balance x 2, hand on rail descend x 4 steps, dec velocity    Trial 2 - ascend one R shoulder lean to wall, hand touch to rail descend x 3, dec velocity      Standing ext  10x  SOC x10 3" hold    Peripheral muscle strengthening which included 1 set of 15-20 repetitions at a slow, controlled 10-13 second per rep pace focused on strengthening supporting musculature for improved body mechanics and functional mobility.  Pt and therapist focused on proper form during treatment to ensure optimal strengthening of each targeted muscle group.  Machines were utilized including torso rotation, leg extension, leg curl, chest press, upright row. Tricep extension, bicep curl, leg press, and hip abduction added visit 3    Home Exercises Provided and Patient Education Provided   Home exercises include:  LTR/  DKTC/  Piriformis/  YUMIKO -> press ups (04/26/22)   EIS  PPT    Cardio program: TBD Visit 5  Lifting education date:TBD visit 11  Lumbar roll: not obtained as 04/26/22    Education provided: "       Written Home Exercises Provided: Patient instructed to cont prior HEP. Added PPT 4/21/22  Exercises were reviewed and Shiraz was able to demonstrate them prior to the end of the session.  Shiraz demonstrated fair  understanding of the education provided.     See EMR under Patient Instructions for exercises provided prior visit and today's visit    Assessment   Shiraz presents today without complaints of pain. Medx resistance was not progressed even though pt achieved max reps last visit due to length of time since last visit, and pt recently being hospitalized. Pt was able to complete 20 reps at maintained resistance of 88 ft/lbs with an RPE of 5/10. Progress resistance 5% next visit and attempt to increased flexion ROM on medx. Continue to progress as tolerated.       Patient is making good progress towards established goals.  Pt will continue to benefit from skilled outpatient physical therapy to address the deficits stated in the impairment chart, provide pt/family education and to maximize pt's level of independence in the home and community environment.     Anticipated Barriers for therapy: none  Pt's spiritual, cultural and educational needs considered and pt agreeable to plan of care and goals as stated below:     GOALS: Pt is in agreement with the following goals.     Short term goals:  6 weeks or 10 visits   1.  Pt will demonstrate increased lumbar ROM by at least 3 degrees from the initial ROM value with improvements noted in functional ROM and ability to perform ADLs.  (approp and ongoing)  2.  Pt will demonstrate increased MedX average isometric strength value  by 10% from initial test resulting in improved ability to perform bending, lifting, and carrying activities safely, confidently.  (approp and ongoing)  3.  Patient report a reduction in worst pain score by 1-2 points for improved tolerance for household chores.  (approp and ongoing)  4.  Pt able to perform HEP correctly with minimal cueing or  supervision from therapist to encourage independent management of symptoms. (approp and ongoing)        Long term goals: 10 weeks or 20 visits   1. Pt will demonstrate increased lumbar ROM by at least 6 degrees from initial ROM value, resulting in improved ability to perform functional fwd bending while standing and sitting. (approp and ongoing)  2. Pt will demonstrate increased MedX average isometric strength value  by 20% from initial test resulting in improved ability to perform bending, lifting, and carrying activities safely, confidently.  (approp and ongoing)  3. Pt to demonstrate ability to independently control and reduce their pain through posture positioning and mechanical movements throughout a typical day.  (approp and ongoing)  4.  Pt will demonstrate reduced pain and improved functional outcomes as reported on the FOTO by reaching a limitation score of < or = 25% or less in order to demonstrate subjective improvement in pt's condition.    (approp and ongoing)  5. Pt will demonstrate independence with the HEP at discharge  (approp and ongoing)  6. Pt will ascend/descend 2 flights of stairs carrying 20# /s inc LBP for inc tolerance to household chores (grocery return)(patient goal)  (approp and ongoing)  7. Pt will perform stand<>kneel x 3 in < 60 sec /s inc LBP for inc tolerance to household chores (approp and ongoing)    Plan   Continue with established Plan of Care towards established PT goals.     Joselito Barahona, PT  5/23/2022

## 2022-05-26 ENCOUNTER — HOSPITAL ENCOUNTER (OUTPATIENT)
Facility: HOSPITAL | Age: 62
Discharge: HOME | End: 2022-05-26
Attending: INTERNAL MEDICINE | Admitting: INTERNAL MEDICINE
Payer: COMMERCIAL

## 2022-05-26 ENCOUNTER — ANESTHESIA EVENT (OUTPATIENT)
Dept: ENDOSCOPY | Facility: HOSPITAL | Age: 62
End: 2022-05-26
Payer: COMMERCIAL

## 2022-05-26 ENCOUNTER — ANESTHESIA (OUTPATIENT)
Dept: ENDOSCOPY | Facility: HOSPITAL | Age: 62
End: 2022-05-26
Payer: COMMERCIAL

## 2022-05-26 VITALS
RESPIRATION RATE: 20 BRPM | TEMPERATURE: 97 F | OXYGEN SATURATION: 100 % | HEART RATE: 54 BPM | SYSTOLIC BLOOD PRESSURE: 140 MMHG | DIASTOLIC BLOOD PRESSURE: 85 MMHG

## 2022-05-26 DIAGNOSIS — K92.1 GASTROINTESTINAL HEMORRHAGE WITH MELENA: Primary | ICD-10-CM

## 2022-05-26 DIAGNOSIS — K92.1 MELENA: ICD-10-CM

## 2022-05-26 PROCEDURE — 88305 TISSUE EXAM BY PATHOLOGIST: CPT | Performed by: PATHOLOGY

## 2022-05-26 PROCEDURE — 45385 COLONOSCOPY W/LESION REMOVAL: CPT | Performed by: INTERNAL MEDICINE

## 2022-05-26 PROCEDURE — 25000003 PHARM REV CODE 250: Performed by: NURSE ANESTHETIST, CERTIFIED REGISTERED

## 2022-05-26 PROCEDURE — 25000003 PHARM REV CODE 250: Performed by: INTERNAL MEDICINE

## 2022-05-26 PROCEDURE — 27201089 HC SNARE, DISP (ANY): Performed by: INTERNAL MEDICINE

## 2022-05-26 PROCEDURE — 27201030 HC OVERTUBE: Performed by: INTERNAL MEDICINE

## 2022-05-26 PROCEDURE — D9220A PRA ANESTHESIA: Mod: CRNA,,, | Performed by: NURSE ANESTHETIST, CERTIFIED REGISTERED

## 2022-05-26 PROCEDURE — 44376 SMALL BOWEL ENDOSCOPY: CPT | Performed by: INTERNAL MEDICINE

## 2022-05-26 PROCEDURE — D9220A PRA ANESTHESIA: ICD-10-PCS | Mod: CRNA,,, | Performed by: NURSE ANESTHETIST, CERTIFIED REGISTERED

## 2022-05-26 PROCEDURE — 37000008 HC ANESTHESIA 1ST 15 MINUTES: Performed by: INTERNAL MEDICINE

## 2022-05-26 PROCEDURE — D9220A PRA ANESTHESIA: ICD-10-PCS | Mod: ANES,,, | Performed by: ANESTHESIOLOGY

## 2022-05-26 PROCEDURE — 45385 PR COLONOSCOPY,REMV LESN,SNARE: ICD-10-PCS | Mod: ,,, | Performed by: INTERNAL MEDICINE

## 2022-05-26 PROCEDURE — 88305 TISSUE EXAM BY PATHOLOGIST: ICD-10-PCS | Mod: 26,,, | Performed by: PATHOLOGY

## 2022-05-26 PROCEDURE — 82962 GLUCOSE BLOOD TEST: CPT | Performed by: INTERNAL MEDICINE

## 2022-05-26 PROCEDURE — 88305 TISSUE EXAM BY PATHOLOGIST: CPT | Mod: 26,,, | Performed by: PATHOLOGY

## 2022-05-26 PROCEDURE — D9220A PRA ANESTHESIA: Mod: ANES,,, | Performed by: ANESTHESIOLOGY

## 2022-05-26 PROCEDURE — 63600175 PHARM REV CODE 636 W HCPCS: Performed by: NURSE ANESTHETIST, CERTIFIED REGISTERED

## 2022-05-26 PROCEDURE — 44376 PR SB SCOPE,TO ILEUM,DIAGNOSTIC: ICD-10-PCS | Mod: 51,,, | Performed by: INTERNAL MEDICINE

## 2022-05-26 PROCEDURE — 37000009 HC ANESTHESIA EA ADD 15 MINS: Performed by: INTERNAL MEDICINE

## 2022-05-26 PROCEDURE — 45385 COLONOSCOPY W/LESION REMOVAL: CPT | Mod: ,,, | Performed by: INTERNAL MEDICINE

## 2022-05-26 PROCEDURE — 44376 SMALL BOWEL ENDOSCOPY: CPT | Mod: 51,,, | Performed by: INTERNAL MEDICINE

## 2022-05-26 RX ORDER — PROPOFOL 10 MG/ML
VIAL (ML) INTRAVENOUS
Status: DISCONTINUED | OUTPATIENT
Start: 2022-05-26 | End: 2022-05-26

## 2022-05-26 RX ORDER — SODIUM CHLORIDE 0.9 % (FLUSH) 0.9 %
3 SYRINGE (ML) INJECTION
Status: DISCONTINUED | OUTPATIENT
Start: 2022-05-26 | End: 2022-05-26 | Stop reason: HOSPADM

## 2022-05-26 RX ORDER — LIDOCAINE HYDROCHLORIDE 20 MG/ML
INJECTION INTRAVENOUS
Status: DISCONTINUED | OUTPATIENT
Start: 2022-05-26 | End: 2022-05-26

## 2022-05-26 RX ORDER — HYDROMORPHONE HYDROCHLORIDE 1 MG/ML
0.2 INJECTION, SOLUTION INTRAMUSCULAR; INTRAVENOUS; SUBCUTANEOUS EVERY 5 MIN PRN
Status: DISCONTINUED | OUTPATIENT
Start: 2022-05-26 | End: 2022-05-26 | Stop reason: HOSPADM

## 2022-05-26 RX ORDER — SODIUM CHLORIDE 9 MG/ML
INJECTION, SOLUTION INTRAVENOUS CONTINUOUS
Status: DISCONTINUED | OUTPATIENT
Start: 2022-05-26 | End: 2022-05-26 | Stop reason: HOSPADM

## 2022-05-26 RX ORDER — MIDAZOLAM HYDROCHLORIDE 1 MG/ML
INJECTION, SOLUTION INTRAMUSCULAR; INTRAVENOUS
Status: DISCONTINUED | OUTPATIENT
Start: 2022-05-26 | End: 2022-05-26

## 2022-05-26 RX ORDER — KETAMINE HCL IN 0.9 % NACL 50 MG/5 ML
SYRINGE (ML) INTRAVENOUS
Status: DISCONTINUED | OUTPATIENT
Start: 2022-05-26 | End: 2022-05-26

## 2022-05-26 RX ORDER — FENTANYL CITRATE 50 UG/ML
INJECTION, SOLUTION INTRAMUSCULAR; INTRAVENOUS
Status: DISCONTINUED | OUTPATIENT
Start: 2022-05-26 | End: 2022-05-26

## 2022-05-26 RX ADMIN — PROPOFOL 40 MG: 10 INJECTION, EMULSION INTRAVENOUS at 02:05

## 2022-05-26 RX ADMIN — FENTANYL CITRATE 25 MCG: 50 INJECTION, SOLUTION INTRAMUSCULAR; INTRAVENOUS at 02:05

## 2022-05-26 RX ADMIN — SODIUM CHLORIDE: 0.9 INJECTION, SOLUTION INTRAVENOUS at 01:05

## 2022-05-26 RX ADMIN — GLYCOPYRROLATE 0.1 MG: 0.2 INJECTION, SOLUTION INTRAMUSCULAR; INTRAVITREAL at 02:05

## 2022-05-26 RX ADMIN — Medication 10 MG: at 02:05

## 2022-05-26 RX ADMIN — Medication 150 MCG/KG/MIN: at 02:05

## 2022-05-26 RX ADMIN — FENTANYL CITRATE 25 MCG: 50 INJECTION, SOLUTION INTRAMUSCULAR; INTRAVENOUS at 03:05

## 2022-05-26 RX ADMIN — Medication 30 MG: at 02:05

## 2022-05-26 RX ADMIN — LIDOCAINE HYDROCHLORIDE 100 MG: 20 INJECTION, SOLUTION INTRAVENOUS at 02:05

## 2022-05-26 RX ADMIN — MIDAZOLAM HYDROCHLORIDE 2 MG: 1 INJECTION, SOLUTION INTRAMUSCULAR; INTRAVENOUS at 02:05

## 2022-05-26 NOTE — ANESTHESIA POSTPROCEDURE EVALUATION
Anesthesia Post Evaluation    Patient: Shiraz Jha    Procedure(s) Performed: Procedure(s) (LRB):  ENTEROSCOPY, SINGLE BALLOON, ANTEGRADE (N/A)  COLONOSCOPY (N/A)    Final Anesthesia Type: general      Patient location during evaluation: [facility]  Patient participation: Yes- Able to Participate  Level of consciousness: awake and alert  Post-procedure vital signs: reviewed and stable  Pain management: adequate  Airway patency: patent  QUINTIN mitigation strategies: Extubation while patient is awake  PONV status at discharge: No PONV  Anesthetic complications: no      Cardiovascular status: stable  Respiratory status: unassisted and spontaneous ventilation  Hydration status: euvolemic  Follow-up not needed.          Vitals Value Taken Time   /90 05/26/22 1616   Temp 36.3 °C (97.4 °F) 05/26/22 1615   Pulse 57 05/26/22 1620   Resp 20 05/26/22 1615   SpO2 98 % 05/26/22 1620   Vitals shown include unvalidated device data.      No case tracking events are documented in the log.      Pain/Dandy Score: Dandy Score: 10 (5/26/2022  4:15 PM)

## 2022-05-26 NOTE — ANESTHESIA PREPROCEDURE EVALUATION
05/26/2022  Shiraz Jha is a 62 y.o., male.      Pre-op Assessment    I have reviewed the Patient Summary Reports.     I have reviewed the Nursing Notes. I have reviewed the NPO Status.   I have reviewed the Medications.     Review of Systems  Anesthesia Hx:  No problems with previous Anesthesia Denies Hx of Anesthetic complications  History of prior surgery of interest to airway management or planning: Previous anesthesia: General Denies Family Hx of Anesthesia complications.   Denies Personal Hx of Anesthesia complications.   Social:  Non-Smoker    Hematology/Oncology:  Hematology Normal   Oncology Normal     EENT/Dental:EENT/Dental Normal   Cardiovascular:   Exercise tolerance: good Hypertension Angina    Pulmonary:   Asthma Sleep Apnea    Renal/:  Renal/ Normal     Hepatic/GI:   GERD    Musculoskeletal:   Arthritis     Neurological:   Neuromuscular Disease,    Endocrine:   Diabetes    Dermatological:  Skin Normal    Psych:   Psychiatric History        Patient Active Problem List   Diagnosis    Essential hypertension    Depression    Atrial flutter    Severe obesity (BMI >= 40)    Type 2 diabetes mellitus without complication, without long-term current use of insulin    Obstructive sleep apnea    Chronic rhinitis    Persistent atrial fibrillation    Spondylosis without myelopathy    Gastroesophageal reflux disease without esophagitis    Benign prostatic hyperplasia with lower urinary tract symptoms    Stable angina pectoris    Status post catheter ablation of atrial fibrillation    Status post catheter ablation of atrial flutter    Current use of long term anticoagulation    Enlarged thoracic aorta    Mobility impaired    Weakness    Osteoarthritis of spine    DDD (degenerative disc disease), lumbar    Status post gastric bypass for obesity    Venous insufficiency of left lower  extremity    Edema of both lower extremities    Impaired fasting blood sugar    Neoplasm of uncertain behavior of lip    Lymphedema of both lower extremities    S/P ablation of atrial fibrillation    Major depressive disorder in remission    Poor posture    Decreased strength of trunk and back    Gastrointestinal hemorrhage with melena    Acute blood loss anemia    Hypokalemia     Past Medical History:   Diagnosis Date    Allergy     Anemia     Asthma     Atrial fibrillation     Cataract     Chronic rhinitis 9/26/2013    DDD (degenerative disc disease) 4/3/2015    Depression     Diabetes mellitus     Fibromyalgia     Gastroesophageal reflux disease without esophagitis 7/14/2017    Hypertension     Sinusitis, acute, maxillary 12/20/2012    Thyroid disease      Past Surgical History:   Procedure Laterality Date    CERVICAL SPINE SURGERY      COLONOSCOPY N/A 5/10/2022    Procedure: COLONOSCOPY;  Surgeon: Conrad Diaz MD;  Location: [facility] (2ND FLR);  Service: Endoscopy;  Laterality: N/A;    EPIDURAL STEROID INJECTION INTO LUMBAR SPINE N/A 5/29/2018    Procedure: INJECTION-STEROID-EPIDURAL-LUMBAR- L4-5;  Surgeon: Roman Lyman MD;  Location: [facility] PAIN MGT;  Service: Pain Management;  Laterality: N/A;  Patient is diabetic and Xarleto     EPIDURAL STEROID INJECTION INTO LUMBAR SPINE N/A 7/3/2018    Procedure: INJECTION, STEROID, SPINE, LUMBAR, EPIDURAL- L4-5;  Surgeon: Roman Lyman MD;  Location: [facility] PAIN [facility]GT;  Service: Pain Management;  Laterality: N/A;  Patient takes Xarelto and ASA     ESOPHAGOGASTRODUODENOSCOPY N/A 5/9/2022    Procedure: EGD (ESOPHAGOGASTRODUODENOSCOPY);  Surgeon: Conrad Diaz MD;  Location: [facility] (2ND FLR);  Service: Endoscopy;  Laterality: N/A;    EXCISION OF LESION OF LIP N/A 7/7/2020    Procedure: EXCISION, LESION, LIP;  Surgeon: Caden Navarro MD;  Location: [facility] OR 2ND FLR;  Service: ENT;  Laterality: N/A;    GASTRIC BYPASS      SINUS  SURGERY  2000    [facility]    TONSILLECTOMY           Physical Exam  General: Well nourished    Airway:  Mallampati: II   Mouth Opening: Normal  TM Distance: Normal  Tongue: Normal  Neck ROM: Normal ROM    Dental:  Intact    Chest/Lungs:  Clear to auscultation    Heart:  Rate: Normal  Rhythm: Regular Rhythm  Sounds: Normal    Abdomen:  Normal        Anesthesia Plan  Type of Anesthesia, risks & benefits discussed:    Anesthesia Type: Gen Natural Airway  Intra-op Monitoring Plan: Standard ASA Monitors  Post Op Pain Control Plan: multimodal analgesia  Induction:  IV  Airway Plan: Direct, Post-Induction  Informed Consent: Informed consent signed with the Patient and all parties understand the risks and agree with anesthesia plan.  All questions answered.   ASA Score: 3  Day of Surgery Review of History & Physical: H&P Update referred to the surgeon/provider.    Ready For Surgery From Anesthesia Perspective.     .

## 2022-05-26 NOTE — TRANSFER OF CARE
Anesthesia Transfer of Care Note    Patient: Shiraz Jha    Procedure(s) Performed: Procedure(s) (LRB):  ENTEROSCOPY, SINGLE BALLOON, ANTEGRADE (N/A)  COLONOSCOPY (N/A)    Patient location: [facility]    Anesthesia Type: general    Transport from OR: Transported from OR on 2-3 L/min O2 by NC with adequate spontaneous ventilation    Post pain: adequate analgesia    Post assessment: no apparent anesthetic complications and tolerated procedure well    Post vital signs: stable    Level of consciousness: awake, alert and oriented    Nausea/Vomiting: no nausea/vomiting    Complications: none    Transfer of care protocol was followed      Last vitals: There were no vitals taken for this visit.

## 2022-05-26 NOTE — PROVATION PATIENT INSTRUCTIONS
Discharge Summary/Instructions after an Endoscopic Procedure  Patient Name: Shiraz Jha  Patient MRN: [id number]  Patient YOB: 1960  Thursday, May 26, 2022  Lyle Edmond MD  Dear patient,  As a result of recent federal legislation (The Federal Cures Act), you may   receive lab or pathology results from your procedure in your [facility]   account before your physician is able to contact you. Your physician or   their representative will relay the results to you with their   recommendations at their soonest availability.  Thank you,  RESTRICTIONS:  During your procedure today, you received medications for sedation.  These   medications may affect your judgment, balance and coordination.  Therefore,   for 24 hours, you have the following restrictions:   - DO NOT drive a car, operate machinery, make legal/financial decisions,   sign important papers or drink alcohol.    ACTIVITY:  Today: no heavy lifting, straining or running due to procedural   sedation/anesthesia.  The following day: return to full activity including work.  DIET:  Eat and drink normally unless instructed otherwise.     TREATMENT FOR COMMON SIDE EFFECTS:  - Mild abdominal pain, nausea, belching, bloating or excessive gas:  rest,   eat lightly and use a heating pad.  - Sore Throat: treat with throat lozenges and/or gargle with warm salt   water.  - Because air was used during the procedure, expelling large amounts of air   from your rectum or belching is normal.  - If a bowel prep was taken, you may not have a bowel movement for 1-3 days.    This is normal.  SYMPTOMS TO WATCH FOR AND REPORT TO YOUR PHYSICIAN:  1. Abdominal pain or bloating, other than gas cramps.  2. Chest pain.  3. Back pain.  4. Signs of infection such as: chills or fever occurring within 24 hours   after the procedure.  5. Rectal bleeding, which would show as bright red, maroon, or black stools.   (A tablespoon of blood from the rectum is not serious, especially if    hemorrhoids are present.)  6. Vomiting.  7. Weakness or dizziness.  GO DIRECTLY TO THE NEAREST EMERGENCY ROOM IF YOU HAVE ANY OF THE FOLLOWING:      Difficulty breathing              Chills and/or fever over 101 F   Persistent vomiting and/or vomiting blood   Severe abdominal pain   Severe chest pain   Black, tarry stools   Bleeding- more than one tablespoon   Any other symptom or condition that you feel may need urgent attention  Your doctor recommends these additional instructions:  If any biopsies were taken, your doctors clinic will contact you in 1 to 2   weeks with any results.  - Perform a colonoscopy now.   - See colonoscopy report for further recommendations.   For questions, problems or results please call your physician - Lyle Edmond MD at Work:  [phone].  [facility], EMERGENCY ROOM PHONE NUMBER: [phone]  IF A COMPLICATION OR EMERGENCY SITUATION ARISES AND YOU ARE UNABLE TO REACH   YOUR PHYSICIAN - GO DIRECTLY TO THE EMERGENCY ROOM.  Lyle Edmond MD  5/26/2022 3:54:38 PM  This report has been verified and signed electronically.  Dear patient,  As a result of recent federal legislation (The Federal Cures Act), you may   receive lab or pathology results from your procedure in your [facility]   account before your physician is able to contact you. Your physician or   their representative will relay the results to you with their   recommendations at their soonest availability.  Thank you,  PROVATION

## 2022-05-26 NOTE — PROVATION PATIENT INSTRUCTIONS
Discharge Summary/Instructions after an Endoscopic Procedure  Patient Name: Shiraz Jha  Patient MRN: [id number]  Patient YOB: 1960  Thursday, May 26, 2022  Lyle Edmond MD  Dear patient,  As a result of recent federal legislation (The Federal Cures Act), you may   receive lab or pathology results from your procedure in your [facility]   account before your physician is able to contact you. Your physician or   their representative will relay the results to you with their   recommendations at their soonest availability.  Thank you,  RESTRICTIONS:  During your procedure today, you received medications for sedation.  These   medications may affect your judgment, balance and coordination.  Therefore,   for 24 hours, you have the following restrictions:   - DO NOT drive a car, operate machinery, make legal/financial decisions,   sign important papers or drink alcohol.    ACTIVITY:  Today: no heavy lifting, straining or running due to procedural   sedation/anesthesia.  The following day: return to full activity including work.  DIET:  Eat and drink normally unless instructed otherwise.     TREATMENT FOR COMMON SIDE EFFECTS:  - Mild abdominal pain, nausea, belching, bloating or excessive gas:  rest,   eat lightly and use a heating pad.  - Sore Throat: treat with throat lozenges and/or gargle with warm salt   water.  - Because air was used during the procedure, expelling large amounts of air   from your rectum or belching is normal.  - If a bowel prep was taken, you may not have a bowel movement for 1-3 days.    This is normal.  SYMPTOMS TO WATCH FOR AND REPORT TO YOUR PHYSICIAN:  1. Abdominal pain or bloating, other than gas cramps.  2. Chest pain.  3. Back pain.  4. Signs of infection such as: chills or fever occurring within 24 hours   after the procedure.  5. Rectal bleeding, which would show as bright red, maroon, or black stools.   (A tablespoon of blood from the rectum is not serious, especially if    hemorrhoids are present.)  6. Vomiting.  7. Weakness or dizziness.  GO DIRECTLY TO THE NEAREST EMERGENCY ROOM IF YOU HAVE ANY OF THE FOLLOWING:      Difficulty breathing              Chills and/or fever over 101 F   Persistent vomiting and/or vomiting blood   Severe abdominal pain   Severe chest pain   Black, tarry stools   Bleeding- more than one tablespoon   Any other symptom or condition that you feel may need urgent attention  Your doctor recommends these additional instructions:  If any biopsies were taken, your doctors clinic will contact you in 1 to 2   weeks with any results.  - Discharge patient to home.   - Patient has a contact number available for emergencies.  The signs and   symptoms of potential delayed complications were discussed with the   patient.  Return to normal activities tomorrow.  Written discharge   instructions were provided to the patient.   - Resume previous diet.   - Continue present medications.   - Resume Xarelto (rivaroxaban) at prior dose tomorrow.   - Await pathology results.   - Repeat colonoscopy in 5 years for surveillance.  For questions, problems or results please call your physician - Lyle Edmond MD at Work:  [phone].  [facility], EMERGENCY ROOM PHONE NUMBER: [phone]  IF A COMPLICATION OR EMERGENCY SITUATION ARISES AND YOU ARE UNABLE TO REACH   YOUR PHYSICIAN - GO DIRECTLY TO THE EMERGENCY ROOM.  Lyle Edmond MD  5/26/2022 3:47:42 PM  This report has been verified and signed electronically.  Dear patient,  As a result of recent federal legislation (The Federal Cures Act), you may   receive lab or pathology results from your procedure in your [facility]   account before your physician is able to contact you. Your physician or   their representative will relay the results to you with their   recommendations at their soonest availability.  Thank you,  PROVATION

## 2022-05-26 NOTE — INTERVAL H&P NOTE
The patient has been examined and the H&P has been reviewed:    Pre-Procedure H and P Addendum    Patient seen and examined.  History and exam unchanged from prior history and physical.      Procedure: antegrade single-balloon enteroscopy and device-assisted colonoscopy  Indication: Recent GI bleeding and incomplete colonoscopy  ASA Class: per anesthesiology  Airway: normal  Neck Mobility: full range of motion  Mallampatti score: per anesthesia  History of anesthesia problems: no  Family history of anesthesia problems: no  Anesthesia Plan: MAC

## 2022-05-27 LAB — POCT GLUCOSE: 117 MG/DL (ref 70–110)

## 2022-05-30 ENCOUNTER — PATIENT MESSAGE (OUTPATIENT)
Dept: ADMINISTRATIVE | Facility: HOSPITAL | Age: 62
End: 2022-05-30
Payer: COMMERCIAL

## 2022-05-31 ENCOUNTER — CLINICAL SUPPORT (OUTPATIENT)
Dept: REHABILITATION | Facility: OTHER | Age: 62
End: 2022-05-31
Attending: NURSE PRACTITIONER
Payer: COMMERCIAL

## 2022-05-31 DIAGNOSIS — R29.3 POOR POSTURE: Primary | ICD-10-CM

## 2022-05-31 DIAGNOSIS — R29.898 DECREASED STRENGTH OF TRUNK AND BACK: ICD-10-CM

## 2022-05-31 PROCEDURE — 97110 THERAPEUTIC EXERCISES: CPT

## 2022-05-31 NOTE — PROGRESS NOTES
"[facility] Physical Therapy Treatment      Name: Shiraz RUBI [facility] Number: [id number]    Therapy Diagnosis:   Encounter Diagnoses   Name Primary?    Poor posture Yes    Decreased strength of trunk and back      Physician: Yadira Brody NP    Visit Date: 2022  Physician Orders: PT Eval and Treat   Medical Diagnosis from Referral: M47.816 (ICD-10-CM) - Lumbar spondylosis  Evaluation Date: 2022  Authorization Period Expiration: 22  Plan of Care Expiration: 22  Reassessment Due: 22  Visit # / Visits authorized:         Time In: 8:00 am  Time Out: 8:45 am  Total Billable Time: 45 minutes     Precautions: Standard cervical fusion      Pattern of pain determined: 1PEP      Subjective   Shiraz reports no LB concern this AM but L side shoulder, neck, thorax discomfort that started Sat AM that he believes is from "sleeping wrong". Pt report no reoccurance of GI issues that had sidelined him.  Pt note he has returned to work full time.  Pt not obtained lumbar roll yet but notes interest in getting for car rides between work locations.          Patient reports tolerating previous visit well with no increase in soreness  Patient reports their pain to be 0/10 LB  8/10 cervicothoracic on a 0-10 scale with 0 being no pain and 10 being the worst pain imaginable.  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   FOTO documents entered into [employer] - see Media section.     Limitation Score: 30%  Visit 6 Score: 34%      Goal: < 25%         Treatment    Pt was instructed in and performed the following:     Shiraz received therapeutic exercises to develop/improved posture, cardiovascular endurance, muscular endurance, lumbar/cervical ROM, strength and muscular endurance for 45 minutes including the following exercises:     5 min on recumbent bike for aerobic endurance     LTR x 10   Open book x 10 B cue for head turn  Seated thoracic ext x 20 elbows point to ceiling   Quad   Cat/Cow x 10 min range    SOC x 20     NP due to focus on thoracic, plan to return to POC  Prone on Elbow 1 min -> prone press ups x 10  DKTC c/T-ball support 10 x 5 sec    Piriformis stretch /c towel 3 x 20 sec   PPT x 15 mod tactile cue for dec BLE use  PPT +Bridges GTB x15 3" hold  BKFO GTB x10      N/P  Steps 2 flights x 2    Trial 1 - ascend hand touch for balance x 2, hand on rail descend x 4 steps, dec velocity    Trial 2 - ascend one R shoulder lean to wall, hand touch to rail descend x 3, dec velocity   Standing ext  10x    [facility] 5/31/2022   Visit Number 9   VAS Pain Rating 0   Treadmill Time (in min.) -   Speed -   Time 5   Lumbar Stretches - Slouch Overcorrection 20   Extension in Lying -   Extension in Standing -   Flexion in Lying -   Lumbar Extension Seat Pad -   Femur Restraint -   Top Dead Center -   Counterweight -   Lumbar Flexion 39   Lumbar Extension 0   Lumbar Peak Torque -   Min Torque -   Test Percent Below Normative Data -   Lumbar Weight 92   Repetitions 15   Rating of Perceived Exertion 5   Ice - Z Lie (in min.) 5       Peripheral muscle strengthening which included 1 set of 15-20 repetitions at a slow, controlled 10-13 second per rep pace focused on strengthening supporting musculature for improved body mechanics and functional mobility.  Pt and " "therapist focused on proper form during treatment to ensure optimal strengthening of each targeted muscle group.  Machines were utilized including torso rotation, leg extension, leg curl, chest press, upright row. Tricep extension, bicep curl, leg press, and hip abduction added visit 3    Home Exercises Provided and Patient Education Provided   Home exercises include:  LTR/  DKTC/  Piriformis/  YUMIKO -> press ups (04/26/22)   EIS  PPT    Cardio program: Visit 5  Lifting education date:TBD visit 11  Lumbar roll: not obtained as 05/31/22    Education provided:   -importance of cont regional mobility during "flare ups"     Written Home Exercises Provided: Patient instructed to cont prior HEP.   Exercises were reviewed and Shiraz was able to demonstrate them prior to the end of the session.  Shiraz demonstrated fair  understanding of the education provided.     See EMR under Patient Instructions for exercises provided prior visit and today's visit    Assessment     Considering pt subjective report of cervicothoracic sx most limiting, today tx offer motion to region.  Motions performed /c concurrent pt education on importance of joint mobility whether it be LB or thoracic region. In cat/cow, pt /c min thoracic ROM noting pain at end range /c acute nature of sx likely contributing to sx presentation.  Pt advised to cont gentle mobility in pain free ROM in cervicothoracic region and to report sx at next visit.     Lumbar MedX weight increased per pt tolerance last visit.  Pt demo comfort /c inc flex range at MedX set up, therefore, inc flex ROM for mobility challenge.  Today pt perform 15 reps at 5 RPE indicating inc mobility and strength.  However, at point pt /c difficulty reaching full end range.  Plan to maintain weight and see improvements in movement quality.    Regarding postural improvements, pt reminded of lumbar bolster in car when driving between work locations.  Pt verbalize intent to obtain lumbar roll.   Plan " midpoint MedX IM testing next visit.         Patient is making good progress towards established goals.  Pt will continue to benefit from skilled outpatient physical therapy to address the deficits stated in the impairment chart, provide pt/family education and to maximize pt's level of independence in the home and community environment.     Anticipated Barriers for therapy: none  Pt's spiritual, cultural and educational needs considered and pt agreeable to plan of care and goals as stated below:     GOALS: Pt is in agreement with the following goals.     Short term goals:  6 weeks or 10 visits   1.  Pt will demonstrate increased lumbar ROM by at least 3 degrees from the initial ROM value with improvements noted in functional ROM and ability to perform ADLs.  (approp and ongoing)  2.  Pt will demonstrate increased MedX average isometric strength value  by 10% from initial test resulting in improved ability to perform bending, lifting, and carrying activities safely, confidently.  (approp and ongoing)  3.  Patient report a reduction in worst pain score by 1-2 points for improved tolerance for household chores.  (approp and ongoing)  4.  Pt able to perform HEP correctly with minimal cueing or supervision from therapist to encourage independent management of symptoms. (approp and ongoing)        Long term goals: 10 weeks or 20 visits   1. Pt will demonstrate increased lumbar ROM by at least 6 degrees from initial ROM value, resulting in improved ability to perform functional fwd bending while standing and sitting. (approp and ongoing)  2. Pt will demonstrate increased MedX average isometric strength value  by 20% from initial test resulting in improved ability to perform bending, lifting, and carrying activities safely, confidently.  (approp and ongoing)  3. Pt to demonstrate ability to independently control and reduce their pain through posture positioning and mechanical movements throughout a typical day.  (approp  and ongoing)  4.  Pt will demonstrate reduced pain and improved functional outcomes as reported on the FOTO by reaching a limitation score of < or = 25% or less in order to demonstrate subjective improvement in pt's condition.    (approp and ongoing)  5. Pt will demonstrate independence with the HEP at discharge  (approp and ongoing)  6. Pt will ascend/descend 2 flights of stairs carrying 20# /s inc LBP for inc tolerance to household chores (grocery return)(patient goal)  (approp and ongoing)  7. Pt will perform stand<>kneel x 3 in < 60 sec /s inc LBP for inc tolerance to household chores (approp and ongoing)    Plan   Continue with established Plan of Care towards established PT goals.     Tang Stacy, PT  5/31/2022

## 2022-06-01 LAB
FINAL PATHOLOGIC DIAGNOSIS: NORMAL
Lab: NORMAL

## 2022-06-02 ENCOUNTER — CLINICAL SUPPORT (OUTPATIENT)
Dept: REHABILITATION | Facility: OTHER | Age: 62
End: 2022-06-02
Attending: NURSE PRACTITIONER
Payer: COMMERCIAL

## 2022-06-02 DIAGNOSIS — R29.898 DECREASED STRENGTH OF TRUNK AND BACK: ICD-10-CM

## 2022-06-02 DIAGNOSIS — R29.3 POOR POSTURE: Primary | ICD-10-CM

## 2022-06-02 PROCEDURE — 97750 PHYSICAL PERFORMANCE TEST: CPT

## 2022-06-02 PROCEDURE — 97110 THERAPEUTIC EXERCISES: CPT

## 2022-06-02 NOTE — PROGRESS NOTES
[facility] Treatment      Name: Shiraz RUBI [facility] Number: [id number]    Therapy Diagnosis:   Encounter Diagnoses   Name Primary?    Poor posture Yes    Decreased strength of trunk and back      Physician: Yadira Brody NP    Visit Date: 2022  Physician Orders: PT Eval and Treat   Medical Diagnosis from Referral: M47.816 (ICD-10-CM) - Lumbar spondylosis  Evaluation Date: 2022  Authorization Period Expiration: 22  Plan of Care Expiration: 22  Reassessment Due: 22  Visit # / Visits authorized: 10 / 20        Time In: 945  Time Out: 1040  Total Billable Time: 45 minutes     Precautions: Standard cervical fusion      Pattern of pain determined: 1PEP      Subjective   Shiraz reports no LB concern this AM .  Pt reports exercises issued last session for thoracic pain have helped  Pt not obtained lumbar roll yet but notes interest in getting for car rides between work locations.          Patient reports tolerating previous visit well with no increase in soreness  Patient reports their pain to be 0/10 LB   1/10 cervicothoracic on a 0-10 scale with 0 being no pain and 10 being the worst pain imaginable.  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 field)   Leisure:   Computer time - reading  Pt goals: walk /s pain, carry things up the steps /s pain    Objective       Baseline Isometric Testing on Med X equipment: Testing administered by PT  Date of testin22  ROM 0 - 36  deg   Max Peak Torque 161   Min Peak Torque 68   Flex/Ext Ratio 2.38    % below normative data 30%          Mid Isometric Testing on Med X equipment: Testing administered by PT  Date of testin22  ROM 0 - 39  deg   Max Peak Torque 198   Min Peak Torque 82   Flex/Ext Ratio 2.4:1   % below normative  "data 27%    5% strength increase    Limitation/Restriction for FOTO Lumbar Survey     Therapist reviewed FOTO scores for Shiraz Jha on 4/12/2022.   FOTO documents entered into [employer] - see Media section.     Limitation Score: 30%  Visit 6 Score: 34%    visit 10 45%  Goal: < 25%         Treatment    Pt was instructed in and performed the following:     Shiraz received therapeutic exercises to develop/improved posture, cardiovascular endurance, muscular endurance, lumbar/cervical ROM, strength and muscular endurance for 55 minutes including the following exercises:   [facility] 6/2/2022   Visit Number 10   VAS Pain Rating 0   Treadmill Time (in min.) -   Speed -   Time 5   Lumbar Stretches - Slouch Overcorrection 20   Extension in Lying -   Extension in Standing -   Flexion in Lying 10   Lumbar Extension Seat Pad -   Femur Restraint -   Top Dead Center -   Counterweight -   Lumbar Flexion -   Lumbar Extension -   Lumbar Peak Torque 198   Min Torque 82   Test Percent Below Normative Data 27   Test Percent Gain in Strength from Initial  5   Lumbar Weight -   Repetitions -   Rating of Perceived Exertion -   Ice - Z Lie (in min.) 5       5 min on recumbent bike for aerobic endurance     LTR x 10   Open book x 10 B cue for head turn  Quad   Cat/Cow x 10 min range  SOC x 20   PPT +Bridges GTB x15 3" hold  BKFO GTB x10  DKTC c/T-ball support 10 x 5 sec       NP   Prone on Elbow 1 min -> prone press ups x 10  Piriformis stretch /c towel 3 x 20 sec   PPT x 15 mod tactile cue for dec BLE use  Seated thoracic ext x 20 elbows point to ceiling       N/P  Steps 2 flights x 2    Trial 1 - ascend hand touch for balance x 2, hand on rail descend x 4 steps, dec velocity    Trial 2 - ascend one R shoulder lean to wall, hand touch to rail descend x 3, dec velocity   Standing ext  10x        Peripheral muscle strengthening which included 1 set of 15-20 repetitions at a slow, controlled 10-13 second per rep pace focused on " "strengthening supporting musculature for improved body mechanics and functional mobility.  Pt and therapist focused on proper form during treatment to ensure optimal strengthening of each targeted muscle group.  Machines were utilized including torso rotation, leg extension, leg curl, chest press, upright row. Tricep extension, bicep curl, leg press, and hip abduction added visit 3    Home Exercises Provided and Patient Education Provided   Home exercises include:  LTR/  DKTC/  Piriformis/  YUMIKO -> press ups (04/26/22)   EIS  PPT    Cardio program: Visit 5  Lifting education date:TBD visit 11  Lumbar roll: not obtained as 05/31/22    Education provided:   -importance of cont regional mobility during "flare ups"     Written Home Exercises Provided: Patient instructed to cont prior HEP.   Exercises were reviewed and Shiraz was able to demonstrate them prior to the end of the session.  Shiraz demonstrated fair  understanding of the education provided.     See EMR under Patient Instructions for exercises provided prior visit and today's visit    Assessment     Patient has attended 10 visits at [facility] which included MD evaluation, PT evaluation with isometric testing, and physical therapy treatment including HEP instruction, education, aerobic work, dynamic strengthening on med ex equipment for the spine, and whole body strengthening on med ex equipment with increasing weight loads.  Patient  is demonstrating increased ability to reduce symptoms, improved posture, improved   ROM, and improved   strength as follows:    -Improved posture,   using lumbar roll, pt has not purchased Lumbar roll yet  -Improved 3 ROM,  initially on med ex test 0-36 and   currently 0-39  -Improved strength at each test point on lumbar med ex IM test with   5% average improvement noted with Reduced pain  Noted by patient  -Initial outcome tool score 30 and current outcome tool score  45%     Patient is making good progress towards " established goals.  Pt will continue to benefit from skilled outpatient physical therapy to address the deficits stated in the impairment chart, provide pt/family education and to maximize pt's level of independence in the home and community environment.     Anticipated Barriers for therapy: none  Pt's spiritual, cultural and educational needs considered and pt agreeable to plan of care and goals as stated below:     GOALS: Pt is in agreement with the following goals.     Short term goals:  6 weeks or 10 visits   1.  Pt will demonstrate increased lumbar ROM by at least 3 degrees from the initial ROM value with improvements noted in functional ROM and ability to perform ADLs. MET  2.  Pt will demonstrate increased MedX average isometric strength value  by 10% from initial test resulting in improved ability to perform bending, lifting, and carrying activities safely, confidently.  (approp and ongoing)  3.  Patient report a reduction in worst pain score by 1-2 points for improved tolerance for household chores.  (approp and ongoing)  4.  Pt able to perform HEP correctly with minimal cueing or supervision from therapist to encourage independent management of symptoms. (approp and ongoing)        Long term goals: 10 weeks or 20 visits   1. Pt will demonstrate increased lumbar ROM by at least 6 degrees from initial ROM value, resulting in improved ability to perform functional fwd bending while standing and sitting. (approp and ongoing)  2. Pt will demonstrate increased MedX average isometric strength value  by 20% from initial test resulting in improved ability to perform bending, lifting, and carrying activities safely, confidently.  (approp and ongoing)  3. Pt to demonstrate ability to independently control and reduce their pain through posture positioning and mechanical movements throughout a typical day.  (approp and ongoing)  4.  Pt will demonstrate reduced pain and improved functional outcomes as reported on the  FOTO by reaching a limitation score of < or = 25% or less in order to demonstrate subjective improvement in pt's condition.    (approp and ongoing)  5. Pt will demonstrate independence with the HEP at discharge  (approp and ongoing)  6. Pt will ascend/descend 2 flights of stairs carrying 20# /s inc LBP for inc tolerance to household chores (grocery return)(patient goal)  (approp and ongoing)  7. Pt will perform stand<>kneel x 3 in < 60 sec /s inc LBP for inc tolerance to household chores (approp and ongoing)    Plan   Continue with established Plan of Care towards established PT goals.     Lynda Ochoa, PT  6/2/2022

## 2022-06-07 ENCOUNTER — DOCUMENTATION ONLY (OUTPATIENT)
Dept: REHABILITATION | Facility: OTHER | Age: 62
End: 2022-06-07
Payer: COMMERCIAL

## 2022-06-07 NOTE — PROGRESS NOTES
Patient no-showed their appointment today at [facility].   Patient has 2 cancels and 1 no shows as of 6/7/22.  Left Voice Mail to remind pt of his next appointment on 6/9/22.

## 2022-06-09 ENCOUNTER — CLINICAL SUPPORT (OUTPATIENT)
Dept: REHABILITATION | Facility: OTHER | Age: 62
End: 2022-06-09
Attending: NURSE PRACTITIONER
Payer: COMMERCIAL

## 2022-06-09 DIAGNOSIS — R29.898 DECREASED STRENGTH OF TRUNK AND BACK: ICD-10-CM

## 2022-06-09 DIAGNOSIS — R29.3 POOR POSTURE: Primary | ICD-10-CM

## 2022-06-09 PROCEDURE — 97110 THERAPEUTIC EXERCISES: CPT | Mod: CQ

## 2022-06-09 NOTE — PROGRESS NOTES
"[facility]      Name: Shiraz RUBI [facility] Number: [id number]    Therapy Diagnosis:   Encounter Diagnoses   Name Primary?    Poor posture Yes    Decreased strength of trunk and back      Physician: Yadira Brody NP    Visit Date: 2022  Physician Orders: PT Eval and Treat   Medical Diagnosis from Referral: M47.816 (ICD-10-CM) - Lumbar spondylosis  Evaluation Date: 2022  Authorization Period Expiration: 22  Plan of Care Expiration: 22  Reassessment Due: 22  Visit # / Visits authorized:         Time In: 8:50  Time Out: 9:45  Total Billable Time: 50 minutes     Precautions: Standard cervical fusion      Pattern of pain determined: 1PEP      Subjective   Shiraz reports minimal back pain today that he rates as a 1-2.  He states he isn't having any lower back pain, his pain is currently in thoracic region. "I have been feeling it in between my shoulder blades .    Patient reports tolerating previous visit well with no increase in soreness  Patient reports their pain to be 0/10 LB   1/10 cervicothoracic on a 0-10 scale with 0 being no pain and 10 being the worst pain imaginable.  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 field)   Leisure:   Computer time - reading  Pt goals: walk /s pain, carry things up the steps /s pain    Objective       Baseline Isometric Testing on Med X equipment: Testing administered by PT  Date of testin22  ROM 0 - 36  deg   Max Peak Torque 161   Min Peak Torque 68   Flex/Ext Ratio 2.38    % below normative data 30%          Mid Isometric Testing on Med X equipment: Testing administered by PT  Date of testin22  ROM 0 - 39  deg   Max Peak Torque 198   Min Peak Torque 82   Flex/Ext Ratio 2.4:1   % below normative data 27% " "   5% strength increase    Limitation/Restriction for FOTO Lumbar Survey     Therapist reviewed FOTO scores for Shiraz Jha on 4/12/2022.   FOTO documents entered into [employer] - see Media section.     Limitation Score: 30%  Visit 6 Score: 34%    visit 10 45%  Goal: < 25%         Treatment    Pt was instructed in and performed the following:     Shiraz received therapeutic exercises to develop/improved posture, cardiovascular endurance, muscular endurance, lumbar/cervical ROM, strength and muscular endurance for 50 minutes including the following exercises:     [facility] - Short 6/9/2022   Visit Number 11   VAS Pain Rating 1   Treadmill Time (in min.) 5   Speed -   Time -   Lumbar Stretches - Slouch 15   Extension in Lying -   Extension in Standing -   Flexion in Lying 10   Lumbar Extension - Seat Pad -   Femur Restraint -   Top Dead Center -   Counterweight -   Lumbar Flexion -   Lumbar Extension -   Lumbar Peak Torque -   Lumbar Weight 92   Repetitions 18   Rating of Perceived Exertion 5       5 min on recumbent bike for aerobic endurance     LTR x 10   Open book x 10 B cue for head turn  Quad   Cat/Cow x 10 min range  SOC+ No Money x 15   PPT +Bridges GTB x15 3" hold  BKFO GTB x10  DKTC c/T-ball support 10 x 5 sec   +Lifting Mechanics: Floor lift & Golfer's lift      NP   Prone on Elbow 1 min -> prone press ups x 10  Piriformis stretch /c towel 3 x 20 sec   PPT x 15 mod tactile cue for dec BLE use  Seated thoracic ext x 20 elbows point to ceiling   Steps 2 flights x 2    Trial 1 - ascend hand touch for balance x 2, hand on rail descend x 4 steps, dec velocity    Trial 2 - ascend one R shoulder lean to wall, hand touch to rail descend x 3, dec velocity   Standing ext  10x      Peripheral muscle strengthening which included 1 set of 15-20 repetitions at a slow, controlled 10-13 second per rep pace focused on strengthening supporting musculature for improved body mechanics and functional mobility.  Pt and " "therapist focused on proper form during treatment to ensure optimal strengthening of each targeted muscle group.  Machines were utilized including torso rotation, leg extension, leg curl, chest press, upright row. Tricep extension, bicep curl, leg press, and hip abduction added visit 3    Home Exercises Provided and Patient Education Provided   Home exercises include:  LTR/  DKTC/  Piriformis/  YUMIKO -> press ups (04/26/22)   EIS  PPT    Cardio program: Visit 5  Lifting education date:TBD visit 11  Lumbar roll: not obtained as 05/31/22    Education provided:   -importance of cont regional mobility during "flare ups"     Written Home Exercises Provided: Patient instructed to cont prior HEP.   Exercises were reviewed and Shiraz was able to demonstrate them prior to the end of the session.  Shiraz demonstrated fair  understanding of the education provided.     See EMR under Patient Instructions for exercises provided prior visit and today's visit    Assessment   Shiraz returned reporting no lower back and minimal thoracic pain.  Shiraz required mod VC and demonstration in order to perform cat cow exercise with proper form.  Treatment progressed by adding no money to SOC to begin scapular strengthening along with postural correction simultaneously.  Lifting do's and don't's handout given to pt with Sihraz demonstrating floor lift and golfer's lift with proper form.  Medx resistance remained at 92#.  He completed 18 reps at a RPE of 5/10.  Will continue to progress per pt's tolerance and HB protocol.    Patient is making good progress towards established goals.  Pt will continue to benefit from skilled outpatient physical therapy to address the deficits stated in the impairment chart, provide pt/family education and to maximize pt's level of independence in the home and community environment.     Anticipated Barriers for therapy: none  Pt's spiritual, cultural and educational needs considered and pt agreeable to plan of care " and goals as stated below:     GOALS: Pt is in agreement with the following goals.     Short term goals:  6 weeks or 10 visits   1.  Pt will demonstrate increased lumbar ROM by at least 3 degrees from the initial ROM value with improvements noted in functional ROM and ability to perform ADLs. MET  2.  Pt will demonstrate increased MedX average isometric strength value  by 10% from initial test resulting in improved ability to perform bending, lifting, and carrying activities safely, confidently.  (approp and ongoing)  3.  Patient report a reduction in worst pain score by 1-2 points for improved tolerance for household chores.  (approp and ongoing)  4.  Pt able to perform HEP correctly with minimal cueing or supervision from therapist to encourage independent management of symptoms. (approp and ongoing)        Long term goals: 10 weeks or 20 visits   1. Pt will demonstrate increased lumbar ROM by at least 6 degrees from initial ROM value, resulting in improved ability to perform functional fwd bending while standing and sitting. (approp and ongoing)  2. Pt will demonstrate increased MedX average isometric strength value  by 20% from initial test resulting in improved ability to perform bending, lifting, and carrying activities safely, confidently.  (approp and ongoing)  3. Pt to demonstrate ability to independently control and reduce their pain through posture positioning and mechanical movements throughout a typical day.  (approp and ongoing)  4.  Pt will demonstrate reduced pain and improved functional outcomes as reported on the FOTO by reaching a limitation score of < or = 25% or less in order to demonstrate subjective improvement in pt's condition.    (approp and ongoing)  5. Pt will demonstrate independence with the HEP at discharge  (approp and ongoing)  6. Pt will ascend/descend 2 flights of stairs carrying 20# /s inc LBP for inc tolerance to household chores (grocery return)(patient goal)  (approp and  ongoing)  7. Pt will perform stand<>kneel x 3 in < 60 sec /s inc LBP for inc tolerance to household chores (approp and ongoing)    Plan   Continue with established Plan of Care towards established PT goals.     Mendez Diego, PTA  6/9/2022

## 2022-06-14 ENCOUNTER — CLINICAL SUPPORT (OUTPATIENT)
Dept: REHABILITATION | Facility: OTHER | Age: 62
End: 2022-06-14
Attending: NURSE PRACTITIONER
Payer: COMMERCIAL

## 2022-06-14 DIAGNOSIS — R29.3 POOR POSTURE: Primary | ICD-10-CM

## 2022-06-14 DIAGNOSIS — R29.898 DECREASED STRENGTH OF TRUNK AND BACK: ICD-10-CM

## 2022-06-14 PROCEDURE — 97110 THERAPEUTIC EXERCISES: CPT

## 2022-06-14 NOTE — PROGRESS NOTES
[facility] Physical Therapy Treatment      Name: Shiraz RUBI [facility] Number: [id number]    Therapy Diagnosis:   Encounter Diagnoses   Name Primary?    Poor posture Yes    Decreased strength of trunk and back      Physician: Yadira Brody NP    Visit Date: 2022  Physician Orders: PT Eval and Treat   Medical Diagnosis from Referral: M47.816 (ICD-10-CM) - Lumbar spondylosis  Evaluation Date: 2022  Authorization Period Expiration: 22  Plan of Care Expiration: 22  Reassessment Due: 22  Visit # / Visits authorized:         Time In: 8:00 am  Time Out: 9:00 am   Total Billable Time: 60 minutes     Precautions: Standard cervical fusion      Pattern of pain determined: 1PEP      Subjective   Shiraz reports no LB or mid back flare ups since last visit and improved LB pain since eval    Pt report employing some lifting techniques at work resulting.    Pt report HEP compliance noting they are getting easier.  However, pt report still using 2 hands to assist balance when walking up his stairs.      Patient reports tolerating previous visit well with no increase in soreness  Patient reports their pain to be 0/10 LB 3/10 cervicothoracic on a 0-10 scale with 0 being no pain and 10 being the worst pain imaginable.  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 field)   Leisure:   Computer time - reading  Pt goals: walk /s pain, carry things up the steps /s pain    Objective       Baseline Isometric Testing on Med X equipment: Testing administered by PT  Date of testin22  ROM 0 - 36  deg   Max Peak Torque 161   Min Peak Torque 68   Flex/Ext Ratio 2.38    % below normative data 30%          Mid Isometric Testing on Med X equipment: Testing administered by PT  Date of testin22  ROM 0 - 39  " deg   Max Peak Torque 198   Min Peak Torque 82   Flex/Ext Ratio 2.4:1   % below normative data 27%    5% strength increase    Limitation/Restriction for FOTO Lumbar Survey     Therapist reviewed FOTO scores for Shiraz Jha on 4/12/2022.   FOTO documents entered into [employer] - see Media section.     Limitation Score: 30%  Visit 6 Score: 34%    visit 10 45%  Goal: < 25%         Treatment    Pt was instructed in and performed the following:     Shiraz received therapeutic exercises to develop/improved posture, cardiovascular endurance, muscular endurance, lumbar/cervical ROM, strength and muscular endurance for 60 minutes including the following exercises:     5 min on recumbent bike for aerobic endurance     LTR x 10   Open book x 10 B cue for head turn  Prone on Elbow 1 min -> prone press ups x 10    Quad   Cat/Cow x 10 min range   PPT x 10 cues for dec BLE use    Seated thoracic ext over chair x 10   SOC+ No Money x 15         NP   +Bridges GTB x15 3" hold  DKTC c/T-ball support 10 x 5 sec   BKFO GTB x10  +Lifting Mechanics: Floor lift & Golfer's lift  Prone on Elbow 1 min -> prone press ups x 10  Piriformis stretch /c towel 3 x 20 sec   PPT x 15 mod tactile cue for dec BLE use  Seated thoracic ext x 20 elbows point to ceiling   Steps 2 flights x 2    Trial 1 - ascend hand touch for balance x 2, hand on rail descend x 4 steps, dec velocity    Trial 2 - ascend one R shoulder lean to wall, hand touch to rail descend x 3, dec velocity   Standing ext  10x    [facility] 6/14/2022   Visit Number 12   VAS Pain Rating 3   Treadmill Time (in min.) 5   Speed -   Time -   Lumbar Stretches - Slouch Overcorrection 20   Extension in Lying 10   Extension in Standing -   Flexion in Lying -   Lumbar Extension Seat Pad -   Femur Restraint -   Top Dead Center -   Counterweight -   Lumbar Flexion 42   Lumbar Extension 0   Lumbar Peak Torque -   Min Torque -   Test Percent Below Normative Data -   Test Percent Gain in Strength " from Initial  -   Lumbar Weight 92   Repetitions 20   Rating of Perceived Exertion 6   Ice - Z Lie (in min.) 5       Peripheral muscle strengthening which included 1 set of 15-20 repetitions at a slow, controlled 10-13 second per rep pace focused on strengthening supporting musculature for improved body mechanics and functional mobility.  Pt and therapist focused on proper form during treatment to ensure optimal strengthening of each targeted muscle group.  Machines were utilized including torso rotation, leg extension, leg curl, chest press, upright row. Tricep extension, bicep curl, leg press, and hip abduction added visit 3    Home Exercises Provided and Patient Education Provided   Home exercises include:  LTR/  Piriformis/  YUMIKO -> press ups    EIS  PPT    Bridges  Thoracic ext over chair    Cardio program: Visit 5  Lifting education date: visit 11  Lumbar roll: not obtained as 06/14/22    Education provided:        Written Home Exercises Provided: Patient instructed to cont prior HEP.   Exercises were reviewed and Shiraz was able to demonstrate them prior to the end of the session.  Shiraz demonstrated fair  understanding of the education provided.     See EMR under Patient Instructions for exercises provided prior visit and today's visit    Assessment     Pt subjective report indicate indicate improved sx presentation thereby meeting STG.  Pt also meet STG /c ability to perform present mobility HEP /c min cue.  However, pt need sig cue for PPT and demo poor/min pelvic motion /c cat/cow both indicating motor control deficit.  Pt also demo limited coordination /c SOC 6Pt advised focus HEP on core activation.  To inc HEP intensity per  added Bridges to HEP for inc exercise intensity.  Pt note midback sx dec /p thoracic mobility, therefore, also added thoracic ext to HEP.  Lumbar MedX weight maintained per pt tolerance last visit.  For mobility challenge, inc flexion ROM.  Today pt perform 20 reps at 6 RPE  indicating improved mobility and strength.  However, recommend next visit to maintain resistance to achieve better quality reps /c dec RPE.        Patient is making good progress towards established goals.  Pt will continue to benefit from skilled outpatient physical therapy to address the deficits stated in the impairment chart, provide pt/family education and to maximize pt's level of independence in the home and community environment.     Anticipated Barriers for therapy: none  Pt's spiritual, cultural and educational needs considered and pt agreeable to plan of care and goals as stated below:     GOALS: Pt is in agreement with the following goals.     Short term goals:  6 weeks or 10 visits   1.  Pt will demonstrate increased lumbar ROM by at least 3 degrees from the initial ROM value with improvements noted in functional ROM and ability to perform ADLs. MET  2.  Pt will demonstrate increased MedX average isometric strength value  by 10% from initial test resulting in improved ability to perform bending, lifting, and carrying activities safely, confidently.  (approp and ongoing)  3.  Patient report a reduction in worst pain score by 1-2 points for improved tolerance for household chores.  (MET 06/14/22)  4.  Pt able to perform HEP correctly with minimal cueing or supervision from therapist to encourage independent management of symptoms. (MET 06/14/22)        Long term goals: 10 weeks or 20 visits   1. Pt will demonstrate increased lumbar ROM by at least 6 degrees from initial ROM value, resulting in improved ability to perform functional fwd bending while standing and sitting. (approp and ongoing)  2. Pt will demonstrate increased MedX average isometric strength value  by 20% from initial test resulting in improved ability to perform bending, lifting, and carrying activities safely, confidently.  (approp and ongoing)  3. Pt to demonstrate ability to independently control and reduce their pain through posture  positioning and mechanical movements throughout a typical day.  (approp and ongoing)  4.  Pt will demonstrate reduced pain and improved functional outcomes as reported on the FOTO by reaching a limitation score of < or = 25% or less in order to demonstrate subjective improvement in pt's condition.    (approp and ongoing)  5. Pt will demonstrate independence with the HEP at discharge  (approp and ongoing)  6. Pt will ascend/descend 2 flights of stairs carrying 20# /s inc LBP for inc tolerance to household chores (grocery return)(patient goal)  (approp and ongoing)  7. Pt will perform stand<>kneel x 3 in < 60 sec /s inc LBP for inc tolerance to household chores (approp and ongoing)    Plan   Continue with established Plan of Care towards established PT goals.     Tang Stacy, PT  6/14/2022

## 2022-06-17 ENCOUNTER — CLINICAL SUPPORT (OUTPATIENT)
Dept: REHABILITATION | Facility: OTHER | Age: 62
End: 2022-06-17
Attending: NURSE PRACTITIONER
Payer: COMMERCIAL

## 2022-06-17 DIAGNOSIS — R29.898 DECREASED STRENGTH OF TRUNK AND BACK: ICD-10-CM

## 2022-06-17 DIAGNOSIS — R29.3 POOR POSTURE: Primary | ICD-10-CM

## 2022-06-17 PROCEDURE — 97110 THERAPEUTIC EXERCISES: CPT | Mod: CQ

## 2022-06-17 NOTE — PROGRESS NOTES
[facility]      Name: Shiraz RUBI [facility] Number: [id number]    Therapy Diagnosis:   Encounter Diagnoses   Name Primary?    Poor posture Yes    Decreased strength of trunk and back      Physician: Yadira Brody NP    Visit Date: 2022  Physician Orders: PT Eval and Treat   Medical Diagnosis from Referral: M47.816 (ICD-10-CM) - Lumbar spondylosis  Evaluation Date: 2022  Authorization Period Expiration: 22  Plan of Care Expiration: 22  Reassessment Due: 22  Visit # / Visits authorized:         Time In: 11:25 am  Time Out: 12:15 pm   Total Billable Time: 45 minutes     Precautions: Standard cervical fusion      Pattern of pain determined: 1PEP      Subjective   Shiraz reports no lower back or mid back pain currently.  He reports newly added HEP stretch for thoracic mobility has helped relieve his pain.    Patient reports tolerating previous visit well with no increase in soreness  Patient reports their pain to be 0/10 LB 3/10 cervicothoracic on a 0-10 scale with 0 being no pain and 10 being the worst pain imaginable.  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 field)   Leisure:   Computer time - reading  Pt goals: walk /s pain, carry things up the steps /s pain    Objective       Baseline Isometric Testing on Med X equipment: Testing administered by PT  Date of testin22  ROM 0 - 36  deg   Max Peak Torque 161   Min Peak Torque 68   Flex/Ext Ratio 2.38    % below normative data 30%          Mid Isometric Testing on Med X equipment: Testing administered by PT  Date of testin22  ROM 0 - 39  deg   Max Peak Torque 198   Min Peak Torque 82   Flex/Ext Ratio 2.4:1   % below normative data 27%    5% strength increase    Limitation/Restriction for FOTO  "Lumbar Survey     Therapist reviewed FOTO scores for Shiraz Jha on 4/12/2022.   FOTO documents entered into [employer] - see Media section.     Limitation Score: 30%  Visit 6 Score: 34%    visit 10 45%  Goal: < 25%         Treatment    Pt was instructed in and performed the following:     Shiraz received therapeutic exercises to develop/improved posture, cardiovascular endurance, muscular endurance, lumbar/cervical ROM, strength and muscular endurance for 45 minutes including the following exercises:     5 min on treadmill for aerobic endurance     LTR x 10   PPT x 15 cues for dec BLE use  Bridges GTB 2x10  3" hold  Open book x 10 B cue for head turn  Prone on Elbow 1 min -> prone press ups x 10  Cat/Cow x 10 min range   Seated thoracic ext over chair x 15   SOC+ No Money GT B x 15       NP   DKTC c/T-ball support 10 x 5 sec   BKFO GTB x10  +Lifting Mechanics: Floor lift & Golfer's lift  Prone on Elbow 1 min -> prone press ups x 10  Piriformis stretch /c towel 3 x 20 sec   PPT x 15 mod tactile cue for dec BLE use  Seated thoracic ext x 20 elbows point to ceiling   Steps 2 flights x 2    Trial 1 - ascend hand touch for balance x 2, hand on rail descend x 4 steps, dec velocity    Trial 2 - ascend one R shoulder lean to wall, hand touch to rail descend x 3, dec velocity   Standing ext  10x    [facility] - Short 6/17/2022   Visit Number 13   VAS Pain Rating 0   Treadmill Time (in min.) 5   Speed -   Time -   Lumbar Stretches - Slouch 20   Extension in Lying 10   Extension in Standing -   Flexion in Lying -   Lumbar Extension - Seat Pad -   Femur Restraint -   Top Dead Center -   Counterweight -   Lumbar Flexion -   Lumbar Extension -   Lumbar Peak Torque -   Lumbar Weight 92   Repetitions 20   Rating of Perceived Exertion 5       Peripheral muscle strengthening which included 1 set of 15-20 repetitions at a slow, controlled 10-13 second per rep pace focused on strengthening supporting musculature for improved body " mechanics and functional mobility.  Pt and therapist focused on proper form during treatment to ensure optimal strengthening of each targeted muscle group.  Machines were utilized including torso rotation, leg extension, leg curl, chest press, upright row. Tricep extension, bicep curl, leg press, and hip abduction added visit 3    Home Exercises Provided and Patient Education Provided   Home exercises include:  LTR/  Piriformis/  YUMIKO -> press ups    EIS  PPT    Bridges  Thoracic ext over chair    Cardio program: Visit 5  Lifting education date: visit 11  Lumbar roll: not obtained as 06/14/22    Education provided:        Written Home Exercises Provided: Patient instructed to cont prior HEP.   Exercises were reviewed and Shiraz was able to demonstrate them prior to the end of the session.  Shiraz demonstrated fair  understanding of the education provided.     See EMR under Patient Instructions for exercises provided prior visit and today's visit    Assessment   Shiraz returned reporting no lower or mid back pain upon arrival today.  Treatment continued with lumbopelvic and thoracic mobility and LE/core strengthening.  Shiraz continues to require max VC and demonstration to perform cat cow exercise properly.  Medx resistance maintained at 92#.  He demonstrated better quality reps with less LE compensation and slight lower RPE.  He completed 20 reps at a RPE of 5.  Consider a 5% increase in resistance NV per HB protocol.    Patient is making good progress towards established goals.  Pt will continue to benefit from skilled outpatient physical therapy to address the deficits stated in the impairment chart, provide pt/family education and to maximize pt's level of independence in the home and community environment.     Anticipated Barriers for therapy: none  Pt's spiritual, cultural and educational needs considered and pt agreeable to plan of care and goals as stated below:     GOALS: Pt is in agreement with the following  goals.     Short term goals:  6 weeks or 10 visits   1.  Pt will demonstrate increased lumbar ROM by at least 3 degrees from the initial ROM value with improvements noted in functional ROM and ability to perform ADLs. MET  2.  Pt will demonstrate increased MedX average isometric strength value  by 10% from initial test resulting in improved ability to perform bending, lifting, and carrying activities safely, confidently.  (approp and ongoing)  3.  Patient report a reduction in worst pain score by 1-2 points for improved tolerance for household chores.  (MET 06/14/22)  4.  Pt able to perform HEP correctly with minimal cueing or supervision from therapist to encourage independent management of symptoms. (MET 06/14/22)        Long term goals: 10 weeks or 20 visits   1. Pt will demonstrate increased lumbar ROM by at least 6 degrees from initial ROM value, resulting in improved ability to perform functional fwd bending while standing and sitting. (approp and ongoing)  2. Pt will demonstrate increased MedX average isometric strength value  by 20% from initial test resulting in improved ability to perform bending, lifting, and carrying activities safely, confidently.  (approp and ongoing)  3. Pt to demonstrate ability to independently control and reduce their pain through posture positioning and mechanical movements throughout a typical day.  (approp and ongoing)  4.  Pt will demonstrate reduced pain and improved functional outcomes as reported on the FOTO by reaching a limitation score of < or = 25% or less in order to demonstrate subjective improvement in pt's condition.    (approp and ongoing)  5. Pt will demonstrate independence with the HEP at discharge  (approp and ongoing)  6. Pt will ascend/descend 2 flights of stairs carrying 20# /s inc LBP for inc tolerance to household chores (grocery return)(patient goal)  (approp and ongoing)  7. Pt will perform stand<>kneel x 3 in < 60 sec /s inc LBP for inc tolerance to  household chores (approp and ongoing)    Plan   Continue with established Plan of Care towards established PT goals.     Mendez Diego, PTA  6/17/2022

## 2022-06-21 ENCOUNTER — CLINICAL SUPPORT (OUTPATIENT)
Dept: REHABILITATION | Facility: OTHER | Age: 62
End: 2022-06-21
Attending: NURSE PRACTITIONER
Payer: COMMERCIAL

## 2022-06-21 DIAGNOSIS — R29.3 POOR POSTURE: Primary | ICD-10-CM

## 2022-06-21 DIAGNOSIS — R29.898 DECREASED STRENGTH OF TRUNK AND BACK: ICD-10-CM

## 2022-06-21 PROCEDURE — 97110 THERAPEUTIC EXERCISES: CPT | Mod: CQ

## 2022-06-21 NOTE — PROGRESS NOTES
[facility]      Name: Shiraz RUBI [facility] Number: [id number]    Therapy Diagnosis:   Encounter Diagnoses   Name Primary?    Poor posture Yes    Decreased strength of trunk and back      Physician: Yadira Brody NP    Visit Date: 2022  Physician Orders: PT Eval and Treat   Medical Diagnosis from Referral: M47.816 (ICD-10-CM) - Lumbar spondylosis  Evaluation Date: 2022  Authorization Period Expiration: 22  Plan of Care Expiration: 22  Reassessment Due: 22  Visit # / Visits authorized:         Time In: 8:20 am  Time Out: 9:15 am   Total Billable Time: 50 minutes     Precautions: Standard cervical fusion      Pattern of pain determined: 1PEP      Subjective   Shiraz reports no lower back or mid back pain currently.  He has no new complaints since last visit.    Patient reports tolerating previous visit well with no increase in soreness  Patient reports their pain to be 0/10 LB 3/10 cervicothoracic on a 0-10 scale with 0 being no pain and 10 being the worst pain imaginable.  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 field)   Leisure:   Computer time - reading  Pt goals: walk /s pain, carry things up the steps /s pain    Objective       Baseline Isometric Testing on Med X equipment: Testing administered by PT  Date of testin22  ROM 0 - 36  deg   Max Peak Torque 161   Min Peak Torque 68   Flex/Ext Ratio 2.38    % below normative data 30%          Mid Isometric Testing on Med X equipment: Testing administered by PT  Date of testin22  ROM 0 - 39  deg   Max Peak Torque 198   Min Peak Torque 82   Flex/Ext Ratio 2.4:1   % below normative data 27%    5% strength increase    Limitation/Restriction for FOTO Lumbar Survey     Therapist reviewed FOTO  "scores for Shiraz Jha on 4/12/2022.   FOTO documents entered into [employer] - see Media section.     Limitation Score: 30%  Visit 6 Score: 34%    visit 10 45%  Goal: < 25%         Treatment    Pt was instructed in and performed the following:     Shiraz received therapeutic exercises to develop/improved posture, cardiovascular endurance, muscular endurance, lumbar/cervical ROM, strength and muscular endurance for 50 minutes including the following exercises:   [facility] - Short 6/21/2022   Visit Number 14   VAS Pain Rating 0   Treadmill Time (in min.) 5   Speed -   Time -   Lumbar Stretches - Slouch 15   Extension in Lying 10   Extension in Standing -   Flexion in Lying -   Lumbar Extension - Seat Pad -   Femur Restraint -   Top Dead Center -   Counterweight -   Lumbar Flexion -   Lumbar Extension -   Lumbar Peak Torque -   Lumbar Weight 96   Repetitions 20   Rating of Perceived Exertion 2       5 min on treadmill for aerobic endurance     LTR x 10   PPT x 15 cues for dec BLE use  Bridges GTB 2x10  3" hold  Open book x 10 B cue for head turn  Prone on Elbow 1 min -> prone press ups x 10  Quadruped LE extensions x10 ea  Seated thoracic ext over chair x 15   SOC+ No Money GT B x 15       NP   DKTC c/T-ball support 10 x 5 sec   BKFO GTB x10  +Lifting Mechanics: Floor lift & Golfer's lift  Piriformis stretch /c towel 3 x 20 sec   Cat/Cow x 10 min range   Steps 2 flights x 2    Trial 1 - ascend hand touch for balance x 2, hand on rail descend x 4 steps, dec velocity    Trial 2 - ascend one R shoulder lean to wall, hand touch to rail descend x 3, dec velocity   Standing ext  10x      Peripheral muscle strengthening which included 1 set of 15-20 repetitions at a slow, controlled 10-13 second per rep pace focused on strengthening supporting musculature for improved body mechanics and functional mobility.  Pt and therapist focused on proper form during treatment to ensure optimal strengthening of each targeted muscle " group.  Machines were utilized including torso rotation, leg extension, leg curl, chest press, upright row. Tricep extension, bicep curl, leg press, and hip abduction added visit 3    Home Exercises Provided and Patient Education Provided   Home exercises include:  LTR/  Piriformis/  YUMIKO -> press ups    EIS  PPT    Bridges  Thoracic ext over chair    Cardio program: Visit 5  Lifting education date: visit 11  Lumbar roll: not obtained as 06/14/22    Education provided:        Written Home Exercises Provided: Patient instructed to cont prior HEP.   Exercises were reviewed and Shiraz was able to demonstrate them prior to the end of the session.  Shiraz demonstrated fair  understanding of the education provided.     See EMR under Patient Instructions for exercises provided prior visit and today's visit    Assessment   Shiraz returned continuing to report no lower or mid back pain today.  Treatment progressed by adding quadruped alternating LE extension to dynamically strengthen glute and core stabilization.  Mod VC required to decrease hip rotation and maintain flat back.  Medx resistance increased to 96#.  He completed 20 reps at a RPE of 2/10.  Consider a 5-10% increase in resistance NV per HB protocol.      Patient is making good progress towards established goals.  Pt will continue to benefit from skilled outpatient physical therapy to address the deficits stated in the impairment chart, provide pt/family education and to maximize pt's level of independence in the home and community environment.     Anticipated Barriers for therapy: none  Pt's spiritual, cultural and educational needs considered and pt agreeable to plan of care and goals as stated below:     GOALS: Pt is in agreement with the following goals.     Short term goals:  6 weeks or 10 visits   1.  Pt will demonstrate increased lumbar ROM by at least 3 degrees from the initial ROM value with improvements noted in functional ROM and ability to perform ADLs.  MET  2.  Pt will demonstrate increased MedX average isometric strength value  by 10% from initial test resulting in improved ability to perform bending, lifting, and carrying activities safely, confidently.  (approp and ongoing)  3.  Patient report a reduction in worst pain score by 1-2 points for improved tolerance for household chores.  (MET 06/14/22)  4.  Pt able to perform HEP correctly with minimal cueing or supervision from therapist to encourage independent management of symptoms. (MET 06/14/22)        Long term goals: 10 weeks or 20 visits   1. Pt will demonstrate increased lumbar ROM by at least 6 degrees from initial ROM value, resulting in improved ability to perform functional fwd bending while standing and sitting. (approp and ongoing)  2. Pt will demonstrate increased MedX average isometric strength value  by 20% from initial test resulting in improved ability to perform bending, lifting, and carrying activities safely, confidently.  (approp and ongoing)  3. Pt to demonstrate ability to independently control and reduce their pain through posture positioning and mechanical movements throughout a typical day.  (approp and ongoing)  4.  Pt will demonstrate reduced pain and improved functional outcomes as reported on the FOTO by reaching a limitation score of < or = 25% or less in order to demonstrate subjective improvement in pt's condition.    (approp and ongoing)  5. Pt will demonstrate independence with the HEP at discharge  (approp and ongoing)  6. Pt will ascend/descend 2 flights of stairs carrying 20# /s inc LBP for inc tolerance to household chores (grocery return)(patient goal)  (approp and ongoing)  7. Pt will perform stand<>kneel x 3 in < 60 sec /s inc LBP for inc tolerance to household chores (approp and ongoing)    Plan   Continue with established Plan of Care towards established PT goals.     Mendez Diego, PTA  6/21/2022

## 2022-06-23 ENCOUNTER — CLINICAL SUPPORT (OUTPATIENT)
Dept: REHABILITATION | Facility: OTHER | Age: 62
End: 2022-06-23
Attending: NURSE PRACTITIONER
Payer: COMMERCIAL

## 2022-06-23 DIAGNOSIS — R29.898 DECREASED STRENGTH OF TRUNK AND BACK: ICD-10-CM

## 2022-06-23 DIAGNOSIS — R29.3 POOR POSTURE: Primary | ICD-10-CM

## 2022-06-23 PROCEDURE — 97110 THERAPEUTIC EXERCISES: CPT

## 2022-06-23 NOTE — PROGRESS NOTES
[facility]      Name: Shiraz Jha  Clinic Number: [id number]    Therapy Diagnosis:   Encounter Diagnoses   Name Primary?    Poor posture Yes    Decreased strength of trunk and back      Physician: Yadira Brody NP    Visit Date: 2022  Physician Orders: PT Eval and Treat   Medical Diagnosis from Referral: M47.816 (ICD-10-CM) - Lumbar spondylosis  Evaluation Date: 2022  Authorization Period Expiration: 22  Plan of Care Expiration: 22  Reassessment Due: 22  Visit # / Visits authorized: 15/ 20        Time In: 10  Time Out: 1055  Total Billable Time: 45 minutes     Precautions: Standard cervical fusion      Pattern of pain determined: 1PEP      Subjective   Shiraz reports no lower back pain today  Patient reports tolerating previous visit well with no increase in soreness  Patient reports their pain to be 0/10 LB 3/10 cervicothoracic on a 0-10 scale with 0 being no pain and 10 being the worst pain imaginable.  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 field)   Leisure:   Computer time - reading  Pt goals: walk /s pain, carry things up the steps /s pain    Objective       Baseline Isometric Testing on Med X equipment: Testing administered by PT  Date of testin22  ROM 0 - 36  deg   Max Peak Torque 161   Min Peak Torque 68   Flex/Ext Ratio 2.38    % below normative data 30%          Mid Isometric Testing on Med X equipment: Testing administered by PT  Date of testin22  ROM 0 - 39  deg   Max Peak Torque 198   Min Peak Torque 82   Flex/Ext Ratio 2.4:1   % below normative data 27%    5% strength increase    Limitation/Restriction for FOTO Lumbar Survey     Therapist reviewed FOTO scores for Shiraz Jha on 2022.   FOTO documents entered into [employer] -  "see Media section.     Limitation Score: 30%  Visit 6 Score: 34%    visit 10 45%  Goal: < 25%         Treatment    Pt was instructed in and performed the following:     Shiraz received therapeutic exercises to develop/improved posture, cardiovascular endurance, muscular endurance, lumbar/cervical ROM, strength and muscular endurance for 55minutes including the following exercises:     [facility] 6/23/2022   Visit Number 15   VAS Pain Rating 0   Treadmill Time (in min.) 5   Speed -   Time -   Lumbar Stretches - Slouch Overcorrection 15   Extension in Lying 10   Extension in Standing -   Flexion in Lying -   Lumbar Extension Seat Pad -   Femur Restraint -   Top Dead Center -   Counterweight -   Lumbar Flexion -   Lumbar Extension -   Lumbar Peak Torque -   Min Torque -   Test Percent Below Normative Data -   Test Percent Gain in Strength from Initial  -   Lumbar Weight 105   Repetitions 18   Rating of Perceived Exertion 5   Ice - Z Lie (in min.) 5       5 min on treadmill for aerobic endurance     LTR x 10   PPT x 15 cues for dec BLE use  Bridges GTB 2x10  3" hold  Blue TB visit 16  Open book x 10 B cue for head turn  Prone on Elbow 1 min -> prone press ups x 10  Quadruped LE extensions x10 ea  Seated thoracic ext over chair x 15   SOC+ No Money GT B x 15        NP   DKTC c/T-ball support 10 x 5 sec   BKFO GTB x10  +Lifting Mechanics: Floor lift & Golfer's lift  Piriformis stretch /c towel 3 x 20 sec   Cat/Cow x 10 min range   Steps 2 flights x 2    Trial 1 - ascend hand touch for balance x 2, hand on rail descend x 4 steps, dec velocity    Trial 2 - ascend one R shoulder lean to wall, hand touch to rail descend x 3, dec velocity   Standing ext  10x      Peripheral muscle strengthening which included 1 set of 15-20 repetitions at a slow, controlled 10-13 second per rep pace focused on strengthening supporting musculature for improved body mechanics and functional mobility.  Pt and therapist focused on proper " form during treatment to ensure optimal strengthening of each targeted muscle group.  Machines were utilized including torso rotation, leg extension, leg curl, chest press, upright row. Tricep extension, bicep curl, leg press, and hip abduction added visit 3    Home Exercises Provided and Patient Education Provided   Home exercises include:  LTR/  Piriformis/  YUMIKO -> press ups    EIS  PPT    Bridges  Thoracic ext over chair    Cardio program: Visit 5  Lifting education date: visit 11  Lumbar roll: not obtained as 06/14/22    Education provided:        Written Home Exercises Provided: Patient instructed to cont prior HEP.   Exercises were reviewed and Shiraz was able to demonstrate them prior to the end of the session.  Shiraz demonstrated fair  understanding of the education provided.     See EMR under Patient Instructions for exercises provided prior visit and today's visit    Assessment   Shiraz reports no pain upon arrival.  Continue with core strengthening.  Issued Blue TB for home use.  Increased weight on Med X by 10% today with completion of 18 reps at 5/10 exertion level.  Pt plans to continue with wellness upon DC  Patient is making good progress towards established goals.  Pt will continue to benefit from skilled outpatient physical therapy to address the deficits stated in the impairment chart, provide pt/family education and to maximize pt's level of independence in the home and community environment.     Anticipated Barriers for therapy: none  Pt's spiritual, cultural and educational needs considered and pt agreeable to plan of care and goals as stated below:     GOALS: Pt is in agreement with the following goals.     Short term goals:  6 weeks or 10 visits   1.  Pt will demonstrate increased lumbar ROM by at least 3 degrees from the initial ROM value with improvements noted in functional ROM and ability to perform ADLs. MET  2.  Pt will demonstrate increased MedX average isometric strength value  by 10%  from initial test resulting in improved ability to perform bending, lifting, and carrying activities safely, confidently.  (approp and ongoing)  3.  Patient report a reduction in worst pain score by 1-2 points for improved tolerance for household chores.  (MET 06/14/22)  4.  Pt able to perform HEP correctly with minimal cueing or supervision from therapist to encourage independent management of symptoms. (MET 06/14/22)        Long term goals: 10 weeks or 20 visits   1. Pt will demonstrate increased lumbar ROM by at least 6 degrees from initial ROM value, resulting in improved ability to perform functional fwd bending while standing and sitting. (approp and ongoing)  2. Pt will demonstrate increased MedX average isometric strength value  by 20% from initial test resulting in improved ability to perform bending, lifting, and carrying activities safely, confidently.  (approp and ongoing)  3. Pt to demonstrate ability to independently control and reduce their pain through posture positioning and mechanical movements throughout a typical day.  (approp and ongoing)  4.  Pt will demonstrate reduced pain and improved functional outcomes as reported on the FOTO by reaching a limitation score of < or = 25% or less in order to demonstrate subjective improvement in pt's condition.    (approp and ongoing)  5. Pt will demonstrate independence with the HEP at discharge  (approp and ongoing)  6. Pt will ascend/descend 2 flights of stairs carrying 20# /s inc LBP for inc tolerance to household chores (grocery return)(patient goal)  (approp and ongoing)  7. Pt will perform stand<>kneel x 3 in < 60 sec /s inc LBP for inc tolerance to household chores (approp and ongoing)    Plan   Continue with established Plan of Care towards established PT goals.     Lynda Ochoa, PT  6/23/2022

## 2022-06-28 ENCOUNTER — LAB VISIT (OUTPATIENT)
Dept: LAB | Facility: HOSPITAL | Age: 62
End: 2022-06-28
Attending: INTERNAL MEDICINE
Payer: COMMERCIAL

## 2022-06-28 ENCOUNTER — OFFICE VISIT (OUTPATIENT)
Dept: CARDIOLOGY | Facility: CLINIC | Age: 62
End: 2022-06-28
Payer: COMMERCIAL

## 2022-06-28 VITALS
WEIGHT: 285.5 LBS | SYSTOLIC BLOOD PRESSURE: 110 MMHG | BODY MASS INDEX: 43.27 KG/M2 | DIASTOLIC BLOOD PRESSURE: 68 MMHG | HEIGHT: 68 IN | OXYGEN SATURATION: 99 % | HEART RATE: 69 BPM

## 2022-06-28 DIAGNOSIS — Z86.79 S/P ABLATION OF ATRIAL FIBRILLATION: ICD-10-CM

## 2022-06-28 DIAGNOSIS — Z98.890 S/P ABLATION OF ATRIAL FIBRILLATION: ICD-10-CM

## 2022-06-28 DIAGNOSIS — I87.2 VENOUS INSUFFICIENCY OF LEFT LOWER EXTREMITY: ICD-10-CM

## 2022-06-28 DIAGNOSIS — I89.0 LYMPHEDEMA OF BOTH LOWER EXTREMITIES: ICD-10-CM

## 2022-06-28 DIAGNOSIS — R60.0 EDEMA OF BOTH LOWER EXTREMITIES: ICD-10-CM

## 2022-06-28 DIAGNOSIS — I10 ESSENTIAL HYPERTENSION: ICD-10-CM

## 2022-06-28 DIAGNOSIS — G47.33 OBSTRUCTIVE SLEEP APNEA: ICD-10-CM

## 2022-06-28 DIAGNOSIS — R60.0 EDEMA OF BOTH LOWER EXTREMITIES: Primary | ICD-10-CM

## 2022-06-28 DIAGNOSIS — I48.92 ATRIAL FLUTTER, UNSPECIFIED TYPE: ICD-10-CM

## 2022-06-28 LAB
ALBUMIN SERPL BCP-MCNC: 3.7 G/DL (ref 3.5–5.2)
ALP SERPL-CCNC: 108 U/L (ref 55–135)
ALT SERPL W/O P-5'-P-CCNC: 11 U/L (ref 10–44)
ANION GAP SERPL CALC-SCNC: 4 MMOL/L (ref 8–16)
AST SERPL-CCNC: 15 U/L (ref 10–40)
BILIRUB SERPL-MCNC: 0.5 MG/DL (ref 0.1–1)
BUN SERPL-MCNC: 12 MG/DL (ref 6–20)
CALCIUM SERPL-MCNC: 9.6 MG/DL (ref 8.7–10.5)
CHLORIDE SERPL-SCNC: 104 MMOL/L (ref 95–110)
CO2 SERPL-SCNC: 32 MMOL/L (ref 23–29)
CREAT SERPL-MCNC: 1.3 MG/DL (ref 0.5–1.4)
EST. GFR  (AFRICAN AMERICAN): >60 ML/MIN/1.73 M^2
EST. GFR  (NON AFRICAN AMERICAN): 59.3 ML/MIN/1.73 M^2
GLUCOSE SERPL-MCNC: 101 MG/DL (ref 70–110)
POTASSIUM SERPL-SCNC: 4.7 MMOL/L (ref 3.5–5.1)
PROT SERPL-MCNC: 6.3 G/DL (ref 6–8.4)
SODIUM SERPL-SCNC: 140 MMOL/L (ref 136–145)

## 2022-06-28 PROCEDURE — 99215 PR OFFICE/OUTPT VISIT, EST, LEVL V, 40-54 MIN: ICD-10-PCS | Mod: S$GLB,,, | Performed by: INTERNAL MEDICINE

## 2022-06-28 PROCEDURE — 99999 PR PBB SHADOW E&M-EST. PATIENT-LVL V: ICD-10-PCS | Mod: PBBFAC,,, | Performed by: INTERNAL MEDICINE

## 2022-06-28 PROCEDURE — 99999 PR PBB SHADOW E&M-EST. PATIENT-LVL V: CPT | Mod: PBBFAC,,, | Performed by: INTERNAL MEDICINE

## 2022-06-28 PROCEDURE — 3078F PR MOST RECENT DIASTOLIC BLOOD PRESSURE < 80 MM HG: ICD-10-PCS | Mod: CPTII,S$GLB,, | Performed by: INTERNAL MEDICINE

## 2022-06-28 PROCEDURE — 3078F DIAST BP <80 MM HG: CPT | Mod: CPTII,S$GLB,, | Performed by: INTERNAL MEDICINE

## 2022-06-28 PROCEDURE — 1159F MED LIST DOCD IN RCRD: CPT | Mod: CPTII,S$GLB,, | Performed by: INTERNAL MEDICINE

## 2022-06-28 PROCEDURE — 99215 OFFICE O/P EST HI 40 MIN: CPT | Mod: S$GLB,,, | Performed by: INTERNAL MEDICINE

## 2022-06-28 PROCEDURE — 3074F SYST BP LT 130 MM HG: CPT | Mod: CPTII,S$GLB,, | Performed by: INTERNAL MEDICINE

## 2022-06-28 PROCEDURE — 80053 COMPREHEN METABOLIC PANEL: CPT | Performed by: INTERNAL MEDICINE

## 2022-06-28 PROCEDURE — 3008F PR BODY MASS INDEX (BMI) DOCUMENTED: ICD-10-PCS | Mod: CPTII,S$GLB,, | Performed by: INTERNAL MEDICINE

## 2022-06-28 PROCEDURE — 36415 COLL VENOUS BLD VENIPUNCTURE: CPT | Performed by: INTERNAL MEDICINE

## 2022-06-28 PROCEDURE — 3074F PR MOST RECENT SYSTOLIC BLOOD PRESSURE < 130 MM HG: ICD-10-PCS | Mod: CPTII,S$GLB,, | Performed by: INTERNAL MEDICINE

## 2022-06-28 PROCEDURE — 3008F BODY MASS INDEX DOCD: CPT | Mod: CPTII,S$GLB,, | Performed by: INTERNAL MEDICINE

## 2022-06-28 PROCEDURE — 1159F PR MEDICATION LIST DOCUMENTED IN MEDICAL RECORD: ICD-10-PCS | Mod: CPTII,S$GLB,, | Performed by: INTERNAL MEDICINE

## 2022-06-28 RX ORDER — BUMETANIDE 2 MG/1
2 TABLET ORAL 2 TIMES DAILY
Qty: 180 TABLET | Refills: 3 | Status: SHIPPED | OUTPATIENT
Start: 2022-06-28 | End: 2023-10-16 | Stop reason: SDUPTHER

## 2022-06-28 RX ORDER — ARIPIPRAZOLE 10 MG/1
10 TABLET ORAL DAILY
COMMUNITY
Start: 2022-06-27 | End: 2022-12-15

## 2022-06-28 NOTE — PROGRESS NOTES
"[facility]    CC: Lower Extremity Edema    Patient ID: Mr. Shiraz Jha is a 62 y.o. male with Afib s/p ablation (7/2014), BLE lymphedema, venous insufficiency, HTN, DM2, morbid obesity s/p gastric bypass (2010), QUINTIN (on CPAP), who presents for a follow up appointment.  Pertinent history/events as follows:    -Pt kindly referred by Twyla Bahena and Dr Scott for evaluation of lower extremity edema (per Dr. Scott's note on 9/24/2019:  "Mr. Jha is in clinic today for continued LE edema and discoloration of his legs.  He had a venous ultrasound on 8/6/19 for the swelling and mild reflux was noted in the left popliteal.  Patient denies chest pain with exertion or at rest, palpitations, SOB, DAI, dizziness, syncope, orthopnea, PND, or claudication.  Instructed to elevate legs when seated, low salt diet, increase walking and compression stockings. No venous reflux noted on US.  Refer to Dr. Ureña in interventional cardiology."    -At our initial clinic visit on 10/16/2019, Mr. Jha reported BLE edema for the past 1 year.  States LE edema is improved with graduated compression hose.  He has no LE pain, claudication symptoms, rest pain, or tissue loss.  No smoking history.  States he has gained over 50 pounds in the past year, despite gastric bypass surgery.  BLE Venous Reflux Study from 8/6/2019 showed no evidence of lower extremity DVT bilaterally.  There is mild left popliteal (deep venous) reflux.  Plan:   BLE Edema- BLE edema likely due to a component of venous insufficiency and morbid obesity.  Check cmp today.  If potassium and creatinine are appropriate, increase lasix to 40 mg bid for 7 days, then resume at 40 mg daily.  Pt to continue this cycle/regimen for lasix.  Pt will benefit from significant weight loss.  Continue graduated compression hose, leg elevation and low salt diet.  Limit fluid intake to 2 liters a day.  Check exercise SURINDER to evaluate for significant PAD.   Morbid Obesity- " Refer back to Bariatric Surgery.     -At follow up clinic visit on 11/27/2019, Mr. Jha reported no significant improvement in BLE edema with lasix regimen of 40 mg bid for 7 days, then resuming at 40 mg daily.  Exercise SURINDER from 11/15/2019 showed normal rest and exercise SURINDER bilaterally with normal PVR waveforms bilaterally.  Exam shows 1+ BLE pitting edema.  Plan:   BLE Edema- BLE edema likely due to a component of venous insufficiency and morbid obesity.  Check cmp today.  If potassium and creatinine are appropriate, discontinue lasix and start bumex 1 mg bid.  Pt will benefit from significant weight loss.  Continue graduated compression hose, leg elevation and low salt diet.  Limit fluid intake to 2 liters a day.  Check exercise SURINDER to evaluate for significant PAD.   Morbid Obesity- Pt referred back to Bariatric Surgery.     -At clinic visit on 1/8/2020, Mr. Jha reported significant improvement in BLE edema since starting bumex 1 mg daily.  Exercise SURINDER study from 11/25/2019 showed normal rest and exercise SURINDER bilaterally.  Normal PVR waveforms bilaterally.  Plan:   BLE Edema- BLE edema now improving.  Continue bumex 1 mg daily.  Check cmp today to monitor renal fxn.  Exercise SURINDER study from 11/25/2019 showed normal rest and exercise SURINDER bilaterally.  Normal PVR waveforms bilaterally. Continue graduated compression hose, leg elevation and low salt diet.  Limit fluid intake to 2 liters a day.    Morbid Obesity- Pt referred back to Bariatric Surgery.     - At clinic visit on 04/08/20, Mr. Jha reported doing well with no significant LE edema.  Continues to wear graduated compression hose, low salt diet, and limiting fluid intake to 2 liters a day.    Plan  BLE Edema- Currently doing well.  Continue bumex 1 mg daily.  Continue graduated compression hose, leg elevation and low salt diet.  Limit fluid intake to 2 liters a day.      - At clinic visit on 12/01/20, Mr. Jha reported worsening swelling of his bilateral  lower extremities. He stopped wearing compression hose a month ago, however, he continues to take bumex twice a day. He reports no rest pain, claudication, CLI or tissue atrophy.   Plan:  BLE Edema- Due to lymphedema and venous insufficiency. Patient notices worsening of bilateral lower extremity edema after he stopped using compression hose for the past month.  Pt instructed to resume graduated compression hose as prescribed.  Continue bumex 1 mg twice a day.  Continue leg elevation and low salt diet.  Limit fluid intake to 2 liters a day.      -At clinic visit on 1/7/2021, Mr. Jha reported improvement in bilateral lower extremity edema compared to previous visit on 12/01/20.  He reports wearing graduated compression hose.  He is taking Bumex 1 mg twice a day.  He is not following sodium restricted diet, and diet includes soups and gumbo.  He reports no claudication, rest pain, or tissue loss.      Plan:   BLE Edema- Due to lymphedema and venous insufficiency. Patient notices improvement of bilateral lower extremity edema. Continue graduated compression hose.  Continue bumex 1 mg twice a day.  Continue leg elevation when resting.  Encourage sodium restriction to 2000 mg/day and limit fluid intake to 2 liters a day.  Obesity- Pt is following up with Bariatric Surgery.  Encourage dietary modification, exercise and weight loss.      -At clinic visit on 2/9/2021, Mr. Jha reports mild improvement in BLE edema since clinic visit on 1/7/2021.  He has no claudication or tissue loss.   Plan:   BLE Edema- Due to lymphedema and venous insufficiency.   Edema is improving.  Check cmp today.  If creatinine and potassium are appropriate, increase bumex to 2 mg bid for 5 days, then decrease to 1 mg bid for 5 days.  Pt to continue this cycle/ergiemen of lasix.  Continue leg elevation when resting.  Limit sodium restriction to 2000 mg/day and limit fluid intake to 2 liters a day.  Obesity- Pt is following up with Bariatric  Surgery.  Encourage dietary modification, exercise and weight loss.      - At clinic visit on 03/25/21, Mr. Jha reported no new symptoms.  Exam shows BLE edema has improved significantly.  Plan:  BLE Edema- Due to lymphedema and venous insufficiency.   Edema has improved significantly.  Refer to lymphedema clinic.  Recheck cmp today.  If creatinine and potassium are appropriate, continue bumex 2 mg bid for 5 days, then decrease to 1 mg bid for 5 days.  Pt to continue this cycle/ergiemen of lasix.  Continue leg elevation when resting.  Limit sodium restriction to 2000 mg/day and limit fluid intake to 2 liters a day.    Obesity- Pt is following up with Bariatric Surgery.  Encourage dietary modification, exercise and weight loss.      -At clinic visit on 5/25/2021, Mr. Jha reported significant improvement in lower extremity edema since initiation of lymphedema clinic therapy.   He reports no claudication, rest pain or tissue loss.   Plan:    BLE Edema- Due to lymphedema and venous insufficiency.   Edema has improved significantly.  Continue lymphedema clinic.  Check CMP today.  If creatinine and potassium are appropriate, continue bumex 2 mg bid for 5 days, then decrease to 1 mg bid for 5 days.  Pt to continue this cycle/ergiemen of lasix.  Continue leg elevation when resting.  Limit sodium restriction to 2000 mg/day and limit fluid intake to 2 liters a day.    Obesity- Pt is following up with Bariatric Surgery.  Encourage dietary modification, exercise and weight loss.       -At clinic visit on 7/13/2021, Mr. Jha reported significant improvement in BLE edema following lymphedema clinic therapy.  He has no claudication or tissue loss.   Plan:   BLE Edema due to lymphedema and venous insufficiency- Now significantly improved following lymphedema clinic therapy.  Check cmp today.  If potassium and creatinine are appropriate, continue bumex 2 mg bid for 1 week, then reduce to 1 mg bid for 1 week.  Pt to continue this  regimen/cycle of bumex.    Obesity- Pt is following up with Bariatric Surgery.  Encourage dietary modification, exercise and weight loss.     -At clinic visit on 10/14/2021 clinic visit, Mr. Jha reporeds continued improvement in BLE edema.  He has no claudication or tissue loss.    Plan:  BLE Edema due to lymphedema and venous insufficiency- Remains significantly improved.  Continue bumex 2 mg bid for 1 week, then reduce to 1 mg bid for 1 week.  Pt to continue this regimen/cycle of bumex.    Obesity- Pt is following up with Bariatric Surgery.  Encourage dietary modification, exercise and weight loss.      -At clinic visit on 1/18/2022, Mr. Jha reported feeling well and swelling improved. He has no other complaints.   Plan:   BLE Edema due to lymphedema and venous insufficiency- Continues to improve. Continue bumex, compression stockings, elevate legs when resting, limit fluid intake to 1.5L daily, sodium intake to 2000mg daily.  Morbid Obesity- Pt is following up with Bariatric Surgery. Stable. Encourage dietary modification, exercise and weight loss.      -At clinic visit on 5/17/2022, Mr. Jha reported worsening leg swelling.  On 5/10/2022, he was admitted for GI bleeding.  During the hospitalization, bumex was held.  He was subsequently restarted on bumex after discharge.   on 5/11/2022.  Currently taking bumex 2 mg bid on Tues/Thur/Sat/Sun.  Taking 4 mg bid on Mon/Wed/Fri.     Plan:   BLE Edema due to lymphedema and venous insufficiency- Pt with worsening leg swelling since hospitalization on 5/10/2022.  Improving on current regimen.  Refer back to lymphedema clinic.  Continue bumex 2 mg bid on Tues/Thur/Sat/Sun and 4 mg bid on Mon/Wed/Fri.   Continue graduated compression hose; elevate legs when resting, limit fluid intake to 1.5L daily, sodium intake to 2000mg daily.  Check cmp today.    Morbid Obesity- Pt is following up with Bariatric Surgery. Stable. Encourage dietary modification, exercise and  weight loss.    HPI:  Mr. hJa reports improvement in BLE edema.  He is on the waiting list for lymphedema clinic therapy.  Currently taking bumex 2 mg bid.     Past Medical History:   Diagnosis Date    Allergy     Anemia     Asthma     Atrial fibrillation     Cataract     Chronic rhinitis 9/26/2013    DDD (degenerative disc disease) 4/3/2015    Depression     Diabetes mellitus     Fibromyalgia     Gastroesophageal reflux disease without esophagitis 7/14/2017    Hypertension     Sinusitis, acute, maxillary 12/20/2012    Thyroid disease        Past Surgical History:   Procedure Laterality Date    ANTEGRADE SINGLE BALLOON ENTEROSCOPY N/A 5/26/2022    Procedure: ENTEROSCOPY, SINGLE BALLOON, ANTEGRADE;  Surgeon: Lyle Edmond MD;  [facility]  Service: Endoscopy;  Laterality: N/A;  Will use single-balloon scope for both the upper endoscopy and the colonoscopy - recent incomplete colonoscopy due to looping.    Pt is fully vaccinated-DS  5/20/22-Approval to hold Xarelto rec'd from Dr. Webb-pending approval (see telephoned en    CERVICAL SPINE SURGERY      COLONOSCOPY N/A 5/10/2022    Procedure: COLONOSCOPY;  Surgeon: Conrad Diaz MD;  Location: [facility] ENDO (2ND FLR);  Service: Endoscopy;  Laterality: N/A;    COLONOSCOPY N/A 5/26/2022    Procedure: COLONOSCOPY;  Surgeon: Lyle Edmond MD;  [facility]  Service: Endoscopy;  Laterality: N/A;    EPIDURAL STEROID INJECTION INTO LUMBAR SPINE N/A 5/29/2018    Procedure: INJECTION-STEROID-EPIDURAL-LUMBAR- L4-5;  Surgeon: Roman Lyman MD;  Location: [facility] PAIN MGT;  Service: Pain Management;  Laterality: N/A;  Patient is diabetic and Xarleto     EPIDURAL STEROID INJECTION INTO LUMBAR SPINE N/A 7/3/2018    Procedure: INJECTION, STEROID, SPINE, LUMBAR, EPIDURAL- L4-5;  Surgeon: Roman Lyman MD;  Location: [facility] PAIN MGT;  Service: Pain Management;  Laterality: N/A;  Patient takes Xarelto and ASA      ESOPHAGOGASTRODUODENOSCOPY N/A 5/9/2022    Procedure: EGD (ESOPHAGOGASTRODUODENOSCOPY);  Surgeon: Conrad Diaz MD;  [facility]  Service: Endoscopy;  Laterality: N/A;    EXCISION OF LESION OF LIP N/A 7/7/2020    Procedure: EXCISION, LESION, LIP;  Surgeon: Caden Navarro MD;  Location: [facility] OR 2ND FLR;  Service: ENT;  Laterality: N/A;    GASTRIC BYPASS      SINUS SURGERY  2000    [facility]    TONSILLECTOMY         Social History     Socioeconomic History    Marital status: Single   Tobacco Use    Smoking status: Never Smoker    Smokeless tobacco: Never Used   Substance and Sexual Activity    Alcohol use: No     Comment: rare    Drug use: No    Sexual activity: Yes     Partners: Female   Social History Narrative    [place], lives alone w/2 dogs.    Dogs are his kids.    Works PT --  at [employer], on ssdi from neck and back/depression.     Social Determinants of Health     Financial Resource Strain: High Risk    Difficulty of Paying Living Expenses: Very hard   Food Insecurity: Food Insecurity Present    Worried About Running Out of Food in the Last Year: Often true    Ran Out of Food in the Last Year: Sometimes true   Transportation Needs: No Transportation Needs    Lack of Transportation (Medical): No    Lack of Transportation (Non-Medical): No   Physical Activity: Insufficiently Active    Days of Exercise per Week: 2 days    Minutes of Exercise per Session: 50 min   Stress: No Stress Concern Present    Feeling of Stress : Only a little   Social Connections: Unknown    Frequency of Communication with Friends and Family: More than three times a week    Frequency of Social Gatherings with Friends and Family: More than three times a week    Active Member of Clubs or Organizations: Yes    Attends Club or Organization Meetings: More than 4 times per year    Marital Status: Never    Housing Stability: High Risk    Unable to Pay for Housing in the Last  Year: Yes    Number of Places Lived in the Last Year: 1    Unstable Housing in the Last Year: No       Family History   Problem Relation Age of Onset    Heart disease Father     Cancer Mother         lung    Hypertension Sister     Heart disease Brother     Cirrhosis Brother     Diabetes Brother        Review of patient's allergies indicates:   Allergen Reactions    Iodinated contrast media Other (See Comments)     Raises BP         Medication List with Changes/Refills   Current Medications    ASPIRIN 325 MG TABLET    Take 325 mg by mouth once daily.    BUMETANIDE (BUMEX) 1 MG TABLET    TAKE 1 TABLET (1 MG TOTAL) BY MOUTH 2 (TWO) TIMES DAILY.    BUMETANIDE (BUMEX) 2 MG TABLET    Take 1 tablet (2 mg total) by mouth once daily.    BUPROPION (WELLBUTRIN XL) 300 MG 24 HR TABLET    Take 300 mg by mouth once daily. Pt states he only takes 300mg    CYANOCOBALAMIN 500 MCG TABLET    Take 500 mcg by mouth once daily.    DEXTROAMPHETAMINE-AMPHETAMINE (ADDERALL) 20 MG TABLET    Take 1 tablet by mouth every morning, take 1 tablet by mouth 3-4 hours later, and take 1/2 tablet every afternoon    FLUTICASONE PROPIONATE (FLONASE) 50 MCG/ACTUATION NASAL SPRAY    1 spray (50 mcg total) by Each Nostril route once daily.    GABAPENTIN (NEURONTIN) 300 MG CAPSULE    Take 1 capsule (300 mg total) by mouth 3 (three) times daily.    IMPOYZ 0.025 % CREA    Apply topically as needed.     LEVOCETIRIZINE (XYZAL) 5 MG TABLET    TAKE 1 TABLET (5 MG TOTAL) BY MOUTH EVERY EVENING.    LUZU 1 % CREA    Apply topically as needed.     METOPROLOL TARTRATE (LOPRESSOR) 25 MG TABLET    TAKE ONE BY MOUTH TWICE DAILY    MULTIVITAMIN (THERAGRAN) PER TABLET    Take 1 tablet by mouth once daily.     NAFTIN 2 % GEL    daily as needed.     NITROGLYCERIN (NITROSTAT) 0.4 MG SL TABLET    Please dispense 25 pills in 4 bottles.    OMEPRAZOLE (PRILOSEC) 10 MG CAPSULE    Take 2 capsules (20 mg total) by mouth once daily.    POTASSIUM CHLORIDE SA (K-DUR,KLOR-CON)  20 MEQ TABLET    Take 1 tablet (20 mEq total) by mouth 2 (two) times daily.    REXULTI 1 MG TAB    Take 1 tablet by mouth once daily.    SOTALOL (BETAPACE) 80 MG TABLET    TAKE ONE BY MOUTH TWICE DAILY    TAMSULOSIN (FLOMAX) 0.4 MG CAP    TAKE ONE BY MOUTH TWICE DAILY    THIAMINE 250 MG TABLET    Take 250 mg by mouth once daily.    VITAMIN D (VITAMIN D3) 1000 UNITS TAB    Take 1,000 Units by mouth once daily.    VORTIOXETINE (TRINTELLIX) 20 MG TAB    Take 20 mg by mouth once daily.     XARELTO 20 MG TAB    TAKE ONE DAILY       Review of Systems  Constitution: Denies chills, fever, and sweats.  HENT: Denies headaches or blurry vision.  Cardiovascular: Denies chest pain or irregular heart beat.  Respiratory: Denies cough or shortness of breath.  Gastrointestinal: Denies abdominal pain, nausea, or vomiting.  Musculoskeletal: Positive for BLE swelling  improved  Neurological: Denies dizziness or focal weakness.  Psychiatric/Behavioral: Normal mental status.  Hematologic/Lymphatic: Denies bleeding problem or easy bruising/bleeding.  Skin: Denies rash or suspicious lesions    Physical Examination  There were no vitals taken for this visit.    Constitutional: No acute distress, conversant  HEENT: Sclera anicteric, Pupils equal, round and reactive to light, extraocular motions intact, Oropharynx clear  Neck: No JVD, no carotid bruits  Cardiovascular: regular rate and rhythm, no murmur, rubs or gallops, normal S1/S2  Pulmonary: Clear to auscultation bilaterally  Abdominal: Abdomen soft, nontender, nondistended, positive bowel sounds  Extremities: BLE's with minimal pitting edema   Pulses:  Carotid pulses are 2+ on the right side, and 2+ on the left side.  Radial pulses are 2+ on the right side, and 2+ on the left side.   Femoral pulses are 2+ on the right side, and 2+ on the left side.  Popliteal pulses are 2+ on the right side, and 2+ on the left side.   Dorsalis pedis pulses are 2+ on the right side, and 2+ on the left  side.   Posterior tibial pulses are 2+ on the right side, and 2+ on the left side.    Skin: No ecchymosis, erythema, or ulcers  Psych: Alert and oriented x 3, appropriate affect  Neuro: CNII-XII intact, no focal deficits    Labs:  Most Recent Data  CBC:   Lab Results   Component Value Date    WBC 8.97 05/19/2022    HGB 9.3 (L) 05/19/2022    HCT 28.9 (L) 05/19/2022     (H) 05/19/2022    MCV 89 05/19/2022    RDW 14.3 05/19/2022     BMP:   Lab Results   Component Value Date     05/17/2022    K 3.5 05/17/2022     05/17/2022    CO2 29 05/17/2022    BUN 19 05/17/2022    CREATININE 1.2 05/17/2022     (H) 05/17/2022    CALCIUM 9.2 05/17/2022    MG 2.0 05/17/2022    PHOS 3.7 07/15/2017     LFTS;   Lab Results   Component Value Date    PROT 5.9 (L) 05/11/2022    ALBUMIN 2.8 (L) 05/11/2022    BILITOT 1.1 (H) 05/11/2022    AST 13 05/11/2022    ALKPHOS 98 05/11/2022    ALT 10 05/11/2022     COAGS:   Lab Results   Component Value Date    INR 1.1 05/06/2022     FLP:   Lab Results   Component Value Date    CHOL 186 02/15/2022    HDL 37 (L) 02/15/2022    LDLCALC 115.6 02/15/2022    TRIG 167 (H) 02/15/2022    CHOLHDL 19.9 (L) 02/15/2022     CARDIAC:   Lab Results   Component Value Date    TROPONINI 0.007 05/11/2022     (H) 05/11/2022       Echo 7/23/2019:  · Normal left ventricular systolic function. The estimated ejection fraction is 60%  · Normal right ventricular systolic function.  · Normal LV diastolic function.  · Moderate left atrial enlargement.  · Mild right atrial enlargement.  · The ascending aorta is mildly dilated (42mm in diameter, unchanged since Feb 2018).  · The estimated PA systolic pressure is 23 mm Hg  · Normal central venous pressure (3 mm Hg).     Exercise SURINDER 11/25/2019:  Normal rest and exercise SURINDER bilaterally.  Normal PVR waveforms bilaterally.    BLE Venous Reflux Study 8/6/2019:  No evidence of lower extremity DVT bilaterally.  Mild left popliteal (deep venous)  reflux.    Assessment/Plan:  Mr. Shiraz Jha is a 62 y.o. male with Afib s/p ablation (7/2014), BLE lymphedema, venous insufficiency, HTN, DM2, morbid obesity s/p gastric bypass (2010), QUINTIN (on CPAP), who presents for a follow up appointment.      1. BLE Edema due to lymphedema and venous insufficiency- Improving.  Pt is on the waiting list for lymphedema clinic therapy.  Continue bumex 2 mg bid.  Check cmp today to monitor renal function.  Continue graduated compression hose; elevate legs when resting, limit fluid intake to 1.5L daily, sodium intake to 2000mg daily.      2. Morbid Obesity s/p Gastric Bypass- Pt is following up with Bariatric Surgery. Stable. Encourage dietary modification, exercise and weight loss.      3. Afib s/p ablation (7/2014)- Stable.  Continue Xarelto 20 mg daily for anticoagulation.     4. HTN- Controlled.  Continue current medications.      5. QUINTIN- Continue CPAP nightly.     Follow up in 3 months    Total duration of face to face visit time 30 minutes.  Total time spent counseling greater than fifty percent of total visit time.  Counseling included discussion regarding imaging findings, diagnosis, possibilities, treatment options, risks and benefits.  The patient had many questions regarding the options and long-term effects.    Randolph Ureña MD, PhD  Interventional Cardiology

## 2022-07-01 DIAGNOSIS — E11.9 TYPE 2 DIABETES MELLITUS WITHOUT COMPLICATION: ICD-10-CM

## 2022-07-12 ENCOUNTER — PATIENT MESSAGE (OUTPATIENT)
Dept: ADMINISTRATIVE | Facility: HOSPITAL | Age: 62
End: 2022-07-12
Payer: COMMERCIAL

## 2022-07-12 ENCOUNTER — HOSPITAL ENCOUNTER (EMERGENCY)
Facility: HOSPITAL | Age: 62
Discharge: HOME | End: 2022-07-12
Attending: EMERGENCY MEDICINE
Payer: COMMERCIAL

## 2022-07-12 VITALS
HEIGHT: 68 IN | BODY MASS INDEX: 43.5 KG/M2 | TEMPERATURE: 99 F | SYSTOLIC BLOOD PRESSURE: 138 MMHG | RESPIRATION RATE: 16 BRPM | HEART RATE: 56 BPM | DIASTOLIC BLOOD PRESSURE: 63 MMHG | WEIGHT: 287 LBS | OXYGEN SATURATION: 97 %

## 2022-07-12 DIAGNOSIS — R60.9 EDEMA: ICD-10-CM

## 2022-07-12 DIAGNOSIS — M72.2 PLANTAR FASCIITIS: Primary | ICD-10-CM

## 2022-07-12 LAB
ALBUMIN SERPL BCP-MCNC: 3.5 G/DL (ref 3.5–5.2)
ALP SERPL-CCNC: 100 U/L (ref 55–135)
ALT SERPL W/O P-5'-P-CCNC: 8 U/L (ref 10–44)
ANION GAP SERPL CALC-SCNC: 8 MMOL/L (ref 8–16)
AST SERPL-CCNC: 16 U/L (ref 10–40)
BILIRUB SERPL-MCNC: 0.8 MG/DL (ref 0.1–1)
BILIRUB UR QL STRIP: NEGATIVE
BNP SERPL-MCNC: 19 PG/ML (ref 0–99)
BUN SERPL-MCNC: 15 MG/DL (ref 6–20)
CALCIUM SERPL-MCNC: 8.9 MG/DL (ref 8.7–10.5)
CHLORIDE SERPL-SCNC: 100 MMOL/L (ref 95–110)
CLARITY UR REFRACT.AUTO: CLEAR
CO2 SERPL-SCNC: 29 MMOL/L (ref 23–29)
COLOR UR AUTO: NORMAL
CREAT SERPL-MCNC: 1.1 MG/DL (ref 0.5–1.4)
EST. GFR  (AFRICAN AMERICAN): >60 ML/MIN/1.73 M^2
EST. GFR  (NON AFRICAN AMERICAN): >60 ML/MIN/1.73 M^2
GLUCOSE SERPL-MCNC: 105 MG/DL (ref 70–110)
GLUCOSE UR QL STRIP: NEGATIVE
HGB UR QL STRIP: NEGATIVE
KETONES UR QL STRIP: NEGATIVE
LEUKOCYTE ESTERASE UR QL STRIP: NEGATIVE
NITRITE UR QL STRIP: NEGATIVE
PH UR STRIP: 5 [PH] (ref 5–8)
POTASSIUM SERPL-SCNC: 3.7 MMOL/L (ref 3.5–5.1)
PROT SERPL-MCNC: 6.5 G/DL (ref 6–8.4)
PROT UR QL STRIP: NEGATIVE
SODIUM SERPL-SCNC: 137 MMOL/L (ref 136–145)
SP GR UR STRIP: 1.01 (ref 1–1.03)
TROPONIN I SERPL DL<=0.01 NG/ML-MCNC: <0.006 NG/ML (ref 0–0.03)
URN SPEC COLLECT METH UR: NORMAL

## 2022-07-12 PROCEDURE — 99284 PR EMERGENCY DEPT VISIT,LEVEL IV: ICD-10-PCS | Mod: ,,, | Performed by: EMERGENCY MEDICINE

## 2022-07-12 PROCEDURE — 99284 EMERGENCY DEPT VISIT MOD MDM: CPT | Mod: ,,, | Performed by: EMERGENCY MEDICINE

## 2022-07-12 PROCEDURE — 93005 ELECTROCARDIOGRAM TRACING: CPT

## 2022-07-12 PROCEDURE — 84484 ASSAY OF TROPONIN QUANT: CPT | Performed by: NURSE PRACTITIONER

## 2022-07-12 PROCEDURE — 81003 URINALYSIS AUTO W/O SCOPE: CPT | Performed by: NURSE PRACTITIONER

## 2022-07-12 PROCEDURE — 83880 ASSAY OF NATRIURETIC PEPTIDE: CPT | Performed by: NURSE PRACTITIONER

## 2022-07-12 PROCEDURE — 93010 EKG 12-LEAD: ICD-10-PCS | Mod: ,,, | Performed by: INTERNAL MEDICINE

## 2022-07-12 PROCEDURE — 93010 ELECTROCARDIOGRAM REPORT: CPT | Mod: ,,, | Performed by: INTERNAL MEDICINE

## 2022-07-12 PROCEDURE — 80053 COMPREHEN METABOLIC PANEL: CPT | Performed by: NURSE PRACTITIONER

## 2022-07-12 PROCEDURE — 99284 EMERGENCY DEPT VISIT MOD MDM: CPT | Mod: 25

## 2022-07-12 NOTE — FIRST PROVIDER EVALUATION
Emergency Department TeleTriage Encounter Note      CHIEF COMPLAINT    Chief Complaint   Patient presents with    Edema     Had trouble urinating today, hx chf, denies cp and no sob       VITAL SIGNS   Initial Vitals [07/12/22 0946]   BP Pulse Resp Temp SpO2   (!) 121/53 61 18 98.6 °F (37 °C) 100 %      MAP       --            ALLERGIES    Review of patient's allergies indicates:   Allergen Reactions    Iodinated contrast media Other (See Comments)     Raises BP         PROVIDER TRIAGE NOTE  This is a teletriage evaluation of a 62 y.o. male presenting to the ED complaining of decreased UOP and BLE edema starting today. Denies CP but has had SOB.     Pt is speaking in full sentences.     Initial orders will be placed and care will be transferred to an alternate provider when patient is roomed for a full evaluation. Any additional orders and the final disposition will be determined by that provider.           ORDERS  Labs Reviewed - No data to display    ED Orders (720h ago, onward)    Start Ordered     Status Ordering Provider    07/12/22 1100 07/12/22 1054  Strict intake and output Monitor and record urine output (in I's & O's section) every hour after initial furosemide injection given in ED  Every hour        Comments: Monitor and record urine output (in I's & O's section) every hour after initial furosemide injection given in ED    Ordered LAKISHA HAYWARD N.    07/12/22 1054 07/12/22 1054  Confirm Patient is not Eligible for Congestive Heart Failure Pathway  Until discontinued         Ordered LAKISHA HAYWARD N.    07/12/22 1054 07/12/22 1054  Vital signs  Every 30 min         Ordered LAKISHA HAYWARD N.    07/12/22 1054 07/12/22 1054  Cardiac Monitoring - Adult  Continuous        Comments: Notify Physician If:    Ordered LAKISHA HAYWARD N.    07/12/22 1054 07/12/22 1054  Pulse Oximetry Continuous  Continuous         Ordered LAKISHA HAYWARD N.    07/12/22 1054 07/12/22 1054   Insert peripheral IV  Once         Ordered LAKISHA HAYWARD N.    07/12/22 1054 07/12/22 1054  CBC auto differential  STAT         Ordered LAKISHA HAYWARD N.    07/12/22 1054 07/12/22 1054  Comprehensive metabolic panel  STAT         Ordered LAKISHA HAYWARD N.    07/12/22 1054 07/12/22 1054  Troponin I  STAT         Ordered LAKISHA HAYWARD N.    07/12/22 1054 07/12/22 1054  Brain natriuretic peptide  STAT         Ordered LAKISHA HAYWARD N.    07/12/22 1054 07/12/22 1054  EKG 12-lead  Once         Ordered LAKISHA HAYWARD N.    07/12/22 1054 07/12/22 1054  X-Ray Chest AP Portable  1 time imaging         Ordered LAKISHA HAYWARD N.    07/12/22 1008 07/12/22 1008  EKG 12-lead  Once         Final result OSWALDO SINGLETARY            Virtual Visit Note: The provider triage portion of this emergency department evaluation and documentation was performed via [employer], a HIPAA-compliant telemedicine application, in concert with a tele-presenter in the room. A face to face patient evaluation with one of my colleagues will occur once the patient is placed in an emergency department room.      DISCLAIMER: This note was prepared with [employer] voice recognition transcription software. Garbled syntax, mangled pronouns, and other bizarre constructions may be attributed to that software system.

## 2022-07-12 NOTE — ED PROVIDER NOTES
Encounter Date: 7/12/2022       History     Chief Complaint   Patient presents with    Edema     Had trouble urinating today, hx chf, denies cp and no sob     63yo male with a past medical history of AFib s/p ablation on Xarelto, HTN, T2DM, Gastric Bypass 2010, BLE lymphedema on Bumex presenting to the ED for 2 months worsening swelling in his lower extremities.  He denies shortness of breath, chest pain, nausea, vomiting, diaphoresis, fevers, cough.  States that he saw his cardiologist 1 month ago in his Bumex was increased from 1 mg to 2 mg daily.  He has been taking this as directed, no missed doses.  States that his swelling is now worsened to the point that it extends to his feet.  States that his edema normally starts in his more proximal lower extremities.  Normally it does not extend to the feet.  He also reports difficulty urinating.  States he last had normal urination yesterday.  He does take a medication he says to help with prostate health.  Has never had this much difficulty urinating in the past.  Prior to this difficulty he was having dysuria.  Denies flank pain, abdominal pain.    The history is provided by the patient. No  was used.     Review of patient's allergies indicates:   Allergen Reactions    Iodinated contrast media Other (See Comments)     Raises BP       Past Medical History:   Diagnosis Date    Allergy     Anemia     Asthma     Atrial fibrillation     Cataract     Chronic rhinitis 9/26/2013    DDD (degenerative disc disease) 4/3/2015    Depression     Diabetes mellitus     Fibromyalgia     Gastroesophageal reflux disease without esophagitis 7/14/2017    Hypertension     Sinusitis, acute, maxillary 12/20/2012    Thyroid disease      Past Surgical History:   Procedure Laterality Date    ANTEGRADE SINGLE BALLOON ENTEROSCOPY N/A 5/26/2022    Procedure: ENTEROSCOPY, SINGLE BALLOON, ANTEGRADE;  Surgeon: Lyle Edmond MD;  Location: [facility] ([address] [place] [address]);   Service: Endoscopy;  Laterality: N/A;  Will use single-balloon scope for both the upper endoscopy and the colonoscopy - recent incomplete colonoscopy due to looping.    Pt is fully vaccinated-DS  5/20/22-Approval to hold Xarelto rec'd from Dr. Webb-pending approval (see telephoned en    CERVICAL SPINE SURGERY      COLONOSCOPY N/A 5/10/2022    Procedure: COLONOSCOPY;  Surgeon: Conrad Diaz MD;  Location: [facility] (2ND FLR);  Service: Endoscopy;  Laterality: N/A;    COLONOSCOPY N/A 5/26/2022    Procedure: COLONOSCOPY;  Surgeon: Lyle Edmond MD;  Location: [facility] ENDO (2ND FLR);  Service: Endoscopy;  Laterality: N/A;    EPIDURAL STEROID INJECTION INTO LUMBAR SPINE N/A 5/29/2018    Procedure: INJECTION-STEROID-EPIDURAL-LUMBAR- L4-5;  Surgeon: Roman Lyman MD;  Location: [facility] PAIN MGT;  Service: Pain Management;  Laterality: N/A;  Patient is diabetic and Xarleto     EPIDURAL STEROID INJECTION INTO LUMBAR SPINE N/A 7/3/2018    Procedure: INJECTION, STEROID, SPINE, LUMBAR, EPIDURAL- L4-5;  Surgeon: Roman Lyman MD;  Location: [facility] PAIN MGT;  Service: Pain Management;  Laterality: N/A;  Patient takes Xarelto and ASA     ESOPHAGOGASTRODUODENOSCOPY N/A 5/9/2022    Procedure: EGD (ESOPHAGOGASTRODUODENOSCOPY);  Surgeon: Conrad Diaz MD;  Location: [facility] (2ND FLR);  Service: Endoscopy;  Laterality: N/A;    EXCISION OF LESION OF LIP N/A 7/7/2020    Procedure: EXCISION, LESION, LIP;  Surgeon: Caden Navarro MD;  Location: [facility] OR 2ND FLR;  Service: ENT;  Laterality: N/A;    GASTRIC BYPASS      SINUS SURGERY  2000    Dr[facility] Watt [facility]    TONSILLECTOMY       Family History   Problem Relation Age of Onset    Heart disease Father     Cancer Mother         lung    Hypertension Sister     Heart disease Brother     Cirrhosis Brother     Diabetes Brother      Social History     Tobacco Use    Smoking status: Never Smoker    Smokeless tobacco: Never Used   Substance Use Topics    Alcohol use: No      Comment: rare    Drug use: No     Review of Systems   Constitutional: Negative for chills and fever.   HENT: Negative for rhinorrhea and sore throat.    Respiratory: Negative for cough and shortness of breath.    Cardiovascular: Positive for leg swelling. Negative for chest pain and palpitations.   Gastrointestinal: Negative for abdominal pain, diarrhea, nausea and vomiting.   Genitourinary: Positive for decreased urine volume, difficulty urinating and dysuria. Negative for hematuria.   Musculoskeletal: Negative for back pain and neck pain.        Bilateral feet pain   Skin: Negative for rash and wound.   Neurological: Negative for seizures and headaches.   Psychiatric/Behavioral: Negative for agitation and behavioral problems.       Physical Exam     Initial Vitals [07/12/22 0946]   BP Pulse Resp Temp SpO2   (!) 121/53 61 18 98.6 °F (37 °C) 100 %      MAP       --         Physical Exam    Nursing note and vitals reviewed.  Constitutional: He appears well-developed and well-nourished. He is not diaphoretic. No distress.   HENT:   Head: Normocephalic and atraumatic.   Eyes: Conjunctivae and EOM are normal.   Neck: Neck supple.   Normal range of motion.  Cardiovascular: Normal rate, regular rhythm, normal heart sounds and intact distal pulses.   Pulmonary/Chest: Breath sounds normal. No respiratory distress. He has no wheezes. He has no rhonchi. He has no rales.   Abdominal: Abdomen is soft. Bowel sounds are normal. He exhibits no distension. There is no abdominal tenderness. There is no rebound and no guarding.   Musculoskeletal:         General: Edema ( Bilateral lower extremity edema extending to the knee.) present. No tenderness. Normal range of motion.      Cervical back: Normal range of motion and neck supple.      Comments: No cervical, thoracic or lumbar tenderness.  Tenderness to sole of foot at base of heel bilaterally     Neurological: He is alert. He has normal strength.   Skin: Skin is warm and dry.    Psychiatric: He has a normal mood and affect. Thought content normal.         ED Course   Procedures  Labs Reviewed   COMPREHENSIVE METABOLIC PANEL - Abnormal; Notable for the following components:       Result Value    ALT 8 (*)     All other components within normal limits   TROPONIN I   B-TYPE NATRIURETIC PEPTIDE   URINALYSIS, REFLEX TO URINE CULTURE    Narrative:     Specimen Source->Urine     EKG Readings: (Independently Interpreted)   Initial Reading: No STEMI. Previous EKG: Compared with most recent EKG Rhythm: Normal Sinus Rhythm. Heart Rate: 61. ST Segments: Normal ST Segments. T Waves: Normal.     ECG Results          EKG 12-lead (Final result)  Result time 07/12/22 10:23:09    Final result by Interface, Lab In [facility] (07/12/22 10:23:09)                 Narrative:    Test Reason : R60.9,    Vent. Rate : 061 BPM     Atrial Rate : 061 BPM     P-R Int : 184 ms          QRS Dur : 094 ms      QT Int : 432 ms       P-R-T Axes : 062 -20 012 degrees     QTc Int : 434 ms    Normal sinus rhythm  Nonspecific T wave abnormality  Abnormal ECG  When compared with ECG of 11-MAY-2022 15:13,  QT has lengthened  Confirmed by John ACOSTA MD ([id number]) on 7/12/2022 10:22:59 AM    Referred By: [email or web address]   SELF           Confirmed By:John ACOSTA MD                            Imaging Results          X-Ray Chest AP Portable (Final result)  Result time 07/12/22 11:31:25    Final result by Epi Cote MD (07/12/22 11:31:25)                 Impression:      No active cardiac or pulmonary findings      Electronically signed by: Epi Cote  Date:    07/12/2022  Time:    11:31             Narrative:    EXAMINATION:  XR CHEST AP PORTABLE    CLINICAL HISTORY:  CHF;    TECHNIQUE:  Single frontal view of the chest was performed.    COMPARISON:  May 11, 2022    FINDINGS:  Reconfirmed upper normal heart size.  Normal pulmonary vasculature.  No focal infiltrates.  No pleural effusion or pneumothorax.  No acute bony abnormalities.                                  Medications - No data to display  Medical Decision Making:   Initial Assessment:   61yo male with a past medical history of AFib s/p ablation on Xarelto, HTN, T2DM, Gastric Bypass 2010, BLE lymphedema on Bumex presenting to the ED for 2 months worsening swelling in his lower extremities  Differential Diagnosis:   Lymphedema, dependent edema, CHF exacerbation, doubt bilateral DVT as edema is symmetric and calves are nontender and pt on anticoagulation   Urinary retention possibly secondary to UTI verses BPH, no back pain, lower extremity weakness to raise concern for chronic cauda equine  Clinical Tests:   Lab Tests: Ordered and Reviewed  Radiological Study: Reviewed and Ordered  Medical Tests: Ordered and Reviewed  ED Management:  Troponin within normal limits.  BNP unremarkable.  Urinalysis without signs of infection.  Patient did not have any urinary retention on postvoid residual.  Unclear what exactly is causing his worsening swelling, could be related to sodium intake, fluid intake or lymphedema.- patient instructed to continue his home dose of Bumex and follow up with his cardiologist and regular doctor.  Discussed strict return precautions.  Patient is stable for discharge and close outpatient follow-up.            Attending Attestation:   Physician Attestation Statement for Resident:  As the supervising MD   Physician Attestation Statement: I have personally seen and examined this patient.   I agree with the above history. -:   As the supervising MD I agree with the above PE.    As the supervising MD I agree with the above treatment, course, plan, and disposition.   -: Pt is well-appearing, b/l LE edema without erythema, no calf pain or TTP concerning for DVT and imaging/labs inconsistent with CHF, pt documented to have normal Cr and UOP. He DENIES SOB.  Pt does have TTP over sole of R foot however, no bony TTP, and I advised him to f/u with Podiatrist (states he has one) for possible  plantar fasciitis as well as use compression stockings and f/u with PCP for further manegemtn of depdendent edema.  Stable for d/c, I discussed outpatient follow up and return precautions with pt and answered all questions.    I have reviewed and agree with the residents interpretation of the following: lab data, x-rays and EKG.  I have reviewed the following: old records at this facility.                ED Course as of 07/12/22 1601   Tue Jul 12, 2022   1328 Patient is not retaining any urine. [KL]   1440 BNP: 19 [KL]   1441 Troponin I: <0.006 [KL]   1449 Troponin I: <0.006 [JR]   1450 BNP: 19 [JR]   1450 Urinalysis, Reflex to Urine Culture Urine, Clean Catch [JR]   1450 Comprehensive metabolic panel(!) [JR]   1555 CMP was sent to chemistry not hematology in lab. It was spun down and would need to be recollected.  This was ordered at triage and do not feel that it will change any management at this time. Instructed to follow up with his regular doctor.  Also discussed exercises for plantar fasciitis.  Suspect this is what is causing his foot pain. [KL]      ED Course User Index  [JR] Keren Brandon MD  [KL] Brisa Frederick MD             Clinical Impression:   Final diagnoses:  [R60.9] Edema  [M72.2] Plantar fasciitis (Primary)          ED Disposition Condition    Discharge Stable        ED Prescriptions     None        Follow-up Information     Follow up With Specialties Details Why Contact Info    GEORGES Theodore MD Internal Medicine Schedule an appointment as soon as possible for a visit in 2 days  [address] [facility]  [place] [address]  [phone]      [facility] - Emergency Dept Emergency Medicine Go to  As needed, If symptoms worsen [address] [facility] [address] [phone]           Keren Brandon MD  07/13/22 4292

## 2022-07-12 NOTE — ED NOTES
Pt c/o bilateral foot and leg swelling. Pt states it got severely worse from last night until this am. Pt states hes also been having trouble urinating. Pt is resting on stretcher, NAD, [id number].

## 2022-07-12 NOTE — DISCHARGE INSTRUCTIONS
Follow-up with your cardiologist.  Return to the emergency department for chest pain, shortness of breath, fainting or other concerning symptoms.

## 2022-07-14 ENCOUNTER — TELEPHONE (OUTPATIENT)
Dept: CARDIOLOGY | Facility: CLINIC | Age: 62
End: 2022-07-14

## 2022-07-14 NOTE — TELEPHONE ENCOUNTER
----- Message from Mariann Lord MA sent at 7/14/2022 10:35 AM CDT -----  Contact: self  Pt is calling to make an appt. with . For 1 week he has had a lot of fluid in legs ankles and legs. Went to ER on Tues 7/12 for the same reason.Dr Ureña doesn't have an time available. Please call [phone]. Last visit 6/28/22

## 2022-07-15 ENCOUNTER — TELEPHONE (OUTPATIENT)
Dept: CARDIOLOGY | Facility: CLINIC | Age: 62
End: 2022-07-15
Payer: COMMERCIAL

## 2022-07-15 NOTE — TELEPHONE ENCOUNTER
Pt states his legs are how they were before. Pt states he legs hurt at about a 10 right now. He is laying in the bed right now. He states he went to ER and they did nothing for him. Advised the patient normally when your are hurting at about a 10 D.asad Ureña would recommend patients to go to the ER. He states was not seen by a cardiologist but he was seen by a PA. Advised pt I will send Dr. Ureña a message.     ----- Message from Angeles Jha MA sent at 7/15/2022 11:52 AM CDT -----  The patient would like to talk to you about the fluid in his legs  he said he is having a hard time walking. Please call [phone]. Thank you.

## 2022-07-16 ENCOUNTER — HOSPITAL ENCOUNTER (EMERGENCY)
Facility: HOSPITAL | Age: 62
Discharge: HOME | End: 2022-07-16
Attending: EMERGENCY MEDICINE
Payer: COMMERCIAL

## 2022-07-16 VITALS
OXYGEN SATURATION: 99 % | WEIGHT: 287 LBS | SYSTOLIC BLOOD PRESSURE: 116 MMHG | TEMPERATURE: 99 F | DIASTOLIC BLOOD PRESSURE: 68 MMHG | HEART RATE: 80 BPM | RESPIRATION RATE: 18 BRPM | HEIGHT: 68 IN | BODY MASS INDEX: 43.5 KG/M2

## 2022-07-16 DIAGNOSIS — D50.9 MICROCYTIC ANEMIA: ICD-10-CM

## 2022-07-16 DIAGNOSIS — M79.606 LEG PAIN: Primary | ICD-10-CM

## 2022-07-16 DIAGNOSIS — R70.0 ESR RAISED: ICD-10-CM

## 2022-07-16 DIAGNOSIS — R79.82 CRP ELEVATED: ICD-10-CM

## 2022-07-16 DIAGNOSIS — M25.569 KNEE PAIN: ICD-10-CM

## 2022-07-16 PROBLEM — Z79.01 CURRENT USE OF LONG TERM ANTICOAGULATION: Chronic | Status: ACTIVE | Noted: 2017-08-01

## 2022-07-16 LAB
ALBUMIN SERPL BCP-MCNC: 3.5 G/DL (ref 3.5–5.2)
ALP SERPL-CCNC: 269 U/L (ref 55–135)
ALT SERPL W/O P-5'-P-CCNC: 36 U/L (ref 10–44)
ANION GAP SERPL CALC-SCNC: 15 MMOL/L (ref 8–16)
AST SERPL-CCNC: 40 U/L (ref 10–40)
BASOPHILS # BLD AUTO: 0.02 K/UL (ref 0–0.2)
BASOPHILS NFR BLD: 0.2 % (ref 0–1.9)
BILIRUB SERPL-MCNC: 1.9 MG/DL (ref 0.1–1)
BNP SERPL-MCNC: 20 PG/ML (ref 0–99)
BUN SERPL-MCNC: 19 MG/DL (ref 6–20)
CALCIUM SERPL-MCNC: 10.6 MG/DL (ref 8.7–10.5)
CHLORIDE SERPL-SCNC: 91 MMOL/L (ref 95–110)
CK SERPL-CCNC: 25 U/L (ref 20–200)
CO2 SERPL-SCNC: 29 MMOL/L (ref 23–29)
CREAT SERPL-MCNC: 1.4 MG/DL (ref 0.5–1.4)
CRP SERPL-MCNC: 229.9 MG/L (ref 0–8.2)
DIFFERENTIAL METHOD: ABNORMAL
EOSINOPHIL # BLD AUTO: 0 K/UL (ref 0–0.5)
EOSINOPHIL NFR BLD: 0 % (ref 0–8)
ERYTHROCYTE [DISTWIDTH] IN BLOOD BY AUTOMATED COUNT: 15.2 % (ref 11.5–14.5)
ERYTHROCYTE [SEDIMENTATION RATE] IN BLOOD BY PHOTOMETRIC METHOD: 95 MM/HR (ref 0–23)
EST. GFR  (AFRICAN AMERICAN): >60 ML/MIN/1.73 M^2
EST. GFR  (NON AFRICAN AMERICAN): 54.2 ML/MIN/1.73 M^2
GLUCOSE SERPL-MCNC: 149 MG/DL (ref 70–110)
HCT VFR BLD AUTO: 37.7 % (ref 40–54)
HGB BLD-MCNC: 11.3 G/DL (ref 14–18)
IMM GRANULOCYTES # BLD AUTO: 0.05 K/UL (ref 0–0.04)
IMM GRANULOCYTES NFR BLD AUTO: 0.4 % (ref 0–0.5)
LYMPHOCYTES # BLD AUTO: 1.4 K/UL (ref 1–4.8)
LYMPHOCYTES NFR BLD: 11.6 % (ref 18–48)
MCH RBC QN AUTO: 24.5 PG (ref 27–31)
MCHC RBC AUTO-ENTMCNC: 30 G/DL (ref 32–36)
MCV RBC AUTO: 82 FL (ref 82–98)
MONOCYTES # BLD AUTO: 1 K/UL (ref 0.3–1)
MONOCYTES NFR BLD: 8 % (ref 4–15)
NEUTROPHILS # BLD AUTO: 9.8 K/UL (ref 1.8–7.7)
NEUTROPHILS NFR BLD: 79.8 % (ref 38–73)
NRBC BLD-RTO: 0 /100 WBC
PLATELET # BLD AUTO: 418 K/UL (ref 150–450)
PMV BLD AUTO: 10.6 FL (ref 9.2–12.9)
POTASSIUM SERPL-SCNC: 3.5 MMOL/L (ref 3.5–5.1)
PROT SERPL-MCNC: 8 G/DL (ref 6–8.4)
RBC # BLD AUTO: 4.61 M/UL (ref 4.6–6.2)
SODIUM SERPL-SCNC: 135 MMOL/L (ref 136–145)
WBC # BLD AUTO: 12.22 K/UL (ref 3.9–12.7)

## 2022-07-16 PROCEDURE — 80053 COMPREHEN METABOLIC PANEL: CPT | Performed by: PHYSICIAN ASSISTANT

## 2022-07-16 PROCEDURE — 83880 ASSAY OF NATRIURETIC PEPTIDE: CPT | Performed by: PHYSICIAN ASSISTANT

## 2022-07-16 PROCEDURE — 86140 C-REACTIVE PROTEIN: CPT | Performed by: PHYSICIAN ASSISTANT

## 2022-07-16 PROCEDURE — 85025 COMPLETE CBC W/AUTO DIFF WBC: CPT | Performed by: PHYSICIAN ASSISTANT

## 2022-07-16 PROCEDURE — 99285 EMERGENCY DEPT VISIT HI MDM: CPT | Mod: 25

## 2022-07-16 PROCEDURE — 85652 RBC SED RATE AUTOMATED: CPT | Performed by: PHYSICIAN ASSISTANT

## 2022-07-16 PROCEDURE — 25000003 PHARM REV CODE 250: Performed by: PHYSICIAN ASSISTANT

## 2022-07-16 PROCEDURE — 99284 EMERGENCY DEPT VISIT MOD MDM: CPT | Mod: ,,, | Performed by: PHYSICIAN ASSISTANT

## 2022-07-16 PROCEDURE — 99284 PR EMERGENCY DEPT VISIT,LEVEL IV: ICD-10-PCS | Mod: ,,, | Performed by: PHYSICIAN ASSISTANT

## 2022-07-16 PROCEDURE — 82550 ASSAY OF CK (CPK): CPT | Performed by: PHYSICIAN ASSISTANT

## 2022-07-16 RX ORDER — HYDROCODONE BITARTRATE AND ACETAMINOPHEN 10; 325 MG/1; MG/1
1 TABLET ORAL
Status: DISCONTINUED | OUTPATIENT
Start: 2022-07-16 | End: 2022-07-16

## 2022-07-16 RX ORDER — OXYCODONE AND ACETAMINOPHEN 10; 325 MG/1; MG/1
1 TABLET ORAL
Status: COMPLETED | OUTPATIENT
Start: 2022-07-16 | End: 2022-07-16

## 2022-07-16 RX ORDER — HYDROCODONE BITARTRATE AND ACETAMINOPHEN 5; 325 MG/1; MG/1
1 TABLET ORAL EVERY 6 HOURS PRN
Qty: 12 TABLET | Refills: 0 | Status: SHIPPED | OUTPATIENT
Start: 2022-07-16 | End: 2022-07-19

## 2022-07-16 RX ADMIN — OXYCODONE AND ACETAMINOPHEN 1 TABLET: 10; 325 TABLET ORAL at 08:07

## 2022-07-16 NOTE — ED PROVIDER NOTES
Encounter Date: 7/16/2022       History     Chief Complaint   Patient presents with    Leg Pain     X 1 week. Seen here Tuesday for the same.      62 year old male AFib s/p ablation on Xarelto, HTN, T2DM, Fibromyalgia Gastric Bypass 2010, BLE lymphedema on Bumex presents to the ED for evaluation of leg pain. Reports symptoms have been ongoing since Monday.  Patient states he was seen in the ER on Tuesday but did not alleviate his pain. Patient reports that the pain is located to b/l legs, and feet. Extends to the right knee. Denies any redness but has noticed swelling. No worsening SOB. Denies CP. Patient denies recent falls. No fever or chills. He has not taken any medications for his pain. Reports compliance with his home medications otherwise. Denies prior Hx of DVT or PE      The history is provided by the patient.     Review of patient's allergies indicates:   Allergen Reactions    Iodinated contrast media Other (See Comments)     Raises BP       Past Medical History:   Diagnosis Date    Allergy     Anemia     Asthma     Atrial fibrillation     Cataract     Chronic rhinitis 9/26/2013    DDD (degenerative disc disease) 4/3/2015    Depression     Diabetes mellitus     Fibromyalgia     Gastroesophageal reflux disease without esophagitis 7/14/2017    Hypertension     Sinusitis, acute, maxillary 12/20/2012    Thyroid disease      Past Surgical History:   Procedure Laterality Date    ANTEGRADE SINGLE BALLOON ENTEROSCOPY N/A 5/26/2022    Procedure: ENTEROSCOPY, SINGLE BALLOON, ANTEGRADE;  Surgeon: Lyle Edmond MD;  Location: [address]);  Service: Endoscopy;  Laterality: N/A;  Will use single-balloon scope for both the upper endoscopy and the colonoscopy - recent incomplete colonoscopy due to looping.    Pt is fully vaccinated-DS  5/20/22-Approval to hold Xarelto rec'd from Dr. Webb-pending approval (see telephoned en    CERVICAL SPINE SURGERY      COLONOSCOPY N/A 5/10/2022     Procedure: COLONOSCOPY;  Surgeon: Conrad Diaz MD;  [facility]);  Service: Endoscopy;  Laterality: N/A;    COLONOSCOPY N/A 5/26/2022    Procedure: COLONOSCOPY;  Surgeon: Lyle Edmond MD;  Location: [facility] (2ND FLR);  Service: Endoscopy;  Laterality: N/A;    EPIDURAL STEROID INJECTION INTO LUMBAR SPINE N/A 5/29/2018    Procedure: INJECTION-STEROID-EPIDURAL-LUMBAR- L4-5;  Surgeon: Roman Lyman MD;  Location: [facility];  Service: Pain Management;  Laterality: N/A;  Patient is diabetic and Xarleto     EPIDURAL STEROID INJECTION INTO LUMBAR SPINE N/A 7/3/2018    Procedure: INJECTION, STEROID, SPINE, LUMBAR, EPIDURAL- L4-5;  Surgeon: Roman Lyman MD;  Location: [facility];  Service: Pain Management;  Laterality: N/A;  Patient takes Xarelto and ASA     ESOPHAGOGASTRODUODENOSCOPY N/A 5/9/2022    Procedure: EGD (ESOPHAGOGASTRODUODENOSCOPY);  Surgeon: Conrad Diaz MD;  Location: [facility]);  Service: Endoscopy;  Laterality: N/A;    EXCISION OF LESION OF LIP N/A 7/7/2020    Procedure: EXCISION, LESION, LIP;  Surgeon: Caden Navarro MD;  [facility];  Service: ENT;  Laterality: N/A;    GASTRIC BYPASS      SINUS SURGERY  2000    [facility] Watt at [facility]    TONSILLECTOMY       Family History   Problem Relation Age of Onset    Heart disease Father     Cancer Mother         lung    Hypertension Sister     Heart disease Brother     Cirrhosis Brother     Diabetes Brother      Social History     Tobacco Use    Smoking status: Never Smoker    Smokeless tobacco: Never Used   Substance Use Topics    Alcohol use: No     Comment: rare    Drug use: No     Review of Systems   Constitutional: Negative for chills and fever.   HENT: Negative for congestion.    Eyes: Negative for visual disturbance.   Respiratory: Negative for shortness of breath.    Cardiovascular: Positive for leg swelling. Negative for chest pain and palpitations.   Gastrointestinal: Negative  for abdominal pain, nausea and vomiting.   Genitourinary: Negative for dysuria and flank pain.   Musculoskeletal: Positive for arthralgias and myalgias. Negative for joint swelling.   Skin: Negative for rash.   Allergic/Immunologic: Negative for immunocompromised state.   Neurological: Negative for weakness and numbness.   Hematological: Does not bruise/bleed easily.   Psychiatric/Behavioral: Negative for confusion.       Physical Exam     Initial Vitals [07/16/22 1731]   BP Pulse Resp Temp SpO2   (!) 148/70 81 16 99.2 °F (37.3 °C) 100 %      MAP       --         Physical Exam    Vitals reviewed.  Constitutional: He appears well-developed and well-nourished. He is not diaphoretic. No distress.   HENT:   Head: Normocephalic and atraumatic.   Eyes: Conjunctivae and EOM are normal.   Neck: Neck supple.   Cardiovascular: Normal rate, regular rhythm, normal heart sounds and intact distal pulses.   Pulmonary/Chest: Breath sounds normal. No respiratory distress.   Musculoskeletal:         General: Normal range of motion.      Cervical back: Neck supple.      Right knee: Swelling present. Normal range of motion. Tenderness present.      Right lower leg: Tenderness present. 1+ Edema present.      Left lower leg: Tenderness present. 1+ Edema present.     Neurological: He is alert and oriented to person, place, and time.   Skin: Skin is warm.         ED Course   Procedures  Labs Reviewed   CBC W/ AUTO DIFFERENTIAL - Abnormal; Notable for the following components:       Result Value    Hemoglobin 11.3 (*)     Hematocrit 37.7 (*)     MCH 24.5 (*)     MCHC 30.0 (*)     RDW 15.2 (*)     Gran # (ANC) 9.8 (*)     Immature Grans (Abs) 0.05 (*)     Gran % 79.8 (*)     Lymph % 11.6 (*)     All other components within normal limits   COMPREHENSIVE METABOLIC PANEL - Abnormal; Notable for the following components:    Sodium 135 (*)     Chloride 91 (*)     Glucose 149 (*)     Calcium 10.6 (*)     Total Bilirubin 1.9 (*)     Alkaline  Phosphatase 269 (*)     eGFR if non  54.2 (*)     All other components within normal limits   C-REACTIVE PROTEIN - Abnormal; Notable for the following components:    .9 (*)     All other components within normal limits   SEDIMENTATION RATE - Abnormal; Notable for the following components:    Sed Rate 95 (*)     All other components within normal limits   CK   B-TYPE NATRIURETIC PEPTIDE          Imaging Results          US Lower Extremity Veins Bilateral (Final result)  Result time 07/16/22 21:26:48    Final result by Carlos Alberto Diaz MD (07/16/22 21:26:48)                 Impression:      No evidence of deep venous thrombosis in either lower extremity.      Electronically signed by: Carlos Alberto Diaz MD  Date:    07/16/2022  Time:    21:26             Narrative:    EXAMINATION:  US LOWER EXTREMITY VEINS BILATERAL    CLINICAL HISTORY:  Pain in leg, unspecified    TECHNIQUE:  Duplex and color flow Doppler and dynamic compression was performed of the bilateral lower extremity veins was performed.    COMPARISON:  None    FINDINGS:  Right thigh veins: The common femoral, femoral, popliteal, upper greater saphenous, and deep femoral veins are patent and free of thrombus. The veins are normally compressible and have normal phasic flow and augmentation response.    Right calf veins: The visualized calf veins are patent.    Left thigh veins: The common femoral, femoral, popliteal, upper greater saphenous, and deep femoral veins are patent and free of thrombus. The veins are normally compressible and have normal phasic flow and augmentation response.    Left calf veins: The visualized calf veins are patent.    Miscellaneous: None                               X-Ray Knee 3 View Right (Final result)  Result time 07/16/22 19:29:19    Final result by Caden France MD (07/16/22 19:29:19)                 Impression:      Suspected nonspecific suprapatellar joint effusion without acute displaced  fracture-dislocation identified.    Electronically signed by resident: Sachin Cevallos  Date:    07/16/2022  Time:    19:20    Electronically signed by: Caden France MD  Date:    07/16/2022  Time:    19:29             Narrative:    EXAMINATION:  XR KNEE 3 VIEW RIGHT    CLINICAL HISTORY:  Pain in unspecified knee    TECHNIQUE:  AP, lateral, and Merchant views of the right knee were performed.    COMPARISON:  None    FINDINGS:  Osseous mineralization is preserved.  No fracture or dislocation.  Small volume suprapatellar joint effusion.  Tibiofemoral cartilage spaces are maintained on nonweightbearing views.  Mild patellofemoral joint space narrowing.                                 Medications   oxyCODONE-acetaminophen  mg per tablet 1 tablet (1 tablet Oral Given 7/16/22 2037)           APC / Resident Notes:   Patient seen in the ER promptly upon arrival.  He is afebrile, no acute distress.  Physical examination reveals tenderness on palpation to bilateral lower extremity.  Tenderness to the right knee.  Range of motion of the knees fully intact.  There is no erythema.  Distal pulses intact and equal bilaterally.  No evidence of septic arthritis to the knee.  Patient given Percocet for pain control.    X-ray of the knee does not reveal any acute fracture.  There is suprapatellar effusion.    Ultrasound of the lower extremities ordered, pending results.    There was a delay in obtaining blood work as patient had difficult IV access.  Ultrasound IV was placed by Dr. Box.     Pending lab work result.   Case was signed out to Dr. Marin at the end of my shift for final disposition.        Attending Attestation:     Physician Attestation Statement for NP/PA:   I have conducted a face to face encounter with this patient in addition to the NP/PA, due to NP/PA Request    Other NP/PA Attestation Additions:    History of Present Illness: 61 yo male presenting with right knee and leg pain (R>L).  Denies injury,  fevers.  Most recent ECHO EF 60%.   Physical Exam: RLE:  SLT intact, compartments soft, intact distal pulse, 5/5 dorsi/plantar flexion, knee joint without erythema or warmth, full range of motion  LLE:  SLT intact, compartments soft, intact distal pulse, FROM each joint, no erythema or joint effusion  B/L LE edematous   Lungs clear, no respiratory distress    Medical Decision Making: Favor musculoskeletal pain or pain 2/2 edema.  No e/o septic arthritis or cellulitis.  Agree with US to r/o DVT given his return visit.  Difficult IV access - US-guided IV placed.  Knee xray w/ effusion, no injury.   Signed out to oncoming team with labs, US pending.   Anticipate d/c, plan for compression stockings, elevation.                         Clinical Impression:   Final diagnoses:  [M25.569] Knee pain  [M79.606] Leg pain (Primary)  [D50.9] Microcytic anemia  [R70.0] ESR raised  [R79.82] CRP elevated          ED Disposition Condition    Discharge Stable        ED Prescriptions     Medication Sig Dispense Start Date End Date Auth. Provider    HYDROcodone-acetaminophen (NORCO) 5-325 mg per tablet Take 1 tablet by mouth every 6 (six) hours as needed for Pain. 12 tablet 7/16/2022 7/19/2022 Saumya Marin MD        Follow-up Information     Follow up With Specialties Details Why Contact Info    GEORGES Theodore MD Internal Medicine Schedule an appointment as soon as possible for a visit   [address] [facility] [address]  [phone]             Najma Whittaker PA-C  07/16/22 2056       Najma Whittaker PA-C  07/16/22 2122          Davidson Box MD  07/17/22 [id number]

## 2022-07-16 NOTE — ED TRIAGE NOTES
Pt states increased bilateral leg pain, x1 week, denies taking medication for pain. Unable to walk without assistance. Denies any sob, cp, at this time. No edema/ redness noted to site.

## 2022-07-17 NOTE — ED NOTES
AVS reviewed with pt. Home medication education complete, pt verbalized understanding. Pt discharged in stable condition with all belongings.

## 2022-07-17 NOTE — DISCHARGE INSTRUCTIONS
Your blood tests showed elevated inflammatory markers which is nonspecific.  This should be followed up by your primary care doctor.    You may take Norco (acetaminophen-hydrocodone) for your pain.  - Take this medication only when needed for breakthrough pain.   - Do not take more than the prescribed amount to control your pain.  - Do not operate machinery, drive, exercise, perform caregiving, make important decisions or perform important tasks while taking Norco. It can make you drowsy or forgetful.  - Norco is addictive in as little as 3 days, so take only as needed.  - Norco can dangerously slow or stop your breathing if you take too much, or if you combine it with alcohol or sedating medicines (including Xanex, Ativan) or illegal drugs.   - Norco can make you constipated (hard to poop), so take fiber supplements and a stool softener while taking this medicine.   - This medication contains acetaminophen (Tylenol). Each pill has 325 mg of acetaminophen. Do not take more than 4,000 mg of acetaminophen within 24 hours as this may lead to liver damage.    Tests today showed:   Labs Reviewed   CBC W/ AUTO DIFFERENTIAL - Abnormal; Notable for the following components:       Result Value    Hemoglobin 11.3 (*)     Hematocrit 37.7 (*)     MCH 24.5 (*)     MCHC 30.0 (*)     RDW 15.2 (*)     Gran # (ANC) 9.8 (*)     Immature Grans (Abs) 0.05 (*)     Gran % 79.8 (*)     Lymph % 11.6 (*)     All other components within normal limits   COMPREHENSIVE METABOLIC PANEL - Abnormal; Notable for the following components:    Sodium 135 (*)     Chloride 91 (*)     Glucose 149 (*)     Calcium 10.6 (*)     Total Bilirubin 1.9 (*)     Alkaline Phosphatase 269 (*)     eGFR if non  54.2 (*)     All other components within normal limits   C-REACTIVE PROTEIN - Abnormal; Notable for the following components:    .9 (*)     All other components within normal limits   SEDIMENTATION RATE - Abnormal; Notable for the  following components:    Sed Rate 95 (*)     All other components within normal limits   CK   B-TYPE NATRIURETIC PEPTIDE     US Lower Extremity Veins Bilateral   Final Result      No evidence of deep venous thrombosis in either lower extremity.         Electronically signed by: Carlos Alberto iDaz MD   Date:    07/16/2022   Time:    21:26      X-Ray Knee 3 View Right   Final Result      Suspected nonspecific suprapatellar joint effusion without acute displaced fracture-dislocation identified.      Electronically signed by resident: Sachin Cevallos   Date:    07/16/2022   Time:    19:20      Electronically signed by: Caden France MD   Date:    07/16/2022   Time:    19:29          Treatments you had today:   Medications   oxyCODONE-acetaminophen  mg per tablet 1 tablet (1 tablet Oral Given 7/16/22 2037)       Follow-Up Plan:  - Follow-up with primary care doctor within 3 - 5 days  - Additional testing and/or evaluation as directed by your primary doctor    Return to the Emergency Department for symptoms including but not limited to: worsening symptoms, shortness of breath or chest pain, vomiting with inability to hold down fluids, fevers greater than 100.4°F, passing out/fainting/unconsciousness, or other concerning symptoms.

## 2022-07-17 NOTE — PROVIDER PROGRESS NOTES - EMERGENCY DEPT.
Signout Note    I received signout from the previous provider.     Chief complaint:  Leg Pain (X 1 week. Seen here Tuesday for the same. )      Pertinent history &exam:  Shiraz Jha is a 62 y.o. male with pertinent PMH of atrial fibrillation, CHF, lymphedema who presented to emergency department with complaint of ongoing leg pain.  Seen in emergency department on Tuesday for the same.  On exam, compartments are soft, legs are neurovascularly intact.  No evidence of cellulitis clinically.  Vital signs stable.  P received oxycodone for pain.  Of note, he has a history of fibromyalgia.  He denied trauma to the previous team.  His knee x-ray is unremarkable.  There is no concern for septic arthritis by the previous physician.  At time of sign-out, he is pending labs and ultrasound for final disposition.    Vitals:    07/16/22 2045   BP: 116/68   Pulse: 80   Resp: 18   Temp: 98.9 °F (37.2 °C)       Imaging Studies:    US Lower Extremity Veins Bilateral   Final Result      No evidence of deep venous thrombosis in either lower extremity.         Electronically signed by: Carlos Alberto Diaz MD   Date:    07/16/2022   Time:    21:26      X-Ray Knee 3 View Right   Final Result      Suspected nonspecific suprapatellar joint effusion without acute displaced fracture-dislocation identified.      Electronically signed by resident: Sachin Cevallos   Date:    07/16/2022   Time:    19:20      Electronically signed by: Caden France MD   Date:    07/16/2022   Time:    19:29          Medications Given:  Medications   oxyCODONE-acetaminophen  mg per tablet 1 tablet (1 tablet Oral Given 7/16/22 2037)       Pending Items/ MDM:  62 y.o. male with ongoing leg pain, signed out pending ultrasound of the legs and labs for final disposition.    Labs reviewed.  Elevation of ESR and CRP without clear cause.  Ultrasound of the lower extremities negative for DVT.    Patient reassessed.  I re-examined him, there is no clear explanation for the  elevation of his inflammatory markers.  He does not have a rash consistent with cellulitis.  He is afebrile, and has no infectious complaints.  Recommend follow-up with primary care for evaluation of possible rheumatologic causes for elevated inflammatory markers, and return if any infectious symptoms declare themselves.  Discharged home in stable condition.  Return precautions discussed at bedside.    Diagnostic Impression:    1. Leg pain    2. Knee pain    3. Microcytic anemia    4. ESR raised    5. CRP elevated         ED Disposition Condition    Discharge Stable        ED Prescriptions     Medication Sig Dispense Start Date End Date Auth. Provider    HYDROcodone-acetaminophen (NORCO) 5-325 mg per tablet Take 1 tablet by mouth every 6 (six) hours as needed for Pain. 12 tablet 7/16/2022 7/19/2022 Saumya Marin MD        Follow-up Information     Follow up With Specialties Details Why Contact Info    GEORGES Theodore MD Internal Medicine Schedule an appointment as soon as possible for a visit   [address] [facility] [address]  [phone]            Patient understands the plan and is in agreement, verbalized understanding, questions answered    Saumya Marin MD  Emergency Medicine

## 2022-07-19 ENCOUNTER — HOSPITAL ENCOUNTER (OUTPATIENT)
Dept: RADIOLOGY | Facility: HOSPITAL | Age: 62
Discharge: HOME | End: 2022-07-19
Attending: PHYSICIAN ASSISTANT
Payer: COMMERCIAL

## 2022-07-19 ENCOUNTER — OFFICE VISIT (OUTPATIENT)
Dept: ORTHOPEDICS | Facility: CLINIC | Age: 62
End: 2022-07-19
Payer: COMMERCIAL

## 2022-07-19 VITALS — WEIGHT: 287.06 LBS | HEIGHT: 68 IN | BODY MASS INDEX: 43.51 KG/M2

## 2022-07-19 DIAGNOSIS — M25.561 RIGHT KNEE PAIN, UNSPECIFIED CHRONICITY: Primary | ICD-10-CM

## 2022-07-19 DIAGNOSIS — M25.561 RIGHT KNEE PAIN, UNSPECIFIED CHRONICITY: ICD-10-CM

## 2022-07-19 PROCEDURE — 99999 PR PBB SHADOW E&M-EST. PATIENT-LVL IV: CPT | Mod: PBBFAC,,, | Performed by: PHYSICIAN ASSISTANT

## 2022-07-19 PROCEDURE — 73560 XR KNEE 1 OR 2 VIEW BILATERAL: ICD-10-PCS | Mod: 26,,, | Performed by: RADIOLOGY

## 2022-07-19 PROCEDURE — 1160F PR REVIEW ALL MEDS BY PRESCRIBER/CLIN PHARMACIST DOCUMENTED: ICD-10-PCS | Mod: CPTII,S$GLB,, | Performed by: PHYSICIAN ASSISTANT

## 2022-07-19 PROCEDURE — 73560 X-RAY EXAM OF KNEE 1 OR 2: CPT | Mod: TC,50

## 2022-07-19 PROCEDURE — 73560 X-RAY EXAM OF KNEE 1 OR 2: CPT | Mod: 26,,, | Performed by: RADIOLOGY

## 2022-07-19 PROCEDURE — 3008F PR BODY MASS INDEX (BMI) DOCUMENTED: ICD-10-PCS | Mod: CPTII,S$GLB,, | Performed by: PHYSICIAN ASSISTANT

## 2022-07-19 PROCEDURE — 1159F PR MEDICATION LIST DOCUMENTED IN MEDICAL RECORD: ICD-10-PCS | Mod: CPTII,S$GLB,, | Performed by: PHYSICIAN ASSISTANT

## 2022-07-19 PROCEDURE — 1159F MED LIST DOCD IN RCRD: CPT | Mod: CPTII,S$GLB,, | Performed by: PHYSICIAN ASSISTANT

## 2022-07-19 PROCEDURE — 3008F BODY MASS INDEX DOCD: CPT | Mod: CPTII,S$GLB,, | Performed by: PHYSICIAN ASSISTANT

## 2022-07-19 PROCEDURE — 99203 PR OFFICE/OUTPT VISIT, NEW, LEVL III, 30-44 MIN: ICD-10-PCS | Mod: S$GLB,,, | Performed by: PHYSICIAN ASSISTANT

## 2022-07-19 PROCEDURE — 99999 PR PBB SHADOW E&M-EST. PATIENT-LVL IV: ICD-10-PCS | Mod: PBBFAC,,, | Performed by: PHYSICIAN ASSISTANT

## 2022-07-19 PROCEDURE — 99203 OFFICE O/P NEW LOW 30 MIN: CPT | Mod: S$GLB,,, | Performed by: PHYSICIAN ASSISTANT

## 2022-07-19 PROCEDURE — 1160F RVW MEDS BY RX/DR IN RCRD: CPT | Mod: CPTII,S$GLB,, | Performed by: PHYSICIAN ASSISTANT

## 2022-07-19 RX ORDER — METHYLPREDNISOLONE 4 MG/1
TABLET ORAL
Qty: 1 TABLET | Refills: 0 | Status: SHIPPED | OUTPATIENT
Start: 2022-07-19 | End: 2022-12-15

## 2022-07-19 NOTE — PROGRESS NOTES
Subjective:      Patient ID: Shiraz Jha is a 62 y.o. male.    Chief Complaint: Pain of the Right Knee    HPI  62 year old male presents with chief complaint of right knee pain x 1 month. He may have twisted the knee when he stepped on a dog toy. Pain is anterior. It is worse with kneeling, standing, and walking. He takes Norco 5 mg with some relief. He is on xarelto. He reports swelling. He denies mechanical symptoms and instability. He does not use assistive devices. He is a .   Review of Systems   Constitutional: Negative for chills, fever and night sweats.   Cardiovascular: Negative for chest pain.   Respiratory: Negative for cough and shortness of breath.    Skin: Negative for color change.   Gastrointestinal: Negative for heartburn.   Genitourinary: Negative for dysuria.   Neurological: Negative for numbness and paresthesias.   Psychiatric/Behavioral: Negative for altered mental status.   Allergic/Immunologic: Negative for persistent infections.         Objective:            Ortho/SPM Exam  General :   alert, appears stated age and cooperative   Gait: Antalgic. The patient can bear weight on the injured extremity.   Right Lower Extremity  Hip Palpation:  no tenderness over the greater  trochanter   Hip ROM: 100% of normal    Knee Effusion:  None.   Ecchymosis:  none   Knee ROM:  0 to 120 degrees without subpatellar   crepitance.   Patella:  Patella does track normally.  Patellar apprehension test: negative  Patellar compression test: positive   Tenderness: medial joint line, lateral joint line and lateral facet of the patella   Stability:  Lachman's test: negative  Posterior drawer: negative  Medial collateral ligament: negative  Lateral collateral ligament: negative         Rayne's Test:  positive   Sensation:   intact to light touch   Pulses: normal DP and PT pulses         X-ray was ordered and independently reviewed by me. Very mild OA change seen.        Assessment:       Encounter  Diagnosis   Name Primary?    Right knee pain, unspecified chronicity Yes          Plan:       Discussed treatment options with patient. Medrol dose pack sent to pharmacy. He will consider CSI if symptoms do not improve. RTC prn.

## 2022-07-26 ENCOUNTER — OFFICE VISIT (OUTPATIENT)
Dept: UROLOGY | Facility: CLINIC | Age: 62
End: 2022-07-26
Payer: COMMERCIAL

## 2022-07-26 ENCOUNTER — CLINICAL SUPPORT (OUTPATIENT)
Dept: REHABILITATION | Facility: OTHER | Age: 62
End: 2022-07-26
Attending: NURSE PRACTITIONER
Payer: COMMERCIAL

## 2022-07-26 DIAGNOSIS — R29.898 DECREASED STRENGTH OF TRUNK AND BACK: ICD-10-CM

## 2022-07-26 DIAGNOSIS — N40.0 BENIGN NON-NODULAR PROSTATIC HYPERPLASIA WITHOUT LOWER URINARY TRACT SYMPTOMS: Primary | ICD-10-CM

## 2022-07-26 DIAGNOSIS — R35.1 NOCTURIA: ICD-10-CM

## 2022-07-26 DIAGNOSIS — G47.33 OBSTRUCTIVE SLEEP APNEA: Chronic | ICD-10-CM

## 2022-07-26 DIAGNOSIS — R29.3 POOR POSTURE: Primary | ICD-10-CM

## 2022-07-26 DIAGNOSIS — E66.01 SEVERE OBESITY (BMI >= 40): Chronic | ICD-10-CM

## 2022-07-26 PROCEDURE — 99999 PR PBB SHADOW E&M-EST. PATIENT-LVL IV: CPT | Mod: PBBFAC,,, | Performed by: UROLOGY

## 2022-07-26 PROCEDURE — 99214 OFFICE O/P EST MOD 30 MIN: CPT | Mod: S$GLB,,, | Performed by: UROLOGY

## 2022-07-26 PROCEDURE — 1159F PR MEDICATION LIST DOCUMENTED IN MEDICAL RECORD: ICD-10-PCS | Mod: CPTII,S$GLB,, | Performed by: UROLOGY

## 2022-07-26 PROCEDURE — 99214 PR OFFICE/OUTPT VISIT, EST, LEVL IV, 30-39 MIN: ICD-10-PCS | Mod: S$GLB,,, | Performed by: UROLOGY

## 2022-07-26 PROCEDURE — 1159F MED LIST DOCD IN RCRD: CPT | Mod: CPTII,S$GLB,, | Performed by: UROLOGY

## 2022-07-26 PROCEDURE — 1160F RVW MEDS BY RX/DR IN RCRD: CPT | Mod: CPTII,S$GLB,, | Performed by: UROLOGY

## 2022-07-26 PROCEDURE — 1160F PR REVIEW ALL MEDS BY PRESCRIBER/CLIN PHARMACIST DOCUMENTED: ICD-10-PCS | Mod: CPTII,S$GLB,, | Performed by: UROLOGY

## 2022-07-26 PROCEDURE — 97110 THERAPEUTIC EXERCISES: CPT

## 2022-07-26 PROCEDURE — 99999 PR PBB SHADOW E&M-EST. PATIENT-LVL IV: ICD-10-PCS | Mod: PBBFAC,,, | Performed by: UROLOGY

## 2022-07-26 RX ORDER — TAMSULOSIN HYDROCHLORIDE 0.4 MG/1
0.4 CAPSULE ORAL 2 TIMES DAILY
Qty: 60 CAPSULE | Refills: 11 | Status: SHIPPED | OUTPATIENT
Start: 2022-07-26 | End: 2023-08-21 | Stop reason: SDUPTHER

## 2022-07-26 RX ORDER — HYDROCODONE BITARTRATE AND ACETAMINOPHEN 5; 325 MG/1; MG/1
2 TABLET ORAL 2 TIMES DAILY
COMMUNITY
Start: 2022-07-19 | End: 2022-12-15

## 2022-07-26 NOTE — PROGRESS NOTES
Subjective:      Patient ID: Shiraz Jha is a 62 y.o. male.    Chief Complaint: bph f/u    Medication Refill      Patient is a 62 y.o. male who is established to our clinic and was initially referred by their PCP, Dr. Theodore for evaluation of BPH.     Benign Prostatic Hypertrophy  Patient complains of lower urinary tract symptoms. He reports intermittency, straining and weak stream---particularly when he does not take the flomax. However, when he takes the flomax, his symptoms are minimal. He denies incomplete emptying and urgency. Patient states symptoms are of moderate severity. Onset of symptoms was several years ago and was gradual in onset. . He has no personal history but does have a family history of prostate cancer--father. He reports a history of no complicating symptoms. He denies flank pain, gross hematuria, kidney stones and recurrent UTI.     His father had prostate cancer.      Interval History 7/26/22:  Currently taking Flomax 0.4 mg BID, which is no longer relieving his symptoms. Rare dysuria.  Reports worsening nocturia x4 and frequency.    IPSS Questionnaire (AUA-SS) 7/26/22:  Over the past month    1)  Incomplete Emptying - How often have you had a sensation of not emptying your bladder completely after you finish urinating?  0 - Not at all   2)  Frequency - How often have you had to urinate again less than two hours after you finished urinating? 4 - More than half the time   3)  Intermittency - How often have you found you stopped and started again several times when you urinated?  1 - Less than 1 time in 5   4) Urgency - How difficult have you found it to postpone urination?  0 - Not at all   5) Weak Stream - How often have you had a weak urinary stream?  5 - Almost always   6) Straining  - How often have you had to push or strain to begin urination?  1 - Less than 1 time in 5   7) Nocturia - How many times did you most typically get up to urinate from the time you went to bed until the time you  got up in the morning?  5 - 5+ times   Total score:  0-7 mild, 8-19 moderate, 20-35 severe 16   Quality of Life:  5 - Unhappy              Lab Results   Component Value Date    PSA 0.41 02/15/2022    PSA 0.37 03/17/2020    PSA 0.26 01/02/2014    PSADIAG 0.46 09/01/2020    PSADIAG 0.48 05/21/2018    PSADIAG 0.30 12/08/2014         Review of Systems  All other systems reviewed and negative except pertinent positives noted in HPI.    Objective:     Physical Exam  Constitutional:       General: He is not in acute distress.     Appearance: He is well-developed.   HENT:      Head: Normocephalic and atraumatic.   Eyes:      General: No scleral icterus.  Neck:      Trachea: No tracheal deviation.   Pulmonary:      Effort: Pulmonary effort is normal. No respiratory distress.   Genitourinary:     Comments: DIANA 40g, smooth no nodules or induration  Rectal irritation from diarrhea  Neurological:      Mental Status: He is alert and oriented to person, place, and time.   Psychiatric:         Behavior: Behavior normal.         Thought Content: Thought content normal.         Judgment: Judgment normal.       Assessment:     1. Benign non-nodular prostatic hyperplasia without lower urinary tract symptoms    2. Severe obesity (BMI >= 40)    3. Obstructive sleep apnea    4. Nocturia      Plan:     1. BPH:    -Continue to avoid bladder irritants including but not limited to caffeine, alcohol, smoking, spicy foods, acidic foods, tomato-based products, citrus, artificial sweeteners, chocolate, coffee or tea. Reports improvement in sx.    -psa 0.41 feb 2022  -prostate meds: flomax BID; continue. Refill sent    2. Morbid obesity:  -discussed diet and exercise for weight loss  - Weight loss bypass surgery: constant diarrhea      I have reviewed the notes, assessments, and/or procedures performed by Dr. Dove, I concur with her/his documentation of Shiraz Jha.

## 2022-07-26 NOTE — PROGRESS NOTES
[facility]      Name: Shiraz RUBI [facility] Number: [id number]    Therapy Diagnosis:   Encounter Diagnoses   Name Primary?    Poor posture Yes    Decreased strength of trunk and back      Physician: Yadira Brody NP    Visit Date: 2022  Physician Orders: PT Eval and Treat   Medical Diagnosis from Referral: M47.816 (ICD-10-CM) - Lumbar spondylosis  Evaluation Date: 2022  Authorization Period Expiration: 22  Plan of Care Expiration:22 Extended POC  Reassessment Due: 22  Visit # / Visits authorized:         Time In: 940  Time Out: 1030  Total Billable Time: 40 minutes     Precautions: Standard cervical fusion      Pattern of pain determined: 1PEP      Subjective   Shiraz reports no lower back pain today.  [facility] after 1 month lapse in treatment due to episode of intense knee pain.  Patient reports tolerating previous visit well with no increase in soreness  Patient reports their pain to be 0/10 LB  on a 0-10 scale with 0 being no pain and 10 being the worst pain imaginable.  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 field)   Leisure:   Computer time - reading  Pt goals: walk /s pain, carry things up the steps /s pain    Objective       Baseline Isometric Testing on Med X equipment: Testing administered by PT  Date of testin22  ROM 0 - 36  deg   Max Peak Torque 161   Min Peak Torque 68   Flex/Ext Ratio 2.38    % below normative data 30%          Mid Isometric Testing on Med X equipment: Testing administered by PT  Date of testin22  ROM 0 - 39  deg   Max Peak Torque 198   Min Peak Torque 82   Flex/Ext Ratio 2.4:1   % below normative data 27%    5% strength increase    Limitation/Restriction for FOTO Lumbar Survey     Therapist  reviewed FOTO scores for Shiraz Jha on 4/12/2022.   FOTO documents entered into [employer] - see Media section.     Limitation Score: 30%  Visit 6 Score: 34%    visit 10 45%  Goal: < 25%         Treatment    Pt was instructed in and performed the following:     Shiraz received therapeutic exercises to develop/improved posture, cardiovascular endurance, muscular endurance, lumbar/cervical ROM, strength and muscular endurance for 50 minutes including the following exercises:     [facility] 7/26/2022   Visit Number 16   VAS Pain Rating 0   Treadmill Time (in min.) 5   Speed -   Time -   Lumbar Stretches - Slouch Overcorrection 10   Extension in Lying 10   Extension in Standing -   Flexion in Lying -   Lumbar Extension Seat Pad -   Femur Restraint -   Top Dead Center -   Counterweight -   Lumbar Flexion -   Lumbar Extension -   Lumbar Peak Torque -   Min Torque -   Test Percent Below Normative Data -   Test Percent Gain in Strength from Initial  -   Lumbar Weight 105   Repetitions 15   Rating of Perceived Exertion 5   Ice - Z Lie (in min.) 5       5 min on treadmill for aerobic endurance     LTR x 10   PPT x 15 cues for dec BLE use  Bridges BTB 15x  Open book x 10 B cue for head turn  Prone on Elbow 1 min -> prone press ups x 10  Quadruped LE extensions x10 ea (N/P secondary to knee pain 7/26/22)  Seated thoracic ext over chair x 15   SOC+ No Money GTB x 15        NP   DKTC c/T-ball support 10 x 5 sec   BKFO GTB x10  +Lifting Mechanics: Floor lift & Golfer's lift  Piriformis stretch /c towel 3 x 20 sec   Cat/Cow x 10 min range   Steps 2 flights x 2    Trial 1 - ascend hand touch for balance x 2, hand on rail descend x 4 steps, dec velocity    Trial 2 - ascend one R shoulder lean to wall, hand touch to rail descend x 3, dec velocity   Standing ext  10x      Peripheral muscle strengthening which included 1 set of 15-20 repetitions at a slow, controlled 10-13 second per rep pace focused on strengthening supporting  musculature for improved body mechanics and functional mobility.  Pt and therapist focused on proper form during treatment to ensure optimal strengthening of each targeted muscle group.  Machines were utilized including torso rotation, leg extension, leg curl, chest press, upright row. Tricep extension, bicep curl, leg press, and hip abduction added visit 3    Home Exercises Provided and Patient Education Provided   Home exercises include:  LTR/  Piriformis/  YUMIKO -> press ups    EIS  PPT    Bridges  Thoracic ext over chair    Cardio program: Visit 5  Lifting education date: visit 11  Lumbar roll: not obtained as 06/14/22    Education provided:        Written Home Exercises Provided: Patient instructed to cont prior HEP.   Exercises were reviewed and Shiraz was able to demonstrate them prior to the end of the session.  Shiraz demonstrated fair  understanding of the education provided.     See EMR under Patient Instructions for exercises provided prior visit and today's visit    Assessment   Shiraz reports no pain upon arrival.  Pt missed 1 month of treatment due to an episode of knee pain, which he was treated for with steroids. Pt returns , and has 4 sessions remaining to complete program. Pt resumed Med X at 105ft/lbs with pt completing 15 reps at 5/10 exertion level. Patient is making good progress towards established goals.  Pt will continue to benefit from skilled outpatient physical therapy to address the deficits stated in the impairment chart, provide pt/family education and to maximize pt's level of independence in the home and community environment.     Anticipated Barriers for therapy: none  Pt's spiritual, cultural and educational needs considered and pt agreeable to plan of care and goals as stated below:     GOALS: Pt is in agreement with the following goals.     Short term goals:  6 weeks or 10 visits   1.  Pt will demonstrate increased lumbar ROM by at least 3 degrees from the initial ROM value with  improvements noted in functional ROM and ability to perform ADLs. MET  2.  Pt will demonstrate increased MedX average isometric strength value  by 10% from initial test resulting in improved ability to perform bending, lifting, and carrying activities safely, confidently.  (approp and ongoing)  3.  Patient report a reduction in worst pain score by 1-2 points for improved tolerance for household chores.  (MET 06/14/22)  4.  Pt able to perform HEP correctly with minimal cueing or supervision from therapist to encourage independent management of symptoms. (MET 06/14/22)        Long term goals: 10 weeks or 20 visits   1. Pt will demonstrate increased lumbar ROM by at least 6 degrees from initial ROM value, resulting in improved ability to perform functional fwd bending while standing and sitting. (approp and ongoing)  2. Pt will demonstrate increased MedX average isometric strength value  by 20% from initial test resulting in improved ability to perform bending, lifting, and carrying activities safely, confidently.  (approp and ongoing)  3. Pt to demonstrate ability to independently control and reduce their pain through posture positioning and mechanical movements throughout a typical day.  (approp and ongoing)  4.  Pt will demonstrate reduced pain and improved functional outcomes as reported on the FOTO by reaching a limitation score of < or = 25% or less in order to demonstrate subjective improvement in pt's condition.    (approp and ongoing)  5. Pt will demonstrate independence with the HEP at discharge  (approp and ongoing)  6. Pt will ascend/descend 2 flights of stairs carrying 20# /s inc LBP for inc tolerance to household chores (grocery return)(patient goal)  (approp and ongoing)  7. Pt will perform stand<>kneel x 3 in < 60 sec /s inc LBP for inc tolerance to household chores (approp and ongoing)    Plan   Continue with established Plan of Care towards established PT goals. Extend POC 3wks to finish  visits    Lynda Ochoa, PT  7/26/2022

## 2022-07-26 NOTE — PLAN OF CARE
Outpatient Therapy Updated Plan of Care     Visit Date: 2022    Name: Shiraz RUBI [facility] Number: [id number]    Therapy Diagnosis:   Encounter Diagnoses   Name Primary?    Poor posture Yes    Decreased strength of trunk and back      Physician: Yadira Brody NP    Visit Date: 2022  Physician Orders: PT Eval and Treat   Medical Diagnosis from Referral: M47.816 (ICD-10-CM) - Lumbar spondylosis  Evaluation Date: 2022  Authorization Period Expiration: 22  Plan of Care Expiration:22 Extended POC  Reassessment Due: 22  Visit # / Visits authorized:           Time In: 940  Time Out: 1030  Total Billable Time: 40 minutes     Precautions: Standard cervical fusion      Pattern of pain determined: 1PEP   Subjective     Update: Shiraz reports no lower back pain today.  [facility] after 1 month lapse in treatment due to episode of intense knee pain.  Patient reports tolerating previous visit well with no increase in soreness  Patient reports their pain to be 0/10 LB  on a 0-10 scale with 0 being no pain and 10 being the worst pain imaginable.  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)  Occupation: [employer] (75% out in field)   Leisure:   Computer time - reading  Pt goals: walk /s pain, carry things up the steps /s pain       Objective     Update:      Baseline Isometric Testing on Med X equipment: Testing administered by PT  Date of testin22  ROM 0 - 36  deg   Max Peak Torque 161   Min Peak Torque 68   Flex/Ext Ratio 2.38    % below normative data 30%          Mid Isometric Testing on Med X equipment: Testing administered by PT  Date of testin22  ROM 0 - 39  deg   Max Peak Torque 198   Min Peak Torque 82   Flex/Ext Ratio 2.4:1   % below normative data 27%    5% strength  increase     Limitation/Restriction for FOTO Lumbar Survey     Therapist reviewed FOTO scores for Shiraz Jha on 4/12/2022.   FOTO documents entered into [employer] - see Media section.     Limitation Score: 30%  Visit 6 Score: 34%    visit 10 45%  Goal: < 25%          Assessment     Shiraz reports no pain upon arrival.  Pt missed 1 month of treatment due to an episode of knee pain, which he was treated for with steroids. Pt returns , and has 4 sessions remaining to complete program. Pt resumed Med X at 105ft/lbs with pt completing 15 reps at 5/10 exertion level.  Pt has had a 5% increase in strength since onset and reports 0/10 back pain.  Continue x 4 visits and DC to wellness. Patient is making good progress towards established goals.  Pt will continue to benefit from skilled outpatient physical therapy to address the deficits stated in the impairment chart, provide pt/family education and to maximize pt's level of independence in the home and community environment.      Anticipated Barriers for therapy: none  Pt's spiritual, cultural and educational needs considered and pt agreeable to plan of care and goals as stated below:      GOALS: Pt is in agreement with the following goals.     Short term goals:  6 weeks or 10 visits   1.  Pt will demonstrate increased lumbar ROM by at least 3 degrees from the initial ROM value with improvements noted in functional ROM and ability to perform ADLs. MET  2.  Pt will demonstrate increased MedX average isometric strength value  by 10% from initial test resulting in improved ability to perform bending, lifting, and carrying activities safely, confidently.  (approp and ongoing)  3.  Patient report a reduction in worst pain score by 1-2 points for improved tolerance for household chores.  (MET 06/14/22)  4.  Pt able to perform HEP correctly with minimal cueing or supervision from therapist to encourage independent management of symptoms. (MET 06/14/22)        Long term goals: 10 weeks  "or 20 visits   1. Pt will demonstrate increased lumbar ROM by at least 6 degrees from initial ROM value, resulting in improved ability to perform functional fwd bending while standing and sitting. (approp and ongoing)  2. Pt will demonstrate increased MedX average isometric strength value  by 20% from initial test resulting in improved ability to perform bending, lifting, and carrying activities safely, confidently.  (approp and ongoing)  3. Pt to demonstrate ability to independently control and reduce their pain through posture positioning and mechanical movements throughout a typical day.  (approp and ongoing)  4.  Pt will demonstrate reduced pain and improved functional outcomes as reported on the FOTO by reaching a limitation score of < or = 25% or less in order to demonstrate subjective improvement in pt's condition.    (approp and ongoing)  5. Pt will demonstrate independence with the HEP at discharge  (approp and ongoing)  6. Pt will ascend/descend 2 flights of stairs carrying 20# /s inc LBP for inc tolerance to household chores (grocery return)(patient goal)  (approp and ongoing)  7. Pt will perform stand<>kneel x 3 in < 60 sec /s inc LBP for inc tolerance to household chores (approp and ongoing)       Plan     Updated Certification Period: 7/26/2022 to 8/16/22  Recommended Treatment Plan: 1-2 times per week for 3 weeks: Moist Heat/ Ice, Neuromuscular Re-ed, Patient Education, Self Care, Therapeutic Activities and Therapeutic Exercise  Other Recommendations: N/A    Outpatient physical therapy 2x week for 13 weeks or 20 visits to include the following:   - Patient education  - Therapeutic exercise  - Manual therapy  - Performance testing   - Neuromuscular Re-education  - Therapeutic activity   - Modalities    Pt may be seen by PTA as part of the rehabilitation team.     Therapist: Lynda Ochoa, PT  7/26/2022    "I certify the need for these services furnished under this plan of treatment and while under my " "care."    ____________________________________  Physician/Referring Practitioner    _______________  Date of Signature    Lynda Ochoa, PT  7/26/2022      I CERTIFY THE NEED FOR THESE SERVICES FURNISHED UNDER THIS PLAN OF TREATMENT AND WHILE UNDER MY CARE    Physician's comments:        Physician's Signature: ___________________________________________________    "

## 2022-07-29 ENCOUNTER — CLINICAL SUPPORT (OUTPATIENT)
Dept: REHABILITATION | Facility: OTHER | Age: 62
End: 2022-07-29
Attending: NURSE PRACTITIONER
Payer: COMMERCIAL

## 2022-07-29 DIAGNOSIS — R29.898 DECREASED STRENGTH OF TRUNK AND BACK: ICD-10-CM

## 2022-07-29 DIAGNOSIS — R29.3 POOR POSTURE: Primary | ICD-10-CM

## 2022-07-29 PROCEDURE — 97110 THERAPEUTIC EXERCISES: CPT

## 2022-07-29 NOTE — PROGRESS NOTES
[facility]      Name: Shiraz Jha  Clinic Number: [id number]    Therapy Diagnosis:   Encounter Diagnoses   Name Primary?    Poor posture Yes    Decreased strength of trunk and back      Physician: Yadira Brody NP    Visit Date: 2022  Physician Orders: PT Eval and Treat   Medical Diagnosis from Referral: M47.816 (ICD-10-CM) - Lumbar spondylosis  Evaluation Date: 2022  Authorization Period Expiration: 22  Plan of Care Expiration:22 Extended POC  Reassessment Due: 22  Visit # / Visits authorized:         Time In: 900  Time Out:10  Total Billable Time: 50 minutes     Precautions: Standard cervical fusion      Pattern of pain determined: 1PEP      Subjective   Shiraz reports no lower back pain today.   Patient reports tolerating previous visit well with no increase in soreness  Patient reports their pain to be 0/10 LB  on a 0-10 scale with 0 being no pain and 10 being the worst pain imaginable.  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 field)   Leisure:   Computer time - reading  Pt goals: walk /s pain, carry things up the steps /s pain    Objective       Baseline Isometric Testing on Med X equipment: Testing administered by PT  Date of testin22  ROM 0 - 36  deg   Max Peak Torque 161   Min Peak Torque 68   Flex/Ext Ratio 2.38    % below normative data 30%          Mid Isometric Testing on Med X equipment: Testing administered by PT  Date of testin22  ROM 0 - 39  deg   Max Peak Torque 198   Min Peak Torque 82   Flex/Ext Ratio 2.4:1   % below normative data 27%    5% strength increase    Limitation/Restriction for FOTO Lumbar Survey     Therapist reviewed FOTO scores for Shiraz Jha on 2022.   FOTO documents entered into [employer] - see Media  section.     Limitation Score: 30%  Visit 6 Score: 34%    visit 10 45%  Goal: < 25%         Treatment    Pt was instructed in and performed the following:     Shiraz received therapeutic exercises to develop/improved posture, cardiovascular endurance, muscular endurance, lumbar/cervical ROM, strength and muscular endurance for 60 minutes including the following exercises:     [facility] 7/29/2022   Visit Number 17   VAS Pain Rating 0   Treadmill Time (in min.) 5   Speed -   Time -   Lumbar Stretches - Slouch Overcorrection 10   Extension in Lying 10   Extension in Standing -   Flexion in Lying -   Lumbar Extension Seat Pad -   Femur Restraint -   Top Dead Center -   Counterweight -   Lumbar Flexion -   Lumbar Extension -   Lumbar Peak Torque -   Min Torque -   Test Percent Below Normative Data -   Test Percent Gain in Strength from Initial  -   Lumbar Weight 105   Repetitions 20   Rating of Perceived Exertion 2   Ice - Z Lie (in min.) 5         5 min on treadmill for aerobic endurance     LTR x 10   PPT x 15 cues for dec BLE use  Bridges BTB 20x  Open book x 10 B cue for head turn  prone press ups x 10  Seated thoracic ext over chair x 15   SOC+ No Money GTB x 15        NP   DKTC c/T-ball support 10 x 5 sec   BKFO GTB x10  +Lifting Mechanics: Floor lift & Golfer's lift  Piriformis stretch /c towel 3 x 20 sec   Cat/Cow x 10 min range   Steps 2 flights x 2    Trial 1 - ascend hand touch for balance x 2, hand on rail descend x 4 steps, dec velocity    Trial 2 - ascend one R shoulder lean to wall, hand touch to rail descend x 3, dec velocity   Standing ext  10x  Quadruped LE extensions x10 ea     Peripheral muscle strengthening which included 1 set of 15-20 repetitions at a slow, controlled 10-13 second per rep pace focused on strengthening supporting musculature for improved body mechanics and functional mobility.  Pt and therapist focused on proper form during treatment to ensure optimal strengthening of each  targeted muscle group.  Machines were utilized including torso rotation, leg extension, leg curl, chest press, upright row. Tricep extension, bicep curl, leg press, and hip abduction added visit 3    Home Exercises Provided and Patient Education Provided   Home exercises include:  LTR/  Piriformis/  YUMIKO -> press ups    EIS  PPT    Bridges  Thoracic ext over chair    Cardio program: Visit 5  Lifting education date: visit 11  Lumbar roll: not obtained as 06/14/22    Education provided:        Written Home Exercises Provided: Patient instructed to cont prior HEP.   Exercises were reviewed and Shiraz was able to demonstrate them prior to the end of the session.  Shiraz demonstrated fair  understanding of the education provided.     See EMR under Patient Instructions for exercises provided prior visit and today's visit    Assessment   Shiraz reports no pain upon arrival.  Increased reps for stability exercises to increase challenge.  Pt continued with Med X weight of 105ft/lbs with completion of 20 reps. Inc 5% next visit. Pt continues to require cueing for mat exercises.  Pt will continue to benefit from skilled outpatient physical therapy to address the deficits stated in the impairment chart, provide pt/family education and to maximize pt's level of independence in the home and community environment.     Anticipated Barriers for therapy: none  Pt's spiritual, cultural and educational needs considered and pt agreeable to plan of care and goals as stated below:     GOALS: Pt is in agreement with the following goals.     Short term goals:  6 weeks or 10 visits   1.  Pt will demonstrate increased lumbar ROM by at least 3 degrees from the initial ROM value with improvements noted in functional ROM and ability to perform ADLs. MET  2.  Pt will demonstrate increased MedX average isometric strength value  by 10% from initial test resulting in improved ability to perform bending, lifting, and carrying activities safely,  confidently.  (approp and ongoing)  3.  Patient report a reduction in worst pain score by 1-2 points for improved tolerance for household chores.  (MET 06/14/22)  4.  Pt able to perform HEP correctly with minimal cueing or supervision from therapist to encourage independent management of symptoms. (MET 06/14/22)        Long term goals: 10 weeks or 20 visits   1. Pt will demonstrate increased lumbar ROM by at least 6 degrees from initial ROM value, resulting in improved ability to perform functional fwd bending while standing and sitting. (approp and ongoing)  2. Pt will demonstrate increased MedX average isometric strength value  by 20% from initial test resulting in improved ability to perform bending, lifting, and carrying activities safely, confidently.  (approp and ongoing)  3. Pt to demonstrate ability to independently control and reduce their pain through posture positioning and mechanical movements throughout a typical day.  (approp and ongoing)  4.  Pt will demonstrate reduced pain and improved functional outcomes as reported on the FOTO by reaching a limitation score of < or = 25% or less in order to demonstrate subjective improvement in pt's condition.    (approp and ongoing)  5. Pt will demonstrate independence with the HEP at discharge  (approp and ongoing)  6. Pt will ascend/descend 2 flights of stairs carrying 20# /s inc LBP for inc tolerance to household chores (grocery return)(patient goal)  (approp and ongoing)  7. Pt will perform stand<>kneel x 3 in < 60 sec /s inc LBP for inc tolerance to household chores (approp and ongoing)    Plan   Continue with established Plan of Care towards established PT goals. Extend POC 3wks to finish visits    Lynda Ochoa PT  7/29/2022

## 2022-08-03 ENCOUNTER — CLINICAL SUPPORT (OUTPATIENT)
Dept: REHABILITATION | Facility: OTHER | Age: 62
End: 2022-08-03
Attending: NURSE PRACTITIONER
Payer: COMMERCIAL

## 2022-08-03 DIAGNOSIS — R29.898 DECREASED STRENGTH OF TRUNK AND BACK: ICD-10-CM

## 2022-08-03 DIAGNOSIS — R29.3 POOR POSTURE: Primary | ICD-10-CM

## 2022-08-03 PROCEDURE — 97110 THERAPEUTIC EXERCISES: CPT | Mod: CQ

## 2022-08-03 NOTE — PROGRESS NOTES
[facility]      Name: Shiraz RUBI [facility] Number: [id number]    Therapy Diagnosis:   Encounter Diagnoses   Name Primary?    Poor posture Yes    Decreased strength of trunk and back      Physician: Yadira Brody NP    Visit Date: 8/3/2022  Physician Orders: PT Eval and Treat   Medical Diagnosis from Referral: M47.816 (ICD-10-CM) - Lumbar spondylosis  Evaluation Date: 2022  Authorization Period Expiration: 22  Plan of Care Expiration:22 Extended POC  Reassessment Due: 22  Visit # / Visits authorized:         Time In: 11:10am  Time Out: 12:05pm  Total Billable Time: 55 minutes     Precautions: Standard cervical fusion      Pattern of pain determined: 1PEP      Subjective   Shiraz reports no lower back pain today, nothing new to report. Patient reports he can now walk 10 minutes without back pain. Less pain with carrying things up the stairs.   Patient reports tolerating previous visit well with no increase in soreness  Patient reports their pain to be 0/10 LB  on a 0-10 scale with 0 being no pain and 10 being the worst pain imaginable.  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 field)   Leisure:   Computer time - reading  Pt goals: walk /s pain, carry things up the steps /s pain    Objective       Baseline Isometric Testing on Med X equipment: Testing administered by PT  Date of testin22  ROM 0 - 36  deg   Max Peak Torque 161   Min Peak Torque 68   Flex/Ext Ratio 2.38    % below normative data 30%          Mid Isometric Testing on Med X equipment: Testing administered by PT  Date of testin22  ROM 0 - 39  deg   Max Peak Torque 198   Min Peak Torque 82   Flex/Ext Ratio 2.4:1   % below normative data 27%    5% strength  increase    Limitation/Restriction for FOTO Lumbar Survey     Therapist reviewed FOTO scores for Shiraz Jha on 4/12/2022.   FOTO documents entered into [employer] - see Media section.     Limitation Score: 30%  Visit 6 Score: 34%    visit 10 45%  Goal: < 25%         Treatment    Pt was instructed in and performed the following:     Shiraz received therapeutic exercises to develop/improved posture, cardiovascular endurance, muscular endurance, lumbar/cervical ROM, strength and muscular endurance for 55 minutes including the following exercises:     5 min on treadmill for aerobic endurance     LTR x 10   PPT x 15 cues for dec BLE use  Bridges BTB 20x  Open book x 10 B cue for head turn  prone press ups x10  Seated thoracic ext over chair x 15   SOC+ No Money GTB x 15      [facility] 8/3/2022   Visit Number 18   VAS Pain Rating 0   Treadmill Time (in min.) 5   Speed -   Time -   Lumbar Stretches - Slouch Overcorrection -   Extension in Lying -   Extension in Standing -   Flexion in Lying -   Lumbar Extension Seat Pad -   Femur Restraint -   Top Dead Center -   Counterweight -   Lumbar Flexion -   Lumbar Extension -   Lumbar Peak Torque -   Min Torque -   Test Percent Below Normative Data -   Test Percent Gain in Strength from Initial  -   Lumbar Weight 109   Repetitions 10   Rating of Perceived Exertion 5   Ice - Z Lie (in min.) 5           NP   DKTC c/T-ball support 10 x 5 sec   BKFO GTB x10  +Lifting Mechanics: Floor lift & Golfer's lift  Piriformis stretch /c towel 3 x 20 sec   Cat/Cow x 10 min range   Steps 2 flights x 2    Trial 1 - ascend hand touch for balance x 2, hand on rail descend x 4 steps, dec velocity    Trial 2 - ascend one R shoulder lean to wall, hand touch to rail descend x 3, dec velocity   Standing ext  10x  Quadruped LE extensions x10 ea     Peripheral muscle strengthening which included 1 set of 15-20 repetitions at a slow, controlled 10-13 second per rep pace focused on strengthening  supporting musculature for improved body mechanics and functional mobility.  Pt and therapist focused on proper form during treatment to ensure optimal strengthening of each targeted muscle group.  Machines were utilized including torso rotation, leg extension, leg curl, chest press, upright row. Tricep extension, bicep curl, leg press, and hip abduction added visit 3    Home Exercises Provided and Patient Education Provided   Home exercises include:  LTR/  Piriformis/  YUMIKO -> press ups    EIS  PPT    Bridges  Thoracic ext over chair    Cardio program: Visit 5  Lifting education date: visit 11  Lumbar roll: not obtained as 06/14/22    Education provided:        Written Home Exercises Provided: Patient instructed to cont prior HEP.   Exercises were reviewed and Shiraz was able to demonstrate them prior to the end of the session.  Shiraz demonstrated fair  understanding of the education provided.     See EMR under Patient Instructions for exercises provided prior visit and today's visit    Assessment   Discussed wellness program with patient as he is approaching d/c. Patient has progressed very well within HB program, stating today that he met his personal goals for PT. Patient able to perform 10 reps on l/s medx machine with an RPE of 5/10, might consider a slight decrease in weight so that patient may perform 15 reps next visit.       Pt will continue to benefit from skilled outpatient physical therapy to address the deficits stated in the impairment chart, provide pt/family education and to maximize pt's level of independence in the home and community environment.     Anticipated Barriers for therapy: none  Pt's spiritual, cultural and educational needs considered and pt agreeable to plan of care and goals as stated below:     GOALS: Pt is in agreement with the following goals.     Short term goals:  6 weeks or 10 visits   1.  Pt will demonstrate increased lumbar ROM by at least 3 degrees from the initial ROM value  with improvements noted in functional ROM and ability to perform ADLs. MET  2.  Pt will demonstrate increased MedX average isometric strength value  by 10% from initial test resulting in improved ability to perform bending, lifting, and carrying activities safely, confidently.  (approp and ongoing)  3.  Patient report a reduction in worst pain score by 1-2 points for improved tolerance for household chores.  (MET 06/14/22)  4.  Pt able to perform HEP correctly with minimal cueing or supervision from therapist to encourage independent management of symptoms. (MET 06/14/22)        Long term goals: 10 weeks or 20 visits   1. Pt will demonstrate increased lumbar ROM by at least 6 degrees from initial ROM value, resulting in improved ability to perform functional fwd bending while standing and sitting. (approp and ongoing)  2. Pt will demonstrate increased MedX average isometric strength value  by 20% from initial test resulting in improved ability to perform bending, lifting, and carrying activities safely, confidently.  (approp and ongoing)  3. Pt to demonstrate ability to independently control and reduce their pain through posture positioning and mechanical movements throughout a typical day.  (approp and ongoing)  4.  Pt will demonstrate reduced pain and improved functional outcomes as reported on the FOTO by reaching a limitation score of < or = 25% or less in order to demonstrate subjective improvement in pt's condition.    (approp and ongoing)  5. Pt will demonstrate independence with the HEP at discharge  (approp and ongoing)  6. Pt will ascend/descend 2 flights of stairs carrying 20# /s inc LBP for inc tolerance to household chores (grocery return)(patient goal)  (approp and ongoing)  7. Pt will perform stand<>kneel x 3 in < 60 sec /s inc LBP for inc tolerance to household chores (approp and ongoing)    Plan   Continue with established Plan of Care towards established PT goals. Extend POC 3wks to finish  visits    Molly Nunez, PTA  8/3/2022

## 2022-08-09 ENCOUNTER — CLINICAL SUPPORT (OUTPATIENT)
Dept: REHABILITATION | Facility: OTHER | Age: 62
End: 2022-08-09
Attending: NURSE PRACTITIONER
Payer: COMMERCIAL

## 2022-08-09 DIAGNOSIS — R29.3 POOR POSTURE: Primary | ICD-10-CM

## 2022-08-09 DIAGNOSIS — R29.898 DECREASED STRENGTH OF TRUNK AND BACK: ICD-10-CM

## 2022-08-09 PROCEDURE — 97110 THERAPEUTIC EXERCISES: CPT | Mod: CQ

## 2022-08-09 NOTE — PROGRESS NOTES
"[facility]      Name: Shiraz RUBI [facility] Number: [id number]    Therapy Diagnosis:   Encounter Diagnoses   Name Primary?    Poor posture Yes    Decreased strength of trunk and back      Physician: Yadira Brody NP    Visit Date: 2022  Physician Orders: PT Eval and Treat   Medical Diagnosis from Referral: M47.816 (ICD-10-CM) - Lumbar spondylosis  Evaluation Date: 2022  Authorization Period Expiration: 22  Plan of Care Expiration:22 Extended POC  Reassessment Due: 22  Visit # / Visits authorized:         Time In: 12:45 pm  Time Out: 1:35 pm  Total Billable Time: 45 minutes     Precautions: Standard cervical fusion      Pattern of pain determined: 1PEP      Subjective   Shiraz reports no lower back pain today. He states "I've been doing good, I can't complain". He will be joining wellness program following 20th HB visit.    Patient reports tolerating previous visit well with no increase in soreness  Patient reports their pain to be 0/10 LB  on a 0-10 scale with 0 being no pain and 10 being the worst pain imaginable.  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 field)   Leisure:   Computer time - reading  Pt goals: walk /s pain, carry things up the steps /s pain    Objective       Baseline Isometric Testing on Med X equipment: Testing administered by PT  Date of testin22  ROM 0 - 36  deg   Max Peak Torque 161   Min Peak Torque 68   Flex/Ext Ratio 2.38    % below normative data 30%          Mid Isometric Testing on Med X equipment: Testing administered by PT  Date of testin22  ROM 0 - 39  deg   Max Peak Torque 198   Min Peak Torque 82   Flex/Ext Ratio 2.4:1   % below normative data 27%    5% strength increase    Limitation/Restriction for FOTO " Lumbar Survey     Therapist reviewed FOTO scores for Shiraz Jha on 4/12/2022.   FOTO documents entered into [employer] ([employer]) - see Media section.     Limitation Score: 30%  Visit 6 Score: 34%    visit 10 45%  Goal: < 25%         Treatment    Pt was instructed in and performed the following:     Shiraz received therapeutic exercises to develop/improved posture, cardiovascular endurance, muscular endurance, lumbar/cervical ROM, strength and muscular endurance for 45 minutes including the following exercises:     5 min on treadmill for aerobic endurance     LTR x 10   PPT x 15 cues for dec BLE use  Bridges BTB 20x  BKFO BTB x10  TA activation w/SLR x10  Open book x 10 B cue for head turn  prone press ups x10  SOC+ No Money GTB x 15      [facility] 8/9/2022   Visit Number 19   VAS Pain Rating 0   Treadmill Time (in min.) 5   Speed -   Time -   Lumbar Stretches - Slouch 10   Extension in Lying 10   Extension in Standing -   Flexion in Lying -   Lumbar Extension - Seat Pad -   Femur Restraint -   Top Dead Center -   Counterweight -   Lumbar Flexion -   Lumbar Extension -   Lumbar Peak Torque -   Lumbar Weight 107   Repetitions 15   Rating of Perceived Exertion 4           NP   DKTC c/T-ball support 10 x 5 sec   +Lifting Mechanics: Floor lift & Golfer's lift  Piriformis stretch /c towel 3 x 20 sec   Cat/Cow x 10 min range   Steps 2 flights x 2    Trial 1 - ascend hand touch for balance x 2, hand on rail descend x 4 steps, dec velocity    Trial 2 - ascend one R shoulder lean to wall, hand touch to rail descend x 3, dec velocity   Standing ext  10x  Quadruped LE extensions x10 ea   Seated thoracic ext over chair x 15     Peripheral muscle strengthening which included 1 set of 15-20 repetitions at a slow, controlled 10-13 second per rep pace focused on strengthening supporting musculature for improved body mechanics and functional mobility.  Pt and therapist focused on proper form during treatment to ensure optimal  strengthening of each targeted muscle group.  Machines were utilized including torso rotation, leg extension, leg curl, chest press, upright row. Tricep extension, bicep curl, leg press, and hip abduction added visit 3    Home Exercises Provided and Patient Education Provided   Home exercises include:  LTR/  Piriformis/  YUMIKO -> press ups    EIS  PPT    Bridges  Thoracic ext over chair    Cardio program: Visit 5  Lifting education date: visit 11  Lumbar roll: not obtained as 06/14/22    Education provided:        Written Home Exercises Provided: Patient instructed to cont prior HEP.   Exercises were reviewed and Shiraz was able to demonstrate them prior to the end of the session.  Shiraz demonstrated fair  understanding of the education provided.     See EMR under Patient Instructions for exercises provided prior visit and today's visit    Assessment   Shiraz returned continuing to report no lower back pain.  He states he is interested in continuing with the wellness program following his 20th  visit.  Treatment continued with lumbopelvic mobility and core/hip/glute strengthening, progressing by adding TA activation w/SLR to increase challenge to core musculature.  He tolerated ex's well with no increase in pain.  Medx resistance decreased slightly to 107# due to pt performing only 10 reps at previous resistance.  He completed 15 reps at a RPE of 4/10.  Pt will be tested on Medx prior to discharge.      Pt will continue to benefit from skilled outpatient physical therapy to address the deficits stated in the impairment chart, provide pt/family education and to maximize pt's level of independence in the home and community environment.     Anticipated Barriers for therapy: none  Pt's spiritual, cultural and educational needs considered and pt agreeable to plan of care and goals as stated below:     GOALS: Pt is in agreement with the following goals.     Short term goals:  6 weeks or 10 visits   1.  Pt will demonstrate  increased lumbar ROM by at least 3 degrees from the initial ROM value with improvements noted in functional ROM and ability to perform ADLs. MET  2.  Pt will demonstrate increased MedX average isometric strength value  by 10% from initial test resulting in improved ability to perform bending, lifting, and carrying activities safely, confidently.  (approp and ongoing)  3.  Patient report a reduction in worst pain score by 1-2 points for improved tolerance for household chores.  (MET 06/14/22)  4.  Pt able to perform HEP correctly with minimal cueing or supervision from therapist to encourage independent management of symptoms. (MET 06/14/22)        Long term goals: 10 weeks or 20 visits   1. Pt will demonstrate increased lumbar ROM by at least 6 degrees from initial ROM value, resulting in improved ability to perform functional fwd bending while standing and sitting. (approp and ongoing)  2. Pt will demonstrate increased MedX average isometric strength value  by 20% from initial test resulting in improved ability to perform bending, lifting, and carrying activities safely, confidently.  (approp and ongoing)  3. Pt to demonstrate ability to independently control and reduce their pain through posture positioning and mechanical movements throughout a typical day.  (approp and ongoing)  4.  Pt will demonstrate reduced pain and improved functional outcomes as reported on the FOTO by reaching a limitation score of < or = 25% or less in order to demonstrate subjective improvement in pt's condition.    (approp and ongoing)  5. Pt will demonstrate independence with the HEP at discharge  (approp and ongoing)  6. Pt will ascend/descend 2 flights of stairs carrying 20# /s inc LBP for inc tolerance to household chores (grocery return)(patient goal)  (approp and ongoing)  7. Pt will perform stand<>kneel x 3 in < 60 sec /s inc LBP for inc tolerance to household chores (approp and ongoing)    Plan   Continue with established Plan of  Care towards established PT goals. Extend POC 3wks to finish visits    Mendez Diego, PTA  8/9/2022

## 2022-08-12 ENCOUNTER — CLINICAL SUPPORT (OUTPATIENT)
Dept: REHABILITATION | Facility: OTHER | Age: 62
End: 2022-08-12
Attending: NURSE PRACTITIONER
Payer: COMMERCIAL

## 2022-08-12 DIAGNOSIS — R29.898 DECREASED STRENGTH OF TRUNK AND BACK: ICD-10-CM

## 2022-08-12 DIAGNOSIS — R29.3 POOR POSTURE: Primary | ICD-10-CM

## 2022-08-12 PROCEDURE — 97110 THERAPEUTIC EXERCISES: CPT

## 2022-08-12 NOTE — PROGRESS NOTES
"[facility] Treatment      Name: Shiraz RUBI [facility] Number: [id number]    Therapy Diagnosis:   Encounter Diagnoses   Name Primary?    Poor posture Yes    Decreased strength of trunk and back      Physician: Yadira Brody NP    Visit Date: 2022  Physician Orders: PT Eval and Treat   Medical Diagnosis from Referral: M47.816 (ICD-10-CM) - Lumbar spondylosis  Evaluation Date: 2022  Authorization Period Expiration: 22  Plan of Care Expiration:22 Extended POC  Visit # / Visits authorized:         Time In: 10:45 pm  Time Out: 11:45 pm  Total Billable Time: 60 minutes     Precautions: Standard cervical fusion      Pattern of pain determined: 1PEP      Subjective   Shiraz reports no lower back pain today. He states "I've been doing good, I can't complain". He will be joining wellness program following  HB visit.    Patient reports tolerating previous visit well with no increase in soreness  Patient reports their pain to be 0/10 LB  on a 0-10 scale with 0 being no pain and 10 being the worst pain imaginable.  Pain Location: LB/R buttocks     Work and leisure: [employer] (75% out in field); Computer time - reading  Pt goals: walk /s pain, carry things up the steps /s pain    Objective       Baseline Isometric Testing on Med X equipment: Testing administered by PT  Date of testin22  ROM 0 - 36  deg   Max Peak Torque 161   Min Peak Torque 68   Flex/Ext Ratio 2.38    % below normative data 30%          Mid Isometric Testing on Med X equipment: Testing administered by PT  Date of testin22  ROM 0 - 39  deg   Max Peak Torque 198   Min Peak Torque 82   Flex/Ext Ratio 2.4:1   % below normative data  % gain from initial 27  5     Discharge Isometric Testing on Med X equipment: Testing administered by PT  Date of testin22  ROM 0 - 45  deg   Max Peak Torque 196   Min Peak Torque 66   Flex/Ext Ratio 3:1   % above " normative data  % gain from initial 2  5       Limitation/Restriction for FOTO Lumbar Survey     Therapist reviewed FOTO scores for Shiraz Jha on 4/12/2022.   FOTO documents entered into [employer] - see Media section.     Limitation Score: 30%  Visit 6 Score: 34%    visit 10 45%  Visit 20: 12%  Goal: < 25%         Treatment    Pt was instructed in and performed the following:     Shiraz received therapeutic exercises to develop/improved posture, cardiovascular endurance, muscular endurance, lumbar/cervical ROM, strength and muscular endurance for 45 minutes including the following exercises:     5 min on treadmill for aerobic endurance     LTR x 10   PPT x 15 cues for dec BLE use  Bridges BTB 20x  BKFO BTB x10  Open book x 10 B  prone press ups x10  SOC+ No Money GTB x 15      [facility] 8/12/2022   Visit Number 20   VAS Pain Rating 0   Treadmill Time (in min.) 5   Speed -   Time -   Lumbar Stretches - Slouch Overcorrection -   Extension in Lying -   Extension in Standing -   Flexion in Lying 10   Lumbar Extension Seat Pad -   Femur Restraint -   Top Dead Center -   Counterweight -   Lumbar Flexion 45   Lumbar Extension 0   Lumbar Peak Torque 196   Min Torque 66   Test Percent Below Normative Data 2   Test Percent Gain in Strength from Initial  7   Lumbar Weight -   Repetitions -   Rating of Perceived Exertion -   Ice - Z Lie (in min.) 5       NP   TA activation w/SLR x10  DKTC c/T-ball support 10 x 5 sec   Piriformis stretch /c towel 3 x 20 sec   Cat/Cow x 10 min range   Standing ext  10x  Quadruped LE extensions x10 ea   Seated thoracic ext over chair x 15     Peripheral muscle strengthening which included 1 set of 15-20 repetitions at a slow, controlled 10-13 second per rep pace focused on strengthening supporting musculature for improved body mechanics and functional mobility.  Pt and therapist focused on proper form during treatment to ensure optimal strengthening of each targeted muscle group.  Machines  were utilized including torso rotation, leg extension, leg curl, chest press, upright row. Tricep extension, bicep curl, leg press, and hip abduction added visit 3    Home Exercises Provided and Patient Education Provided   Home exercises include:  LTR, Piriformis, press ups, EIS, PPT, Bridges, thoracic ext over chair  Cardio program: Visit 5  Lifting education date: visit 11  Lumbar roll: not obtained as 06/14/22    Education provided:   - Final MedX testing and results     Written Home Exercises Provided: Patient instructed to cont prior HEP.   Exercises were reviewed and Shiraz was able to demonstrate them prior to the end of the session.  Shiraz demonstrated fair  understanding of the education provided.     See EMR under Patient Instructions for exercises provided prior visit and today's visit    Assessment   Patient has attended 20 visits of the [facility] program for aerobic work, med ex isometric testing with dynamic strengthening on med ex equipment for spine, whole body strengthening on med ex equipment, HEP, education.  Patient has completed [facility] Program and is ready to be transitioned into wellness program.  Importance of wellness program, and attending 1/week to maintain strength stressed.  Importance of continuing there ex and body mech and ergonomics stressed.   Patient demonstrates improvement in ability to reduce symptoms, improved posture, improved ROM and improved strength.   Reviewed HEP, and importance of maintaining a healthy spine through continued stretching and performance of HEP, good posture, good ergonomics, good body mech with ADL, leisure, and work.  Discharge handout with HEP given, and discussed aspects of exercise program, cardio, strengthening, and stretching.    Patient expressed understanding information given.  Patient plans to attend wellness and is ready to transition to wellness.      -Improved posture,   better using lumbar roll  -Improved lumbar ROM,  initially on med  ex test 0-36 and   currently 0-45  -Improved strength at each test point on lumbar med ex IM test with   7% average improvement noted with Reduced pain  Noted by patient  -Initial outcome tool score 70 and current outcome tool score  88 indicating reduced pain and improved function        Pt will continue to benefit from skilled outpatient physical therapy to address the deficits stated in the impairment chart, provide pt/family education and to maximize pt's level of independence in the home and community environment.     Anticipated Barriers for therapy: none  Pt's spiritual, cultural and educational needs considered and pt agreeable to plan of care and goals as stated below:     GOALS: Pt is in agreement with the following goals.     Short term goals:  6 weeks or 10 visits   1.  Pt will demonstrate increased lumbar ROM by at least 3 degrees from the initial ROM value with improvements noted in functional ROM and ability to perform ADLs. MET  2.  Pt will demonstrate increased MedX average isometric strength value  by 10% from initial test resulting in improved ability to perform bending, lifting, and carrying activities safely, confidently.  (partially met)  3.  Patient report a reduction in worst pain score by 1-2 points for improved tolerance for household chores.  (MET 06/14/22)  4.  Pt able to perform HEP correctly with minimal cueing or supervision from therapist to encourage independent management of symptoms. (MET 06/14/22)        Long term goals: 10 weeks or 20 visits   1. Pt will demonstrate increased lumbar ROM by at least 6 degrees from initial ROM value, resulting in improved ability to perform functional fwd bending while standing and sitting. (MET)  2. Pt will demonstrate increased MedX average isometric strength value  by 20% from initial test resulting in improved ability to perform bending, lifting, and carrying activities safely, confidently.  (approp and ongoing)  3. Pt to demonstrate ability to  independently control and reduce their pain through posture positioning and mechanical movements throughout a typical day.  (MET)  4.  Pt will demonstrate reduced pain and improved functional outcomes as reported on the FOTO by reaching a limitation score of < or = 25% or less in order to demonstrate subjective improvement in pt's condition.    (MET)  5. Pt will demonstrate independence with the HEP at discharge  (MET)  6. Pt will ascend/descend 2 flights of stairs carrying 20# /s inc LBP for inc tolerance to household chores  (partially met)  7. Pt will perform stand<>kneel x 3 in < 60 sec /s inc LBP for inc tolerance to household chores (partially met)    Plan   Patient is discharged from skilled therapy at this time and will transition to wellness program in order to maintain progress going forward.    Rigo Jackson, PT  8/12/2022

## 2022-08-16 ENCOUNTER — DOCUMENTATION ONLY (OUTPATIENT)
Dept: REHABILITATION | Facility: OTHER | Age: 62
End: 2022-08-16
Attending: INTERNAL MEDICINE
Payer: COMMERCIAL

## 2022-08-16 NOTE — PROGRESS NOTES
Health  Wellness Visit Note    Name: Shiraz RUBI [place]  Clinic Number: [id number]  Physician: No ref. provider found  Diagnosis: No diagnosis found.  Past Medical History:   Diagnosis Date    Allergy     Anemia     Asthma     Atrial fibrillation     Cataract     Chronic rhinitis 9/26/2013    DDD (degenerative disc disease) 4/3/2015    Depression     Diabetes mellitus     Fibromyalgia     Gastroesophageal reflux disease without esophagitis 7/14/2017    Hypertension     Sinusitis, acute, maxillary 12/20/2012    Thyroid disease      Visit Number: 21  Precautions: standard      1st PT visit:  4/12/2022  Year of care end date:  4/12/2023  6 Month test  Performed: October 2022  12 Month test performed: April 2023  Mind body plan: 1F  Patient level: C    Time In: 12:15 PM  Time Out: 1:50  Total Treatment Time: 40 minutes    Wellness Vision 2022  Handout on this week's wellness topic States of Change provided  along with a discussion on what it means, the benefits, and suggestions for practice.  Reviewed last week's topic of n/a    Subjective:   Patient reports that his back is pain-free today. At home, he completes his stretches on a daily basis, but he does not ice at home. For exercise, he goes walking once a week.    Objective:   Shiraz completed therapeutic stretches (EIL, DIONICIO) and the following MedX exercise machines: core lumbar, torso rotation l/r, leg extension, leg curl, upright row, chest press, biceps curl, triceps extension, leg press    See exercise log in patient folder for rate of exertion and repetitions completed.       Fitness Machine Education Key:  E=education on equipment initiated and further follow up and education needed  I=independent with  and exercise.  The patient:   Adjusts machines to his/her settings   Uses equipment levers, pins, weights safely   Maintains safe and correct posture while exercising   Moves through exercise with correct pace and control   Gets on  and off equipment safely      Core Lumbar Strength  Torso Rotation  Leg Press    Leg Extension  Seated Leg Curl  Chest Press    Seated Row  Hip ADD  Hip ABD    Triceps Extension  Bicep Curl  Other:        Assessment:   Patient tolerated MedX Core Lumbar Strength and all other peripheral exercises without an increase in symptoms. Patient warmed up on treadmill for 5 minutes, stretched, and iced low back for 5 minutes after the workout.    Plan:  Continue with established plan of care towards wellness goals.     Health  : Tiffanie Campos  8/16/2022

## 2022-08-22 ENCOUNTER — PATIENT MESSAGE (OUTPATIENT)
Dept: ADMINISTRATIVE | Facility: HOSPITAL | Age: 62
End: 2022-08-22
Payer: COMMERCIAL

## 2022-08-24 ENCOUNTER — PATIENT MESSAGE (OUTPATIENT)
Dept: ADMINISTRATIVE | Facility: HOSPITAL | Age: 62
End: 2022-08-24
Payer: COMMERCIAL

## 2022-08-26 ENCOUNTER — DOCUMENTATION ONLY (OUTPATIENT)
Dept: REHABILITATION | Facility: OTHER | Age: 62
End: 2022-08-26
Attending: INTERNAL MEDICINE
Payer: COMMERCIAL

## 2022-08-26 NOTE — PROGRESS NOTES
Health  Wellness Visit Note    Name: Shiraz RUBI [place]  Clinic Number: [id number]  Physician: No ref. provider found  Diagnosis: No diagnosis found.  Past Medical History:   Diagnosis Date    Allergy     Anemia     Asthma     Atrial fibrillation     Cataract     Chronic rhinitis 9/26/2013    DDD (degenerative disc disease) 4/3/2015    Depression     Diabetes mellitus     Fibromyalgia     Gastroesophageal reflux disease without esophagitis 7/14/2017    Hypertension     Sinusitis, acute, maxillary 12/20/2012    Thyroid disease      Visit Number: 22  Precautions: standard      1st PT visit:  4/12/2022  Year of care end date:  4/12/2023  6 Month test  Performed: October 2022  12 Month test performed: April 2023  Mind body plan: 2F  Patient level: C    Time In: 12:10 PM  Time Out: 12:45 PM  Total Treatment Time: 35 minutes    Wellness Vision 2022  Handout on this week's wellness topic Journaing provided along with a discussion on what it means, the benefits, and suggestions for practice.  Reviewed last week's topic of States of Change    Subjective  Patient reports that his back feels good today. Pt continues to stretch daily; he does not typically ice at home. His exercise includes walking 1x/week..    Objective:   Shiraz completed therapeutic stretches (EIL, DIONICIO) and the following MedX exercise machines: core lumbar, torso rotation l/r, leg extension, leg curl, upright row, chest press, biceps curl, triceps extension, leg press    See exercise log in patient folder for rate of exertion and repetitions completed.       Fitness Machine Education Key:  E=education on equipment initiated and further follow up and education needed  I=independent with  and exercise.  The patient:   Adjusts machines to his/her settings   Uses equipment levers, pins, weights safely   Maintains safe and correct posture while exercising   Moves through exercise with correct pace and control   Gets on and off  equipment safely      Core Lumbar Strength  Torso Rotation  Leg Press    Leg Extension  Seated Leg Curl  Chest Press    Seated Row  Hip ADD  Hip ABD    Triceps Extension  Bicep Curl  Other:        Assessment:   Patient tolerated MedX Core Lumbar Strength and all other peripheral exercises without an increase in symptoms. Patient warmed up on treadmill for 5 minutes, stretched, and iced low back for 5 minutes after the workout.    Plan:  Continue with established plan of care towards wellness goals.     Health  : Tiffanie Campos  8/26/2022

## 2022-09-14 ENCOUNTER — TELEPHONE (OUTPATIENT)
Dept: PAIN MEDICINE | Facility: CLINIC | Age: 62
End: 2022-09-14
Payer: COMMERCIAL

## 2022-09-14 NOTE — TELEPHONE ENCOUNTER
----- Message from Yanira Asif sent at 9/14/2022 12:40 PM CDT -----  Regarding: prescription  Name of Who is Calling: [facility]           What is the request in detail: [facility] is calling on behalf of the patients prescription for gabapentin (NEURONTIN) 300 MG capsul, pharmacy is in need of doctors VICK number           Can the clinic reply by [facility]: no           What Number to Call Back if not in [facility]:[phone]

## 2022-09-14 NOTE — TELEPHONE ENCOUNTER
Contacted [facility] - spoke to Al,  they required a physician VICK number to fill the gabenpentin. It was placed under Dr. Lyman name.

## 2022-09-15 ENCOUNTER — DOCUMENTATION ONLY (OUTPATIENT)
Dept: REHABILITATION | Facility: OTHER | Age: 62
End: 2022-09-15
Attending: INTERNAL MEDICINE
Payer: COMMERCIAL

## 2022-09-15 NOTE — PROGRESS NOTES
Health  Wellness Visit Note    Name: Shiraz RUBI [place]  Clinic Number: [id number]  Physician: No ref. provider found  Diagnosis: No diagnosis found.  Past Medical History:   Diagnosis Date    Allergy     Anemia     Asthma     Atrial fibrillation     Cataract     Chronic rhinitis 9/26/2013    DDD (degenerative disc disease) 4/3/2015    Depression     Diabetes mellitus     Fibromyalgia     Gastroesophageal reflux disease without esophagitis 7/14/2017    Hypertension     Sinusitis, acute, maxillary 12/20/2012    Thyroid disease      Visit Number: 23  Precautions: standard      1st PT visit:  4/12/2022  Year of care end date:  4/12/2023  6 Month test  Performed: October 2022  12 Month test performed: April 2023  Mind body plan: Plan A 7 months  Patient level: C    Time In: 3:30 PM  Time Out: 4:10 PM  Total Treatment Time: 40 minutes    Wellness Vision 2022  Handout on this week's wellness topic Preventing Burnout was provided along with a discussion on what it means, the benefits, and suggestions for practice.  Reviewed last week's topic of NA-missed last week.    Subjective  Patient reports that his back feels moderately good today. He hasn't been stretching or exercising regularly at home but he know it is helping him build strength and reduce pain. Pt continues to stretch daily; he does not typically ice at home. His exercise includes walking 1x/week.  Shiraz began learning .  CC was entered into [employer].    Objective:   Shiraz completed therapeutic stretches (EIL, DIONICIO) and the following MedX exercise machines: core lumbar, torso rotation l/r, leg extension, leg curl, upright row, chest press, biceps curl, triceps extension, leg press    See exercise log in patient folder for rate of exertion and repetitions completed.       Fitness Machine Education Key:  E=education on equipment initiated and further follow up and education needed  I=independent with  and exercise.  The patient:  Adjusts  machines to his/her settings  Uses equipment levers, pins, weights safely  Maintains safe and correct posture while exercising  Moves through exercise with correct pace and control  Gets on and off equipment safely      Core Lumbar Strength  Torso Rotation  Leg Press    Leg Extension E Seated Leg Curl  Chest Press    Seated Row  Hip ADD  Hip ABD    Triceps Extension E Bicep Curl E Other:        Assessment:   Patient tolerated MedX Core Lumbar Strength and all other peripheral exercises without an increase in symptoms. Patient warmed up on treadmill for 5 minutes, stretched, and iced low back for 5 minutes after the workout.    Plan:  Continue with established plan of care towards wellness goals.     Health  : Sabra Esparza  9/15/2022

## 2022-09-18 ENCOUNTER — HOSPITAL ENCOUNTER (OUTPATIENT)
Dept: RADIOLOGY | Facility: HOSPITAL | Age: 62
Discharge: HOME | End: 2022-09-18
Attending: PODIATRIST
Payer: COMMERCIAL

## 2022-09-18 DIAGNOSIS — M79.671 RIGHT FOOT PAIN: ICD-10-CM

## 2022-09-18 LAB
CREAT SERPL-MCNC: 1.2 MG/DL (ref 0.5–1.4)
SAMPLE: NORMAL

## 2022-09-18 PROCEDURE — 73720 MRI LWR EXTREMITY W/O&W/DYE: CPT | Mod: TC,RT

## 2022-09-18 PROCEDURE — 25500020 PHARM REV CODE 255: Performed by: PODIATRIST

## 2022-09-18 PROCEDURE — 73720 MRI LWR EXTREMITY W/O&W/DYE: CPT | Mod: 26,RT,, | Performed by: RADIOLOGY

## 2022-09-18 PROCEDURE — A9585 GADOBUTROL INJECTION: HCPCS | Performed by: PODIATRIST

## 2022-09-18 PROCEDURE — 73720 MRI FOOT (HINDFOOT) RIGHT W W/O CONTRAST: ICD-10-PCS | Mod: 26,RT,, | Performed by: RADIOLOGY

## 2022-09-18 RX ORDER — GADOBUTROL 604.72 MG/ML
10 INJECTION INTRAVENOUS
Status: COMPLETED | OUTPATIENT
Start: 2022-09-18 | End: 2022-09-18

## 2022-09-18 RX ADMIN — GADOBUTROL 10 ML: 604.72 INJECTION INTRAVENOUS at 09:09

## 2022-09-21 ENCOUNTER — DOCUMENTATION ONLY (OUTPATIENT)
Dept: REHABILITATION | Facility: OTHER | Age: 62
End: 2022-09-21
Attending: INTERNAL MEDICINE
Payer: COMMERCIAL

## 2022-09-21 NOTE — PROGRESS NOTES
Health  Wellness Visit Note    Name: Shiraz hJa  Clinic Number: [id number]  Physician: No ref. provider found  Diagnosis: No diagnosis found.  Past Medical History:   Diagnosis Date    Allergy     Anemia     Asthma     Atrial fibrillation     Cataract     Chronic rhinitis 9/26/2013    DDD (degenerative disc disease) 4/3/2015    Depression     Diabetes mellitus     Fibromyalgia     Gastroesophageal reflux disease without esophagitis 7/14/2017    Hypertension     Sinusitis, acute, maxillary 12/20/2012    Thyroid disease      Visit Number: 23  Precautions: standard      1st PT visit:  4/12/2022  Year of care end date:  4/12/2023  6 Month test  Performed: October 2022  12 Month test performed: April 2023  Mind body plan: Plan A 7 months  Patient level: C    Time In: 2:50 PM  Time Out: 3:25 PM  Total Treatment Time: 45 minutes    Wellness Vision 2022  Handout on this week's wellness topic Taking Ownership of Overall Wellness was provided along with a discussion on what it means, the benefits, and suggestions for practice.  Reviewed last week's topic of Preventing Burnout.    Subjective  Patient reports that his back feels good today. He visited the ER this weekend with extreme right foot pain. Shiraz stated that MRI shows a tear in his foot.  Shiraz hasn't been stretching or exercising regularly at home but he know it is helping him build strength and reduce pain. Pt continues to stretch daily; he does not typically ice at home. He did not walk this week bc foot pain.  Shiraz began learning .    Objective:   Shiraz completed therapeutic stretches (EIL, DIONICIO) and the following MedX exercise machines: core lumbar, torso rotation l/r, leg extension, leg curl, upright row, chest press, biceps curl, triceps extension, leg press    See exercise log in patient folder for rate of exertion and repetitions completed.       Fitness Machine Education Key:  E=education on equipment initiated and further follow up and  education needed  I=independent with  and exercise.  The patient:  Adjusts machines to his/her settings  Uses equipment levers, pins, weights safely  Maintains safe and correct posture while exercising  Moves through exercise with correct pace and control  Gets on and off equipment safely      Core Lumbar Strength  Torso Rotation  Leg Press    Leg Extension E Seated Leg Curl  Chest Press    Seated Row  Hip ADD  Hip ABD    Triceps Extension E Bicep Curl E Other:        Assessment:   Patient tolerated MedX Core Lumbar Strength and all other peripheral exercises without an increase in symptoms. Patient SKIPPED warmed up on treadmill for 5 minutes, stretched, and iced low back for 5 minutes after the workout.    Plan:  Continue with established plan of care towards wellness goals.     Health  : Sabra Esparza  9/21/2022

## 2022-09-29 ENCOUNTER — PATIENT MESSAGE (OUTPATIENT)
Dept: PODIATRY | Facility: CLINIC | Age: 62
End: 2022-09-29
Payer: COMMERCIAL

## 2022-09-29 ENCOUNTER — OFFICE VISIT (OUTPATIENT)
Dept: PODIATRY | Facility: CLINIC | Age: 62
End: 2022-09-29
Payer: COMMERCIAL

## 2022-09-29 VITALS — BODY MASS INDEX: 42.36 KG/M2 | RESPIRATION RATE: 18 BRPM | HEIGHT: 69 IN | WEIGHT: 286 LBS

## 2022-09-29 DIAGNOSIS — M77.9 TENDINITIS: ICD-10-CM

## 2022-09-29 DIAGNOSIS — M94.9 OSTEOCHONDRAL LESION OF TALAR DOME: Primary | ICD-10-CM

## 2022-09-29 DIAGNOSIS — M89.9 OSTEOCHONDRAL LESION OF TALAR DOME: Primary | ICD-10-CM

## 2022-09-29 PROCEDURE — 3044F PR MOST RECENT HEMOGLOBIN A1C LEVEL <7.0%: ICD-10-PCS | Mod: CPTII,S$GLB,, | Performed by: PODIATRIST

## 2022-09-29 PROCEDURE — 99213 OFFICE O/P EST LOW 20 MIN: CPT | Mod: S$GLB,,, | Performed by: PODIATRIST

## 2022-09-29 PROCEDURE — 3008F PR BODY MASS INDEX (BMI) DOCUMENTED: ICD-10-PCS | Mod: CPTII,S$GLB,, | Performed by: PODIATRIST

## 2022-09-29 PROCEDURE — 99999 PR PBB SHADOW E&M-EST. PATIENT-LVL IV: ICD-10-PCS | Mod: PBBFAC,,, | Performed by: PODIATRIST

## 2022-09-29 PROCEDURE — 99999 PR PBB SHADOW E&M-EST. PATIENT-LVL IV: CPT | Mod: PBBFAC,,, | Performed by: PODIATRIST

## 2022-09-29 PROCEDURE — 3044F HG A1C LEVEL LT 7.0%: CPT | Mod: CPTII,S$GLB,, | Performed by: PODIATRIST

## 2022-09-29 PROCEDURE — 3008F BODY MASS INDEX DOCD: CPT | Mod: CPTII,S$GLB,, | Performed by: PODIATRIST

## 2022-09-29 PROCEDURE — 99213 PR OFFICE/OUTPT VISIT, EST, LEVL III, 20-29 MIN: ICD-10-PCS | Mod: S$GLB,,, | Performed by: PODIATRIST

## 2022-09-29 NOTE — PROGRESS NOTES
Subjective:      Patient ID: Shiraz Jha is a 62 y.o. male.    Chief Complaint: Follow-up (Rt foot pain had MRI wants to know the results  ) and Foot Swelling (Foot and leg swelling )    Shiraz is a 62 y.o. male who presents to the clinic upon referral from Dr. Yadira he. provider found  for evaluation and treatment of diabetic feet. Shiraz has a past medical history of Allergy, Anemia, Asthma, Atrial fibrillation, Cataract, Chronic rhinitis (9/26/2013), DDD (degenerative disc disease) (4/3/2015), Depression, Diabetes mellitus, Fibromyalgia, Gastroesophageal reflux disease without esophagitis (7/14/2017), Hypertension, Sinusitis, acute, maxillary (12/20/2012), and Thyroid disease. Patient presents today for right ankle pain. P  PCP: GUSTAVO Theodore MD    Date Last Seen by PCP:   Chief Complaint   Patient presents with    Follow-up     Rt foot pain had MRI wants to know the results      Foot Swelling     Foot and leg swelling          Current shoe gear: Tennis shoes    Hemoglobin A1C   Date Value Ref Range Status   04/06/2021 5.5 4.0 - 5.6 % Final     Comment:     ADA Screening Guidelines:  5.7-6.4%  Consistent with prediabetes  >or=6.5%  Consistent with diabetes    High levels of fetal hemoglobin interfere with the HbA1C  assay. Heterozygous hemoglobin variants (HbS, HgC, etc)do  not significantly interfere with this assay.   However, presence of multiple variants may affect accuracy.     03/17/2020 5.7 (H) 4.0 - 5.6 % Final     Comment:     ADA Screening Guidelines:  5.7-6.4%  Consistent with prediabetes  >or=6.5%  Consistent with diabetes  High levels of fetal hemoglobin interfere with the HbA1C  assay. Heterozygous hemoglobin variants (HbS, HgC, etc)do  not significantly interfere with this assay.   However, presence of multiple variants may affect accuracy.     07/15/2017 5.6 4.0 - 5.6 % Final     Comment:     According to ADA guidelines, hemoglobin A1c <7.0% represents  optimal control in non-pregnant diabetic  patients. Different  metrics may apply to specific patient populations.   Standards of Medical Care in Diabetes-2016.  For the purpose of screening for the presence of diabetes:  <5.7%     Consistent with the absence of diabetes  5.7-6.4%  Consistent with increasing risk for diabetes   (prediabetes)  >or=6.5%  Consistent with diabetes  Currently, no consensus exists for use of hemoglobin A1c  for diagnosis of diabetes for children.  This Hemoglobin A1c assay has significant interference with fetal   hemoglobin   (HbF). The results are invalid for patients with abnormal amounts of   HbF,   including those with known Hereditary Persistence   of Fetal Hemoglobin. Heterozygous hemoglobin variants (HbAS, HbAC,   HbAD, HbAE, HbA2) do not significantly interfere with this assay;   however, presence of multiple variants in a sample may impact the %   interference.             Review of Systems   Constitutional: Negative for chills, fever and malaise/fatigue.   HENT:  Negative for hearing loss.    Cardiovascular:  Negative for claudication.   Respiratory:  Negative for shortness of breath.    Skin:  Positive for nail changes. Negative for flushing and rash.   Musculoskeletal:  Negative for joint pain and myalgias.   Neurological:  Negative for loss of balance, numbness, paresthesias and sensory change.   Psychiatric/Behavioral:  Negative for altered mental status.    Allergic/Immunologic: Negative for hives.         Objective:      Physical Exam  Vitals reviewed.   Cardiovascular:      Pulses:           Dorsalis pedis pulses are 2+ on the right side and 2+ on the left side.        Posterior tibial pulses are 2+ on the right side and 2+ on the left side.      Comments: No edema noted to b/L LEs  Musculoskeletal:         General: Normal range of motion.      Comments: some pain with inversion/eversion  POP to medial and lateral ankle ligaments.    Feet:      Right foot:      Protective Sensation: 5 sites tested.  5 sites  sensed.      Left foot:      Protective Sensation: 5 sites tested.  5 sites sensed.   Skin:     Comments: Normal skin tugor noted.   No open lesion noted b/L  Skin temp is warm to warm from proximal to distal b/L.  Webspaces clean, dry, and intact  Nails x10 elongated  Scaling dryness in a moccasin distribution is noted to the bilateral lower extremities with associated erythema.       Neurological:      Mental Status: He is alert.      Comments: Intact gross sensation noted to b/L LEs             Assessment:       Encounter Diagnoses   Name Primary?    Osteochondral lesion of talar dome Yes    Tendinitis - Right Foot          Plan:       Shiraz was seen today for follow-up and foot swelling.    Diagnoses and all orders for this visit:    Osteochondral lesion of talar dome    Tendinitis - Right Foot    I counseled the patient on his conditions, their implications and medical management.    MRI ordered for persistent ankle pain  Pt advised on RICE and OTC NSAIDs for associated pain.   Will f/u after MRI

## 2022-10-04 ENCOUNTER — DOCUMENTATION ONLY (OUTPATIENT)
Dept: REHABILITATION | Facility: OTHER | Age: 62
End: 2022-10-04
Attending: INTERNAL MEDICINE
Payer: COMMERCIAL

## 2022-10-04 NOTE — PROGRESS NOTES
Health  Wellness Visit Note    Name: Shiraz RUBI West  Clinic Number: [id number]  Physician: No ref. provider found  Diagnosis: No diagnosis found.  Past Medical History:   Diagnosis Date    Allergy     Anemia     Asthma     Atrial fibrillation     Cataract     Chronic rhinitis 9/26/2013    DDD (degenerative disc disease) 4/3/2015    Depression     Diabetes mellitus     Fibromyalgia     Gastroesophageal reflux disease without esophagitis 7/14/2017    Hypertension     Sinusitis, acute, maxillary 12/20/2012    Thyroid disease      Visit Number: 25  Precautions: standard      1st PT visit:  4/12/2022  Year of care end date:  4/12/2023  6 Month test  Performed: October 2022  12 Month test performed: April 2023  Mind body plan: Plan A 7 months  Patient level: C    Time In: 8:40 PM  Time Out: 9:15 PM  Total Treatment Time:  35 minutes    Wellness Vision 2022  Handout on this week's wellness topic Relationships of Overall Wellness was provided along with a discussion on what it means, the benefits, and suggestions for practice.  Reviewed last week's topic of Taking Ownership.    Subjective  Patient reports that his back feels good today.  Shiraz hasn't been stretching or exercising regularly at home but he know it is helping him build strength and reduce pain. Pt continues to stretch daily; he does not typically ice at home. He did not walk this week bc foot pain. Goes to surgeon in November.   Shiraz began learning .    Objective:   Shiraz completed therapeutic stretches (EIL, DIONICIO) and the following MedX exercise machines: core lumbar, torso rotation l/r, leg extension, leg curl, upright row, chest press, biceps curl, triceps extension, leg press    See exercise log in patient folder for rate of exertion and repetitions completed.       Fitness Machine Education Key:  E=education on equipment initiated and further follow up and education needed  I=independent with  and exercise.  The  patient:  Adjusts machines to his/her settings  Uses equipment levers, pins, weights safely  Maintains safe and correct posture while exercising  Moves through exercise with correct pace and control  Gets on and off equipment safely      Core Lumbar Strength  Torso Rotation  Leg Press    Leg Extension E Seated Leg Curl  Chest Press    Seated Row  Hip ADD  Hip ABD    Triceps Extension E Bicep Curl E Other:        Assessment:   Patient tolerated MedX Core Lumbar Strength and all other peripheral exercises without an increase in symptoms. Patient SKIPPED warmed up on treadmill for 5 minutes, stretched, and iced low back for 5 minutes after the workout.    Plan:  Continue with established plan of care towards wellness goals.     Health  : Fiorella Lopez  10/4/2022

## 2022-10-05 DIAGNOSIS — I49.8 OTHER SPECIFIED CARDIAC ARRHYTHMIAS: Primary | ICD-10-CM

## 2022-10-06 ENCOUNTER — PATIENT OUTREACH (OUTPATIENT)
Dept: ADMINISTRATIVE | Facility: HOSPITAL | Age: 62
End: 2022-10-06
Payer: COMMERCIAL

## 2022-10-06 NOTE — PROGRESS NOTES
Health Maintenance Due   Topic Date Due    Pneumococcal Vaccines (Age 0-64) (1 - PCV) Never done    High Dose Statin  Never done    Shingles Vaccine (1 of 2) Never done    Eye Exam  02/11/2020    Hemoglobin A1c  10/06/2021    COVID-19 Vaccine (3 - Booster for Lucas series) 02/17/2022    Diabetes Urine Screening  04/22/2022    Influenza Vaccine (1) 09/01/2022     Chart review done. HM updated. Immunizations reviewed & updated. Care Everywhere updated.  Called pt to inquire about DM Eye Exam. Pt declined exam. Stated that he would set appointment with his regular provider and bring the Exam to his PCP.

## 2022-10-10 ENCOUNTER — PATIENT MESSAGE (OUTPATIENT)
Dept: ADMINISTRATIVE | Facility: HOSPITAL | Age: 62
End: 2022-10-10
Payer: COMMERCIAL

## 2022-10-11 ENCOUNTER — OFFICE VISIT (OUTPATIENT)
Dept: CARDIOLOGY | Facility: CLINIC | Age: 62
End: 2022-10-11
Payer: COMMERCIAL

## 2022-10-11 VITALS
RESPIRATION RATE: 20 BRPM | HEART RATE: 53 BPM | HEIGHT: 69 IN | WEIGHT: 281.75 LBS | SYSTOLIC BLOOD PRESSURE: 138 MMHG | DIASTOLIC BLOOD PRESSURE: 75 MMHG | BODY MASS INDEX: 41.73 KG/M2

## 2022-10-11 DIAGNOSIS — E66.01 SEVERE OBESITY (BMI >= 40): Chronic | ICD-10-CM

## 2022-10-11 DIAGNOSIS — Z86.79 S/P ABLATION OF ATRIAL FIBRILLATION: ICD-10-CM

## 2022-10-11 DIAGNOSIS — I87.2 VENOUS INSUFFICIENCY OF LEFT LOWER EXTREMITY: Primary | ICD-10-CM

## 2022-10-11 DIAGNOSIS — I10 ESSENTIAL HYPERTENSION: Chronic | ICD-10-CM

## 2022-10-11 DIAGNOSIS — M79.89 LEG SWELLING: ICD-10-CM

## 2022-10-11 DIAGNOSIS — I89.0 LYMPHEDEMA OF BOTH LOWER EXTREMITIES: ICD-10-CM

## 2022-10-11 DIAGNOSIS — Z98.890 S/P ABLATION OF ATRIAL FIBRILLATION: ICD-10-CM

## 2022-10-11 PROCEDURE — 99214 OFFICE O/P EST MOD 30 MIN: CPT | Mod: S$GLB,,, | Performed by: INTERNAL MEDICINE

## 2022-10-11 PROCEDURE — 99214 PR OFFICE/OUTPT VISIT, EST, LEVL IV, 30-39 MIN: ICD-10-PCS | Mod: S$GLB,,, | Performed by: INTERNAL MEDICINE

## 2022-10-11 PROCEDURE — 99999 PR PBB SHADOW E&M-EST. PATIENT-LVL V: ICD-10-PCS | Mod: PBBFAC,,, | Performed by: INTERNAL MEDICINE

## 2022-10-11 PROCEDURE — 99215 OFFICE O/P EST HI 40 MIN: CPT | Mod: PBBFAC,PO | Performed by: INTERNAL MEDICINE

## 2022-10-11 PROCEDURE — 99999 PR PBB SHADOW E&M-EST. PATIENT-LVL V: CPT | Mod: PBBFAC,,, | Performed by: INTERNAL MEDICINE

## 2022-10-11 RX ORDER — SPIRONOLACTONE 25 MG/1
25 TABLET ORAL DAILY
Qty: 90 TABLET | Refills: 3 | Status: SHIPPED | OUTPATIENT
Start: 2022-10-11 | End: 2024-01-09

## 2022-10-11 NOTE — PROGRESS NOTES
HISTORY:    This is a 62-year-old male with a history of AFib status post ablation 2014, venous insufficiency/lymphedema, hypertension, diabetes, obesity status post gastric bypass 2010, and QUINTIN presenting for initial evaluation by me.  The patient has previously been seen by Dr. Ureña. Followed by Dr. Scott in primary cards clinic.    The patient reports years of LE dwelling bilaterally, progressively getting worse. He has been managed with bumex 2x2, but has been taking increased doses to try to counteract progression. Symptoms best in the morning and worst in the evening. He does have a heaviness in his legs when his swelling is significant.    Activity levles mild to minimal at this time due to orthopedic issues.    In addition to bumetadine 2x2, he tolerates metoprolol 25x2, sotalol 80x1, rivaroxaban 20x1. Bps well controlled.      PHYSICAL EXAM:    Vitals:    10/11/22 1019   BP: 138/75   Pulse: (!) 53   Resp: 20       NAD, A+Ox3.  No jvd, no bruit.  RRR nml s1,s2. No murmurs.  CTA B no wheezes or crackles.  2+ B radial and 1+ B DP/PT. B LE edema.    LABS/STUDIES (imaging reviewed during clinic visit):    July 2022 hemoglobin 11.3 with MCV of 82.  Creatinine 1.4 with BUN of 19.  Albumin 3.5.  BNP 20.  ECG July 2022 demonstrates sinus rhythm with no Q-waves or ST changes.    TTE May 2022 demonstrates normal LV size and systolic function.  EF 60%.  CVP 3.  PET stress 2021 normal LVEF with no evidence of ischemia.    SURINDER 2019 normal rest and exercise SURINDER bilaterally.  Normal PVR waveforms bilaterally.    Venous duplex 2019 no evidence of DVT bilaterally.  No evidence of GSV reflux bilaterally.        ASSESSMENT & PLAN:    1. Venous insufficiency of left lower extremity    2. Leg swelling    3. Lymphedema of both lower extremities    4. Severe obesity (BMI >= 40)    5. S/P ablation of atrial fibrillation    6. Essential hypertension        Orders Placed This Encounter    Basic Metabolic Panel    CV US Lower  Extremity Veins Bilateral Insufficiency    spironolactone (ALDACTONE) 25 MG tablet        B LE venous insufficiency with secondary lymphedema. C3 symptoms bilaterally with limitation related to symptoms. CVP 3 on TTE. Continue bumetadine 2x2 and add spironolactone 25x1. Repeat BMP in 2 weeks. Will repeat a venous insufficiency study to assess for reflux not present in '19. Compression stockings have been used before, but are too cumbersome for the patient to use.    Awaiting lymphedema clinic opening.    He understands the importance of movement as well as weight loss in effecting his disease process and CV risk. Bps controlled. On rivaroxaban for afib history.      Amy James MD

## 2022-10-13 ENCOUNTER — OFFICE VISIT (OUTPATIENT)
Dept: ORTHOPEDICS | Facility: CLINIC | Age: 62
End: 2022-10-13
Payer: COMMERCIAL

## 2022-10-13 VITALS
BODY MASS INDEX: 41.94 KG/M2 | WEIGHT: 283.19 LBS | HEIGHT: 69 IN | SYSTOLIC BLOOD PRESSURE: 127 MMHG | DIASTOLIC BLOOD PRESSURE: 67 MMHG | HEART RATE: 55 BPM

## 2022-10-13 DIAGNOSIS — E11.9 TYPE 2 DIABETES MELLITUS WITHOUT COMPLICATION, WITHOUT LONG-TERM CURRENT USE OF INSULIN: Chronic | ICD-10-CM

## 2022-10-13 DIAGNOSIS — M17.0 PRIMARY OSTEOARTHRITIS OF BOTH KNEES: Primary | ICD-10-CM

## 2022-10-13 DIAGNOSIS — K92.1 GASTROINTESTINAL HEMORRHAGE WITH MELENA: ICD-10-CM

## 2022-10-13 DIAGNOSIS — K21.9 GASTROESOPHAGEAL REFLUX DISEASE WITHOUT ESOPHAGITIS: ICD-10-CM

## 2022-10-13 DIAGNOSIS — Z79.01 CURRENT USE OF LONG TERM ANTICOAGULATION: Chronic | ICD-10-CM

## 2022-10-13 PROCEDURE — 99215 OFFICE O/P EST HI 40 MIN: CPT | Mod: PBBFAC | Performed by: PHYSICIAN ASSISTANT

## 2022-10-13 PROCEDURE — 99999 PR PBB SHADOW E&M-EST. PATIENT-LVL V: CPT | Mod: PBBFAC,,, | Performed by: PHYSICIAN ASSISTANT

## 2022-10-13 PROCEDURE — 99213 PR OFFICE/OUTPT VISIT, EST, LEVL III, 20-29 MIN: ICD-10-PCS | Mod: S$GLB,,, | Performed by: PHYSICIAN ASSISTANT

## 2022-10-13 PROCEDURE — 99213 OFFICE O/P EST LOW 20 MIN: CPT | Mod: S$GLB,,, | Performed by: PHYSICIAN ASSISTANT

## 2022-10-13 PROCEDURE — 99999 PR PBB SHADOW E&M-EST. PATIENT-LVL V: ICD-10-PCS | Mod: PBBFAC,,, | Performed by: PHYSICIAN ASSISTANT

## 2022-10-13 NOTE — PROGRESS NOTES
SUBJECTIVE:     Chief Complaint & History of Present Illness:  Shiraz Jha is a New patient 62 y.o. male who is seen here today with a complaint of    Chief Complaint   Patient presents with    Right Knee - Pain    Left Knee - Pain    .  Patient is here today for evaluation treatment progressively worsening pain soreness in bilateral knees.  States he has had problem with his knees dating back multiple years he has tried multiple treatments to include oral anti-inflammatories which she has to discontinue secondary to GI issues.  Activity modifications avoiding high impact activities and low weight high repetition exercises.  Topical creams with poor results.  Continues to struggle with increasing pain difficulty with start-up pain in periods of prolonged standing and ambulation  On a scale of 1-10, with 10 being worst pain imaginable, he rates this pain as 3 on good days and 8 on bad days.  he describes the pain as sore and grinding.    Review of patient's allergies indicates:   Allergen Reactions    Iodinated contrast media Other (See Comments)     Raises BP           Current Outpatient Medications   Medication Sig Dispense Refill    ARIPiprazole (ABILIFY) 10 MG Tab Take 10 mg by mouth once daily.      bumetanide (BUMEX) 2 MG tablet Take 1 tablet (2 mg total) by mouth 2 (two) times daily. 180 tablet 3    buPROPion (WELLBUTRIN XL) 300 MG 24 hr tablet Take 300 mg by mouth once daily. Pt states he only takes 300mg      cyanocobalamin 500 MCG tablet Take 500 mcg by mouth once daily.      dextroamphetamine-amphetamine (ADDERALL) 20 mg tablet Take 1 tablet by mouth every morning, take 1 tablet by mouth 3-4 hours later, and take 1/2 tablet every afternoon      fluticasone propionate (FLONASE) 50 mcg/actuation nasal spray 1 spray (50 mcg total) by Each Nostril route once daily. (Patient taking differently: 1 spray by Each Nostril route daily as needed.) 16 g 0    gabapentin (NEURONTIN) 300 MG capsule Take 1 capsule (300  mg total) by mouth 3 (three) times daily. 270 capsule 3    HYDROcodone-acetaminophen (NORCO) 5-325 mg per tablet Take 2 tablets by mouth 2 (two) times daily.      IMPOYZ 0.025 % Crea Apply topically as needed.       levocetirizine (XYZAL) 5 MG tablet TAKE 1 TABLET (5 MG TOTAL) BY MOUTH EVERY EVENING. (Patient taking differently: Take 5 mg by mouth daily as needed for Allergies.) 30 tablet 11    LUZU 1 % Crea Apply topically as needed.       methylPREDNISolone (MEDROL DOSEPACK) 4 mg tablet use as directed 1 tablet 0    metoprolol tartrate (LOPRESSOR) 25 MG tablet TAKE ONE BY MOUTH TWICE DAILY 180 tablet 3    multivitamin (THERAGRAN) per tablet Take 1 tablet by mouth once daily.       NAFTIN 2 % Gel daily as needed.       nitroGLYCERIN (NITROSTAT) 0.4 MG SL tablet Please dispense 25 pills in 4 bottles. 100 tablet 0    omeprazole (PRILOSEC) 10 MG capsule Take 2 capsules (20 mg total) by mouth once daily. 30 capsule 6    sotaloL (BETAPACE) 80 MG tablet TAKE ONE BY MOUTH TWICE DAILY 180 tablet 3    spironolactone (ALDACTONE) 25 MG tablet Take 1 tablet (25 mg total) by mouth once daily. 90 tablet 3    tamsulosin (FLOMAX) 0.4 mg Cap Take 1 capsule (0.4 mg total) by mouth 2 (two) times daily. 60 capsule 11    thiamine 250 MG tablet Take 250 mg by mouth once daily.      vitamin D (VITAMIN D3) 1000 units Tab Take 1,000 Units by mouth once daily.      vortioxetine (TRINTELLIX) 20 mg Tab Take 20 mg by mouth once daily.       XARELTO 20 mg Tab Take 1 tablet by mouth once daily 30 tablet 0     Current Facility-Administered Medications   Medication Dose Route Frequency Provider Last Rate Last Admin    sodium hyaluronate (EUFLEXXA) 10 mg/mL(mw 2.4 -3.6 million) injection 40 mg  40 mg Intra-articular Weekly Yaw Puente PA-C           Past Medical History:   Diagnosis Date    Allergy     Anemia     Asthma     Atrial fibrillation     Cataract     Chronic rhinitis 9/26/2013    DDD (degenerative disc disease) 4/3/2015     Depression     Diabetes mellitus     Fibromyalgia     Gastroesophageal reflux disease without esophagitis 7/14/2017    Hypertension     Sinusitis, acute, maxillary 12/20/2012    Thyroid disease        Past Surgical History:   Procedure Laterality Date    ANTEGRADE SINGLE BALLOON ENTEROSCOPY N/A 5/26/2022    Procedure: ENTEROSCOPY, SINGLE BALLOON, ANTEGRADE;  Surgeon: Lyle Edmond MD;  Location: [facility] ENDO (2ND FLR);  Service: Endoscopy;  Laterality: N/A;  Will use single-balloon scope for both the upper endoscopy and the colonoscopy - recent incomplete colonoscopy due to looping.    Pt is fully vaccinated-DS  5/20/22-Approval to hold Xarelto rec'd from Dr. Webb-pending approval (see telephoned en    CERVICAL SPINE SURGERY      COLONOSCOPY N/A 5/10/2022    Procedure: COLONOSCOPY;  Surgeon: Conrad Diaz MD;  Location: [facility] ENDO (2ND FLR);  Service: Endoscopy;  Laterality: N/A;    COLONOSCOPY N/A 5/26/2022    Procedure: COLONOSCOPY;  Surgeon: Lyle Edmond MD;  Location: [facility] ENDO (2ND FLR);  Service: Endoscopy;  Laterality: N/A;    EPIDURAL STEROID INJECTION INTO LUMBAR SPINE N/A 5/29/2018    Procedure: INJECTION-STEROID-EPIDURAL-LUMBAR- L4-5;  Surgeon: Roman Lyman MD;  Location: [facility] PAIN MGT;  Service: Pain Management;  Laterality: N/A;  Patient is diabetic and Xarleto     EPIDURAL STEROID INJECTION INTO LUMBAR SPINE N/A 7/3/2018    Procedure: INJECTION, STEROID, SPINE, LUMBAR, EPIDURAL- L4-5;  Surgeon: Roman Lyman MD;  Location: [facility] PAIN MGT;  Service: Pain Management;  Laterality: N/A;  Patient takes Xarelto and ASA     ESOPHAGOGASTRODUODENOSCOPY N/A 5/9/2022    Procedure: EGD (ESOPHAGOGASTRODUODENOSCOPY);  Surgeon: Conrad Diaz MD;  Location: [facility] ENDO (2ND FLR);  Service: Endoscopy;  Laterality: N/A;    EXCISION OF LESION OF LIP N/A 7/7/2020    Procedure: EXCISION, LESION, LIP;  Surgeon: Caden Navarro MD;  Location: [facility] OR 2ND FLR;  Service: ENT;  Laterality: N/A;    GASTRIC BYPASS   "    SINUS SURGERY  2000    Dr. Watt at [facility]    TONSILLECTOMY         Vital Signs (Most Recent)  Vitals:    10/13/22 1043   BP: 127/67   Pulse: (!) 55           Review of Systems:  ROS:  Constitutional: no fever or chills  Eyes: no visual changes  ENT: no nasal congestion or sore throat  Respiratory: no cough or shortness of breath  Cardiovascular: no chest pain or palpitations, positive atrial flutter, persistent AFib, stable angina, history of cardiac ablation  Gastrointestinal: no nausea or vomiting, tolerating diet, positive for GERD without esophagitis history bypass  Genitourinary: no hematuria or dysuria, positive BPH, hypokalemia  Integument/Breast: no rash or pruritis  Hematologic/Lymphatic: no easy bruising or lymphadenopathy, positive long-term anticoagulation, anemia,  Musculoskeletal: no arthralgias or myalgias  Neurological: no seizures or tremors, positive spondylosis without myopathy, a osteoarthritis of the spine, degenerative disc disease, chronic pain syndrome, decreased range of motion lumbar spine  Behavioral/Psych: no auditory or visual hallucinations, positive for depression, major depressive disorder in remission  Endocrine: no heat or cold intolerance, positive thyroid disease, diabetes type 2, status post gastric bypass surgery                OBJECTIVE:     PHYSICAL EXAM:  Height: 5' 9" (175.3 cm) Weight: 128.4 kg (283 lb 2.9 oz), General Appearance: Well nourished, well developed, in no acute distress.  Neurological: Mood & affect are normal.    left  Knee Exam:  Knee Range of Motion:0-120 degrees flexion   Effusion:none  Condition of skin:intact  Location of tenderness:Medial joint line   Strength:limited by pain and 5 of 5  Stability:  Lachman: stable, LCL: stable, MCL: stable, PCL: stable, and posteromedial (dial): stable  Varus /Valgus stress:  normal  Rayne:   negative/negative    right  Knee Exam:  Knee Range of Motion:0-120 degrees flexion   Effusion:none  Condition of " skin:intact  Location of tenderness:Medial joint line   Strength:limited by pain and 5 of 5  Stability:  Lachman: stable, LCL: stable, MCL: stable, PCL: stable, and posteromedial (dial): stable  Varus /Valgus stress:  normal  Rayne:   negative/negative      Hip Examination:  full painless range of motion, without tenderness    RADIOGRAPHS:  X-rays the knees taken today films reviewed by me demonstrate mild to moderate arthritic changes throughout both knees significant medial joint space narrowing primarily medial compartments.  Sclerotic changes noted medial compartment early osteophytic spurring no evidence of fracture dislocation care neck Justice stage II or better      MEDICAL NECESSITY FOR VISCOSUPPLENTATION: After thorough evaluation of the patient, I have determined that visco-supplementation is medically necessary. The patient has painful DJD of the knee with failure of conservative therapy including lifestyle modifications and rehabilitation exercises. Oral analgesics ? NSAIDS have not adequately controlled symptoms and there is radiographic evidence of joint space narrowing, subchondral sclerosis, and osteophytic changes, or in lack of radiographic evidence, there is arthroscopic or other evidence of chondrosis Kellgren-Justice grade 2 or greater.     ASSESSMENT/PLAN:       ICD-10-CM ICD-9-CM   1. Primary osteoarthritis of both knees  M17.0 715.16       Plan: We discussed with the patient at length all the different treatment options available for  the knee including anti-inflammatories, acetaminophen, rest, ice, knee strengthening exercise, occasional cortisone injections for temporary relief, Viscosupplimentation injections, arthroscopic debridement osteotomy, and finally knee arthroplasty.   Patient to proceed with viscosupplementation injections for bilateral disease unable to tolerate oral anti-inflammatories secondary to his bypass surgery.  And does not wish to pursue cortisone injections this  time secondary to difficulty controlling his blood sugars    Euflexxa bilateral knees plan to begin next week

## 2022-10-14 ENCOUNTER — DOCUMENTATION ONLY (OUTPATIENT)
Dept: REHABILITATION | Facility: OTHER | Age: 62
End: 2022-10-14
Attending: INTERNAL MEDICINE
Payer: COMMERCIAL

## 2022-10-14 NOTE — PROGRESS NOTES
Health  Wellness Visit Note    Name: Shiraz RUBI West  Clinic Number: [id number]  Physician: No ref. provider found  Diagnosis: No diagnosis found.  Past Medical History:   Diagnosis Date    Allergy     Anemia     Asthma     Atrial fibrillation     Cataract     Chronic rhinitis 9/26/2013    DDD (degenerative disc disease) 4/3/2015    Depression     Diabetes mellitus     Fibromyalgia     Gastroesophageal reflux disease without esophagitis 7/14/2017    Hypertension     Sinusitis, acute, maxillary 12/20/2012    Thyroid disease      Visit Number: 26  Precautions: standard      1st PT visit:  4/12/2022  Year of care end date:  4/12/2023  6 Month test  Performed: October 2022  12 Month test performed: April 2023  Mind body plan: Plan A 7 months  Patient level: C    Time In: 12:10 PM  Time Out: 12:55 PM  Total Treatment Time: 45 minutes    Wellness Vision 2022  Handout on this week's wellness topic Communication was provided along with a discussion on what it means, the benefits, and suggestions for practice.  Reviewed last week's topic of Relationships.    Subjective  Patient reports that his back is 3/10 pain which is significantly better since he first began [employer]. Shiraz hast been stretching every morning. He has weights and elastic bands to use for exercising at home. He does not walk for exercise bc foot pain. Goes to surgeon in November.   Shiraz is learning . He would like to complete the 6 month test next visit.    Objective:   Shiraz completed therapeutic stretches (EIL, DIONICIO) and the following MedX exercise machines: core lumbar, torso rotation l/r, leg extension, leg curl, upright row, chest press, biceps curl, triceps extension, leg press    See exercise log in patient folder for rate of exertion and repetitions completed.       Fitness Machine Education Key:  E=education on equipment initiated and further follow up and education needed  I=independent with  and exercise.  The  patient:  Adjusts machines to his/her settings  Uses equipment levers, pins, weights safely  Maintains safe and correct posture while exercising  Moves through exercise with correct pace and control  Gets on and off equipment safely      Core Lumbar Strength  Torso Rotation  Leg Press    Leg Extension E Seated Leg Curl  Chest Press    Seated Row E Hip ADD  Hip ABD    Triceps Extension E Bicep Curl E Other:        Assessment:   Patient tolerated MedX Core Lumbar Strength and all other peripheral exercises without an increase in symptoms. Patient SKIPPED warmed up on treadmill for 5 minutes, stretched, and iced low back for 5 minutes after the workout.    Plan:  Continue with established plan of care towards wellness goals.     Health  : Sabra Espraza  10/14/2022

## 2022-10-18 ENCOUNTER — TELEPHONE (OUTPATIENT)
Dept: ORTHOPEDICS | Facility: CLINIC | Age: 62
End: 2022-10-18
Payer: COMMERCIAL

## 2022-10-18 ENCOUNTER — OFFICE VISIT (OUTPATIENT)
Dept: INTERNAL MEDICINE | Facility: CLINIC | Age: 62
End: 2022-10-18
Payer: COMMERCIAL

## 2022-10-18 VITALS
HEART RATE: 63 BPM | OXYGEN SATURATION: 98 % | SYSTOLIC BLOOD PRESSURE: 125 MMHG | BODY MASS INDEX: 46.06 KG/M2 | WEIGHT: 286.63 LBS | TEMPERATURE: 98 F | DIASTOLIC BLOOD PRESSURE: 83 MMHG | HEIGHT: 66 IN

## 2022-10-18 DIAGNOSIS — M46.1 SACROILIITIS: ICD-10-CM

## 2022-10-18 DIAGNOSIS — R51.9 HEAD ACHE: ICD-10-CM

## 2022-10-18 DIAGNOSIS — R68.83 CHILLS: ICD-10-CM

## 2022-10-18 DIAGNOSIS — R50.9 FEVER: ICD-10-CM

## 2022-10-18 DIAGNOSIS — R05.9 COUGH: ICD-10-CM

## 2022-10-18 DIAGNOSIS — R50.9 FEBRILE ILLNESS: Primary | ICD-10-CM

## 2022-10-18 DIAGNOSIS — M79.10 MUSCLE PAIN: ICD-10-CM

## 2022-10-18 LAB
INFLUENZA A, MOLECULAR: NEGATIVE
INFLUENZA B, MOLECULAR: NEGATIVE
SPECIMEN SOURCE: NORMAL

## 2022-10-18 PROCEDURE — 99215 OFFICE O/P EST HI 40 MIN: CPT | Mod: PBBFAC,PO | Performed by: FAMILY MEDICINE

## 2022-10-18 PROCEDURE — 87502 INFLUENZA DNA AMP PROBE: CPT | Mod: PO | Performed by: FAMILY MEDICINE

## 2022-10-18 PROCEDURE — U0003 INFECTIOUS AGENT DETECTION BY NUCLEIC ACID (DNA OR RNA); SEVERE ACUTE RESPIRATORY SYNDROME CORONAVIRUS 2 (SARS-COV-2) (CORONAVIRUS DISEASE [COVID-19]), AMPLIFIED PROBE TECHNIQUE, MAKING USE OF HIGH THROUGHPUT TECHNOLOGIES AS DESCRIBED BY CMS-2020-01-R: HCPCS | Performed by: FAMILY MEDICINE

## 2022-10-18 PROCEDURE — 99999 PR PBB SHADOW E&M-EST. PATIENT-LVL V: CPT | Mod: PBBFAC,,, | Performed by: FAMILY MEDICINE

## 2022-10-18 PROCEDURE — 99214 PR OFFICE/OUTPT VISIT, EST, LEVL IV, 30-39 MIN: ICD-10-PCS | Mod: S$GLB,,, | Performed by: FAMILY MEDICINE

## 2022-10-18 PROCEDURE — 99999 PR PBB SHADOW E&M-EST. PATIENT-LVL V: ICD-10-PCS | Mod: PBBFAC,,, | Performed by: FAMILY MEDICINE

## 2022-10-18 PROCEDURE — 99214 OFFICE O/P EST MOD 30 MIN: CPT | Mod: S$GLB,,, | Performed by: FAMILY MEDICINE

## 2022-10-18 PROCEDURE — U0005 INFEC AGEN DETEC AMPLI PROBE: HCPCS | Performed by: FAMILY MEDICINE

## 2022-10-18 RX ORDER — BENZONATATE 200 MG/1
200 CAPSULE ORAL 3 TIMES DAILY PRN
Qty: 30 CAPSULE | Refills: 0 | Status: SHIPPED | OUTPATIENT
Start: 2022-10-18 | End: 2022-10-28

## 2022-10-18 RX ORDER — AZITHROMYCIN 250 MG/1
TABLET, FILM COATED ORAL
Qty: 6 TABLET | Refills: 0 | Status: SHIPPED | OUTPATIENT
Start: 2022-10-18 | End: 2022-11-11

## 2022-10-18 RX ORDER — FLUTICASONE PROPIONATE 50 MCG
2 SPRAY, SUSPENSION (ML) NASAL DAILY
Qty: 16 G | Refills: 11 | Status: SHIPPED | OUTPATIENT
Start: 2022-10-18

## 2022-10-18 NOTE — PROGRESS NOTES
Subjective:       Patient ID: Shiraz Jha is a 62 y.o. male.    Chief Complaint: Sinus Problem, Cough, and Sore Throat  62-year-old white male patient of Dr. Theodore with sacroiliitis,, major depressive disorder, atrial fibrillation, hypertension, diabetes, GERD, BPH, and sleep apnea presents to clinic today secondary to symptoms of subjective fever and chills, postnasal drip, runny nose, sinus pressure, increased sneezing, sore throat, chest congestion, cough productive of greenish sputum, generalized body aches, and headaches for the past 3-4 days.  He has taken no medication for the symptoms.  Sinus Problem  Associated symptoms include chills, coughing (greenish sputum), headaches, sinus pressure, sneezing and a sore throat. Pertinent negatives include no congestion, ear pain, neck pain or shortness of breath.   Cough  Associated symptoms include chills, a fever, headaches, myalgias, postnasal drip, rhinorrhea and a sore throat. Pertinent negatives include no chest pain, ear pain, eye redness, rash or shortness of breath.   Sore Throat   Associated symptoms include coughing (greenish sputum) and headaches. Pertinent negatives include no abdominal pain, congestion, diarrhea, ear pain, neck pain, shortness of breath or vomiting.   Review of Systems   Constitutional:  Positive for chills and fever. Negative for appetite change and fatigue.   HENT:  Positive for postnasal drip, rhinorrhea, sinus pressure/congestion, sneezing and sore throat. Negative for nasal congestion, ear pain, hearing loss and tinnitus.    Eyes:  Negative for redness, itching and visual disturbance.   Respiratory:  Positive for cough (greenish sputum) and chest tightness. Negative for shortness of breath.    Cardiovascular:  Negative for chest pain and palpitations.   Gastrointestinal:  Negative for abdominal pain, constipation, diarrhea, nausea and vomiting.   Genitourinary:  Negative for decreased urine volume, difficulty urinating, dysuria,  frequency, hematuria and urgency.   Musculoskeletal:  Positive for myalgias. Negative for back pain, neck pain and neck stiffness.   Integumentary:  Negative for rash.   Neurological:  Positive for headaches. Negative for dizziness and light-headedness.   Psychiatric/Behavioral: Negative.         Objective:      Physical Exam  Vitals and nursing note reviewed.   Constitutional:       General: He is not in acute distress.     Appearance: Normal appearance. He is well-developed. He is not diaphoretic.   HENT:      Head: Normocephalic and atraumatic.      Right Ear: External ear normal. A middle ear effusion is present.      Left Ear: External ear normal. A middle ear effusion is present.      Nose: Rhinorrhea present. Rhinorrhea is clear.      Right Turbinates: Swollen.      Left Turbinates: Swollen.      Mouth/Throat:      Pharynx: No oropharyngeal exudate.   Eyes:      General: No scleral icterus.        Right eye: No discharge.         Left eye: No discharge.      Conjunctiva/sclera: Conjunctivae normal.      Pupils: Pupils are equal, round, and reactive to light.   Neck:      Thyroid: No thyromegaly.      Vascular: No JVD.      Trachea: No tracheal deviation.   Cardiovascular:      Rate and Rhythm: Normal rate and regular rhythm.      Heart sounds: Normal heart sounds. No murmur heard.    No friction rub. No gallop.   Pulmonary:      Effort: Pulmonary effort is normal. No respiratory distress.      Breath sounds: Normal breath sounds. No stridor. No wheezing, rhonchi or rales.   Chest:      Chest wall: No tenderness.   Abdominal:      General: Bowel sounds are normal. There is no distension.      Palpations: Abdomen is soft. There is no mass.      Tenderness: There is no abdominal tenderness. There is no guarding or rebound.   Musculoskeletal:         General: No tenderness. Normal range of motion.      Cervical back: Normal range of motion and neck supple.   Lymphadenopathy:      Cervical: No cervical adenopathy.    Skin:     General: Skin is warm and dry.      Coloration: Skin is not pale.      Findings: No erythema or rash.   Neurological:      Mental Status: He is alert and oriented to person, place, and time.   Psychiatric:         Mood and Affect: Mood normal.         Behavior: Behavior normal.         Thought Content: Thought content normal.         Judgment: Judgment normal.       Assessment:       Problem List Items Addressed This Visit       Sacroiliitis     Other Visit Diagnoses       Febrile illness    -  Primary    Relevant Medications    azithromycin (ZITHROMAX Z-SARWAT) 250 MG tablet    fluticasone propionate (FLONASE) 50 mcg/actuation nasal spray    benzonatate (TESSALON) 200 MG capsule    Other Relevant Orders    Influenza A & B by Molecular            Plan:         1. Flu and COVID swab now.  2. Azithromycin 250 mg 2 tablets on day 1, then 1 tab on days 2 through 5.  3. Flonase nasal spray 2 sprays per nostril daily.  4. Over-the-counter Claritin, Allegra, or Zyrtec as needed for congestion.  5. Tylenol as needed for fever or pain.  6. Tessalon Perles 200 mg t.i.d. p.r.n. cough.  7. Saltwater Listerine gargle as needed for sore throat.  8. Return to clinic as needed if symptoms persist or worsen.

## 2022-10-18 NOTE — TELEPHONE ENCOUNTER
Tried contacting patient no answer , left message on voice mail regarding his [employer] insurance is no longer active ,we will have to cancel the euflexxa gel injection

## 2022-10-19 ENCOUNTER — TELEPHONE (OUTPATIENT)
Dept: INTERNAL MEDICINE | Facility: CLINIC | Age: 62
End: 2022-10-19
Payer: COMMERCIAL

## 2022-10-19 ENCOUNTER — PATIENT MESSAGE (OUTPATIENT)
Dept: INTERNAL MEDICINE | Facility: CLINIC | Age: 62
End: 2022-10-19
Payer: COMMERCIAL

## 2022-10-19 DIAGNOSIS — U07.1 COVID-19 VIRUS DETECTED: ICD-10-CM

## 2022-10-19 DIAGNOSIS — U07.1 COVID: Primary | ICD-10-CM

## 2022-10-19 DIAGNOSIS — U07.1 COVID: ICD-10-CM

## 2022-10-19 LAB
SARS-COV-2 RNA RESP QL NAA+PROBE: DETECTED
SARS-COV-2- CYCLE NUMBER: 13

## 2022-10-19 NOTE — TELEPHONE ENCOUNTER
Patient said that [facility] does not have the Lagevrio, and he would like for you to transfer the order to..     [facility] [place] [place] [address]   [address] [place] [address]   Phone: [phone] Fax: [phone]

## 2022-10-19 NOTE — TELEPHONE ENCOUNTER
----- Message from Ingrid Castorena sent at 10/19/2022  1:50 PM CDT -----  Contact: Pt Mobile [phone]  Patient is calling in regards to him saying that you have ordered Lagevrio for him and the order was sent to ..    [facility] ([place]) - [place] - [address]  [address] [place] [address]  Phone: [phone] Fax: [phone]    Patient said that [facility] does not have the Lagevrio, and he would like for you to transfer the order to..    [facility] - [place] - [address]  [address] [place] [address]  Phone: [phone] Fax: [phone]

## 2022-10-19 NOTE — TELEPHONE ENCOUNTER
Informed pt that his rx was sent to [facility]   Pt expressed understanding

## 2022-10-26 ENCOUNTER — NURSE TRIAGE (OUTPATIENT)
Dept: ADMINISTRATIVE | Facility: CLINIC | Age: 62
End: 2022-10-26
Payer: COMMERCIAL

## 2022-10-26 ENCOUNTER — TELEPHONE (OUTPATIENT)
Dept: INTERNAL MEDICINE | Facility: CLINIC | Age: 62
End: 2022-10-26
Payer: COMMERCIAL

## 2022-10-26 RX ORDER — PREDNISONE 20 MG/1
TABLET ORAL
Qty: 6 TABLET | Refills: 0 | Status: SHIPPED | OUTPATIENT
Start: 2022-10-26 | End: 2022-12-15

## 2022-10-26 RX ORDER — PROMETHAZINE HYDROCHLORIDE AND CODEINE PHOSPHATE 6.25; 1 MG/5ML; MG/5ML
5 SOLUTION ORAL EVERY 4 HOURS PRN
Qty: 120 ML | Refills: 0 | Status: SHIPPED | OUTPATIENT
Start: 2022-10-26 | End: 2022-11-05

## 2022-10-26 NOTE — TELEPHONE ENCOUNTER
Pt dx w/ Covid on 10/18. Pt was triaged and found to have some sinus congestion, runny nose, cough, eyes watering.  pt wants to know if he needs to come in to be treated for symptoms.

## 2022-10-26 NOTE — TELEPHONE ENCOUNTER
----- Message from Yadira Tillman sent at 10/26/2022  8:31 AM CDT -----  Contact: [phone] Patient  [email or web address]     Patient is calling for Medical Advice regarding: Pt james w/ Covid on 10/18. Pt was triaged and found to have some sinus congestion, runny nose, cough, eyes watering.    How long has patient had these symptoms:     Pharmacy name and phone#:  [facility] ([place]) - [place] - [address] [place] [address]  [address] [place] [address]  [place] [address]  Phone: [phone] Fax: [phone]        Would like response via [employer]:  Call Back please and let the pt to know if he needs to come in .    Comments: Pt wants to know if he needs to come in to office to be seen? Please call and advise    Marsiol w/ on call through [facility] triaged there pt this morning her number is

## 2022-10-26 NOTE — TELEPHONE ENCOUNTER
Pt states he couldn't sleep last night , had to sleep in a chair to avoid coughing / sob. Has a nasal drip states it is clear fluids coming out. Pt is requesting a steroid states meds are not helping his symptoms

## 2022-10-26 NOTE — TELEPHONE ENCOUNTER
Pt responded to [facility] text message. Pt diagnosed with COVID 10/18. States still has cough, runny nose, and eye, and sinus congestion. Reports has two days left of paxlovid. Advised to be seen in office now. Office called to see if appropriate for pt to be seen there. Unable to reach office. Pt also advised ED/UC. OAC, and RR also offered Pt verbalized understanding.  Reason for Disposition   SEVERE sinus pain    Additional Information   Negative: Sounds like a life-threatening emergency to the triager   Negative: Difficulty breathing, and not from stuffy nose (e.g., not relieved by cleaning out the nose)   Negative: SEVERE headache and has fever   Negative: Patient sounds very sick or weak to the triager    Protocols used: Sinus Pain or Congestion-A-[place]

## 2022-10-27 ENCOUNTER — TELEPHONE (OUTPATIENT)
Dept: ORTHOPEDICS | Facility: CLINIC | Age: 62
End: 2022-10-27
Payer: COMMERCIAL

## 2022-10-27 NOTE — TELEPHONE ENCOUNTER
----- Message from Jaylyn Monroe sent at 10/27/2022  1:23 PM CDT -----  Contact: CAPRI GHOSH [[id number]] [phone]  GENERAL CALL BACK    Patient is trying to get back in touch with Joslyn if she is in the office. He would like to discuss getting set up for his next joint injection, but he indicates it has to be approved by his insurance first.     Contact Preference: Phone call [phone]

## 2022-10-27 NOTE — TELEPHONE ENCOUNTER
Spoke with patient regarding gel injection is not approve because his [employer] insurance is no longer active per the referral center

## 2022-11-01 ENCOUNTER — TELEPHONE (OUTPATIENT)
Dept: ORTHOPEDICS | Facility: CLINIC | Age: 62
End: 2022-11-01
Payer: COMMERCIAL

## 2022-11-01 NOTE — TELEPHONE ENCOUNTER
Spoke with patient regarding his insurance [facility] , patient stated  his insurance in [facility]

## 2022-11-01 NOTE — TELEPHONE ENCOUNTER
----- Message from Susan Robin RN sent at 10/31/2022  9:23 PM CDT -----  Regarding: FW: insurance update  Contact: [phone]    ----- Message -----  From: Jayne VIZCAINO May  Sent: 10/31/2022  12:19 PM CDT  To: Cindi Burgess Staff  Subject: insurance update                                 Pt updated insurance info today. Would like to inform Ms Jorgensen so approval for insurance can be put in. Pls call to schedule injection when approved

## 2022-11-07 ENCOUNTER — TELEPHONE (OUTPATIENT)
Dept: PODIATRY | Facility: CLINIC | Age: 62
End: 2022-11-07

## 2022-11-07 ENCOUNTER — OFFICE VISIT (OUTPATIENT)
Dept: PODIATRY | Facility: CLINIC | Age: 62
End: 2022-11-07
Payer: COMMERCIAL

## 2022-11-07 VITALS
BODY MASS INDEX: 46.26 KG/M2 | HEIGHT: 66 IN | SYSTOLIC BLOOD PRESSURE: 125 MMHG | HEART RATE: 60 BPM | DIASTOLIC BLOOD PRESSURE: 69 MMHG

## 2022-11-07 DIAGNOSIS — S86.301S PERONEAL TENDON INJURY, RIGHT, SEQUELA: Primary | ICD-10-CM

## 2022-11-07 DIAGNOSIS — Z01.818 PREOP TESTING: ICD-10-CM

## 2022-11-07 DIAGNOSIS — M79.671 CHRONIC FOOT PAIN, RIGHT: ICD-10-CM

## 2022-11-07 DIAGNOSIS — G89.29 CHRONIC FOOT PAIN, RIGHT: ICD-10-CM

## 2022-11-07 PROCEDURE — 99999 PR PBB SHADOW E&M-EST. PATIENT-LVL V: ICD-10-PCS | Mod: PBBFAC,,, | Performed by: PODIATRIST

## 2022-11-07 PROCEDURE — 99215 OFFICE O/P EST HI 40 MIN: CPT | Mod: 57,S$PBB,, | Performed by: PODIATRIST

## 2022-11-07 PROCEDURE — 99999 PR PBB SHADOW E&M-EST. PATIENT-LVL V: CPT | Mod: PBBFAC,,, | Performed by: PODIATRIST

## 2022-11-07 PROCEDURE — 99215 OFFICE O/P EST HI 40 MIN: CPT | Mod: PBBFAC,PN | Performed by: PODIATRIST

## 2022-11-07 PROCEDURE — 99215 PR OFFICE/OUTPT VISIT, EST, LEVL V, 40-54 MIN: ICD-10-PCS | Mod: 57,S$PBB,, | Performed by: PODIATRIST

## 2022-11-07 RX ORDER — CEFAZOLIN SODIUM 2 G/50ML
2 SOLUTION INTRAVENOUS
Status: CANCELLED | OUTPATIENT
Start: 2022-11-07

## 2022-11-07 RX ORDER — SODIUM CHLORIDE 0.9 % (FLUSH) 0.9 %
10 SYRINGE (ML) INJECTION
Status: DISCONTINUED | OUTPATIENT
Start: 2022-11-07 | End: 2022-12-27 | Stop reason: ALTCHOICE

## 2022-11-07 NOTE — PROGRESS NOTES
"Subjective:      Patient ID: Shiraz Jha is a 62 y.o. male.    Chief Complaint: Surgical Consult (Right foot ) and Diabetes Mellitus (Hang Romano MD     10/18/2022)    From Dr. Aquino for chronic right foot pain.  Recent MRI revealed hypertrophic peroneal tubercle and tendinosis involving the peroneus longus tendon.  Patient relates that the pain is directly to the lateral right hindfoot.  Reports that he may have rolled over his foot a year ago but does not remember a specific event.  No significant pain at rest.  Pain is aggravated by increased periods of standing or walking.  He has tried changing shoes.  History of peripheral venous insufficiency.  Wears compression stockings.  History of chronic anticoagulation taking Xarelto for AFib.    Vitals:    11/07/22 1403   BP: 125/69   Pulse: 60   Height: 5' 6" (1.676 m)   PainSc: 0-No pain   PainLoc: Foot      Past Medical History:   Diagnosis Date    Allergy     Anemia     Asthma     Atrial fibrillation     Cataract     Chronic rhinitis 9/26/2013    DDD (degenerative disc disease) 4/3/2015    Depression     Diabetes mellitus     Fibromyalgia     Gastroesophageal reflux disease without esophagitis 7/14/2017    Hypertension     Sinusitis, acute, maxillary 12/20/2012    Thyroid disease        Past Surgical History:   Procedure Laterality Date    ANTEGRADE SINGLE BALLOON ENTEROSCOPY N/A 5/26/2022    Procedure: ENTEROSCOPY, SINGLE BALLOON, ANTEGRADE;  Surgeon: Lyle Edmond MD;  Location: [facility] ([address] [place] [address]);  Service: Endoscopy;  Laterality: N/A;  Will use single-balloon scope for both the upper endoscopy and the colonoscopy - recent incomplete colonoscopy due to looping.    Pt is fully vaccinated-DS  5/20/22-Approval to hold Xarelto rec'd from Dr. Webb-pending approval (see telephoned en    CERVICAL SPINE SURGERY      COLONOSCOPY N/A 5/10/2022    Procedure: COLONOSCOPY;  Surgeon: Conrad Diaz MD;  Location: [facility] ([address] [place] [address]);  Service: Endoscopy;  " Laterality: N/A;    COLONOSCOPY N/A 5/26/2022    Procedure: COLONOSCOPY;  Surgeon: Lyle Edmond MD;  Location: [facility] ENDO (2ND FLR);  Service: Endoscopy;  Laterality: N/A;    EPIDURAL STEROID INJECTION INTO LUMBAR SPINE N/A 5/29/2018    Procedure: INJECTION-STEROID-EPIDURAL-LUMBAR- L4-5;  Surgeon: Roman Lyman MD;  Location: [facility] PAIN MGT;  Service: Pain Management;  Laterality: N/A;  Patient is diabetic and Xarleto     EPIDURAL STEROID INJECTION INTO LUMBAR SPINE N/A 7/3/2018    Procedure: INJECTION, STEROID, SPINE, LUMBAR, EPIDURAL- L4-5;  Surgeon: Roman Lyman MD;  Location: [facility] PAIN MGT;  Service: Pain Management;  Laterality: N/A;  Patient takes Xarelto and ASA     ESOPHAGOGASTRODUODENOSCOPY N/A 5/9/2022    Procedure: EGD (ESOPHAGOGASTRODUODENOSCOPY);  Surgeon: Conrad Diaz MD;  Location: [facility] ENDO (2ND FLR);  Service: Endoscopy;  Laterality: N/A;    EXCISION OF LESION OF LIP N/A 7/7/2020    Procedure: EXCISION, LESION, LIP;  Surgeon: Caden Navarro MD;  Location: [facility] OR 2ND FLR;  Service: ENT;  Laterality: N/A;    GASTRIC BYPASS      SINUS SURGERY  2000    Dr. Watt at [facility]    TONSILLECTOMY         Family History   Problem Relation Age of Onset    Heart disease Father     Cancer Mother         lung    Hypertension Sister     Heart disease Brother     Cirrhosis Brother     Diabetes Brother        Social History     Socioeconomic History    Marital status: Single   Tobacco Use    Smoking status: Never    Smokeless tobacco: Never   Substance and Sexual Activity    Alcohol use: No     Comment: rare    Drug use: No    Sexual activity: Yes     Partners: Female   Social History Narrative    [place], lives alone w/2 dogs.    Dogs are his kids.    Works PT --  at [employer], on ssdi from neck and back/depression.     Social Determinants of Health     Financial Resource Strain: High Risk    Difficulty of Paying Living Expenses: Very hard   Food Insecurity: Food Insecurity Present    Worried  About Running Out of Food in the Last Year: Often true    Ran Out of Food in the Last Year: Sometimes true   Transportation Needs: No Transportation Needs    Lack of Transportation (Medical): No    Lack of Transportation (Non-Medical): No   Physical Activity: Insufficiently Active    Days of Exercise per Week: 2 days    Minutes of Exercise per Session: 50 min   Stress: No Stress Concern Present    Feeling of Stress : Only a little   Social Connections: Unknown    Frequency of Communication with Friends and Family: More than three times a week    Frequency of Social Gatherings with Friends and Family: More than three times a week    Active Member of Clubs or Organizations: Yes    Attends Club or Organization Meetings: More than 4 times per year    Marital Status: Never    Housing Stability: High Risk    Unable to Pay for Housing in the Last Year: Yes    Number of Places Lived in the Last Year: 1    Unstable Housing in the Last Year: No       Current Outpatient Medications   Medication Sig Dispense Refill    ARIPiprazole (ABILIFY) 10 MG Tab Take 10 mg by mouth once daily.      azithromycin (ZITHROMAX Z-SARWAT) 250 MG tablet Take 2 tablets on day 1, then 1 tablet on days 2-5. 6 tablet 0    bumetanide (BUMEX) 2 MG tablet Take 1 tablet (2 mg total) by mouth 2 (two) times daily. 180 tablet 3    buPROPion (WELLBUTRIN XL) 300 MG 24 hr tablet Take 300 mg by mouth once daily. Pt states he only takes 300mg      cyanocobalamin 500 MCG tablet Take 500 mcg by mouth once daily.      dextroamphetamine-amphetamine (ADDERALL) 20 mg tablet Take 1 tablet by mouth every morning, take 1 tablet by mouth 3-4 hours later, and take 1/2 tablet every afternoon      fluticasone propionate (FLONASE) 50 mcg/actuation nasal spray 2 sprays (100 mcg total) by Each Nostril route once daily. 16 g 11    gabapentin (NEURONTIN) 300 MG capsule Take 1 capsule (300 mg total) by mouth 3 (three) times daily. 270 capsule 3    HYDROcodone-acetaminophen  (NORCO) 5-325 mg per tablet Take 2 tablets by mouth 2 (two) times daily.      IMPOYZ 0.025 % Crea Apply topically as needed.       levocetirizine (XYZAL) 5 MG tablet TAKE 1 TABLET (5 MG TOTAL) BY MOUTH EVERY EVENING. (Patient taking differently: Take 5 mg by mouth daily as needed for Allergies.) 30 tablet 11    LUZU 1 % Crea Apply topically as needed.       methylPREDNISolone (MEDROL DOSEPACK) 4 mg tablet use as directed 1 tablet 0    metoprolol tartrate (LOPRESSOR) 25 MG tablet TAKE ONE BY MOUTH TWICE DAILY 180 tablet 3    multivitamin (THERAGRAN) per tablet Take 1 tablet by mouth once daily.       NAFTIN 2 % Gel daily as needed.       nitroGLYCERIN (NITROSTAT) 0.4 MG SL tablet Please dispense 25 pills in 4 bottles. 100 tablet 0    omeprazole (PRILOSEC) 10 MG capsule Take 2 capsules (20 mg total) by mouth once daily. 30 capsule 6    predniSONE (DELTASONE) 20 MG tablet 2 po qd x 3d 6 tablet 0    sotaloL (BETAPACE) 80 MG tablet TAKE ONE BY MOUTH TWICE DAILY 180 tablet 3    spironolactone (ALDACTONE) 25 MG tablet Take 1 tablet (25 mg total) by mouth once daily. 90 tablet 3    tamsulosin (FLOMAX) 0.4 mg Cap Take 1 capsule (0.4 mg total) by mouth 2 (two) times daily. 60 capsule 11    thiamine 250 MG tablet Take 250 mg by mouth once daily.      vitamin D (VITAMIN D3) 1000 units Tab Take 1,000 Units by mouth once daily.      vortioxetine (TRINTELLIX) 20 mg Tab Take 20 mg by mouth once daily.       XARELTO 20 mg Tab Take 1 tablet by mouth once daily 30 tablet 0    fluticasone propionate (FLONASE) 50 mcg/actuation nasal spray 1 spray (50 mcg total) by Each Nostril route once daily. (Patient not taking: Reported on 10/18/2022) 16 g 0     Current Facility-Administered Medications   Medication Dose Route Frequency Provider Last Rate Last Admin    sodium chloride 0.9% flush 10 mL  10 mL Intravenous PRN Paul Beaulieu, SHU           Review of patient's allergies indicates:   Allergen Reactions    Iodinated contrast media  Other (See Comments)     Raises BP           Review of Systems   Constitutional: Negative for chills, fever and malaise/fatigue.   HENT:  Negative for congestion.    Cardiovascular:  Positive for leg swelling. Negative for chest pain and claudication.   Respiratory:  Negative for cough and shortness of breath.    Skin:  Negative for color change and poor wound healing.   Musculoskeletal:  Negative for back pain, joint pain, muscle cramps and muscle weakness.   Gastrointestinal:  Negative for nausea and vomiting.   Neurological:  Negative for numbness, paresthesias and weakness.   Psychiatric/Behavioral:  Negative for altered mental status.          Objective:      Physical Exam  Constitutional:       General: He is not in acute distress.     Appearance: He is not ill-appearing.   Cardiovascular:      Pulses:           Dorsalis pedis pulses are 2+ on the right side and 2+ on the left side.        Posterior tibial pulses are 2+ on the right side and 2+ on the left side.      Comments: Mild to moderate edema to lower extremity bilateral with multiple varicosities.  Skin temp is warm to foot bilateral.  No rubor on dependency bilateral foot.  No hair growth bilateral lower extremity.  Musculoskeletal:      Comments: Palpable bony prominence at the lateral right heel overlying the course of peroneal tendons with localized pain.  There is mild pain with active resisted eversion the neutral position however moderate pain in the plantar flex position.  No subluxation of the peroneal tendons with active circumduction of the right ankle.  Mild pain with circumduction of the right ankle overlying the peroneal tubercle region.  No pain with midtarsal joint range of motion right foot.    No pain on palpation along the anterior joint line right ankle or with range of motion to the right ankle.    Semi rigid supinated foot structure bilateral foot.   Skin:     General: Skin is warm.      Capillary Refill: Capillary refill takes  less than 2 seconds.      Findings: No ecchymosis or erythema.      Nails: There is no clubbing.   Neurological:      Mental Status: He is alert and oriented to person, place, and time.      Sensory: Sensation is intact.      Motor: Motor function is intact.             Assessment:       Encounter Diagnoses   Name Primary?    Peroneal tendon injury, right, sequela Yes    Chronic foot pain, right     Preop testing          Plan:       Shiraz was seen today for surgical consult and diabetes mellitus.    Diagnoses and all orders for this visit:    Peroneal tendon injury, right, sequela  -     Ambulatory referral/consult to Cardiology; Future  -     Ambulatory referral/consult to Internal Medicine; Future  -     WALKER FOR HOME USE  -     Ambulatory referral/consult to Physical/Occupational Therapy; Future  -     Case Request Operating Room: REPAIR, TENDON, LOWER EXTREMITY  -     Full code; Standing  -     Insert peripheral IV; Standing  -     Verify surgical site; Standing  -     Verify informed consent; Standing  -     Place in Outpatient; Standing  -     Full code  -     Insert peripheral IV    Chronic foot pain, right  -     Ambulatory referral/consult to Cardiology; Future  -     Ambulatory referral/consult to Internal Medicine; Future  -     WALKER FOR HOME USE  -     Ambulatory referral/consult to Physical/Occupational Therapy; Future  -     Case Request Operating Room: REPAIR, TENDON, LOWER EXTREMITY  -     Full code; Standing  -     Insert peripheral IV; Standing  -     Verify surgical site; Standing  -     Verify informed consent; Standing  -     Place in Outpatient; Standing  -     Full code  -     Insert peripheral IV    Preop testing  -     Ambulatory referral/consult to Cardiology; Future  -     Ambulatory referral/consult to Internal Medicine; Future  -     WALKER FOR HOME USE  -     Ambulatory referral/consult to Physical/Occupational Therapy; Future    Other orders  -     sodium chloride 0.9% flush 10  mL  -     cefazolin (ANCEF) 2 gram in dextrose 5% 50 mL IVPB (premix)    I counseled the patient on his conditions, their implications and medical management.    Reviewed MRI findings in detail the patient.  EXAMINATION:  MRI of the RIGHT ankle     CLINICAL HISTORY:  Pain     TECHNIQUE:  Multiplanar multisequence MRI examination of the RIGHT ankle.  Additional postcontrast images after 10 cc Gadavist     COMPARISON:  None     FINDINGS:  **MEDIAL COMPARTMENT     Posterior tibial tendon: Intact.No tendinosis.No tenosynovitis.     Flexor digitorum longus tendon: Normal.     Superficial Deltoid: Intact.     Deep Deltoid: Intact.     Tibiospring/ SM Calcaneonavicular (Spring)/Inferoplantar Complex: Intact     **LATERAL COMPARTMENT     Peroneus longus: Intact.  Minimal tenosynovitis distally.     Peroneus brevis: Post malleolar split tear peroneus brevis..     Superior peroneal retinaculum: Intact     Peroneal tubercle of calcaneus demonstrates underlying edema consistent with peroneal tendinopathy.     Ligaments:     Interosseous (syndesmosis): Intact.     Anterior inferior tibiofibular (syndesmosis): Intact.     Posterior inferior tibiofibular (syndesmosis): Intact.     Anterior talofibular ligament: Intact.     Calcaneofibular ligament: Intact.     Posterior talofibular ligament: Intact.     **POSTERIOR COMPARTMENT     Flexor hallucis longus: Normal.     Posterior talus: Normal.     Achilles tendon: Normal.     Plantar fascia: Normal.Lateral cord of plantar fascia demonstrates normal appearance and insertion upon the base of the 5th MT.     **ANTERIOR COMPARTMENT     Tendons:     Anterior tibial tendon: Normal.  Insertion intact.     Extensor hallucis longus: Normal.     Extensor digitorum longus: Normal.     Ligaments:     Dorsal talonavicular ligament: Intact.     **ARTICULATIONS:     Tibiotalar joint: Osteochondral lesion medial talar dome 5 mm in extent..  No evidence for anterior osseous spurs.     Subtalar joint:  Normal.     Talonavicular joint: Normal.     Calcaneocuboid joint: Normal     Talus and calcaneus: Intact lateral process of talus and anterior process of calcaneus without fracture.     **GENERAL FINDINGS     Osseous Structures: No evidence of fracture     Muscles: Normal.     Nerves and tarsal tunnel: Normal.     Sinus tarsi: Sinus tarsi syndrome potentially given replacement of normal sinus tarsi fat for which correlation advised..     Diffuse subcutaneous edema.     Impression:     5 mm osteochondral lesion medial talar dome.     Split tear peroneus brevis with peroneal tubercle edema reflective of associated peroneal tendinopathy, predominantly peroneus longus at this level.  Associated tenosynovitis.     Diffuse subcutaneous edema.        Electronically signed by: Hang Lau MD  Date:                                            09/18/2022  Time:                                           11:01    We discussed continued conservative care such as bracing to right lower extremity, physical therapy, activity restrictions modifications versus surgical intervention consisting excision of the hypertrophic peroneal tubercle with repair of the peroneal tendons.  Risks, benefits anticipate postop course discussed in detail.  No guarantees were given or implied.  Patient elected proceed surgical intervention outlined above.  We did discuss that he is a high risk secondary to chronic venous insufficiency for delayed healing of the incision as well as increased risk for infection.  Patient will have to hold Xarelto 5-7 days preoperatively.  He will be mobilized in a cast for up to 3 weeks to allow time for the incision heal properly.  At that time he will begin weight-bearing and a guided and protected manner.    This procedure has been fully reviewed with the patient and written informed consent has been obtained.    Referred to PCP and Cardiology for medical optimization and risk stratification.    My staff will  notify the patient of the surgery date and arrange for RTC 1 week postop.    Referral placed to physical therapy for preoperative gait training nonweightbearing to right lower extremity and to perform any strengthening or conditioning that the patient may require.    A portion of this note was generated by voice recognition software and may contain spelling and grammar errors.        .

## 2022-11-08 ENCOUNTER — LAB VISIT (OUTPATIENT)
Dept: LAB | Facility: HOSPITAL | Age: 62
End: 2022-11-08
Attending: INTERNAL MEDICINE
Payer: COMMERCIAL

## 2022-11-08 ENCOUNTER — TELEPHONE (OUTPATIENT)
Dept: INTERNAL MEDICINE | Facility: CLINIC | Age: 62
End: 2022-11-08
Payer: COMMERCIAL

## 2022-11-08 DIAGNOSIS — I87.2 VENOUS INSUFFICIENCY OF LEFT LOWER EXTREMITY: ICD-10-CM

## 2022-11-08 LAB
ANION GAP SERPL CALC-SCNC: 7 MMOL/L (ref 8–16)
BUN SERPL-MCNC: 11 MG/DL (ref 6–20)
CALCIUM SERPL-MCNC: 8.8 MG/DL (ref 8.7–10.5)
CHLORIDE SERPL-SCNC: 103 MMOL/L (ref 95–110)
CO2 SERPL-SCNC: 28 MMOL/L (ref 23–29)
CREAT SERPL-MCNC: 1 MG/DL (ref 0.5–1.4)
EST. GFR  (NO RACE VARIABLE): >60 ML/MIN/1.73 M^2
GLUCOSE SERPL-MCNC: 130 MG/DL (ref 70–110)
POTASSIUM SERPL-SCNC: 3.3 MMOL/L (ref 3.5–5.1)
SODIUM SERPL-SCNC: 138 MMOL/L (ref 136–145)

## 2022-11-08 PROCEDURE — 36415 COLL VENOUS BLD VENIPUNCTURE: CPT | Mod: PO | Performed by: INTERNAL MEDICINE

## 2022-11-08 PROCEDURE — 80048 BASIC METABOLIC PNL TOTAL CA: CPT | Performed by: INTERNAL MEDICINE

## 2022-11-08 NOTE — TELEPHONE ENCOUNTER
Spoke with Mr Jha to inform him that Dr Beaulieu has scheduled his foot surgery for 12/21/22 and that he should proceed with scheduling his appts with his PCP and Cardiology. Mr Jha verbalized understanding and is aware of his 1 week follow up appt with Dr. Beaulieu on 12/29/22. No other needs voiced at this time. Encouraged him to call if further assistance is needed.

## 2022-11-08 NOTE — TELEPHONE ENCOUNTER
----- Message from Paul Beaulieu DPM sent at 11/7/2022  3:05 PM CST -----  Please notify of the date that you schedule for him and verify that there are no outstanding cases in the depot to be scheduled. If needed schedule on 01/04/23.    Thanks,  Janae

## 2022-11-09 ENCOUNTER — OFFICE VISIT (OUTPATIENT)
Dept: ORTHOPEDICS | Facility: CLINIC | Age: 62
End: 2022-11-09
Payer: COMMERCIAL

## 2022-11-09 VITALS — DIASTOLIC BLOOD PRESSURE: 74 MMHG | HEART RATE: 62 BPM | SYSTOLIC BLOOD PRESSURE: 132 MMHG

## 2022-11-09 DIAGNOSIS — M17.0 PRIMARY OSTEOARTHRITIS OF BOTH KNEES: Primary | ICD-10-CM

## 2022-11-09 PROCEDURE — 20610 PR DRAIN/INJECT LARGE JOINT/BURSA: ICD-10-PCS | Mod: 50,S$GLB,, | Performed by: PHYSICIAN ASSISTANT

## 2022-11-09 PROCEDURE — 99499 NO LOS: ICD-10-PCS | Mod: S$GLB,,, | Performed by: PHYSICIAN ASSISTANT

## 2022-11-09 PROCEDURE — 20610 DRAIN/INJ JOINT/BURSA W/O US: CPT | Mod: 50,PBBFAC | Performed by: PHYSICIAN ASSISTANT

## 2022-11-09 PROCEDURE — 20610 DRAIN/INJ JOINT/BURSA W/O US: CPT | Mod: 50,S$GLB,, | Performed by: PHYSICIAN ASSISTANT

## 2022-11-09 PROCEDURE — 99999 PR PBB SHADOW E&M-EST. PATIENT-LVL II: ICD-10-PCS | Mod: PBBFAC,,, | Performed by: PHYSICIAN ASSISTANT

## 2022-11-09 PROCEDURE — 99212 OFFICE O/P EST SF 10 MIN: CPT | Mod: PBBFAC,25 | Performed by: PHYSICIAN ASSISTANT

## 2022-11-09 PROCEDURE — 99999 PR PBB SHADOW E&M-EST. PATIENT-LVL II: CPT | Mod: PBBFAC,,, | Performed by: PHYSICIAN ASSISTANT

## 2022-11-09 PROCEDURE — 20610 DRAIN/INJ JOINT/BURSA W/O US: CPT | Mod: PBBFAC,50 | Performed by: PHYSICIAN ASSISTANT

## 2022-11-09 PROCEDURE — 99499 UNLISTED E&M SERVICE: CPT | Mod: S$GLB,,, | Performed by: PHYSICIAN ASSISTANT

## 2022-11-09 RX ADMIN — Medication 40 MG: at 08:11

## 2022-11-09 NOTE — PROGRESS NOTES
Shiraz Jha is a 62 y.o. year old his here today for his 1st Euflexxa injection for degenerative changes of his bilateral knee . he was last seen and treated in the clinic on 10/13/2022. There has been no significant change in his medical status since his last visit. No Fever, chills, malaise, or unexplained weight change.      Allergies, Medications, past medical and surgical history were reviewed .    Examination of the knee demonstrates  No evidence of edema, erythema , echymosis strength and range of motion are unchanged from previous visit.    The injection site was identified and the skin was prepared with a betadine solution. The  bilateral knee  joint was injected with 2 ml of Euflexxa solution under sterile technique. Shiraz Jha tolerated the procedure well, he was advised to rest the knee today, ice and elevation. I may take 3 -6 weeks following the last injection to get the full benefit of the medication.  I will see him back in 1 weekl. Sooner if he has any problems or concerns.           .     ICD-10-CM ICD-9-CM   1. Primary osteoarthritis of both knees  M17.0 715.16

## 2022-11-10 ENCOUNTER — HOSPITAL ENCOUNTER (OUTPATIENT)
Dept: CARDIOLOGY | Facility: HOSPITAL | Age: 62
Discharge: HOME | End: 2022-11-10
Attending: INTERNAL MEDICINE
Payer: COMMERCIAL

## 2022-11-10 DIAGNOSIS — I87.2 VENOUS INSUFFICIENCY OF LEFT LOWER EXTREMITY: ICD-10-CM

## 2022-11-10 LAB
LEFT GREAT SAPHENOUS DISTAL THIGH DIA: 0.35 CM
LEFT GREAT SAPHENOUS JUNCTION DIA: 0.54 CM
LEFT GREAT SAPHENOUS KNEE DIA: 0.29 CM
LEFT GREAT SAPHENOUS KNEE REFLUX: 1764 MS
LEFT GREAT SAPHENOUS MIDDLE THIGH DIA: 0.42 CM
LEFT GREAT SAPHENOUS PROXIMAL CALF DIA: 0.18 CM
LEFT SMALL SAPHENOUS KNEE DIA: 0.32 CM
LEFT SMALL SAPHENOUS SPJ DIA: 0.22 CM
RIGHT GREAT SAPHENOUS DISTAL THIGH DIA: 0.37 CM
RIGHT GREAT SAPHENOUS JUNCTION DIA: 0.83 CM
RIGHT GREAT SAPHENOUS KNEE DIA: 0.36 CM
RIGHT GREAT SAPHENOUS MIDDLE THIGH DIA: 0.58 CM
RIGHT SMALL SAPHENOUS KNEE DIA: 0.31 CM
RIGHT SMALL SAPHENOUS SPJ DIA: 0.25 CM

## 2022-11-10 PROCEDURE — 93970 CV US LOWER VENOUS INSUFFICIENCY BILATERAL (CUPID ONLY): ICD-10-PCS | Mod: 26,,, | Performed by: INTERNAL MEDICINE

## 2022-11-10 PROCEDURE — 93970 EXTREMITY STUDY: CPT | Mod: TC

## 2022-11-10 PROCEDURE — 93970 EXTREMITY STUDY: CPT | Mod: 26,,, | Performed by: INTERNAL MEDICINE

## 2022-11-11 ENCOUNTER — PATIENT OUTREACH (OUTPATIENT)
Dept: ADMINISTRATIVE | Facility: OTHER | Age: 62
End: 2022-11-11
Payer: COMMERCIAL

## 2022-11-11 ENCOUNTER — OFFICE VISIT (OUTPATIENT)
Dept: FAMILY MEDICINE | Facility: CLINIC | Age: 62
End: 2022-11-11
Payer: COMMERCIAL

## 2022-11-11 VITALS
OXYGEN SATURATION: 98 % | HEART RATE: 63 BPM | TEMPERATURE: 99 F | SYSTOLIC BLOOD PRESSURE: 132 MMHG | DIASTOLIC BLOOD PRESSURE: 70 MMHG | BODY MASS INDEX: 46.7 KG/M2 | WEIGHT: 290.56 LBS | HEIGHT: 66 IN

## 2022-11-11 DIAGNOSIS — J01.90 ACUTE BACTERIAL SINUSITIS: Primary | ICD-10-CM

## 2022-11-11 DIAGNOSIS — B96.89 ACUTE BACTERIAL SINUSITIS: Primary | ICD-10-CM

## 2022-11-11 PROCEDURE — 99999 PR PBB SHADOW E&M-EST. PATIENT-LVL III: ICD-10-PCS | Mod: PBBFAC,,, | Performed by: FAMILY MEDICINE

## 2022-11-11 PROCEDURE — 99214 OFFICE O/P EST MOD 30 MIN: CPT | Mod: S$GLB,,, | Performed by: FAMILY MEDICINE

## 2022-11-11 PROCEDURE — 99214 PR OFFICE/OUTPT VISIT, EST, LEVL IV, 30-39 MIN: ICD-10-PCS | Mod: S$GLB,,, | Performed by: FAMILY MEDICINE

## 2022-11-11 PROCEDURE — 99213 OFFICE O/P EST LOW 20 MIN: CPT | Mod: PBBFAC,PO | Performed by: FAMILY MEDICINE

## 2022-11-11 PROCEDURE — 99999 PR PBB SHADOW E&M-EST. PATIENT-LVL III: CPT | Mod: PBBFAC,,, | Performed by: FAMILY MEDICINE

## 2022-11-11 RX ORDER — AMOXICILLIN AND CLAVULANATE POTASSIUM 875; 125 MG/1; MG/1
1 TABLET, FILM COATED ORAL EVERY 12 HOURS
Qty: 14 TABLET | Refills: 0 | Status: SHIPPED | OUTPATIENT
Start: 2022-11-11 | End: 2022-11-18

## 2022-11-11 NOTE — PATIENT INSTRUCTIONS
Discussed diagnosis and treatment of sinusitis  Augmentin x 7 days   Flonase BID  Nasal saline spray for congestion.  Aggressive fluids  Buckwheat honey for possible cough  Follow up if symptoms aren't resolving.  Follow up with PCP as needed      Acute Bacterial Rhinosinusitis (ABRS)    Acute bacterial rhinosinusitis (ABRS) is an infection of your nasal cavity and sinuses. Its caused by bacteria. Acute means that youve had symptoms for less than 12 weeks.  Understanding your sinuses  The nasal cavity is the large air-filled space behind your nose. The sinuses are a group of spaces formed by the bones of your face. They connect with your nasal cavity. ABRS causes the tissue lining these spaces to become inflamed. Mucus may not drain normally. This leads to facial pain and other symptoms.  What causes ABRS?  ABRS most often follows an upper respiratory infection caused by a virus. Bacteria then infect the lining of your nasal cavity and sinuses. But you can also get ABRS if you have:  Nasal allergies  Long-term nasal swelling and congestion not caused by allergies  Blockage in the nose  Symptoms of ABRS  The symptoms of ABRS may be different for each person, and can include:  Nasal congestion  Runny nose  Fluid draining from the nose down the throat (postnasal drip)  Headache  Cough  Pain in the sinuses  Thick, colored fluid from the nose (mucus)  Fever  Diagnosing ABRS  ABRS may be diagnosed if youve had an upper respiratory infection like a cold and cough for longer than 10 to 14 days. Your health care provider will ask about your symptoms and your medical history. The provider will check your vital signs, including your temperature. Youll have a physical exam. The health care provider will check your ears, nose, and throat. You likely wont need any tests. If ABRS comes back, you may have a culture or other tests.  Treatment for ABRS  Treatment may include:  Antibiotic medicine. This is for symptoms that last  for at least 10 to 14 days.  Nasal corticosteroid medicine. Drops or spray used in the nose can lessen swelling and congestion.  Over-the-counter pain medicine. This is to lessen sinus pain and pressure.  Nasal decongestant medicine. Spray or drops may help to lessen congestion. Do not use them for more than a few days.  Salt wash (saline irrigation). This can help to loosen mucus.  Possible complications of ABRS  ABRS may come back or become long-term (chronic).  In rare cases, ABRS may cause complications such as:   Inflamed tissue around the brain and spinal cord (meningitis)  Inflamed tissue around the eyes (orbital cellulitis)  Inflamed bones around the sinuses (osteitis)  These problems may need to be treated in a hospital with intravenous (IV) antibiotic medicine or surgery.  When to call the health care provider  Call your health care provider if you have any of the following:  Symptoms that dont get better, or get worse  Symptoms that dont get better after 3 to 5 days on antibiotics  Trouble seeing  Swelling around your eyes  Confusion or trouble staying awake   Date Last Reviewed: 3/3/2015  © 2351-1047 [employer]. [address] [place] [address]. All rights reserved. This information is not intended as a substitute for professional medical care. Always follow your healthcare professional's instructions.

## 2022-11-11 NOTE — PROGRESS NOTES
CHW - Initial Contact    This Community Health Worker updated the Social Determinant of Health questionnaire with patient via telephone today.    Pt identified barriers of most importance are: Food and Job Placement   Referrals to community agencies completed with patient/caregiver consent outside of [facility] include:  SNAP 11/14/22  Referrals were put through [facility] - yes: [facility], [employer]  Support and Services: n/a  Other information discussed the patient needs / wants help with: Pt stated that he has been unemployed for a little over a month and he receives unemployment compensation at $250.00/ week. 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 CHW placed appropriate referrals through  for job placement and pt voiced understanding to come in-person on 11/14 to complete a SNAP application.   Follow up required: Yes  Follow-up Outreach - Due: 11/14/2022

## 2022-11-11 NOTE — PROGRESS NOTES
Subjective:       Patient ID: Shiraz Jha is a 62 y.o. male.    Chief Complaint: Sinusitis    Shiraz is a 62 y.o. male who presents today for an  visit. He had covid 3 weeks ago. He is having sinus issues. Has tried a z amor. This hasn't helped.     Sinusitis  This is a new problem. The current episode started 1 to 4 weeks ago. The problem is unchanged. There has been no fever. Associated symptoms include congestion, headaches and sinus pressure. Pertinent negatives include no chills, coughing, ear pain, hoarse voice, neck pain, shortness of breath, sneezing or sore throat. Past treatments include antibiotics and spray decongestants. The treatment provided mild relief.   Review of Systems   Constitutional:  Negative for chills.   HENT:  Positive for congestion and sinus pressure. Negative for ear pain, hoarse voice, sneezing and sore throat.    Respiratory:  Negative for cough and shortness of breath.    Musculoskeletal:  Negative for neck pain.   Neurological:  Positive for headaches.               Objective:     Vitals:    11/11/22 0800   BP: 132/70   Pulse: 63   Temp: 98.6 °F (37 °C)        Physical Exam  Vitals and nursing note reviewed.   Constitutional:       Appearance: He is well-developed.   HENT:      Head: Normocephalic and atraumatic.      Nose: Congestion present.      Comments: TM: appear normal BL  Nasal congestion noted   pharyngeal erythema without exudate noted  Dry tongue noted    Cardiovascular:      Rate and Rhythm: Normal rate and regular rhythm.   Pulmonary:      Effort: Pulmonary effort is normal.      Breath sounds: Normal breath sounds.   Neurological:      Mental Status: He is alert and oriented to person, place, and time.   Psychiatric:         Behavior: Behavior normal.         Thought Content: Thought content normal.         Judgment: Judgment normal.       Assessment:       1. Acute bacterial sinusitis        Plan:       Discussed diagnosis and treatment of sinusitis  Augmentin x 7  days     Acute bacterial sinusitis  -     amoxicillin-clavulanate 875-125mg (AUGMENTIN) 875-125 mg per tablet; Take 1 tablet by mouth every 12 (twelve) hours. for 7 days  Dispense: 14 tablet; Refill: 0      Warning signs discussed, patient to call with any further issues or worsening of symptoms.

## 2022-11-17 ENCOUNTER — OFFICE VISIT (OUTPATIENT)
Dept: ORTHOPEDICS | Facility: CLINIC | Age: 62
End: 2022-11-17
Payer: COMMERCIAL

## 2022-11-17 VITALS — SYSTOLIC BLOOD PRESSURE: 141 MMHG | HEART RATE: 60 BPM | DIASTOLIC BLOOD PRESSURE: 78 MMHG

## 2022-11-17 DIAGNOSIS — M17.0 PRIMARY OSTEOARTHRITIS OF BOTH KNEES: Primary | ICD-10-CM

## 2022-11-17 PROCEDURE — 20610 DRAIN/INJ JOINT/BURSA W/O US: CPT | Mod: 50,PBBFAC | Performed by: PHYSICIAN ASSISTANT

## 2022-11-17 PROCEDURE — 99999 PR PBB SHADOW E&M-EST. PATIENT-LVL II: CPT | Mod: PBBFAC,,, | Performed by: PHYSICIAN ASSISTANT

## 2022-11-17 PROCEDURE — 20610 PR DRAIN/INJECT LARGE JOINT/BURSA: ICD-10-PCS | Mod: 50,S$GLB,, | Performed by: PHYSICIAN ASSISTANT

## 2022-11-17 PROCEDURE — 99212 OFFICE O/P EST SF 10 MIN: CPT | Mod: PBBFAC | Performed by: PHYSICIAN ASSISTANT

## 2022-11-17 PROCEDURE — 99499 NO LOS: ICD-10-PCS | Mod: S$GLB,,, | Performed by: PHYSICIAN ASSISTANT

## 2022-11-17 PROCEDURE — 99499 UNLISTED E&M SERVICE: CPT | Mod: S$GLB,,, | Performed by: PHYSICIAN ASSISTANT

## 2022-11-17 PROCEDURE — 20610 DRAIN/INJ JOINT/BURSA W/O US: CPT | Mod: 50,S$GLB,, | Performed by: PHYSICIAN ASSISTANT

## 2022-11-17 PROCEDURE — 99999 PR PBB SHADOW E&M-EST. PATIENT-LVL II: ICD-10-PCS | Mod: PBBFAC,,, | Performed by: PHYSICIAN ASSISTANT

## 2022-11-17 RX ADMIN — Medication 40 MG: at 08:11

## 2022-11-17 NOTE — PROGRESS NOTES
Shiraz Jha is a 62 y.o. year old his here today for his 2nd Euflexxa injection for degenerative changes of his bilateral knee . he was last seen and treated in the clinic on 11/9/2022. There has been no significant change in his medical status since his last visit. No Fever, chills, malaise, or unexplained weight change.      Allergies, Medications, past medical and surgical history were reviewed .    Examination of the knee demonstrates  No evidence of edema, erythema , echymosis strength and range of motion are unchanged from previous visit.    The injection site was identified and the skin was prepared with a betadine solution. The  bilateral knee  joint was injected with 2 ml of Euflexxa solution under sterile technique. Shiraz Jha tolerated the procedure well, he was advised to rest the knee today, ice and elevation. I may take 3 -6 weeks following the last injection to get the full benefit of the medication.  I will see him back in 1 week. Sooner if he has any problems or concerns.           .     ICD-10-CM ICD-9-CM   1. Primary osteoarthritis of both knees  M17.0 715.16

## 2022-11-18 NOTE — PROGRESS NOTES
Mr. Jha is a patient of Dr. Webb and was last seen in clinic 1/13/2021.      Subjective:   Patient ID:  Shiraz Jha is a 62 y.o. male who presents for follow-up of Atrial Fibrillation  .     HPI:    Mr. Jha is a 62 y.o. male with Htn, morbid obesity, atrial fibrillation (cryo PVI 2014), atrial flutter, Sleep Apnea, diet controlled DM here for follow up.     Background:    H/o recurrent atrial arrhythmias since 2008, previously managed at .   Presented to [facility] 11/12 with symptomatic atrial flutter and fibrillation. Had been on Dronedarone at that time. RFA of CTI 11/15/12. Sotalol and Xarelto then initiated.   Did well initially, but had increasing episodes. Subsequently had a Cryoablation PVI on 7/2014. He was last seen in 2/15, when he was still having on and off symptoms.   Has not had symptoms c/w atrial fibrillation. Notes dyspnea with walking up the stairs, which is not new.  We elected to continue Sotalol and observe.  Echo 7/23/19 normal biventricular structure and function.  Clinic visit 12/9/2019: QT 420ms.  1/13/2021: Doing well from a rhythm standpoint, maintaining sinus rhythm on sotalol. EKG with acceptable intervals. CHADSVASc 2 and he remains on xarelto for CVA prophylaxis.  Sees Dr. Ureña and Dr. Scott. He feels well.    Update (11/22/2022):    Today he says he has no new cardiac complaints. No worsening DAI, palpitations, exertional chest pain, LH, syncope reported.  Chronic leg edema. Sees general cardiology for venous insufficiency.  Achilles tendon repair scheduled for next month.    He is currently taking xarelto 20mg daily for stroke prophylaxis and denies significant bleeding episodes. He is currently being treated with sotalol 80mg BID for rhythm control and lopressor 25mg BID for HR control.  Kidney function is stable, with a creatinine of 1 on 11/8/2022.    I have personally reviewed the patient's EKG today, which shows sinus bradycardia at 54bpm. NC interval is 174. QRS is  98. QT is 454.    Relevant Cardiac Test Results:    2D Echo (5/17/2022):  The left ventricle is normal in size with normal systolic function. The estimated ejection fraction is 60%.  Normal right ventricular size with normal right ventricular systolic function.  Normal left ventricular diastolic function.  The estimated PA systolic pressure is 24 mmHg.  Normal central venous pressure (3 mmHg).    Current Outpatient Medications   Medication Sig    ARIPiprazole (ABILIFY) 10 MG Tab Take 10 mg by mouth once daily.    bumetanide (BUMEX) 2 MG tablet Take 1 tablet (2 mg total) by mouth 2 (two) times daily.    buPROPion (WELLBUTRIN XL) 300 MG 24 hr tablet Take 300 mg by mouth once daily. Pt states he only takes 300mg    cyanocobalamin 500 MCG tablet Take 500 mcg by mouth once daily.    dextroamphetamine-amphetamine (ADDERALL) 20 mg tablet Take 1 tablet by mouth every morning, take 1 tablet by mouth 3-4 hours later, and take 1/2 tablet every afternoon    fluticasone propionate (FLONASE) 50 mcg/actuation nasal spray 2 sprays (100 mcg total) by Each Nostril route once daily.    gabapentin (NEURONTIN) 300 MG capsule Take 1 capsule (300 mg total) by mouth 3 (three) times daily.    HYDROcodone-acetaminophen (NORCO) 5-325 mg per tablet Take 2 tablets by mouth 2 (two) times daily.    IMPOYZ 0.025 % Crea Apply topically as needed.     levocetirizine (XYZAL) 5 MG tablet TAKE 1 TABLET (5 MG TOTAL) BY MOUTH EVERY EVENING. (Patient taking differently: Take 5 mg by mouth daily as needed for Allergies.)    LUZU 1 % Crea Apply topically as needed.     methylPREDNISolone (MEDROL DOSEPACK) 4 mg tablet use as directed    metoprolol tartrate (LOPRESSOR) 25 MG tablet TAKE ONE BY MOUTH TWICE DAILY    multivitamin (THERAGRAN) per tablet Take 1 tablet by mouth once daily.     NAFTIN 2 % Gel daily as needed.     nitroGLYCERIN (NITROSTAT) 0.4 MG SL tablet Please dispense 25 pills in 4 bottles.    omeprazole (PRILOSEC) 10 MG capsule Take 2 capsules  "(20 mg total) by mouth once daily.    predniSONE (DELTASONE) 20 MG tablet 2 po qd x 3d    rivaroxaban (XARELTO) 20 mg Tab Take 1 tablet (20 mg total) by mouth once daily.    sotaloL (BETAPACE) 80 MG tablet TAKE ONE BY MOUTH TWICE DAILY    spironolactone (ALDACTONE) 25 MG tablet Take 1 tablet (25 mg total) by mouth once daily.    tamsulosin (FLOMAX) 0.4 mg Cap Take 1 capsule (0.4 mg total) by mouth 2 (two) times daily.    thiamine 250 MG tablet Take 250 mg by mouth once daily.    vitamin D (VITAMIN D3) 1000 units Tab Take 1,000 Units by mouth once daily.    vortioxetine (TRINTELLIX) 20 mg Tab Take 20 mg by mouth once daily.      Current Facility-Administered Medications   Medication    sodium chloride 0.9% flush 10 mL    sodium hyaluronate (EUFLEXXA) 10 mg/mL(mw 2.4 -3.6 million) injection 40 mg       Review of Systems   Constitutional: Negative for malaise/fatigue.   Cardiovascular:  Positive for leg swelling. Negative for chest pain, dyspnea on exertion, irregular heartbeat and palpitations.   Respiratory:  Negative for shortness of breath.    Hematologic/Lymphatic: Negative for bleeding problem.   Skin:  Negative for rash.   Musculoskeletal:  Negative for myalgias.   Gastrointestinal:  Negative for hematemesis, hematochezia and nausea.   Genitourinary:  Negative for hematuria.   Neurological:  Negative for light-headedness.   Psychiatric/Behavioral:  Negative for altered mental status.    Allergic/Immunologic: Negative for persistent infections.     Objective:          /70 (BP Location: Left arm, Patient Position: Sitting, BP Method: Large (Manual))   Pulse (!) 54   Ht 5' 6" (1.676 m)   Wt 130.2 kg (287 lb 0.6 oz)   BMI 46.33 kg/m²     Physical Exam  Vitals and nursing note reviewed.   Constitutional:       Appearance: Normal appearance. He is well-developed.   HENT:      Head: Normocephalic.      Nose: Nose normal.   Eyes:      Pupils: Pupils are equal, round, and reactive to light.   Cardiovascular:    "   Rate and Rhythm: Regular rhythm. Bradycardia present.   Pulmonary:      Effort: No respiratory distress.      Breath sounds: Normal breath sounds.   Musculoskeletal:         General: Normal range of motion.   Skin:     General: Skin is warm and dry.      Findings: No erythema.   Neurological:      Mental Status: He is alert and oriented to person, place, and time.   Psychiatric:         Speech: Speech normal.         Behavior: Behavior normal.         Lab Results   Component Value Date     11/08/2022    K 3.3 (L) 11/08/2022    MG 2.0 05/17/2022    BUN 11 11/08/2022    CREATININE 1.0 11/08/2022    ALT 36 07/16/2022    AST 40 07/16/2022    HGB 11.3 (L) 07/16/2022    HCT 37.7 (L) 07/16/2022    TSH 1.544 02/15/2022    LDLCALC 115.6 02/15/2022       Recent Labs   Lab 05/06/22  1529   INR 1.1         Assessment:     1. Persistent atrial fibrillation    2. Essential hypertension    3. Typical atrial flutter    4. Obstructive sleep apnea    5. S/P ablation of atrial fibrillation      Plan:     In summary, Mr. Jha is a 62 y.o. male with Htn, morbid obesity, atrial fibrillation, atrial flutter, Sleep Apnea, diet controlled DM here for follow up.   Doing well from a rhythm standpoint, with no AF symptoms on sotalol. EKG with stable intervals. Willem but asymptomatic. CHADSVASc 2 on xarelto with no bleeding.  Echo 5/2022 showed normal heart function.      Continue current regimen  RTC 1 yr, sooner if needed    *A copy of this note has been sent to Dr. Webb*    Follow up in about 6 months (around 5/22/2023).    ------------------------------------------------------------------    FERNANDA Hansen, NP-C  Cardiac Electrophysiology

## 2022-11-22 ENCOUNTER — HOSPITAL ENCOUNTER (OUTPATIENT)
Dept: CARDIOLOGY | Facility: CLINIC | Age: 62
Discharge: HOME | End: 2022-11-22
Payer: COMMERCIAL

## 2022-11-22 ENCOUNTER — OFFICE VISIT (OUTPATIENT)
Dept: ELECTROPHYSIOLOGY | Facility: CLINIC | Age: 62
End: 2022-11-22
Payer: COMMERCIAL

## 2022-11-22 VITALS
HEIGHT: 66 IN | SYSTOLIC BLOOD PRESSURE: 136 MMHG | HEART RATE: 54 BPM | BODY MASS INDEX: 46.13 KG/M2 | WEIGHT: 287.06 LBS | DIASTOLIC BLOOD PRESSURE: 70 MMHG

## 2022-11-22 DIAGNOSIS — I48.3 TYPICAL ATRIAL FLUTTER: Chronic | ICD-10-CM

## 2022-11-22 DIAGNOSIS — G47.33 OBSTRUCTIVE SLEEP APNEA: Chronic | ICD-10-CM

## 2022-11-22 DIAGNOSIS — Z98.890 S/P ABLATION OF ATRIAL FIBRILLATION: ICD-10-CM

## 2022-11-22 DIAGNOSIS — I49.8 OTHER SPECIFIED CARDIAC ARRHYTHMIAS: ICD-10-CM

## 2022-11-22 DIAGNOSIS — I48.19 PERSISTENT ATRIAL FIBRILLATION: Primary | ICD-10-CM

## 2022-11-22 DIAGNOSIS — I10 ESSENTIAL HYPERTENSION: Chronic | ICD-10-CM

## 2022-11-22 DIAGNOSIS — Z86.79 S/P ABLATION OF ATRIAL FIBRILLATION: ICD-10-CM

## 2022-11-22 PROCEDURE — 99999 PR PBB SHADOW E&M-EST. PATIENT-LVL IV: CPT | Mod: PBBFAC,,, | Performed by: NURSE PRACTITIONER

## 2022-11-22 PROCEDURE — 93010 RHYTHM STRIP: ICD-10-PCS | Mod: S$GLB,,, | Performed by: INTERNAL MEDICINE

## 2022-11-22 PROCEDURE — 99214 OFFICE O/P EST MOD 30 MIN: CPT | Mod: S$GLB,,, | Performed by: NURSE PRACTITIONER

## 2022-11-22 PROCEDURE — 99214 OFFICE O/P EST MOD 30 MIN: CPT | Mod: PBBFAC | Performed by: NURSE PRACTITIONER

## 2022-11-22 PROCEDURE — 93005 ELECTROCARDIOGRAM TRACING: CPT | Mod: PBBFAC | Performed by: INTERNAL MEDICINE

## 2022-11-22 PROCEDURE — 99999 PR PBB SHADOW E&M-EST. PATIENT-LVL IV: ICD-10-PCS | Mod: PBBFAC,,, | Performed by: NURSE PRACTITIONER

## 2022-11-22 PROCEDURE — 99214 PR OFFICE/OUTPT VISIT, EST, LEVL IV, 30-39 MIN: ICD-10-PCS | Mod: S$GLB,,, | Performed by: NURSE PRACTITIONER

## 2022-11-22 PROCEDURE — 93005 RHYTHM STRIP: ICD-10-PCS | Mod: S$GLB,,, | Performed by: INTERNAL MEDICINE

## 2022-11-22 PROCEDURE — 93005 ELECTROCARDIOGRAM TRACING: CPT | Mod: S$GLB,,, | Performed by: INTERNAL MEDICINE

## 2022-11-22 PROCEDURE — 93010 ELECTROCARDIOGRAM REPORT: CPT | Mod: S$GLB,,, | Performed by: INTERNAL MEDICINE

## 2022-11-22 RX ORDER — METOPROLOL TARTRATE 25 MG/1
25 TABLET, FILM COATED ORAL 2 TIMES DAILY
Qty: 180 TABLET | Refills: 3 | Status: SHIPPED | OUTPATIENT
Start: 2022-11-22 | End: 2023-05-23

## 2022-11-22 RX ORDER — SOTALOL HYDROCHLORIDE 80 MG/1
80 TABLET ORAL 2 TIMES DAILY
Qty: 180 TABLET | Refills: 3 | Status: SHIPPED | OUTPATIENT
Start: 2022-11-22 | End: 2023-05-23 | Stop reason: SDUPTHER

## 2022-11-23 ENCOUNTER — OFFICE VISIT (OUTPATIENT)
Dept: ORTHOPEDICS | Facility: CLINIC | Age: 62
End: 2022-11-23
Payer: COMMERCIAL

## 2022-11-23 VITALS
HEART RATE: 53 BPM | BODY MASS INDEX: 46.13 KG/M2 | DIASTOLIC BLOOD PRESSURE: 74 MMHG | SYSTOLIC BLOOD PRESSURE: 127 MMHG | WEIGHT: 287.06 LBS | HEIGHT: 66 IN

## 2022-11-23 DIAGNOSIS — M17.0 PRIMARY OSTEOARTHRITIS OF BOTH KNEES: Primary | ICD-10-CM

## 2022-11-23 PROCEDURE — 99214 OFFICE O/P EST MOD 30 MIN: CPT | Mod: PBBFAC | Performed by: PHYSICIAN ASSISTANT

## 2022-11-23 PROCEDURE — 20610 DRAIN/INJ JOINT/BURSA W/O US: CPT | Mod: 50,S$PBB,, | Performed by: PHYSICIAN ASSISTANT

## 2022-11-23 PROCEDURE — 99999 PR PBB SHADOW E&M-EST. PATIENT-LVL IV: CPT | Mod: PBBFAC,,, | Performed by: PHYSICIAN ASSISTANT

## 2022-11-23 PROCEDURE — 99999 PR PBB SHADOW E&M-EST. PATIENT-LVL IV: ICD-10-PCS | Mod: PBBFAC,,, | Performed by: PHYSICIAN ASSISTANT

## 2022-11-23 PROCEDURE — 20610 DRAIN/INJ JOINT/BURSA W/O US: CPT | Mod: 50,PBBFAC | Performed by: PHYSICIAN ASSISTANT

## 2022-11-23 PROCEDURE — 20610 PR DRAIN/INJECT LARGE JOINT/BURSA: ICD-10-PCS | Mod: 50,S$PBB,, | Performed by: PHYSICIAN ASSISTANT

## 2022-11-23 PROCEDURE — 99499 UNLISTED E&M SERVICE: CPT | Mod: S$PBB,,, | Performed by: PHYSICIAN ASSISTANT

## 2022-11-23 PROCEDURE — 99499 NO LOS: ICD-10-PCS | Mod: S$PBB,,, | Performed by: PHYSICIAN ASSISTANT

## 2022-11-23 RX ADMIN — Medication 40 MG: at 09:11

## 2022-11-23 NOTE — PROGRESS NOTES
Shiraz Jha is a 62 y.o. year old his here today for his 3rd Euflexxa injection for degenerative changes of his bilateral knee . he was last seen and treated in the clinic on 11/17/2022. There has been no significant change in his medical status since his last visit. No Fever, chills, malaise, or unexplained weight change.      Allergies, Medications, past medical and surgical history were reviewed .    Examination of the knee demonstrates  No evidence of edema, erythema , echymosis strength and range of motion are unchanged from previous visit.    The injection site was identified and the skin was prepared with a betadine solution. The  bilateral knee  joint was injected with 2 ml of Euflexxa solution under sterile technique. Shiraz Jha tolerated the procedure well, he was advised to rest the knee today, ice and elevation. I may take 3 -6 weeks following the last injection to get the full benefit of the medication.  I will see him back in 6 months. Sooner if he has any problems or concerns.           .     ICD-10-CM ICD-9-CM   1. Primary osteoarthritis of both knees  M17.0 715.16

## 2022-12-09 DIAGNOSIS — I48.19 PERSISTENT ATRIAL FIBRILLATION: ICD-10-CM

## 2022-12-12 ENCOUNTER — TELEPHONE (OUTPATIENT)
Dept: PODIATRY | Facility: CLINIC | Age: 62
End: 2022-12-12
Payer: COMMERCIAL

## 2022-12-12 ENCOUNTER — PATIENT MESSAGE (OUTPATIENT)
Dept: PODIATRY | Facility: CLINIC | Age: 62
End: 2022-12-12
Payer: COMMERCIAL

## 2022-12-12 ENCOUNTER — PATIENT OUTREACH (OUTPATIENT)
Dept: ADMINISTRATIVE | Facility: OTHER | Age: 62
End: 2022-12-12
Payer: COMMERCIAL

## 2022-12-12 DIAGNOSIS — I48.19 PERSISTENT ATRIAL FIBRILLATION: ICD-10-CM

## 2022-12-12 NOTE — PROGRESS NOTES
CHW - Outreach Attempt    Community Health Worker left a voicemail message for 1st attempt to contact patient regarding: f/u  Community Health Worker to attempt to contact patient on: 12/22

## 2022-12-12 NOTE — TELEPHONE ENCOUNTER
Attempted to reach out to Mr Jha, but he was unavailable. Left voice message informing him that we received his message in regards to rescheduling his surgery. Inquired if he knew when he may want to reschedule and that I would reach out to him through the pt portal as well. Encouraged to call if further assistance is needed.

## 2022-12-12 NOTE — TELEPHONE ENCOUNTER
----- Message from Billy Guillaume sent at 12/12/2022  2:38 PM CST -----  Type:  Needs Medical Advice    Who Called: self  Reason:reschedule surgery  Would the patient rather a call back or a response via [facility]? call  Best Call Back Number: [phone]  Additional Information: nonr

## 2022-12-13 ENCOUNTER — TELEPHONE (OUTPATIENT)
Dept: PODIATRY | Facility: CLINIC | Age: 62
End: 2022-12-13
Payer: COMMERCIAL

## 2022-12-13 NOTE — TELEPHONE ENCOUNTER
Spoke with Mr Jha who reports he would like to reschedule his surgery with Dr. Beaulieu for after Jan 16, 2023. Informed him that I will let Dr Beaulieu and his  know and that his surgery may possibly go towards the first week of February. Mr Jha verbalized understanding. No other needs voice. Encouraged to call if further assistance is needed. Message sent to Dr. Beaulieu and Shirley with surgery scheduling.

## 2022-12-13 NOTE — TELEPHONE ENCOUNTER
Spoke with Mr Jha to inform him that Dr Beaulieu has moved his surgery to Feb 3, 2023. New surgery date accepted with him verbalizing understanding that he will need to see his PCP at the beginning of the year. Post op appts in place with no other needs voiced at this time. Encouraged to call if further assistance is needed.

## 2022-12-13 NOTE — TELEPHONE ENCOUNTER
----- Message from Paul Beaulieu DPM sent at 12/13/2022 12:55 PM CST -----  Regarding: RE: Request to rescheule surgery until after Jan 16 2023  Please move the case to 02/03/23.    Max.  ----- Message -----  From: Twyla Ornelas RN  Sent: 12/12/2022   6:28 PM CST  To: Paul Beaulieu DPM, Shirley Morrow  Subject: Request to rescheule surgery until after Jan#    Dr Kyle:    Spoke with Mr Jha and he would like to reschedule his surgery until after Jan 16. I told him he may end up moving towards the beginning of Feb, and he verbalized understanding.    Just wanted to let you all know so a new surgery date could be determined.    Thank you    Ava       39.5

## 2022-12-15 ENCOUNTER — OFFICE VISIT (OUTPATIENT)
Dept: INTERNAL MEDICINE | Facility: CLINIC | Age: 62
End: 2022-12-15
Payer: COMMERCIAL

## 2022-12-15 VITALS
WEIGHT: 287.13 LBS | HEART RATE: 56 BPM | SYSTOLIC BLOOD PRESSURE: 130 MMHG | OXYGEN SATURATION: 99 % | HEIGHT: 66 IN | RESPIRATION RATE: 19 BRPM | DIASTOLIC BLOOD PRESSURE: 76 MMHG | TEMPERATURE: 97 F | BODY MASS INDEX: 46.15 KG/M2

## 2022-12-15 DIAGNOSIS — I10 ESSENTIAL HYPERTENSION: Chronic | ICD-10-CM

## 2022-12-15 DIAGNOSIS — E87.6 HYPOKALEMIA: ICD-10-CM

## 2022-12-15 DIAGNOSIS — M79.671 CHRONIC FOOT PAIN, RIGHT: ICD-10-CM

## 2022-12-15 DIAGNOSIS — G89.29 CHRONIC FOOT PAIN, RIGHT: ICD-10-CM

## 2022-12-15 DIAGNOSIS — Z01.818 PREOP TESTING: Primary | ICD-10-CM

## 2022-12-15 DIAGNOSIS — S86.301S PERONEAL TENDON INJURY, RIGHT, SEQUELA: ICD-10-CM

## 2022-12-15 DIAGNOSIS — R73.03 PREDIABETES: ICD-10-CM

## 2022-12-15 PROCEDURE — 99214 PR OFFICE/OUTPT VISIT, EST, LEVL IV, 30-39 MIN: ICD-10-PCS | Mod: S$GLB,,,

## 2022-12-15 PROCEDURE — 99999 PR PBB SHADOW E&M-EST. PATIENT-LVL V: ICD-10-PCS | Mod: PBBFAC,,,

## 2022-12-15 PROCEDURE — 99999 PR PBB SHADOW E&M-EST. PATIENT-LVL V: CPT | Mod: PBBFAC,,,

## 2022-12-15 PROCEDURE — G0008 ADMIN INFLUENZA VIRUS VAC: HCPCS | Mod: PBBFAC,PO

## 2022-12-15 PROCEDURE — 99215 OFFICE O/P EST HI 40 MIN: CPT | Mod: PBBFAC,PO,25

## 2022-12-15 PROCEDURE — 99214 OFFICE O/P EST MOD 30 MIN: CPT | Mod: S$GLB,,,

## 2022-12-15 RX ORDER — CARIPRAZINE 3 MG/1
3 CAPSULE, GELATIN COATED ORAL DAILY
COMMUNITY
Start: 2022-12-05

## 2022-12-15 RX ORDER — POTASSIUM CHLORIDE 750 MG/1
10 TABLET, EXTENDED RELEASE ORAL 2 TIMES DAILY
Qty: 30 TABLET | Refills: 11 | Status: SHIPPED | OUTPATIENT
Start: 2022-12-15

## 2022-12-15 NOTE — PROGRESS NOTES
Clinic Note  12/15/2022      Subjective:       Patient ID:  patient is a 62 y.o. male being seen for a preoperative evaluation.     Chief Complaint: Pre-op Exam (Ankle sx (2/3/23))    HPI  Mr. Jha is a 62 year old male w/ HTN, morbid obesity, preDM, Afib s/p DCCV on Xarelto, lymphedema of the lower extremities and depressive disorder who presents today for a preoperative evaluation. Patient is undergoing a right ankle tendon repair on 02/03/2023 for a hypertrophic peroneal tubercle and tendinosis involving the peroneus longus tendon. Patient denies any acute concerns. Endorses chronic SOB w/ dyspnea. Denies HA, vision changes, acute SOB, CP, orthopnea, PND, abdominal pain, diarrhea, vomiting, bloody stools, hematuria. Denies any prior complications w/ anesthesia. He underwent a colonoscopy earlier this year and experienced post procedural bloody stools which spontaneously resolved.     Review of Systems   Constitutional:  Negative for chills and fever.   HENT:  Negative for congestion and sinus pain.    Eyes:  Negative for blurred vision and pain.   Respiratory:  Negative for cough and shortness of breath.    Cardiovascular:  Negative for chest pain and palpitations.   Gastrointestinal:  Negative for abdominal pain, diarrhea and vomiting.   Musculoskeletal:  Negative for joint pain and myalgias.   Skin:  Negative for itching and rash.   Neurological:  Negative for dizziness and headaches.   Psychiatric/Behavioral:  Negative for depression and suicidal ideas.      Medication List with Changes/Refills   Current Medications    BUMETANIDE (BUMEX) 2 MG TABLET    Take 1 tablet (2 mg total) by mouth 2 (two) times daily.    BUPROPION (WELLBUTRIN XL) 300 MG 24 HR TABLET    Take 300 mg by mouth once daily. Pt states he only takes 300mg    CYANOCOBALAMIN 500 MCG TABLET    Take 500 mcg by mouth once daily.    DEXTROAMPHETAMINE-AMPHETAMINE (ADDERALL) 20 MG TABLET    Take 1 tablet by mouth every morning, take 1 tablet by mouth  3-4 hours later, and take 1/2 tablet every afternoon    FLUTICASONE PROPIONATE (FLONASE) 50 MCG/ACTUATION NASAL SPRAY    2 sprays (100 mcg total) by Each Nostril route once daily.    GABAPENTIN (NEURONTIN) 300 MG CAPSULE    Take 1 capsule (300 mg total) by mouth 3 (three) times daily.    IMPOYZ 0.025 % CREA    Apply topically as needed.     LEVOCETIRIZINE (XYZAL) 5 MG TABLET    TAKE 1 TABLET (5 MG TOTAL) BY MOUTH EVERY EVENING.    LUZU 1 % CREA    Apply topically as needed.     METOPROLOL TARTRATE (LOPRESSOR) 25 MG TABLET    Take 1 tablet (25 mg total) by mouth 2 (two) times daily.    MULTIVITAMIN (THERAGRAN) PER TABLET    Take 1 tablet by mouth once daily.     NAFTIN 2 % GEL    daily as needed.     NITROGLYCERIN (NITROSTAT) 0.4 MG SL TABLET    Please dispense 25 pills in 4 bottles.    OMEPRAZOLE (PRILOSEC) 10 MG CAPSULE    Take 2 capsules (20 mg total) by mouth once daily.    RIVAROXABAN (XARELTO) 20 MG TAB    Take 1 tablet (20 mg total) by mouth once daily.    SOTALOL (BETAPACE) 80 MG TABLET    Take 1 tablet (80 mg total) by mouth 2 (two) times daily.    SPIRONOLACTONE (ALDACTONE) 25 MG TABLET    Take 1 tablet (25 mg total) by mouth once daily.    TAMSULOSIN (FLOMAX) 0.4 MG CAP    Take 1 capsule (0.4 mg total) by mouth 2 (two) times daily.    THIAMINE 250 MG TABLET    Take 250 mg by mouth once daily.    VITAMIN D (VITAMIN D3) 1000 UNITS TAB    Take 1,000 Units by mouth once daily.    VORTIOXETINE (TRINTELLIX) 20 MG TAB    Take 20 mg by mouth once daily.     VRAYLAR 3 MG CAP    Take 3 mg by mouth.   Discontinued Medications    ARIPIPRAZOLE (ABILIFY) 10 MG TAB    Take 10 mg by mouth once daily.    HYDROCODONE-ACETAMINOPHEN (NORCO) 5-325 MG PER TABLET    Take 2 tablets by mouth 2 (two) times daily.    METHYLPREDNISOLONE (MEDROL DOSEPACK) 4 MG TABLET    use as directed    PREDNISONE (DELTASONE) 20 MG TABLET    2 po qd x 3d       Patient Active Problem List   Diagnosis    Essential hypertension    Depression    Atrial  "flutter    Severe obesity (BMI >= 40)    Type 2 diabetes mellitus without complication, without long-term current use of insulin    Obstructive sleep apnea    Chronic rhinitis    Persistent atrial fibrillation    Spondylosis without myelopathy    Gastroesophageal reflux disease without esophagitis    Benign prostatic hyperplasia with lower urinary tract symptoms    Stable angina pectoris    Status post catheter ablation of atrial fibrillation    Status post catheter ablation of atrial flutter    Current use of long term anticoagulation    Enlarged thoracic aorta    Mobility impaired    Weakness    Osteoarthritis of spine    DDD (degenerative disc disease), lumbar    Status post gastric bypass for obesity    Venous insufficiency of left lower extremity    Edema of both lower extremities    Impaired fasting blood sugar    Neoplasm of uncertain behavior of lip    Lymphedema of both lower extremities    S/P ablation of atrial fibrillation    Major depressive disorder in remission    Poor posture    Decreased strength of trunk and back    Gastrointestinal hemorrhage with melena    Acute blood loss anemia    Hypokalemia    Sacroiliitis             Health Maintenance Topics with due status: Not Due       Topic Last Completion Date    TETANUS VACCINE 05/01/2019    Lipid Panel 02/15/2022    Foot Exam 02/18/2022    Colorectal Cancer Screening 05/26/2022       Objective:      /76 (BP Location: Right arm, Patient Position: Sitting, BP Method: Large (Manual))   Pulse (!) 56   Temp 97.3 °F (36.3 °C) (Temporal)   Resp 19   Ht 5' 6" (1.676 m)   Wt 130.2 kg (287 lb 1.6 oz)   SpO2 99%   BMI 46.34 kg/m²   Estimated body mass index is 46.34 kg/m² as calculated from the following:    Height as of this encounter: 5' 6" (1.676 m).    Weight as of this encounter: 130.2 kg (287 lb 1.6 oz).  Physical Exam  Constitutional:       General: He is not in acute distress.     Appearance: He is obese.   HENT:      Head: Normocephalic " and atraumatic.      Right Ear: External ear normal.      Left Ear: External ear normal.   Eyes:      Extraocular Movements: Extraocular movements intact.      Conjunctiva/sclera: Conjunctivae normal.   Cardiovascular:      Rate and Rhythm: Normal rate and regular rhythm.      Heart sounds: Normal heart sounds. No murmur heard.  Pulmonary:      Effort: Pulmonary effort is normal.      Breath sounds: Normal breath sounds.   Abdominal:      General: Bowel sounds are normal.      Palpations: Abdomen is soft.      Tenderness: There is no abdominal tenderness.   Musculoskeletal:         General: Normal range of motion.      Cervical back: Normal range of motion.      Right lower leg: Edema present.      Left lower leg: Edema present.   Skin:     General: Skin is warm.      Capillary Refill: Capillary refill takes less than 2 seconds.   Neurological:      General: No focal deficit present.      Mental Status: He is alert and oriented to person, place, and time. Mental status is at baseline.   Psychiatric:         Mood and Affect: Mood normal.         Behavior: Behavior normal.         Assessment and Plan:     Preop testing  Peroneal tendon injury, right, sequela  Chronic foot pain, right  - operative risk pending labs   - CBC W/ AUTO DIFFERENTIAL; Future; Expected date: 01/15/2023  - BASIC METABOLIC PANEL; Future; Expected date: 01/15/2023  - Will defer magnolia-operative management of OAC to cardiology; upcoming appointment in January.     Hypokalemia  - potassium chloride SA (K-DUR,KLOR-CON M) 10 MEQ tablet; Take 1 tablet (10 mEq total) by mouth 2 (two) times daily.  Dispense: 30 tablet; Refill: 11    Essential hypertension   - well controlled     Prediabetes  - last A1C 5.5 1 yr prior; well controlled w/ diet   - Hemoglobin A1C; Future; Expected date: 01/15/2023    Other orders  - Influenza - Quadrivalent (PF)    Follow Up:   No follow-ups on file.        Carmel Gonzalez M.D.  Internal Medicine PGY-2  [facility]

## 2022-12-20 ENCOUNTER — CLINICAL SUPPORT (OUTPATIENT)
Dept: REHABILITATION | Facility: HOSPITAL | Age: 62
End: 2022-12-20
Attending: PODIATRIST
Payer: COMMERCIAL

## 2022-12-20 ENCOUNTER — TELEPHONE (OUTPATIENT)
Dept: CARDIOLOGY | Facility: CLINIC | Age: 62
End: 2022-12-20
Payer: COMMERCIAL

## 2022-12-20 DIAGNOSIS — S86.301S PERONEAL TENDON INJURY, RIGHT, SEQUELA: ICD-10-CM

## 2022-12-20 DIAGNOSIS — Z01.818 PREOP TESTING: ICD-10-CM

## 2022-12-20 DIAGNOSIS — R52 PAIN: ICD-10-CM

## 2022-12-20 DIAGNOSIS — M79.671 CHRONIC FOOT PAIN, RIGHT: ICD-10-CM

## 2022-12-20 DIAGNOSIS — G89.29 CHRONIC FOOT PAIN, RIGHT: ICD-10-CM

## 2022-12-20 PROCEDURE — 97110 THERAPEUTIC EXERCISES: CPT | Performed by: PHYSICAL THERAPIST

## 2022-12-20 PROCEDURE — 97161 PT EVAL LOW COMPLEX 20 MIN: CPT | Performed by: PHYSICAL THERAPIST

## 2022-12-20 NOTE — PLAN OF CARE
[facility]  Physical Therapy Initial Evaluation    Name: Shiraz Jha  Clinic Number: [id number]    Therapy Diagnosis:   Encounter Diagnoses   Name Primary?    Peroneal tendon injury, right, sequela     Chronic foot pain, right     Preop testing     Pain      Physician: Paul Beaulieu DPM    Physician Orders: PT Eval and Treat   Medical Diagnosis: S86.301S (ICD-10-CM) - Peroneal tendon injury, right, sequela M79.671,G89.29 (ICD-10-CM) - Chronic foot pain, right   Evaluation Date: 12/20/2022  Authorization Period Expiration: 12-31-22  Plan of Care Certification Period: 3-30-23  Visit # / Visits authorized: 1/ 1    Time In: 11:15 am  Time Out: 11:55 am  Total Billable Time: 40 minutes    Precautions: Standard    Subjective     Date of onset: 1 yr ago  History of current condition - Shiraz reports: R foot/heel pain began insidiously.  Pt states he'll have R peroneal tendon repair on 2-03-23.  Pt c/o lateral ankle/calcaneus pain with wt-bearing activities.  Pt states mainly being sedentary.  Pt feels pain limits his ADL.       Past Medical History:   Diagnosis Date    Allergy     Anemia     Asthma     Atrial fibrillation     Cataract     Chronic rhinitis 9/26/2013    DDD (degenerative disc disease) 4/3/2015    Depression     Diabetes mellitus     Fibromyalgia     Gastroesophageal reflux disease without esophagitis 7/14/2017    Hypertension     Sinusitis, acute, maxillary 12/20/2012    Thyroid disease      Shiraz Jha  has a past surgical history that includes Tonsillectomy; Cervical spine surgery; Gastric bypass; Sinus surgery (2000); Epidural steroid injection into lumbar spine (N/A, 5/29/2018); Epidural steroid injection into lumbar spine (N/A, 7/3/2018); Excision of lesion of lip (N/A, 7/7/2020); Esophagogastroduodenoscopy (N/A, 5/9/2022); Colonoscopy (N/A, 5/10/2022); Antegrade single balloon enteroscopy (N/A, 5/26/2022); and Colonoscopy (N/A, 5/26/2022).    Shiraz has a current  medication list which includes the following prescription(s): bumetanide, bupropion, cyanocobalamin, dextroamphetamine-amphetamine, fluticasone propionate, gabapentin, impoyz, levocetirizine, luzu, metoprolol tartrate, multivitamin, naftin, nitroglycerin, omeprazole, potassium chloride sa, rivaroxaban, sotalol, spironolactone, tamsulosin, thiamine, vitamin d, vortioxetine, and vraylar, and the following Facility-Administered Medications: sodium chloride 0.9%.    Review of patient's allergies indicates:   Allergen Reactions    Iodinated contrast media Other (See Comments)     Raises BP          Imaging, MRI studies: Split tear peroneus brevis with peroneal tubercle edema reflective of associated peroneal tendinopathy, predominantly peroneus longus at this level.  Associated tenosynovitis.     Diffuse subcutaneous edema    Prior Therapy: na  Social History:  lives alone  Occupation: na  Prior Level of Function: yard work  Current Level of Function: ADL limited    Pain:  Current 0/10, worst 10/10, best 0/10   Location: right ankle  Description: Aching and Sharp  Aggravating Factors: Standing and Walking  Easing Factors: rest    Pts goals: decrease pain    Objective     Postural examination:  decreased arch height     Functional assessment:   - walking:   independent             AROM:  5 deg DF, others WNL     MMT:   gross ankle 4+/5 to 5/5, pain with eversion    Tone:  decreased gastroc    Flexibility testing:   tight heelcords    Special tests:   neg ankle stress tests    Palpation:   TTP lateral calcaneus and peroneal tendon    Joint mobility: fair    Swelling:  MIN    Other:  sensation intact to light touch    CMS Impairment/Limitation/Restriction for FOTO ankle Survey    Therapist reviewed FOTO scores for Shiraz Jha on 12/20/2022.   FOTO documents entered into EPIC - see Media section.             TREATMENT     Treatment Time In: 11:10 am  Treatment Time Out: 11:50 am  Total Treatment time separate from  Evaluation time: 20 min    Treatment:  HEP and crutch training, NWB on R    Home Exercises and Patient Education Provided    Education provided:   - ice for pain    Written Home Exercises Provided: yes.  Exercises were reviewed and Shiraz was able to demonstrate them prior to the end of the session.  Shiraz demonstrated good  understanding of the education provided.     See EMR under Media for exercises provided 12/20/2022.      Assessment     Shiraz is a 62 y.o. male referred to outpatient Physical Therapy with a medical diagnosis of S86.301S (ICD-10-CM) - Peroneal tendon injury, right, sequela M79.671,G89.29 (ICD-10-CM) - Chronic foot pain, right .  Pt presents with R ankle pain, weakness and decreased flexibility that limit ADL.    Pt prognosis is Good.   Pt will benefit from skilled outpatient Physical Therapy to address the deficits stated above and in the chart below, provide pt/family education, and to maximize pt's level of independence.     Plan of care discussed with patient: Yes  Pt's spiritual, cultural and educational needs considered and patient is agreeable to the plan of care and goals as stated below:     Anticipated Barriers for therapy: high BMI    Medical Necessity is demonstrated by the following:  History  Co-morbidities and personal factors that may impact the plan of care Co-morbidities:   depression, diabetes, high BMI, and HTN, FM    Personal Factors:   no deficits     moderate   Examination  Body Structures and Functions, activity limitations and participation restrictions that may impact the plan of care Body Regions:   lower extremities    Body Systems:    ROM  strength  balance  gait    Participation Restrictions:   none    Activity limitations:   Learning and applying knowledge  no deficits    General Tasks and Commands  no deficits    Communication  no deficits    Mobility  lifting and carrying objects  walking    Self care  no deficits    Domestic Life  doing house work (cleaning house,  washing dishes, laundry)    Interactions/Relationships  no deficits    Life Areas  no deficits    Community and Social Life  no deficits         low   Clinical Presentation stable and uncomplicated low   Decision Making/ Complexity Score: low     Goals:  Short Term Goals:         1.   Independent with HEP          Plan     Certification Period/Plan of care expiration: 12/20/2022 to 3-30-23.    D/C PT with a HEP.  Pt to call if questions arise.      Mirza Horn, PT

## 2022-12-20 NOTE — TELEPHONE ENCOUNTER
----- Message from Mary Neri sent at 12/20/2022 10:48 AM CST -----  Contact: pt/[phone]  Type:   Apoointment Request    Caller is requesting a sooner appointment.   Name of Caller:PT   When is the first available appointment?No dates  given   Symptoms:Leg  pain    Would the patient rather a call back or a response via [facility]? Call   Best Call Back Number:[phone]  Additional Information: pt  is requesting to  schedule  an appointment  soon

## 2022-12-20 NOTE — PATIENT INSTRUCTIONS
Ankle Pump    With affected leg elevated, gently flex and extend ankle. Move through full range of motion. Avoid pain. Perform more frequently if having increased swelling.  Repeat 25 times. Do 1 set per session. Do 2 sessions per day.      Ankle Alphabet    Sit with leg straight out in front of you and heel off edge of bed or propped up on towel roll. Using ankle and foot only, trace the letters of the alphabet. Perform A to Z. Do not let the knee move too much side to side.  Repeat 1 times on affected side Do 2 sessions per day.      Towel Scrunches    Place affected foot flat on towel, knee pointed forward. Use forefoot and toes to pull towel backward.  Do not allow heel or knee to move. Repetitions should be slow and controlled.  Repeat 25 times Do 1 set per session. Do 2 sessions per day.      Gastroc, Sitting (Passive)     Sit with leg straight out in front of you and a towel under your heel and around ball of foot. Gently pull toward body. Hold 30 seconds.   Repeat 5 times. Do 1 sets per session. Do 2 sessions per day.      Resistance Band Ankle Movements, 4 ways    1. Wrap band around foot and attach below the foot. Pull foot up against resistance band. Slowly release for 3-5 seconds.  2. Wrap band around foot and hold band in your hand. Push foot down against resistance band. Slowly release for 3-5 seconds.  3. Wrap band around foot and loop around other foot and hold the end of the band. Pull foot out to side against resistance band. Slowly release for 3-5 seconds.   4. Cross one leg over the other, wrap band around bottom foot, step top of foot on band and hold the end of the band. Pull foot to the inside against resistance band. Slowly release for 3-5 seconds.     Use  resistance band.  Repeat 25 times Do 1 set per session. Do 2 sessions per day.      Calf Raise: (Standing)    Stand on both feet and rise on ball of foot. Do not let ankle roll out. Slowly return to start and repeat.  Repeat 25 times per  set. Do 1 set per session. Do 2 sessions per day.

## 2022-12-27 ENCOUNTER — OFFICE VISIT (OUTPATIENT)
Dept: CARDIOLOGY | Facility: CLINIC | Age: 62
End: 2022-12-27
Payer: COMMERCIAL

## 2022-12-27 VITALS
BODY MASS INDEX: 46.06 KG/M2 | DIASTOLIC BLOOD PRESSURE: 85 MMHG | HEIGHT: 66 IN | HEART RATE: 60 BPM | WEIGHT: 286.63 LBS | SYSTOLIC BLOOD PRESSURE: 126 MMHG

## 2022-12-27 DIAGNOSIS — E11.9 TYPE 2 DIABETES MELLITUS WITHOUT COMPLICATION, WITHOUT LONG-TERM CURRENT USE OF INSULIN: Chronic | ICD-10-CM

## 2022-12-27 DIAGNOSIS — I48.19 PERSISTENT ATRIAL FIBRILLATION: ICD-10-CM

## 2022-12-27 DIAGNOSIS — I87.2 VENOUS INSUFFICIENCY OF LEFT LOWER EXTREMITY: ICD-10-CM

## 2022-12-27 DIAGNOSIS — I89.0 LYMPHEDEMA OF BOTH LOWER EXTREMITIES: Primary | ICD-10-CM

## 2022-12-27 DIAGNOSIS — Z98.890 S/P ABLATION OF ATRIAL FIBRILLATION: ICD-10-CM

## 2022-12-27 DIAGNOSIS — I10 ESSENTIAL HYPERTENSION: Chronic | ICD-10-CM

## 2022-12-27 DIAGNOSIS — Z86.79 S/P ABLATION OF ATRIAL FIBRILLATION: ICD-10-CM

## 2022-12-27 DIAGNOSIS — R60.0 EDEMA OF BOTH LOWER EXTREMITIES: ICD-10-CM

## 2022-12-27 PROCEDURE — 99215 PR OFFICE/OUTPT VISIT, EST, LEVL V, 40-54 MIN: ICD-10-PCS | Mod: S$GLB,,, | Performed by: INTERNAL MEDICINE

## 2022-12-27 PROCEDURE — 99215 OFFICE O/P EST HI 40 MIN: CPT | Mod: PBBFAC | Performed by: INTERNAL MEDICINE

## 2022-12-27 PROCEDURE — 99999 PR PBB SHADOW E&M-EST. PATIENT-LVL V: CPT | Mod: PBBFAC,,, | Performed by: INTERNAL MEDICINE

## 2022-12-27 PROCEDURE — 99999 PR PBB SHADOW E&M-EST. PATIENT-LVL V: ICD-10-PCS | Mod: PBBFAC,,, | Performed by: INTERNAL MEDICINE

## 2022-12-27 PROCEDURE — 99215 OFFICE O/P EST HI 40 MIN: CPT | Mod: S$GLB,,, | Performed by: INTERNAL MEDICINE

## 2022-12-27 NOTE — PROGRESS NOTES
"[facility]    CC: Lower Extremity Edema    Patient ID: Mr. Shiraz Jha is a 62 y.o. male with Afib s/p ablation (7/2014), BLE lymphedema, venous insufficiency, HTN, DM2, morbid obesity s/p gastric bypass (2010), QUINTIN (on CPAP), who presents for a follow up appointment.  Pertinent history/events as follows:    -Pt kindly referred by Twyla Bahena and Dr Scott for evaluation of lower extremity edema (per Dr. Scott's note on 9/24/2019:  "Mr. Jha is in clinic today for continued LE edema and discoloration of his legs.  He had a venous ultrasound on 8/6/19 for the swelling and mild reflux was noted in the left popliteal.  Patient denies chest pain with exertion or at rest, palpitations, SOB, DAI, dizziness, syncope, orthopnea, PND, or claudication.  Instructed to elevate legs when seated, low salt diet, increase walking and compression stockings. No venous reflux noted on US.  Refer to Dr. Ureña in interventional cardiology."    -At our initial clinic visit on 10/16/2019, Mr. Jha reported BLE edema for the past 1 year.  States LE edema is improved with graduated compression hose.  He has no LE pain, claudication symptoms, rest pain, or tissue loss.  No smoking history.  States he has gained over 50 pounds in the past year, despite gastric bypass surgery.  BLE Venous Reflux Study from 8/6/2019 showed no evidence of lower extremity DVT bilaterally.  There is mild left popliteal (deep venous) reflux.  Plan:   BLE Edema- BLE edema likely due to a component of venous insufficiency and morbid obesity.  Check cmp today.  If potassium and creatinine are appropriate, increase lasix to 40 mg bid for 7 days, then resume at 40 mg daily.  Pt to continue this cycle/regimen for lasix.  Pt will benefit from significant weight loss.  Continue graduated compression hose, leg elevation and low salt diet.  Limit fluid intake to 2 liters a day.  Check exercise SURINDER to evaluate for significant PAD.   Morbid Obesity- " Refer back to Bariatric Surgery.     -At follow up clinic visit on 11/27/2019, Mr. Jha reported no significant improvement in BLE edema with lasix regimen of 40 mg bid for 7 days, then resuming at 40 mg daily.  Exercise SURINDER from 11/15/2019 showed normal rest and exercise SURINDER bilaterally with normal PVR waveforms bilaterally.  Exam shows 1+ BLE pitting edema.  Plan:   BLE Edema- BLE edema likely due to a component of venous insufficiency and morbid obesity.  Check cmp today.  If potassium and creatinine are appropriate, discontinue lasix and start bumex 1 mg bid.  Pt will benefit from significant weight loss.  Continue graduated compression hose, leg elevation and low salt diet.  Limit fluid intake to 2 liters a day.  Check exercise SURINDER to evaluate for significant PAD.   Morbid Obesity- Pt referred back to Bariatric Surgery.     -At clinic visit on 1/8/2020, Mr. Jha reported significant improvement in BLE edema since starting bumex 1 mg daily.  Exercise SURINDER study from 11/25/2019 showed normal rest and exercise SURINDER bilaterally.  Normal PVR waveforms bilaterally.  Plan:   BLE Edema- BLE edema now improving.  Continue bumex 1 mg daily.  Check cmp today to monitor renal fxn.  Exercise SURINDER study from 11/25/2019 showed normal rest and exercise SURINDER bilaterally.  Normal PVR waveforms bilaterally. Continue graduated compression hose, leg elevation and low salt diet.  Limit fluid intake to 2 liters a day.    Morbid Obesity- Pt referred back to Bariatric Surgery.     - At clinic visit on 04/08/20, Mr. Jha reported doing well with no significant LE edema.  Continues to wear graduated compression hose, low salt diet, and limiting fluid intake to 2 liters a day.    Plan  BLE Edema- Currently doing well.  Continue bumex 1 mg daily.  Continue graduated compression hose, leg elevation and low salt diet.  Limit fluid intake to 2 liters a day.      - At clinic visit on 12/01/20, Mr. Jha reported worsening swelling of his bilateral  lower extremities. He stopped wearing compression hose a month ago, however, he continues to take bumex twice a day. He reports no rest pain, claudication, CLI or tissue atrophy.   Plan:  BLE Edema- Due to lymphedema and venous insufficiency. Patient notices worsening of bilateral lower extremity edema after he stopped using compression hose for the past month.  Pt instructed to resume graduated compression hose as prescribed.  Continue bumex 1 mg twice a day.  Continue leg elevation and low salt diet.  Limit fluid intake to 2 liters a day.      -At clinic visit on 1/7/2021, Mr. Jha reported improvement in bilateral lower extremity edema compared to previous visit on 12/01/20.  He reports wearing graduated compression hose.  He is taking Bumex 1 mg twice a day.  He is not following sodium restricted diet, and diet includes soups and gumbo.  He reports no claudication, rest pain, or tissue loss.      Plan:   BLE Edema- Due to lymphedema and venous insufficiency. Patient notices improvement of bilateral lower extremity edema. Continue graduated compression hose.  Continue bumex 1 mg twice a day.  Continue leg elevation when resting.  Encourage sodium restriction to 2000 mg/day and limit fluid intake to 2 liters a day.  Obesity- Pt is following up with Bariatric Surgery.  Encourage dietary modification, exercise and weight loss.      -At clinic visit on 2/9/2021, Mr. Jha reports mild improvement in BLE edema since clinic visit on 1/7/2021.  He has no claudication or tissue loss.   Plan:   BLE Edema- Due to lymphedema and venous insufficiency.   Edema is improving.  Check cmp today.  If creatinine and potassium are appropriate, increase bumex to 2 mg bid for 5 days, then decrease to 1 mg bid for 5 days.  Pt to continue this cycle/ergiemen of lasix.  Continue leg elevation when resting.  Limit sodium restriction to 2000 mg/day and limit fluid intake to 2 liters a day.  Obesity- Pt is following up with Bariatric  Surgery.  Encourage dietary modification, exercise and weight loss.      - At clinic visit on 03/25/21, Mr. Jha reported no new symptoms.  Exam shows BLE edema has improved significantly.  Plan:  BLE Edema- Due to lymphedema and venous insufficiency.   Edema has improved significantly.  Refer to lymphedema clinic.  Recheck cmp today.  If creatinine and potassium are appropriate, continue bumex 2 mg bid for 5 days, then decrease to 1 mg bid for 5 days.  Pt to continue this cycle/ergiemen of lasix.  Continue leg elevation when resting.  Limit sodium restriction to 2000 mg/day and limit fluid intake to 2 liters a day.    Obesity- Pt is following up with Bariatric Surgery.  Encourage dietary modification, exercise and weight loss.      -At clinic visit on 5/25/2021, Mr. Jha reported significant improvement in lower extremity edema since initiation of lymphedema clinic therapy.   He reports no claudication, rest pain or tissue loss.   Plan:    BLE Edema- Due to lymphedema and venous insufficiency.   Edema has improved significantly.  Continue lymphedema clinic.  Check CMP today.  If creatinine and potassium are appropriate, continue bumex 2 mg bid for 5 days, then decrease to 1 mg bid for 5 days.  Pt to continue this cycle/ergiemen of lasix.  Continue leg elevation when resting.  Limit sodium restriction to 2000 mg/day and limit fluid intake to 2 liters a day.    Obesity- Pt is following up with Bariatric Surgery.  Encourage dietary modification, exercise and weight loss.       -At clinic visit on 7/13/2021, Mr. Jha reported significant improvement in BLE edema following lymphedema clinic therapy.  He has no claudication or tissue loss.   Plan:   BLE Edema due to lymphedema and venous insufficiency- Now significantly improved following lymphedema clinic therapy.  Check cmp today.  If potassium and creatinine are appropriate, continue bumex 2 mg bid for 1 week, then reduce to 1 mg bid for 1 week.  Pt to continue this  regimen/cycle of bumex.    Obesity- Pt is following up with Bariatric Surgery.  Encourage dietary modification, exercise and weight loss.     -At clinic visit on 10/14/2021 clinic visit, Mr. Jha reporeds continued improvement in BLE edema.  He has no claudication or tissue loss.    Plan:  BLE Edema due to lymphedema and venous insufficiency- Remains significantly improved.  Continue bumex 2 mg bid for 1 week, then reduce to 1 mg bid for 1 week.  Pt to continue this regimen/cycle of bumex.    Obesity- Pt is following up with Bariatric Surgery.  Encourage dietary modification, exercise and weight loss.      -At clinic visit on 1/18/2022, Mr. Jha reported feeling well and swelling improved. He has no other complaints.   Plan:   BLE Edema due to lymphedema and venous insufficiency- Continues to improve. Continue bumex, compression stockings, elevate legs when resting, limit fluid intake to 1.5L daily, sodium intake to 2000mg daily.  Morbid Obesity- Pt is following up with Bariatric Surgery. Stable. Encourage dietary modification, exercise and weight loss.      -At clinic visit on 5/17/2022, Mr. Jha reported worsening leg swelling.  On 5/10/2022, he was admitted for GI bleeding.  During the hospitalization, bumex was held.  He was subsequently restarted on bumex after discharge.   on 5/11/2022.  Currently taking bumex 2 mg bid on Tues/Thur/Sat/Sun.  Taking 4 mg bid on Mon/Wed/Fri.     Plan:   BLE Edema due to lymphedema and venous insufficiency- Pt with worsening leg swelling since hospitalization on 5/10/2022.  Improving on current regimen.  Refer back to lymphedema clinic.  Continue bumex 2 mg bid on Tues/Thur/Sat/Sun and 4 mg bid on Mon/Wed/Fri.   Continue graduated compression hose; elevate legs when resting, limit fluid intake to 1.5L daily, sodium intake to 2000mg daily.  Check cmp today.    Morbid Obesity- Pt is following up with Bariatric Surgery. Stable. Encourage dietary modification, exercise and  weight loss.    -At clinic visit on 6/28/2022, Mr. Jha reported improvement in BLE edema.  He is on the waiting list for lymphedema clinic therapy.  Currently taking bumex 2 mg bid.  Plan:   BLE Edema due to lymphedema and venous insufficiency- Improving.  Pt is on the waiting list for lymphedema clinic therapy.  Continue bumex 2 mg bid.  Check cmp today to monitor renal function.  Continue graduated compression hose; elevate legs when resting, limit fluid intake to 1.5L daily, sodium intake to 2000mg daily.    Morbid Obesity s/p Gastric Bypass- Pt is following up with Bariatric Surgery. Stable. Encourage dietary modification, exercise and weight loss.    Afib s/p ablation (7/2014)- Stable.  Continue Xarelto 20 mg daily for anticoagulation.   HTN- Controlled.  Continue current medications.    QUINTIN- Continue CPAP nightly.     HPI:  Mr. Jha reports BLE edema has improved and is stable.  Spironolactone 25 mg daily was added to his regimen by Dr. James on 10/11/202.  He has no chest pain or SOB.  Plans to have right ankle surgery on 2/3/2022.      Past Medical History:   Diagnosis Date    Allergy     Anemia     Asthma     Atrial fibrillation     Cataract     Chronic rhinitis 9/26/2013    DDD (degenerative disc disease) 4/3/2015    Depression     Diabetes mellitus     Fibromyalgia     Gastroesophageal reflux disease without esophagitis 7/14/2017    Hypertension     Sinusitis, acute, maxillary 12/20/2012    Thyroid disease        Past Surgical History:   Procedure Laterality Date    ANTEGRADE SINGLE BALLOON ENTEROSCOPY N/A 5/26/2022    Procedure: ENTEROSCOPY, SINGLE BALLOON, ANTEGRADE;  Surgeon: Lyle Edmond MD;  Location: [address];  Service: Endoscopy;  Laterality: N/A;  Will use single-balloon scope for both the upper endoscopy and the colonoscopy - recent incomplete colonoscopy due to looping.    Pt is fully vaccinated-DS  5/20/22-Approval to hold Xarelto rec'd from Dr. Webb-pending approval (see  telephoned en    CERVICAL SPINE SURGERY      COLONOSCOPY N/A 5/10/2022    Procedure: COLONOSCOPY;  Surgeon: Conrad Diaz MD;  Location: [facility] (2ND FLR);  Service: Endoscopy;  Laterality: N/A;    COLONOSCOPY N/A 5/26/2022    Procedure: COLONOSCOPY;  Surgeon: Lyle Edmond MD;  Location: [facility] ENDO (2ND FLR);  Service: Endoscopy;  Laterality: N/A;    EPIDURAL STEROID INJECTION INTO LUMBAR SPINE N/A 5/29/2018    Procedure: INJECTION-STEROID-EPIDURAL-LUMBAR- L4-5;  Surgeon: Roman Lyman MD;  Location: [facility] PAIN MGT;  Service: Pain Management;  Laterality: N/A;  Patient is diabetic and Xarleto     EPIDURAL STEROID INJECTION INTO LUMBAR SPINE N/A 7/3/2018    Procedure: INJECTION, STEROID, SPINE, LUMBAR, EPIDURAL- L4-5;  Surgeon: Roman Lyman MD;  Location: [facility] PAIN MGT;  Service: Pain Management;  Laterality: N/A;  Patient takes Xarelto and ASA     ESOPHAGOGASTRODUODENOSCOPY N/A 5/9/2022    Procedure: EGD (ESOPHAGOGASTRODUODENOSCOPY);  Surgeon: Conrad Diaz MD;  [facility]  Service: Endoscopy;  Laterality: N/A;    EXCISION OF LESION OF LIP N/A 7/7/2020    Procedure: EXCISION, LESION, LIP;  Surgeon: Caden Navarro MD;  [facility];  Service: ENT;  Laterality: N/A;    GASTRIC BYPASS      SINUS SURGERY  2000    Dr. Watt at [facility]    TONSILLECTOMY         Social History     Socioeconomic History    Marital status: Single   Tobacco Use    Smoking status: Never    Smokeless tobacco: Never   Substance and Sexual Activity    Alcohol use: No     Comment: rare    Drug use: No    Sexual activity: Yes     Partners: Female   Social History Narrative    [place], lives alone w/2 dogs.    Dogs are his kids.    Works PT --  at [employer], on ssdi from neck and back/depression.     Social Determinants of Health     Financial Resource Strain: High Risk    Difficulty of Paying Living Expenses: Very hard   Food Insecurity: Food Insecurity Present    Worried About Running Out of  Food in the Last Year: Often true    Ran Out of Food in the Last Year: Often true   Transportation Needs: No Transportation Needs    Lack of Transportation (Medical): No    Lack of Transportation (Non-Medical): No   Physical Activity: Insufficiently Active    Days of Exercise per Week: 3 days    Minutes of Exercise per Session: 30 min   Stress: No Stress Concern Present    Feeling of Stress : Not at all   Social Connections: Unknown    Frequency of Communication with Friends and Family: More than three times a week    Frequency of Social Gatherings with Friends and Family: More than three times a week    Active Member of Clubs or Organizations: Yes    Attends Club or Organization Meetings: More than 4 times per year    Marital Status: Never    Housing Stability: High Risk    Unable to Pay for Housing in the Last Year: Yes    Number of Places Lived in the Last Year: 1    Unstable Housing in the Last Year: Yes       Family History   Problem Relation Age of Onset    Heart disease Father     Cancer Mother         lung    Hypertension Sister     Heart disease Brother     Cirrhosis Brother     Diabetes Brother        Review of patient's allergies indicates:   Allergen Reactions    Iodinated contrast media Other (See Comments)     Raises BP         Medication List with Changes/Refills   Current Medications    BUMETANIDE (BUMEX) 2 MG TABLET    Take 1 tablet (2 mg total) by mouth 2 (two) times daily.    BUPROPION (WELLBUTRIN XL) 300 MG 24 HR TABLET    Take 300 mg by mouth once daily. Pt states he only takes 300mg    CYANOCOBALAMIN 500 MCG TABLET    Take 500 mcg by mouth once daily.    DEXTROAMPHETAMINE-AMPHETAMINE (ADDERALL) 20 MG TABLET    Take 1 tablet by mouth every morning, take 1 tablet by mouth 3-4 hours later, and take 1/2 tablet every afternoon    FLUTICASONE PROPIONATE (FLONASE) 50 MCG/ACTUATION NASAL SPRAY    2 sprays (100 mcg total) by Each Nostril route once daily.    GABAPENTIN (NEURONTIN) 300 MG CAPSULE     Take 1 capsule (300 mg total) by mouth 3 (three) times daily.    IMPOYZ 0.025 % CREA    Apply topically as needed.     LEVOCETIRIZINE (XYZAL) 5 MG TABLET    TAKE 1 TABLET (5 MG TOTAL) BY MOUTH EVERY EVENING.    LUZU 1 % CREA    Apply topically as needed.     METOPROLOL TARTRATE (LOPRESSOR) 25 MG TABLET    Take 1 tablet (25 mg total) by mouth 2 (two) times daily.    MULTIVITAMIN (THERAGRAN) PER TABLET    Take 1 tablet by mouth once daily.     NAFTIN 2 % GEL    daily as needed.     NITROGLYCERIN (NITROSTAT) 0.4 MG SL TABLET    Please dispense 25 pills in 4 bottles.    OMEPRAZOLE (PRILOSEC) 10 MG CAPSULE    Take 2 capsules (20 mg total) by mouth once daily.    POTASSIUM CHLORIDE SA (K-DUR,KLOR-CON M) 10 MEQ TABLET    Take 1 tablet (10 mEq total) by mouth 2 (two) times daily.    RIVAROXABAN (XARELTO) 20 MG TAB    Take 1 tablet (20 mg total) by mouth once daily.    SOTALOL (BETAPACE) 80 MG TABLET    Take 1 tablet (80 mg total) by mouth 2 (two) times daily.    SPIRONOLACTONE (ALDACTONE) 25 MG TABLET    Take 1 tablet (25 mg total) by mouth once daily.    TAMSULOSIN (FLOMAX) 0.4 MG CAP    Take 1 capsule (0.4 mg total) by mouth 2 (two) times daily.    THIAMINE 250 MG TABLET    Take 250 mg by mouth once daily.    VITAMIN D (VITAMIN D3) 1000 UNITS TAB    Take 1,000 Units by mouth once daily.    VRAYLAR 3 MG CAP    Take 3 mg by mouth.   Discontinued Medications    VORTIOXETINE (TRINTELLIX) 20 MG TAB    Take 20 mg by mouth once daily.        Review of Systems  Constitution: Denies chills, fever, and sweats.  HENT: Denies headaches or blurry vision.  Cardiovascular: Denies chest pain or irregular heart beat.  Respiratory: Denies cough or shortness of breath.  Gastrointestinal: Denies abdominal pain, nausea, or vomiting.  Musculoskeletal: Positive for BLE swelling  improved  Neurological: Denies dizziness or focal weakness.  Psychiatric/Behavioral: Normal mental status.  Hematologic/Lymphatic: Denies bleeding problem or easy  "bruising/bleeding.  Skin: Denies rash or suspicious lesions    Physical Examination  /85   Pulse 60   Ht 5' 6" (1.676 m)   Wt 130 kg (286 lb 9.6 oz)   BMI 46.26 kg/m²     Constitutional: No acute distress, conversant  HEENT: Sclera anicteric, Pupils equal, round and reactive to light, extraocular motions intact, Oropharynx clear  Neck: No JVD, no carotid bruits  Cardiovascular: regular rate and rhythm, no murmur, rubs or gallops, normal S1/S2  Pulmonary: Clear to auscultation bilaterally  Abdominal: Abdomen soft, nontender, nondistended, positive bowel sounds  Extremities: BLE's with minimal pitting edema   Pulses:  Carotid pulses are 2+ on the right side, and 2+ on the left side.  Radial pulses are 2+ on the right side, and 2+ on the left side.   Femoral pulses are 2+ on the right side, and 2+ on the left side.  Popliteal pulses are 2+ on the right side, and 2+ on the left side.   Dorsalis pedis pulses are 2+ on the right side, and 2+ on the left side.   Posterior tibial pulses are 2+ on the right side, and 2+ on the left side.    Skin: No ecchymosis, erythema, or ulcers  Psych: Alert and oriented x 3, appropriate affect  Neuro: CNII-XII intact, no focal deficits    Labs:  Most Recent Data  CBC:   Lab Results   Component Value Date    WBC 12.22 07/16/2022    HGB 11.3 (L) 07/16/2022    HCT 37.7 (L) 07/16/2022     07/16/2022    MCV 82 07/16/2022    RDW 15.2 (H) 07/16/2022     BMP:   Lab Results   Component Value Date     11/08/2022    K 3.3 (L) 11/08/2022     11/08/2022    CO2 28 11/08/2022    BUN 11 11/08/2022    CREATININE 1.0 11/08/2022     (H) 11/08/2022    CALCIUM 8.8 11/08/2022    MG 2.0 05/17/2022    PHOS 3.7 07/15/2017     LFTS;   Lab Results   Component Value Date    PROT 8.0 07/16/2022    ALBUMIN 3.5 07/16/2022    BILITOT 1.9 (H) 07/16/2022    AST 40 07/16/2022    ALKPHOS 269 (H) 07/16/2022    ALT 36 07/16/2022     COAGS:   Lab Results   Component Value Date    INR 1.1 " 05/06/2022     FLP:   Lab Results   Component Value Date    CHOL 186 02/15/2022    HDL 37 (L) 02/15/2022    LDLCALC 115.6 02/15/2022    TRIG 167 (H) 02/15/2022    CHOLHDL 19.9 (L) 02/15/2022     CARDIAC:   Lab Results   Component Value Date    TROPONINI <0.006 07/12/2022    BNP 20 07/16/2022       Echo 7/23/2019:  Normal left ventricular systolic function. The estimated ejection fraction is 60%  Normal right ventricular systolic function.  Normal LV diastolic function.  Moderate left atrial enlargement.  Mild right atrial enlargement.  The ascending aorta is mildly dilated (42mm in diameter, unchanged since Feb 2018).  The estimated PA systolic pressure is 23 mm Hg  Normal central venous pressure (3 mm Hg).     Exercise SURINDER 11/25/2019:  Normal rest and exercise SURINDER bilaterally.  Normal PVR waveforms bilaterally.    BLE Venous Reflux Study 8/6/2019:  No evidence of lower extremity DVT bilaterally.  Mild left popliteal (deep venous) reflux.    Assessment/Plan:  Mr. Shiraz Jha is a 62 y.o. male with Afib s/p ablation (7/2014), BLE lymphedema, venous insufficiency, HTN, DM2, morbid obesity s/p gastric bypass (2010), QUINTIN (on CPAP), who presents for a follow up appointment.      1. BLE Edema due to lymphedema and venous insufficiency- Improved and stable.  Refer back to lymphedema clinic.  Continue bumex 2 mg bid and spironolactone 25 mg daily.  Continue graduated compression hose; elevate legs when resting, limit fluid intake to 1.5L daily, sodium intake to 2000mg daily.      2. Morbid Obesity s/p Gastric Bypass- Encourage dietary modification, exercise and weight loss.      3. Afib s/p ablation (7/2014)- Stable.  Continue Xarelto 20 mg daily for anticoagulation.     4. HTN- Controlled.  Continue current medications.      5. QUINTIN- Continue CPAP nightly.     Follow up in 3 months    Total duration of face to face visit time 30 minutes.  Total time spent counseling greater than fifty percent of total visit  time.  Counseling included discussion regarding imaging findings, diagnosis, possibilities, treatment options, risks and benefits.  The patient had many questions regarding the options and long-term effects.    Randolph Ureña MD, PhD  Interventional Cardiology

## 2022-12-27 NOTE — PATIENT INSTRUCTIONS
Assessment/Plan:  Mr. Shiraz Jha is a 62 y.o. male with Afib s/p ablation (7/2014), BLE lymphedema, venous insufficiency, HTN, DM2, morbid obesity s/p gastric bypass (2010), QUINTIN (on CPAP), who presents for a follow up appointment.      1. BLE Edema due to lymphedema and venous insufficiency- Improved and stable.  Refer back to lymphedema clinic.  Continue bumex 2 mg bid and spironolactone 25 mg daily.  Continue graduated compression hose; elevate legs when resting, limit fluid intake to 1.5L daily, sodium intake to 2000mg daily.      2. Morbid Obesity s/p Gastric Bypass- Encourage dietary modification, exercise and weight loss.      3. Afib s/p ablation (7/2014)- Stable.  Continue Xarelto 20 mg daily for anticoagulation.     4. HTN- Controlled.  Continue current medications.      5. QUINTIN- Continue CPAP nightly.     Follow up in 3 months

## 2023-01-05 ENCOUNTER — PATIENT OUTREACH (OUTPATIENT)
Dept: ADMINISTRATIVE | Facility: OTHER | Age: 63
End: 2023-01-05
Payer: COMMERCIAL

## 2023-01-05 ENCOUNTER — PATIENT OUTREACH (OUTPATIENT)
Dept: ADMINISTRATIVE | Facility: HOSPITAL | Age: 63
End: 2023-01-05
Payer: COMMERCIAL

## 2023-01-05 ENCOUNTER — PATIENT MESSAGE (OUTPATIENT)
Dept: ADMINISTRATIVE | Facility: HOSPITAL | Age: 63
End: 2023-01-05
Payer: COMMERCIAL

## 2023-01-05 ENCOUNTER — LAB VISIT (OUTPATIENT)
Dept: LAB | Facility: HOSPITAL | Age: 63
End: 2023-01-05
Payer: MEDICARE

## 2023-01-05 DIAGNOSIS — Z01.818 PREOP TESTING: ICD-10-CM

## 2023-01-05 DIAGNOSIS — R73.03 PREDIABETES: ICD-10-CM

## 2023-01-05 LAB
ANION GAP SERPL CALC-SCNC: 7 MMOL/L (ref 8–16)
BASOPHILS # BLD AUTO: 0.05 K/UL (ref 0–0.2)
BASOPHILS NFR BLD: 0.9 % (ref 0–1.9)
BUN SERPL-MCNC: 16 MG/DL (ref 6–20)
CALCIUM SERPL-MCNC: 9.2 MG/DL (ref 8.7–10.5)
CHLORIDE SERPL-SCNC: 104 MMOL/L (ref 95–110)
CO2 SERPL-SCNC: 29 MMOL/L (ref 23–29)
CREAT SERPL-MCNC: 1.1 MG/DL (ref 0.5–1.4)
DIFFERENTIAL METHOD: ABNORMAL
EOSINOPHIL # BLD AUTO: 0.1 K/UL (ref 0–0.5)
EOSINOPHIL NFR BLD: 1 % (ref 0–8)
ERYTHROCYTE [DISTWIDTH] IN BLOOD BY AUTOMATED COUNT: 17.6 % (ref 11.5–14.5)
EST. GFR  (NO RACE VARIABLE): >60 ML/MIN/1.73 M^2
ESTIMATED AVG GLUCOSE: 146 MG/DL (ref 68–131)
GLUCOSE SERPL-MCNC: 139 MG/DL (ref 70–110)
HBA1C MFR BLD: 6.7 % (ref 4–5.6)
HCT VFR BLD AUTO: 36.1 % (ref 40–54)
HGB BLD-MCNC: 10.2 G/DL (ref 14–18)
IMM GRANULOCYTES # BLD AUTO: 0.02 K/UL (ref 0–0.04)
IMM GRANULOCYTES NFR BLD AUTO: 0.3 % (ref 0–0.5)
LYMPHOCYTES # BLD AUTO: 1.7 K/UL (ref 1–4.8)
LYMPHOCYTES NFR BLD: 30.2 % (ref 18–48)
MCH RBC QN AUTO: 22.1 PG (ref 27–31)
MCHC RBC AUTO-ENTMCNC: 28.3 G/DL (ref 32–36)
MCV RBC AUTO: 78 FL (ref 82–98)
MONOCYTES # BLD AUTO: 0.5 K/UL (ref 0.3–1)
MONOCYTES NFR BLD: 8 % (ref 4–15)
NEUTROPHILS # BLD AUTO: 3.4 K/UL (ref 1.8–7.7)
NEUTROPHILS NFR BLD: 59.6 % (ref 38–73)
NRBC BLD-RTO: 0 /100 WBC
PLATELET # BLD AUTO: 323 K/UL (ref 150–450)
PMV BLD AUTO: 10.1 FL (ref 9.2–12.9)
POTASSIUM SERPL-SCNC: 4 MMOL/L (ref 3.5–5.1)
RBC # BLD AUTO: 4.62 M/UL (ref 4.6–6.2)
SODIUM SERPL-SCNC: 140 MMOL/L (ref 136–145)
WBC # BLD AUTO: 5.72 K/UL (ref 3.9–12.7)

## 2023-01-05 PROCEDURE — 85025 COMPLETE CBC W/AUTO DIFF WBC: CPT

## 2023-01-05 PROCEDURE — 80048 BASIC METABOLIC PNL TOTAL CA: CPT

## 2023-01-05 PROCEDURE — 83036 HEMOGLOBIN GLYCOSYLATED A1C: CPT

## 2023-01-05 PROCEDURE — 36415 COLL VENOUS BLD VENIPUNCTURE: CPT | Mod: PO

## 2023-01-05 NOTE — PROGRESS NOTES
CHW - Follow Up    This Community Health Worker completed a follow up visit with patient via telephone today.  Pt/Caregiver reported: Pt stated that he signed the consent  Community Health Worker provided: CHW encouraged pt to sign consent if he is still in need of services, pt stated that he signed it. The [facility] consent indicator stated that it is pending. Consent was re-sent to pt, pt voiced understanding. Additional referrals were put in for food assistance.  Follow up required: Yes

## 2023-01-10 ENCOUNTER — PATIENT OUTREACH (OUTPATIENT)
Dept: ADMINISTRATIVE | Facility: OTHER | Age: 63
End: 2023-01-10
Payer: COMMERCIAL

## 2023-01-10 NOTE — PROGRESS NOTES
CHW - Follow Up    Pt/Caregiver reported: Pt signed the consent  Community Health Worker provided: CHW checked that statuses of [facility] referrals placed, SNAP assistance is approved and Job Placement referrals had no response from the organization. CHW placed additional referrals through [facility] for Job Placement assistance.  Follow up required: Yes  Follow-up Outreach - Due: 1/24/2023

## 2023-01-12 ENCOUNTER — OFFICE VISIT (OUTPATIENT)
Dept: CARDIOLOGY | Facility: CLINIC | Age: 63
End: 2023-01-12
Payer: COMMERCIAL

## 2023-01-12 VITALS
WEIGHT: 286.19 LBS | DIASTOLIC BLOOD PRESSURE: 70 MMHG | BODY MASS INDEX: 45.99 KG/M2 | HEIGHT: 66 IN | SYSTOLIC BLOOD PRESSURE: 116 MMHG | HEART RATE: 56 BPM

## 2023-01-12 DIAGNOSIS — M79.671 CHRONIC FOOT PAIN, RIGHT: ICD-10-CM

## 2023-01-12 DIAGNOSIS — I89.0 LYMPHEDEMA OF BOTH LOWER EXTREMITIES: ICD-10-CM

## 2023-01-12 DIAGNOSIS — I87.2 VENOUS INSUFFICIENCY OF BOTH LOWER EXTREMITIES: Primary | ICD-10-CM

## 2023-01-12 DIAGNOSIS — G89.29 CHRONIC FOOT PAIN, RIGHT: ICD-10-CM

## 2023-01-12 DIAGNOSIS — Z01.810 PREOPERATIVE CARDIOVASCULAR EXAMINATION: ICD-10-CM

## 2023-01-12 DIAGNOSIS — I10 ESSENTIAL HYPERTENSION: Chronic | ICD-10-CM

## 2023-01-12 DIAGNOSIS — I48.19 PERSISTENT ATRIAL FIBRILLATION: ICD-10-CM

## 2023-01-12 DIAGNOSIS — S86.301S PERONEAL TENDON INJURY, RIGHT, SEQUELA: ICD-10-CM

## 2023-01-12 DIAGNOSIS — Z01.818 PREOP TESTING: ICD-10-CM

## 2023-01-12 PROCEDURE — 99215 OFFICE O/P EST HI 40 MIN: CPT | Mod: PBBFAC,PO | Performed by: INTERNAL MEDICINE

## 2023-01-12 PROCEDURE — 99999 PR PBB SHADOW E&M-EST. PATIENT-LVL V: ICD-10-PCS | Mod: PBBFAC,,, | Performed by: INTERNAL MEDICINE

## 2023-01-12 PROCEDURE — 99999 PR PBB SHADOW E&M-EST. PATIENT-LVL V: CPT | Mod: PBBFAC,,, | Performed by: INTERNAL MEDICINE

## 2023-01-12 PROCEDURE — 99214 PR OFFICE/OUTPT VISIT, EST, LEVL IV, 30-39 MIN: ICD-10-PCS | Mod: S$GLB,,, | Performed by: INTERNAL MEDICINE

## 2023-01-12 PROCEDURE — 99214 OFFICE O/P EST MOD 30 MIN: CPT | Mod: S$GLB,,, | Performed by: INTERNAL MEDICINE

## 2023-01-12 NOTE — PROGRESS NOTES
HISTORY:    This is a 62-year-old male with a history of AFib status post ablation 2014, venous insufficiency/lymphedema, hypertension, diabetes, obesity status post gastric bypass 2010, and QUINTIN presenting for follow-up. The patient has previously been seen by Dr. Ureña. Followed by Dr. Scott in primary cards clinic.    The patient reports years of LE dwelling bilaterally, progressively getting worse. He has been managed with bumex 2x2 and spironolactone 25x1. Symptoms best in the morning and worst in the evening. He does have a heaviness in his legs when his swelling is significant. Using compression stockings daily.     Activity levles mild to minimal at this time due to orthopedic issues. Anticipated ankle surgey in the future.    In addition to bumetadine 2x2, he tolerates metoprolol 25x2, spironolactone 25x1, sotalol 80x1, rivaroxaban 20x1. Bps well controlled.    Pt has been re-referred to lymphedema clinic and home compression devices have been requested by Dr. Ureña.     PHYSICAL EXAM:    Vitals:    01/12/23 1006   BP: 116/70   Pulse: (!) 56         NAD, A+Ox3.  No jvd, no bruit.  RRR nml s1,s2. No murmurs.  CTA B no wheezes or crackles.  2+ B radial and 1+ B DP/PT. B LE edema. Negative stemmer sign.    LABS/STUDIES (imaging reviewed during clinic visit):    January 2023 hemoglobin 10.2 with an MCV of 78.  Creatinine 1.1 with a BUN of 16.  Albumin 3.5.  July 2022 hemoglobin 11.3 with MCV of 82.  Creatinine 1.4 with BUN of 19.  Albumin 3.5.  BNP 20.  ECG July 2022 demonstrates sinus rhythm with no Q-waves or ST changes.    TTE May 2022 demonstrates normal LV size and systolic function.  EF 60%.  CVP 3.  PET stress 2021 normal LVEF with no evidence of ischemia.    SURINDER 2019 normal rest and exercise SURINDER bilaterally.  Normal PVR waveforms bilaterally.    Venous duplex November 2022 demonstrates right SFJ and left GSV reflux.  2019 no evidence of DVT bilaterally.  No evidence of GSV reflux bilaterally.         ASSESSMENT & PLAN:    1. Venous insufficiency of both lower extremities    2. Peroneal tendon injury, right, sequela    3. Chronic foot pain, right    4. Preop testing    5. Lymphedema of both lower extremities    6. Essential hypertension    7. Persistent atrial fibrillation    8. Preoperative cardiovascular examination                  B LE venous insufficiency with secondary lymphedema. C3, Ep, As, Pr symptoms bilaterally with limitation related to symptoms. CVP 3 on TTE. Continue bumetadine 2x2 and spironolactone 25x1.     Venous insufficiency study with R SFJ and L GSV reflux. Not certain how venous ablation will accept symptoms given multifactorial contribution, lymphedema, decreased activity, etc. However, pt has significant symptoms and ablation would be reasonable. VCSS of 9.     Awaiting lymphedema clinic opening and has been setup for home compression devices.    Pt will start with lymphedema therapy and see response to therapy before deciding on venous ablation. Continue to follow with Dr. Ureña.    He understands the importance of movement as well as weight loss in effecting his disease process and CV risk. Bps controlled. On rivaroxaban for afib history.    Pt also has anticipated right ankle surgery. He would be considered low risk from a CV perspective and is optimized. Okay to proceed without further testing. If necessary, okay to hold rivaroxaban 48-72 hours pre-procedure and restart post-op when safe from a surgical perspective.    Amy James MD

## 2023-01-26 ENCOUNTER — PATIENT OUTREACH (OUTPATIENT)
Dept: ADMINISTRATIVE | Facility: OTHER | Age: 63
End: 2023-01-26
Payer: COMMERCIAL

## 2023-01-26 NOTE — PROGRESS NOTES
CHW - Follow Up    Community Health Worker provided: CHW placed additional referrals through  for [employer], there was no response from the organization for previously entered referrals. S[employer] case is accepted and still currently open.   Follow up required: Yes  Follow-up Outreach - Due: 2/9/2023

## 2023-01-31 ENCOUNTER — PATIENT MESSAGE (OUTPATIENT)
Dept: INTERNAL MEDICINE | Facility: CLINIC | Age: 63
End: 2023-01-31
Payer: COMMERCIAL

## 2023-01-31 NOTE — TELEPHONE ENCOUNTER
Lab results reviewed; overall stable. A1C increased to 6.7. Advised Mr. Jha to continue to measure his home blood glucoses like discussed in clinic. At this time, patient appears stable to proceed with scheduled surgery.

## 2023-02-06 ENCOUNTER — PATIENT MESSAGE (OUTPATIENT)
Dept: PODIATRY | Facility: CLINIC | Age: 63
End: 2023-02-06
Payer: COMMERCIAL

## 2023-02-06 ENCOUNTER — TELEPHONE (OUTPATIENT)
Dept: PODIATRY | Facility: CLINIC | Age: 63
End: 2023-02-06
Payer: COMMERCIAL

## 2023-02-06 ENCOUNTER — TELEPHONE (OUTPATIENT)
Dept: PREADMISSION TESTING | Facility: HOSPITAL | Age: 63
End: 2023-02-06
Payer: COMMERCIAL

## 2023-02-06 ENCOUNTER — CLINICAL SUPPORT (OUTPATIENT)
Dept: REHABILITATION | Facility: HOSPITAL | Age: 63
End: 2023-02-06
Attending: PODIATRIST
Payer: COMMERCIAL

## 2023-02-06 DIAGNOSIS — R60.0 EDEMA OF BOTH LOWER EXTREMITIES: Primary | ICD-10-CM

## 2023-02-06 DIAGNOSIS — I89.0 LYMPHEDEMA OF BOTH LOWER EXTREMITIES: ICD-10-CM

## 2023-02-06 PROCEDURE — 97140 MANUAL THERAPY 1/> REGIONS: CPT

## 2023-02-06 PROCEDURE — 97162 PT EVAL MOD COMPLEX 30 MIN: CPT

## 2023-02-06 PROCEDURE — 97535 SELF CARE MNGMENT TRAINING: CPT

## 2023-02-06 NOTE — TELEPHONE ENCOUNTER
Attempted to reach out to Mr Jha to inform him that his surgery with Dr Beaulieu has been rescheduled to 2/15/23 with post op appts made accordingly; however, he was unavailable. Left voicemail and sent new appt reminders in the mail to the address on file as well as with sister, Lisandra Snyder. Will also send message thorough pt portal. Encouraged to call if further assistance is needed. Will attempt to reach out to pt again during clinic hours.

## 2023-02-06 NOTE — PLAN OF CARE
[facility]  Physical Therapy Initial Evaluation    Name: Shiraz RUBI [facility] Number: [id number]    Therapy Diagnosis:   Encounter Diagnoses   Name Primary?    Lymphedema of both lower extremities     Edema of both lower extremities Yes     Physician: Randolph Ureña MD*    Physician Orders: PT Eval and Treat - lymphedema  Medical Diagnosis from Referral:   Diagnosis   I89.0 (ICD-10-CM) - Lymphedema of both lower extremities   Evaluation Date: 2/6/2023  Authorization Period Expiration: 12/27/23  Plan of Care Expiration: 4/3/23  Visit # / Visits authorized: 1/1    Time In: 800a  Time Out: 900a  Total Billable Time: 60 minutes    Precautions: Standard and obesity, Afib, CVI, Lymphedema    Subjective   Date of onset: swelling in both legs, mostly lower legs, ankles and feet R > L managing 5-10 years.   History of current condition - Shiraz reports: podiatry had planned procedure on tendon 2/15/23 has been cancelled.  Compression stockings daily - come up to mid leg - may help somewhat.  Wears at home daily. Pt takes a fluid pill.    Pt denies CHF, KF, borderline DM and CA.   Admits Afib with Xarelto.  Fluid pill yes  Blood thinner yes     Imaging: US      Impression:   No evidence of deep venous thrombosis in either lower extremity.   Electronically signed by: Carlos Alberto Diaz MD  Date:                                            07/16/2022:   11/2022  Reason for Exam  Priority: Routine  Dx: Venous insufficiency of left lower extremity [I87.2 (ICD-10-CM)]     Conclusion    There is no evidence of a right lower extremity DVT.  There is no evidence of a left lower extremity DVT.  The right SFJ has reflux.  The left greater saphenous vein has reflux.     Surgery: bariatric weight loss 2010     Medical History:   Past Medical History:   Diagnosis Date    Allergy     Anemia     Asthma     Atrial fibrillation     Cataract     Chronic rhinitis 9/26/2013    DDD (degenerative disc disease) 4/3/2015     Depression     Diabetes mellitus     Fibromyalgia     Gastroesophageal reflux disease without esophagitis 7/14/2017    Hypertension     Sinusitis, acute, maxillary 12/20/2012    Thyroid disease        Surgical History:   Shiraz Jha  has a past surgical history that includes Tonsillectomy; Cervical spine surgery; Gastric bypass; Sinus surgery (2000); Epidural steroid injection into lumbar spine (N/A, 5/29/2018); Epidural steroid injection into lumbar spine (N/A, 7/3/2018); Excision of lesion of lip (N/A, 7/7/2020); Esophagogastroduodenoscopy (N/A, 5/9/2022); Colonoscopy (N/A, 5/10/2022); Antegrade single balloon enteroscopy (N/A, 5/26/2022); and Colonoscopy (N/A, 5/26/2022).    Medications:   Shiraz has a current medication list which includes the following prescription(s): bumetanide, bupropion, cyanocobalamin, dextroamphetamine-amphetamine, fluticasone propionate, gabapentin, impoyz, levocetirizine, luzu, metoprolol tartrate, multivitamin, naftin, nitroglycerin, omeprazole, potassium chloride sa, rivaroxaban, sotalol, spironolactone, tamsulosin, thiamine, vitamin d, and vraylar.    Allergies:   Review of patient's allergies indicates:   Allergen Reactions    Iodinated contrast media Other (See Comments)     Raises BP        Previous Lymphedema Treatment: 2021 - [facility], massage and banding, compression products for a few more years   Prior Therapy: yes  Social History: lives alone, dog, + driving   Occupation: not working   Environmental barriers: labored with full flight of stairs, standing in shower, able to apply compression   Abuse/Neglect: none noted    Nutritional status: BMI 46   Educational needs: met   Spiritual/Cultural: met   Fall risk: no     Prior Level of Function: managing ADLs  Current Level of Function: managing ADLs, at times challenging   Gait: amb no device    Transfers:I   Bed Mobility: I     Pain and Swelling:  Current 0/10, worst 3/10, best 0/10   Location: bilateral legs R > L    Description: Aching and heavy   Aggravating Factors: just there  Easing Factors: elevation    Pts goals: reduce the swelling    Objective     Male amb to dept no device, did not wear compression due to evaluation - states has KH 20-30mmHg garments from [employer]  Amount of Swelling/Location of Swelling: mod to B LE lower legs, ankles, feet R > > L   Skin Integrity: dry yellow nails, dryness, firmness   Palpation/Texture: pitting to B LE ankles, lower legs R > L, density, firmness   + B mild to toes, not to feet Stemmer Sign  - B Jean's Sign  Circulation: intact         Posture: wfl     Range of Motion - LE  Arom knee, ankle sitting or supine  (R)  stiffness/soreness DF 0- monitor per Podiatry for tendon procedure planned/cancelled   (L)  DF 0    Strength: functional screen of AROM against gravity and sit to stand transfers  Shows active motions against gravity from sitting of B LE  Able to stand with min use of UEs     Sensation: intact to lt touch B LE    Girth Measurements (in centimeters)  LANDMARK LEFT LE  2/6/23 RIGHT LE  2/6/23 DIFF   at eval   SBP + 10  65.0 cm 66.0 cm 1.0 cm   SBP 55.0 cm 56.0 cm 1.0 cm   10 below SBP 43.0 cm 45.0 cm 2.0 cm   20 below SBP 47.0 cm 50.5 cm 3.5 cm   30 below SBP 40.0 cm 44.0 cm 4.0 cm   35 below SBP 33.0 cm 37.5 cm 4.5 cm   Ankle 30.5 cm 32.0 cm 1.5 cm   Forefoot 27.0 cm 28.0 cm 1.0 cm     CMS Impairment/Limitation/Restriction for FOTO Survey  Therapist reviewed FOTO scores for Shiraz Jha on 2/6/2023.   FOTO documents entered into [employer] - see Media section.     TREATMENT   Treatment Time In: 830a  Treatment Time Out: 900a  Total Treatment time separate from Evaluation: 30 minutes    Shiraz received therapeutic exercises to develop strength, endurance, ROM, flexibility, and posture for 5 minutes including:  Instructed in and performed towel assisted GSS 3x 20-30 sec hold- advised Podiatry advice on R ankle/tendon procedure planned.   Aps, knee ROM,  avoid dependency, avoid immobility, elevation, walking with compression, deep breathing, use of muscle pump to assist venous return    Shiraz received the following manual therapy techniques: Manual Lymphatic Drainage and plan of care training were applied to the: B LE for 15 minutes, including:  Education and training in compression needs.  Complete Decongestive Therapy components and management with goals and plan of care review.    Demo of Manual Lymphatic Drainage and short stretch compression bandaging.     Discussed obesity, CVI with reflux and lymphedema require management plans and focus on long term self care and compression needs.  Pt admits [employer] home pump consult per MD - pt to revisit if appropriate - discussed process and goals of support.    Review of his reported KH 20-30mmHg socks  Fit, position and wear schedules.  Advised on potential need for Velcro Inelastic Wraps for ease of application, fit, position, variability of size, and reduction goals.    Self Care/Home Management / Functional Therapeutic Activity training for 15 minutes including:  Product demonstrations and goals  Present compression: KH garments 20-30mmHg    Pt was educated in potential compression needs.  Demo of products including socks, garments, and Inelastic Velcro wraps.   Discussed cost/coverage and authorization per insurance with Durable Medical Equipment(DME) provider.  Compression require orders from referring provider and coverage or purchase of products from DME or self order.      Discussed wear schedule, don/doff, wash and management of products.  Size and compression class and AM/PM needs.    Product information provided.   Vendor list provided.    Informed insurance coverage of compression is per DME provider and typically Medicare and Medicare group plans may not cover cost beyond pair of standard sized knee high garments. Velcro Wraps will likely be OOP expense.  Options on methods to secure new or  recommended compression.  Use present socks daily - confirmed fit/position and wear schedules.  Commitment to attendance as well as commitment to securing compression needs is critical to edema management.      Home Exercises and Patient Education Provided  Education provided:   - GSS stretch with towel  - Pt was educated in lymphedema etiology and management plans.  Pt was provided with written risk reductions and precautions for managing lymphedema.      This patient is in agreement to participate in Lymphedema treatment.    Written Home Exercises Provided: yes.  Exercises were reviewed and Shiraz was able to demonstrate them prior to the end of the session.  Shiraz demonstrated good  understanding of the education provided.     See EMR under Patient Instructions for exercises provided 2/6/2023.    Assessment   Shiraz is a 63 y.o. male referred to outpatient Physical Therapy with a medical diagnosis of   Diagnosis   I89.0 (ICD-10-CM) - Lymphedema of both lower extremities   This patient presents s/p obesity with Bariatric procedure 2010, Afib, HTN, CVI resulting in: obesity related multifactorial venous lymphedema of the B LE Stage II changes R > L, pitting, increased pain, increased stiffness in the ankles, as well as difficulty performing ADLS, compression support , compression needs, and placing the pt at higher risk of infection.   Advised obesity, venous reflux and medical conditions likely contribute to his venous lymphatic edema.  Pt should manage these medical conditions to allow more support to managing his edema.  Focus will be on compression needs and home / self care management plans.    Pt prognosis is Good.   Pt will benefit from skilled outpatient Physical Therapy to address the deficits stated above and in the chart below, provide pt/family education, and to maximize pt's level of independence.     Plan of care discussed with patient: Yes  Pt's spiritual, cultural and educational needs considered and  patient is agreeable to the plan of care and goals as stated below:     Medical Necessity is demonstrated by the following  History  Co-morbidities and personal factors that may impact the plan of care Co-morbidities:   advanced age, financial considerations, high BMI, HTN, level of undertstanding of current condition, and Afib, venous reflux, obesity with past Gastric surgery, CVI, lymphedema  R ankle/tendon procedure pending with Podiatry    Personal Factors:   age     moderate   Examination  Body Structures and Functions, activity limitations and participation restrictions that may impact the plan of care Body Regions:   lower extremities  trunk    Body Systems:    gross symmetry  ROM  gait  edema  scar formation  skin integrity  skin color  Pitting, bandaing    Participation Restrictions:   none    Activity limitations:   Learning and applying knowledge  no deficits    General Tasks and Commands  no deficits    Communication  no deficits    Mobility  walking    Self care  Labored ADLS and household management at times    Domestic Life  doing house work (cleaning house, washing dishes, laundry)    Interactions/Relationships  no deficits    Life Areas  no deficits    Community and Social Life  no deficits         moderate   Clinical Presentation evolving clinical presentation with changing clinical characteristics moderate   Decision Making/ Complexity Score: moderate     Anticipated Barriers for therapy: obesity, CVI lymphedema, R ankle procedure pending    The following goals were discussed with the patient and patient is in agreement with them as to be addressed in the treatment plan.     Short Term Goals: (6 weeks)  1. Patient will show decreased girth in B LE by up to 1 cm to allow for LE symmetry, shoe and clothing choice, and ability to apply needed compression.  (progressing, not met)   2. Patient will demonstrate 100% knowledge of lymphedema precautions and signs of infection to allow for reduced  lymphedema risk, infection risk, and/or exacerbation of condition.  (progressing, not met)  3. Patient or caregiver will perform self-bandaging techniques and/or wearing of compression garments to allow for lymphatic drainage support, skin elasticity, and reduction in shape and size of limb. (progressing, not met)  4. Patient will perform self lymph drainage techniques to areas within reach to enhance lymphatic drainage and skin condition.  (progressing, not met)  5. Patient will tolerate daily activities with multilayered bandaging to allow for lymphatic and venous support.  (progressing, not met)    Long Term Goals: (12  weeks)  1. Patient will show decreased girth in R LE by up to 2 cm  to allow for LE symmetry, shoe and clothing choice, and ability to apply needed compression daily.  (progressing, not met)  2. Patient will show reduction in density to mild or less with improved contour of limb to allow for cosmesis, LE symmetry, infection risk reduction, and clothing and compression choice.   (progressing, not met)  3. Patient to domo/doff compression garment with daily compliance to assist in lymphedema management, skin elasticity, and tissue density.  (progressing, not met)  4. Pt to show improved postural awareness and alignment.  (progressing, not met)  5. Pt to be I and compliant with HEP to allow for increased function in affected limb.   (progressing, not met)  Plan   Plan of care Certification: 2/6/2023 to 4/3/23.    Outpatient Physical Therapy 2 times weekly for 10 weeks to include the following interventions: Neuromuscular Re-ed, Patient Education, Self Care, Therapeutic Activities, and Therapeutic Exercise. Complete Decongestive Therapy- compression and home equipment needs to be addressed and assisted.    Pt may be seen by a PTA as part of the Rehab treatment team.  Plan of Care was discussed with Renetta Alegre PTA    Hermelinda Rueda, PT

## 2023-02-06 NOTE — TELEPHONE ENCOUNTER
Spoke with Mr Jha who reports that he would like to cancel his surgery at this time and that he will plan to schedule a follow up appt with Dr Beaulieu at a later date to reschedule his appt (preferably late summer/early fall). No other needs voiced. Encouraged to call if further assistance is needed. Notified Pre Op Surgery Team, Dr Beaulieu and the surgery coordinator.

## 2023-02-06 NOTE — TELEPHONE ENCOUNTER
Good morning, Bhavna with patient access reached out to him this morning to schedule his PAT appointment. He stated his surgery is canceled. I see where you sent a message that it was rescheduled. I am not sure if he is confused as to the surgery. Please let us know if it is canceled so we can take him off the board. Thanks

## 2023-02-10 ENCOUNTER — CLINICAL SUPPORT (OUTPATIENT)
Dept: REHABILITATION | Facility: HOSPITAL | Age: 63
End: 2023-02-10
Payer: COMMERCIAL

## 2023-02-10 DIAGNOSIS — R60.0 EDEMA OF BOTH LOWER EXTREMITIES: ICD-10-CM

## 2023-02-10 DIAGNOSIS — Z98.84 STATUS POST GASTRIC BYPASS FOR OBESITY: ICD-10-CM

## 2023-02-10 DIAGNOSIS — E11.9 TYPE 2 DIABETES MELLITUS WITHOUT COMPLICATION, WITHOUT LONG-TERM CURRENT USE OF INSULIN: Chronic | ICD-10-CM

## 2023-02-10 DIAGNOSIS — I87.2 VENOUS INSUFFICIENCY OF BOTH LOWER EXTREMITIES: ICD-10-CM

## 2023-02-10 DIAGNOSIS — I89.0 LYMPHEDEMA OF BOTH LOWER EXTREMITIES: Primary | ICD-10-CM

## 2023-02-10 PROCEDURE — 97140 MANUAL THERAPY 1/> REGIONS: CPT

## 2023-02-10 NOTE — PROGRESS NOTES
Physical Therapy Daily Treatment Note     Name: Shiraz RUBI [facility] Number: [id number]    Therapy Diagnosis:   Encounter Diagnoses   Name Primary?    Lymphedema of both lower extremities Yes    Type 2 diabetes mellitus without complication, without long-term current use of insulin     Status post gastric bypass for obesity     Venous insufficiency of both lower extremities     Edema of both lower extremities      Physician: Randolph Ureña MD*    Visit Date: 2/10/2023    Physician Orders: PT Eval and Treat - lymphedema  Medical Diagnosis from Referral:   Diagnosis   I89.0 (ICD-10-CM) - Lymphedema of both lower extremities   Evaluation Date: 2/6/2023  Authorization Period Expiration: 12/31/23  Plan of Care Expiration: 4/3/23  Visit # / Visits authorized: 2/20     Time In: 955a  Time Out: 1055a  Total Billable Time: 60 minutes     Precautions: Standard and obesity, Afib, CVI, Lymphedema    Subjective     Pt reports: he has several pairs of KH garments - even 2 new pairs, not always wearing and difficulty getting on.  He was not compliant with home compression/exercise program.  Response to previous treatment: eval instructions, recommend to resume daily use of compression socks or garments, consider Velcro Inelastic Wraps.  Functional change: amb no device, cued on methods to apply compression    Pain: 0/10  Location: bilateral LEs      Objective   Male amb to dept no device, did not wear compression  Admits he has KH 20-30mmHg garments from [employer]  Amount of Swelling/Location of Swelling: mod to B LE lower legs, ankles, feet R > > L, fullness of body habitus  Skin Integrity: dry yellow nails, dryness, firmness   Palpation/Texture: pitting to B LE ankles, lower legs R > L, density, firmness   + B mild to toes, not to feet Stemmer Sign  - B Jean's Sign  Circulation: intact     Treatment:   Shiraz received the following manual therapy techniques:- Manual Lymphatic Drainage were applied to the: R LE  for 55 minutes, including: MLD and short stretch compression bandaging   Demo of his present compression and potential needs    MANUAL LYMPHATIC DRAINAGE (MLD):    While supine with LEs elevated stimulation at terminus, along GI region, B inguinal regions, drainage of entire R LE julia lower leg, ankle, and foot with return proximally,  Use of Aquaphor/Eucerin due to dryness.   Consider self massage to abdominal areas, B inguinal areas, thigh, and remaining LE within reach.    MULTILAYERED BANDAGING:  issued supplies and bandaged R LE with cotton stockinette, Rosidal soft section dorsum of foot, 2 komprex wedges post malleoli, Rosidal soft rolls ankle to knee, 1-8cm and 2- 10cm Durelast rolls foot to knee, to leave intact 12-24 hrs as tolerated, discontinue with any problems, return rolled bandages next session. Wash and wear schedules confirmed.     Shiraz received therapeutic exercises to develop strength, endurance, ROM, flexibility, and posture for verbal review of compression for aps, walking, avoid dependency and immobility, support of muscle pump with compression and activity.  THERAPEUTIC EXERCISES:  Continue HEP of AROM, stretching, and postural correction.     Home Exercises Provided and Patient Education Provided:  Self Care Home Management Training/Functional Therapeutic Activity x 10 minutes   Demo of don / doff of KH with gloves, or slip assist, methods and need to apply daily  Present compression:  KH 20-30mmHg garments from [employer]  Orders and recommendations: wear his present garments and socks, consider Velcro Inelastic Wraps     Demo of options for Velcro Inelastic Wraps - position, fit, don/doff with goals and benefits of reduction, varying size range, and ease of application.  Process and methods to secure were reviewed.   PATIENT/FAMILY Education: bandaging/compression wear schedule,  HEP,  Beginning of self massage,  Self or assisted bandaging, compression options, and Risk  reduction    Written Home Exercises Provided: Patient instructed to cont prior HEP.  Home plan of management was reviewed and Shiraz was able to demonstrate understanding prior to the end of the session.  Shiraz demonstrated good  understanding of the education provided.     Assessment   Shiraz is a 63 y.o. male referred to outpatient Physical Therapy with a medical diagnosis of   Diagnosis   I89.0 (ICD-10-CM) - Lymphedema of both lower extremities   This patient presents s/p obesity with Bariatric procedure 2010, Afib, HTN, CVI resulting in: obesity related multifactorial venous lymphedema of the B LE Stage II changes R > L, pitting, increased pain, increased stiffness in the ankles, as well as difficulty performing ADLS, compression support , compression needs, and placing the pt at higher risk of infection.   Advised obesity, venous reflux and medical conditions likely contribute to his venous lymphatic edema.  Pt should manage these medical conditions to allow more support to managing his edema.  Focus will be on compression needs and home / self care management plans.    Pt will need to resume compliance of KH garments and/or consider Velcro inelastic wraps for daily compression support. Initiated Complete Decongestive Therapy, CDT, to his R LE which is more involved than his L LE.  Body shape/size of body habitus with obesity is a contributing factor and also benefits from daily compression support while exercising and performing daily activities.     Shiraz Is progressing well towards his goals.   Pt prognosis is Good.     Pt will continue to benefit from skilled outpatient physical therapy to address the deficits listed in the problem list box on initial evaluation, provide pt/family education and to maximize pt's level of independence in the home and community environment.     Pt's spiritual, cultural and educational needs considered and pt agreeable to plan of care and goals.    Anticipated Barriers for  therapy: obesity, CVI lymphedema, R ankle procedure pending     The following goals were discussed with the patient and patient is in agreement with them as to be addressed in the treatment plan.      Short Term Goals: (6 weeks)  1. Patient will show decreased girth in B LE by up to 1 cm to allow for LE symmetry, shoe and clothing choice, and ability to apply needed compression.  (progressing, not met)   2. Patient will demonstrate 100% knowledge of lymphedema precautions and signs of infection to allow for reduced lymphedema risk, infection risk, and/or exacerbation of condition.  (progressing, not met)  3. Patient or caregiver will perform self-bandaging techniques and/or wearing of compression garments to allow for lymphatic drainage support, skin elasticity, and reduction in shape and size of limb. (progressing, not met)  4. Patient will perform self lymph drainage techniques to areas within reach to enhance lymphatic drainage and skin condition.  (progressing, not met)  5. Patient will tolerate daily activities with multilayered bandaging to allow for lymphatic and venous support.  (progressing, not met)     Long Term Goals: (12  weeks)  1. Patient will show decreased girth in R LE by up to 2 cm  to allow for LE symmetry, shoe and clothing choice, and ability to apply needed compression daily.  (progressing, not met)  2. Patient will show reduction in density to mild or less with improved contour of limb to allow for cosmesis, LE symmetry, infection risk reduction, and clothing and compression choice.   (progressing, not met)  3. Patient to domo/doff compression garment with daily compliance to assist in lymphedema management, skin elasticity, and tissue density.  (progressing, not met)  4. Pt to show improved postural awareness and alignment.  (progressing, not met)  5. Pt to be I and compliant with HEP to allow for increased function in affected limb.   (progressing, not met)  Plan   Plan of care  Certification: 2/6/2023 to 4/3/23.     Outpatient Physical Therapy 2 times weekly for 10 weeks to include the following interventions: Neuromuscular Re-ed, Patient Education, Self Care, Therapeutic Activities, and Therapeutic Exercise. Complete Decongestive Therapy- compression and home equipment needs to be addressed and assisted.     Pt may be seen by a PTA as part of the Rehab treatment team.  Plan of Care was discussed with HELIO Rosales, PT

## 2023-02-14 ENCOUNTER — CLINICAL SUPPORT (OUTPATIENT)
Dept: REHABILITATION | Facility: HOSPITAL | Age: 63
End: 2023-02-14
Payer: COMMERCIAL

## 2023-02-14 DIAGNOSIS — E11.9 TYPE 2 DIABETES MELLITUS WITHOUT COMPLICATION, WITHOUT LONG-TERM CURRENT USE OF INSULIN: Chronic | ICD-10-CM

## 2023-02-14 DIAGNOSIS — R60.0 EDEMA OF BOTH LOWER EXTREMITIES: ICD-10-CM

## 2023-02-14 DIAGNOSIS — Z98.84 STATUS POST GASTRIC BYPASS FOR OBESITY: ICD-10-CM

## 2023-02-14 DIAGNOSIS — I87.2 VENOUS INSUFFICIENCY OF BOTH LOWER EXTREMITIES: ICD-10-CM

## 2023-02-14 DIAGNOSIS — I89.0 LYMPHEDEMA OF BOTH LOWER EXTREMITIES: Primary | ICD-10-CM

## 2023-02-14 PROCEDURE — 97140 MANUAL THERAPY 1/> REGIONS: CPT | Mod: CQ

## 2023-02-14 NOTE — PROGRESS NOTES
Physical Therapy Daily Treatment Note     Name: Shiraz RUBI [facility] Number: [id number]    Therapy Diagnosis:   Encounter Diagnoses   Name Primary?    Lymphedema of both lower extremities Yes    Type 2 diabetes mellitus without complication, without long-term current use of insulin     Status post gastric bypass for obesity     Venous insufficiency of both lower extremities     Edema of both lower extremities        Physician: Randolph Ureña MD*    Visit Date: 2/14/2023    Physician Orders: PT Eval and Treat - lymphedema  Medical Diagnosis from Referral:   Diagnosis   I89.0 (ICD-10-CM) - Lymphedema of both lower extremities   Evaluation Date: 2/6/2023  Authorization Period Expiration: 12/31/23  Plan of Care Expiration: 4/3/23  Visit # / Visits authorized: 3/20     Time In: 4:00 pm  Time Out: 4:55 pm  Total Billable Time: 55 minutes     Precautions: Standard and obesity, Afib, CVI, Lymphedema    Subjective     Pt reports: bandaging went well and noticed improvements after, he felt after removing the bandages that his right leg looked smaller then his left leg.    He was not compliant with home compression/exercise program.  Response to previous treatment: He has knee high compression with improved compliance with daily use - brought with him today for review.   Functional change: amb no device, cued on methods to apply compression    Pain: 0/10  Location: bilateral LEs      Objective   Male amb to dept no device  Wearing 20-30 mmHg Medi plus petite size VI purchased from [employer] - noted good fit and length   [employer] 20-30mmHg knee high compression socks     Amount of Swelling/Location of Swelling: mod to B LE lower legs, ankles, feet R > > L, fullness of body habitus  Skin Integrity: dry yellow nails, dryness, firmness   Palpation/Texture: pitting to B LE ankles, lower legs R > L, density, firmness   + B mild to toes, not to feet Stemmer Sign  - B Jean's Sign  Circulation: intact     Treatment:   Shiraz  received the following manual therapy techniques:- Manual Lymphatic Drainage were applied to the: R LE for 55 minutes, including: MLD and short stretch compression bandaging   Demo of his present compression and potential needs    MANUAL LYMPHATIC DRAINAGE (MLD):    While supine with LEs elevated stimulation at terminus, along GI region, B inguinal regions, drainage of entire R LE julia lower leg, ankle, and foot with return proximally,  Use of Aquaphor/Eucerin due to dryness.   Consider self massage to abdominal areas, B inguinal areas, thigh, and remaining LE within reach.    MULTILAYERED BANDAGING:  issued supplies and bandaged R LE with cotton stockinette, Rosidal soft section dorsum of foot, 2 komprex wedges post malleoli, Rosidal soft rolls ankle to knee, 1-8cm and 2- 10cm Durelast rolls foot to knee, to leave intact 12-24 hrs as tolerated, discontinue with any problems, return rolled bandages next session. Wash and wear schedules confirmed.     Shiraz received therapeutic exercises to develop strength, endurance, ROM, flexibility, and posture for verbal review of compression for aps, walking, avoid dependency and immobility, support of muscle pump with compression and activity.  THERAPEUTIC EXERCISES:  Continue HEP of AROM, stretching, and postural correction.     Home Exercises Provided and Patient Education Provided:  Self Care Home Management Training/Functional Therapeutic Activity   Demo of don / doff of KH with gloves, or slip assist, methods and need to apply daily  Present compression:  KH 20-30mmHg garments from [employer]  Orders and recommendations: wear his present garments and socks, consider Velcro Inelastic Wraps     Demo of options for Velcro Inelastic Wraps - position, fit, don/doff with goals and benefits of reduction, varying size range, and ease of application.  Process and methods to secure were reviewed.   PATIENT/FAMILY Education: bandaging/compression wear schedule,  HEP,   Beginning of self massage,  Self or assisted bandaging, compression options, and Risk reduction    Written Home Exercises Provided: Patient instructed to cont prior HEP.  Home plan of management was reviewed and Shiraz was able to demonstrate understanding prior to the end of the session.  Shiraz demonstrated good  understanding of the education provided.     Assessment   Shiraz is a 63 y.o. male referred to outpatient Physical Therapy with a medical diagnosis of   Diagnosis   I89.0 (ICD-10-CM) - Lymphedema of both lower extremities   This patient presents s/p obesity with Bariatric procedure 2010, Afib, HTN, CVI resulting in: obesity related multifactorial venous lymphedema of the B LE Stage II changes R > L, pitting, increased pain, increased stiffness in the ankles, as well as difficulty performing ADLS, compression support , compression needs, and placing the pt at higher risk of infection.   Advised obesity, venous reflux and medical conditions likely contribute to his venous lymphatic edema.  Pt should manage these medical conditions to allow more support to managing his edema.  Focus will be on compression needs and home / self care management plans.    Pt reports has resumed compliance of current compression options ( as above) daily and with good ability to independently don/doff. He notes improvements following treatment sofar and is progressing well . Therapy to continue to progress with CDT and self care/home management - will plan to progress to BLEs at subsequent visits.   Body shape/size of body habitus with obesity is a contributing factor and also benefits from daily compression support while exercising and performing daily activities.     Shiraz Is progressing well towards his goals.   Pt prognosis is Good.     Pt will continue to benefit from skilled outpatient physical therapy to address the deficits listed in the problem list box on initial evaluation, provide pt/family education and to maximize  pt's level of independence in the home and community environment.   Pt's spiritual, cultural and educational needs considered and pt agreeable to plan of care and goals.    Anticipated Barriers for therapy: obesity, CVI lymphedema, R ankle procedure pending     The following goals were discussed with the patient and patient is in agreement with them as to be addressed in the treatment plan.      Short Term Goals: (6 weeks)  1. Patient will show decreased girth in B LE by up to 1 cm to allow for LE symmetry, shoe and clothing choice, and ability to apply needed compression.  (progressing, not met)   2. Patient will demonstrate 100% knowledge of lymphedema precautions and signs of infection to allow for reduced lymphedema risk, infection risk, and/or exacerbation of condition.  (progressing, not met)  3. Patient or caregiver will perform self-bandaging techniques and/or wearing of compression garments to allow for lymphatic drainage support, skin elasticity, and reduction in shape and size of limb. (progressing, not met)  4. Patient will perform self lymph drainage techniques to areas within reach to enhance lymphatic drainage and skin condition.  (progressing, not met)  5. Patient will tolerate daily activities with multilayered bandaging to allow for lymphatic and venous support.  (progressing, not met)     Long Term Goals: (12  weeks)  1. Patient will show decreased girth in R LE by up to 2 cm  to allow for LE symmetry, shoe and clothing choice, and ability to apply needed compression daily.  (progressing, not met)  2. Patient will show reduction in density to mild or less with improved contour of limb to allow for cosmesis, LE symmetry, infection risk reduction, and clothing and compression choice.   (progressing, not met)  3. Patient to domo/doff compression garment with daily compliance to assist in lymphedema management, skin elasticity, and tissue density.  (progressing, not met)  4. Pt to show improved  postural awareness and alignment.  (progressing, not met)  5. Pt to be I and compliant with HEP to allow for increased function in affected limb.   (progressing, not met)  Plan   Plan of care Certification: 2/6/2023 to 4/3/23.     Outpatient Physical Therapy 2 times weekly for 10 weeks to include the following interventions: Neuromuscular Re-ed, Patient Education, Self Care, Therapeutic Activities, and Therapeutic Exercise. Complete Decongestive Therapy- compression and home equipment needs to be addressed and assisted.    Renetta Alegre, PTA

## 2023-02-17 ENCOUNTER — CLINICAL SUPPORT (OUTPATIENT)
Dept: REHABILITATION | Facility: HOSPITAL | Age: 63
End: 2023-02-17
Payer: COMMERCIAL

## 2023-02-17 DIAGNOSIS — E11.9 TYPE 2 DIABETES MELLITUS WITHOUT COMPLICATION, WITHOUT LONG-TERM CURRENT USE OF INSULIN: Chronic | ICD-10-CM

## 2023-02-17 DIAGNOSIS — I87.2 VENOUS INSUFFICIENCY OF BOTH LOWER EXTREMITIES: ICD-10-CM

## 2023-02-17 DIAGNOSIS — R60.0 EDEMA OF BOTH LOWER EXTREMITIES: ICD-10-CM

## 2023-02-17 DIAGNOSIS — Z98.84 STATUS POST GASTRIC BYPASS FOR OBESITY: ICD-10-CM

## 2023-02-17 DIAGNOSIS — I89.0 LYMPHEDEMA OF BOTH LOWER EXTREMITIES: Primary | ICD-10-CM

## 2023-02-17 PROCEDURE — 97140 MANUAL THERAPY 1/> REGIONS: CPT | Mod: CQ

## 2023-02-17 PROCEDURE — 97016 VASOPNEUMATIC DEVICE THERAPY: CPT | Mod: CQ

## 2023-02-17 NOTE — PROGRESS NOTES
Physical Therapy Daily Treatment Note     Name: Shiraz RUBI [facility] Number: [id number]    Therapy Diagnosis:   Encounter Diagnoses   Name Primary?    Lymphedema of both lower extremities Yes    Type 2 diabetes mellitus without complication, without long-term current use of insulin     Status post gastric bypass for obesity     Venous insufficiency of both lower extremities     Edema of both lower extremities        Physician: Randolph Ureña MD*    Visit Date: 2/17/2023    Physician Orders: PT Eval and Treat - lymphedema  Medical Diagnosis from Referral:   Diagnosis   I89.0 (ICD-10-CM) - Lymphedema of both lower extremities   Evaluation Date: 2/6/2023  Authorization Period Expiration: 12/31/23  Plan of Care Expiration: 4/3/23  Visit # / Visits authorized: 4/20     Time In: 2:05 pm  Time Out: 3:00 pm  Total Billable Time: 55 minutes     Precautions: Standard and obesity, Afib, CVI, Lymphedema    Subjective     Pt reports: doing well and has been wearing is compression daily. He washed his bandages .     He was compliant with home compression/exercise program.  Response to previous treatment: Good response/tolerance to bandaging and wore a little over 24 hours.   He has knee high compression with improved compliance with daily use.   Functional change: amb no device, cued on methods to apply compression    Pain: 0/10  Location: bilateral LEs      Objective   Male amb to dept no device  Wearing 20-30 mmHg Medi plus petite size VI purchased from [employer] - noted good fit and length   Brings Jobst 20-30mmHg knee high compression socks for review     Amount of Swelling/Location of Swelling: mod to B LE lower legs, ankles, feet R > > L, fullness of body habitus  Skin Integrity: dry yellow nails, dryness, firmness   Palpation/Texture: pitting to B LE ankles, lower legs R > L, density, firmness   + B mild to toes, not to feet Stemmer Sign  - B Jean's Sign  Circulation: intact     Treatment:   Shiraz received the following  manual therapy techniques:- Manual Lymphatic Drainage were applied to the: R LE for 55 minutes, including: MLD and short stretch compression bandaging   Demo of his present compression and potential needs    MANUAL LYMPHATIC DRAINAGE (MLD):    While supine with LEs elevated stimulation at terminus, along GI region, B inguinal regions, drainage of entire R LE julia lower leg, ankle, and foot with return proximally,  Use of Aquaphor/Eucerin due to dryness.   Consider self massage to abdominal areas, B inguinal areas, thigh, and remaining LE within reach.    SEQUENTIAL COMPRESSION PUMP: full leg sleeve applied to RLE  VES 5200 with default setting with distal pressures starting at 45mmHg entire LE x 10 minutes  Performed simultaneously to compression review - review of current products as well as example of inelastic velcro wraps      MULTILAYERED BANDAGING:  issued supplies and bandaged R LE with cotton stockinette, Rosidal soft section dorsum of foot, 2 komprex wedges post malleoli, Rosidal soft rolls ankle to knee, 1-8cm and 2- 10cm Durelast rolls foot to knee, to leave intact 12-24 hrs as tolerated, discontinue with any problems, return rolled bandages next session. Wash and wear schedules confirmed.     Shiraz received therapeutic exercises to develop strength, endurance, ROM, flexibility, and posture for verbal review of compression for aps, walking, avoid dependency and immobility, support of muscle pump with compression and activity.  THERAPEUTIC EXERCISES:  Continue HEP of AROM, stretching, and postural correction.     Home Exercises Provided and Patient Education Provided:  Self Care Home Management Training/Functional Therapeutic Activity   Demo of don / doff of KH with gloves, or slip assist, methods and need to apply daily  Present compression:  KH 20-30mmHg compression garments from [employer]                                       KH 20-30mmHg Jobst compression socks   Orders and recommendations:  wear his present garments and socks, consider Velcro Inelastic Wraps     Demo of options for Velcro Inelastic Wraps - position, fit, don/doff with goals and benefits of reduction, varying size range, and ease of application.  Process and methods to secure were reviewed.   PATIENT/FAMILY Education: bandaging/compression wear schedule,  HEP,  Beginning of self massage,  Self or assisted bandaging, compression options, and Risk reduction    Written Home Exercises Provided: Patient instructed to cont prior HEP.  Home plan of management was reviewed and Shiraz was able to demonstrate understanding prior to the end of the session.  Shiraz demonstrated good  understanding of the education provided.     Assessment   Shiraz is a 63 y.o. male referred to outpatient Physical Therapy with a medical diagnosis of   Diagnosis   I89.0 (ICD-10-CM) - Lymphedema of both lower extremities   This patient presents s/p obesity with Bariatric procedure 2010, Afib, HTN, CVI resulting in: obesity related multifactorial venous lymphedema of the B LE Stage II changes R > L, pitting, increased pain, increased stiffness in the ankles, as well as difficulty performing ADLS, compression support , compression needs, and placing the pt at higher risk of infection.   Advised obesity, venous reflux and medical conditions likely contribute to his venous lymphatic edema.  Pt should manage these medical conditions to allow more support to managing his edema.  Focus will be on compression needs and home / self care management plans.    Pt demonstrates good response to CDT sofar with mild reductions noted . He also demonstrates improved compliance with use of his current compression products. Pt should continue use of his current compression garments/socks daily and may consider purchasing inelastic velcro wraps for future use. Therapy to continue to progress with CDT and self care/home management - will plan to progress to BLEs at subsequent visits.   Body  shape/size of body habitus with obesity is a contributing factor and also benefits from daily compression support while exercising and performing daily activities.     Shiraz Is progressing well towards his goals.   Pt prognosis is Good.     Pt will continue to benefit from skilled outpatient physical therapy to address the deficits listed in the problem list box on initial evaluation, provide pt/family education and to maximize pt's level of independence in the home and community environment.   Pt's spiritual, cultural and educational needs considered and pt agreeable to plan of care and goals.    Anticipated Barriers for therapy: obesity, CVI lymphedema, R ankle procedure pending     The following goals were discussed with the patient and patient is in agreement with them as to be addressed in the treatment plan.      Short Term Goals: (6 weeks)  1. Patient will show decreased girth in B LE by up to 1 cm to allow for LE symmetry, shoe and clothing choice, and ability to apply needed compression.  (progressing, not met)   2. Patient will demonstrate 100% knowledge of lymphedema precautions and signs of infection to allow for reduced lymphedema risk, infection risk, and/or exacerbation of condition.  (progressing, not met)  3. Patient or caregiver will perform self-bandaging techniques and/or wearing of compression garments to allow for lymphatic drainage support, skin elasticity, and reduction in shape and size of limb. (progressing, not met)  4. Patient will perform self lymph drainage techniques to areas within reach to enhance lymphatic drainage and skin condition.  (progressing, not met)  5. Patient will tolerate daily activities with multilayered bandaging to allow for lymphatic and venous support.  (progressing, not met)     Long Term Goals: (12  weeks)  1. Patient will show decreased girth in R LE by up to 2 cm  to allow for LE symmetry, shoe and clothing choice, and ability to apply needed compression daily.   (progressing, not met)  2. Patient will show reduction in density to mild or less with improved contour of limb to allow for cosmesis, LE symmetry, infection risk reduction, and clothing and compression choice.   (progressing, not met)  3. Patient to domo/doff compression garment with daily compliance to assist in lymphedema management, skin elasticity, and tissue density.  (progressing, not met)  4. Pt to show improved postural awareness and alignment.  (progressing, not met)  5. Pt to be I and compliant with HEP to allow for increased function in affected limb.   (progressing, not met)  Plan   Plan of care Certification: 2/6/2023 to 4/3/23.     Outpatient Physical Therapy 2 times weekly for 10 weeks to include the following interventions: Neuromuscular Re-ed, Patient Education, Self Care, Therapeutic Activities, and Therapeutic Exercise. Complete Decongestive Therapy- compression and home equipment needs to be addressed and assisted.    Renetta Alegre, PTA

## 2023-02-24 ENCOUNTER — CLINICAL SUPPORT (OUTPATIENT)
Dept: REHABILITATION | Facility: HOSPITAL | Age: 63
End: 2023-02-24
Payer: COMMERCIAL

## 2023-02-24 DIAGNOSIS — I89.0 LYMPHEDEMA OF BOTH LOWER EXTREMITIES: Primary | ICD-10-CM

## 2023-02-24 DIAGNOSIS — E11.9 TYPE 2 DIABETES MELLITUS WITHOUT COMPLICATION, WITHOUT LONG-TERM CURRENT USE OF INSULIN: Chronic | ICD-10-CM

## 2023-02-24 DIAGNOSIS — I87.2 VENOUS INSUFFICIENCY OF BOTH LOWER EXTREMITIES: ICD-10-CM

## 2023-02-24 DIAGNOSIS — R60.0 EDEMA OF BOTH LOWER EXTREMITIES: ICD-10-CM

## 2023-02-24 DIAGNOSIS — Z98.84 STATUS POST GASTRIC BYPASS FOR OBESITY: ICD-10-CM

## 2023-02-24 PROCEDURE — 97535 SELF CARE MNGMENT TRAINING: CPT

## 2023-02-24 PROCEDURE — 97016 VASOPNEUMATIC DEVICE THERAPY: CPT

## 2023-02-24 PROCEDURE — 97140 MANUAL THERAPY 1/> REGIONS: CPT

## 2023-02-24 NOTE — PROGRESS NOTES
Physical Therapy Daily Treatment Note     Name: Shiraz RUBI [facility] Number: [id number]    Therapy Diagnosis:   Encounter Diagnoses   Name Primary?    Lymphedema of both lower extremities Yes    Type 2 diabetes mellitus without complication, without long-term current use of insulin     Status post gastric bypass for obesity     Venous insufficiency of both lower extremities     Edema of both lower extremities        Physician: Randolph Ureña MD*    Visit Date: 2/24/2023    Physician Orders: PT Eval and Treat - lymphedema  Medical Diagnosis from Referral:   Diagnosis   I89.0 (ICD-10-CM) - Lymphedema of both lower extremities   Evaluation Date: 2/6/2023  Authorization Period Expiration: 12/31/23  Plan of Care Expiration: 4/3/23  Visit # / Visits authorized: 5/20     Time In: 1000a  Time Out: 1110a  Total Billable Time: 70 minutes  Pump un timed      Precautions: Standard and obesity, Afib, CVI, Lymphedema    Subjective     Pt reports: bending down and was on knees picking something up and gave knee soreness/stiffness. Pt has been followed with ortho for knee injections in past.   He was compliant with home compression/exercise program.  Response to previous treatment: pt has several pairs of KH compression and will consider Velcro Inelastic Wraps - orders were requested.   Functional change: amb no device- knee pain and soreness at times  Pain: 0/10  Location: bilateral LEs      Objective   Male amb to dept no device  Wearing KH garments for B LE  Pt has 20-30 mmHg Medi plus petite size VI   Jobst 20-30mmHg knee high compression      Amount of Swelling/Location of Swelling: mod to B LE lower legs, ankles, feet R > > L, fullness of body habitus  Skin Integrity: dry yellow nails, dryness, firmness   Palpation/Texture: pitting to B LE ankles, lower legs R > L, density, firmness   + B mild to toes, not to feet Stemmer Sign  - B Jean's Sign  Circulation: intact   Girth Measurements (in centimeters)  LANDMARK LEFT  LE  2/6/23 RIGHT LE  2/6/23 R LE  2/24/23 Change  R DIFF   at eval   SBP + 10  65.0 cm 66.0 cm - - 1.0 cm   SBP 55.0 cm 56.0 cm 54.0 2.0 1.0 cm   10 below SBP 43.0 cm 45.0 cm 45.5 0.5 2.0 cm   20 below SBP 47.0 cm 50.5 cm 49.0 1.5 3.5 cm   30 below SBP 40.0 cm 44.0 cm 39.0 5.0 4.0 cm   35 below SBP 33.0 cm 37.5 cm 32.0 5.5 4.5 cm   Ankle 30.5 cm 32.0 cm 32.5 0.5 1.5 cm   Forefoot 27.0 cm 28.0 cm 27.5 0.5 1.0 cm      Treatment:   Shiraz received the following manual therapy techniques:- Manual Lymphatic Drainage were applied to the: R LE for 45 minutes, including: MLD and short stretch compression bandaging   Girth assessment    MANUAL LYMPHATIC DRAINAGE (MLD):    While supine with LEs elevated stimulation at terminus, along GI region, B inguinal regions, drainage of entire R LE julia lower leg, ankle, and foot with return proximally,  Use of Aquaphor/Eucerin due to dryness.   Consider self massage to abdominal areas, B inguinal areas, thigh, and remaining LE within reach.    SEQUENTIAL COMPRESSION PUMP: full leg sleeve applied to RLE  VES 5200 with default setting with distal pressures starting at 45mmHg entire LE   Performed simultaneously to compression option review, current products, inelastic velcro wraps, DME providers and options    MULTILAYERED BANDAGING:  issued supplies and bandaged R LE with cotton stockinette, Rosidal soft section dorsum of foot, 2 komprex wedges post malleoli, Rosidal soft rolls ankle to knee, 1-8cm and 2- 10cm Durelast rolls foot to knee, to leave intact 12-24 hrs as tolerated, discontinue with any problems, return rolled bandages next session. Wash and wear schedules confirmed.     Shiraz received therapeutic exercises to develop strength, endurance, ROM, flexibility, and posture for verbal review of compression for aps, walking, avoid dependency and immobility, support of muscle pump with compression and activity.  THERAPEUTIC EXERCISES:  Continue HEP of AROM, stretching, and postural  correction.     Home Exercises Provided and Patient Education Provided:  Self Care Home Management Training/Functional Therapeutic Activity x 10 min    Present compression:  KH 20-30mmHg compression garments from [employer]                                       KH 20-30mmHg Jobst compression socks   Orders and recommendations: wear his present garments and socks, consider Velcro Inelastic Wraps   Orders requested  Pt notes pump consult and some contact from [employer] - unclear of process/status - therapist will contact vendor for any needs  Spoke with vendor after session- pump system has been authorized per his insurance, may have deductibles, pt has had trial and some training with vendor.  MD orders were received- pending full approval and delivery.    Velcro Inelastic Wraps - position, fit, don/doff with goals and benefits of reduction, varying size range, and ease of application.  Process and methods to secure were reviewed.   PATIENT/FAMILY Education: bandaging/compression wear schedule,  HEP,  Beginning of self massage,  Self or assisted bandaging, compression options, and Risk reduction    Written Home Exercises Provided: Patient instructed to cont prior HEP.  Home plan of management was reviewed and Shiraz was able to demonstrate understanding prior to the end of the session.  Shiraz demonstrated good  understanding of the education provided.     Assessment   Shiraz is a 63 y.o. male referred to outpatient Physical Therapy with a medical diagnosis of   Diagnosis   I89.0 (ICD-10-CM) - Lymphedema of both lower extremities   This patient presents s/p obesity with Bariatric procedure 2010, Afib, HTN, CVI resulting in: obesity related multifactorial venous lymphedema of the B LE Stage II changes R > L, pitting, increased pain, increased stiffness in the ankles, as well as difficulty performing ADLS, compression support , compression needs, and placing the pt at higher risk of infection.   Advised  obesity, venous reflux and medical conditions likely contribute to his venous lymphatic edema.  Pt should manage these medical conditions to allow more support to managing his edema.  Focus will be on compression needs and home / self care management plans.    Fairly significant reductions to his lower leg region. Pt has adequate KH garments and therapy is recommending additional components of Velcro Inelastic Wrap as well as pump support for daily home use.    Daily KH compression with recommendations to add daily home pump support and Velcro Inelastic Wraps.  Patient has Stage II secondary lymphedema due to multifactorial conditions not unlimited to CVI and obesity Therapy recommends elevation, exercise, regular use of compression, and therapy for at least a month to reduce swelling.  Swelling persists and pt would benefit from home pump support and additional compression options.    Shiraz Is progressing well towards his goals.   Pt prognosis is Good.     Pt will continue to benefit from skilled outpatient physical therapy to address the deficits listed in the problem list box on initial evaluation, provide pt/family education and to maximize pt's level of independence in the home and community environment.   Pt's spiritual, cultural and educational needs considered and pt agreeable to plan of care and goals.    Anticipated Barriers for therapy: obesity, CVI lymphedema, R ankle procedure pending     The following goals were discussed with the patient and patient is in agreement with them as to be addressed in the treatment plan.      Short Term Goals: (6 weeks)  1. Patient will show decreased girth in B LE by up to 1 cm to allow for LE symmetry, shoe and clothing choice, and ability to apply needed compression.  (progressing  2. Patient will demonstrate 100% knowledge of lymphedema precautions and signs of infection to allow for reduced lymphedema risk, infection risk, and/or exacerbation of condition.   met)  3.  Patient or caregiver will perform self-bandaging techniques and/or wearing of compression garments to allow for lymphatic drainage support, skin elasticity, and reduction in shape and size of limb.  met)  4. Patient will perform self lymph drainage techniques to areas within reach to enhance lymphatic drainage and skin condition.  (progressing  5. Patient will tolerate daily activities with multilayered bandaging to allow for lymphatic and venous support.  met)     Long Term Goals: (12  weeks)  1. Patient will show decreased girth in R LE by up to 2 cm  to allow for LE symmetry, shoe and clothing choice, and ability to apply needed compression daily.  (progressing, not met)  2. Patient will show reduction in density to mild or less with improved contour of limb to allow for cosmesis, LE symmetry, infection risk reduction, and clothing and compression choice.   (progressing, not met)  3. Patient to domo/doff compression garment with daily compliance to assist in lymphedema management, skin elasticity, and tissue density.  (progressing, not met)  4. Pt to show improved postural awareness and alignment.  (progressing, not met)  5. Pt to be I and compliant with HEP to allow for increased function in affected limb.   (progressing, not met)  Plan   Plan of care Certification: 2/6/2023 to 4/3/23.     Outpatient Physical Therapy 2 times weekly for 10 weeks to include the following interventions: Neuromuscular Re-ed, Patient Education, Self Care, Therapeutic Activities, and Therapeutic Exercise. Complete Decongestive Therapy- compression and home equipment needs to be addressed and assisted.    Hermelinda Rueda, PT              Declines

## 2023-03-01 ENCOUNTER — PATIENT OUTREACH (OUTPATIENT)
Dept: ADMINISTRATIVE | Facility: OTHER | Age: 63
End: 2023-03-01
Payer: COMMERCIAL

## 2023-03-01 DIAGNOSIS — E11.9 TYPE 2 DIABETES MELLITUS WITHOUT COMPLICATION: ICD-10-CM

## 2023-03-03 ENCOUNTER — PATIENT MESSAGE (OUTPATIENT)
Dept: INTERNAL MEDICINE | Facility: CLINIC | Age: 63
End: 2023-03-03
Payer: COMMERCIAL

## 2023-03-06 ENCOUNTER — CLINICAL SUPPORT (OUTPATIENT)
Dept: REHABILITATION | Facility: HOSPITAL | Age: 63
End: 2023-03-06
Payer: COMMERCIAL

## 2023-03-06 DIAGNOSIS — R60.0 EDEMA OF BOTH LOWER EXTREMITIES: ICD-10-CM

## 2023-03-06 DIAGNOSIS — I89.0 LYMPHEDEMA OF BOTH LOWER EXTREMITIES: Primary | ICD-10-CM

## 2023-03-06 DIAGNOSIS — Z98.84 STATUS POST GASTRIC BYPASS FOR OBESITY: ICD-10-CM

## 2023-03-06 DIAGNOSIS — E11.9 TYPE 2 DIABETES MELLITUS WITHOUT COMPLICATION, WITHOUT LONG-TERM CURRENT USE OF INSULIN: Chronic | ICD-10-CM

## 2023-03-06 DIAGNOSIS — I87.2 VENOUS INSUFFICIENCY OF BOTH LOWER EXTREMITIES: ICD-10-CM

## 2023-03-06 PROCEDURE — 97016 VASOPNEUMATIC DEVICE THERAPY: CPT | Mod: CQ

## 2023-03-06 PROCEDURE — 97140 MANUAL THERAPY 1/> REGIONS: CPT | Mod: CQ

## 2023-03-06 NOTE — PROGRESS NOTES
Physical Therapy Daily Treatment Note     Name: Shiraz RUBI [facility] Number: [id number]    Therapy Diagnosis:   Encounter Diagnoses   Name Primary?    Lymphedema of both lower extremities Yes    Type 2 diabetes mellitus without complication, without long-term current use of insulin     Status post gastric bypass for obesity     Venous insufficiency of both lower extremities     Edema of both lower extremities        Physician: Randolph Ureña MD*    Visit Date: 3/6/2023    Physician Orders: PT Eval and Treat - lymphedema  Medical Diagnosis from Referral:   Diagnosis   I89.0 (ICD-10-CM) - Lymphedema of both lower extremities   Evaluation Date: 2/6/2023  Authorization Period Expiration: 12/31/23  Plan of Care Expiration: 4/3/23  Visit # / Visits authorized: 6/20     Time In: 10:00 am   Time Out: 10:55 am   Total Billable Time: 55  minutes  Pump un timed      Precautions: Standard and obesity, Afib, CVI, Lymphedema    Subjective     Pt reports: doing well and has been wearing his compression garments daily .   He was compliant with home compression/exercise program.  Response to previous treatment: pt has several pairs of KH compression and will consider Velcro Inelastic Wraps - orders were requested.   Functional change: amb no device- knee pain and soreness at times  Pain: 0/10  Location: bilateral LEs      Objective   Male amb to dept no device  Wearing KH garments for B LE  Pt has 20-30 mmHg Medi plus petite size VI   Jobst 20-30mmHg knee high compression      Amount of Swelling/Location of Swelling: mod to B LE lower legs, ankles, feet R > > L, fullness of body habitus  Skin Integrity: dry yellow nails, dryness, firmness   Palpation/Texture: pitting to B LE ankles, lower legs R > L, density, firmness   + B mild to toes, not to feet Stemmer Sign  - B Jean's Sign  Circulation: intact   Girth Measurements (in centimeters)  LANDMARK LEFT LE  2/6/23 RIGHT LE  2/6/23 R LE  2/24/23 Change  R DIFF   at eval   SBP +  10  65.0 cm 66.0 cm - - 1.0 cm   SBP 55.0 cm 56.0 cm 54.0 2.0 1.0 cm   10 below SBP 43.0 cm 45.0 cm 45.5 0.5 2.0 cm   20 below SBP 47.0 cm 50.5 cm 49.0 1.5 3.5 cm   30 below SBP 40.0 cm 44.0 cm 39.0 5.0 4.0 cm   35 below SBP 33.0 cm 37.5 cm 32.0 5.5 4.5 cm   Ankle 30.5 cm 32.0 cm 32.5 0.5 1.5 cm   Forefoot 27.0 cm 28.0 cm 27.5 0.5 1.0 cm      Treatment:   Shiraz received the following manual therapy techniques:- Manual Lymphatic Drainage were applied to the: R LE for 45 minutes, including: MLD and short stretch compression bandaging     MANUAL LYMPHATIC DRAINAGE (MLD):    While supine with LEs elevated stimulation at terminus, along GI region, B inguinal regions, drainage of entire R LE julia lower leg, ankle, and foot with return proximally,  Use of Aquaphor/Eucerin due to dryness.   Consider self massage to abdominal areas, B inguinal areas, thigh, and remaining LE within reach.    SEQUENTIAL COMPRESSION PUMP: full leg sleeve applied to RLE for 15'  VES 5200 with default setting with distal pressures starting at 45mmHg entire LE   Performed simultaneously to compression option review, current products, inelastic velcro wraps, DME providers and options    MULTILAYERED BANDAGING:  issued supplies and bandaged R LE with cotton stockinette, Rosidal soft section dorsum of foot, 2 komprex wedges post malleoli, Rosidal soft rolls ankle to knee, 1-8cm and 2- 10cm Durelast rolls foot to knee, to leave intact 12-24 hrs as tolerated, discontinue with any problems, return rolled bandages next session. Wash and wear schedules confirmed.     Shiraz received therapeutic exercises to develop strength, endurance, ROM, flexibility, and posture for verbal review of compression for aps, walking, avoid dependency and immobility, support of muscle pump with compression and activity.  THERAPEUTIC EXERCISES:  Continue HEP of AROM, stretching, and postural correction.     Home Exercises Provided and Patient Education Provided:  Self Care Home  Management Training/Functional Therapeutic Activity     Present compression:  KH 20-30mmHg compression garments from [employer]                                       KH 20-30mmHg Jobst compression socks   Orders and recommendations: wear his present garments and socks, consider Velcro Inelastic Wraps   Orders requested  Pt notes pump consult and some contact from [employer] - unclear of process/status - therapist will contact vendor for any needs  Spoke with vendor after session- pump system has been authorized per his insurance, may have deductibles, pt has had trial and some training with vendor.  MD orders were received- pending full approval and delivery.    Velcro Inelastic Wraps - position, fit, don/doff with goals and benefits of reduction, varying size range, and ease of application.  Process and methods to secure were reviewed.   PATIENT/FAMILY Education: bandaging/compression wear schedule,  HEP,  Beginning of self massage,  Self or assisted bandaging, compression options, and Risk reduction    Written Home Exercises Provided: Patient instructed to cont prior HEP.  Home plan of management was reviewed and Shiraz was able to demonstrate understanding prior to the end of the session.  Shiraz demonstrated good  understanding of the education provided.     Assessment   Shiraz is a 63 y.o. male referred to outpatient Physical Therapy with a medical diagnosis of   Diagnosis   I89.0 (ICD-10-CM) - Lymphedema of both lower extremities   This patient presents s/p obesity with Bariatric procedure 2010, Afib, HTN, CVI resulting in: obesity related multifactorial venous lymphedema of the B LE Stage II changes R > L, pitting, increased pain, increased stiffness in the ankles, as well as difficulty performing ADLS, compression support , compression needs, and placing the pt at higher risk of infection.   Advised obesity, venous reflux and medical conditions likely contribute to his venous lymphatic edema.   Pt should manage these medical conditions to allow more support to managing his edema.  Focus will be on compression needs and home / self care management plans.    Pt is progressing well and noted reductions to his RLE since initiating therapy. Pt also demonstrates good compliance with use of compression and is in process of approval for a home compression pump. Pt may also be interested in velcro inelastic wraps and is waiting on orders from the doctor for this .     Patient has Stage II secondary lymphedema due to multifactorial conditions not unlimited to CVI and obesity Therapy recommends elevation, exercise, regular use of compression, and therapy for at least a month to reduce swelling.  Swelling persists and pt would benefit from home pump support and additional compression options.    Shiraz Is progressing well towards his goals.   Pt prognosis is Good.     Pt will continue to benefit from skilled outpatient physical therapy to address the deficits listed in the problem list box on initial evaluation, provide pt/family education and to maximize pt's level of independence in the home and community environment.   Pt's spiritual, cultural and educational needs considered and pt agreeable to plan of care and goals.    Anticipated Barriers for therapy: obesity, CVI lymphedema, R ankle procedure pending     The following goals were discussed with the patient and patient is in agreement with them as to be addressed in the treatment plan.      Short Term Goals: (6 weeks)  1. Patient will show decreased girth in B LE by up to 1 cm to allow for LE symmetry, shoe and clothing choice, and ability to apply needed compression.  (progressing  2. Patient will demonstrate 100% knowledge of lymphedema precautions and signs of infection to allow for reduced lymphedema risk, infection risk, and/or exacerbation of condition.   met)  3. Patient or caregiver will perform self-bandaging techniques and/or wearing of compression  garments to allow for lymphatic drainage support, skin elasticity, and reduction in shape and size of limb.  met)  4. Patient will perform self lymph drainage techniques to areas within reach to enhance lymphatic drainage and skin condition.  (progressing  5. Patient will tolerate daily activities with multilayered bandaging to allow for lymphatic and venous support.  met)     Long Term Goals: (12  weeks)  1. Patient will show decreased girth in R LE by up to 2 cm  to allow for LE symmetry, shoe and clothing choice, and ability to apply needed compression daily.  (progressing, not met)  2. Patient will show reduction in density to mild or less with improved contour of limb to allow for cosmesis, LE symmetry, infection risk reduction, and clothing and compression choice.   (progressing, not met)  3. Patient to domo/doff compression garment with daily compliance to assist in lymphedema management, skin elasticity, and tissue density.  (progressing, not met)  4. Pt to show improved postural awareness and alignment.  (progressing, not met)  5. Pt to be I and compliant with HEP to allow for increased function in affected limb.   (progressing, not met)  Plan   Plan of care Certification: 2/6/2023 to 4/3/23.     Outpatient Physical Therapy 2 times weekly for 10 weeks to include the following interventions: Neuromuscular Re-ed, Patient Education, Self Care, Therapeutic Activities, and Therapeutic Exercise. Complete Decongestive Therapy- compression and home equipment needs to be addressed and assisted.    Renetta Alegre, PTA

## 2023-03-07 NOTE — PROGRESS NOTES
Physical Therapy Daily Treatment Note     Name: Shiraz RUBI [facility] Number: [id number]    Therapy Diagnosis:   Encounter Diagnoses   Name Primary?    Lymphedema of both lower extremities Yes    Type 2 diabetes mellitus without complication, without long-term current use of insulin     Status post gastric bypass for obesity     Venous insufficiency of both lower extremities     Edema of both lower extremities        Physician: Randolph Ureña MD*    Visit Date: 3/8/2023    Physician Orders: PT Eval and Treat - lymphedema  Medical Diagnosis from Referral:   Diagnosis   I89.0 (ICD-10-CM) - Lymphedema of both lower extremities   Evaluation Date: 2/6/2023  Authorization Period Expiration: 12/31/23  Plan of Care Expiration: 4/3/23  Visit # / Visits authorized: 6/20     Time In: 10:00 am   Time Out: 10:55 am   Total Billable Time: 55  minutes  Pump un timed      Precautions: Standard and obesity, Afib, CVI, Lymphedema    Subjective     Pt reports: no complaints and is doing well.   He was compliant with home compression/exercise program.  Response to previous treatment: pt has several pairs of KH compression and will consider Velcro Inelastic Wraps - orders were requested 3/8/23.  Functional change: amb no device- knee pain and soreness at times  Pain: 0/10  Location: bilateral LEs      Objective   Male amb to dept no device  Wearing KH garments for B LE  Pt has 20-30 mmHg Medi plus petite size VI   Jobst 20-30mmHg knee high compression      Amount of Swelling/Location of Swelling: mod to B LE lower legs, ankles, feet R > > L, fullness of body habitus  Skin Integrity: dry yellow nails, dryness, firmness   Palpation/Texture: pitting to B LE ankles, lower legs R > L, density, firmness   + B mild to toes, not to feet Stemmer Sign  - B Jean's Sign  Circulation: intact   Girth Measurements (in centimeters)  LANDMARK LEFT LE  2/6/23 RIGHT LE  2/6/23 R LE  2/24/23 Change  R DIFF   at eval   SBP + 10  65.0 cm 66.0 cm - - 1.0  cm   SBP 55.0 cm 56.0 cm 54.0 2.0 1.0 cm   10 below SBP 43.0 cm 45.0 cm 45.5 0.5 2.0 cm   20 below SBP 47.0 cm 50.5 cm 49.0 1.5 3.5 cm   30 below SBP 40.0 cm 44.0 cm 39.0 5.0 4.0 cm   35 below SBP 33.0 cm 37.5 cm 32.0 5.5 4.5 cm   Ankle 30.5 cm 32.0 cm 32.5 0.5 1.5 cm   Forefoot 27.0 cm 28.0 cm 27.5 0.5 1.0 cm      Treatment:   Shiraz received the following manual therapy techniques:- Manual Lymphatic Drainage were applied to the: R LE for 45 minutes, including: MLD and short stretch compression bandaging     MANUAL LYMPHATIC DRAINAGE (MLD):    While supine with LEs elevated stimulation at terminus, along GI region, B inguinal regions, drainage of entire R LE julia lower leg, ankle, and foot with return proximally,  Use of Aquaphor/Eucerin due to dryness.   Consider self massage to abdominal areas, B inguinal areas, thigh, and remaining LE within reach.    SEQUENTIAL COMPRESSION PUMP: full leg sleeve applied to RLE for 15'  VES 5200 with default setting with distal pressures starting at 45mmHg entire LE   Performed simultaneously to compression option review, current products, inelastic velcro wraps, DME providers and options    MULTILAYERED BANDAGING:  issued supplies and bandaged R LE with cotton stockinette, Rosidal soft section dorsum of foot, 2 komprex wedges post malleoli, Rosidal soft rolls ankle to knee, 1-8cm and 2- 10cm Durelast rolls foot to knee, to leave intact 12-24 hrs as tolerated, discontinue with any problems, return rolled bandages next session. Wash and wear schedules confirmed.     Shiraz received therapeutic exercises to develop strength, endurance, ROM, flexibility, and posture for verbal review of compression for aps, walking, avoid dependency and immobility, support of muscle pump with compression and activity.  THERAPEUTIC EXERCISES:  Continue HEP of AROM, stretching, and postural correction.     Home Exercises Provided and Patient Education Provided:  Self Care Home Management  Training/Functional Therapeutic Activity     Present compression:  KH 20-30mmHg compression garments from [employer]                                       KH 20-30mmHg Jobst compression socks   Orders and recommendations: wear his present garments and socks, consider Velcro Inelastic Wraps   Orders requested  Pt notes pump consult and some contact from [employer] - unclear of process/status - therapist will contact vendor for any needs  Spoke with vendor after session- pump system has been authorized per his insurance, may have deductibles, pt has had trial and some training with vendor.  MD orders were received- pending full approval and delivery.    Velcro Inelastic Wraps - position, fit, don/doff with goals and benefits of reduction, varying size range, and ease of application.  Process and methods to secure were reviewed.   PATIENT/FAMILY Education: bandaging/compression wear schedule,  HEP,  Beginning of self massage,  Self or assisted bandaging, compression options, and Risk reduction    Written Home Exercises Provided: Patient instructed to cont prior HEP.  Home plan of management was reviewed and Shiraz was able to demonstrate understanding prior to the end of the session.  Shiraz demonstrated good  understanding of the education provided.     Assessment   Shiraz is a 63 y.o. male referred to outpatient Physical Therapy with a medical diagnosis of   Diagnosis   I89.0 (ICD-10-CM) - Lymphedema of both lower extremities   This patient presents s/p obesity with Bariatric procedure 2010, Afib, HTN, CVI resulting in: obesity related multifactorial venous lymphedema of the B LE Stage II changes R > L, pitting, increased pain, increased stiffness in the ankles, as well as difficulty performing ADLS, compression support , compression needs, and placing the pt at higher risk of infection.   Advised obesity, venous reflux and medical conditions likely contribute to his venous lymphatic edema.  Pt should  manage these medical conditions to allow more support to managing his edema.  Focus will be on compression needs and home / self care management plans.    Pt is progressing well and noted reductions to his RLE since initiating therapy. Pt also demonstrates good compliance with use of compression and is in process of approval for a home compression pump. Pt may also benefit from inelastic velcro wraps and orders were requested for this .     Patient has Stage II secondary lymphedema due to multifactorial conditions not unlimited to CVI and obesity Therapy recommends elevation, exercise, regular use of compression, and therapy for at least a month to reduce swelling.  Swelling persists and pt would benefit from home pump support and additional compression options.    Shiraz Is progressing well towards his goals.   Pt prognosis is Good.     Pt will continue to benefit from skilled outpatient physical therapy to address the deficits listed in the problem list box on initial evaluation, provide pt/family education and to maximize pt's level of independence in the home and community environment.   Pt's spiritual, cultural and educational needs considered and pt agreeable to plan of care and goals.    Anticipated Barriers for therapy: obesity, CVI lymphedema, R ankle procedure pending     The following goals were discussed with the patient and patient is in agreement with them as to be addressed in the treatment plan.      Short Term Goals: (6 weeks)  1. Patient will show decreased girth in B LE by up to 1 cm to allow for LE symmetry, shoe and clothing choice, and ability to apply needed compression.  (progressing  2. Patient will demonstrate 100% knowledge of lymphedema precautions and signs of infection to allow for reduced lymphedema risk, infection risk, and/or exacerbation of condition.   met)  3. Patient or caregiver will perform self-bandaging techniques and/or wearing of compression garments to allow for lymphatic  drainage support, skin elasticity, and reduction in shape and size of limb.  met)  4. Patient will perform self lymph drainage techniques to areas within reach to enhance lymphatic drainage and skin condition.  (progressing  5. Patient will tolerate daily activities with multilayered bandaging to allow for lymphatic and venous support.  met)     Long Term Goals: (12  weeks)  1. Patient will show decreased girth in R LE by up to 2 cm  to allow for LE symmetry, shoe and clothing choice, and ability to apply needed compression daily.  (progressing, not met)  2. Patient will show reduction in density to mild or less with improved contour of limb to allow for cosmesis, LE symmetry, infection risk reduction, and clothing and compression choice.   (progressing, not met)  3. Patient to domo/doff compression garment with daily compliance to assist in lymphedema management, skin elasticity, and tissue density.  (progressing, not met)  4. Pt to show improved postural awareness and alignment.  (progressing, not met)  5. Pt to be I and compliant with HEP to allow for increased function in affected limb.   (progressing, not met)  Plan   Plan of care Certification: 2/6/2023 to 4/3/23.     Outpatient Physical Therapy 2 times weekly for 10 weeks to include the following interventions: Neuromuscular Re-ed, Patient Education, Self Care, Therapeutic Activities, and Therapeutic Exercise. Complete Decongestive Therapy- compression and home equipment needs to be addressed and assisted.    Renetta Alegre, PTA                  Annette Loja

## 2023-03-08 ENCOUNTER — CLINICAL SUPPORT (OUTPATIENT)
Dept: REHABILITATION | Facility: HOSPITAL | Age: 63
End: 2023-03-08
Payer: COMMERCIAL

## 2023-03-08 DIAGNOSIS — E11.9 TYPE 2 DIABETES MELLITUS WITHOUT COMPLICATION, WITHOUT LONG-TERM CURRENT USE OF INSULIN: Chronic | ICD-10-CM

## 2023-03-08 DIAGNOSIS — R60.0 EDEMA OF BOTH LOWER EXTREMITIES: ICD-10-CM

## 2023-03-08 DIAGNOSIS — I87.2 VENOUS INSUFFICIENCY OF BOTH LOWER EXTREMITIES: ICD-10-CM

## 2023-03-08 DIAGNOSIS — I89.0 LYMPHEDEMA OF BOTH LOWER EXTREMITIES: Primary | ICD-10-CM

## 2023-03-08 DIAGNOSIS — Z98.84 STATUS POST GASTRIC BYPASS FOR OBESITY: ICD-10-CM

## 2023-03-08 PROCEDURE — 97140 MANUAL THERAPY 1/> REGIONS: CPT | Mod: CQ

## 2023-03-08 PROCEDURE — 97016 VASOPNEUMATIC DEVICE THERAPY: CPT | Mod: CQ

## 2023-03-17 ENCOUNTER — PATIENT MESSAGE (OUTPATIENT)
Dept: RESEARCH | Facility: HOSPITAL | Age: 63
End: 2023-03-17
Payer: COMMERCIAL

## 2023-03-22 ENCOUNTER — PATIENT MESSAGE (OUTPATIENT)
Dept: INTERNAL MEDICINE | Facility: CLINIC | Age: 63
End: 2023-03-22
Payer: COMMERCIAL

## 2023-04-19 NOTE — ANESTHESIA POSTPROCEDURE EVALUATION
Anesthesia Post Evaluation    Patient: Shiraz Jha    Procedure(s) Performed: Procedure(s) (LRB):  EGD (ESOPHAGOGASTRODUODENOSCOPY) (N/A)    Final Anesthesia Type: general      Patient location during evaluation: PACU  Patient participation: Yes- Able to Participate  Level of consciousness: awake and alert and oriented  Post-procedure vital signs: reviewed and stable  Pain management: adequate  Airway patency: patent    PONV status at discharge: No PONV  Anesthetic complications: no      Cardiovascular status: blood pressure returned to baseline, hemodynamically stable and stable  Respiratory status: unassisted, room air and spontaneous ventilation  Hydration status: euvolemic  Follow-up not needed.          Vitals Value Taken Time   /58 05/09/22 1114   Temp 37 °C (98.6 °F) 05/09/22 0931   Pulse 65 05/09/22 1006   Resp 17 05/09/22 1006   SpO2 100 % 05/09/22 1006   Vitals shown include unvalidated device data.      Event Time   Out of Recovery 10:12:00         Pain/Dandy Score: Dandy Score: 10 (5/9/2022 10:00 AM)         · Patient has a history of adenocarcinoma of the left lung status post VATS with left upper lobe resection, now found to have brain metastasis to the left temporal lobe, discovered in March with 2023, status post radiation  · Recently completed a course of steroids, ended on 4/12  Now back on decadron to help w/ above symptoms  Neuro will speak to [facility] about steroid recs  · Given loading dose of Keppra 4 5 g as left-sided temporal lobe brain metastasis may be contributing to seizure-like activity and strokelike symptoms/aphasia  · D/w medical oncology, they do not have any IP recommendations as of now  OP follow up with them

## 2023-05-07 ENCOUNTER — HOSPITAL ENCOUNTER (OUTPATIENT)
Facility: HOSPITAL | Age: 63
Discharge: HOME | End: 2023-05-08
Attending: EMERGENCY MEDICINE | Admitting: HOSPITALIST
Payer: COMMERCIAL

## 2023-05-07 DIAGNOSIS — R07.9 CHEST PAIN: ICD-10-CM
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
## 2023-05-07 DIAGNOSIS — D50.8 OTHER IRON DEFICIENCY ANEMIA: ICD-10-CM

## 2023-05-07 DIAGNOSIS — M10.9 GOUT: ICD-10-CM

## 2023-05-07 DIAGNOSIS — E11.9 TYPE 2 DIABETES MELLITUS WITHOUT COMPLICATION, WITHOUT LONG-TERM CURRENT USE OF INSULIN: Primary | Chronic | ICD-10-CM

## 2023-05-07 PROBLEM — D64.9 ANEMIA: Status: ACTIVE | Noted: 2023-05-07

## 2023-05-07 LAB
ALBUMIN SERPL BCP-MCNC: 3.2 G/DL (ref 3.5–5.2)
ALP SERPL-CCNC: 152 U/L (ref 55–135)
ALT SERPL W/O P-5'-P-CCNC: 20 U/L (ref 10–44)
ANION GAP SERPL CALC-SCNC: 12 MMOL/L (ref 8–16)
APPEARANCE FLD: NORMAL
AST SERPL-CCNC: 22 U/L (ref 10–40)
BASOPHILS # BLD AUTO: 0.03 K/UL (ref 0–0.2)
BASOPHILS NFR BLD: 0.3 % (ref 0–1.9)
BILIRUB SERPL-MCNC: 1.3 MG/DL (ref 0.1–1)
BODY FLD TYPE: ABNORMAL
BODY FLD TYPE: NORMAL
BUN SERPL-MCNC: 16 MG/DL (ref 8–23)
CALCIUM SERPL-MCNC: 9.8 MG/DL (ref 8.7–10.5)
CHLORIDE SERPL-SCNC: 102 MMOL/L (ref 95–110)
CO2 SERPL-SCNC: 22 MMOL/L (ref 23–29)
COLOR FLD: YELLOW
CREAT SERPL-MCNC: 1.1 MG/DL (ref 0.5–1.4)
CRP SERPL-MCNC: 137.5 MG/L (ref 0–8.2)
CRYSTALS FLD MICRO: POSITIVE
DIFFERENTIAL METHOD: ABNORMAL
EOSINOPHIL # BLD AUTO: 0 K/UL (ref 0–0.5)
EOSINOPHIL NFR BLD: 0.2 % (ref 0–8)
ERYTHROCYTE [DISTWIDTH] IN BLOOD BY AUTOMATED COUNT: 18.1 % (ref 11.5–14.5)
ERYTHROCYTE [SEDIMENTATION RATE] IN BLOOD BY PHOTOMETRIC METHOD: 118 MM/HR (ref 0–23)
EST. GFR  (NO RACE VARIABLE): >60 ML/MIN/1.73 M^2
GLUCOSE SERPL-MCNC: 157 MG/DL (ref 70–110)
GRAM STN SPEC: NORMAL
GRAM STN SPEC: NORMAL
HCT VFR BLD AUTO: 34.5 % (ref 40–54)
HGB BLD-MCNC: 9.8 G/DL (ref 14–18)
IMM GRANULOCYTES # BLD AUTO: 0.03 K/UL (ref 0–0.04)
IMM GRANULOCYTES NFR BLD AUTO: 0.3 % (ref 0–0.5)
LYMPHOCYTES # BLD AUTO: 1.3 K/UL (ref 1–4.8)
LYMPHOCYTES NFR BLD: 13.5 % (ref 18–48)
LYMPHOCYTES NFR FLD MANUAL: 2 %
MCH RBC QN AUTO: 21 PG (ref 27–31)
MCHC RBC AUTO-ENTMCNC: 28.4 G/DL (ref 32–36)
MCV RBC AUTO: 74 FL (ref 82–98)
MONOCYTES # BLD AUTO: 0.8 K/UL (ref 0.3–1)
MONOCYTES NFR BLD: 8 % (ref 4–15)
MONOS+MACROS NFR FLD MANUAL: 10 %
NEUTROPHILS # BLD AUTO: 7.2 K/UL (ref 1.8–7.7)
NEUTROPHILS NFR BLD: 77.7 % (ref 38–73)
NEUTROPHILS NFR FLD MANUAL: 88 %
NRBC BLD-RTO: 0 /100 WBC
PLATELET # BLD AUTO: 347 K/UL (ref 150–450)
PMV BLD AUTO: 9.7 FL (ref 9.2–12.9)
POCT GLUCOSE: 147 MG/DL (ref 70–110)
POTASSIUM SERPL-SCNC: 4.1 MMOL/L (ref 3.5–5.1)
PROT SERPL-MCNC: 6.8 G/DL (ref 6–8.4)
RBC # BLD AUTO: 4.66 M/UL (ref 4.6–6.2)
SODIUM SERPL-SCNC: 136 MMOL/L (ref 136–145)
WBC # BLD AUTO: 9.32 K/UL (ref 3.9–12.7)
WBC # FLD: NORMAL /CU MM

## 2023-05-07 PROCEDURE — G0378 HOSPITAL OBSERVATION PER HR: HCPCS

## 2023-05-07 PROCEDURE — 89051 BODY FLUID CELL COUNT: CPT | Performed by: EMERGENCY MEDICINE

## 2023-05-07 PROCEDURE — 89060 EXAM SYNOVIAL FLUID CRYSTALS: CPT | Performed by: EMERGENCY MEDICINE

## 2023-05-07 PROCEDURE — 27000190 HC CPAP FULL FACE MASK W/VALVE

## 2023-05-07 PROCEDURE — 94660 CPAP INITIATION&MGMT: CPT

## 2023-05-07 PROCEDURE — 99223 1ST HOSP IP/OBS HIGH 75: CPT | Mod: ,,, | Performed by: HOSPITALIST

## 2023-05-07 PROCEDURE — 87205 SMEAR GRAM STAIN: CPT | Performed by: EMERGENCY MEDICINE

## 2023-05-07 PROCEDURE — 85652 RBC SED RATE AUTOMATED: CPT | Performed by: EMERGENCY MEDICINE

## 2023-05-07 PROCEDURE — 99223 PR INITIAL HOSPITAL CARE,LEVL III: ICD-10-PCS | Mod: ,,, | Performed by: HOSPITALIST

## 2023-05-07 PROCEDURE — 87075 CULTR BACTERIA EXCEPT BLOOD: CPT | Performed by: EMERGENCY MEDICINE

## 2023-05-07 PROCEDURE — 94761 N-INVAS EAR/PLS OXIMETRY MLT: CPT

## 2023-05-07 PROCEDURE — 96372 THER/PROPH/DIAG INJ SC/IM: CPT | Performed by: STUDENT IN AN ORGANIZED HEALTH CARE EDUCATION/TRAINING PROGRAM

## 2023-05-07 PROCEDURE — 85025 COMPLETE CBC W/AUTO DIFF WBC: CPT | Performed by: EMERGENCY MEDICINE

## 2023-05-07 PROCEDURE — 99000 SPECIMEN HANDLING OFFICE-LAB: CPT | Performed by: EMERGENCY MEDICINE

## 2023-05-07 PROCEDURE — 25000003 PHARM REV CODE 250: Performed by: EMERGENCY MEDICINE

## 2023-05-07 PROCEDURE — 63600175 PHARM REV CODE 636 W HCPCS

## 2023-05-07 PROCEDURE — 99285 EMERGENCY DEPT VISIT HI MDM: CPT | Mod: 25

## 2023-05-07 PROCEDURE — 25000003 PHARM REV CODE 250

## 2023-05-07 PROCEDURE — 63600175 PHARM REV CODE 636 W HCPCS: Performed by: STUDENT IN AN ORGANIZED HEALTH CARE EDUCATION/TRAINING PROGRAM

## 2023-05-07 PROCEDURE — 99285 EMERGENCY DEPT VISIT HI MDM: CPT | Mod: 25,,, | Performed by: EMERGENCY MEDICINE

## 2023-05-07 PROCEDURE — 99900035 HC TECH TIME PER 15 MIN (STAT)

## 2023-05-07 PROCEDURE — 20611 DRAIN/INJ JOINT/BURSA W/US: CPT | Mod: RT

## 2023-05-07 PROCEDURE — 82962 GLUCOSE BLOOD TEST: CPT

## 2023-05-07 PROCEDURE — 25000003 PHARM REV CODE 250: Performed by: STUDENT IN AN ORGANIZED HEALTH CARE EDUCATION/TRAINING PROGRAM

## 2023-05-07 PROCEDURE — 86140 C-REACTIVE PROTEIN: CPT | Performed by: EMERGENCY MEDICINE

## 2023-05-07 PROCEDURE — 99285 PR EMERGENCY DEPT VISIT,LEVEL V: ICD-10-PCS | Mod: 25,,, | Performed by: EMERGENCY MEDICINE

## 2023-05-07 PROCEDURE — 20610 DRAIN/INJ JOINT/BURSA W/O US: CPT | Mod: RT

## 2023-05-07 PROCEDURE — 27000221 HC OXYGEN, UP TO 24 HOURS

## 2023-05-07 PROCEDURE — 80053 COMPREHEN METABOLIC PANEL: CPT | Performed by: EMERGENCY MEDICINE

## 2023-05-07 RX ORDER — ACETAMINOPHEN 325 MG/1
650 TABLET ORAL EVERY 4 HOURS PRN
Status: DISCONTINUED | OUTPATIENT
Start: 2023-05-07 | End: 2023-05-08 | Stop reason: HOSPADM

## 2023-05-07 RX ORDER — INSULIN ASPART 100 [IU]/ML
0-5 INJECTION, SOLUTION INTRAVENOUS; SUBCUTANEOUS
Status: DISCONTINUED | OUTPATIENT
Start: 2023-05-07 | End: 2023-05-08 | Stop reason: HOSPADM

## 2023-05-07 RX ORDER — IBUPROFEN 200 MG
16 TABLET ORAL
Status: DISCONTINUED | OUTPATIENT
Start: 2023-05-07 | End: 2023-05-07

## 2023-05-07 RX ORDER — TALC
6 POWDER (GRAM) TOPICAL NIGHTLY PRN
Status: DISCONTINUED | OUTPATIENT
Start: 2023-05-07 | End: 2023-05-08 | Stop reason: HOSPADM

## 2023-05-07 RX ORDER — ENOXAPARIN SODIUM 100 MG/ML
40 INJECTION SUBCUTANEOUS 2 TIMES DAILY
Status: DISCONTINUED | OUTPATIENT
Start: 2023-05-07 | End: 2023-05-07

## 2023-05-07 RX ORDER — TAMSULOSIN HYDROCHLORIDE 0.4 MG/1
0.4 CAPSULE ORAL 2 TIMES DAILY
Status: DISCONTINUED | OUTPATIENT
Start: 2023-05-07 | End: 2023-05-08 | Stop reason: HOSPADM

## 2023-05-07 RX ORDER — OXYCODONE HYDROCHLORIDE 5 MG/1
5 TABLET ORAL EVERY 6 HOURS PRN
Status: DISCONTINUED | OUTPATIENT
Start: 2023-05-07 | End: 2023-05-08 | Stop reason: HOSPADM

## 2023-05-07 RX ORDER — DEXTROSE 40 %
15 GEL (GRAM) ORAL
Status: DISCONTINUED | OUTPATIENT
Start: 2023-05-07 | End: 2023-05-08 | Stop reason: HOSPADM

## 2023-05-07 RX ORDER — DEXAMETHASONE SODIUM PHOSPHATE 4 MG/ML
8 INJECTION, SOLUTION INTRA-ARTICULAR; INTRALESIONAL; INTRAMUSCULAR; INTRAVENOUS; SOFT TISSUE
Status: DISCONTINUED | OUTPATIENT
Start: 2023-05-07 | End: 2023-05-07

## 2023-05-07 RX ORDER — SPIRONOLACTONE 25 MG/1
25 TABLET ORAL DAILY
Status: DISCONTINUED | OUTPATIENT
Start: 2023-05-08 | End: 2023-05-08 | Stop reason: HOSPADM

## 2023-05-07 RX ORDER — PANTOPRAZOLE SODIUM 40 MG/1
40 TABLET, DELAYED RELEASE ORAL DAILY
Status: DISCONTINUED | OUTPATIENT
Start: 2023-05-08 | End: 2023-05-08 | Stop reason: HOSPADM

## 2023-05-07 RX ORDER — GLUCAGON 1 MG
1 KIT INJECTION
Status: DISCONTINUED | OUTPATIENT
Start: 2023-05-07 | End: 2023-05-08 | Stop reason: HOSPADM

## 2023-05-07 RX ORDER — HYDROCODONE BITARTRATE AND ACETAMINOPHEN 5; 325 MG/1; MG/1
1 TABLET ORAL
Status: COMPLETED | OUTPATIENT
Start: 2023-05-07 | End: 2023-05-07

## 2023-05-07 RX ORDER — IBUPROFEN 200 MG
24 TABLET ORAL
Status: DISCONTINUED | OUTPATIENT
Start: 2023-05-07 | End: 2023-05-07

## 2023-05-07 RX ORDER — SODIUM CHLORIDE 0.9 % (FLUSH) 0.9 %
10 SYRINGE (ML) INJECTION EVERY 12 HOURS PRN
Status: DISCONTINUED | OUTPATIENT
Start: 2023-05-07 | End: 2023-05-08 | Stop reason: HOSPADM

## 2023-05-07 RX ORDER — PREDNISONE 20 MG/1
40 TABLET ORAL DAILY
Status: DISCONTINUED | OUTPATIENT
Start: 2023-05-07 | End: 2023-05-08

## 2023-05-07 RX ORDER — SOTALOL HYDROCHLORIDE 80 MG/1
80 TABLET ORAL 2 TIMES DAILY
Status: DISCONTINUED | OUTPATIENT
Start: 2023-05-07 | End: 2023-05-08 | Stop reason: HOSPADM

## 2023-05-07 RX ORDER — LIDOCAINE HYDROCHLORIDE 10 MG/ML
10 INJECTION, SOLUTION EPIDURAL; INFILTRATION; INTRACAUDAL; PERINEURAL
Status: COMPLETED | OUTPATIENT
Start: 2023-05-07 | End: 2023-05-07

## 2023-05-07 RX ORDER — CHOLECALCIFEROL (VITAMIN D3) 25 MCG
1000 TABLET ORAL DAILY
Status: DISCONTINUED | OUTPATIENT
Start: 2023-05-08 | End: 2023-05-08 | Stop reason: HOSPADM

## 2023-05-07 RX ORDER — BUMETANIDE 1 MG/1
2 TABLET ORAL 2 TIMES DAILY
Status: DISCONTINUED | OUTPATIENT
Start: 2023-05-07 | End: 2023-05-08 | Stop reason: HOSPADM

## 2023-05-07 RX ORDER — DEXTROSE 40 %
30 GEL (GRAM) ORAL
Status: DISCONTINUED | OUTPATIENT
Start: 2023-05-07 | End: 2023-05-08 | Stop reason: HOSPADM

## 2023-05-07 RX ORDER — NALOXONE HCL 0.4 MG/ML
0.02 VIAL (ML) INJECTION
Status: DISCONTINUED | OUTPATIENT
Start: 2023-05-07 | End: 2023-05-08 | Stop reason: HOSPADM

## 2023-05-07 RX ORDER — METOPROLOL TARTRATE 25 MG/1
25 TABLET, FILM COATED ORAL 2 TIMES DAILY
Status: DISCONTINUED | OUTPATIENT
Start: 2023-05-07 | End: 2023-05-08 | Stop reason: HOSPADM

## 2023-05-07 RX ORDER — ONDANSETRON 8 MG/1
8 TABLET, ORALLY DISINTEGRATING ORAL EVERY 8 HOURS PRN
Status: DISCONTINUED | OUTPATIENT
Start: 2023-05-07 | End: 2023-05-08 | Stop reason: HOSPADM

## 2023-05-07 RX ORDER — BUPROPION HYDROCHLORIDE 150 MG/1
300 TABLET ORAL DAILY
Status: DISCONTINUED | OUTPATIENT
Start: 2023-05-08 | End: 2023-05-08 | Stop reason: HOSPADM

## 2023-05-07 RX ORDER — DEXAMETHASONE SODIUM PHOSPHATE 4 MG/ML
8 INJECTION, SOLUTION INTRA-ARTICULAR; INTRALESIONAL; INTRAMUSCULAR; INTRAVENOUS; SOFT TISSUE
Status: COMPLETED | OUTPATIENT
Start: 2023-05-07 | End: 2023-05-07

## 2023-05-07 RX ORDER — CYANOCOBALAMIN (VITAMIN B-12) 250 MCG
500 TABLET ORAL DAILY
Status: DISCONTINUED | OUTPATIENT
Start: 2023-05-08 | End: 2023-05-08 | Stop reason: HOSPADM

## 2023-05-07 RX ADMIN — PREDNISONE 40 MG: 20 TABLET ORAL at 08:05

## 2023-05-07 RX ADMIN — SOTALOL HYDROCHLORIDE 80 MG: 80 TABLET ORAL at 11:05

## 2023-05-07 RX ADMIN — HYDROCODONE BITARTRATE AND ACETAMINOPHEN 1 TABLET: 5; 325 TABLET ORAL at 05:05

## 2023-05-07 RX ADMIN — HYDROCODONE BITARTRATE AND ACETAMINOPHEN 1 TABLET: 5; 325 TABLET ORAL at 11:05

## 2023-05-07 RX ADMIN — METOPROLOL TARTRATE 25 MG: 25 TABLET, FILM COATED ORAL at 08:05

## 2023-05-07 RX ADMIN — TAMSULOSIN HYDROCHLORIDE 0.4 MG: 0.4 CAPSULE ORAL at 08:05

## 2023-05-07 RX ADMIN — DEXAMETHASONE SODIUM PHOSPHATE 8 MG: 4 INJECTION INTRA-ARTICULAR; INTRALESIONAL; INTRAMUSCULAR; INTRAVENOUS; SOFT TISSUE at 05:05

## 2023-05-07 RX ADMIN — LIDOCAINE HYDROCHLORIDE 100 MG: 10 INJECTION, SOLUTION EPIDURAL; INFILTRATION; INTRACAUDAL at 01:05

## 2023-05-07 NOTE — PROGRESS NOTES
Pharmacist Renal Dose Adjustment Note    Shiraz Jha is a 63 y.o. male being treated with the medication enoxaparin    Patient Data:    Vital Signs (Most Recent):  Temp: 98.3 °F (36.8 °C) (05/07/23 1430)  Pulse: 81 (05/07/23 1430)  Resp: 19 (05/07/23 1715)  BP: (!) 141/69 (05/07/23 1430)  SpO2: 99 % (05/07/23 1430) Vital Signs (72h Range):  Temp:  [98.3 °F (36.8 °C)-98.4 °F (36.9 °C)]   Pulse:  [80-81]   Resp:  [18-19]   BP: (141-158)/(69-94)   SpO2:  [98 %-99 %]      Recent Labs   Lab 05/07/23  1134   CREATININE 1.1     Serum creatinine: 1.1 mg/dL 05/07/23 1134  Estimated creatinine clearance: 92.5 mL/min    Enoxaparin 40 mg subQ daily will be changed to enoxaparin 40 mg subQ BID 2/2 CrCl > 30 and BMI > 40    Pharmacist's Name: Millicent Wells  Pharmacist's Extension: [phone]

## 2023-05-07 NOTE — ED PROVIDER NOTES
"Encounter Date: 5/7/2023       History     Chief Complaint   Patient presents with    Knee Pain     Pt was walking his dog 3-4 days ago and states he twisted his right knee.      63-year-old male with history of Afib s/p ablation (7/2014), BLE lymphedema, venous insufficiency, HTN, DM2, morbid obesity s/p gastric bypass (2010), QUINTIN on CPAP.  Patient reports that several days ago he twisted his right knee while walking his dog.  He was able to ambulate immediately after and he did not fall to the ground.  He reports progressive right knee pain since then.  Reports he was able to walk but is "dragging" his right leg.  He reports low-grade fever on review of systems but has not taken his temperature.  He denies numbness or weakness.  Pain is worse with ambulation and palpation of the affected knee.  He denies numbness or weakness.  He denies other orthopedic injury.    Review of patient's allergies indicates:   Allergen Reactions    Iodinated contrast media Other (See Comments)     Raises BP       Past Medical History:   Diagnosis Date    Allergy     Anemia     Asthma     Atrial fibrillation     Cataract     Chronic rhinitis 9/26/2013    DDD (degenerative disc disease) 4/3/2015    Depression     Diabetes mellitus     Fibromyalgia     Gastroesophageal reflux disease without esophagitis 7/14/2017    Hypertension     Sinusitis, acute, maxillary 12/20/2012    Thyroid disease      Past Surgical History:   Procedure Laterality Date    ANTEGRADE SINGLE BALLOON ENTEROSCOPY N/A 5/26/2022    Procedure: ENTEROSCOPY, SINGLE BALLOON, ANTEGRADE;  Surgeon: Lyle Edmond MD;  Location: [facility] ([address] [place] [address]);  Service: Endoscopy;  Laterality: N/A;  Will use single-balloon scope for both the upper endoscopy and the colonoscopy - recent incomplete colonoscopy due to looping.    Pt is fully vaccinated-DS  5/20/22-Approval to hold Xarelto rec'd from Dr. Webb-pending approval (see telephoned en    CERVICAL SPINE SURGERY      COLONOSCOPY " N/A 5/10/2022    Procedure: COLONOSCOPY;  Surgeon: Conrad Diaz MD;  Location: [facility] ENDO (2ND FLR);  Service: Endoscopy;  Laterality: N/A;    COLONOSCOPY N/A 5/26/2022    Procedure: COLONOSCOPY;  Surgeon: Lyle Edmond MD;  Location: [facility] ENDO (2ND FLR);  Service: Endoscopy;  Laterality: N/A;    EPIDURAL STEROID INJECTION INTO LUMBAR SPINE N/A 5/29/2018    Procedure: INJECTION-STEROID-EPIDURAL-LUMBAR- L4-5;  Surgeon: Roman Lyman MD;  Location: [facility] PAIN MGT;  Service: Pain Management;  Laterality: N/A;  Patient is diabetic and Xarleto     EPIDURAL STEROID INJECTION INTO LUMBAR SPINE N/A 7/3/2018    Procedure: INJECTION, STEROID, SPINE, LUMBAR, EPIDURAL- L4-5;  Surgeon: Roman Lmyan MD;  Location: [facility] PAIN MGT;  Service: Pain Management;  Laterality: N/A;  Patient takes Xarelto and ASA     ESOPHAGOGASTRODUODENOSCOPY N/A 5/9/2022    Procedure: EGD (ESOPHAGOGASTRODUODENOSCOPY);  Surgeon: Conrad Diaz MD;  Location: [facility] ENDO (2ND FLR);  Service: Endoscopy;  Laterality: N/A;    EXCISION OF LESION OF LIP N/A 7/7/2020    Procedure: EXCISION, LESION, LIP;  Surgeon: Caden Navarro MD;  Location: [facility] OR 2ND FLR;  Service: ENT;  Laterality: N/A;    GASTRIC BYPASS      SINUS SURGERY  2000    Dr. Watt at [facility]    TONSILLECTOMY       Family History   Problem Relation Age of Onset    Heart disease Father     Cancer Mother         lung    Hypertension Sister     Heart disease Brother     Cirrhosis Brother     Diabetes Brother      Social History     Tobacco Use    Smoking status: Never    Smokeless tobacco: Never   Substance Use Topics    Alcohol use: No     Comment: rare    Drug use: No     Review of Systems  All other systems reviewed and negative except as noted in HPI    Physical Exam     Initial Vitals [05/07/23 1009]   BP Pulse Resp Temp SpO2   (!) 158/94 80 18 98.4 °F (36.9 °C) 98 %      MAP       --         Physical Exam  General: AO x 3, NAD. Well nourished. Well Developed  Head: Normocephalic and  Atraumatic  HEENT: PERRLA. EOMI. OP Clear  Neck: Supple, Nontender in midline.  Cardiovascular: RRR. No m/r/g. 2+ Distal Pulses  Pulm/Chest: Chest nontender. Lungs clear to auscultation. No respiratory distress.  Abdomen: Nontender. Nondistended. No rigidity, rebound, or guarding  MSK:  Edema, warmth, no erythema to right knee.  No laxity with anterior and posterior drawer and Lachman's signs.  However, they cause significant pain.  No laxity with varus and valgus stress but they do elicit significant pain.  Unable to perform Rayne's test due to significant pain and limitation of range of motion due to same.  Extension of knee intact.    Ext:  2+ nonpitting edema bilateral lower extremities  Neuro: GCS 15. CN II-XII intact. Intact symmetric sensation, strength, DTR x 4 extremities  Skin: no bullae, petechiae, or purpura. Warm, dry, and intact.  Psych: normal mood and affect.    ED Course   Arthrocentesis    Date/Time: 5/8/2023 12:14 PM  Location procedure was performed: [facility] EMERGENCY DEPARTMENT  Performed by: Steven Hoyos MD  Authorized by: Bartolo Mcgee MD   Assisting provider: Gabirela Goodman MD  Pre-op diagnosis: septic arthritis  Post-op diagnosis: gout  Consent Done: Yes  Consent: Verbal consent obtained. Written consent obtained.  Risks and benefits: risks, benefits and alternatives were discussed  Consent given by: patient  Patient understanding: patient states understanding of the procedure being performed  Patient consent: the patient's understanding of the procedure matches consent given  Procedure consent: procedure consent matches procedure scheduled  Relevant documents: relevant documents present and verified  Test results: test results available and properly labeled  Site marked: the operative site was marked  Imaging studies: imaging studies available  Required items: required blood products, implants, devices, and special equipment available  Patient identity confirmed: MRN, name,  ", provided demographic data and verbally with patient  Time out: Immediately prior to procedure a "time out" was called to verify the correct patient, procedure, equipment, support staff and site/side marked as required.  Indications: joint swelling, pain, possible septic joint and diagnostic evaluation   Body area: knee  Joint: right knee  Local anesthesia used: yes  Anesthesia: local infiltration    Anesthesia:  Local anesthesia used: yes  Local Anesthetic: lidocaine 1% without epinephrine  Anesthetic total: 10 mL    Patient sedated: no  Description of findings: 30 cc yellow fluid obtained without difficulty after lateral approach   Preparation: Patient was prepped and draped in the usual sterile fashion.  Needle size: 18 G  Approach: lateral  Aspirate amount: 30 mL  Aspirate: yellow  Lidocaine 1% amount: 10 mL  Patient tolerance: Patient tolerated the procedure well with no immediate complications  Technical procedures used: Ultrasound localization landmark guidance  Significant surgical tasks conducted by the assistant(s): Aspiration  Complications: No  Estimated blood loss (mL): 0  Specimens: Yes (Fluid sent for crystal/cell count/culture)  Implants: No      Labs Reviewed   CBC W/ AUTO DIFFERENTIAL - Abnormal; Notable for the following components:       Result Value    Hemoglobin 9.8 (*)     Hematocrit 34.5 (*)     MCV 74 (*)     MCH 21.0 (*)     MCHC 28.4 (*)     RDW 18.1 (*)     Gran % 77.7 (*)     Lymph % 13.5 (*)     All other components within normal limits   COMPREHENSIVE METABOLIC PANEL - Abnormal; Notable for the following components:    CO2 22 (*)     Glucose 157 (*)     Albumin 3.2 (*)     Total Bilirubin 1.3 (*)     Alkaline Phosphatase 152 (*)     All other components within normal limits   C-REACTIVE PROTEIN - Abnormal; Notable for the following components:    .5 (*)     All other components within normal limits   SEDIMENTATION RATE - Abnormal; Notable for the following components:    Sed " Rate 118 (*)     All other components within normal limits   BODY FLUID CRYSTAL - Abnormal; Notable for the following components:    Body Fluid Crystal Positive (*)     All other components within normal limits    Narrative:     Joint Fluid   GRAM STAIN    Narrative:     Joint Fluid   WBC & DIFF, BODY FLUID    Narrative:     Joint Fluid   FREEZE AND HOLD -           Imaging Results               CT Knee Without Contrast Right (Final result)  Result time 05/07/23 16:09:38      Final result by Caden France MD (05/07/23 16:09:38)                   Impression:      1. No acute displaced fracture-dislocation identified.  2. Moderate joint effusion, nonspecific.  3. Nonspecific soft tissue swelling and edema primarily about the anterolateral aspect of the knee and posterolateral aspect of the partially imaged proximal right lower leg and calf and to a lesser extent anterolateral aspect of the distal right thigh above the knee.  This could be related to localized edema/contusion in the setting of recent trauma versus cellulitis.  Additionally, there are scattered gas foci about the posterolateral aspect of the knee which may be related to recent trauma with penetrating soft tissue injury versus procedure; however, in the setting of cellulitis without recent operative procedure or penetrating trauma, gas-forming infection not excluded in this patient with history of diabetes.  Clinical correlation advised.  Further evaluation/follow-up as warranted.  This report was flagged in Epic as abnormal.      Electronically signed by: Caden France MD  Date:    05/07/2023  Time:    16:09               Narrative:    EXAMINATION:  CT KNEE WITHOUT CONTRAST RIGHT; CT 3D WITHOUT INDEPENDENT WS    CLINICAL HISTORY:  Knee trauma, occult fracture suspected, xray done;need thin cuts and 3D RECONS;; Knee trauma, occult fracture suspected, xray done;need thin cuts and 3D RECONS;fx;    TECHNIQUE:  Axial images were obtained through the  right knee without IV contrast.  Coronal in sagittal reformatted and also 3D rendered images were generated    COMPARISON:  Right knee series earlier same day and bilateral knee series 07/19/2022    FINDINGS:  Overall alignment is within normal limits.  No displaced fracture, dislocation or destructive osseous process seen.  Mild tricompartmental degenerative change.    Moderate-sized joint effusion noted.  There is soft tissue swelling with subcutaneous stranding and overlying skin thickening primarily about the anterior and lateral aspect of the knee and to a lesser extent distal right thigh, and more lateral and posterior aspect of the partially imaged proximal right lower leg and calf.  Scattered subcutaneous gas foci about the posterolateral margin of the knee.  No radiodense retained foreign body.  No convincing evidence for sub fascial fluid or definitive fascial thickening at this time.  Surrounding musculature is within normal limits.  Mild degree of scattered atherosclerotic vascular calcifications noted.                                        CT 3D RECON WITHOUT INDEPENDENT WS (Final result)  Result time 05/07/23 16:09:38      Final result by Caden France MD (05/07/23 16:09:38)                   Impression:      1. No acute displaced fracture-dislocation identified.  2. Moderate joint effusion, nonspecific.  3. Nonspecific soft tissue swelling and edema primarily about the anterolateral aspect of the knee and posterolateral aspect of the partially imaged proximal right lower leg and calf and to a lesser extent anterolateral aspect of the distal right thigh above the knee.  This could be related to localized edema/contusion in the setting of recent trauma versus cellulitis.  Additionally, there are scattered gas foci about the posterolateral aspect of the knee which may be related to recent trauma with penetrating soft tissue injury versus procedure; however, in the setting of cellulitis without recent  operative procedure or penetrating trauma, gas-forming infection not excluded in this patient with history of diabetes.  Clinical correlation advised.  Further evaluation/follow-up as warranted.  This report was flagged in Epic as abnormal.      Electronically signed by: Caden France MD  Date:    05/07/2023  Time:    16:09               Narrative:    EXAMINATION:  CT KNEE WITHOUT CONTRAST RIGHT; CT 3D WITHOUT INDEPENDENT WS    CLINICAL HISTORY:  Knee trauma, occult fracture suspected, xray done;need thin cuts and 3D RECONS;; Knee trauma, occult fracture suspected, xray done;need thin cuts and 3D RECONS;fx;    TECHNIQUE:  Axial images were obtained through the right knee without IV contrast.  Coronal in sagittal reformatted and also 3D rendered images were generated    COMPARISON:  Right knee series earlier same day and bilateral knee series 07/19/2022    FINDINGS:  Overall alignment is within normal limits.  No displaced fracture, dislocation or destructive osseous process seen.  Mild tricompartmental degenerative change.    Moderate-sized joint effusion noted.  There is soft tissue swelling with subcutaneous stranding and overlying skin thickening primarily about the anterior and lateral aspect of the knee and to a lesser extent distal right thigh, and more lateral and posterior aspect of the partially imaged proximal right lower leg and calf.  Scattered subcutaneous gas foci about the posterolateral margin of the knee.  No radiodense retained foreign body.  No convincing evidence for sub fascial fluid or definitive fascial thickening at this time.  Surrounding musculature is within normal limits.  Mild degree of scattered atherosclerotic vascular calcifications noted.                                       X-Ray Knee 1 or 2 View Right (Final result)  Result time 05/07/23 12:40:30   Procedure changed from X-Ray Knee 3 View Right     Final result by Caden France MD (05/07/23 12:40:30)                   Impression:       Nonspecific suprapatellar joint effusion, without acute displaced fracture-dislocation identified.      Electronically signed by: Caden France MD  Date:    05/07/2023  Time:    12:40               Narrative:    EXAMINATION:  XR KNEE 1 OR 2 VIEW RIGHT    CLINICAL HISTORY:  Fall;  Unspecified fall, initial encounter    TECHNIQUE:  AP and lateral views of the right knee were performed.    COMPARISON:  Bilateral knee series 07/19/2022    FINDINGS:  Overall alignment is within normal limits. No displaced fracture, dislocation or destructive osseous process.  There is nonspecific suprapatellar joint effusion.  Minimal tricompartmental degenerative change.  No subcutaneous emphysema or radiodense retained foreign body.                                    X-Rays:   Independently Interpreted Readings:   Other Readings:  Right knee:  Suprapatellar effusion noted.  No fracture or dislocation  Medications   sodium chloride 0.9% flush 10 mL (has no administration in time range)   naloxone 0.4 mg/mL injection 0.02 mg (has no administration in time range)   glucagon (human recombinant) injection 1 mg (has no administration in time range)   acetaminophen tablet 650 mg (has no administration in time range)   ondansetron disintegrating tablet 8 mg (has no administration in time range)   insulin aspart U-100 pen 0-5 Units (has no administration in time range)   melatonin tablet 6 mg (has no administration in time range)   dextrose 10% bolus 125 mL 125 mL (has no administration in time range)   dextrose 10% bolus 250 mL 250 mL (has no administration in time range)   dextrose 40 % gel 15,000 mg (has no administration in time range)   dextrose 40 % gel 30,000 mg (has no administration in time range)   bumetanide tablet 2 mg (2 mg Oral Given 5/8/23 0914)   buPROPion TB24 tablet 300 mg (300 mg Oral Given 5/8/23 0914)   cyanocobalamin tablet 500 mcg (500 mcg Oral Given 5/8/23 0914)   metoprolol tartrate (LOPRESSOR) tablet 25 mg (25 mg  Oral Given 5/8/23 0915)   pantoprazole EC tablet 40 mg (40 mg Oral Given 5/8/23 0915)   thiamine tablet 250 mg (250 mg Oral Given 5/8/23 0915)   vitamin D 1000 units tablet 1,000 Units (1,000 Units Oral Given 5/8/23 0914)   sotaloL tablet 80 mg (80 mg Oral Given 5/8/23 0914)   spironolactone tablet 25 mg (25 mg Oral Given 5/8/23 0914)   tamsulosin 24 hr capsule 0.4 mg (0.4 mg Oral Given 5/8/23 0915)   rivaroxaban tablet 20 mg (has no administration in time range)   oxyCODONE immediate release tablet 5 mg (has no administration in time range)   naproxen tablet 500 mg (has no administration in time range)   HYDROcodone-acetaminophen 5-325 mg per tablet 1 tablet (1 tablet Oral Given 5/7/23 1126)   LIDOcaine (PF) 10 mg/ml (1%) injection 100 mg (100 mg Infiltration Given by Provider 5/7/23 1333)   HYDROcodone-acetaminophen 5-325 mg per tablet 1 tablet (1 tablet Oral Given 5/7/23 1715)   dexAMETHasone injection 8 mg (8 mg Intramuscular Given 5/7/23 1755)     Medical Decision Making:   History:   Old Medical Records: I decided to obtain old medical records.  Old Records Summarized: records from clinic visits, records from previous admission(s), records from another hospital and other records.       <> Summary of Records: History of bilateral lower extremity lymphedema and venous insufficiency for which patient was followed by cardiology Dr. Ureña  Initial Assessment:   Concern for traumatic ligamentous injury of right knee.  Will obtain x-ray to assess for degree of effusion.  Can not rule out infectious or inflammatory process based on examination.  However, history is less consistent with same.  Will obtain infectious markers.  Independently Interpreted Test(s):   I have ordered and independently interpreted X-rays - see prior notes.  Clinical Tests:   Lab Tests: Ordered and Reviewed  Radiological Study: Ordered and Reviewed  ED Management:  Traumatic onset more concerning for possible ligamentous disruption or occult  fracture causing heme arthrosis.  However, arthrocentesis showed yellow fluid with no evidence of blood.  Given patient's inability to ambulate or even perform passive range of motion of right knee, he will require admission.  Will obtain CT to rule out occult fracture of knee.  Arthrocentesis labs pending at time of turnover  Other:   I have discussed this case with another health care provider.       <> Summary of the Discussion: Orthopedics consult due to possible occult fracture septic arthritis.    Care transitioned to Dr. Mariee pending results of arthrocentesis and CT  Discussed with hospital medicine for admission           ED Course as of 05/08/23 1218   Sun May 07, 2023   1350 Patient remains exquisitely tender to palpation and with range of motion.  Arthrocentesis fluid is yellow but not cloudy or bloody.  Will send to lab to assess for cell count, Gram stain, culture, and crystals.  Will obtain CT of knee given inability to ambulate. [DS]      ED Course User Index  [DS] Steven Hoyos MD                 Clinical Impression:   Final diagnoses:  [W19.XXXA] Fall  [M10.9] Gout        ED Disposition Condition    Observation                 Steven Hoyos MD  05/08/23 1218

## 2023-05-07 NOTE — ED TRIAGE NOTES
Pt presents to the ED with complaints of rt knee pain. Pt states he was walking dog when she tried to run and twisted the entire rt side of his body including his knee. He states this occurred a few days ago and the pain is constant and gotten worse as time went by. Pt states he  didn't fall. Pt denies any other complaints  Patient identifiers verified and correct for [place]  LOC: The patient is awake, alert and aware of environment with an appropriate affect, the patient is oriented x 4 and speaking appropriately.   APPEARANCE: Patient appears comfortable and in no acute distress, patient is clean and well groomed.  SKIN: The skin is warm and dry, color consistent with ethnicity, patient has normal skin turgor and moist mucus membranes, skin intact, no breakdown or bruising noted.   MUSCULOSKELETAL: Patient moving all extremities spontaneously, no swelling noted.  RESPIRATORY: Airway is open and patent, respirations are spontaneous, patient has a normal effort and rate, no accessory muscle use noted, pt placed on  pulse ox with O2 sats noted at 98% on room air.  CARDIAC: Patient has a normal rate and regular rhythm, no edema noted, capillary refill < 3 seconds.   GASTRO: Soft and non tender to palpation, no distention noted, normoactive bowel sounds present in all four quadrants. Pt states bowel movements have been regular.  : Pt denies any pain or frequency with urination.  NEURO: Pt opens eyes spontaneously, behavior appropriate to situation, follows commands, facial expression symmetrical, bilateral hand grasp equal and even, purposeful motor response noted, normal sensation in all extremities when touched with a finger.

## 2023-05-07 NOTE — PROVIDER PROGRESS NOTES - EMERGENCY DEPT.
ED Resident HAND-OFF NOTE:  5:49 PM 5/7/2023  Shiraz Jha is a 63 y.o. male who presented to the ED on 5/7/2023 and has been managed by , who reports patient C/O knee pain. I assumed care of patient from off-going ED physician team at 5:49 PM pending labs, CT.    On my evaluation, Shiraz Jha appears well and is hemodynamically stable. Thus far, Shiraz Jha has received:  Medications   dexAMETHasone injection 8 mg (has no administration in time range)   HYDROcodone-acetaminophen 5-325 mg per tablet 1 tablet (1 tablet Oral Given 5/7/23 1126)   LIDOcaine (PF) 10 mg/ml (1%) injection 100 mg (100 mg Infiltration Given by Provider 5/7/23 1333)   HYDROcodone-acetaminophen 5-325 mg per tablet 1 tablet (1 tablet Oral Given 5/7/23 1715)       On my exam, I appreciate:  BP (!) 141/69 (BP Location: Right arm, Patient Position: Lying)   Pulse 81   Temp 98.3 °F (36.8 °C) (Oral)   Resp 19   Wt 136.1 kg (300 lb)   SpO2 99%   BMI 48.42 kg/m²     ED Course as of 05/07/23 1749   Sun May 07, 2023   1350 Patient remains exquisitely tender to palpation and with range of motion.  Arthrocentesis fluid is yellow but not cloudy or bloody.  Will send to lab to assess for cell count, Gram stain, culture, and crystals.  Will obtain MRI of knee given inability to ambulate. [DS]      ED Course User Index  [DS] Steven Hoyos MD        Additional ED course:  Arthrocentesis consistent with gout.  Patient unable to bend the knee, unable to stand safely.  CT without evidence of acute fracture or dislocation.  Patient admitted to hospital medicine    Disposition:  Admit  I have discussed and counseled Shiraz Jha regarding exam, results, diagnosis, treatment, and plan.  ______________________  Keyon Julian MD   Emergency Medicine Resident  5/7/2023

## 2023-05-07 NOTE — HPI
"63M with history of Asthma, Afib on AC s/p ablation 2014, T2DM, HTN, Obesity s/p gastric bypass 2009, and Venous insufficiency presents with right knee pain. The pain started 3 days ago. At the time he describes his dog ran past him and "twisted him up". He felt severe pain in that knee since depending on the position. There is also new swelling and warmth of the knee. The pain is more localized to the anterolateral surface with radiation to the medial surface and down the leg. He does not report pain associated on the posterior aspect of his knee. Otherwise he denies other associated symptoms including fever, chills, chest pain, sob, palpitations, nausea, vomiting, or diarrhea. He is adherent with his medications. He denies alcohol, smoking, or illicit drug use.     In the ED joint fluid was collected, which was consistent with crystals. This raises concern for gouty arthropathy. WBC 9.32, Hgb 9.8, MCV 74, , .5, Alk p 152, Bili 1.3. Xray with joint effusion. CT of the knee consistent with soft tissues swelling. There was mention of scattered gas in the tissue concerning for cellulitis vs. Necrotizing fasciitis, though the CT was performed after joint aspiration. The aspiration could have potentially introduced gas into the tissue. Hospital medicine will admit the patient for gout flare and rule out possibility for infectious etiology, though less likely.   "

## 2023-05-07 NOTE — SUBJECTIVE & OBJECTIVE
Past Medical History:   Diagnosis Date    Allergy     Anemia     Asthma     Atrial fibrillation     Cataract     Chronic rhinitis 9/26/2013    DDD (degenerative disc disease) 4/3/2015    Depression     Diabetes mellitus     Fibromyalgia     Gastroesophageal reflux disease without esophagitis 7/14/2017    Hypertension     Sinusitis, acute, maxillary 12/20/2012    Thyroid disease        Past Surgical History:   Procedure Laterality Date    ANTEGRADE SINGLE BALLOON ENTEROSCOPY N/A 5/26/2022    Procedure: ENTEROSCOPY, SINGLE BALLOON, ANTEGRADE;  Surgeon: Lyle Edmond MD;  Location: [facility] ENDO (2ND FLR);  Service: Endoscopy;  Laterality: N/A;  Will use single-balloon scope for both the upper endoscopy and the colonoscopy - recent incomplete colonoscopy due to looping.    Pt is fully vaccinated-DS  5/20/22-Approval to hold Xarelto rec'd from Dr. Webb-pending approval (see telephoned en    CERVICAL SPINE SURGERY      COLONOSCOPY N/A 5/10/2022    Procedure: COLONOSCOPY;  Surgeon: Conrad Diaz MD;  Location: [facility] ENDO (2ND FLR);  Service: Endoscopy;  Laterality: N/A;    COLONOSCOPY N/A 5/26/2022    Procedure: COLONOSCOPY;  Surgeon: Lyle Edmond MD;  Location: [facility] ENDO (2ND FLR);  Service: Endoscopy;  Laterality: N/A;    EPIDURAL STEROID INJECTION INTO LUMBAR SPINE N/A 5/29/2018    Procedure: INJECTION-STEROID-EPIDURAL-LUMBAR- L4-5;  Surgeon: Roman Lyman MD;  Location: [facility] PAIN MGT;  Service: Pain Management;  Laterality: N/A;  Patient is diabetic and Xarleto     EPIDURAL STEROID INJECTION INTO LUMBAR SPINE N/A 7/3/2018    Procedure: INJECTION, STEROID, SPINE, LUMBAR, EPIDURAL- L4-5;  Surgeon: Roman Lyman MD;  Location: [facility] PAIN MGT;  Service: Pain Management;  Laterality: N/A;  Patient takes Xarelto and ASA     ESOPHAGOGASTRODUODENOSCOPY N/A 5/9/2022    Procedure: EGD (ESOPHAGOGASTRODUODENOSCOPY);  Surgeon: Conrad Diaz MD;  Location: [facility] ENDO (2ND FLR);  Service: Endoscopy;  Laterality: N/A;     EXCISION OF LESION OF LIP N/A 7/7/2020    Procedure: EXCISION, LESION, LIP;  Surgeon: Caden Navarro MD;  Location: [facility] OR [address] [place] [address];  Service: ENT;  Laterality: N/A;    GASTRIC BYPASS      SINUS SURGERY  2000    Dr. Watt at [facility]    TONSILLECTOMY         Review of patient's allergies indicates:   Allergen Reactions    Iodinated contrast media Other (See Comments)     Raises BP         No current facility-administered medications on file prior to encounter.     Current Outpatient Medications on File Prior to Encounter   Medication Sig    bumetanide (BUMEX) 2 MG tablet Take 1 tablet (2 mg total) by mouth 2 (two) times daily.    buPROPion (WELLBUTRIN XL) 300 MG 24 hr tablet Take 300 mg by mouth once daily. Pt states he only takes 300mg    cyanocobalamin 500 MCG tablet Take 500 mcg by mouth once daily.    dextroamphetamine-amphetamine (ADDERALL) 20 mg tablet Take 1 tablet by mouth every morning, take 1 tablet by mouth 3-4 hours later, and take 1/2 tablet every afternoon    fluticasone propionate (FLONASE) 50 mcg/actuation nasal spray 2 sprays (100 mcg total) by Each Nostril route once daily.    IMPOYZ 0.025 % Crea Apply topically as needed.     LUZU 1 % Crea Apply topically as needed.     metoprolol tartrate (LOPRESSOR) 25 MG tablet Take 1 tablet (25 mg total) by mouth 2 (two) times daily.    multivitamin (THERAGRAN) per tablet Take 1 tablet by mouth once daily.     omeprazole (PRILOSEC) 10 MG capsule Take 2 capsules (20 mg total) by mouth once daily.    potassium chloride SA (K-DUR,KLOR-CON M) 10 MEQ tablet Take 1 tablet (10 mEq total) by mouth 2 (two) times daily.    rivaroxaban (XARELTO) 20 mg Tab Take 1 tablet (20 mg total) by mouth once daily.    sotaloL (BETAPACE) 80 MG tablet Take 1 tablet (80 mg total) by mouth 2 (two) times daily.    spironolactone (ALDACTONE) 25 MG tablet Take 1 tablet (25 mg total) by mouth once daily.    tamsulosin (FLOMAX) 0.4 mg Cap Take 1 capsule (0.4 mg total) by mouth 2 (two) times  daily.    thiamine 250 MG tablet Take 250 mg by mouth once daily.    vitamin D (VITAMIN D3) 1000 units Tab Take 1,000 Units by mouth once daily.    VRAYLAR 3 mg Cap Take 3 mg by mouth.    gabapentin (NEURONTIN) 300 MG capsule Take 1 capsule (300 mg total) by mouth 3 (three) times daily.    levocetirizine (XYZAL) 5 MG tablet TAKE 1 TABLET (5 MG TOTAL) BY MOUTH EVERY EVENING. (Patient taking differently: Take 5 mg by mouth daily as needed for Allergies.)    NAFTIN 2 % Gel daily as needed.     nitroGLYCERIN (NITROSTAT) 0.4 MG SL tablet Please dispense 25 pills in 4 bottles.     Family History       Problem Relation (Age of Onset)    Cancer Mother    Cirrhosis Brother    Diabetes Brother    Heart disease Father, Brother    Hypertension Sister          Tobacco Use    Smoking status: Never    Smokeless tobacco: Never   Substance and Sexual Activity    Alcohol use: No     Comment: rare    Drug use: No    Sexual activity: Yes     Partners: Female     Review of Systems   Constitutional:  Negative for chills and fever.   HENT:  Negative for rhinorrhea and sneezing.    Respiratory:  Negative for cough and shortness of breath.    Cardiovascular:  Negative for chest pain and leg swelling.   Gastrointestinal:  Negative for abdominal pain and nausea.   Genitourinary:  Negative for dysuria and hematuria.   Musculoskeletal:  Positive for arthralgias, gait problem, joint swelling and myalgias.   Neurological:  Negative for tremors and headaches.   Psychiatric/Behavioral:  Negative for agitation and confusion.    Objective:     Vital Signs (Most Recent):  Temp: 98.3 °F (36.8 °C) (05/07/23 1430)  Pulse: 81 (05/07/23 1430)  Resp: 19 (05/07/23 1715)  BP: (!) 141/69 (05/07/23 1430)  SpO2: 99 % (05/07/23 1430) Vital Signs (24h Range):  Temp:  [98.3 °F (36.8 °C)-98.4 °F (36.9 °C)] 98.3 °F (36.8 °C)  Pulse:  [80-81] 81  Resp:  [18-19] 19  SpO2:  [98 %-99 %] 99 %  BP: (141-158)/(69-94) 141/69     Weight: 136.1 kg (300 lb)  Body mass index is  48.42 kg/m².     Physical Exam  Vitals and nursing note reviewed.   Constitutional:       General: He is not in acute distress.     Comments: The patient is obese. S/p gastric bypass 2009. He is not in acute distress. He is not in acute pain as long as his knee remains still.    HENT:      Head: Normocephalic and atraumatic.      Nose: Nose normal. No rhinorrhea.   Eyes:      Extraocular Movements: Extraocular movements intact.      Conjunctiva/sclera: Conjunctivae normal.   Cardiovascular:      Rate and Rhythm: Normal rate and regular rhythm.   Pulmonary:      Effort: Pulmonary effort is normal. No respiratory distress.   Abdominal:      General: There is no distension.      Palpations: Abdomen is soft.   Musculoskeletal:         General: Swelling and tenderness present.      Right lower leg: Edema present.      Left lower leg: Edema present.      Comments: Swelling and tenderness of the right knee joint to palpation. There is mild erythema anterolaterally and anteromedially. Most pain is elicited anterolaterally. Mild radiation of pain down the right leg. Bilateral edema.    Skin:     General: Skin is warm and dry.      Capillary Refill: Capillary refill takes less than 2 seconds.      Findings: Erythema present.   Neurological:      General: No focal deficit present.      Mental Status: He is alert and oriented to person, place, and time.              Significant Labs: All pertinent labs within the past 24 hours have been reviewed.  CBC:   Recent Labs   Lab 05/07/23  1134   WBC 9.32   HGB 9.8*   HCT 34.5*        CMP:   Recent Labs   Lab 05/07/23  1134      K 4.1      CO2 22*   *   BUN 16   CREATININE 1.1   CALCIUM 9.8   PROT 6.8   ALBUMIN 3.2*   BILITOT 1.3*   ALKPHOS 152*   AST 22   ALT 20   ANIONGAP 12       Significant Imaging: I have reviewed all pertinent imaging results/findings within the past 24 hours.

## 2023-05-08 VITALS
RESPIRATION RATE: 18 BRPM | OXYGEN SATURATION: 96 % | WEIGHT: 300 LBS | HEART RATE: 63 BPM | TEMPERATURE: 98 F | BODY MASS INDEX: 48.42 KG/M2 | DIASTOLIC BLOOD PRESSURE: 57 MMHG | SYSTOLIC BLOOD PRESSURE: 117 MMHG

## 2023-05-08 LAB
ALBUMIN SERPL BCP-MCNC: 3 G/DL (ref 3.5–5.2)
ALP SERPL-CCNC: 162 U/L (ref 55–135)
ALT SERPL W/O P-5'-P-CCNC: 19 U/L (ref 10–44)
ANION GAP SERPL CALC-SCNC: 10 MMOL/L (ref 8–16)
AST SERPL-CCNC: 16 U/L (ref 10–40)
BASOPHILS # BLD AUTO: 0.01 K/UL (ref 0–0.2)
BASOPHILS NFR BLD: 0.2 % (ref 0–1.9)
BILIRUB SERPL-MCNC: 1 MG/DL (ref 0.1–1)
BUN SERPL-MCNC: 20 MG/DL (ref 8–23)
CALCIUM SERPL-MCNC: 9.8 MG/DL (ref 8.7–10.5)
CHLORIDE SERPL-SCNC: 100 MMOL/L (ref 95–110)
CO2 SERPL-SCNC: 25 MMOL/L (ref 23–29)
CREAT SERPL-MCNC: 0.9 MG/DL (ref 0.5–1.4)
DIFFERENTIAL METHOD: ABNORMAL
EOSINOPHIL # BLD AUTO: 0 K/UL (ref 0–0.5)
EOSINOPHIL NFR BLD: 0 % (ref 0–8)
ERYTHROCYTE [DISTWIDTH] IN BLOOD BY AUTOMATED COUNT: 18 % (ref 11.5–14.5)
EST. GFR  (NO RACE VARIABLE): >60 ML/MIN/1.73 M^2
ESTIMATED AVG GLUCOSE: 148 MG/DL (ref 68–131)
FERRITIN SERPL-MCNC: 69 NG/ML (ref 20–300)
FOLATE SERPL-MCNC: 15 NG/ML (ref 4–24)
GLUCOSE SERPL-MCNC: 274 MG/DL (ref 70–110)
HBA1C MFR BLD: 6.8 % (ref 4–5.6)
HCT VFR BLD AUTO: 32.3 % (ref 40–54)
HGB BLD-MCNC: 9.4 G/DL (ref 14–18)
IMM GRANULOCYTES # BLD AUTO: 0.02 K/UL (ref 0–0.04)
IMM GRANULOCYTES NFR BLD AUTO: 0.4 % (ref 0–0.5)
IRON SERPL-MCNC: 11 UG/DL (ref 45–160)
LYMPHOCYTES # BLD AUTO: 0.7 K/UL (ref 1–4.8)
LYMPHOCYTES NFR BLD: 12.9 % (ref 18–48)
MAGNESIUM SERPL-MCNC: 2.3 MG/DL (ref 1.6–2.6)
MCH RBC QN AUTO: 21.5 PG (ref 27–31)
MCHC RBC AUTO-ENTMCNC: 29.1 G/DL (ref 32–36)
MCV RBC AUTO: 74 FL (ref 82–98)
MONOCYTES # BLD AUTO: 0.1 K/UL (ref 0.3–1)
MONOCYTES NFR BLD: 1.4 % (ref 4–15)
NEUTROPHILS # BLD AUTO: 4.4 K/UL (ref 1.8–7.7)
NEUTROPHILS NFR BLD: 85.1 % (ref 38–73)
NRBC BLD-RTO: 0 /100 WBC
PATH INTERP FLD-IMP: NORMAL
PHOSPHATE SERPL-MCNC: 2.8 MG/DL (ref 2.7–4.5)
PLATELET # BLD AUTO: 351 K/UL (ref 150–450)
PMV BLD AUTO: 9.6 FL (ref 9.2–12.9)
POCT GLUCOSE: 230 MG/DL (ref 70–110)
POCT GLUCOSE: 250 MG/DL (ref 70–110)
POCT GLUCOSE: 262 MG/DL (ref 70–110)
POTASSIUM SERPL-SCNC: 4.3 MMOL/L (ref 3.5–5.1)
PROT SERPL-MCNC: 6.6 G/DL (ref 6–8.4)
RBC # BLD AUTO: 4.38 M/UL (ref 4.6–6.2)
SATURATED IRON: 3 % (ref 20–50)
SODIUM SERPL-SCNC: 135 MMOL/L (ref 136–145)
TOTAL IRON BINDING CAPACITY: 437 UG/DL (ref 250–450)
TRANSFERRIN SERPL-MCNC: 295 MG/DL (ref 200–375)
TSH SERPL DL<=0.005 MIU/L-ACNC: 0.74 UIU/ML (ref 0.4–4)
URATE SERPL-MCNC: 7.4 MG/DL (ref 3.4–7)
VIT B12 SERPL-MCNC: 1049 PG/ML (ref 210–950)
WBC # BLD AUTO: 5.18 K/UL (ref 3.9–12.7)

## 2023-05-08 PROCEDURE — 84550 ASSAY OF BLOOD/URIC ACID: CPT

## 2023-05-08 PROCEDURE — 99239 PR HOSPITAL DISCHARGE DAY,>30 MIN: ICD-10-PCS | Mod: ,,, | Performed by: HOSPITALIST

## 2023-05-08 PROCEDURE — 20611 DRAIN/INJ JOINT/BURSA W/US: CPT | Mod: RT

## 2023-05-08 PROCEDURE — 84443 ASSAY THYROID STIM HORMONE: CPT

## 2023-05-08 PROCEDURE — 94761 N-INVAS EAR/PLS OXIMETRY MLT: CPT

## 2023-05-08 PROCEDURE — 83735 ASSAY OF MAGNESIUM: CPT

## 2023-05-08 PROCEDURE — 99239 HOSP IP/OBS DSCHRG MGMT >30: CPT | Mod: ,,, | Performed by: HOSPITALIST

## 2023-05-08 PROCEDURE — 36415 COLL VENOUS BLD VENIPUNCTURE: CPT

## 2023-05-08 PROCEDURE — 85025 COMPLETE CBC W/AUTO DIFF WBC: CPT

## 2023-05-08 PROCEDURE — G0378 HOSPITAL OBSERVATION PER HR: HCPCS

## 2023-05-08 PROCEDURE — 99900035 HC TECH TIME PER 15 MIN (STAT)

## 2023-05-08 PROCEDURE — 83036 HEMOGLOBIN GLYCOSYLATED A1C: CPT

## 2023-05-08 PROCEDURE — 25000003 PHARM REV CODE 250

## 2023-05-08 PROCEDURE — 82728 ASSAY OF FERRITIN: CPT

## 2023-05-08 PROCEDURE — 20610 DRAIN/INJ JOINT/BURSA W/O US: CPT | Mod: RT,,, | Performed by: EMERGENCY MEDICINE

## 2023-05-08 PROCEDURE — 27000221 HC OXYGEN, UP TO 24 HOURS

## 2023-05-08 PROCEDURE — 20610 PR DRAIN/INJECT LARGE JOINT/BURSA: ICD-10-PCS | Mod: RT,,, | Performed by: EMERGENCY MEDICINE

## 2023-05-08 PROCEDURE — 82746 ASSAY OF FOLIC ACID SERUM: CPT

## 2023-05-08 PROCEDURE — 84466 ASSAY OF TRANSFERRIN: CPT

## 2023-05-08 PROCEDURE — 82607 VITAMIN B-12: CPT

## 2023-05-08 PROCEDURE — 94660 CPAP INITIATION&MGMT: CPT

## 2023-05-08 PROCEDURE — 80053 COMPREHEN METABOLIC PANEL: CPT

## 2023-05-08 PROCEDURE — 84100 ASSAY OF PHOSPHORUS: CPT

## 2023-05-08 PROCEDURE — 63600175 PHARM REV CODE 636 W HCPCS

## 2023-05-08 RX ORDER — NAPROXEN 500 MG/1
500 TABLET ORAL 2 TIMES DAILY WITH MEALS
Status: DISCONTINUED | OUTPATIENT
Start: 2023-05-08 | End: 2023-05-08

## 2023-05-08 RX ORDER — PREDNISONE 20 MG/1
40 TABLET ORAL DAILY
Qty: 12 TABLET | Refills: 0 | Status: SHIPPED | OUTPATIENT
Start: 2023-05-08 | End: 2023-05-23

## 2023-05-08 RX ADMIN — NAPROXEN 500 MG: 500 TABLET ORAL at 01:05

## 2023-05-08 RX ADMIN — Medication 250 MG: at 09:05

## 2023-05-08 RX ADMIN — PREDNISONE 40 MG: 20 TABLET ORAL at 09:05

## 2023-05-08 RX ADMIN — RIVAROXABAN 20 MG: 20 TABLET, FILM COATED ORAL at 04:05

## 2023-05-08 RX ADMIN — TAMSULOSIN HYDROCHLORIDE 0.4 MG: 0.4 CAPSULE ORAL at 09:05

## 2023-05-08 RX ADMIN — Medication 1000 UNITS: at 09:05

## 2023-05-08 RX ADMIN — BUMETANIDE 2 MG: 1 TABLET ORAL at 09:05

## 2023-05-08 RX ADMIN — SOTALOL HYDROCHLORIDE 80 MG: 80 TABLET ORAL at 09:05

## 2023-05-08 RX ADMIN — PANTOPRAZOLE SODIUM 40 MG: 40 TABLET, DELAYED RELEASE ORAL at 09:05

## 2023-05-08 RX ADMIN — SPIRONOLACTONE 25 MG: 25 TABLET, FILM COATED ORAL at 09:05

## 2023-05-08 RX ADMIN — BUPROPION HYDROCHLORIDE 300 MG: 150 TABLET, FILM COATED, EXTENDED RELEASE ORAL at 09:05

## 2023-05-08 RX ADMIN — METOPROLOL TARTRATE 25 MG: 25 TABLET, FILM COATED ORAL at 09:05

## 2023-05-08 RX ADMIN — CYANOCOBALAMIN TAB 250 MCG 500 MCG: 250 TAB at 09:05

## 2023-05-08 NOTE — NURSING
Patient is medically cleared for discharge.  AOX4, room air, self-ambulates with cane assist .  Tele equipment and all peripheral lines have been removed.  Medications have been delivered and AVS reviewed with patient and/or caregiver.  Transport requested.

## 2023-05-08 NOTE — SUBJECTIVE & OBJECTIVE
Interval History: Pain in the right knee improved. S/p prednisone and joint arthrocentesis. Findings consistent with gouty arthropathy. Switching treatment to naproxen given diabetes. No specific contraindications for short term NSAID use. Orthopedic surgery consulted.     Review of Systems   Constitutional:  Negative for chills and fever.   HENT:  Negative for rhinorrhea and sneezing.    Respiratory:  Negative for cough and shortness of breath.    Cardiovascular:  Negative for chest pain and leg swelling.   Gastrointestinal:  Negative for abdominal pain and nausea.   Genitourinary:  Negative for dysuria and hematuria.   Musculoskeletal:  Positive for arthralgias, joint swelling and myalgias.   Neurological:  Negative for tremors and headaches.   Psychiatric/Behavioral:  Negative for agitation and confusion.    Objective:     Vital Signs (Most Recent):  Temp: 97 °F (36.1 °C) (05/08/23 1037)  Pulse: 65 (05/08/23 1037)  Resp: 18 (05/08/23 1037)  BP: 120/62 (05/08/23 1037)  SpO2: 98 % (05/08/23 1037) Vital Signs (24h Range):  Temp:  [97 °F (36.1 °C)-98.3 °F (36.8 °C)] 97 °F (36.1 °C)  Pulse:  [63-81] 65  Resp:  [18-19] 18  SpO2:  [95 %-100 %] 98 %  BP: (120-141)/(60-73) 120/62     Weight: 136.1 kg (300 lb)  Body mass index is 48.42 kg/m².    Intake/Output Summary (Last 24 hours) at 5/8/2023 1255  Last data filed at 5/8/2023 0918  Gross per 24 hour   Intake 360 ml   Output --   Net 360 ml         Physical Exam  Vitals and nursing note reviewed.   Constitutional:       General: He is not in acute distress.     Appearance: He is obese.   HENT:      Head: Normocephalic and atraumatic.      Nose: Nose normal. No rhinorrhea.   Eyes:      Extraocular Movements: Extraocular movements intact.      Conjunctiva/sclera: Conjunctivae normal.   Cardiovascular:      Rate and Rhythm: Normal rate and regular rhythm.   Pulmonary:      Effort: Pulmonary effort is normal. No respiratory distress.   Abdominal:      General: There is no  distension.      Palpations: Abdomen is soft.   Musculoskeletal:         General: Swelling and tenderness present.      Right lower leg: Edema present.      Left lower leg: Edema present.      Comments: Pain elicited to palpation on the anterolateral surface of right knee. Mild erythema. No rashes or scars. No crepitus appreciated.    Skin:     General: Skin is warm and dry.      Capillary Refill: Capillary refill takes less than 2 seconds.   Neurological:      General: No focal deficit present.      Mental Status: He is alert and oriented to person, place, and time.           Significant Labs: All pertinent labs within the past 24 hours have been reviewed.  CBC:   Recent Labs   Lab 05/07/23  1134 05/08/23  0408   WBC 9.32 5.18   HGB 9.8* 9.4*   HCT 34.5* 32.3*    351     CMP:   Recent Labs   Lab 05/07/23  1134 05/08/23  0408    135*   K 4.1 4.3    100   CO2 22* 25   * 274*   BUN 16 20   CREATININE 1.1 0.9   CALCIUM 9.8 9.8   PROT 6.8 6.6   ALBUMIN 3.2* 3.0*   BILITOT 1.3* 1.0   ALKPHOS 152* 162*   AST 22 16   ALT 20 19   ANIONGAP 12 10       Significant Imaging: I have reviewed all pertinent imaging results/findings within the past 24 hours.

## 2023-05-08 NOTE — CONSULTS
Inpatient consult to Orthopedics  Consult performed by: Oscar Ga MD  Consult ordered by: Bartolo Velasco MD      Consult Note  Orthopaedics    SUBJECTIVE:     History of Present Illness:  Patient is a 63 y.o. male with PMH of type 2 diabetes, atrial fibrillation, presents with right knee pain.  Consulted for infectious versus inflammatory joint pain.  Patient states that he twisted his knee approximately 3 days ago while walking his dog.  He has had significant pain since then but reports that it has improved since admission.  Denies any history of gout.  Workup so far significant for elevated ESR/CRP.  Orthopedics consulted to evaluate for possible gout vs septic arthritis of right knee.      Scheduled Meds:   bumetanide  2 mg Oral BID    buPROPion  300 mg Oral Daily    cyanocobalamin  500 mcg Oral Daily    metoprolol tartrate  25 mg Oral BID    naproxen  500 mg Oral BID WM    pantoprazole  40 mg Oral Daily    rivaroxaban  20 mg Oral Daily with dinner    sotaloL  80 mg Oral BID    spironolactone  25 mg Oral Daily    tamsulosin  0.4 mg Oral BID    thiamine  250 mg Oral Daily    vitamin D  1,000 Units Oral Daily     Continuous Infusions:  PRN Meds:acetaminophen, dextrose 10%, dextrose 10%, dextrose, dextrose, glucagon (human recombinant), insulin aspart U-100, melatonin, naloxone, ondansetron, oxyCODONE, sodium chloride 0.9%    Review of patient's allergies indicates:   Allergen Reactions    Iodinated contrast media Other (See Comments)     Raises BP         Past Medical History:   Diagnosis Date    Allergy     Anemia     Asthma     Atrial fibrillation     Cataract     Chronic rhinitis 9/26/2013    DDD (degenerative disc disease) 4/3/2015    Depression     Diabetes mellitus     Fibromyalgia     Gastroesophageal reflux disease without esophagitis 7/14/2017    Hypertension     Sinusitis, acute, maxillary 12/20/2012    Thyroid disease      Past Surgical History:   Procedure Laterality Date    ANTEGRADE SINGLE  BALLOON ENTEROSCOPY N/A 5/26/2022    Procedure: ENTEROSCOPY, SINGLE BALLOON, ANTEGRADE;  Surgeon: Lyle Edmond MD;  Location: [facility] (2ND FLR);  Service: Endoscopy;  Laterality: N/A;  Will use single-balloon scope for both the upper endoscopy and the colonoscopy - recent incomplete colonoscopy due to looping.    Pt is fully vaccinated-DS  5/20/22-Approval to hold Xarelto rec'd from Dr. Webb-pending approval (see telephoned en    CERVICAL SPINE SURGERY      COLONOSCOPY N/A 5/10/2022    Procedure: COLONOSCOPY;  Surgeon: Conrad Diaz MD;  Location: [facility] ENDO (2ND FLR);  Service: Endoscopy;  Laterality: N/A;    COLONOSCOPY N/A 5/26/2022    Procedure: COLONOSCOPY;  Surgeon: Lyle Edmond MD;  Location: [facility] ENDO (2ND FLR);  Service: Endoscopy;  Laterality: N/A;    EPIDURAL STEROID INJECTION INTO LUMBAR SPINE N/A 5/29/2018    Procedure: INJECTION-STEROID-EPIDURAL-LUMBAR- L4-5;  Surgeon: Roman Lyman MD;  Location: [facility] PAIN MGT;  Service: Pain Management;  Laterality: N/A;  Patient is diabetic and Xarleto     EPIDURAL STEROID INJECTION INTO LUMBAR SPINE N/A 7/3/2018    Procedure: INJECTION, STEROID, SPINE, LUMBAR, EPIDURAL- L4-5;  Surgeon: Roman Lyman MD;  Location: [facility] PAIN MGT;  Service: Pain Management;  Laterality: N/A;  Patient takes Xarelto and ASA     ESOPHAGOGASTRODUODENOSCOPY N/A 5/9/2022    Procedure: EGD (ESOPHAGOGASTRODUODENOSCOPY);  Surgeon: Conrad Diaz MD;  Location: [facility] ENDO (2ND FLR);  Service: Endoscopy;  Laterality: N/A;    EXCISION OF LESION OF LIP N/A 7/7/2020    Procedure: EXCISION, LESION, LIP;  Surgeon: Caden Navarro MD;  Location: [facility] OR 2ND FLR;  Service: ENT;  Laterality: N/A;    GASTRIC BYPASS      SINUS SURGERY  2000    Dr. Watt at [facility]    TONSILLECTOMY       Family History   Problem Relation Age of Onset    Heart disease Father     Cancer Mother         lung    Hypertension Sister     Heart disease Brother     Cirrhosis Brother     Diabetes Brother       Social History     Tobacco Use    Smoking status: Never    Smokeless tobacco: Never   Substance Use Topics    Alcohol use: No     Comment: rare    Drug use: No        Review of Systems:  Constitutional: negative for fevers  Eyes: no visual changes  ENT: negative for hearing loss  Respiratory: negative for dyspnea  Cardiovascular: negative for chest pain  Gastrointestinal: negative for abdominal pain  Genitourinary: negative for dysuria  Neurological: negative for headaches  Behavioral/Psych: negative for hallucinations  Endocrine: negative for temperature intolerance      OBJECTIVE:     Vital Signs (Most Recent)  Temp: 97 °F (36.1 °C) (05/08/23 1037)  Pulse: 65 (05/08/23 1037)  Resp: 18 (05/08/23 1037)  BP: 120/62 (05/08/23 1037)  SpO2: 98 % (05/08/23 1037)    Physical Exam:  General:  no apparent distress, WDWN  HENT:  NCAT, Bilateral ears/eyes normal  CV:  Normal pulses, color, and cap refill  Lungs:  Normal respiratory effort  Neuro: No FND, awake, alert  Psych:  Oriented to Person, Place, Time, and Situation    MSK:       bilateral Upper Extremity:  Inspection: Skin intact throughout, no open wounds or ecchymosis   Palpation: Non-TTP throughout, no palpable abnormality.   ROM: AROM and PROM of the shoulder, elbow, wrist, and hand intact without pain.   Neuro: AIN/PIN/Radial/Median/Ulnar Nerves assessed in isolation without deficit.   SILT throughout.    Vascular: Radial artery palpated 2+. Capillary refill <3s.         left Lower Extremity:  Inspection: Skin intact throughout, no open wounds or ecchymosis.   Palpation: Non-TTP throughout. No palpable abnormality.   ROM: AROM and PROM of the hip, knee, ankle, and foot intact without pain.   Neuro: TA/EHL/Gastroc/FHL assessed in isolation without deficit.   SILT throughout.    Vascular: DP and PT arteries palpated 2+. Capillary refill <3s.         Right Lower Extremity:  Inspection: Skin intact throughout, no open wounds or ecchymosis.  No crepitus or  erythema.   Palpation: Tender to palpation in the infrapatellar/lateral infrapatellar area.  No palpable abnormality.   ROM: AROM and PROM of the hip, ankle, and foot intact without pain.  Limited range of motion for flexion of right knee secondary to pain.  Minimal pain with extension of the right knee and valgus/varus stress on the knee.   Neuro: TA/EHL/Gastroc/FHL assessed in isolation without deficit.   SILT throughout.    Vascular: DP and PT arteries palpated 2+. Capillary refill <3s.          Spine/pelvis/axial body:  No tenderness to palpation of cervical, thoracic, or lumbar spine  No pain with compression of pelvis  No chest wall or abdominal tenderness  No decubitus ulcers  Muscle tone normal     Media Information       Media Information        Laboratory:  Lab Results   Component Value Date    WBCBF 45663 2023    SEGS 88 2023    CRYST Positive (A) 2023    BODYFLUIDTYP Joint Fluid, Right Knee 2023     Lab Results   Component Value Date    LABGRAM Rare WBC's 2023    LABGRAM No organisms seen 2023     Lab Results   Component Value Date    SEDRATE 118 (H) 2023    .5 (H) 2023    WBC 5.18 2023         Diagnostic Results:  Relevant imaging has been reviewed and interpreted by me.   Mild joint effusion. There is no acute osseous abnormality.      ASSESSMENT & PLAN:     Shiraz Jha is a 63 y.o. old male with R knee pain and elevated inflammatory markers. Orthopedics consulted for evaluation of possible inflammatory arthritis vs septic arthritis.     Procedure Note: right knee aspiration  Patient was explained risks, benefits, and alternatives to treatment and verbalized consent to proceed. Time out was performed and patient name, , site, and procedure were confirmed. Skin was sterilely prepared with alcohol solution. 18 gauge needle on a 60 cc syringe was used for aspiration through superolateral approach.Straw colored fluid collected. Fluid and  cultures were obtained and sent for analysis. Aspiration site was covered with a bandaid.    Lab Results   Component Value Date    WBCBF 34210 05/07/2023    SEGS 88 05/07/2023    CRYST Positive (A) 05/07/2023    BODYFLUIDTYP Joint Fluid, Right Knee 05/07/2023     Lab Results   Component Value Date    LABGRAM Rare WBC's 05/07/2023    LABGRAM No organisms seen 05/07/2023     Lab Results   Component Value Date    SEDRATE 118 (H) 05/07/2023    .5 (H) 05/07/2023    WBC 5.18 05/08/2023     Intracellular urate crystals seen on pathologist review.     Aspiration results consistent with gout.     - Recommend medical treatment of gout  - Remainder of care per primary

## 2023-05-08 NOTE — PROGRESS NOTES
"[facility] Medicine  Progress Note    Patient Name: Shiraz Jha  MRN: [id number]  Patient Class: OP- Observation   Admission Date: 5/7/2023  Length of Stay: 0 days  Attending Physician: Bartolo Mcgee MD  Primary Care Provider: GUSTAVO Theodore MD        Subjective:     Principal Problem:Gout        HPI:  63M with history of Asthma, Afib on AC s/p ablation 2014, T2DM, HTN, Obesity s/p gastric bypass 2009, and Venous insufficiency presents with right knee pain. The pain started 3 days ago. At the time he describes his dog ran past him and "twisted him up". He felt severe pain in that knee since depending on the position. There is also new swelling and warmth of the knee. The pain is more localized to the anterolateral surface with radiation to the medial surface and down the leg. He does not report pain associated on the posterior aspect of his knee. Otherwise he denies other associated symptoms including fever, chills, chest pain, sob, palpitations, nausea, vomiting, or diarrhea. He is adherent with his medications. He denies alcohol, smoking, or illicit drug use.     In the ED joint fluid was collected, which was consistent with crystals. This raises concern for gouty arthropathy. WBC 9.32, Hgb 9.8, MCV 74, , .5, Alk p 152, Bili 1.3. Xray with joint effusion. CT of the knee consistent with soft tissues swelling. There was mention of scattered gas in the tissue concerning for cellulitis vs. Necrotizing fasciitis, though the CT was performed after joint aspiration. The aspiration could have potentially introduced gas into the tissue. Hospital medicine will admit the patient for gout flare and rule out possibility for infectious etiology, though less likely.       Overview/Hospital Course:  The patient presents to hospital medicine for an acute gout flare. Arthocentesis yielded crystals with out white blood cells to indicate infectious process. CT also confirmed R knee edema with " note on rule out of cellulitis/necrotizing fasciitis. There was note of gas in the tissues though suspected not to be secondary to infection The patient is hemodynamically stable. Orthopedic surgery consulted. Prednisone 40 x 1 initiated and switched to naproxen 500mg bid.       Interval History: Pain in the right knee improved. S/p prednisone and joint arthrocentesis. Findings consistent with gouty arthropathy. Switching treatment to naproxen given diabetes. No specific contraindications for short term NSAID use. Orthopedic surgery consulted.     Review of Systems   Constitutional:  Negative for chills and fever.   HENT:  Negative for rhinorrhea and sneezing.    Respiratory:  Negative for cough and shortness of breath.    Cardiovascular:  Negative for chest pain and leg swelling.   Gastrointestinal:  Negative for abdominal pain and nausea.   Genitourinary:  Negative for dysuria and hematuria.   Musculoskeletal:  Positive for arthralgias, joint swelling and myalgias.   Neurological:  Negative for tremors and headaches.   Psychiatric/Behavioral:  Negative for agitation and confusion.    Objective:     Vital Signs (Most Recent):  Temp: 97 °F (36.1 °C) (05/08/23 1037)  Pulse: 65 (05/08/23 1037)  Resp: 18 (05/08/23 1037)  BP: 120/62 (05/08/23 1037)  SpO2: 98 % (05/08/23 1037) Vital Signs (24h Range):  Temp:  [97 °F (36.1 °C)-98.3 °F (36.8 °C)] 97 °F (36.1 °C)  Pulse:  [63-81] 65  Resp:  [18-19] 18  SpO2:  [95 %-100 %] 98 %  BP: (120-141)/(60-73) 120/62     Weight: 136.1 kg (300 lb)  Body mass index is 48.42 kg/m².    Intake/Output Summary (Last 24 hours) at 5/8/2023 1255  Last data filed at 5/8/2023 0918  Gross per 24 hour   Intake 360 ml   Output --   Net 360 ml         Physical Exam  Vitals and nursing note reviewed.   Constitutional:       General: He is not in acute distress.     Appearance: He is obese.   HENT:      Head: Normocephalic and atraumatic.      Nose: Nose normal. No rhinorrhea.   Eyes:      Extraocular  Movements: Extraocular movements intact.      Conjunctiva/sclera: Conjunctivae normal.   Cardiovascular:      Rate and Rhythm: Normal rate and regular rhythm.   Pulmonary:      Effort: Pulmonary effort is normal. No respiratory distress.   Abdominal:      General: There is no distension.      Palpations: Abdomen is soft.   Musculoskeletal:         General: Swelling and tenderness present.      Right lower leg: Edema present.      Left lower leg: Edema present.      Comments: Pain elicited to palpation on the anterolateral surface of right knee. Mild erythema. No rashes or scars. No crepitus appreciated.    Skin:     General: Skin is warm and dry.      Capillary Refill: Capillary refill takes less than 2 seconds.   Neurological:      General: No focal deficit present.      Mental Status: He is alert and oriented to person, place, and time.           Significant Labs: All pertinent labs within the past 24 hours have been reviewed.  CBC:   Recent Labs   Lab 05/07/23  1134 05/08/23  0408   WBC 9.32 5.18   HGB 9.8* 9.4*   HCT 34.5* 32.3*    351     CMP:   Recent Labs   Lab 05/07/23  1134 05/08/23  0408    135*   K 4.1 4.3    100   CO2 22* 25   * 274*   BUN 16 20   CREATININE 1.1 0.9   CALCIUM 9.8 9.8   PROT 6.8 6.6   ALBUMIN 3.2* 3.0*   BILITOT 1.3* 1.0   ALKPHOS 152* 162*   AST 22 16   ALT 20 19   ANIONGAP 12 10       Significant Imaging: I have reviewed all pertinent imaging results/findings within the past 24 hours.      Assessment/Plan:      * Gout  The patient presents with acute right knee pain. The pain started 3 days ago. There is severe pain, erythema, and swelling of the joint. Joint aspirate fluid shows crystals consistent with gout. CT of the knee did show sign of possible gas in the tissue though the CT was performed after the joint aspiration. Thus I suspect the gas in the tissue is more likely secondary to the joint aspiration as opposed to a primary necrotising pathology. The  patient has no fever or chills at this time. ESR 18, .5.    - S/p dexamethasone in ED and prednisone 40 mg PO  - Switch treatment to Naproxen 500 BID for course of 7 days  - F/u Uric acid level   - Consult Orthopedics to follow up CT knee   - Tylenol prn   - Will eventually need wheelchair and or crutches when discharges; will follow up with social work       Anemia  Hgb 9.8, MCV 74    - follow up iron studies   - transfuse if Hgb < 7      Status post gastric bypass for obesity  Body mass index is 48.42 kg/m². Morbid obesity complicates all aspects of disease management from diagnostic modalities to treatment. Weight loss encouraged and health benefits explained to patient.     - Continue vitamin supplementation  - Diabetic diet         Status post catheter ablation of atrial fibrillation  Continue sotalol and xarelto (on chronic anticoagulation)      Obstructive sleep apnea  CPAP QHS      Type 2 diabetes mellitus without complication, without long-term current use of insulin  Patient's FSGs are controlled on current medication regimen.  Last A1c reviewed-   Lab Results   Component Value Date    HGBA1C 6.8 (H) 05/08/2023     Most recent fingerstick glucose reviewed-   Recent Labs   Lab 05/07/23  1859 05/08/23  0736 05/08/23  1036   POCTGLUCOSE 147* 250* 230*     Current correctional scale  Low  Maintain anti-hyperglycemic dose as follows-   Antihyperglycemics (From admission, onward)    Start     Stop Route Frequency Ordered    05/07/23 1905  insulin aspart U-100 pen 0-5 Units         -- SubQ Before meals & nightly PRN 05/07/23 1806        Hold Oral hypoglycemics while patient is in the hospital.    Severe obesity (BMI >= 40)  Body mass index is 48.42 kg/m². Morbid obesity complicates all aspects of disease management from diagnostic modalities to treatment. Weight loss encouraged and health benefits explained to patient. S/p gastric bypass 2009.      Depression  Patient has persistent depression which is  moderate and is currently controlled. Will Continue anti-depressant medications. We will not consult psychiatry at this time. Patient does not display psychosis at this time. Continue to monitor closely and adjust plan of care as needed.        Essential hypertension  Continue metoprolol       VTE Risk Mitigation (From admission, onward)         Ordered     rivaroxaban tablet 20 mg  With dinner         05/07/23 1903     IP VTE HIGH RISK PATIENT  Once         05/07/23 1806     Place sequential compression device  Until discontinued         05/07/23 1806                Discharge Planning   BERTHA: 5/8/2023     Code Status: Full Code   Is the patient medically ready for discharge?: No    Reason for patient still in hospital (select all that apply): Treatment and Pending disposition  Discharge Plan A: Home, Home Health   Discharge Delays: None known at this time              Bartolo Velasco MD  Department of Hospital Medicine   [facility]

## 2023-05-08 NOTE — PLAN OF CARE
MARCOS spoke to pt regarding his d/c plan.  Pt lives on the 2nd floor and does not have help at home. Pt asked if he could go to SNF/rehab.  No therapy notes on file.  MARCOS advised pt will need clinicals to support recommendations for SNF/Inpt Rehab.  Pt has [employer] insurance.  Per medical team, pt requested outpatient therapy.  [employer] processing request for Cane.  Pt's sister will provide transportation home.       05/08/23 5845   Post-Acute Status   Post-Acute Authorization E   [facility] Status Set-up Complete/Auth obtained  (Cane)   Discharge Delays None known at this time   Discharge Plan   Discharge Plan A Home;Other  (Cane)   Discharge Plan B Home     Lynda Robledo LMSW  PRN-  [facility]  Ext. [phone]

## 2023-05-08 NOTE — ASSESSMENT & PLAN NOTE
The patient presents with acute right knee pain. The pain started 3 days ago. There is severe pain, erythema, and swelling of the joint. Joint aspirate fluid shows crystals consistent with gout. CT of the knee did show sign of possible gas in the tissue though the CT was performed after the joint aspiration. Thus I suspect the gas in the tissue is more likely secondary to the joint aspiration as opposed to a primary necrotising pathology. The patient has no fever or chills at this time. ESR 18, .5.    - S/p dexamethasone in ED and prednisone 40 mg PO  - Switch treatment to Naproxen 500 BID for course of 7 days  - F/u Uric acid level   - Consult Orthopedics to follow up CT knee   - Tylenol prn   - Will eventually need wheelchair and or crutches when discharges; will follow up with social work

## 2023-05-08 NOTE — HOSPITAL COURSE
The patient presented to hospital medicine for an acute gout flare. Arthocentesis yielded crystals with out white blood cells to indicate infectious process. CT also confirmed R knee edema with note on rule out of cellulitis/necrotizing fasciitis. There was note of gas in the tissues though suspected not to be secondary to infection. The patient is hemodynamically stable. Orthopedic surgery consulted and agree that presentation most consistent with gout. Continue week long course of prednisone 40 outpatient. Discharge with a cane for ambulation.  Follow up with primary care doctor and repeat labs.

## 2023-05-08 NOTE — DISCHARGE SUMMARY
"[facility] Medicine  Discharge Summary      Patient Name: Shiraz Jha  MRN: [id number]  KAILASH: [id number]  Patient Class: OP- Observation  Admission Date: 5/7/2023  Hospital Length of Stay: 0 days  Discharge Date and Time:  05/08/2023 3:51 PM  Attending Physician: Bartolo Mcgee MD   Discharging Provider: Bartolo Velasco MD  Primary Care Provider: GUSTAVO Theodore MD  [facility] Medicine Team: [facility] 3 Bartolo Velasco MD  Primary Care Team: [facility] 3    HPI:   63M with history of Asthma, Afib on AC s/p ablation 2014, T2DM, HTN, Obesity s/p gastric bypass 2009, and Venous insufficiency presents with right knee pain. The pain started 3 days ago. At the time he describes his dog ran past him and "twisted him up". He felt severe pain in that knee since depending on the position. There is also new swelling and warmth of the knee. The pain is more localized to the anterolateral surface with radiation to the medial surface and down the leg. He does not report pain associated on the posterior aspect of his knee. Otherwise he denies other associated symptoms including fever, chills, chest pain, sob, palpitations, nausea, vomiting, or diarrhea. He is adherent with his medications. He denies alcohol, smoking, or illicit drug use.     In the ED joint fluid was collected, which was consistent with crystals. This raises concern for gouty arthropathy. WBC 9.32, Hgb 9.8, MCV 74, , .5, Alk p 152, Bili 1.3. Xray with joint effusion. CT of the knee consistent with soft tissues swelling. There was mention of scattered gas in the tissue concerning for cellulitis vs. Necrotizing fasciitis, though the CT was performed after joint aspiration. The aspiration could have potentially introduced gas into the tissue. Hospital medicine will admit the patient for gout flare and rule out possibility for infectious etiology, though less likely.       * No surgery found *      Hospital Course:   The patient " presented to hospital medicine for an acute gout flare. Arthocentesis yielded crystals with out white blood cells to indicate infectious process. CT also confirmed R knee edema with note on rule out of cellulitis/necrotizing fasciitis. There was note of gas in the tissues though suspected not to be secondary to infection. The patient is hemodynamically stable. Orthopedic surgery consulted and agree that presentation most consistent with gout. Continue week long course of prednisone 40 outpatient. Discharge with a cane for ambulation.  Follow up with primary care doctor and repeat labs.     Vitals:    05/08/23 0452 05/08/23 0803 05/08/23 1037 05/08/23 1545   BP: 132/68 123/60 120/62 (!) 117/57   BP Location: Right arm Left arm Right arm Right arm   Patient Position: Lying Sitting Lying Lying   Pulse: 65 63 65 63   Resp: 18 18 18 18   Temp: 97.6 °F (36.4 °C)  97 °F (36.1 °C) 98 °F (36.7 °C)   TempSrc: Oral  Oral Oral   SpO2: 95% 97% 98% 96%   Weight:           Goals of Care Treatment Preferences:  Code Status: Full Code      Consults:   Consults (From admission, onward)        Status Ordering Provider     Inpatient consult to Orthopedics  Once        Provider:  (Not yet assigned)    Completed DEJA PARSON          No new Assessment & Plan notes have been filed under this hospital service since the last note was generated.  Service: Hospital Medicine    Final Active Diagnoses:    Diagnosis Date Noted POA    PRINCIPAL PROBLEM:  Gout [M10.9] 05/07/2023 Unknown    Anemia [D64.9] 05/07/2023 Unknown    Status post gastric bypass for obesity [Z98.84] 09/19/2019 Not Applicable    Status post catheter ablation of atrial fibrillation [Z98.890] 08/01/2017 Not Applicable    Type 2 diabetes mellitus without complication, without long-term current use of insulin [E11.9] 01/03/2013 Yes     Chronic    Severe obesity (BMI >= 40) [E66.01] 01/03/2013 Yes     Chronic    Obstructive sleep apnea [G47.33] 01/03/2013 Yes     Chronic  "   Essential hypertension [I10] 11/15/2012 Yes     Chronic    Depression [F32.A] 11/15/2012 Yes      Problems Resolved During this Admission:       Discharged Condition: fair    Disposition: Home or Self Care    Follow Up:    Patient Instructions:      CANE FOR HOME USE     Order Specific Question Answer Comments   Type of Cane: Straight    Height: 5'6"    Weight: 136.1 kg (300 lb)    Does patient have medical equipment at home? CPAP    Length of need (1-99 months): 99    Please check all that apply: Patient's condition impairs ambulation.      CBC W/ AUTO DIFFERENTIAL   Standing Status: Future Standing Exp. Date: 07/06/24     COMPREHENSIVE METABOLIC PANEL   Standing Status: Future Standing Exp. Date: 07/06/24     URIC ACID   Standing Status: Future Standing Exp. Date: 07/06/24     Ambulatory referral/consult to Internal Medicine   Standing Status: Future   Referral Priority: Routine Referral Type: Consultation   Referral Reason: Specialty Services Required   Requested Specialty: Internal Medicine   Number of Visits Requested: 1       Significant Diagnostic Studies: Labs:   CMP   Recent Labs   Lab 05/07/23  1134 05/08/23  0408    135*   K 4.1 4.3    100   CO2 22* 25   * 274*   BUN 16 20   CREATININE 1.1 0.9   CALCIUM 9.8 9.8   PROT 6.8 6.6   ALBUMIN 3.2* 3.0*   BILITOT 1.3* 1.0   ALKPHOS 152* 162*   AST 22 16   ALT 20 19   ANIONGAP 12 10   , CBC   Recent Labs   Lab 05/07/23  1134 05/08/23  0408   WBC 9.32 5.18   HGB 9.8* 9.4*   HCT 34.5* 32.3*    351    and Uric Acid 7.4  Microbiology: Joint Fluid with crystals, no WBC  Radiology: X-Ray: R Knee  CT scan: R Knee  CT Knee Without Contrast Right [[id number]] (Abnormal) Resulted: 05/07/23 1609   Order Status: Completed Updated: 05/07/23 1612   Narrative:     EXAMINATION:   CT KNEE WITHOUT CONTRAST RIGHT; CT 3D WITHOUT INDEPENDENT WS     CLINICAL HISTORY:   Knee trauma, occult fracture suspected, xray done;need thin cuts and 3D RECONS;; Knee " trauma, occult fracture suspected, xray done;need thin cuts and 3D RECONS;fx;     TECHNIQUE:   Axial images were obtained through the right knee without IV contrast.  Coronal in sagittal reformatted and also 3D rendered images were generated     COMPARISON:   Right knee series earlier same day and bilateral knee series 07/19/2022     FINDINGS:   Overall alignment is within normal limits.  No displaced fracture, dislocation or destructive osseous process seen.  Mild tricompartmental degenerative change.     Moderate-sized joint effusion noted.  There is soft tissue swelling with subcutaneous stranding and overlying skin thickening primarily about the anterior and lateral aspect of the knee and to a lesser extent distal right thigh, and more lateral and posterior aspect of the partially imaged proximal right lower leg and calf.  Scattered subcutaneous gas foci about the posterolateral margin of the knee.  No radiodense retained foreign body.  No convincing evidence for sub fascial fluid or definitive fascial thickening at this time.  Surrounding musculature is within normal limits.  Mild degree of scattered atherosclerotic vascular calcifications noted.    Impression:       1. No acute displaced fracture-dislocation identified.   2. Moderate joint effusion, nonspecific.   3. Nonspecific soft tissue swelling and edema primarily about the anterolateral aspect of the knee and posterolateral aspect of the partially imaged proximal right lower leg and calf and to a lesser extent anterolateral aspect of the distal right thigh above the knee.  This could be related to localized edema/contusion in the setting of recent trauma versus cellulitis.  Additionally, there are scattered gas foci about the posterolateral aspect of the knee which may be related to recent trauma with penetrating soft tissue injury versus procedure; however, in the setting of cellulitis without recent operative procedure or penetrating trauma,  gas-forming infection not excluded in this patient with history of diabetes.  Clinical correlation advised.  Further evaluation/follow-up as warranted.   This report was flagged in Epic as abnormal.        Pending Diagnostic Studies:     Procedure Component Value Units Date/Time    Freeze and Hold, [facility] [[id number]] Collected: 05/07/23 1353    Order Status: Sent Lab Status: No result     Specimen: Joint Fluid from Right Knee Fluid          Medications:  Reconciled Home Medications:      Medication List      START taking these medications    predniSONE 20 MG tablet  Commonly known as: DELTASONE  Take 2 tablets (40 mg total) by mouth once daily. for 6 days        CHANGE how you take these medications    levocetirizine 5 MG tablet  Commonly known as: XYZAL  TAKE 1 TABLET (5 MG TOTAL) BY MOUTH EVERY EVENING.  What changed:   · when to take this  · reasons to take this        CONTINUE taking these medications    bumetanide 2 MG tablet  Commonly known as: BUMEX  Take 1 tablet (2 mg total) by mouth 2 (two) times daily.     buPROPion 300 MG 24 hr tablet  Commonly known as: WELLBUTRIN XL  Take 300 mg by mouth once daily. Pt states he only takes 300mg     cyanocobalamin 500 MCG tablet  Take 500 mcg by mouth once daily.     dextroamphetamine-amphetamine 20 mg tablet  Commonly known as: ADDERALL  Take 1 tablet by mouth every morning, take 1 tablet by mouth 3-4 hours later, and take 1/2 tablet every afternoon     fluticasone propionate 50 mcg/actuation nasal spray  Commonly known as: FLONASE  2 sprays (100 mcg total) by Each Nostril route once daily.     gabapentin 300 MG capsule  Commonly known as: NEURONTIN  Take 1 capsule (300 mg total) by mouth 3 (three) times daily.     IMPOYZ 0.025 % Crea  Generic drug: clobetasoL  Apply topically as needed.     LUZU 1 % Crea  Generic drug: luliconazole  Apply topically as needed.     metoprolol tartrate 25 MG tablet  Commonly known as: LOPRESSOR  Take 1 tablet (25 mg total) by mouth 2 (two)  times daily.     multivitamin per tablet  Commonly known as: THERAGRAN  Take 1 tablet by mouth once daily.     NAFTIN 2 % Gel  Generic drug: naftifine  daily as needed.     nitroGLYCERIN 0.4 MG SL tablet  Commonly known as: NITROSTAT  Please dispense 25 pills in 4 bottles.     omeprazole 10 MG capsule  Commonly known as: PRILOSEC  Take 2 capsules (20 mg total) by mouth once daily.     potassium chloride SA 10 MEQ tablet  Commonly known as: K-DUR,KLOR-CON M  Take 1 tablet (10 mEq total) by mouth 2 (two) times daily.     rivaroxaban 20 mg Tab  Commonly known as: XARELTO  Take 1 tablet (20 mg total) by mouth once daily.     sotaloL 80 MG tablet  Commonly known as: BETAPACE  Take 1 tablet (80 mg total) by mouth 2 (two) times daily.     spironolactone 25 MG tablet  Commonly known as: ALDACTONE  Take 1 tablet (25 mg total) by mouth once daily.     tamsulosin 0.4 mg Cap  Commonly known as: FLOMAX  Take 1 capsule (0.4 mg total) by mouth 2 (two) times daily.     thiamine 250 MG tablet  Take 250 mg by mouth once daily.     vitamin D 1000 units Tab  Commonly known as: VITAMIN D3  Take 1,000 Units by mouth once daily.     VRAYLAR 3 mg Cap  Generic drug: cariprazine  Take 3 mg by mouth.            Indwelling Lines/Drains at time of discharge:   Lines/Drains/Airways     None                 Time spent on the discharge of patient: 45 minutes         Bartolo Velasco MD  Department of Hospital Medicine  [facility]

## 2023-05-08 NOTE — NURSING
Nurses Note -- 4 Eyes      5/8/2023   2:55 AM      Skin assessed during: Admit      [x] No Altered Skin Integrity Present    []Prevention Measures Documented      [] Yes- Altered Skin Integrity Present or Discovered   [] LDA Added if Not in Epic (Describe Wound)   [] New Altered Skin Integrity was Present on Admit and Documented in LDA   [] Wound Image Taken    Wound Care Consulted? No    Attending Nurse:  Verna Jin RN     Second RN/Staff Member:  april guido

## 2023-05-08 NOTE — ASSESSMENT & PLAN NOTE
The patient presents with acute right knee pain. The pain started 3 days ago. There is severe pain, erythema, and swelling of the joint. Joint aspirate fluid shows crystals consistent with gout. CT of the knee did show sign of possible gas in the tissue though the CT was performed after the joint aspiration. Thus I suspect the gas in the tissue is more likely secondary to the joint aspiration as opposed to a primary necrotising pathology. The patient has no fever or chills at this time. ESR 18, .5.    - Plan to treat gout with course of prednisone; start prednisone 40mg PO   - Will continue to keep infectious process in mind though less likely; hold antibiotics  - Tylenol prn

## 2023-05-08 NOTE — DISCHARGE INSTRUCTIONS
Please take additional week course of prednisone. Follow up with your doctor outpatient. You will be provided a cane for walking.

## 2023-05-08 NOTE — ASSESSMENT & PLAN NOTE
Patient's FSGs are controlled on current medication regimen.  Last A1c reviewed-   Lab Results   Component Value Date    HGBA1C 6.8 (H) 05/08/2023     Most recent fingerstick glucose reviewed-   Recent Labs   Lab 05/07/23  1859 05/08/23  0736 05/08/23  1036   POCTGLUCOSE 147* 250* 230*     Current correctional scale  Low  Maintain anti-hyperglycemic dose as follows-   Antihyperglycemics (From admission, onward)    Start     Stop Route Frequency Ordered    05/07/23 1905  insulin aspart U-100 pen 0-5 Units         -- SubQ Before meals & nightly PRN 05/07/23 1806        Hold Oral hypoglycemics while patient is in the hospital.

## 2023-05-08 NOTE — ASSESSMENT & PLAN NOTE
Patient's FSGs are controlled on current medication regimen.  Last A1c reviewed-   Lab Results   Component Value Date    HGBA1C 6.7 (H) 01/05/2023     Most recent fingerstick glucose reviewed- No results for input(s): POCTGLUCOSE in the last 24 hours.  Current correctional scale  Low  Maintain anti-hyperglycemic dose as follows-   Antihyperglycemics (From admission, onward)    Start     Stop Route Frequency Ordered    05/07/23 1905  insulin aspart U-100 pen 0-5 Units         -- SubQ Before meals & nightly PRN 05/07/23 1806        Hold Oral hypoglycemics while patient is in the hospital.

## 2023-05-08 NOTE — PROGRESS NOTES
Pt was admitted to the unit this evening 5/7/23. Pt was oriented to room, informed of needing to call staff before getting out of bed and provided urinals for the bedside. Pt complaints of pain on his left knee when moving but otherwise has no other complaints. Will continue to monitor pt throughout shift.

## 2023-05-08 NOTE — ASSESSMENT & PLAN NOTE
Body mass index is 48.42 kg/m². Morbid obesity complicates all aspects of disease management from diagnostic modalities to treatment. Weight loss encouraged and health benefits explained to patient. S/p gastric bypass 2009.

## 2023-05-08 NOTE — PLAN OF CARE
SW met with patient at bedside. Patient was alert and cooperative. Patient stated that he lives I  his apartment alone. Patient lives in a two story apartment that has stairs to get in and will not be able to get in and ou this home with out any help. Patient stated that he has not devorah hable to take his blood thinner because he cannot afford to pay $500 for them. Patient stated that it has been hard for him to pay for shelter due to his income. Patient gets $$188 a month. Patient stated that he is disabled. Patient stated that he was using cobra but it has now discontinued. Patient would like more information on insurance options.     Ana Thomas, GEOVANNY, MSW-LMSW  Medical Social Worker/  ER Department         [facility] - Med Surg  Initial Discharge Assessment       Primary Care Provider: GUSTAVO Theodore MD    Admission Diagnosis: Fall [W19.XXXA]  Gout [M10.9]  Chest pain [R07.9]    Admission Date: 5/7/2023  Expected Discharge Date:     Discharge Barriers Identified: (P) None    Payor: MEDICARE / Plan: MEDICARE A ONLY / Product Type: Government /     Extended Emergency Contact Information  Primary Emergency Contact: Lisandra Snyder  Address: [address] [place]           [place] [address] [facility]  Home Phone: [phone]  Work Phone: [phone]  Mobile Phone: [phone]  Relation: Sister    Discharge Plan A: (P) Home, Home with family  Discharge Plan B: (P) Rehab      [facility] - [place] - [address]  [address] [place] [address]  Phone: [phone] Fax: [phone]    [facility] [address] [place] [address] [address] [facility]  [address] [facility] [address]  Phone: [phone] Fax: [phone]      Initial Assessment (most recent)       Adult Discharge Assessment - 05/08/23 0429          Discharge Assessment    Assessment Type Discharge Planning Assessment (P)      Confirmed/corrected address, phone number and insurance Yes (P)      Confirmed  Demographics Correct on Facesheet (P)      Source of Information patient (P)      When was your last doctors appointment? -- (P)    This year sometime    Does patient/caregiver understand observation status Yes (P)      Communicated BERTHA with patient/caregiver Yes (P)      Reason For Admission Fall (P)      People in Home alone (P)      Facility Arrived From: Home (P)      Do you expect to return to your current living situation? Yes (P)      Do you have help at home or someone to help you manage your care at home? No (P)      Prior to hospitilization cognitive status: Alert/Oriented (P)      Current cognitive status: Alert/Oriented (P)      Home Accessibility not wheelchair accessible;stairs to enter home (P)      Number of Stairs, Main Entrance other (see comments) (P)    21    Home Layout Bedroom on 2nd floor (P)      Equipment Currently Used at Home CPAP (P)      Readmission within 30 days? No (P)      Patient currently being followed by outpatient case management? No (P)      Do you currently have service(s) that help you manage your care at home? No (P)      Do you take prescription medications? Yes (P)      Do you have prescription coverage? Yes (P)      Coverage BCBS (P)      Do you have any problems affording any of your prescribed medications? Yes (P)      If yes, what medications? Blood Thinners (P)      Is the patient taking medications as prescribed? no (P)      If no, which medications is patient not taking? VBlood Thinner (P)      Who is going to help you get home at discharge? Self (P)      How do you get to doctors appointments? car, drives self (P)      Are you on dialysis? No (P)      Do you take coumadin? No (P)      Discharge Plan A Home;Home with family (P)      Discharge Plan B Rehab (P)      DME Needed Upon Discharge  walker, standard (P)      Discharge Plan discussed with: Patient (P)      Discharge Barriers Identified None (P)         Physical Activity    On average, how many days per week  do you engage in moderate to strenuous exercise (like a brisk walk)? 0 days (P)      On average, how many minutes do you engage in exercise at this level? 0 min (P)         Financial Resource Strain    How hard is it for you to pay for the very basics like food, housing, medical care, and heating? Hard (P)         Housing Stability    In the last 12 months, was there a time when you were not able to pay the mortgage or rent on time? Yes (P)      In the last 12 months, how many places have you lived? 1 (P)      In the last 12 months, was there a time when you did not have a steady place to sleep or slept in a shelter (including now)? Yes (P)         Transportation Needs    In the past 12 months, has lack of transportation kept you from medical appointments or from getting medications? No (P)      In the past 12 months, has lack of transportation kept you from meetings, work, or from getting things needed for daily living? No (P)         Food Insecurity    Within the past 12 months, you worried that your food would run out before you got the money to buy more. Never true (P)      Within the past 12 months, the food you bought just didn't last and you didn't have money to get more. Never true (P)         Stress    Do you feel stress - tense, restless, nervous, or anxious, or unable to sleep at night because your mind is troubled all the time - these days? Very much (P)    Patient stated that he is always depressed and he takes depresion medications       Social Connections    In a typical week, how many times do you talk on the phone with family, friends, or neighbors? Once a week (P)      How often do you get together with friends or relatives? Once a week (P)      How often do you attend Yazidi or Scientology services? Never (P)      Do you belong to any clubs or organizations such as Yazidi groups, unions, fraternal or athletic groups, or school groups? No (P)      How often do you attend meetings of the clubs or  organizations you belong to? Never (P)      Are you , , , , never , or living with a partner? Never  (P)         Alcohol Use    Q1: How often do you have a drink containing alcohol? Never (P)      Q2: How many drinks containing alcohol do you have on a typical day when you are drinking? Patient does not drink (P)      Q3: How often do you have six or more drinks on one occasion? Never (P)

## 2023-05-08 NOTE — ASSESSMENT & PLAN NOTE
Body mass index is 48.42 kg/m². Morbid obesity complicates all aspects of disease management from diagnostic modalities to treatment. Weight loss encouraged and health benefits explained to patient.     - Continue vitamin supplementation  - Diabetic diet

## 2023-05-08 NOTE — PLAN OF CARE
Problem: Adult Inpatient Plan of Care  Goal: Plan of Care Review  Outcome: Met  Goal: Patient-Specific Goal (Individualized)  Outcome: Met  Goal: Absence of Hospital-Acquired Illness or Injury  Outcome: Met  Goal: Optimal Comfort and Wellbeing  Outcome: Met  Goal: Readiness for Transition of Care  Outcome: Met     Problem: Bariatric Environmental Safety  Goal: Safety Maintained with Care  Outcome: Met     Problem: Diabetes Comorbidity  Goal: Blood Glucose Level Within Targeted Range  Outcome: Met     Problem: Skin Injury Risk Increased  Goal: Skin Health and Integrity  Outcome: Met       Reviewed plan of care with emphasis on fall prevention and diet related to gout.  Patient verbalized understanding and agreement with plan of care.

## 2023-05-08 NOTE — H&P
"[facility] - Emergency Dept  [facility] Medicine  History & Physical    Patient Name: Shiraz Jha  MRN: [id number]  Patient Class: OP- Observation  Admission Date: 5/7/2023  Attending Physician: Bartolo Mcgee MD   Primary Care Provider: GUSTAVO Theodore MD         Patient information was obtained from patient and ER records.     Subjective:     Principal Problem:<principal problem not specified>    Chief Complaint:   Chief Complaint   Patient presents with    Knee Pain     Pt was walking his dog 3-4 days ago and states he twisted his right knee.         HPI: 63M with history of Asthma, Afib on AC s/p ablation 2014, T2DM, HTN, Obesity s/p gastric bypass 2009, and Venous insufficiency presents with right knee pain. The pain started 3 days ago. At the time he describes his dog ran past him and "twisted him up". He felt severe pain in that knee since depending on the position. There is also new swelling and warmth of the knee. The pain is more localized to the anterolateral surface with radiation to the medial surface and down the leg. He does not report pain associated on the posterior aspect of his knee. Otherwise he denies other associated symptoms including fever, chills, chest pain, sob, palpitations, nausea, vomiting, or diarrhea. He is adherent with his medications. He denies alcohol, smoking, or illicit drug use.     In the ED joint fluid was collected, which was consistent with crystals. This raises concern for gouty arthropathy. WBC 9.32, Hgb 9.8, MCV 74, , .5, Alk p 152, Bili 1.3. Xray with joint effusion. CT of the knee consistent with soft tissues swelling. There was mention of scattered gas in the tissue concerning for cellulitis vs. Necrotizing fasciitis, though the CT was performed after joint aspiration. The aspiration could have potentially introduced gas into the tissue. Hospital medicine will admit the patient for gout flare and rule out possibility for infectious etiology, though less " likely.       Past Medical History:   Diagnosis Date    Allergy     Anemia     Asthma     Atrial fibrillation     Cataract     Chronic rhinitis 9/26/2013    DDD (degenerative disc disease) 4/3/2015    Depression     Diabetes mellitus     Fibromyalgia     Gastroesophageal reflux disease without esophagitis 7/14/2017    Hypertension     Sinusitis, acute, maxillary 12/20/2012    Thyroid disease        Past Surgical History:   Procedure Laterality Date    ANTEGRADE SINGLE BALLOON ENTEROSCOPY N/A 5/26/2022    Procedure: ENTEROSCOPY, SINGLE BALLOON, ANTEGRADE;  Surgeon: Lyle Edmond MD;  Location: [facility] ENDO (2ND FLR);  Service: Endoscopy;  Laterality: N/A;  Will use single-balloon scope for both the upper endoscopy and the colonoscopy - recent incomplete colonoscopy due to looping.    Pt is fully vaccinated-DS  5/20/22-Approval to hold Xarelto rec'd from Dr. Webb-pending approval (see telephoned en    CERVICAL SPINE SURGERY      COLONOSCOPY N/A 5/10/2022    Procedure: COLONOSCOPY;  Surgeon: Conrad Diaz MD;  Location: [facility] ENDO (2ND FLR);  Service: Endoscopy;  Laterality: N/A;    COLONOSCOPY N/A 5/26/2022    Procedure: COLONOSCOPY;  Surgeon: Lyle Edmond MD;  Location: [facility] ENDO (2ND FLR);  Service: Endoscopy;  Laterality: N/A;    EPIDURAL STEROID INJECTION INTO LUMBAR SPINE N/A 5/29/2018    Procedure: INJECTION-STEROID-EPIDURAL-LUMBAR- L4-5;  Surgeon: Roman Lyman MD;  Location: [facility] PAIN MGT;  Service: Pain Management;  Laterality: N/A;  Patient is diabetic and Xarleto     EPIDURAL STEROID INJECTION INTO LUMBAR SPINE N/A 7/3/2018    Procedure: INJECTION, STEROID, SPINE, LUMBAR, EPIDURAL- L4-5;  Surgeon: Roman Lyman MD;  Location: [facility] PAIN MGT;  Service: Pain Management;  Laterality: N/A;  Patient takes Xarelto and ASA     ESOPHAGOGASTRODUODENOSCOPY N/A 5/9/2022    Procedure: EGD (ESOPHAGOGASTRODUODENOSCOPY);  Surgeon: Conrad Diaz MD;  Location: [facility] ENDO (2ND FLR);   Service: Endoscopy;  Laterality: N/A;    EXCISION OF LESION OF LIP N/A 7/7/2020    Procedure: EXCISION, LESION, LIP;  Surgeon: Caden Navarro MD;  Location: [facility] OR [address] [place] [address];  Service: ENT;  Laterality: N/A;    GASTRIC BYPASS      SINUS SURGERY  2000    [facility]att [facility]    TONSILLECTOMY         Review of patient's allergies indicates:   Allergen Reactions    Iodinated contrast media Other (See Comments)     Raises BP         No current facility-administered medications on file prior to encounter.     Current Outpatient Medications on File Prior to Encounter   Medication Sig    bumetanide (BUMEX) 2 MG tablet Take 1 tablet (2 mg total) by mouth 2 (two) times daily.    buPROPion (WELLBUTRIN XL) 300 MG 24 hr tablet Take 300 mg by mouth once daily. Pt states he only takes 300mg    cyanocobalamin 500 MCG tablet Take 500 mcg by mouth once daily.    dextroamphetamine-amphetamine (ADDERALL) 20 mg tablet Take 1 tablet by mouth every morning, take 1 tablet by mouth 3-4 hours later, and take 1/2 tablet every afternoon    fluticasone propionate (FLONASE) 50 mcg/actuation nasal spray 2 sprays (100 mcg total) by Each Nostril route once daily.    IMPOYZ 0.025 % Crea Apply topically as needed.     LUZU 1 % Crea Apply topically as needed.     metoprolol tartrate (LOPRESSOR) 25 MG tablet Take 1 tablet (25 mg total) by mouth 2 (two) times daily.    multivitamin (THERAGRAN) per tablet Take 1 tablet by mouth once daily.     omeprazole (PRILOSEC) 10 MG capsule Take 2 capsules (20 mg total) by mouth once daily.    potassium chloride SA (K-DUR,KLOR-CON M) 10 MEQ tablet Take 1 tablet (10 mEq total) by mouth 2 (two) times daily.    rivaroxaban (XARELTO) 20 mg Tab Take 1 tablet (20 mg total) by mouth once daily.    sotaloL (BETAPACE) 80 MG tablet Take 1 tablet (80 mg total) by mouth 2 (two) times daily.    spironolactone (ALDACTONE) 25 MG tablet Take 1 tablet (25 mg total) by mouth once daily.    tamsulosin (FLOMAX) 0.4 mg  Cap Take 1 capsule (0.4 mg total) by mouth 2 (two) times daily.    thiamine 250 MG tablet Take 250 mg by mouth once daily.    vitamin D (VITAMIN D3) 1000 units Tab Take 1,000 Units by mouth once daily.    VRAYLAR 3 mg Cap Take 3 mg by mouth.    gabapentin (NEURONTIN) 300 MG capsule Take 1 capsule (300 mg total) by mouth 3 (three) times daily.    levocetirizine (XYZAL) 5 MG tablet TAKE 1 TABLET (5 MG TOTAL) BY MOUTH EVERY EVENING. (Patient taking differently: Take 5 mg by mouth daily as needed for Allergies.)    NAFTIN 2 % Gel daily as needed.     nitroGLYCERIN (NITROSTAT) 0.4 MG SL tablet Please dispense 25 pills in 4 bottles.     Family History       Problem Relation (Age of Onset)    Cancer Mother    Cirrhosis Brother    Diabetes Brother    Heart disease Father, Brother    Hypertension Sister          Tobacco Use    Smoking status: Never    Smokeless tobacco: Never   Substance and Sexual Activity    Alcohol use: No     Comment: rare    Drug use: No    Sexual activity: Yes     Partners: Female     Review of Systems   Constitutional:  Negative for chills and fever.   HENT:  Negative for rhinorrhea and sneezing.    Respiratory:  Negative for cough and shortness of breath.    Cardiovascular:  Negative for chest pain and leg swelling.   Gastrointestinal:  Negative for abdominal pain and nausea.   Genitourinary:  Negative for dysuria and hematuria.   Musculoskeletal:  Positive for arthralgias, gait problem, joint swelling and myalgias.   Neurological:  Negative for tremors and headaches.   Psychiatric/Behavioral:  Negative for agitation and confusion.    Objective:     Vital Signs (Most Recent):  Temp: 98.3 °F (36.8 °C) (05/07/23 1430)  Pulse: 81 (05/07/23 1430)  Resp: 19 (05/07/23 1715)  BP: (!) 141/69 (05/07/23 1430)  SpO2: 99 % (05/07/23 1430) Vital Signs (24h Range):  Temp:  [98.3 °F (36.8 °C)-98.4 °F (36.9 °C)] 98.3 °F (36.8 °C)  Pulse:  [80-81] 81  Resp:  [18-19] 19  SpO2:  [98 %-99 %] 99 %  BP:  (141-158)/(69-94) 141/69     Weight: 136.1 kg (300 lb)  Body mass index is 48.42 kg/m².     Physical Exam  Vitals and nursing note reviewed.   Constitutional:       General: He is not in acute distress.     Comments: The patient is obese. S/p gastric bypass 2009. He is not in acute distress. He is not in acute pain as long as his knee remains still.    HENT:      Head: Normocephalic and atraumatic.      Nose: Nose normal. No rhinorrhea.   Eyes:      Extraocular Movements: Extraocular movements intact.      Conjunctiva/sclera: Conjunctivae normal.   Cardiovascular:      Rate and Rhythm: Normal rate and regular rhythm.   Pulmonary:      Effort: Pulmonary effort is normal. No respiratory distress.   Abdominal:      General: There is no distension.      Palpations: Abdomen is soft.   Musculoskeletal:         General: Swelling and tenderness present.      Right lower leg: Edema present.      Left lower leg: Edema present.      Comments: Swelling and tenderness of the right knee joint to palpation. There is mild erythema anterolaterally and anteromedially. Most pain is elicited anterolaterally. Mild radiation of pain down the right leg. Bilateral edema.    Skin:     General: Skin is warm and dry.      Capillary Refill: Capillary refill takes less than 2 seconds.      Findings: Erythema present.   Neurological:      General: No focal deficit present.      Mental Status: He is alert and oriented to person, place, and time.              Significant Labs: All pertinent labs within the past 24 hours have been reviewed.  CBC:   Recent Labs   Lab 05/07/23  1134   WBC 9.32   HGB 9.8*   HCT 34.5*        CMP:   Recent Labs   Lab 05/07/23  1134      K 4.1      CO2 22*   *   BUN 16   CREATININE 1.1   CALCIUM 9.8   PROT 6.8   ALBUMIN 3.2*   BILITOT 1.3*   ALKPHOS 152*   AST 22   ALT 20   ANIONGAP 12       Significant Imaging: I have reviewed all pertinent imaging results/findings within the past 24  hours.    Assessment/Plan:     Gout  The patient presents with acute right knee pain. The pain started 3 days ago. There is severe pain, erythema, and swelling of the joint. Joint aspirate fluid shows crystals consistent with gout. CT of the knee did show sign of possible gas in the tissue though the CT was performed after the joint aspiration. Thus I suspect the gas in the tissue is more likely secondary to the joint aspiration as opposed to a primary necrotising pathology. The patient has no fever or chills at this time. ESR 18, .5.    - Plan to treat gout with course of prednisone; start prednisone 40mg PO   - Will continue to keep infectious process in mind though less likely; hold antibiotics  - Tylenol prn       Anemia  Hgb 9.8, MCV 74    - follow up iron studies   - transfuse if Hgb < 7      Status post gastric bypass for obesity  Body mass index is 48.42 kg/m². Morbid obesity complicates all aspects of disease management from diagnostic modalities to treatment. Weight loss encouraged and health benefits explained to patient.     - Continue vitamin supplementation  - Diabetic diet         Status post catheter ablation of atrial fibrillation  Continue sotalol and xarelto ( on chronic anticoagulation)      Obstructive sleep apnea  CPAP QHS      Type 2 diabetes mellitus without complication, without long-term current use of insulin  Patient's FSGs are controlled on current medication regimen.  Last A1c reviewed-   Lab Results   Component Value Date    HGBA1C 6.7 (H) 01/05/2023     Most recent fingerstick glucose reviewed- No results for input(s): POCTGLUCOSE in the last 24 hours.  Current correctional scale  Low  Maintain anti-hyperglycemic dose as follows-   Antihyperglycemics (From admission, onward)    Start     Stop Route Frequency Ordered    05/07/23 1905  insulin aspart U-100 pen 0-5 Units         -- SubQ Before meals & nightly PRN 05/07/23 1806        Hold Oral hypoglycemics while patient is in the  hospital.    Severe obesity (BMI >= 40)  Body mass index is 48.42 kg/m². Morbid obesity complicates all aspects of disease management from diagnostic modalities to treatment. Weight loss encouraged and health benefits explained to patient. S/p gastric bypass 2009.      Depression  Patient has persistent depression which is moderate and is currently controlled. Will Continue anti-depressant medications. We will not consult psychiatry at this time. Patient does not display psychosis at this time. Continue to monitor closely and adjust plan of care as needed.        Essential hypertension  Continue metoprolol       VTE Risk Mitigation (From admission, onward)         Ordered     rivaroxaban tablet 20 mg  With dinner         05/07/23 1903     IP VTE HIGH RISK PATIENT  Once         05/07/23 1806     Place sequential compression device  Until discontinued         05/07/23 1806                       On 05/07/2023, patient should be placed in hospital observation services under my care in collaboration with Bartolo Mcgee MD.    Bartolo Velasco MD  Department of Hospital Medicine  [facility] - Emergency Dept

## 2023-05-09 NOTE — PLAN OF CARE
[facility] - Med Surg  Discharge Final Note    Primary Care Provider: GUSTAVO Theodore MD    Expected Discharge Date: 5/8/2023    Pt discharged home.  ODME processed request for cane.  Pt's sister provided transportation home.  Client will participate in outpatient therapy.      Final Discharge Note (most recent)       Final Note - 05/09/23 0739          Final Note    Assessment Type Final Discharge Note     Anticipated Discharge Disposition Home or Self Care        Post-Acute Status    Post-Acute Authorization Other;HME     HME Status Set-up Complete/Auth obtained   Cane    Coverage BCBS     Other Status See Comments   Pt referrred for outpatient PT.    Discharge Delays None known at this time                     Important Message from Medicare             Lynda Robledo LMSW  PRN-  [facility]  Ext. [phone]

## 2023-05-10 ENCOUNTER — TELEPHONE (OUTPATIENT)
Dept: CARDIOLOGY | Facility: CLINIC | Age: 63
End: 2023-05-10
Payer: COMMERCIAL

## 2023-05-10 NOTE — TELEPHONE ENCOUNTER
Per [employer] rep, patient's insurance has terminated. Unable to provide lymphedema pump at this time.

## 2023-05-15 LAB — BACTERIA SPEC ANAEROBE CULT: NORMAL

## 2023-05-16 ENCOUNTER — OFFICE VISIT (OUTPATIENT)
Dept: CARDIOLOGY | Facility: CLINIC | Age: 63
End: 2023-05-16
Payer: COMMERCIAL

## 2023-05-16 ENCOUNTER — LAB VISIT (OUTPATIENT)
Dept: LAB | Facility: HOSPITAL | Age: 63
End: 2023-05-16
Attending: INTERNAL MEDICINE
Payer: COMMERCIAL

## 2023-05-16 VITALS
DIASTOLIC BLOOD PRESSURE: 71 MMHG | HEART RATE: 60 BPM | SYSTOLIC BLOOD PRESSURE: 131 MMHG | WEIGHT: 284.19 LBS | BODY MASS INDEX: 45.67 KG/M2 | HEIGHT: 66 IN

## 2023-05-16 DIAGNOSIS — I10 ESSENTIAL HYPERTENSION: Chronic | ICD-10-CM

## 2023-05-16 DIAGNOSIS — G47.33 OBSTRUCTIVE SLEEP APNEA: Chronic | ICD-10-CM

## 2023-05-16 DIAGNOSIS — Z86.79 S/P ABLATION OF ATRIAL FIBRILLATION: ICD-10-CM

## 2023-05-16 DIAGNOSIS — I89.0 LYMPHEDEMA OF BOTH LOWER EXTREMITIES: ICD-10-CM

## 2023-05-16 DIAGNOSIS — I87.2 VENOUS INSUFFICIENCY OF BOTH LOWER EXTREMITIES: ICD-10-CM

## 2023-05-16 DIAGNOSIS — Z98.890 S/P ABLATION OF ATRIAL FIBRILLATION: ICD-10-CM

## 2023-05-16 DIAGNOSIS — I20.89 STABLE ANGINA PECTORIS: ICD-10-CM

## 2023-05-16 DIAGNOSIS — I77.89 ENLARGED THORACIC AORTA: ICD-10-CM

## 2023-05-16 DIAGNOSIS — I48.0 PAROXYSMAL ATRIAL FIBRILLATION: ICD-10-CM

## 2023-05-16 DIAGNOSIS — E11.9 TYPE 2 DIABETES MELLITUS WITHOUT COMPLICATION, WITHOUT LONG-TERM CURRENT USE OF INSULIN: Chronic | ICD-10-CM

## 2023-05-16 DIAGNOSIS — Z79.01 CURRENT USE OF LONG TERM ANTICOAGULATION: Chronic | ICD-10-CM

## 2023-05-16 DIAGNOSIS — I89.0 LYMPHEDEMA OF BOTH LOWER EXTREMITIES: Primary | ICD-10-CM

## 2023-05-16 LAB
ALBUMIN SERPL BCP-MCNC: 3.8 G/DL (ref 3.5–5.2)
ALP SERPL-CCNC: 110 U/L (ref 55–135)
ALT SERPL W/O P-5'-P-CCNC: 24 U/L (ref 10–44)
ANION GAP SERPL CALC-SCNC: 11 MMOL/L (ref 8–16)
AST SERPL-CCNC: 21 U/L (ref 10–40)
BILIRUB SERPL-MCNC: 0.7 MG/DL (ref 0.1–1)
BUN SERPL-MCNC: 22 MG/DL (ref 8–23)
CALCIUM SERPL-MCNC: 9.3 MG/DL (ref 8.7–10.5)
CHLORIDE SERPL-SCNC: 103 MMOL/L (ref 95–110)
CO2 SERPL-SCNC: 24 MMOL/L (ref 23–29)
CREAT SERPL-MCNC: 1.2 MG/DL (ref 0.5–1.4)
EST. GFR  (NO RACE VARIABLE): >60 ML/MIN/1.73 M^2
GLUCOSE SERPL-MCNC: 130 MG/DL (ref 70–110)
POTASSIUM SERPL-SCNC: 4 MMOL/L (ref 3.5–5.1)
PROT SERPL-MCNC: 7 G/DL (ref 6–8.4)
SODIUM SERPL-SCNC: 138 MMOL/L (ref 136–145)

## 2023-05-16 PROCEDURE — 3078F PR MOST RECENT DIASTOLIC BLOOD PRESSURE < 80 MM HG: ICD-10-PCS | Mod: CPTII,S$GLB,, | Performed by: INTERNAL MEDICINE

## 2023-05-16 PROCEDURE — 3044F HG A1C LEVEL LT 7.0%: CPT | Mod: CPTII,S$GLB,, | Performed by: INTERNAL MEDICINE

## 2023-05-16 PROCEDURE — 80053 COMPREHEN METABOLIC PANEL: CPT | Performed by: INTERNAL MEDICINE

## 2023-05-16 PROCEDURE — 99999 PR PBB SHADOW E&M-EST. PATIENT-LVL V: ICD-10-PCS | Mod: PBBFAC,,, | Performed by: INTERNAL MEDICINE

## 2023-05-16 PROCEDURE — 99999 PR PBB SHADOW E&M-EST. PATIENT-LVL V: CPT | Mod: PBBFAC,,, | Performed by: INTERNAL MEDICINE

## 2023-05-16 PROCEDURE — 3075F SYST BP GE 130 - 139MM HG: CPT | Mod: CPTII,S$GLB,, | Performed by: INTERNAL MEDICINE

## 2023-05-16 PROCEDURE — 3075F PR MOST RECENT SYSTOLIC BLOOD PRESS GE 130-139MM HG: ICD-10-PCS | Mod: CPTII,S$GLB,, | Performed by: INTERNAL MEDICINE

## 2023-05-16 PROCEDURE — 99215 OFFICE O/P EST HI 40 MIN: CPT | Mod: S$GLB,,, | Performed by: INTERNAL MEDICINE

## 2023-05-16 PROCEDURE — 3078F DIAST BP <80 MM HG: CPT | Mod: CPTII,S$GLB,, | Performed by: INTERNAL MEDICINE

## 2023-05-16 PROCEDURE — 1159F MED LIST DOCD IN RCRD: CPT | Mod: CPTII,S$GLB,, | Performed by: INTERNAL MEDICINE

## 2023-05-16 PROCEDURE — 1159F PR MEDICATION LIST DOCUMENTED IN MEDICAL RECORD: ICD-10-PCS | Mod: CPTII,S$GLB,, | Performed by: INTERNAL MEDICINE

## 2023-05-16 PROCEDURE — 3008F PR BODY MASS INDEX (BMI) DOCUMENTED: ICD-10-PCS | Mod: CPTII,S$GLB,, | Performed by: INTERNAL MEDICINE

## 2023-05-16 PROCEDURE — 36415 COLL VENOUS BLD VENIPUNCTURE: CPT | Performed by: INTERNAL MEDICINE

## 2023-05-16 PROCEDURE — 3008F BODY MASS INDEX DOCD: CPT | Mod: CPTII,S$GLB,, | Performed by: INTERNAL MEDICINE

## 2023-05-16 PROCEDURE — 3044F PR MOST RECENT HEMOGLOBIN A1C LEVEL <7.0%: ICD-10-PCS | Mod: CPTII,S$GLB,, | Performed by: INTERNAL MEDICINE

## 2023-05-16 PROCEDURE — 99215 PR OFFICE/OUTPT VISIT, EST, LEVL V, 40-54 MIN: ICD-10-PCS | Mod: S$GLB,,, | Performed by: INTERNAL MEDICINE

## 2023-05-16 NOTE — PROGRESS NOTES
"  [facility]    CC: Lower Extremity Edema    Patient ID: Mr. Shiraz Jha is a 63 y.o. male with Afib s/p ablation (7/2014), BLE lymphedema, venous insufficiency, HTN, DM2, morbid obesity s/p gastric bypass (2010), QUINTIN (on CPAP), who presents for a follow up appointment.  Pertinent history/events as follows:    -Pt kindly referred by Twyla Bahena and Dr Scott for evaluation of lower extremity edema (per Dr. Scott's note on 9/24/2019:  "Mr. Jha is in clinic today for continued LE edema and discoloration of his legs.  He had a venous ultrasound on 8/6/19 for the swelling and mild reflux was noted in the left popliteal.  Patient denies chest pain with exertion or at rest, palpitations, SOB, DAI, dizziness, syncope, orthopnea, PND, or claudication.  Instructed to elevate legs when seated, low salt diet, increase walking and compression stockings. No venous reflux noted on US.  Refer to Dr. Ureña in interventional cardiology."    -At our initial clinic visit on 10/16/2019, Mr. Jha reported BLE edema for the past 1 year.  States LE edema is improved with graduated compression hose.  He has no LE pain, claudication symptoms, rest pain, or tissue loss.  No smoking history.  States he has gained over 50 pounds in the past year, despite gastric bypass surgery.  BLE Venous Reflux Study from 8/6/2019 showed no evidence of lower extremity DVT bilaterally.  There is mild left popliteal (deep venous) reflux.  Plan:   BLE Edema- BLE edema likely due to a component of venous insufficiency and morbid obesity.  Check cmp today.  If potassium and creatinine are appropriate, increase lasix to 40 mg bid for 7 days, then resume at 40 mg daily.  Pt to continue this cycle/regimen for lasix.  Pt will benefit from significant weight loss.  Continue graduated compression hose, leg elevation and low salt diet.  Limit fluid intake to 2 liters a day.  Check exercise SURINDER to evaluate for significant PAD.   Morbid Obesity- " Refer back to Bariatric Surgery.     -At follow up clinic visit on 11/27/2019, Mr. Jha reported no significant improvement in BLE edema with lasix regimen of 40 mg bid for 7 days, then resuming at 40 mg daily.  Exercise SURINDER from 11/15/2019 showed normal rest and exercise SURINDER bilaterally with normal PVR waveforms bilaterally.  Exam shows 1+ BLE pitting edema.  Plan:   BLE Edema- BLE edema likely due to a component of venous insufficiency and morbid obesity.  Check cmp today.  If potassium and creatinine are appropriate, discontinue lasix and start bumex 1 mg bid.  Pt will benefit from significant weight loss.  Continue graduated compression hose, leg elevation and low salt diet.  Limit fluid intake to 2 liters a day.  Check exercise SURINDER to evaluate for significant PAD.   Morbid Obesity- Pt referred back to Bariatric Surgery.     -At clinic visit on 1/8/2020, Mr. Jha reported significant improvement in BLE edema since starting bumex 1 mg daily.  Exercise SURINDER study from 11/25/2019 showed normal rest and exercise SURINDER bilaterally.  Normal PVR waveforms bilaterally.  Plan:   BLE Edema- BLE edema now improving.  Continue bumex 1 mg daily.  Check cmp today to monitor renal fxn.  Exercise SURINDER study from 11/25/2019 showed normal rest and exercise SURINDER bilaterally.  Normal PVR waveforms bilaterally. Continue graduated compression hose, leg elevation and low salt diet.  Limit fluid intake to 2 liters a day.    Morbid Obesity- Pt referred back to Bariatric Surgery.     - At clinic visit on 04/08/20, Mr. Jha reported doing well with no significant LE edema.  Continues to wear graduated compression hose, low salt diet, and limiting fluid intake to 2 liters a day.    Plan  BLE Edema- Currently doing well.  Continue bumex 1 mg daily.  Continue graduated compression hose, leg elevation and low salt diet.  Limit fluid intake to 2 liters a day.      - At clinic visit on 12/01/20, Mr. Jha reported worsening swelling of his bilateral  lower extremities. He stopped wearing compression hose a month ago, however, he continues to take bumex twice a day. He reports no rest pain, claudication, CLI or tissue atrophy.   Plan:  BLE Edema- Due to lymphedema and venous insufficiency. Patient notices worsening of bilateral lower extremity edema after he stopped using compression hose for the past month.  Pt instructed to resume graduated compression hose as prescribed.  Continue bumex 1 mg twice a day.  Continue leg elevation and low salt diet.  Limit fluid intake to 2 liters a day.      -At clinic visit on 1/7/2021, Mr. Jha reported improvement in bilateral lower extremity edema compared to previous visit on 12/01/20.  He reports wearing graduated compression hose.  He is taking Bumex 1 mg twice a day.  He is not following sodium restricted diet, and diet includes soups and gumbo.  He reports no claudication, rest pain, or tissue loss.      Plan:   BLE Edema- Due to lymphedema and venous insufficiency. Patient notices improvement of bilateral lower extremity edema. Continue graduated compression hose.  Continue bumex 1 mg twice a day.  Continue leg elevation when resting.  Encourage sodium restriction to 2000 mg/day and limit fluid intake to 2 liters a day.  Obesity- Pt is following up with Bariatric Surgery.  Encourage dietary modification, exercise and weight loss.      -At clinic visit on 2/9/2021, Mr. Jha reports mild improvement in BLE edema since clinic visit on 1/7/2021.  He has no claudication or tissue loss.   Plan:   BLE Edema- Due to lymphedema and venous insufficiency.   Edema is improving.  Check cmp today.  If creatinine and potassium are appropriate, increase bumex to 2 mg bid for 5 days, then decrease to 1 mg bid for 5 days.  Pt to continue this cycle/ergiemen of lasix.  Continue leg elevation when resting.  Limit sodium restriction to 2000 mg/day and limit fluid intake to 2 liters a day.  Obesity- Pt is following up with Bariatric  Surgery.  Encourage dietary modification, exercise and weight loss.      - At clinic visit on 03/25/21, Mr. Jha reported no new symptoms.  Exam shows BLE edema has improved significantly.  Plan:  BLE Edema- Due to lymphedema and venous insufficiency.   Edema has improved significantly.  Refer to lymphedema clinic.  Recheck cmp today.  If creatinine and potassium are appropriate, continue bumex 2 mg bid for 5 days, then decrease to 1 mg bid for 5 days.  Pt to continue this cycle/ergiemen of lasix.  Continue leg elevation when resting.  Limit sodium restriction to 2000 mg/day and limit fluid intake to 2 liters a day.    Obesity- Pt is following up with Bariatric Surgery.  Encourage dietary modification, exercise and weight loss.      -At clinic visit on 5/25/2021, Mr. Jha reported significant improvement in lower extremity edema since initiation of lymphedema clinic therapy.   He reports no claudication, rest pain or tissue loss.   Plan:    BLE Edema- Due to lymphedema and venous insufficiency.   Edema has improved significantly.  Continue lymphedema clinic.  Check CMP today.  If creatinine and potassium are appropriate, continue bumex 2 mg bid for 5 days, then decrease to 1 mg bid for 5 days.  Pt to continue this cycle/ergiemen of lasix.  Continue leg elevation when resting.  Limit sodium restriction to 2000 mg/day and limit fluid intake to 2 liters a day.    Obesity- Pt is following up with Bariatric Surgery.  Encourage dietary modification, exercise and weight loss.       -At clinic visit on 7/13/2021, Mr. Jha reported significant improvement in BLE edema following lymphedema clinic therapy.  He has no claudication or tissue loss.   Plan:   BLE Edema due to lymphedema and venous insufficiency- Now significantly improved following lymphedema clinic therapy.  Check cmp today.  If potassium and creatinine are appropriate, continue bumex 2 mg bid for 1 week, then reduce to 1 mg bid for 1 week.  Pt to continue this  regimen/cycle of bumex.    Obesity- Pt is following up with Bariatric Surgery.  Encourage dietary modification, exercise and weight loss.     -At clinic visit on 10/14/2021 clinic visit, Mr. Jha reporeds continued improvement in BLE edema.  He has no claudication or tissue loss.    Plan:  BLE Edema due to lymphedema and venous insufficiency- Remains significantly improved.  Continue bumex 2 mg bid for 1 week, then reduce to 1 mg bid for 1 week.  Pt to continue this regimen/cycle of bumex.    Obesity- Pt is following up with Bariatric Surgery.  Encourage dietary modification, exercise and weight loss.      -At clinic visit on 1/18/2022, Mr. Jha reported feeling well and swelling improved. He has no other complaints.   Plan:   BLE Edema due to lymphedema and venous insufficiency- Continues to improve. Continue bumex, compression stockings, elevate legs when resting, limit fluid intake to 1.5L daily, sodium intake to 2000mg daily.  Morbid Obesity- Pt is following up with Bariatric Surgery. Stable. Encourage dietary modification, exercise and weight loss.      -At clinic visit on 5/17/2022, Mr. Jha reported worsening leg swelling.  On 5/10/2022, he was admitted for GI bleeding.  During the hospitalization, bumex was held.  He was subsequently restarted on bumex after discharge.   on 5/11/2022.  Currently taking bumex 2 mg bid on Tues/Thur/Sat/Sun.  Taking 4 mg bid on Mon/Wed/Fri.     Plan:   BLE Edema due to lymphedema and venous insufficiency- Pt with worsening leg swelling since hospitalization on 5/10/2022.  Improving on current regimen.  Refer back to lymphedema clinic.  Continue bumex 2 mg bid on Tues/Thur/Sat/Sun and 4 mg bid on Mon/Wed/Fri.   Continue graduated compression hose; elevate legs when resting, limit fluid intake to 1.5L daily, sodium intake to 2000mg daily.  Check cmp today.    Morbid Obesity- Pt is following up with Bariatric Surgery. Stable. Encourage dietary modification, exercise and  weight loss.    -At clinic visit on 6/28/2022, Mr. Jha reported improvement in BLE edema.  He is on the waiting list for lymphedema clinic therapy.  Currently taking bumex 2 mg bid.  Plan:   BLE Edema due to lymphedema and venous insufficiency- Improving.  Pt is on the waiting list for lymphedema clinic therapy.  Continue bumex 2 mg bid.  Check cmp today to monitor renal function.  Continue graduated compression hose; elevate legs when resting, limit fluid intake to 1.5L daily, sodium intake to 2000mg daily.    Morbid Obesity s/p Gastric Bypass- Pt is following up with Bariatric Surgery. Stable. Encourage dietary modification, exercise and weight loss.    Afib s/p ablation (7/2014)- Stable.  Continue Xarelto 20 mg daily for anticoagulation.   HTN- Controlled.  Continue current medications.    QUINTIN- Continue CPAP nightly.     -At clinic visit on 12/27/2022, Mr. Jha reported BLE edema has improved and is stable.  Spironolactone 25 mg daily was added to his regimen by Dr. James on 10/11/202.  He has no chest pain or SOB.  Plans to have right ankle surgery on 2/3/2022.   Plan:   BLE Edema due to lymphedema and venous insufficiency- Improved and stable.  Refer back to lymphedema clinic.  Continue bumex 2 mg bid and spironolactone 25 mg daily.  Continue graduated compression hose; elevate legs when resting, limit fluid intake to 1.5L daily, sodium intake to 2000mg daily.    Morbid Obesity s/p Gastric Bypass- Encourage dietary modification, exercise and weight loss.    Afib s/p ablation (7/2014)- Stable.  Continue Xarelto 20 mg daily for anticoagulation.   HTN- Controlled.  Continue current medications.    QUINTIN- Continue CPAP nightly.     HPI:  Mr. Jha reports significant improvement in BLE edema.  He has completed another course of lymphedema clinic therapy.  He reports no claudication or tissue loss.     Past Medical History:   Diagnosis Date    Allergy     Anemia     Asthma     Atrial fibrillation     Cataract      Chronic rhinitis 9/26/2013    DDD (degenerative disc disease) 4/3/2015    Depression     Diabetes mellitus     Fibromyalgia     Gastroesophageal reflux disease without esophagitis 7/14/2017    Hypertension     Sinusitis, acute, maxillary 12/20/2012    Thyroid disease        Past Surgical History:   Procedure Laterality Date    ANTEGRADE SINGLE BALLOON ENTEROSCOPY N/A 5/26/2022    Procedure: ENTEROSCOPY, SINGLE BALLOON, ANTEGRADE;  Surgeon: Lyle Edmond MD;  Location: [facility] (2ND FLR);  Service: Endoscopy;  Laterality: N/A;  Will use single-balloon scope for both the upper endoscopy and the colonoscopy - recent incomplete colonoscopy due to looping.    Pt is fully vaccinated-DS  5/20/22-Approval to hold Xarelto rec'd from Dr. Webb-pending approval (see telephoned en    CERVICAL SPINE SURGERY      COLONOSCOPY N/A 5/10/2022    Procedure: COLONOSCOPY;  Surgeon: Conrad Diaz MD;  Location: [facility] ENDO (2ND FLR);  Service: Endoscopy;  Laterality: N/A;    COLONOSCOPY N/A 5/26/2022    Procedure: COLONOSCOPY;  Surgeon: Lyle Edmond MD;  Location: [facility] (2ND FLR);  Service: Endoscopy;  Laterality: N/A;    EPIDURAL STEROID INJECTION INTO LUMBAR SPINE N/A 5/29/2018    Procedure: INJECTION-STEROID-EPIDURAL-LUMBAR- L4-5;  Surgeon: Roman Lyman MD;  Location: [facility] PAIN MGT;  Service: Pain Management;  Laterality: N/A;  Patient is diabetic and Xarleto     EPIDURAL STEROID INJECTION INTO LUMBAR SPINE N/A 7/3/2018    Procedure: INJECTION, STEROID, SPINE, LUMBAR, EPIDURAL- L4-5;  Surgeon: Roman Lyman MD;  Location: [facility] PAIN MGT;  Service: Pain Management;  Laterality: N/A;  Patient takes Xarelto and ASA     ESOPHAGOGASTRODUODENOSCOPY N/A 5/9/2022    Procedure: EGD (ESOPHAGOGASTRODUODENOSCOPY);  Surgeon: Conrad Diaz MD;  Location: [facility] ENDO (2ND FLR);  Service: Endoscopy;  Laterality: N/A;    EXCISION OF LESION OF LIP N/A 7/7/2020    Procedure: EXCISION, LESION, LIP;  Surgeon: Caedn Navarro MD;   Location: [facility] OR [address] [place] [address];  Service: ENT;  Laterality: N/A;    GASTRIC BYPASS      SINUS SURGERY  2000    Dr. Watt at [facility]    TONSILLECTOMY         Social History     Socioeconomic History    Marital status: Single   Tobacco Use    Smoking status: Never    Smokeless tobacco: Never   Substance and Sexual Activity    Alcohol use: No     Comment: rare    Drug use: No    Sexual activity: Yes     Partners: Female   Social History Narrative    [place], lives alone w/2 dogs.    Dogs are his kids.    Works PT --  at [employer], on ssdi from neck and back/depression.     Social Determinants of Health     Financial Resource Strain: High Risk    Difficulty of Paying Living Expenses: Hard   Food Insecurity: No Food Insecurity    Worried About Running Out of Food in the Last Year: Never true    Ran Out of Food in the Last Year: Never true   Transportation Needs: No Transportation Needs    Lack of Transportation (Medical): No    Lack of Transportation (Non-Medical): No   Physical Activity: Inactive    Days of Exercise per Week: 0 days    Minutes of Exercise per Session: 0 min   Stress: Stress Concern Present    Feeling of Stress : Very much   Social Connections: Socially Isolated    Frequency of Communication with Friends and Family: Once a week    Frequency of Social Gatherings with Friends and Family: Once a week    Attends [employer] Services: Never    Active Member of Clubs or Organizations: No    Attends Club or Organization Meetings: Never    Marital Status: Never    Housing Stability: High Risk    Unable to Pay for Housing in the Last Year: Yes    Number of Places Lived in the Last Year: 1    Unstable Housing in the Last Year: Yes       Family History   Problem Relation Age of Onset    Heart disease Father     Cancer Mother         lung    Hypertension Sister     Heart disease Brother     Cirrhosis Brother     Diabetes Brother        Review of patient's allergies indicates:   Allergen Reactions    Iodinated  contrast media Other (See Comments)     Raises BP         Medication List with Changes/Refills   Current Medications    BUMETANIDE (BUMEX) 2 MG TABLET    Take 1 tablet (2 mg total) by mouth 2 (two) times daily.    BUPROPION (WELLBUTRIN XL) 300 MG 24 HR TABLET    Take 300 mg by mouth once daily. Pt states he only takes 300mg    CYANOCOBALAMIN 500 MCG TABLET    Take 500 mcg by mouth once daily.    DEXTROAMPHETAMINE-AMPHETAMINE (ADDERALL) 20 MG TABLET    Take 1 tablet by mouth every morning, take 1 tablet by mouth 3-4 hours later, and take 1/2 tablet every afternoon    FLUTICASONE PROPIONATE (FLONASE) 50 MCG/ACTUATION NASAL SPRAY    2 sprays (100 mcg total) by Each Nostril route once daily.    GABAPENTIN (NEURONTIN) 300 MG CAPSULE    Take 1 capsule (300 mg total) by mouth 3 (three) times daily.    IMPOYZ 0.025 % CREA    Apply topically as needed.     LEVOCETIRIZINE (XYZAL) 5 MG TABLET    TAKE 1 TABLET (5 MG TOTAL) BY MOUTH EVERY EVENING.    LUZU 1 % CREA    Apply topically as needed.     METOPROLOL TARTRATE (LOPRESSOR) 25 MG TABLET    Take 1 tablet (25 mg total) by mouth 2 (two) times daily.    MULTIVITAMIN (THERAGRAN) PER TABLET    Take 1 tablet by mouth once daily.     NITROGLYCERIN (NITROSTAT) 0.4 MG SL TABLET    Please dispense 25 pills in 4 bottles.    OMEPRAZOLE (PRILOSEC) 10 MG CAPSULE    Take 2 capsules (20 mg total) by mouth once daily.    POTASSIUM CHLORIDE SA (K-DUR,KLOR-CON M) 10 MEQ TABLET    Take 1 tablet (10 mEq total) by mouth 2 (two) times daily.    RIVAROXABAN (XARELTO) 20 MG TAB    Take 1 tablet (20 mg total) by mouth once daily.    SOTALOL (BETAPACE) 80 MG TABLET    Take 1 tablet (80 mg total) by mouth 2 (two) times daily.    SPIRONOLACTONE (ALDACTONE) 25 MG TABLET    Take 1 tablet (25 mg total) by mouth once daily.    TAMSULOSIN (FLOMAX) 0.4 MG CAP    Take 1 capsule (0.4 mg total) by mouth 2 (two) times daily.    THIAMINE 250 MG TABLET    Take 250 mg by mouth once daily.    VITAMIN D (VITAMIN D3)  "1000 UNITS TAB    Take 1,000 Units by mouth once daily.    VRAYLAR 3 MG CAP    Take 3 mg by mouth.   Discontinued Medications    NAFTIN 2 % GEL    daily as needed.        Review of Systems  Constitution: Denies chills, fever, and sweats.  HENT: Denies headaches or blurry vision.  Cardiovascular: Denies chest pain or irregular heart beat.  Respiratory: Denies cough or shortness of breath.  Gastrointestinal: Denies abdominal pain, nausea, or vomiting.  Musculoskeletal: Positive for BLE swelling  improved  Neurological: Denies dizziness or focal weakness.  Psychiatric/Behavioral: Normal mental status.  Hematologic/Lymphatic: Denies bleeding problem or easy bruising/bleeding.  Skin: Denies rash or suspicious lesions    Physical Examination  /71   Pulse 60   Ht 5' 6" (1.676 m)   Wt 128.9 kg (284 lb 2.8 oz)   BMI 45.87 kg/m²     Constitutional: No acute distress, conversant  HEENT: Sclera anicteric, Pupils equal, round and reactive to light, extraocular motions intact, Oropharynx clear  Neck: No JVD, no carotid bruits  Cardiovascular: regular rate and rhythm, no murmur, rubs or gallops, normal S1/S2  Pulmonary: Clear to auscultation bilaterally  Abdominal: Abdomen soft, nontender, nondistended, positive bowel sounds  Extremities: BLE's with minimal pitting edema   Pulses:  Carotid pulses are 2+ on the right side, and 2+ on the left side.  Radial pulses are 2+ on the right side, and 2+ on the left side.   Femoral pulses are 2+ on the right side, and 2+ on the left side.  Popliteal pulses are 2+ on the right side, and 2+ on the left side.   Dorsalis pedis pulses are 2+ on the right side, and 2+ on the left side.   Posterior tibial pulses are 2+ on the right side, and 2+ on the left side.    Skin: No ecchymosis, erythema, or ulcers  Psych: Alert and oriented x 3, appropriate affect  Neuro: CNII-XII intact, no focal deficits    Labs:  Most Recent Data  CBC:   Lab Results   Component Value Date    WBC 5.18 05/08/2023 "    HGB 9.4 (L) 05/08/2023    HCT 32.3 (L) 05/08/2023     05/08/2023    MCV 74 (L) 05/08/2023    RDW 18.0 (H) 05/08/2023     BMP:   Lab Results   Component Value Date     (L) 05/08/2023    K 4.3 05/08/2023     05/08/2023    CO2 25 05/08/2023    BUN 20 05/08/2023    CREATININE 0.9 05/08/2023     (H) 05/08/2023    CALCIUM 9.8 05/08/2023    MG 2.3 05/08/2023    PHOS 2.8 05/08/2023     LFTS;   Lab Results   Component Value Date    PROT 6.6 05/08/2023    ALBUMIN 3.0 (L) 05/08/2023    BILITOT 1.0 05/08/2023    AST 16 05/08/2023    ALKPHOS 162 (H) 05/08/2023    ALT 19 05/08/2023     COAGS:   Lab Results   Component Value Date    INR 1.1 05/06/2022     FLP:   Lab Results   Component Value Date    CHOL 186 02/15/2022    HDL 37 (L) 02/15/2022    LDLCALC 115.6 02/15/2022    TRIG 167 (H) 02/15/2022    CHOLHDL 19.9 (L) 02/15/2022     CARDIAC:   Lab Results   Component Value Date    TROPONINI <0.006 07/12/2022    BNP 20 07/16/2022       Echo 7/23/2019:  Normal left ventricular systolic function. The estimated ejection fraction is 60%  Normal right ventricular systolic function.  Normal LV diastolic function.  Moderate left atrial enlargement.  Mild right atrial enlargement.  The ascending aorta is mildly dilated (42mm in diameter, unchanged since Feb 2018).  The estimated PA systolic pressure is 23 mm Hg  Normal central venous pressure (3 mm Hg).     Exercise SURINDER 11/25/2019:  Normal rest and exercise SURINDER bilaterally.  Normal PVR waveforms bilaterally.    BLE Venous Reflux Study 8/6/2019:  No evidence of lower extremity DVT bilaterally.  Mild left popliteal (deep venous) reflux.    Assessment/Plan:  Mr. Shiraz Jha is a 63 y.o. male with Afib s/p ablation (7/2014), BLE lymphedema, venous insufficiency, HTN, DM2, morbid obesity s/p gastric bypass (2010), QUINTIN (on CPAP), who presents for a follow up appointment.      1. BLE Edema due to lymphedema and venous insufficiency- Now improved and stable following  another course of lymphedema clinic therapy.  Continue bumex 2 mg bid and spironolactone 25 mg daily.  Continue graduated compression hose; elevate legs when resting, limit fluid intake to 1.5L daily, sodium intake to 2000mg daily.  Check cmp today to monitor renal function.     2. Morbid Obesity s/p Gastric Bypass- Encourage dietary modification, exercise and weight loss.      3. Afib s/p ablation (7/2014)- Stable.  Continue Xarelto 20 mg daily for anticoagulation.     4. HTN- Controlled.  Continue current medications.      5. QUINTIN- Continue CPAP nightly.     Follow up in 4 months    Total duration of face to face visit time 30 minutes.  Total time spent counseling greater than fifty percent of total visit time.  Counseling included discussion regarding imaging findings, diagnosis, possibilities, treatment options, risks and benefits.  The patient had many questions regarding the options and long-term effects.    Randolph Ureña MD, PhD  Interventional Cardiology

## 2023-05-16 NOTE — PATIENT INSTRUCTIONS
Assessment/Plan:  Mr. Shiraz Jha is a 63 y.o. male with Afib s/p ablation (7/2014), BLE lymphedema, venous insufficiency, HTN, DM2, morbid obesity s/p gastric bypass (2010), QUINTIN (on CPAP), who presents for a follow up appointment.      1. BLE Edema due to lymphedema and venous insufficiency- Now improved and stable following another course of lymphedema clinic therapy.  Continue bumex 2 mg bid and spironolactone 25 mg daily.  Continue graduated compression hose; elevate legs when resting, limit fluid intake to 1.5L daily, sodium intake to 2000mg daily.  Check cmp today to monitor renal function.     2. Morbid Obesity s/p Gastric Bypass- Encourage dietary modification, exercise and weight loss.      3. Afib s/p ablation (7/2014)- Stable.  Continue Xarelto 20 mg daily for anticoagulation.     4. HTN- Controlled.  Continue current medications.      5. QUINTIN- Continue CPAP nightly.     Follow up in 4 months

## 2023-05-16 NOTE — PROGRESS NOTES
Mr. Jha is a patient of Dr. Webb and was last seen in clinic 11/22/2022.      Subjective:   Patient ID:  Shiraz Jha is a 63 y.o. male who presents for follow up of Atrial Fibrillation  .     HPI:    Mr. Jha is a 63 y.o. male with Htn, morbid obesity, atrial fibrillation (cryo PVI 2014), atrial flutter, Sleep Apnea, diet controlled DM here for follow up.     Background:    H/o recurrent atrial arrhythmias since 2008, previously managed at .   Presented to [facility] 11/12 with symptomatic atrial flutter and fibrillation. Had been on Dronedarone at that time. RFA of CTI 11/15/12. Sotalol and Xarelto then initiated.   Did well initially, but had increasing episodes. Subsequently had a Cryoablation PVI on 7/2014. He was last seen in 2/15, when he was still having on and off symptoms.   Has not had symptoms c/w atrial fibrillation. Notes dyspnea with walking up the stairs, which is not new.  We elected to continue Sotalol and observe.  Echo 7/23/19 normal biventricular structure and function.  Clinic visit 12/9/2019: QT 420ms.  1/13/2021: Doing well from a rhythm standpoint, maintaining sinus rhythm on sotalol. EKG with acceptable intervals. CHADSVASc 2 and he remains on xarelto for CVA prophylaxis.  Sees Dr. Ureña and Dr. Scott. He feels well.  11/22/2022:     11/22/2022: Doing well from a rhythm standpoint, with no AF symptoms on sotalol. EKG with stable intervals. Willem but asymptomatic. CHADSVASc 2 on xarelto with no bleeding.  Echo 5/2022 showed normal heart function.      Update (05/23/2023):    5/8/2023: Hospitalized for an acute gout flare.     Today he says he feels well. At baseline. Mild chronic DAI. No CP, palps, LH, syncope.    He is currently taking xarelto 20mg daily for stroke prophylaxis and denies significant bleeding episodes. He is currently being treated with sotalol 80mg BID for rhythm control and lopressor 25mg BID for HR control.  Kidney function is stable, with a creatinine of 0.9 on  5/8/2023.    I have personally reviewed the patient's EKG today, which shows sinus rhythm at 61bpm. NC interval is 174. QRS is 92. QTc is 420.    Relevant Cardiac Test Results:    2D Echo (5/17/2022):  The left ventricle is normal in size with normal systolic function. The estimated ejection fraction is 60%.  Normal right ventricular size with normal right ventricular systolic function.  Normal left ventricular diastolic function.  The estimated PA systolic pressure is 24 mmHg.  Normal central venous pressure (3 mmHg).    Current Outpatient Medications   Medication Sig    bumetanide (BUMEX) 2 MG tablet Take 1 tablet (2 mg total) by mouth 2 (two) times daily.    buPROPion (WELLBUTRIN XL) 300 MG 24 hr tablet Take 300 mg by mouth once daily. Pt states he only takes 300mg    cyanocobalamin 500 MCG tablet Take 500 mcg by mouth once daily.    dextroamphetamine-amphetamine (ADDERALL) 20 mg tablet Take 1 tablet by mouth every morning, take 1 tablet by mouth 3-4 hours later, and take 1/2 tablet every afternoon    fluticasone propionate (FLONASE) 50 mcg/actuation nasal spray 2 sprays (100 mcg total) by Each Nostril route once daily.    gabapentin (NEURONTIN) 300 MG capsule Take 1 capsule (300 mg total) by mouth 3 (three) times daily.    IMPOYZ 0.025 % Crea Apply topically as needed.     LUZU 1 % Crea Apply topically as needed.     multivitamin (THERAGRAN) per tablet Take 1 tablet by mouth once daily.     omeprazole (PRILOSEC) 10 MG capsule Take 2 capsules (20 mg total) by mouth once daily.    potassium chloride SA (K-DUR,KLOR-CON M) 10 MEQ tablet Take 1 tablet (10 mEq total) by mouth 2 (two) times daily.    spironolactone (ALDACTONE) 25 MG tablet Take 1 tablet (25 mg total) by mouth once daily.    tamsulosin (FLOMAX) 0.4 mg Cap Take 1 capsule (0.4 mg total) by mouth 2 (two) times daily.    thiamine 250 MG tablet Take 250 mg by mouth once daily.    vitamin D (VITAMIN D3) 1000 units Tab Take 1,000 Units by mouth once daily.     "VRAYLAR 3 mg Cap Take 3 mg by mouth.    levocetirizine (XYZAL) 5 MG tablet TAKE 1 TABLET (5 MG TOTAL) BY MOUTH EVERY EVENING. (Patient taking differently: Take 5 mg by mouth daily as needed for Allergies.)    metoprolol tartrate (LOPRESSOR) 25 MG tablet Take 1 tablet (25 mg total) by mouth 2 (two) times daily.    nitroGLYCERIN (NITROSTAT) 0.4 MG SL tablet Please dispense 25 pills in 4 bottles. (Patient not taking: Reported on 5/16/2023)    rivaroxaban (XARELTO) 20 mg Tab Take 1 tablet (20 mg total) by mouth once daily.    sotaloL (BETAPACE) 80 MG tablet Take 1 tablet (80 mg total) by mouth 2 (two) times daily.     No current facility-administered medications for this visit.       Review of Systems   Constitutional: Negative for malaise/fatigue.   Cardiovascular:  Negative for chest pain, dyspnea on exertion, irregular heartbeat, leg swelling and palpitations.   Respiratory:  Negative for shortness of breath.    Hematologic/Lymphatic: Negative for bleeding problem.   Skin:  Negative for rash.   Musculoskeletal:  Negative for myalgias.   Gastrointestinal:  Negative for hematemesis, hematochezia and nausea.   Genitourinary:  Negative for hematuria.   Neurological:  Negative for light-headedness.   Psychiatric/Behavioral:  Negative for altered mental status.    Allergic/Immunologic: Negative for persistent infections.     Objective:          BP (!) 143/65   Pulse 61   Ht 5' 6" (1.676 m)   Wt 130.9 kg (288 lb 9.3 oz)   BMI 46.58 kg/m²     Physical Exam  Vitals and nursing note reviewed.   Constitutional:       Appearance: Normal appearance. He is well-developed.   HENT:      Head: Normocephalic.      Nose: Nose normal.   Eyes:      Pupils: Pupils are equal, round, and reactive to light.   Cardiovascular:      Rate and Rhythm: Normal rate and regular rhythm.   Pulmonary:      Effort: No respiratory distress.      Breath sounds: Normal breath sounds.   Musculoskeletal:         General: Normal range of motion.   Skin:   "   General: Skin is warm and dry.      Findings: No erythema.   Neurological:      Mental Status: He is alert and oriented to person, place, and time.   Psychiatric:         Speech: Speech normal.         Behavior: Behavior normal.         Lab Results   Component Value Date     05/16/2023    K 4.0 05/16/2023    MG 2.3 05/08/2023    BUN 22 05/16/2023    CREATININE 1.2 05/16/2023    ALT 24 05/16/2023    AST 21 05/16/2023    HGB 9.4 (L) 05/08/2023    HCT 32.3 (L) 05/08/2023    TSH 0.738 05/08/2023    LDLCALC 115.6 02/15/2022       Recent Labs   Lab 05/06/22  1529   INR 1.1       Assessment:     1. Persistent atrial fibrillation    2. Typical atrial flutter    3. Current use of long term anticoagulation    4. Obstructive sleep apnea    5. S/P ablation of atrial fibrillation    6. Severe obesity (BMI >= 40)    7. Status post catheter ablation of atrial fibrillation    8. Status post catheter ablation of atrial flutter        Plan:     In summary, Mr. Jha is a 63 y.o. male with Htn, morbid obesity, atrial fibrillation (cryo PVI 2014), atrial flutter, Sleep Apnea, diet controlled DM here for follow up.   Doing well from a rhythm standpoint, maintaining sinus rhythm on sotalol. ECG with stable intervals. CHADSVASc 2 on xarelto. On CPAP.    Continue current meds  RTC 6 mo, sooner if needed    *A copy of this note has been sent to Dr. Webb*    Follow up in about 6 months (around 11/23/2023).    ------------------------------------------------------------------    FERNANDA Hansen, NP-C  Cardiac Electrophysiology

## 2023-05-23 ENCOUNTER — HOSPITAL ENCOUNTER (OUTPATIENT)
Dept: CARDIOLOGY | Facility: CLINIC | Age: 63
Discharge: HOME | End: 2023-05-23
Payer: COMMERCIAL

## 2023-05-23 ENCOUNTER — OFFICE VISIT (OUTPATIENT)
Dept: ELECTROPHYSIOLOGY | Facility: CLINIC | Age: 63
End: 2023-05-23
Payer: COMMERCIAL

## 2023-05-23 VITALS
DIASTOLIC BLOOD PRESSURE: 65 MMHG | BODY MASS INDEX: 46.38 KG/M2 | SYSTOLIC BLOOD PRESSURE: 143 MMHG | HEIGHT: 66 IN | HEART RATE: 61 BPM | WEIGHT: 288.56 LBS

## 2023-05-23 DIAGNOSIS — E66.01 SEVERE OBESITY (BMI >= 40): Chronic | ICD-10-CM

## 2023-05-23 DIAGNOSIS — G47.33 OBSTRUCTIVE SLEEP APNEA: Chronic | ICD-10-CM

## 2023-05-23 DIAGNOSIS — I49.8 OTHER SPECIFIED CARDIAC ARRHYTHMIAS: ICD-10-CM

## 2023-05-23 DIAGNOSIS — Z86.79 S/P ABLATION OF ATRIAL FIBRILLATION: ICD-10-CM

## 2023-05-23 DIAGNOSIS — I48.19 PERSISTENT ATRIAL FIBRILLATION: Primary | ICD-10-CM

## 2023-05-23 DIAGNOSIS — Z98.890 STATUS POST CATHETER ABLATION OF ATRIAL FLUTTER: ICD-10-CM

## 2023-05-23 DIAGNOSIS — Z79.01 CURRENT USE OF LONG TERM ANTICOAGULATION: Chronic | ICD-10-CM

## 2023-05-23 DIAGNOSIS — Z98.890 STATUS POST CATHETER ABLATION OF ATRIAL FIBRILLATION: ICD-10-CM

## 2023-05-23 DIAGNOSIS — Z98.890 S/P ABLATION OF ATRIAL FIBRILLATION: ICD-10-CM

## 2023-05-23 DIAGNOSIS — I48.3 TYPICAL ATRIAL FLUTTER: Chronic | ICD-10-CM

## 2023-05-23 PROCEDURE — 3044F PR MOST RECENT HEMOGLOBIN A1C LEVEL <7.0%: ICD-10-PCS | Mod: CPTII,S$GLB,, | Performed by: NURSE PRACTITIONER

## 2023-05-23 PROCEDURE — 1160F RVW MEDS BY RX/DR IN RCRD: CPT | Mod: CPTII,S$GLB,, | Performed by: NURSE PRACTITIONER

## 2023-05-23 PROCEDURE — 3008F BODY MASS INDEX DOCD: CPT | Mod: CPTII,S$GLB,, | Performed by: NURSE PRACTITIONER

## 2023-05-23 PROCEDURE — 93005 RHYTHM STRIP: ICD-10-PCS | Mod: S$GLB,,, | Performed by: INTERNAL MEDICINE

## 2023-05-23 PROCEDURE — 93005 ELECTROCARDIOGRAM TRACING: CPT | Mod: S$GLB,,, | Performed by: INTERNAL MEDICINE

## 2023-05-23 PROCEDURE — 3077F PR MOST RECENT SYSTOLIC BLOOD PRESSURE >= 140 MM HG: ICD-10-PCS | Mod: CPTII,S$GLB,, | Performed by: NURSE PRACTITIONER

## 2023-05-23 PROCEDURE — 3078F PR MOST RECENT DIASTOLIC BLOOD PRESSURE < 80 MM HG: ICD-10-PCS | Mod: CPTII,S$GLB,, | Performed by: NURSE PRACTITIONER

## 2023-05-23 PROCEDURE — 99214 OFFICE O/P EST MOD 30 MIN: CPT | Mod: S$GLB,,, | Performed by: NURSE PRACTITIONER

## 2023-05-23 PROCEDURE — 3008F PR BODY MASS INDEX (BMI) DOCUMENTED: ICD-10-PCS | Mod: CPTII,S$GLB,, | Performed by: NURSE PRACTITIONER

## 2023-05-23 PROCEDURE — 93010 ELECTROCARDIOGRAM REPORT: CPT | Mod: S$GLB,,, | Performed by: INTERNAL MEDICINE

## 2023-05-23 PROCEDURE — 99214 PR OFFICE/OUTPT VISIT, EST, LEVL IV, 30-39 MIN: ICD-10-PCS | Mod: S$GLB,,, | Performed by: NURSE PRACTITIONER

## 2023-05-23 PROCEDURE — 99999 PR PBB SHADOW E&M-EST. PATIENT-LVL IV: CPT | Mod: PBBFAC,,, | Performed by: NURSE PRACTITIONER

## 2023-05-23 PROCEDURE — 1159F MED LIST DOCD IN RCRD: CPT | Mod: CPTII,S$GLB,, | Performed by: NURSE PRACTITIONER

## 2023-05-23 PROCEDURE — 99999 PR PBB SHADOW E&M-EST. PATIENT-LVL IV: ICD-10-PCS | Mod: PBBFAC,,, | Performed by: NURSE PRACTITIONER

## 2023-05-23 PROCEDURE — 3077F SYST BP >= 140 MM HG: CPT | Mod: CPTII,S$GLB,, | Performed by: NURSE PRACTITIONER

## 2023-05-23 PROCEDURE — 1160F PR REVIEW ALL MEDS BY PRESCRIBER/CLIN PHARMACIST DOCUMENTED: ICD-10-PCS | Mod: CPTII,S$GLB,, | Performed by: NURSE PRACTITIONER

## 2023-05-23 PROCEDURE — 93010 RHYTHM STRIP: ICD-10-PCS | Mod: S$GLB,,, | Performed by: INTERNAL MEDICINE

## 2023-05-23 PROCEDURE — 1159F PR MEDICATION LIST DOCUMENTED IN MEDICAL RECORD: ICD-10-PCS | Mod: CPTII,S$GLB,, | Performed by: NURSE PRACTITIONER

## 2023-05-23 PROCEDURE — 3044F HG A1C LEVEL LT 7.0%: CPT | Mod: CPTII,S$GLB,, | Performed by: NURSE PRACTITIONER

## 2023-05-23 PROCEDURE — 3078F DIAST BP <80 MM HG: CPT | Mod: CPTII,S$GLB,, | Performed by: NURSE PRACTITIONER

## 2023-05-23 RX ORDER — METOPROLOL TARTRATE 25 MG/1
25 TABLET, FILM COATED ORAL 2 TIMES DAILY
Qty: 180 TABLET | Refills: 3 | Status: SHIPPED | OUTPATIENT
Start: 2023-05-23

## 2023-05-23 RX ORDER — SOTALOL HYDROCHLORIDE 80 MG/1
80 TABLET ORAL 2 TIMES DAILY
Qty: 180 TABLET | Refills: 3 | Status: SHIPPED | OUTPATIENT
Start: 2023-05-23

## 2023-06-26 ENCOUNTER — DOCUMENTATION ONLY (OUTPATIENT)
Dept: REHABILITATION | Facility: HOSPITAL | Age: 63
End: 2023-06-26
Payer: COMMERCIAL

## 2023-06-26 DIAGNOSIS — I87.2 VENOUS INSUFFICIENCY OF BOTH LOWER EXTREMITIES: ICD-10-CM

## 2023-06-26 DIAGNOSIS — I89.0 LYMPHEDEMA OF BOTH LOWER EXTREMITIES: Primary | ICD-10-CM

## 2023-06-26 DIAGNOSIS — Z98.84 STATUS POST GASTRIC BYPASS FOR OBESITY: ICD-10-CM

## 2023-06-26 DIAGNOSIS — E11.9 TYPE 2 DIABETES MELLITUS WITHOUT COMPLICATION, WITHOUT LONG-TERM CURRENT USE OF INSULIN: Chronic | ICD-10-CM

## 2023-06-26 DIAGNOSIS — R60.0 EDEMA OF BOTH LOWER EXTREMITIES: ICD-10-CM

## 2023-06-26 NOTE — PROGRESS NOTES
[facility]  Discharge Note    Name: Shiraz RUBI [facility] Number: [id number]    Therapy Diagnosis:   Encounter Diagnoses   Name Primary?    Lymphedema of both lower extremities Yes    Type 2 diabetes mellitus without complication, without long-term current use of insulin     Status post gastric bypass for obesity     Venous insufficiency of both lower extremities     Edema of both lower extremities      Physician: Randolph Ureña MD*     Physician Orders: PT Eval and Treat - lymphedema  Medical Diagnosis from Referral:   Diagnosis   I89.0 (ICD-10-CM) - Lymphedema of both lower extremities   Evaluation Date: 2/6/2023  Authorization Period Expiration: 12/31/23  Plan of Care Expiration: 4/3/23  Visit # / Visits authorized: 6/20     Precautions: Standard and obesity, Afib, CVI, Lymphedema    Date of Last visit: Visit Date: 3/8/2023  Total Visits Received: 7    ASSESSMENT      Per last visit seen:  Pt is progressing well and noted reductions to his RLE since initiating therapy. Pt also demonstrates good compliance with use of compression and is in process of approval for a home compression pump. Pt may also benefit from inelastic velcro wraps and orders were requested for this .     Orders for Velcro Inelastic Wraps are noted 3/31/23    3/13/23 primary therapist went out on personal leave    Scheduled 3/17/23 and patient cancelled  No contact to resume or reschedule    Process in place for home pump and recommended compression products     Discharge reason: Patient has not attended therapy since 3/8/23, Patient has reached the maximum rehab potential for the present time, and Other:  lost to follow up    Discharge FOTO Score: na    Goals: Short Term Goals: (6 weeks)  1. Patient will show decreased girth in B LE by up to 1 cm to allow for LE symmetry, shoe and clothing choice, and ability to apply needed compression. met  2. Patient will demonstrate 100% knowledge of lymphedema precautions  and signs of infection to allow for reduced lymphedema risk, infection risk, and/or exacerbation of condition.   met)  3. Patient or caregiver will perform self-bandaging techniques and/or wearing of compression garments to allow for lymphatic drainage support, skin elasticity, and reduction in shape and size of limb.  met)  4. Patient will perform self lymph drainage techniques to areas within reach to enhance lymphatic drainage and skin condition.  (progressing  5. Patient will tolerate daily activities with multilayered bandaging to allow for lymphatic and venous support.  met)     Long Term Goals: (12  weeks)  1. Patient will show decreased girth in R LE by up to 2 cm  to allow for LE symmetry, shoe and clothing choice, and ability to apply needed compression daily.  (progressing,  2. Patient will show reduction in density to mild or less with improved contour of limb to allow for cosmesis, LE symmetry, infection risk reduction, and clothing and compression choice.   (progressing,  3. Patient to domo/doff compression garment with daily compliance to assist in lymphedema management, skin elasticity, and tissue density.  met)  4. Pt to show improved postural awareness and alignment.  (progressing,)  5. Pt to be I and compliant with HEP to allow for increased function in affected limb.   (progressing    PLAN   This patient is discharged from Physical Therapy      Hermelinda Rueda, PT

## 2023-07-16 NOTE — TELEPHONE ENCOUNTER
Will need ov if needs clearance.  Should clarify issue of holding the xarelto with Dr Webb.       #Enteritis  findings present on CT, unable to rule out partial SBO  surgery consulted, recommends medical management with NGT to suction  IVF fluids while NPO  Hypokalemia: Repleteed.   Hypomagnesemia: Repleted  Check K, Mg, Phos   stool studies cx, O&P as per surgery  GI consulted  s/p zosyn in ED  f/u BC  GI PCR if + BM   ID  consulted due to bandemia and recommendations for further antibiotics     #Insulin-dependent diabetes  insulin sliding scale with glucose checks q6h while NPO  will plan to advance to diabetic diet when ready by surgery    #current smoker  declines nicotine patch    Diet: NPO  Fluids: LR  GI ppx: pantoprazole  DVT ppx: Ambulate

## 2023-08-21 DIAGNOSIS — N40.0 BENIGN NON-NODULAR PROSTATIC HYPERPLASIA WITHOUT LOWER URINARY TRACT SYMPTOMS: ICD-10-CM

## 2023-08-21 RX ORDER — TAMSULOSIN HYDROCHLORIDE 0.4 MG/1
0.4 CAPSULE ORAL DAILY
Qty: 30 CAPSULE | Refills: 11 | Status: SHIPPED | OUTPATIENT
Start: 2023-08-21 | End: 2023-08-25 | Stop reason: SDUPTHER

## 2023-08-25 DIAGNOSIS — N40.0 BENIGN NON-NODULAR PROSTATIC HYPERPLASIA WITHOUT LOWER URINARY TRACT SYMPTOMS: ICD-10-CM

## 2023-08-25 RX ORDER — TAMSULOSIN HYDROCHLORIDE 0.4 MG/1
0.4 CAPSULE ORAL DAILY
Qty: 30 CAPSULE | Refills: 11 | Status: SHIPPED | OUTPATIENT
Start: 2023-08-25 | End: 2024-08-24

## 2023-09-14 DIAGNOSIS — E11.9 TYPE 2 DIABETES MELLITUS WITHOUT COMPLICATION: ICD-10-CM

## 2023-10-16 RX ORDER — BUMETANIDE 2 MG/1
2 TABLET ORAL 2 TIMES DAILY
Qty: 180 TABLET | Refills: 3 | Status: SHIPPED | OUTPATIENT
Start: 2023-10-16 | End: 2024-10-15

## 2023-11-18 ENCOUNTER — HOSPITAL ENCOUNTER (EMERGENCY)
Facility: HOSPITAL | Age: 63
Discharge: HOME | End: 2023-11-18
Attending: STUDENT IN AN ORGANIZED HEALTH CARE EDUCATION/TRAINING PROGRAM
Payer: COMMERCIAL

## 2023-11-18 VITALS
HEART RATE: 83 BPM | RESPIRATION RATE: 18 BRPM | DIASTOLIC BLOOD PRESSURE: 74 MMHG | TEMPERATURE: 99 F | HEIGHT: 66 IN | SYSTOLIC BLOOD PRESSURE: 151 MMHG | BODY MASS INDEX: 46.28 KG/M2 | OXYGEN SATURATION: 99 % | WEIGHT: 288 LBS

## 2023-11-18 DIAGNOSIS — I89.0 LYMPHEDEMA: Primary | ICD-10-CM

## 2023-11-18 DIAGNOSIS — M10.9 ACUTE GOUT OF RIGHT ANKLE, UNSPECIFIED CAUSE: ICD-10-CM

## 2023-11-18 DIAGNOSIS — R52 PAIN: ICD-10-CM

## 2023-11-18 LAB
ALBUMIN SERPL BCP-MCNC: 3.6 G/DL (ref 3.5–5.2)
ALP SERPL-CCNC: 115 U/L (ref 55–135)
ALT SERPL W/O P-5'-P-CCNC: 10 U/L (ref 10–44)
ANION GAP SERPL CALC-SCNC: 11 MMOL/L (ref 8–16)
AST SERPL-CCNC: 19 U/L (ref 10–40)
BASOPHILS # BLD AUTO: 0.03 K/UL (ref 0–0.2)
BASOPHILS NFR BLD: 0.2 % (ref 0–1.9)
BILIRUB SERPL-MCNC: 1.3 MG/DL (ref 0.1–1)
BNP SERPL-MCNC: 32 PG/ML (ref 0–99)
BUN SERPL-MCNC: 11 MG/DL (ref 8–23)
CALCIUM SERPL-MCNC: 9.2 MG/DL (ref 8.7–10.5)
CHLORIDE SERPL-SCNC: 103 MMOL/L (ref 95–110)
CO2 SERPL-SCNC: 23 MMOL/L (ref 23–29)
CREAT SERPL-MCNC: 1 MG/DL (ref 0.5–1.4)
CRP SERPL-MCNC: 69.8 MG/L (ref 0–8.2)
DIFFERENTIAL METHOD: ABNORMAL
EOSINOPHIL # BLD AUTO: 0 K/UL (ref 0–0.5)
EOSINOPHIL NFR BLD: 0.1 % (ref 0–8)
ERYTHROCYTE [DISTWIDTH] IN BLOOD BY AUTOMATED COUNT: 16.6 % (ref 11.5–14.5)
ERYTHROCYTE [SEDIMENTATION RATE] IN BLOOD BY PHOTOMETRIC METHOD: 70 MM/HR (ref 0–23)
EST. GFR  (NO RACE VARIABLE): >60 ML/MIN/1.73 M^2
GLUCOSE SERPL-MCNC: 146 MG/DL (ref 70–110)
HCT VFR BLD AUTO: 38 % (ref 40–54)
HCV AB SERPL QL IA: NORMAL
HGB BLD-MCNC: 11.3 G/DL (ref 14–18)
HIV 1+2 AB+HIV1 P24 AG SERPL QL IA: NORMAL
IMM GRANULOCYTES # BLD AUTO: 0.05 K/UL (ref 0–0.04)
IMM GRANULOCYTES NFR BLD AUTO: 0.4 % (ref 0–0.5)
LYMPHOCYTES # BLD AUTO: 1 K/UL (ref 1–4.8)
LYMPHOCYTES NFR BLD: 8.5 % (ref 18–48)
MCH RBC QN AUTO: 22.6 PG (ref 27–31)
MCHC RBC AUTO-ENTMCNC: 29.7 G/DL (ref 32–36)
MCV RBC AUTO: 76 FL (ref 82–98)
MONOCYTES # BLD AUTO: 0.9 K/UL (ref 0.3–1)
MONOCYTES NFR BLD: 7.6 % (ref 4–15)
NEUTROPHILS # BLD AUTO: 10 K/UL (ref 1.8–7.7)
NEUTROPHILS NFR BLD: 83.2 % (ref 38–73)
NRBC BLD-RTO: 0 /100 WBC
PLATELET # BLD AUTO: 289 K/UL (ref 150–450)
PMV BLD AUTO: 11.2 FL (ref 9.2–12.9)
POTASSIUM SERPL-SCNC: 4.2 MMOL/L (ref 3.5–5.1)
PROT SERPL-MCNC: 7 G/DL (ref 6–8.4)
RBC # BLD AUTO: 5.01 M/UL (ref 4.6–6.2)
SODIUM SERPL-SCNC: 137 MMOL/L (ref 136–145)
URATE SERPL-MCNC: 7.3 MG/DL (ref 3.4–7)
WBC # BLD AUTO: 12.05 K/UL (ref 3.9–12.7)

## 2023-11-18 PROCEDURE — 86803 HEPATITIS C AB TEST: CPT | Performed by: PHYSICIAN ASSISTANT

## 2023-11-18 PROCEDURE — 63600175 PHARM REV CODE 636 W HCPCS

## 2023-11-18 PROCEDURE — 85652 RBC SED RATE AUTOMATED: CPT

## 2023-11-18 PROCEDURE — 80053 COMPREHEN METABOLIC PANEL: CPT

## 2023-11-18 PROCEDURE — 86140 C-REACTIVE PROTEIN: CPT

## 2023-11-18 PROCEDURE — 83880 ASSAY OF NATRIURETIC PEPTIDE: CPT

## 2023-11-18 PROCEDURE — 84550 ASSAY OF BLOOD/URIC ACID: CPT

## 2023-11-18 PROCEDURE — 99284 EMERGENCY DEPT VISIT MOD MDM: CPT

## 2023-11-18 PROCEDURE — 85025 COMPLETE CBC W/AUTO DIFF WBC: CPT

## 2023-11-18 PROCEDURE — 87389 HIV-1 AG W/HIV-1&-2 AB AG IA: CPT | Performed by: PHYSICIAN ASSISTANT

## 2023-11-18 RX ORDER — PREDNISONE 20 MG/1
20 TABLET ORAL DAILY
Qty: 5 TABLET | Refills: 0 | Status: SHIPPED | OUTPATIENT
Start: 2023-11-18 | End: 2023-11-25

## 2023-11-18 RX ORDER — PREDNISONE 20 MG/1
20 TABLET ORAL
Status: COMPLETED | OUTPATIENT
Start: 2023-11-18 | End: 2023-11-18

## 2023-11-18 RX ORDER — TRAZODONE HYDROCHLORIDE 50 MG/1
50 TABLET ORAL NIGHTLY
COMMUNITY
Start: 2023-09-20

## 2023-11-18 RX ORDER — PREDNISONE 20 MG/1
20 TABLET ORAL DAILY
Qty: 7 TABLET | Refills: 0 | Status: SHIPPED | OUTPATIENT
Start: 2023-11-18 | End: 2023-11-18 | Stop reason: SDUPTHER

## 2023-11-18 RX ADMIN — PREDNISONE 20 MG: 20 TABLET ORAL at 04:11

## 2023-11-18 NOTE — ED PROVIDER NOTES
"Encounter Date: 11/18/2023       History     Chief Complaint   Patient presents with    Leg Pain     Pt c/o bilateral leg pain.  Denies injury.  Pt states "last time I was here it was the gout"  Pain worse in the ankles     63-year-old male medical history of AFib, degenerative joint disease depression diabetes mellitus GERD hypertension lymphedema and history of gout presents emergency department due to bilateral lower extremity edema with discomfort.  Patient reports pain primarily located in his ankles bilaterally.  He states sometimes he can walk independently but he is currently using his cane.  Patient is prescribed Bumex for his lymphedema instructed to take b.i.d., however patient reports due to medication caused he was only taking it once a day.  He does maintain full range of motion.  No falls or trauma reported.  No fevers or chills.  No orthopnea or shortness a breath.      Review of patient's allergies indicates:   Allergen Reactions    Iodinated contrast media Other (See Comments)     Raises BP       Past Medical History:   Diagnosis Date    Allergy     Anemia     Asthma     Atrial fibrillation     Cataract     Chronic rhinitis 9/26/2013    DDD (degenerative disc disease) 4/3/2015    Depression     Diabetes mellitus     Fibromyalgia     Gastroesophageal reflux disease without esophagitis 7/14/2017    Hypertension     Sinusitis, acute, maxillary 12/20/2012    Thyroid disease      Past Surgical History:   Procedure Laterality Date    ANTEGRADE SINGLE BALLOON ENTEROSCOPY N/A 5/26/2022    Procedure: ENTEROSCOPY, SINGLE BALLOON, ANTEGRADE;  Surgeon: Lyle Edmond MD;  Location: [address]);  Service: Endoscopy;  Laterality: N/A;  Will use single-balloon scope for both the upper endoscopy and the colonoscopy - recent incomplete colonoscopy due to looping.    Pt is fully vaccinated-DS  5/20/22-Approval to hold Xarelto rec'd from Dr. Webb-pending approval (see telephoned en    CERVICAL SPINE " SURGERY      COLONOSCOPY N/A 5/10/2022    Procedure: COLONOSCOPY;  Surgeon: Conrad Diaz MD;  Location: [facility] ENDO (2ND FLR);  Service: Endoscopy;  Laterality: N/A;    COLONOSCOPY N/A 5/26/2022    Procedure: COLONOSCOPY;  Surgeon: Lyle Edmond MD;  Location: [facility] (2ND FLR);  Service: Endoscopy;  Laterality: N/A;    EPIDURAL STEROID INJECTION INTO LUMBAR SPINE N/A 5/29/2018    Procedure: INJECTION-STEROID-EPIDURAL-LUMBAR- L4-5;  Surgeon: Roman Lyman MD;  Location: [facility] PAIN MGT;  Service: Pain Management;  Laterality: N/A;  Patient is diabetic and Xarleto     EPIDURAL STEROID INJECTION INTO LUMBAR SPINE N/A 7/3/2018    Procedure: INJECTION, STEROID, SPINE, LUMBAR, EPIDURAL- L4-5;  Surgeon: Roman Lyman MD;  Location: [facility] PAIN MGT;  Service: Pain Management;  Laterality: N/A;  Patient takes Xarelto and ASA     ESOPHAGOGASTRODUODENOSCOPY N/A 5/9/2022    Procedure: EGD (ESOPHAGOGASTRODUODENOSCOPY);  Surgeon: Conrad Diaz MD;  Location: [facility] END[facility] (2ND FLR);  Service: Endoscopy;  Laterality: N/A;    EXCISION OF LESION OF LIP N/A 7/7/2020    Procedure: EXCISION, LESION, LIP;  Surgeon: Caden Navarro MD;  Location: [facility] OR 2ND FLR;  Service: ENT;  Laterality: N/A;    GASTRIC BYPASS      SINUS SURGERY  2000    Dr. Watt at [facility]    TONSILLECTOMY       Family History   Problem Relation Age of Onset    Heart disease Father     Cancer Mother         lung    Hypertension Sister     Heart disease Brother     Cirrhosis Brother     Diabetes Brother      Social History     Tobacco Use    Smoking status: Never    Smokeless tobacco: Never   Substance Use Topics    Alcohol use: No     Comment: rare    Drug use: No     Review of Systems  See HPI  Physical Exam     Initial Vitals [11/18/23 1159]   BP Pulse Resp Temp SpO2   (!) 149/65 79 20 97.9 °F (36.6 °C) 96 %      MAP       --         Physical Exam    Vitals reviewed.  Constitutional: He appears well-developed and well-nourished.   Obese male   HENT:   Head:  Normocephalic and atraumatic.   Eyes: Conjunctivae and EOM are normal.   Neck:   Normal range of motion.  Cardiovascular:  Normal rate.           Pulmonary/Chest: No respiratory distress.   Abdominal: He exhibits no distension.   Musculoskeletal:         General: Tenderness (Right lateral malleolus) and edema present.      Cervical back: Normal range of motion.      Comments: Patient has bilateral lower extremity edema primarily distally 1+ pitting certainly nonpitting, along with pedal edema.  Patient maintains flexion-extension of knee.  No overlying large joint effusion warmth or erythema  Ankles with plantar and dorsiflexion bilaterally no large joint effusion erythema or warmth.     Neurological: He is alert and oriented to person, place, and time.   Skin: Skin is warm and dry. No rash noted. No erythema.   Psychiatric: He has a normal mood and affect. Thought content normal.         ED Course   Procedures  Labs Reviewed   CBC W/ AUTO DIFFERENTIAL - Abnormal; Notable for the following components:       Result Value    Hemoglobin 11.3 (*)     Hematocrit 38.0 (*)     MCV 76 (*)     MCH 22.6 (*)     MCHC 29.7 (*)     RDW 16.6 (*)     Gran # (ANC) 10.0 (*)     Immature Grans (Abs) 0.05 (*)     Gran % 83.2 (*)     Lymph % 8.5 (*)     All other components within normal limits   COMPREHENSIVE METABOLIC PANEL - Abnormal; Notable for the following components:    Glucose 146 (*)     Total Bilirubin 1.3 (*)     All other components within normal limits   URIC ACID - Abnormal; Notable for the following components:    Uric Acid 7.3 (*)     All other components within normal limits   C-REACTIVE PROTEIN - Abnormal; Notable for the following components:    CRP 69.8 (*)     All other components within normal limits   SEDIMENTATION RATE - Abnormal; Notable for the following components:    Sed Rate 70 (*)     All other components within normal limits   HIV 1 / 2 ANTIBODY    Narrative:     Release to patient->Immediate   HEPATITIS  C ANTIBODY    Narrative:     Release to patient->Immediate   B-TYPE NATRIURETIC PEPTIDE          Imaging Results              X-Ray Ankle Complete Right (Final result)  Result time 11/18/23 15:58:14      Final result by Caden France MD (11/18/23 15:58:14)                   Impression:      Distal right lower leg, ankle and hindfoot diffuse nonspecific soft tissue swelling from edema or cellulitis, without acute displaced fracture-dislocation identified.      Electronically signed by: Caden France MD  Date:    11/18/2023  Time:    15:58               Narrative:    EXAMINATION:  XR ANKLE COMPLETE 3 VIEW RIGHT    CLINICAL HISTORY:  Pain, unspecified    TECHNIQUE:  AP, lateral, and oblique images of the right ankle were performed.    COMPARISON:  None    FINDINGS:  There is diffuse nonspecific soft tissue thickening with subcutaneous stranding about the imaged right lower leg, ankle and hindfoot, particularly along the ventral aspect.  No subcutaneous emphysema or radiodense retained foreign body.  Few scattered nonspecific linear soft tissue calcifications noted.    Bones appear well mineralized for age.  Overall alignment is within normal limits.  Ankle mortise appears intact.  No displaced fracture, dislocation or destructive osseous process.  Minimal DJD.  Small plantar calcaneal spur noted.                                       Medications   predniSONE tablet 20 mg (has no administration in time range)     Medical Decision Making  63-year-old male presents emergency department with bilateral lower extremity edema pain also located in his ankle primarily right.  Differential diagnosis includes new onset heart failure, chronic lymphedema, low suspicion for gout.  No concern septic arthritis as patient has range of motion is intact    No signs of new onset heart failure, edema secondary to lymphedema possibly due to decreasing his dose of Bumex.  Recommend he discuss with PCP for alternative medication management  due to cost.  We will also provide patient was short course of steroids for suspected gout flare.  At this time no concern of septic arthritis.  Discussed return precautions with patient.  Discharged home.  Pt discussed with supervising physician      Amount and/or Complexity of Data Reviewed  Labs: ordered.  Radiology: ordered.    Risk  Prescription drug management.                                   Clinical Impression:  Final diagnoses:  [R52] Pain  [I89.0] Lymphedema (Primary)  [M10.9] Acute gout of right ankle, unspecified cause          ED Disposition Condition    Discharge Stable          ED Prescriptions       Medication Sig Dispense Start Date End Date Auth. Provider         Yumiko Flowers PA-C    predniSONE (DELTASONE) 20 MG tablet Take 1 tablet (20 mg total) by mouth once daily. for 7 days 5 tablet 11/18/2023 11/25/2023 Yumiko Flowers PA-C          Follow-up Information       Follow up With Specialties Details Why Contact Info    GEORGES Theodore MD Internal Medicine   [address] [facility] [address]  [phone]               Yumiko Flowers PA-C  11/18/23 7014

## 2023-11-18 NOTE — DISCHARGE INSTRUCTIONS
No new onset heart failure  Swelling in your lower legs is likely due to her lymphedema and decreasing her dose of Bumex.  I recommend taking as directed if difficulty in obtaining medication follow up with your PCP to determine alternative medication.  I have prescribed a short course of prednisone for gout flare of the ankle.  While at home elevate legs.  Follow up with PCP as needed

## 2023-11-29 DIAGNOSIS — E11.9 TYPE 2 DIABETES MELLITUS WITHOUT COMPLICATION: ICD-10-CM

## 2024-01-09 RX ORDER — SPIRONOLACTONE 25 MG/1
25 TABLET ORAL
Qty: 90 TABLET | Refills: 0 | Status: SHIPPED | OUTPATIENT
Start: 2024-01-09

## 2024-02-20 ENCOUNTER — OFFICE VISIT (OUTPATIENT)
Dept: CARDIOLOGY | Facility: CLINIC | Age: 64
End: 2024-02-20

## 2024-02-20 VITALS
BODY MASS INDEX: 44.36 KG/M2 | HEIGHT: 66 IN | HEART RATE: 62 BPM | SYSTOLIC BLOOD PRESSURE: 102 MMHG | WEIGHT: 276 LBS | DIASTOLIC BLOOD PRESSURE: 64 MMHG

## 2024-02-20 DIAGNOSIS — G47.33 OBSTRUCTIVE SLEEP APNEA: Chronic | ICD-10-CM

## 2024-02-20 DIAGNOSIS — Z86.79 S/P ABLATION OF ATRIAL FIBRILLATION: ICD-10-CM

## 2024-02-20 DIAGNOSIS — I10 ESSENTIAL HYPERTENSION: Chronic | ICD-10-CM

## 2024-02-20 DIAGNOSIS — I89.0 LYMPHEDEMA OF BOTH LOWER EXTREMITIES: ICD-10-CM

## 2024-02-20 DIAGNOSIS — R79.9 ABNORMAL FINDING OF BLOOD CHEMISTRY, UNSPECIFIED: ICD-10-CM

## 2024-02-20 DIAGNOSIS — E11.9 TYPE 2 DIABETES MELLITUS WITHOUT COMPLICATION, WITHOUT LONG-TERM CURRENT USE OF INSULIN: Chronic | ICD-10-CM

## 2024-02-20 DIAGNOSIS — I48.92 ATRIAL FLUTTER, UNSPECIFIED TYPE: Chronic | ICD-10-CM

## 2024-02-20 DIAGNOSIS — E66.01 SEVERE OBESITY (BMI >= 40): ICD-10-CM

## 2024-02-20 DIAGNOSIS — I48.19 PERSISTENT ATRIAL FIBRILLATION: ICD-10-CM

## 2024-02-20 DIAGNOSIS — I48.0 PAROXYSMAL ATRIAL FIBRILLATION: ICD-10-CM

## 2024-02-20 DIAGNOSIS — Z74.09 MOBILITY IMPAIRED: ICD-10-CM

## 2024-02-20 DIAGNOSIS — Z98.890 STATUS POST CATHETER ABLATION OF ATRIAL FIBRILLATION: ICD-10-CM

## 2024-02-20 DIAGNOSIS — I87.2 VENOUS INSUFFICIENCY OF BOTH LOWER EXTREMITIES: Primary | ICD-10-CM

## 2024-02-20 DIAGNOSIS — Z98.890 S/P ABLATION OF ATRIAL FIBRILLATION: ICD-10-CM

## 2024-02-20 DIAGNOSIS — I77.89 ENLARGED THORACIC AORTA: ICD-10-CM

## 2024-02-20 PROCEDURE — 99999 PR PBB SHADOW E&M-EST. PATIENT-LVL V: CPT | Mod: PBBFAC,,, | Performed by: INTERNAL MEDICINE

## 2024-02-20 PROCEDURE — 99215 OFFICE O/P EST HI 40 MIN: CPT | Mod: S$PBB,,, | Performed by: INTERNAL MEDICINE

## 2024-02-20 PROCEDURE — 99215 OFFICE O/P EST HI 40 MIN: CPT | Mod: PBBFAC | Performed by: INTERNAL MEDICINE

## 2024-02-20 NOTE — PATIENT INSTRUCTIONS
Assessment/Plan:  Mr. Shiraz Jha is a 64 y.o. male with Afib s/p ablation (7/2014), BLE lymphedema, venous insufficiency, HTN, DM2, morbid obesity s/p gastric bypass (2010), QUINTIN (on CPAP), who presents for a follow up appointment.      1. BLE Edema due to lymphedema and venous insufficiency- Stable.  Continue bumex 2 mg bid and spironolactone 25 mg daily.  Continue graduated compression hose; elevate legs when resting, limit fluid intake to 1.5L daily, sodium intake to 2000mg daily.  Check cmp today to monitor renal function.     2. Morbid Obesity s/p Gastric Bypass- Encourage dietary modification, exercise and weight loss.      3. Afib s/p ablation (7/2014)- Stable.  Continue Xarelto 20 mg daily for anticoagulation.     4. HTN- Controlled.  Continue current medications.      5. QUINTIN- Continue CPAP nightly.     Follow up in 4 months with lipids and cmp prior

## 2024-02-20 NOTE — PROGRESS NOTES
"  [facility]    CC: Lower Extremity Edema    Patient ID: Mr. Shiraz Jha is a 64 y.o. male with Afib s/p ablation (7/2014), BLE lymphedema, venous insufficiency, HTN, DM2, morbid obesity s/p gastric bypass (2010), QUINTIN (on CPAP), who presents for a follow up appointment.  Pertinent history/events as follows:    -Pt kindly referred by Twyla Bahena and Dr Scott for evaluation of lower extremity edema (per Dr. Scott's note on 9/24/2019:  "Mr. Jha is in clinic today for continued LE edema and discoloration of his legs.  He had a venous ultrasound on 8/6/19 for the swelling and mild reflux was noted in the left popliteal.  Patient denies chest pain with exertion or at rest, palpitations, SOB, DAI, dizziness, syncope, orthopnea, PND, or claudication.  Instructed to elevate legs when seated, low salt diet, increase walking and compression stockings. No venous reflux noted on US.  Refer to Dr. Ureña in interventional cardiology."    -At our initial clinic visit on 10/16/2019, Mr. Jha reported BLE edema for the past 1 year.  States LE edema is improved with graduated compression hose.  He has no LE pain, claudication symptoms, rest pain, or tissue loss.  No smoking history.  States he has gained over 50 pounds in the past year, despite gastric bypass surgery.  BLE Venous Reflux Study from 8/6/2019 showed no evidence of lower extremity DVT bilaterally.  There is mild left popliteal (deep venous) reflux.  Plan:   BLE Edema- BLE edema likely due to a component of venous insufficiency and morbid obesity.  Check cmp today.  If potassium and creatinine are appropriate, increase lasix to 40 mg bid for 7 days, then resume at 40 mg daily.  Pt to continue this cycle/regimen for lasix.  Pt will benefit from significant weight loss.  Continue graduated compression hose, leg elevation and low salt diet.  Limit fluid intake to 2 liters a day.  Check exercise SURINDER to evaluate for significant PAD.   Morbid Obesity- " Refer back to Bariatric Surgery.     -At follow up clinic visit on 11/27/2019, Mr. Jha reported no significant improvement in BLE edema with lasix regimen of 40 mg bid for 7 days, then resuming at 40 mg daily.  Exercise SURINDER from 11/15/2019 showed normal rest and exercise SURINDER bilaterally with normal PVR waveforms bilaterally.  Exam shows 1+ BLE pitting edema.  Plan:   BLE Edema- BLE edema likely due to a component of venous insufficiency and morbid obesity.  Check cmp today.  If potassium and creatinine are appropriate, discontinue lasix and start bumex 1 mg bid.  Pt will benefit from significant weight loss.  Continue graduated compression hose, leg elevation and low salt diet.  Limit fluid intake to 2 liters a day.  Check exercise SURINDER to evaluate for significant PAD.   Morbid Obesity- Pt referred back to Bariatric Surgery.     -At clinic visit on 1/8/2020, Mr. Jha reported significant improvement in BLE edema since starting bumex 1 mg daily.  Exercise SURINDER study from 11/25/2019 showed normal rest and exercise SURINDER bilaterally.  Normal PVR waveforms bilaterally.  Plan:   BLE Edema- BLE edema now improving.  Continue bumex 1 mg daily.  Check cmp today to monitor renal fxn.  Exercise SURINDER study from 11/25/2019 showed normal rest and exercise SURINDER bilaterally.  Normal PVR waveforms bilaterally. Continue graduated compression hose, leg elevation and low salt diet.  Limit fluid intake to 2 liters a day.    Morbid Obesity- Pt referred back to Bariatric Surgery.     - At clinic visit on 04/08/20, Mr. Jha reported doing well with no significant LE edema.  Continues to wear graduated compression hose, low salt diet, and limiting fluid intake to 2 liters a day.    Plan  BLE Edema- Currently doing well.  Continue bumex 1 mg daily.  Continue graduated compression hose, leg elevation and low salt diet.  Limit fluid intake to 2 liters a day.      - At clinic visit on 12/01/20, Mr. Jha reported worsening swelling of his bilateral  lower extremities. He stopped wearing compression hose a month ago, however, he continues to take bumex twice a day. He reports no rest pain, claudication, CLI or tissue atrophy.   Plan:  BLE Edema- Due to lymphedema and venous insufficiency. Patient notices worsening of bilateral lower extremity edema after he stopped using compression hose for the past month.  Pt instructed to resume graduated compression hose as prescribed.  Continue bumex 1 mg twice a day.  Continue leg elevation and low salt diet.  Limit fluid intake to 2 liters a day.      -At clinic visit on 1/7/2021, Mr. Jha reported improvement in bilateral lower extremity edema compared to previous visit on 12/01/20.  He reports wearing graduated compression hose.  He is taking Bumex 1 mg twice a day.  He is not following sodium restricted diet, and diet includes soups and gumbo.  He reports no claudication, rest pain, or tissue loss.      Plan:   BLE Edema- Due to lymphedema and venous insufficiency. Patient notices improvement of bilateral lower extremity edema. Continue graduated compression hose.  Continue bumex 1 mg twice a day.  Continue leg elevation when resting.  Encourage sodium restriction to 2000 mg/day and limit fluid intake to 2 liters a day.  Obesity- Pt is following up with Bariatric Surgery.  Encourage dietary modification, exercise and weight loss.      -At clinic visit on 2/9/2021, Mr. Jha reports mild improvement in BLE edema since clinic visit on 1/7/2021.  He has no claudication or tissue loss.   Plan:   BLE Edema- Due to lymphedema and venous insufficiency.   Edema is improving.  Check cmp today.  If creatinine and potassium are appropriate, increase bumex to 2 mg bid for 5 days, then decrease to 1 mg bid for 5 days.  Pt to continue this cycle/ergiemen of lasix.  Continue leg elevation when resting.  Limit sodium restriction to 2000 mg/day and limit fluid intake to 2 liters a day.  Obesity- Pt is following up with Bariatric  Surgery.  Encourage dietary modification, exercise and weight loss.      - At clinic visit on 03/25/21, Mr. Jha reported no new symptoms.  Exam shows BLE edema has improved significantly.  Plan:  BLE Edema- Due to lymphedema and venous insufficiency.   Edema has improved significantly.  Refer to lymphedema clinic.  Recheck cmp today.  If creatinine and potassium are appropriate, continue bumex 2 mg bid for 5 days, then decrease to 1 mg bid for 5 days.  Pt to continue this cycle/ergiemen of lasix.  Continue leg elevation when resting.  Limit sodium restriction to 2000 mg/day and limit fluid intake to 2 liters a day.    Obesity- Pt is following up with Bariatric Surgery.  Encourage dietary modification, exercise and weight loss.      -At clinic visit on 5/25/2021, Mr. Jha reported significant improvement in lower extremity edema since initiation of lymphedema clinic therapy.   He reports no claudication, rest pain or tissue loss.   Plan:    BLE Edema- Due to lymphedema and venous insufficiency.   Edema has improved significantly.  Continue lymphedema clinic.  Check CMP today.  If creatinine and potassium are appropriate, continue bumex 2 mg bid for 5 days, then decrease to 1 mg bid for 5 days.  Pt to continue this cycle/ergiemen of lasix.  Continue leg elevation when resting.  Limit sodium restriction to 2000 mg/day and limit fluid intake to 2 liters a day.    Obesity- Pt is following up with Bariatric Surgery.  Encourage dietary modification, exercise and weight loss.       -At clinic visit on 7/13/2021, Mr. Jha reported significant improvement in BLE edema following lymphedema clinic therapy.  He has no claudication or tissue loss.   Plan:   BLE Edema due to lymphedema and venous insufficiency- Now significantly improved following lymphedema clinic therapy.  Check cmp today.  If potassium and creatinine are appropriate, continue bumex 2 mg bid for 1 week, then reduce to 1 mg bid for 1 week.  Pt to continue this  regimen/cycle of bumex.    Obesity- Pt is following up with Bariatric Surgery.  Encourage dietary modification, exercise and weight loss.     -At clinic visit on 10/14/2021 clinic visit, Mr. Jha reporeds continued improvement in BLE edema.  He has no claudication or tissue loss.    Plan:  BLE Edema due to lymphedema and venous insufficiency- Remains significantly improved.  Continue bumex 2 mg bid for 1 week, then reduce to 1 mg bid for 1 week.  Pt to continue this regimen/cycle of bumex.    Obesity- Pt is following up with Bariatric Surgery.  Encourage dietary modification, exercise and weight loss.      -At clinic visit on 1/18/2022, Mr. Jha reported feeling well and swelling improved. He has no other complaints.   Plan:   BLE Edema due to lymphedema and venous insufficiency- Continues to improve. Continue bumex, compression stockings, elevate legs when resting, limit fluid intake to 1.5L daily, sodium intake to 2000mg daily.  Morbid Obesity- Pt is following up with Bariatric Surgery. Stable. Encourage dietary modification, exercise and weight loss.      -At clinic visit on 5/17/2022, Mr. Jha reported worsening leg swelling.  On 5/10/2022, he was admitted for GI bleeding.  During the hospitalization, bumex was held.  He was subsequently restarted on bumex after discharge.   on 5/11/2022.  Currently taking bumex 2 mg bid on Tues/Thur/Sat/Sun.  Taking 4 mg bid on Mon/Wed/Fri.     Plan:   BLE Edema due to lymphedema and venous insufficiency- Pt with worsening leg swelling since hospitalization on 5/10/2022.  Improving on current regimen.  Refer back to lymphedema clinic.  Continue bumex 2 mg bid on Tues/Thur/Sat/Sun and 4 mg bid on Mon/Wed/Fri.   Continue graduated compression hose; elevate legs when resting, limit fluid intake to 1.5L daily, sodium intake to 2000mg daily.  Check cmp today.    Morbid Obesity- Pt is following up with Bariatric Surgery. Stable. Encourage dietary modification, exercise and  weight loss.    -At clinic visit on 6/28/2022, Mr. Jha reported improvement in BLE edema.  He is on the waiting list for lymphedema clinic therapy.  Currently taking bumex 2 mg bid.  Plan:   BLE Edema due to lymphedema and venous insufficiency- Improving.  Pt is on the waiting list for lymphedema clinic therapy.  Continue bumex 2 mg bid.  Check cmp today to monitor renal function.  Continue graduated compression hose; elevate legs when resting, limit fluid intake to 1.5L daily, sodium intake to 2000mg daily.    Morbid Obesity s/p Gastric Bypass- Pt is following up with Bariatric Surgery. Stable. Encourage dietary modification, exercise and weight loss.    Afib s/p ablation (7/2014)- Stable.  Continue Xarelto 20 mg daily for anticoagulation.   HTN- Controlled.  Continue current medications.    QUINTIN- Continue CPAP nightly.     -At clinic visit on 12/27/2022, Mr. Jha reported BLE edema has improved and is stable.  Spironolactone 25 mg daily was added to his regimen by Dr. James on 10/11/202.  He has no chest pain or SOB.  Plans to have right ankle surgery on 2/3/2022.   Plan:   BLE Edema due to lymphedema and venous insufficiency- Improved and stable.  Refer back to lymphedema clinic.  Continue bumex 2 mg bid and spironolactone 25 mg daily.  Continue graduated compression hose; elevate legs when resting, limit fluid intake to 1.5L daily, sodium intake to 2000mg daily.    Morbid Obesity s/p Gastric Bypass- Encourage dietary modification, exercise and weight loss.    Afib s/p ablation (7/2014)- Stable.  Continue Xarelto 20 mg daily for anticoagulation.   HTN- Controlled.  Continue current medications.    QUINTIN- Continue CPAP nightly.     HPI:  Mr. Jha reports significant improvement in BLE edema.  He has completed another course of lymphedema clinic therapy.  He reports no claudication or tissue loss.     Past Medical History:   Diagnosis Date    Allergy     Anemia     Asthma     Atrial fibrillation     Cataract      Chronic rhinitis 9/26/2013    DDD (degenerative disc disease) 4/3/2015    Depression     Diabetes mellitus     Fibromyalgia     Gastroesophageal reflux disease without esophagitis 7/14/2017    Hypertension     Sinusitis, acute, maxillary 12/20/2012    Thyroid disease        Past Surgical History:   Procedure Laterality Date    ANTEGRADE SINGLE BALLOON ENTEROSCOPY N/A 5/26/2022    Procedure: ENTEROSCOPY, SINGLE BALLOON, ANTEGRADE;  Surgeon: Lyle Edmond MD;  Location: [facility] (2ND FLR);  Service: Endoscopy;  Laterality: N/A;  Will use single-balloon scope for both the upper endoscopy and the colonoscopy - recent incomplete colonoscopy due to looping.    Pt is fully vaccinated-DS  5/20/22-Approval to hold Xarelto rec'd from Dr. Webb-pending approval (see telephoned en    CERVICAL SPINE SURGERY      COLONOSCOPY N/A 5/10/2022    Procedure: COLONOSCOPY;  Surgeon: Conrad Diaz MD;  Location: [facility] ENDO (2ND FLR);  Service: Endoscopy;  Laterality: N/A;    COLONOSCOPY N/A 5/26/2022    Procedure: COLONOSCOPY;  Surgeon: Lyle Edmond MD;  Location: [facility] (2ND FLR);  Service: Endoscopy;  Laterality: N/A;    EPIDURAL STEROID INJECTION INTO LUMBAR SPINE N/A 5/29/2018    Procedure: INJECTION-STEROID-EPIDURAL-LUMBAR- L4-5;  Surgeon: Roman Lyman MD;  Location: [facility] PAIN MGT;  Service: Pain Management;  Laterality: N/A;  Patient is diabetic and Xarleto     EPIDURAL STEROID INJECTION INTO LUMBAR SPINE N/A 7/3/2018    Procedure: INJECTION, STEROID, SPINE, LUMBAR, EPIDURAL- L4-5;  Surgeon: Roman Lyman MD;  Location: [facility] PAIN MGT;  Service: Pain Management;  Laterality: N/A;  Patient takes Xarelto and ASA     ESOPHAGOGASTRODUODENOSCOPY N/A 5/9/2022    Procedure: EGD (ESOPHAGOGASTRODUODENOSCOPY);  Surgeon: Conrad Diaz MD;  Location: [facility] ENDO (2ND FLR);  Service: Endoscopy;  Laterality: N/A;    EXCISION OF LESION OF LIP N/A 7/7/2020    Procedure: EXCISION, LESION, LIP;  Surgeon: Caden Navarro MD;   Location: [facility] OR [address] [place] [address];  Service: ENT;  Laterality: N/A;    GASTRIC BYPASS      SINUS SURGERY  2000    Dr. Watt at [facility]    TONSILLECTOMY         Social History     Socioeconomic History    Marital status: Single   Tobacco Use    Smoking status: Never    Smokeless tobacco: Never   Substance and Sexual Activity    Alcohol use: No     Comment: rare    Drug use: No    Sexual activity: Yes     Partners: Female   Social History Narrative    [place], lives alone w/2 dogs.    Dogs are his kids.    Works PT --  at [employer], on ssdi from neck and back/depression.     Social Determinants of Health     Financial Resource Strain: High Risk (5/8/2023)    Overall Financial Resource Strain (CARDIA)     Difficulty of Paying Living Expenses: Hard   Food Insecurity: No Food Insecurity (5/8/2023)    Hunger Vital Sign     Worried About Running Out of Food in the Last Year: Never true     Ran Out of Food in the Last Year: Never true   Recent Concern: Food Insecurity - Food Insecurity Present (3/1/2023)    Hunger Vital Sign     Worried About Running Out of Food in the Last Year: Sometimes true     Ran Out of Food in the Last Year: Sometimes true   Transportation Needs: No Transportation Needs (5/8/2023)    PRAPARE - Transportation     Lack of Transportation (Medical): No     Lack of Transportation (Non-Medical): No   Physical Activity: Inactive (5/8/2023)    Exercise Vital Sign     Days of Exercise per Week: 0 days     Minutes of Exercise per Session: 0 min   Stress: Stress Concern Present (5/8/2023)    [employer] - Occupational Stress Questionnaire     Feeling of Stress : Very much   Social Connections: Socially Isolated (5/8/2023)    Social Connection and Isolation Panel [NHANES]     Frequency of Communication with Friends and Family: Once a week     Frequency of Social Gatherings with Friends and Family: Once a week     Attends Shinto Services: Never     Active Member of Clubs or  Organizations: No     Attends Club or Organization Meetings: Never     Marital Status: Never    Housing Stability: High Risk (5/8/2023)    Housing Stability Vital Sign     Unable to Pay for Housing in the Last Year: Yes     Number of Places Lived in the Last Year: 1     Unstable Housing in the Last Year: Yes       Family History   Problem Relation Age of Onset    Heart disease Father     Cancer Mother         lung    Hypertension Sister     Heart disease Brother     Cirrhosis Brother     Diabetes Brother        Review of patient's allergies indicates:   Allergen Reactions    Iodinated contrast media Other (See Comments)     Raises BP         Medication List with Changes/Refills   Current Medications    BUMETANIDE (BUMEX) 2 MG TABLET    Take 1 tablet (2 mg total) by mouth 2 (two) times daily.    BUPROPION (WELLBUTRIN XL) 300 MG 24 HR TABLET    Take 300 mg by mouth once daily. Pt states he only takes 300mg    CYANOCOBALAMIN 500 MCG TABLET    Take 500 mcg by mouth once daily.    DEXTROAMPHETAMINE-AMPHETAMINE (ADDERALL) 20 MG TABLET    Take 1 tablet by mouth every morning, take 1 tablet by mouth 3-4 hours later, and take 1/2 tablet every afternoon    FLUTICASONE PROPIONATE (FLONASE) 50 MCG/ACTUATION NASAL SPRAY    2 sprays (100 mcg total) by Each Nostril route once daily.    GABAPENTIN (NEURONTIN) 300 MG CAPSULE    Take 1 capsule (300 mg total) by mouth 3 (three) times daily.    IMPOYZ 0.025 % CREA    Apply topically as needed.     LEVOCETIRIZINE (XYZAL) 5 MG TABLET    TAKE 1 TABLET (5 MG TOTAL) BY MOUTH EVERY EVENING.    LUZU 1 % CREA    Apply topically as needed.     METOPROLOL TARTRATE (LOPRESSOR) 25 MG TABLET    Take 1 tablet (25 mg total) by mouth 2 (two) times daily.    MULTIVITAMIN (THERAGRAN) PER TABLET    Take 1 tablet by mouth once daily.     NITROGLYCERIN (NITROSTAT) 0.4 MG SL TABLET    Please dispense 25 pills in 4 bottles.    OMEPRAZOLE (PRILOSEC) 10 MG CAPSULE    Take 2 capsules (20 mg total) by  "mouth once daily.    POTASSIUM CHLORIDE SA (K-DUR,KLOR-CON M) 10 MEQ TABLET    Take 1 tablet (10 mEq total) by mouth 2 (two) times daily.    RIVAROXABAN (XARELTO) 20 MG TAB    Take 1 tablet (20 mg total) by mouth once daily.    SOTALOL (BETAPACE) 80 MG TABLET    Take 1 tablet (80 mg total) by mouth 2 (two) times daily.    SPIRONOLACTONE (ALDACTONE) 25 MG TABLET    Take 1 tablet by mouth once daily    TAMSULOSIN (FLOMAX) 0.4 MG CAP    Take 1 capsule (0.4 mg total) by mouth once daily.    THIAMINE 250 MG TABLET    Take 250 mg by mouth once daily.    TRAZODONE (DESYREL) 50 MG TABLET    Take 50 mg by mouth every evening.    VITAMIN D (VITAMIN D3) 1000 UNITS TAB    Take 1,000 Units by mouth once daily.    VRAYLAR 3 MG CAP    Take 3 mg by mouth Daily.       Review of Systems  Constitution: Denies chills, fever, and sweats.  HENT: Denies headaches or blurry vision.  Cardiovascular: Denies chest pain or irregular heart beat.  Respiratory: Denies cough or shortness of breath.  Gastrointestinal: Denies abdominal pain, nausea, or vomiting.  Musculoskeletal: Positive for BLE swelling  improved  Neurological: Denies dizziness or focal weakness.  Psychiatric/Behavioral: Normal mental status.  Hematologic/Lymphatic: Denies bleeding problem or easy bruising/bleeding.  Skin: Denies rash or suspicious lesions    Physical Examination  /64   Pulse 62   Ht 5' 6" (1.676 m)   Wt 125.2 kg (276 lb 0.3 oz)   BMI 44.55 kg/m²     Constitutional: No acute distress, conversant  HEENT: Sclera anicteric, Pupils equal, round and reactive to light, extraocular motions intact, Oropharynx clear  Neck: No JVD, no carotid bruits  Cardiovascular: regular rate and rhythm, no murmur, rubs or gallops, normal S1/S2  Pulmonary: Clear to auscultation bilaterally  Abdominal: Abdomen soft, nontender, nondistended, positive bowel sounds  Extremities: BLE's with minimal pitting edema   Pulses:  Carotid pulses are 2+ on the right side, and 2+ on the left " side.  Radial pulses are 2+ on the right side, and 2+ on the left side.   Femoral pulses are 2+ on the right side, and 2+ on the left side.  Popliteal pulses are 2+ on the right side, and 2+ on the left side.   Dorsalis pedis pulses are 2+ on the right side, and 2+ on the left side.   Posterior tibial pulses are 2+ on the right side, and 2+ on the left side.    Skin: No ecchymosis, erythema, or ulcers  Psych: Alert and oriented x 3, appropriate affect  Neuro: CNII-XII intact, no focal deficits    Labs:  Most Recent Data  CBC:   Lab Results   Component Value Date    WBC 12.05 11/18/2023    HGB 11.3 (L) 11/18/2023    HCT 38.0 (L) 11/18/2023     11/18/2023    MCV 76 (L) 11/18/2023    RDW 16.6 (H) 11/18/2023     BMP:   Lab Results   Component Value Date     11/18/2023    K 4.2 11/18/2023     11/18/2023    CO2 23 11/18/2023    BUN 11 11/18/2023    CREATININE 1.0 11/18/2023     (H) 11/18/2023    CALCIUM 9.2 11/18/2023    MG 2.3 05/08/2023    PHOS 2.8 05/08/2023     LFTS;   Lab Results   Component Value Date    PROT 7.0 11/18/2023    ALBUMIN 3.6 11/18/2023    BILITOT 1.3 (H) 11/18/2023    AST 19 11/18/2023    ALKPHOS 115 11/18/2023    ALT 10 11/18/2023     COAGS:   Lab Results   Component Value Date    INR 1.1 05/06/2022     FLP:   Lab Results   Component Value Date    CHOL 186 02/15/2022    HDL 37 (L) 02/15/2022    LDLCALC 115.6 02/15/2022    TRIG 167 (H) 02/15/2022    CHOLHDL 19.9 (L) 02/15/2022     CARDIAC:   Lab Results   Component Value Date    TROPONINI <0.006 07/12/2022    BNP 32 11/18/2023       Echo 7/23/2019:  Normal left ventricular systolic function. The estimated ejection fraction is 60%  Normal right ventricular systolic function.  Normal LV diastolic function.  Moderate left atrial enlargement.  Mild right atrial enlargement.  The ascending aorta is mildly dilated (42mm in diameter, unchanged since Feb 2018).  The estimated PA systolic pressure is 23 mm Hg  Normal central venous  pressure (3 mm Hg).     Exercise SURINDER 11/25/2019:  Normal rest and exercise SURINDER bilaterally.  Normal PVR waveforms bilaterally.    BLE Venous Reflux Study 8/6/2019:  No evidence of lower extremity DVT bilaterally.  Mild left popliteal (deep venous) reflux.    Assessment/Plan:  Mr. Shiraz Jha is a 64 y.o. male with Afib s/p ablation (7/2014), BLE lymphedema, venous insufficiency, HTN, DM2, morbid obesity s/p gastric bypass (2010), QUINTIN (on CPAP), who presents for a follow up appointment.      1. BLE Edema due to lymphedema and venous insufficiency- Stable.  Continue bumex 2 mg bid and spironolactone 25 mg daily.  Continue graduated compression hose; elevate legs when resting, limit fluid intake to 1.5L daily, sodium intake to 2000mg daily.  Check cmp today to monitor renal function.     2. Morbid Obesity s/p Gastric Bypass- Encourage dietary modification, exercise and weight loss.      3. Afib s/p ablation (7/2014)- Stable.  Continue Xarelto 20 mg daily for anticoagulation.     4. HTN- Controlled.  Continue current medications.      5. QUINTIN- Continue CPAP nightly.     Follow up in 4 months with lipids and cmp prior    Total duration of face to face visit time 30 minutes.  Total time spent counseling greater than fifty percent of total visit time.  Counseling included discussion regarding imaging findings, diagnosis, possibilities, treatment options, risks and benefits.  The patient had many questions regarding the options and long-term effects.    Randolph Ureña MD, PhD  Interventional Cardiology

## 2024-04-04 RX ORDER — SPIRONOLACTONE 25 MG/1
25 TABLET ORAL
Qty: 90 TABLET | Refills: 0 | OUTPATIENT
Start: 2024-04-04

## 2024-05-13 ENCOUNTER — PATIENT OUTREACH (OUTPATIENT)
Dept: ADMINISTRATIVE | Facility: HOSPITAL | Age: 64
End: 2024-05-13

## 2024-05-13 NOTE — PROGRESS NOTES
Chart review done.  updated. Immunizations reviewed & updated. Care Everywhere updated. A1C & urine microalbumin linked to appt.

## 2024-05-26 NOTE — ED NOTES
[facility]  Neurosurgery  Progress Note    Subjective:     Interval History:  No acute events, patient feeling well.  Has been hypertensive    Post-Op Info:  Procedure(s) (LRB):  LAMINOFORAMINOTOMY, SPINE, T7/T8, MINIMALLY INVASIVE; with REMOVAL OF ABSCESS (Left)   2 Days Post-Op      Medications:  Continuous Infusions:  Scheduled Meds:   carvediloL  12.5 mg Oral BID WM    carvediloL  6.25 mg Oral Once    cefTRIAXone (Rocephin) IV (PEDS and ADULTS)  2 g Intravenous Q12H    hydroCHLOROthiazide  25 mg Oral Daily    losartan  50 mg Oral Daily    melatonin  6 mg Oral Nightly    mupirocin   Nasal BID     PRN Meds:  Current Facility-Administered Medications:     acetaminophen, 650 mg, Oral, Q8H PRN    glucagon (human recombinant), 1 mg, Intramuscular, PRN    glucagon (human recombinant), 1 mg, Intramuscular, PRN    glucose, 16 g, Oral, PRN    glucose, 16 g, Oral, PRN    glucose, 24 g, Oral, PRN    glucose, 24 g, Oral, PRN    HYDROmorphone, 1 mg, Intravenous, Q3H PRN    insulin aspart U-100, 0-10 Units, Subcutaneous, QID (AC + HS) PRN    naloxone, 0.02 mg, Intravenous, PRN    polyethylene glycol, 17 g, Oral, BID PRN     Review of Systems  Objective:     Weight: 94.6 kg (208 lb 8.9 oz)  Body mass index is 32.66 kg/m².  Vital Signs (Most Recent):  Temp: 98.2 °F (36.8 °C) (05/26/24 0535)  Pulse: 66 (05/26/24 0535)  Resp: 18 (05/26/24 0535)  BP: (!) 195/90 (05/26/24 0640)  SpO2: 99 % (05/26/24 0535) Vital Signs (24h Range):  Temp:  [97.9 °F (36.6 °C)-98.8 °F (37.1 °C)] 98.2 °F (36.8 °C)  Pulse:  [66-73] 66  Resp:  [18] 18  SpO2:  [99 %-100 %] 99 %  BP: (141-195)/(65-90) 195/90                              Closed/Suction Drain 05/24/24 1408 Tube - 1 Left Back Bulb 10 Fr. (Active)   Dressing Type Transparent (Tegaderm) 05/25/24 1726   Dressing Status Clean;Dry;Intact 05/25/24 1726   Dressing Intervention Integrity maintained 05/25/24 1726   Drainage None 05/25/24 1726   Status To bulb suction 05/25/24 1726   Output  Pt instructed need for urine     "(mL) 15 mL 05/26/24 0649       Neurosurgery Physical Exam  General: well developed, well nourished, no distress  Head: normocephalic, atraumatic  Neurologic: Alert and oriented. Thought content appropriate  Speech: Fluent  Cranial nerves: face symmetric   Eyes: pupils equal  Pulmonary: normal respirations  Sensory: intact to light touch throughout  Motor Strength: Moves all extremities spontaneously with good tone.  Full strength upper and lower extremities. No abnormal movements seen.                  Delt Bi Tri Wrist ext.  IO  RUE:      5    5   5        5          5    5  LUE:      5    5   5        5          5    5              HF KE EHL DF PF  RLE      5    5     5     5    5  LLE:      5    5     5     5    5     DTR's - 3+ patellar reflexes  Ballard: absent  Clonus: absent     Left-sided paraspinal dressing dry/intact, scant saturation of dressing.  20 cc throughout the day yesterday, 15 cc on night shift.  Drain removed at bedside and fresh dressing placed.    Significant Labs:  Recent Labs   Lab 05/25/24  0654         K 3.8      CO2 24   BUN 15   CREATININE 0.7   CALCIUM 9.5   MG 1.8     Recent Labs   Lab 05/25/24  0654   WBC 6.86   HGB 10.2*   HCT 31.8*        No results for input(s): "LABPT", "INR", "APTT" in the last 48 hours.  Microbiology Results (last 7 days)       Procedure Component Value Units Date/Time    Blood Culture #2 **CANNOT BE ORDERED STAT** [[id number]] Collected: 05/23/24 1341    Order Status: Completed Specimen: Blood from Peripheral, Forearm, Left Updated: 05/25/24 1503     Blood Culture, Routine No Growth to date      No Growth to date      No Growth to date    Blood Culture #1 **CANNOT BE ORDERED STAT** [[id number]] Collected: 05/23/24 1341    Order Status: Completed Specimen: Blood from Peripheral, Antecubital, Left Updated: 05/25/24 1503     Blood Culture, Routine No Growth to date      No Growth to date      No Growth to date    AFB Culture & Smear " [[id number]] Collected: 05/24/24 1421    Order Status: Sent Specimen: Abscess from Back Updated: 05/25/24 1437    AFB Culture & Smear [[id number]] Collected: 05/24/24 1421    Order Status: Sent Specimen: Abscess from Back Updated: 05/25/24 1437    Gram stain [[id number]] Collected: 05/24/24 1421    Order Status: Completed Specimen: Abscess from Back Updated: 05/25/24 0912     Gram Stain Result Rare WBC's      No organisms seen    Narrative:      EPIDURAL ABSCESS    Gram stain [[id number]] Collected: 05/24/24 1421    Order Status: Completed Specimen: Abscess from Back Updated: 05/25/24 0911     Gram Stain Result Rare WBC's      No organisms seen    Narrative:      EPIDURAL PHLEGMON    Aerobic culture [[id number]] Collected: 05/24/24 1421    Order Status: Completed Specimen: Abscess from Back Updated: 05/25/24 0739     Aerobic Bacterial Culture No growth    Narrative:      EPIDURAL ABSCESS    Aerobic culture [[id number]] Collected: 05/24/24 1421    Order Status: Completed Specimen: Abscess from Back Updated: 05/25/24 0739     Aerobic Bacterial Culture No growth    Narrative:      EPIDURAL PHLEGMON    Fungus culture [[id number]] Collected: 05/24/24 1421    Order Status: Sent Specimen: Abscess from Back Updated: 05/24/24 1512    Culture, Anaerobe [[id number]] Collected: 05/24/24 1421    Order Status: Sent Specimen: Abscess from Back Updated: 05/24/24 1511    Fungus culture [[id number]] Collected: 05/24/24 1421    Order Status: Sent Specimen: Abscess from Back Updated: 05/24/24 1509    Culture, Anaerobe [[id number]] Collected: 05/24/24 1421    Order Status: Sent Specimen: Abscess from Back Updated: 05/24/24 1508    Gram stain [[id number]] Collected: 05/24/24 1421    Order Status: Canceled Specimen: Abscess from Back     Aerobic culture [[id number]] Collected: 05/24/24 1421    Order Status: Canceled Specimen: Abscess from Back     Culture, Anaerobic [[id number]] Collected: 05/24/24 1421    Order Status: Canceled Specimen:  Abscess from Back           No imaging to review    Assessment/Plan:   Shahida Ortega is a 53 y.o. female postop day 2 Status post left T7/8 laminectomy/medial facetectomy for evacuation of epidural abscess/phlegmon  -postoperative neuro exam is stable  -drain output slowed.  Removed.  -continue dressing for 3 days.  After 3 days, okay to shower as normal.  Clean wound daily.  -continue antibiotics per ID recommendations  -mobilize ad joaquín, no restrictions  -okay home today  -patient will follow up in neurosurgery clinic in 2 weeks for wound check    Active Diagnoses:    Diagnosis Date Noted POA    PRINCIPAL PROBLEM:  Vertebral abscess [M46.20] 04/19/2024 Yes    Osteomyelitis [M86.9] 05/23/2024 Yes    Bacteremia due to group B Streptococcus [R78.81, B95.1] 04/16/2024 Yes    A-fib [I48.91] 05/31/2023 Yes    Prolonged Q-T interval on ECG [R94.31] 05/31/2023 Yes    DM2 (diabetes mellitus, type 2) [E11.9] 05/28/2023 Yes      Problems Resolved During this Admission:       Janusz Xiao MD  Neurosurgery  [facility] - Telemetry

## 2024-06-10 DIAGNOSIS — I48.19 PERSISTENT ATRIAL FIBRILLATION: ICD-10-CM

## 2024-06-10 RX ORDER — METOPROLOL TARTRATE 25 MG/1
25 TABLET, FILM COATED ORAL 2 TIMES DAILY
Qty: 180 TABLET | Refills: 3 | Status: SHIPPED | OUTPATIENT
Start: 2024-06-10

## 2024-06-10 RX ORDER — SOTALOL HYDROCHLORIDE 80 MG/1
80 TABLET ORAL 2 TIMES DAILY
Qty: 180 TABLET | Refills: 3 | Status: SHIPPED | OUTPATIENT
Start: 2024-06-10

## 2024-09-17 ENCOUNTER — HOSPITAL ENCOUNTER (EMERGENCY)
Facility: HOSPITAL | Age: 64
Discharge: HOME | End: 2024-09-17
Attending: EMERGENCY MEDICINE
Payer: MEDICARE

## 2024-09-17 VITALS
HEIGHT: 68 IN | SYSTOLIC BLOOD PRESSURE: 120 MMHG | RESPIRATION RATE: 18 BRPM | DIASTOLIC BLOOD PRESSURE: 61 MMHG | HEART RATE: 61 BPM | OXYGEN SATURATION: 96 % | TEMPERATURE: 98 F | BODY MASS INDEX: 45.47 KG/M2 | WEIGHT: 300 LBS

## 2024-09-17 DIAGNOSIS — M54.16 ACUTE RIGHT LUMBAR RADICULOPATHY: ICD-10-CM

## 2024-09-17 DIAGNOSIS — M54.31 SCIATICA OF RIGHT SIDE: Primary | ICD-10-CM

## 2024-09-17 PROCEDURE — 99283 EMERGENCY DEPT VISIT LOW MDM: CPT

## 2024-09-17 RX ORDER — METHYLPREDNISOLONE 4 MG/1
TABLET ORAL
Qty: 1 EACH | Refills: 0 | Status: SHIPPED | OUTPATIENT
Start: 2024-09-17

## 2024-09-17 RX ORDER — TRAZODONE HYDROCHLORIDE 150 MG/1
150 TABLET ORAL NIGHTLY
COMMUNITY
Start: 2024-08-26

## 2024-09-17 NOTE — ED PROVIDER NOTES
Source of History:  Patient    Chief complaint:  Leg Pain (R leg pain from hip to knee burning pain, denies injury)      HPI:  Shiraz Jha is a 64 y.o. male presenting with 4 week history of right-sided hip pain extending down his lateral thigh to his knee.  He also has some numbness with this.  He denies any trauma or specific right lower back pain.  He has no history of lumbar pathology and no history of hip pathology.  He does work standing on his feet and has been doing that for several years.  He also is obese and has diminished mobility and flexibility per the patient.  He denies any bowel or bladder incontinence from this pain, but does state over the last year he has had some issue with bowel incontinence.  He is urinating regularly however with no blood in his urine and no frequency.  He is able to ambulate, he is concerned about a blood clot as he had someone close to him lose the leg from a blood clot.    ROS: As per HPI and below:  General: No fever.  No chills.  Eyes: No visual changes.  Head: No headache.    Integument: No rashes or lesions.  Chest: No shortness of breath.  Cardiovascular: No chest pain.  Abdomen: No abdominal pain.  No nausea or vomiting.  Urinary: No abnormal urination.  Neurologic:  As above.  No focal weakness.  Musculoskeletal: As above.        Review of patient's allergies indicates:   Allergen Reactions    Iodinated contrast media Other (See Comments)     Raises BP         No current facility-administered medications on file prior to encounter.     Current Outpatient Medications on File Prior to Encounter   Medication Sig Dispense Refill    traZODone (DESYREL) 150 MG tablet Take 150 mg by mouth every evening.      bumetanide (BUMEX) 2 MG tablet Take 1 tablet (2 mg total) by mouth 2 (two) times daily. 180 tablet 3    buPROPion (WELLBUTRIN XL) 300 MG 24 hr tablet Take 300 mg by mouth once daily. Pt states he only takes 300mg      cyanocobalamin 500 MCG tablet Take 500 mcg by  mouth once daily.      dextroamphetamine-amphetamine (ADDERALL) 20 mg tablet Take 1 tablet by mouth every morning, take 1 tablet by mouth 3-4 hours later, and take 1/2 tablet every afternoon      fluticasone propionate (FLONASE) 50 mcg/actuation nasal spray 2 sprays (100 mcg total) by Each Nostril route once daily. (Patient taking differently: 2 sprays by Each Nostril route as needed.) 16 g 11    gabapentin (NEURONTIN) 300 MG capsule Take 1 capsule (300 mg total) by mouth 3 (three) times daily. 270 capsule 3    IMPOYZ 0.025 % Crea Apply topically as needed.       levocetirizine (XYZAL) 5 MG tablet TAKE 1 TABLET (5 MG TOTAL) BY MOUTH EVERY EVENING. (Patient taking differently: Take 5 mg by mouth daily as needed for Allergies.) 30 tablet 11    LUZU 1 % Crea Apply topically as needed.       metoprolol tartrate (LOPRESSOR) 25 MG tablet Take 1 tablet (25 mg total) by mouth 2 (two) times daily. 180 tablet 3    multivitamin (THERAGRAN) per tablet Take 1 tablet by mouth once daily.       nitroGLYCERIN (NITROSTAT) 0.4 MG SL tablet Please dispense 25 pills in 4 bottles. 100 tablet 0    omeprazole (PRILOSEC) 10 MG capsule Take 2 capsules (20 mg total) by mouth once daily. 30 capsule 6    potassium chloride SA (K-DUR,KLOR-CON M) 10 MEQ tablet Take 1 tablet (10 mEq total) by mouth 2 (two) times daily. 30 tablet 11    rivaroxaban (XARELTO) 20 mg Tab Take 1 tablet (20 mg total) by mouth once daily. 90 tablet 3    sotaloL (BETAPACE) 80 MG tablet Take 1 tablet (80 mg total) by mouth 2 (two) times daily. 180 tablet 3    spironolactone (ALDACTONE) 25 MG tablet Take 1 tablet by mouth once daily 90 tablet 0    tamsulosin (FLOMAX) 0.4 mg Cap Take 1 capsule (0.4 mg total) by mouth once daily. 30 capsule 11    thiamine 250 MG tablet Take 250 mg by mouth once daily.      traZODone (DESYREL) 50 MG tablet Take 50 mg by mouth every evening.      vitamin D (VITAMIN D3) 1000 units Tab Take 1,000 Units by mouth once daily.      VRAYLAR 3 mg Cap  Take 3 mg by mouth Daily.         PMH:  As per HPI and below:  Past Medical History:   Diagnosis Date    Allergy     Anemia     Asthma     Atrial fibrillation     Cataract     Chronic rhinitis 9/26/2013    DDD (degenerative disc disease) 4/3/2015    Depression     Diabetes mellitus     Fibromyalgia     Gastroesophageal reflux disease without esophagitis 7/14/2017    Hypertension     Sinusitis, acute, maxillary 12/20/2012    Thyroid disease      Past Surgical History:   Procedure Laterality Date    ANTEGRADE SINGLE BALLOON ENTEROSCOPY N/A 5/26/2022    Procedure: ENTEROSCOPY, SINGLE BALLOON, ANTEGRADE;  Surgeon: Lyle Edmond MD;  Location: [facility] (2ND FLR);  Service: Endoscopy;  Laterality: N/A;  Will use single-balloon scope for both the upper endoscopy and the colonoscopy - recent incomplete colonoscopy due to looping.    Pt is fully vaccinated-DS  5/20/22-Approval to hold Xarelto rec'd from Dr. Webb-pending approval (see telephoned en    CERVICAL SPINE SURGERY      COLONOSCOPY N/A 5/10/2022    Procedure: COLONOSCOPY;  Surgeon: Conrad Diaz MD;  Location: [facility] (2ND FLR);  Service: Endoscopy;  Laterality: N/A;    COLONOSCOPY N/A 5/26/2022    Procedure: COLONOSCOPY;  Surgeon: Lyle Edmond MD;  Location: [facility] (2ND [facility]);  Service: Endoscopy;  Laterality: N/A;    EPIDURAL STEROID INJECTION INTO LUMBAR SPINE N/A 5/29/2018    Procedure: INJECTION-STEROID-EPIDURAL-LUMBAR- L4-5;  Surgeon: Roman Lyman MD;  Location: [facility] PAIN MGT;  Service: Pain Management;  Laterality: N/A;  Patient is diabetic and Xarleto     EPIDURAL STEROID INJECTION INTO LUMBAR SPINE N/A 7/3/2018    Procedure: INJECTION, STEROID, SPINE, LUMBAR, EPIDURAL- L4-5;  Surgeon: Roman Lyman MD;  Location: [facility] PAIN MGT;  Service: Pain Management;  Laterality: N/A;  Patient takes Xarelto and ASA     ESOPHAGOGASTRODUODENOSCOPY N/A 5/9/2022    Procedure: EGD (ESOPHAGOGASTRODUODENOSCOPY);  Surgeon: Conrad Diaz MD;  Location:  [facility] (2ND FLR);  Service: Endoscopy;  Laterality: N/A;    EXCISION OF LESION OF LIP N/A 7/7/2020    Procedure: EXCISION, LESION, LIP;  Surgeon: Caden Navarro MD;  Location: [facility] OR 2ND FLR;  Service: ENT;  Laterality: N/A;    GASTRIC BYPASS      SINUS SURGERY  2000    Dr. Watt at [facility]    TONSILLECTOMY         Social History     Socioeconomic History    Marital status: Single   Tobacco Use    Smoking status: Never    Smokeless tobacco: Never   Substance and Sexual Activity    Alcohol use: No     Comment: rare    Drug use: No    Sexual activity: Yes     Partners: Female   Social History Narrative    [place], lives alone w/2 dogs.    Dogs are his kids.    Works PT --  at [employer], on ssdi from neck and back/depression.     Social Determinants of Health     Financial Resource Strain: High Risk (5/8/2023)    Overall Financial Resource Strain (CARDIA)     Difficulty of Paying Living Expenses: Hard   Food Insecurity: No Food Insecurity (5/8/2023)    Hunger Vital Sign     Worried About Running Out of Food in the Last Year: Never true     Ran Out of Food in the Last Year: Never true   Recent Concern: Food Insecurity - Food Insecurity Present (3/1/2023)    Hunger Vital Sign     Worried About Running Out of Food in the Last Year: Sometimes true     Ran Out of Food in the Last Year: Sometimes true   Transportation Needs: No Transportation Needs (5/8/2023)    PRAPARE - Transportation     Lack of Transportation (Medical): No     Lack of Transportation (Non-Medical): No   Physical Activity: Inactive (5/8/2023)    Exercise Vital Sign     Days of Exercise per Week: 0 days     Minutes of Exercise per Session: 0 min   Stress: Stress Concern Present (5/8/2023)    [employer] - Occupational Stress Questionnaire     Feeling of Stress : Very much   Housing Stability: High Risk (5/8/2023)    Housing Stability Vital Sign     Unable to Pay for Housing in the Last Year: Yes     Number of Places  "Lived in the Last Year: 1     Unstable Housing in the Last Year: Yes       Family History   Problem Relation Name Age of Onset    Heart disease Father      Cancer Mother          lung    Hypertension Sister      Heart disease Brother      Cirrhosis Brother      Diabetes Brother         Physical Exam:    Vitals:    09/17/24 1000   BP: 120/61   Pulse: 61   Resp: 18   Temp: 98.2 °F (36.8 °C)   TempSrc: Oral   SpO2: 96%   Weight: 136.1 kg (300 lb)   Height: 5' 8" (1.727 m)     Appearance: No acute distress.  Skin: No rashes seen.  Good turgor.  No abrasions.  No ecchymoses.  Eyes: No conjunctival injection.  ENT: Oropharynx clear.    Chest: Clear to auscultation bilaterally.  Good air movement.  No wheezes.  No rhonchi.  Cardiovascular: Regular rate and rhythm.  No murmurs. No gallops. No rubs.  Abdomen: Soft.  Not distended.  Nontender.  No guarding.  No rebound.  Musculoskeletal:  Patient with tenderness to palpation in his right posterior hip extending superiorly towards the right SI area.  There is no point tenderness to the trochanteric region of the hip nor extending downward.  He does have tenderness with active abduction of the right hip.  He also has tenderness with internal rotation of the right hip.   Good range of motion all joints.  No deformities.  Neck supple.  No meningismus.  Neurologic: Patient states that he has decreased sensation along the lateral aspect of his right thigh.  Otherwise, Motor intact with equal strength bilaterally.  Sensation intact.  Cerebellar intact.  Cranial nerves intact.  Mental Status:  Alert and oriented x 3.  Appropriate, conversant.    Initial Impression:  Paresthesia of the right lateral thigh    Labs Reviewed - No data to display    I decided to obtain the patient's medical records.    Imaging Results    None         Medications - No data to display                MDM:    64 y.o. male with Paresthesia of the right lateral thigh.  I do believe this is likely radicular in " nature of the lumbar region.  Patient can not take nonsteroidal anti-inflammatories, so I am going to start him on a Medrol pack and referred him to our back and spine clinic.  I advised the patient that if I am incorrect we can send him to Orthopedics, but I do not want to be in correct about missing a lumbar radiculopathy that could lead to permanent damage.  At this time there is no signs or symptoms of cauda equina, and there is no motor deficit bilaterally.  There is no sign or symptoms of a blood clot that would be causing this, and I explained that to the patient as well.  Patient verbalizes his understanding agreement with the diagnosis and plan and was discharged in stable condition.    Diagnostic Impression:    1. Sciatica of right side    2. Acute right lumbar radiculopathy         ED Disposition Condition    Discharge Stable          ED Prescriptions       Medication Sig Dispense Start Date End Date Auth. Provider    methylPREDNISolone (MEDROL DOSEPACK) 4 mg tablet Take as directed 1 each 9/17/2024 -- Avila Condon III, MD          Follow-up Information    None            Avila Condon III, MD  09/17/24 7801

## 2024-09-17 NOTE — ED NOTES
Discharge home, states understanding to follow up as directed. Ambulates out of ED without difficulty. RX sent

## 2024-09-17 NOTE — ED TRIAGE NOTES
Patient is a 64 year old male that presents to the ED via personal vehicle with c/o right leg pain x 1 month denies injury.

## 2024-10-03 ENCOUNTER — PATIENT OUTREACH (OUTPATIENT)
Dept: ADMINISTRATIVE | Facility: HOSPITAL | Age: 64
End: 2024-10-03

## 2024-10-03 ENCOUNTER — TELEPHONE (OUTPATIENT)
Dept: UROLOGY | Facility: HOSPITAL | Age: 64
End: 2024-10-03

## 2024-10-03 DIAGNOSIS — N40.0 BENIGN NON-NODULAR PROSTATIC HYPERPLASIA WITHOUT LOWER URINARY TRACT SYMPTOMS: ICD-10-CM

## 2024-10-03 RX ORDER — TAMSULOSIN HYDROCHLORIDE 0.4 MG/1
0.4 CAPSULE ORAL DAILY
Qty: 30 CAPSULE | Refills: 11 | Status: SHIPPED | OUTPATIENT
Start: 2024-10-03 | End: 2025-10-03

## 2024-10-03 NOTE — TELEPHONE ENCOUNTER
Refill sent to pharmacy    ----- Message from ANA Del Rio sent at 10/3/2024 10:11 AM CDT -----    ----- Message -----  From: Karina Nava LPN  Sent: 10/2/2024   4:09 PM CDT  To: Eliane Nichols RN      ----- Message -----  From: Bi Mcleod  Sent: 10/2/2024   3:26 PM CDT  To: Gregg Hernandez Staff    Type:  RX Refill Request    Who Called: pt  Refill or New Rx:refill  RX Name and Strength:tamsulosin (FLOMAX) 0.4 mg Cap  How is the patient currently taking it? (ex. 1XDay):Route: Take 1 capsule (0.4 mg total) by mouth once daily. - Oral  Is this a 30 day or 90 day RX:30  Preferred Pharmacy with phone number:[facility] [address] [place], [place] - [address] [facility]   Phone: [phone]  Fax: [phone]  Local or Mail Order:local  Ordering Provider: Herminio Hu  Would the patient rather a call back or a response via [facility]? call  Best Call Back Number:  Additional Information:

## 2025-01-14 ENCOUNTER — LAB VISIT (OUTPATIENT)
Dept: LAB | Facility: HOSPITAL | Age: 65
End: 2025-01-14
Payer: COMMERCIAL

## 2025-01-14 ENCOUNTER — OFFICE VISIT (OUTPATIENT)
Dept: UROLOGY | Facility: CLINIC | Age: 65
End: 2025-01-14
Payer: COMMERCIAL

## 2025-01-14 VITALS
BODY MASS INDEX: 43.23 KG/M2 | HEART RATE: 60 BPM | WEIGHT: 285.25 LBS | DIASTOLIC BLOOD PRESSURE: 75 MMHG | HEIGHT: 68 IN | SYSTOLIC BLOOD PRESSURE: 133 MMHG

## 2025-01-14 DIAGNOSIS — N40.0 BENIGN NON-NODULAR PROSTATIC HYPERPLASIA WITHOUT LOWER URINARY TRACT SYMPTOMS: Primary | ICD-10-CM

## 2025-01-14 DIAGNOSIS — N40.0 BENIGN NON-NODULAR PROSTATIC HYPERPLASIA WITHOUT LOWER URINARY TRACT SYMPTOMS: ICD-10-CM

## 2025-01-14 DIAGNOSIS — E66.01 MORBID OBESITY: ICD-10-CM

## 2025-01-14 LAB — COMPLEXED PSA SERPL-MCNC: 0.43 NG/ML (ref 0–4)

## 2025-01-14 PROCEDURE — 99999 PR PBB SHADOW E&M-EST. PATIENT-LVL IV: CPT | Mod: PBBFAC,,, | Performed by: UROLOGY

## 2025-01-14 PROCEDURE — 36415 COLL VENOUS BLD VENIPUNCTURE: CPT | Performed by: UROLOGY

## 2025-01-14 PROCEDURE — 84153 ASSAY OF PSA TOTAL: CPT | Performed by: UROLOGY

## 2025-01-14 PROCEDURE — 99214 OFFICE O/P EST MOD 30 MIN: CPT | Mod: S$GLB,,, | Performed by: UROLOGY

## 2025-01-14 RX ORDER — TAMSULOSIN HYDROCHLORIDE 0.4 MG/1
0.4 CAPSULE ORAL DAILY
Qty: 30 CAPSULE | Refills: 11 | Status: SHIPPED | OUTPATIENT
Start: 2025-01-14 | End: 2026-01-14

## 2025-01-14 RX ORDER — SODIUM FLUORIDE 6.1 MG/ML
GEL, DENTIFRICE DENTAL
COMMUNITY
Start: 2024-12-31

## 2025-01-14 NOTE — PROGRESS NOTES
Subjective:      Patient ID: Shiraz Jha is a 64 y.o. male.    Chief Complaint: bph f/u    Medication Refill      Patient is a 64 y.o. male who is established to our clinic and was initially referred by their PCP, Dr. Theodore for evaluation of BPH.     Benign Prostatic Hypertrophy  Patient complains of lower urinary tract symptoms. He reports intermittency, straining and weak stream---particularly when he does not take the flomax. However, when he takes the flomax, his symptoms are minimal. He denies incomplete emptying and urgency. Patient states symptoms are of moderate severity. Onset of symptoms was several years ago and was gradual in onset. . He has no personal history but does have a family history of prostate cancer--father. He reports a history of no complicating symptoms. He denies flank pain, gross hematuria, kidney stones and recurrent UTI.     His father had prostate cancer.      Interval History 7/26/22:  Currently taking Flomax 0.4 mg BID, which is no longer relieving his symptoms. Rare dysuria.  Reports worsening nocturia x4 and frequency.    Interval History: 1/14/25:  Doing well from a voiding perspective.  On flomax.  No recent PSA results. Last was 2022 and was 0.41.      Lab Results   Component Value Date    PSA 0.41 02/15/2022    PSA 0.37 03/17/2020    PSA 0.26 01/02/2014    PSADIAG 0.46 09/01/2020    PSADIAG 0.48 05/21/2018    PSADIAG 0.30 12/08/2014         IPSS Questionnaire (AUA-SS) 1/14/25:  Over the past month    1)  Incomplete Emptying - How often have you had a sensation of not emptying your bladder completely after you finish urinating?  0 - Not at all   2)  Frequency - How often have you had to urinate again less than two hours after you finished urinating? 0 - Not at all   3)  Intermittency - How often have you found you stopped and started again several times when you urinated?  0 - Not at all   4) Urgency - How difficult have you found it to postpone urination?  0 - Not at all   5)  Weak Stream - How often have you had a weak urinary stream?  0 - Not at all   6) Straining  - How often have you had to push or strain to begin urination?  0 - Not at all   7) Nocturia - How many times did you most typically get up to urinate from the time you went to bed until the time you got up in the morning?  3 - 3 times   Total score:  0-7 mild, 8-19 moderate, 20-35 severe 3   Quality of Life:  2 - Mostly Satisfied          IPSS Questionnaire (AUA-SS) 7/26/22:  Over the past month    1)  Incomplete Emptying - How often have you had a sensation of not emptying your bladder completely after you finish urinating?  0 - Not at all   2)  Frequency - How often have you had to urinate again less than two hours after you finished urinating? 4 - More than half the time   3)  Intermittency - How often have you found you stopped and started again several times when you urinated?  1 - Less than 1 time in 5   4) Urgency - How difficult have you found it to postpone urination?  0 - Not at all   5) Weak Stream - How often have you had a weak urinary stream?  5 - Almost always   6) Straining  - How often have you had to push or strain to begin urination?  1 - Less than 1 time in 5   7) Nocturia - How many times did you most typically get up to urinate from the time you went to bed until the time you got up in the morning?  5 - 5+ times   Total score:  0-7 mild, 8-19 moderate, 20-35 severe 16   Quality of Life:  5 - Unhappy              Lab Results   Component Value Date    PSA 0.41 02/15/2022    PSA 0.37 03/17/2020    PSA 0.26 01/02/2014    PSADIAG 0.46 09/01/2020    PSADIAG 0.48 05/21/2018    PSADIAG 0.30 12/08/2014         Review of Systems  All other systems reviewed and negative except pertinent positives noted in HPI.    Objective:     Physical Exam  Constitutional:       General: He is not in acute distress.     Appearance: He is well-developed.   HENT:      Head: Normocephalic and atraumatic.   Eyes:      General: No  scleral icterus.  Neck:      Trachea: No tracheal deviation.   Pulmonary:      Effort: Pulmonary effort is normal. No respiratory distress.   Neurological:      Mental Status: He is alert and oriented to person, place, and time.   Psychiatric:         Behavior: Behavior normal.         Thought Content: Thought content normal.         Judgment: Judgment normal.       Assessment:     1. Benign non-nodular prostatic hyperplasia without lower urinary tract symptoms    2. Morbid obesity        Plan:     1. BPH:      -psa 0.41 feb 2022  -prostate meds: flomax q day; continue. Refill sent    -plan for psa today---patient with family h/o prostate cancer. Last psa was in 2022. Will notify of result.     2. Morbid obesity:  -discussed diet and exercise for weight loss  - Weight loss bypass surgery: constant diarrhea        We discussed that if his psa is normal, his LUTs do not warrant a yearly visit with urology.  He can pursue med refills with his PCP.

## 2025-02-06 ENCOUNTER — LAB VISIT (OUTPATIENT)
Dept: LAB | Facility: HOSPITAL | Age: 65
End: 2025-02-06
Attending: INTERNAL MEDICINE
Payer: COMMERCIAL

## 2025-02-06 ENCOUNTER — OFFICE VISIT (OUTPATIENT)
Dept: CARDIOLOGY | Facility: CLINIC | Age: 65
End: 2025-02-06
Payer: COMMERCIAL

## 2025-02-06 VITALS
HEIGHT: 68 IN | BODY MASS INDEX: 44.3 KG/M2 | WEIGHT: 292.31 LBS | DIASTOLIC BLOOD PRESSURE: 75 MMHG | SYSTOLIC BLOOD PRESSURE: 173 MMHG | HEART RATE: 55 BPM

## 2025-02-06 DIAGNOSIS — I77.89 ENLARGED THORACIC AORTA: ICD-10-CM

## 2025-02-06 DIAGNOSIS — G47.33 OBSTRUCTIVE SLEEP APNEA: Chronic | ICD-10-CM

## 2025-02-06 DIAGNOSIS — R60.0 EDEMA OF BOTH LOWER EXTREMITIES: Primary | ICD-10-CM

## 2025-02-06 DIAGNOSIS — I89.0 LYMPHEDEMA OF BOTH LOWER EXTREMITIES: ICD-10-CM

## 2025-02-06 DIAGNOSIS — E66.01 SEVERE OBESITY (BMI >= 40): Chronic | ICD-10-CM

## 2025-02-06 DIAGNOSIS — I10 ESSENTIAL HYPERTENSION: Chronic | ICD-10-CM

## 2025-02-06 DIAGNOSIS — E11.9 TYPE 2 DIABETES MELLITUS WITHOUT COMPLICATION, WITHOUT LONG-TERM CURRENT USE OF INSULIN: Chronic | ICD-10-CM

## 2025-02-06 DIAGNOSIS — Z98.890 STATUS POST CATHETER ABLATION OF ATRIAL FIBRILLATION: ICD-10-CM

## 2025-02-06 DIAGNOSIS — I87.2 VENOUS INSUFFICIENCY OF BOTH LOWER EXTREMITIES: ICD-10-CM

## 2025-02-06 DIAGNOSIS — Z98.890 STATUS POST CATHETER ABLATION OF ATRIAL FLUTTER: ICD-10-CM

## 2025-02-06 DIAGNOSIS — I48.0 PAROXYSMAL ATRIAL FIBRILLATION: ICD-10-CM

## 2025-02-06 DIAGNOSIS — Z86.79 S/P ABLATION OF ATRIAL FIBRILLATION: ICD-10-CM

## 2025-02-06 DIAGNOSIS — Z98.890 S/P ABLATION OF ATRIAL FIBRILLATION: ICD-10-CM

## 2025-02-06 DIAGNOSIS — I48.19 PERSISTENT ATRIAL FIBRILLATION: ICD-10-CM

## 2025-02-06 LAB
ALBUMIN SERPL BCP-MCNC: 3.7 G/DL (ref 3.5–5.2)
ALP SERPL-CCNC: 109 U/L (ref 40–150)
ALT SERPL W/O P-5'-P-CCNC: 16 U/L (ref 10–44)
ANION GAP SERPL CALC-SCNC: 8 MMOL/L (ref 8–16)
AST SERPL-CCNC: 20 U/L (ref 10–40)
BILIRUB SERPL-MCNC: 0.7 MG/DL (ref 0.1–1)
BUN SERPL-MCNC: 16 MG/DL (ref 8–23)
CALCIUM SERPL-MCNC: 8.9 MG/DL (ref 8.7–10.5)
CHLORIDE SERPL-SCNC: 109 MMOL/L (ref 95–110)
CO2 SERPL-SCNC: 23 MMOL/L (ref 23–29)
CREAT SERPL-MCNC: 0.8 MG/DL (ref 0.5–1.4)
EST. GFR  (NO RACE VARIABLE): >60 ML/MIN/1.73 M^2
GLUCOSE SERPL-MCNC: 90 MG/DL (ref 70–110)
POTASSIUM SERPL-SCNC: 4.3 MMOL/L (ref 3.5–5.1)
PROT SERPL-MCNC: 6.7 G/DL (ref 6–8.4)
SODIUM SERPL-SCNC: 140 MMOL/L (ref 136–145)

## 2025-02-06 PROCEDURE — 80053 COMPREHEN METABOLIC PANEL: CPT | Performed by: INTERNAL MEDICINE

## 2025-02-06 PROCEDURE — 99212 OFFICE O/P EST SF 10 MIN: CPT | Mod: S$GLB,,, | Performed by: INTERNAL MEDICINE

## 2025-02-06 PROCEDURE — 99999 PR PBB SHADOW E&M-EST. PATIENT-LVL V: CPT | Mod: PBBFAC,,, | Performed by: INTERNAL MEDICINE

## 2025-02-06 PROCEDURE — 36415 COLL VENOUS BLD VENIPUNCTURE: CPT | Performed by: INTERNAL MEDICINE

## 2025-02-06 RX ORDER — BUMETANIDE 2 MG/1
2 TABLET ORAL 2 TIMES DAILY
Qty: 180 TABLET | Refills: 3 | Status: SHIPPED | OUTPATIENT
Start: 2025-02-06 | End: 2026-02-06

## 2025-02-06 NOTE — PATIENT INSTRUCTIONS
Assessment/Plan:  Mr. Shiraz Jha is a 65 y.o. male with Afib s/p ablation (7/2014), BLE lymphedema, venous insufficiency, HTN, DM2, morbid obesity s/p gastric bypass (2010), QUINTIN (on CPAP), who presents for a follow up appointment.      1. BLE Edema due to lymphedema and venous insufficiency- Mr. Jha reports worsening leg swelling over the past few weeks. States he's taking bumex daily instead of bid as prescribed.  Pt instructed to take bumex 2 mg bid and spironolactone 25 mg daily. Check cmp today and in 1 week. Continue graduated compression hose; elevate legs when resting, limit fluid intake to 1.5L daily, sodium intake to 2000mg daily.  Check cmp today to monitor renal function.     2. Morbid Obesity s/p Gastric Bypass- Encourage dietary modification, exercise and weight loss.      3. Afib s/p ablation (7/2014)- Stable.  Continue Xarelto 20 mg daily for anticoagulation.     4. HTN- Controlled.  Continue current medications.      5. QUINTIN- Continue CPAP nightly.     Follow up in 2 months

## 2025-02-06 NOTE — PROGRESS NOTES
"  [facility]    CC: Lower Extremity Edema    Patient ID: Mr. Shiraz Jha is a 65 y.o. male with Afib s/p ablation (7/2014), BLE lymphedema, venous insufficiency, HTN, DM2, morbid obesity s/p gastric bypass (2010), QUINTIN (on CPAP), who presents for a follow up appointment.  Pertinent history/events as follows:    -Pt kindly referred by Twyla Bahena and Dr Scott for evaluation of lower extremity edema (per Dr. Scott's note on 9/24/2019:  "Mr. Jha is in clinic today for continued LE edema and discoloration of his legs.  He had a venous ultrasound on 8/6/19 for the swelling and mild reflux was noted in the left popliteal.  Patient denies chest pain with exertion or at rest, palpitations, SOB, DAI, dizziness, syncope, orthopnea, PND, or claudication.  Instructed to elevate legs when seated, low salt diet, increase walking and compression stockings. No venous reflux noted on US.  Refer to Dr. Ureña in interventional cardiology."    -At our initial clinic visit on 10/16/2019, Mr. Jha reported BLE edema for the past 1 year.  States LE edema is improved with graduated compression hose.  He has no LE pain, claudication symptoms, rest pain, or tissue loss.  No smoking history.  States he has gained over 50 pounds in the past year, despite gastric bypass surgery.  BLE Venous Reflux Study from 8/6/2019 showed no evidence of lower extremity DVT bilaterally.  There is mild left popliteal (deep venous) reflux.  Plan:   BLE Edema- BLE edema likely due to a component of venous insufficiency and morbid obesity.  Check cmp today.  If potassium and creatinine are appropriate, increase lasix to 40 mg bid for 7 days, then resume at 40 mg daily.  Pt to continue this cycle/regimen for lasix.  Pt will benefit from significant weight loss.  Continue graduated compression hose, leg elevation and low salt diet.  Limit fluid intake to 2 liters a day.  Check exercise SURINDER to evaluate for significant PAD.   Morbid Obesity- " Refer back to Bariatric Surgery.     -At follow up clinic visit on 11/27/2019, Mr. Jha reported no significant improvement in BLE edema with lasix regimen of 40 mg bid for 7 days, then resuming at 40 mg daily.  Exercise SURINDER from 11/15/2019 showed normal rest and exercise SURINDER bilaterally with normal PVR waveforms bilaterally.  Exam shows 1+ BLE pitting edema.  Plan:   BLE Edema- BLE edema likely due to a component of venous insufficiency and morbid obesity.  Check cmp today.  If potassium and creatinine are appropriate, discontinue lasix and start bumex 1 mg bid.  Pt will benefit from significant weight loss.  Continue graduated compression hose, leg elevation and low salt diet.  Limit fluid intake to 2 liters a day.  Check exercise SURINDER to evaluate for significant PAD.   Morbid Obesity- Pt referred back to Bariatric Surgery.     -At clinic visit on 1/8/2020, Mr. Jha reported significant improvement in BLE edema since starting bumex 1 mg daily.  Exercise SURINDER study from 11/25/2019 showed normal rest and exercise SURINDER bilaterally.  Normal PVR waveforms bilaterally.  Plan:   BLE Edema- BLE edema now improving.  Continue bumex 1 mg daily.  Check cmp today to monitor renal fxn.  Exercise SURINDER study from 11/25/2019 showed normal rest and exercise SURINDER bilaterally.  Normal PVR waveforms bilaterally. Continue graduated compression hose, leg elevation and low salt diet.  Limit fluid intake to 2 liters a day.    Morbid Obesity- Pt referred back to Bariatric Surgery.     - At clinic visit on 04/08/20, Mr. Jha reported doing well with no significant LE edema.  Continues to wear graduated compression hose, low salt diet, and limiting fluid intake to 2 liters a day.    Plan  BLE Edema- Currently doing well.  Continue bumex 1 mg daily.  Continue graduated compression hose, leg elevation and low salt diet.  Limit fluid intake to 2 liters a day.      -At clinic visit on 12/01/20, Mr. Jha reported worsening swelling of his bilateral  lower extremities. He stopped wearing compression hose a month ago, however, he continues to take bumex twice a day. He reports no rest pain, claudication, CLI or tissue atrophy.   Plan:  BLE Edema- Due to lymphedema and venous insufficiency. Patient notices worsening of bilateral lower extremity edema after he stopped using compression hose for the past month.  Pt instructed to resume graduated compression hose as prescribed.  Continue bumex 1 mg twice a day.  Continue leg elevation and low salt diet.  Limit fluid intake to 2 liters a day.      -At clinic visit on 1/7/2021, Mr. Jha reported improvement in bilateral lower extremity edema compared to previous visit on 12/01/20.  He reports wearing graduated compression hose.  He is taking Bumex 1 mg twice a day.  He is not following sodium restricted diet, and diet includes soups and gumbo.  He reports no claudication, rest pain, or tissue loss.      Plan:   BLE Edema- Due to lymphedema and venous insufficiency. Patient notices improvement of bilateral lower extremity edema. Continue graduated compression hose.  Continue bumex 1 mg twice a day.  Continue leg elevation when resting.  Encourage sodium restriction to 2000 mg/day and limit fluid intake to 2 liters a day.  Obesity- Pt is following up with Bariatric Surgery.  Encourage dietary modification, exercise and weight loss.      -At clinic visit on 2/9/2021, Mr. Jha reports mild improvement in BLE edema since clinic visit on 1/7/2021.  He has no claudication or tissue loss.   Plan:   BLE Edema- Due to lymphedema and venous insufficiency.   Edema is improving.  Check cmp today.  If creatinine and potassium are appropriate, increase bumex to 2 mg bid for 5 days, then decrease to 1 mg bid for 5 days.  Pt to continue this cycle/ergiemen of lasix.  Continue leg elevation when resting.  Limit sodium restriction to 2000 mg/day and limit fluid intake to 2 liters a day.  Obesity- Pt is following up with Bariatric  Surgery.  Encourage dietary modification, exercise and weight loss.      -At clinic visit on 03/25/21, Mr. Jha reported no new symptoms.  Exam shows BLE edema has improved significantly.  Plan:  BLE Edema- Due to lymphedema and venous insufficiency.   Edema has improved significantly.  Refer to lymphedema clinic.  Recheck cmp today.  If creatinine and potassium are appropriate, continue bumex 2 mg bid for 5 days, then decrease to 1 mg bid for 5 days.  Pt to continue this cycle/ergiemen of lasix.  Continue leg elevation when resting.  Limit sodium restriction to 2000 mg/day and limit fluid intake to 2 liters a day.    Obesity- Pt is following up with Bariatric Surgery.  Encourage dietary modification, exercise and weight loss.      -At clinic visit on 5/25/2021, Mr. Jha reported significant improvement in lower extremity edema since initiation of lymphedema clinic therapy.   He reports no claudication, rest pain or tissue loss.   Plan:    BLE Edema- Due to lymphedema and venous insufficiency.   Edema has improved significantly.  Continue lymphedema clinic.  Check CMP today.  If creatinine and potassium are appropriate, continue bumex 2 mg bid for 5 days, then decrease to 1 mg bid for 5 days.  Pt to continue this cycle/ergiemen of lasix.  Continue leg elevation when resting.  Limit sodium restriction to 2000 mg/day and limit fluid intake to 2 liters a day.    Obesity- Pt is following up with Bariatric Surgery.  Encourage dietary modification, exercise and weight loss.       -At clinic visit on 7/13/2021, Mr. Jha reported significant improvement in BLE edema following lymphedema clinic therapy.  He has no claudication or tissue loss.   Plan:   BLE Edema due to lymphedema and venous insufficiency- Now significantly improved following lymphedema clinic therapy.  Check cmp today.  If potassium and creatinine are appropriate, continue bumex 2 mg bid for 1 week, then reduce to 1 mg bid for 1 week.  Pt to continue this  regimen/cycle of bumex.    Obesity- Pt is following up with Bariatric Surgery.  Encourage dietary modification, exercise and weight loss.     -At clinic visit on 10/14/2021 clinic visit, Mr. Jha reporeds continued improvement in BLE edema.  He has no claudication or tissue loss.    Plan:  BLE Edema due to lymphedema and venous insufficiency- Remains significantly improved.  Continue bumex 2 mg bid for 1 week, then reduce to 1 mg bid for 1 week.  Pt to continue this regimen/cycle of bumex.    Obesity- Pt is following up with Bariatric Surgery.  Encourage dietary modification, exercise and weight loss.      -At clinic visit on 1/18/2022, Mr. Jha reported feeling well and swelling improved. He has no other complaints.   Plan:   BLE Edema due to lymphedema and venous insufficiency- Continues to improve. Continue bumex, compression stockings, elevate legs when resting, limit fluid intake to 1.5L daily, sodium intake to 2000mg daily.  Morbid Obesity- Pt is following up with Bariatric Surgery. Stable. Encourage dietary modification, exercise and weight loss.      -At clinic visit on 5/17/2022, Mr. Jha reported worsening leg swelling.  On 5/10/2022, he was admitted for GI bleeding.  During the hospitalization, bumex was held.  He was subsequently restarted on bumex after discharge.   on 5/11/2022.  Currently taking bumex 2 mg bid on Tues/Thur/Sat/Sun.  Taking 4 mg bid on Mon/Wed/Fri.     Plan:   BLE Edema due to lymphedema and venous insufficiency- Pt with worsening leg swelling since hospitalization on 5/10/2022.  Improving on current regimen.  Refer back to lymphedema clinic.  Continue bumex 2 mg bid on Tues/Thur/Sat/Sun and 4 mg bid on Mon/Wed/Fri.   Continue graduated compression hose; elevate legs when resting, limit fluid intake to 1.5L daily, sodium intake to 2000mg daily.  Check cmp today.    Morbid Obesity- Pt is following up with Bariatric Surgery. Stable. Encourage dietary modification, exercise and  weight loss.    -At clinic visit on 6/28/2022, Mr. Jha reported improvement in BLE edema.  He is on the waiting list for lymphedema clinic therapy.  Currently taking bumex 2 mg bid.  Plan:   BLE Edema due to lymphedema and venous insufficiency- Improving.  Pt is on the waiting list for lymphedema clinic therapy.  Continue bumex 2 mg bid.  Check cmp today to monitor renal function.  Continue graduated compression hose; elevate legs when resting, limit fluid intake to 1.5L daily, sodium intake to 2000mg daily.    Morbid Obesity s/p Gastric Bypass- Pt is following up with Bariatric Surgery. Stable. Encourage dietary modification, exercise and weight loss.    Afib s/p ablation (7/2014)- Stable.  Continue Xarelto 20 mg daily for anticoagulation.   HTN- Controlled.  Continue current medications.    QUINTIN- Continue CPAP nightly.     -At clinic visit on 12/27/2022, Mr. Jha reported BLE edema has improved and is stable.  Spironolactone 25 mg daily was added to his regimen by Dr. James on 10/11/202.  He has no chest pain or SOB.  Plans to have right ankle surgery on 2/3/2022.   Plan:   BLE Edema due to lymphedema and venous insufficiency- Improved and stable.  Refer back to lymphedema clinic.  Continue bumex 2 mg bid and spironolactone 25 mg daily.  Continue graduated compression hose; elevate legs when resting, limit fluid intake to 1.5L daily, sodium intake to 2000mg daily.    Morbid Obesity s/p Gastric Bypass- Encourage dietary modification, exercise and weight loss.    Afib s/p ablation (7/2014)- Stable.  Continue Xarelto 20 mg daily for anticoagulation.   HTN- Controlled.  Continue current medications.    QUINTIN- Continue CPAP nightly.     -2/20/2024 clinic visit, Mr. Jha reports significant improvement in BLE edema.  He has completed another course of lymphedema clinic therapy.  He reports no claudication or tissue loss.  Plan:  BLE Edema due to lymphedema and venous insufficiency- Stable.  Continue bumex 2 mg bid and  spironolactone 25 mg daily.  Continue graduated compression hose; elevate legs when resting, limit fluid intake to 1.5L daily, sodium intake to 2000mg daily.  Check cmp today to monitor renal function.   Morbid Obesity s/p Gastric Bypass- Encourage dietary modification, exercise and weight loss.    Afib s/p ablation (7/2014)- Stable.  Continue Xarelto 20 mg daily for anticoagulation.   HTN- Controlled.  Continue current medications.    QUINTIN- Continue CPAP nightly.     HPI:  Mr. Jha reports worsening leg swelling over the past few weeks. He has no chest pain or SOB.  States he's taking bumex daily instead of bid as prescribed.     Past Medical History:   Diagnosis Date    Allergy     Anemia     Asthma     Atrial fibrillation     Cataract     Chronic rhinitis 9/26/2013    DDD (degenerative disc disease) 4/3/2015    Depression     Diabetes mellitus     Fibromyalgia     Gastroesophageal reflux disease without esophagitis 7/14/2017    Hypertension     Sinusitis, acute, maxillary 12/20/2012    Thyroid disease        Past Surgical History:   Procedure Laterality Date    ANTEGRADE SINGLE BALLOON ENTEROSCOPY N/A 5/26/2022    Procedure: ENTEROSCOPY, SINGLE BALLOON, ANTEGRADE;  Surgeon: Lyle Edmond MD;  Location: [facility] ([address] [place] [address]);  Service: Endoscopy;  Laterality: N/A;  Will use single-balloon scope for both the upper endoscopy and the colonoscopy - recent incomplete colonoscopy due to looping.    Pt is fully vaccinated-DS  5/20/22-Approval to hold Xarelto rec'd from Dr. Webb-pending approval (see telephoned en    CERVICAL SPINE SURGERY      COLONOSCOPY N/A 5/10/2022    Procedure: COLONOSCOPY;  Surgeon: Conrad Diaz MD;  [facility] ([facility]  Service: Endoscopy;  Laterality: N/A;    COLONOSCOPY N/A 5/26/2022    Procedure: COLONOSCOPY;  Surgeon: Lyle Edmond MD;  [facility]  Service: Endoscopy;  Laterality: N/A;    EPIDURAL STEROID INJECTION INTO LUMBAR SPINE N/A 5/29/2018     Procedure: INJECTION-STEROID-EPIDURAL-LUMBAR- L4-5;  Surgeon: Roman Lyman MD;  Location: [facility];  Service: Pain Management;  Laterality: N/A;  Patient is diabetic and Xarleto     EPIDURAL STEROID INJECTION INTO LUMBAR SPINE N/A 7/3/2018    Procedure: INJECTION, STEROID, SPINE, LUMBAR, EPIDURAL- L4-5;  Surgeon: Roman Lyman MD;  Location: [facility];  Service: Pain Management;  Laterality: N/A;  Patient takes Xarelto and ASA     ESOPHAGOGASTRODUODENOSCOPY N/A 5/9/2022    Procedure: EGD (ESOPHAGOGASTRODUODENOSCOPY);  Surgeon: Conrad Diaz MD;  Location: [facility] (2ND FLR);  Service: Endoscopy;  Laterality: N/A;    EXCISION OF LESION OF LIP N/A 7/7/2020    Procedure: EXCISION, LESION, LIP;  Surgeon: Caden Navarro MD;  Location: [facility] OR 2ND FLR;  Service: ENT;  Laterality: N/A;    GASTRIC BYPASS      SINUS SURGERY  2000    Dr. Watt at [facility]    TONSILLECTOMY         Social History     Socioeconomic History    Marital status: Single   Tobacco Use    Smoking status: Never    Smokeless tobacco: Never   Substance and Sexual Activity    Alcohol use: No     Comment: rare    Drug use: No    Sexual activity: Yes     Partners: Female   Social History Narrative    [place], lives alone w/2 dogs.    Dogs are his kids.    Works PT --  at [employer], on ssdi from neck and back/depression.     Social Drivers of Health     Financial Resource Strain: High Risk (1/30/2025)    Overall Financial Resource Strain (CARDIA)     Difficulty of Paying Living Expenses: Very hard   Food Insecurity: Food Insecurity Present (1/30/2025)    Hunger Vital Sign     Worried About Running Out of Food in the Last Year: Sometimes true     Ran Out of Food in the Last Year: Patient declined   Transportation Needs: No Transportation Needs (5/8/2023)    PRAPARE - Transportation     Lack of Transportation (Medical): No     Lack of Transportation (Non-Medical): No   Physical Activity: Inactive (1/30/2025)    Exercise Vital Sign      Days of Exercise per Week: 0 days     Minutes of Exercise per Session: 0 min   Stress: Patient Declined (1/30/2025)    [employer] - Occupational Stress Questionnaire     Feeling of Stress : Patient declined   Housing Stability: High Risk (5/8/2023)    Housing Stability Vital Sign     Unable to Pay for Housing in the Last Year: Yes     Number of Places Lived in the Last Year: 1     Unstable Housing in the Last Year: Yes       Family History   Problem Relation Name Age of Onset    Heart disease Father      Cancer Mother          lung    Hypertension Sister      Heart disease Brother      Cirrhosis Brother      Diabetes Brother         Review of patient's allergies indicates:   Allergen Reactions    Iodinated contrast media Other (See Comments)     Raises BP         Medication List with Changes/Refills   Current Medications    BUMETANIDE (BUMEX) 2 MG TABLET    Take 1 tablet (2 mg total) by mouth 2 (two) times daily.    BUPROPION (WELLBUTRIN XL) 300 MG 24 HR TABLET    Take 300 mg by mouth once daily. Pt states he only takes 300mg    CYANOCOBALAMIN 500 MCG TABLET    Take 500 mcg by mouth once daily.    DEXTROAMPHETAMINE-AMPHETAMINE (ADDERALL) 20 MG TABLET    Take 1 tablet by mouth every morning, take 1 tablet by mouth 3-4 hours later, and take 1/2 tablet every afternoon    FLUTICASONE PROPIONATE (FLONASE) 50 MCG/ACTUATION NASAL SPRAY    2 sprays (100 mcg total) by Each Nostril route once daily.    GABAPENTIN (NEURONTIN) 300 MG CAPSULE    Take 1 capsule (300 mg total) by mouth 3 (three) times daily.    IMPOYZ 0.025 % CREA    Apply topically as needed.     LEVOCETIRIZINE (XYZAL) 5 MG TABLET    TAKE 1 TABLET (5 MG TOTAL) BY MOUTH EVERY EVENING.    LUZU 1 % CREA    Apply topically as needed.     METHYLPREDNISOLONE (MEDROL DOSEPACK) 4 MG TABLET    Take as directed    METOPROLOL TARTRATE (LOPRESSOR) 25 MG TABLET    Take 1 tablet (25 mg total) by mouth 2 (two) times daily.    MULTIVITAMIN  "(THERAGRAN) PER TABLET    Take 1 tablet by mouth once daily.     NITROGLYCERIN (NITROSTAT) 0.4 MG SL TABLET    Please dispense 25 pills in 4 bottles.    OMEPRAZOLE (PRILOSEC) 10 MG CAPSULE    Take 2 capsules (20 mg total) by mouth once daily.    POTASSIUM CHLORIDE SA (K-DUR,KLOR-CON M) 10 MEQ TABLET    Take 1 tablet (10 mEq total) by mouth 2 (two) times daily.    PREVIDENT 5000 DRY MOUTH 1.1 % PSTE    DO NOT EAT ,DRINK OR RINSE FOR 30 MINUTES AFTER BRUSHING    RIVAROXABAN (XARELTO) 20 MG TAB    Take 1 tablet (20 mg total) by mouth once daily.    SOTALOL (BETAPACE) 80 MG TABLET    Take 1 tablet (80 mg total) by mouth 2 (two) times daily.    SPIRONOLACTONE (ALDACTONE) 25 MG TABLET    Take 1 tablet by mouth once daily    TAMSULOSIN (FLOMAX) 0.4 MG CAP    Take 1 capsule (0.4 mg total) by mouth once daily.    THIAMINE 250 MG TABLET    Take 250 mg by mouth once daily.    TRAZODONE (DESYREL) 150 MG TABLET    Take 150 mg by mouth every evening.    TRAZODONE (DESYREL) 50 MG TABLET    Take 50 mg by mouth every evening.    VITAMIN D (VITAMIN D3) 1000 UNITS TAB    Take 1,000 Units by mouth once daily.    VRAYLAR 3 MG CAP    Take 3 mg by mouth Daily.       Review of Systems  Constitution: Denies chills, fever, and sweats.  HENT: Denies headaches or blurry vision.  Cardiovascular: Denies chest pain or irregular heart beat.  Respiratory: Denies cough or shortness of breath.  Gastrointestinal: Denies abdominal pain, nausea, or vomiting.  Musculoskeletal: Positive for BLE swelling  improved  Neurological: Denies dizziness or focal weakness.  Psychiatric/Behavioral: Normal mental status.  Hematologic/Lymphatic: Denies bleeding problem or easy bruising/bleeding.  Skin: Denies rash or suspicious lesions    Physical Examination  BP (!) 173/75   Pulse (!) 55   Ht 5' 8" (1.727 m)   Wt 132.6 kg (292 lb 5.3 oz)   BMI 44.45 kg/m²     Constitutional: No acute distress, conversant  HEENT: Sclera anicteric, Pupils equal, round and reactive " to light, extraocular motions intact, Oropharynx clear  Neck: No JVD, no carotid bruits  Cardiovascular: regular rate and rhythm, no murmur, rubs or gallops, normal S1/S2  Pulmonary: Clear to auscultation bilaterally  Abdominal: Abdomen soft, nontender, nondistended, positive bowel sounds  Extremities: BLE's with minimal pitting edema   Pulses:  Carotid pulses are 2+ on the right side, and 2+ on the left side.  Radial pulses are 2+ on the right side, and 2+ on the left side.   Femoral pulses are 2+ on the right side, and 2+ on the left side.  Popliteal pulses are 2+ on the right side, and 2+ on the left side.   Dorsalis pedis pulses are 2+ on the right side, and 2+ on the left side.   Posterior tibial pulses are 2+ on the right side, and 2+ on the left side.    Skin: No ecchymosis, erythema, or ulcers  Psych: Alert and oriented x 3, appropriate affect  Neuro: CNII-XII intact, no focal deficits    Labs:  Most Recent Data  CBC:   Lab Results   Component Value Date    WBC 12.05 11/18/2023    HGB 11.3 (L) 11/18/2023    HCT 38.0 (L) 11/18/2023     11/18/2023    MCV 76 (L) 11/18/2023    RDW 16.6 (H) 11/18/2023     BMP:   Lab Results   Component Value Date     11/18/2023    K 4.2 11/18/2023     11/18/2023    CO2 23 11/18/2023    BUN 11 11/18/2023    CREATININE 1.0 11/18/2023     (H) 11/18/2023    CALCIUM 9.2 11/18/2023    MG 2.3 05/08/2023    PHOS 2.8 05/08/2023     LFTS;   Lab Results   Component Value Date    PROT 7.0 11/18/2023    ALBUMIN 3.6 11/18/2023    BILITOT 1.3 (H) 11/18/2023    AST 19 11/18/2023    ALKPHOS 115 11/18/2023    ALT 10 11/18/2023     COAGS:   Lab Results   Component Value Date    INR 1.1 05/06/2022     FLP:   Lab Results   Component Value Date    CHOL 186 02/15/2022    HDL 37 (L) 02/15/2022    LDLCALC 115.6 02/15/2022    TRIG 167 (H) 02/15/2022    CHOLHDL 19.9 (L) 02/15/2022     CARDIAC:   Lab Results   Component Value Date    TROPONINI <0.006 07/12/2022    BNP 32 11/18/2023        Echo 7/23/2019:  Normal left ventricular systolic function. The estimated ejection fraction is 60%  Normal right ventricular systolic function.  Normal LV diastolic function.  Moderate left atrial enlargement.  Mild right atrial enlargement.  The ascending aorta is mildly dilated (42mm in diameter, unchanged since Feb 2018).  The estimated PA systolic pressure is 23 mm Hg  Normal central venous pressure (3 mm Hg).     Exercise SURINDER 11/25/2019:  Normal rest and exercise SURINDER bilaterally.  Normal PVR waveforms bilaterally.    BLE Venous Reflux Study 8/6/2019:  No evidence of lower extremity DVT bilaterally.  Mild left popliteal (deep venous) reflux.    Assessment/Plan:  Mr. Shiraz Jha is a 65 y.o. male with Afib s/p ablation (7/2014), BLE lymphedema, venous insufficiency, HTN, DM2, morbid obesity s/p gastric bypass (2010), QUINTIN (on CPAP), who presents for a follow up appointment.      1. BLE Edema due to lymphedema and venous insufficiency- Mr. Jha reports worsening leg swelling over the past few weeks. States he's taking bumex daily instead of bid as prescribed.  Pt instructed to take bumex 2 mg bid and spironolactone 25 mg daily. Check cmp today and in 1 week. Continue graduated compression hose; elevate legs when resting, limit fluid intake to 1.5L daily, sodium intake to 2000mg daily.  Check cmp today to monitor renal function.     2. Morbid Obesity s/p Gastric Bypass- Encourage dietary modification, exercise and weight loss.      3. Afib s/p ablation (7/2014)- Stable.  Continue Xarelto 20 mg daily for anticoagulation.     4. HTN- Controlled.  Continue current medications.      5. QUINTIN- Continue CPAP nightly.     Follow up in 2 months     Total duration of face to face visit time 30 minutes.  Total time spent counseling greater than fifty percent of total visit time.  Counseling included discussion regarding imaging findings, diagnosis, possibilities, treatment options, risks and benefits.  The patient had  many questions regarding the options and long-term effects.    Randolph Ureña MD, PhD  Interventional Cardiology

## 2025-02-12 ENCOUNTER — LAB VISIT (OUTPATIENT)
Dept: LAB | Facility: HOSPITAL | Age: 65
End: 2025-02-12
Attending: INTERNAL MEDICINE
Payer: COMMERCIAL

## 2025-02-12 DIAGNOSIS — E66.01 SEVERE OBESITY (BMI >= 40): Chronic | ICD-10-CM

## 2025-02-12 LAB
ALBUMIN SERPL BCP-MCNC: 4 G/DL (ref 3.5–5.2)
ALP SERPL-CCNC: 111 U/L (ref 40–150)
ALT SERPL W/O P-5'-P-CCNC: 14 U/L (ref 10–44)
ANION GAP SERPL CALC-SCNC: 9 MMOL/L (ref 8–16)
AST SERPL-CCNC: 17 U/L (ref 10–40)
BILIRUB SERPL-MCNC: 0.9 MG/DL (ref 0.1–1)
BUN SERPL-MCNC: 19 MG/DL (ref 8–23)
CALCIUM SERPL-MCNC: 8.9 MG/DL (ref 8.7–10.5)
CHLORIDE SERPL-SCNC: 104 MMOL/L (ref 95–110)
CHOLEST SERPL-MCNC: 171 MG/DL (ref 120–199)
CHOLEST/HDLC SERPL: 6.3 {RATIO} (ref 2–5)
CO2 SERPL-SCNC: 27 MMOL/L (ref 23–29)
CREAT SERPL-MCNC: 0.9 MG/DL (ref 0.5–1.4)
EST. GFR  (NO RACE VARIABLE): >60 ML/MIN/1.73 M^2
GLUCOSE SERPL-MCNC: 121 MG/DL (ref 70–110)
HDLC SERPL-MCNC: 27 MG/DL (ref 40–75)
HDLC SERPL: 15.8 % (ref 20–50)
LDLC SERPL CALC-MCNC: 116.2 MG/DL (ref 63–159)
NONHDLC SERPL-MCNC: 144 MG/DL
POTASSIUM SERPL-SCNC: 4 MMOL/L (ref 3.5–5.1)
PROT SERPL-MCNC: 6.9 G/DL (ref 6–8.4)
SODIUM SERPL-SCNC: 140 MMOL/L (ref 136–145)
TRIGL SERPL-MCNC: 139 MG/DL (ref 30–150)

## 2025-02-12 PROCEDURE — 36415 COLL VENOUS BLD VENIPUNCTURE: CPT | Performed by: INTERNAL MEDICINE

## 2025-02-12 PROCEDURE — 80061 LIPID PANEL: CPT | Performed by: INTERNAL MEDICINE

## 2025-02-12 PROCEDURE — 80053 COMPREHEN METABOLIC PANEL: CPT | Performed by: INTERNAL MEDICINE

## 2025-03-21 NOTE — PATIENT INSTRUCTIONS
Assessment/Plan:  Mr. Shiraz Jha is a 62 y.o. male with Afib s/p ablation (7/2014), BLE lymphedema, venous insufficiency, HTN, DM2, morbid obesity s/p gastric bypass (2010), QUINTIN (on CPAP), who presents for a follow up appointment.      1. BLE Edema due to lymphedema and venous insufficiency- Improving.  Pt is on the waiting list for lymphedema clinic therapy.  Continue bumex 2 mg bid.  Check cmp today to monitor renal function.  Continue graduated compression hose; elevate legs when resting, limit fluid intake to 1.5L daily, sodium intake to 2000mg daily.      2. Morbid Obesity s/p Gastric Bypass- Pt is following up with Bariatric Surgery. Stable. Encourage dietary modification, exercise and weight loss.      3. Afib s/p ablation (7/2014)- Stable.  Continue Xarelto 20 mg daily for anticoagulation.     4. HTN- Controlled.  Continue current medications.      5. QUINTIN- Continue CPAP nightly.     Follow up in 3 months   Attending Attestation (For Attendings USE Only)...

## 2025-03-31 DIAGNOSIS — M53.3 SACROILIAC JOINT PAIN: ICD-10-CM

## 2025-03-31 DIAGNOSIS — M54.17 LUMBOSACRAL RADICULOPATHY: ICD-10-CM

## 2025-03-31 DIAGNOSIS — M51.369 DDD (DEGENERATIVE DISC DISEASE), LUMBAR: ICD-10-CM

## 2025-03-31 DIAGNOSIS — R50.9 FEBRILE ILLNESS: ICD-10-CM

## 2025-03-31 RX ORDER — GABAPENTIN 300 MG/1
300 CAPSULE ORAL 3 TIMES DAILY
Qty: 270 CAPSULE | Refills: 3 | Status: CANCELLED | OUTPATIENT
Start: 2025-03-31 | End: 2026-03-31

## 2025-04-01 RX ORDER — FLUTICASONE PROPIONATE 50 MCG
2 SPRAY, SUSPENSION (ML) NASAL DAILY
Qty: 16 G | Refills: 11 | OUTPATIENT
Start: 2025-04-01

## 2025-04-01 NOTE — TELEPHONE ENCOUNTER
----- Message from Eda sent at 4/1/2025  9:12 AM CDT -----  Type: Patient callWho called: Patient Does the patient know what this is regarding? Requesting a call back in regards to needing to schedule an appt to get medications updated ; patient last office visit was 3/17/22 ; please advise Would the patient rather a call back or response via [facility]? [facility]: [phone] information:

## 2025-04-01 NOTE — TELEPHONE ENCOUNTER
Staff spoke with patient to assist with scheduling an appointment to be reevaluated due to last office visit being in 2022. Patient accepted appointment that was offered but then inquired about a refill on medications. Staff informed patient that since it has been three years since last visit, we are unable to refill any medications. Patient stated he will just go to [facility] and disconnected call.

## 2025-04-10 ENCOUNTER — LAB VISIT (OUTPATIENT)
Dept: LAB | Facility: HOSPITAL | Age: 65
End: 2025-04-10
Attending: INTERNAL MEDICINE
Payer: COMMERCIAL

## 2025-04-10 ENCOUNTER — OFFICE VISIT (OUTPATIENT)
Dept: CARDIOLOGY | Facility: CLINIC | Age: 65
End: 2025-04-10
Payer: COMMERCIAL

## 2025-04-10 VITALS
BODY MASS INDEX: 43.51 KG/M2 | HEIGHT: 68 IN | SYSTOLIC BLOOD PRESSURE: 116 MMHG | DIASTOLIC BLOOD PRESSURE: 74 MMHG | HEART RATE: 62 BPM | WEIGHT: 287.06 LBS

## 2025-04-10 DIAGNOSIS — Z98.890 STATUS POST CATHETER ABLATION OF ATRIAL FLUTTER: ICD-10-CM

## 2025-04-10 DIAGNOSIS — R60.0 EDEMA OF BOTH LOWER EXTREMITIES: Primary | ICD-10-CM

## 2025-04-10 DIAGNOSIS — E78.2 MIXED HYPERLIPIDEMIA: ICD-10-CM

## 2025-04-10 DIAGNOSIS — Z86.79 S/P ABLATION OF ATRIAL FIBRILLATION: ICD-10-CM

## 2025-04-10 DIAGNOSIS — I87.2 VENOUS INSUFFICIENCY OF BOTH LOWER EXTREMITIES: ICD-10-CM

## 2025-04-10 DIAGNOSIS — G47.33 OBSTRUCTIVE SLEEP APNEA: Chronic | ICD-10-CM

## 2025-04-10 DIAGNOSIS — Z98.890 S/P ABLATION OF ATRIAL FIBRILLATION: ICD-10-CM

## 2025-04-10 DIAGNOSIS — Z98.890 STATUS POST CATHETER ABLATION OF ATRIAL FIBRILLATION: ICD-10-CM

## 2025-04-10 DIAGNOSIS — I89.0 LYMPHEDEMA OF BOTH LOWER EXTREMITIES: ICD-10-CM

## 2025-04-10 DIAGNOSIS — I77.89 ENLARGED THORACIC AORTA: ICD-10-CM

## 2025-04-10 DIAGNOSIS — I48.92 ATRIAL FLUTTER, UNSPECIFIED TYPE: Chronic | ICD-10-CM

## 2025-04-10 DIAGNOSIS — E11.9 TYPE 2 DIABETES MELLITUS WITHOUT COMPLICATION, WITHOUT LONG-TERM CURRENT USE OF INSULIN: Chronic | ICD-10-CM

## 2025-04-10 DIAGNOSIS — I10 ESSENTIAL HYPERTENSION: Chronic | ICD-10-CM

## 2025-04-10 DIAGNOSIS — E66.01 SEVERE OBESITY (BMI >= 40): Chronic | ICD-10-CM

## 2025-04-10 DIAGNOSIS — R60.0 EDEMA OF BOTH LOWER EXTREMITIES: ICD-10-CM

## 2025-04-10 LAB
ALBUMIN SERPL BCP-MCNC: 3.6 G/DL (ref 3.5–5.2)
ALP SERPL-CCNC: 100 UNIT/L (ref 40–150)
ALT SERPL W/O P-5'-P-CCNC: 18 UNIT/L (ref 10–44)
ANION GAP (OHS): 7 MMOL/L (ref 8–16)
AST SERPL-CCNC: 13 UNIT/L (ref 11–45)
BILIRUB SERPL-MCNC: 0.6 MG/DL (ref 0.1–1)
BUN SERPL-MCNC: 16 MG/DL (ref 8–23)
CALCIUM SERPL-MCNC: 8.6 MG/DL (ref 8.7–10.5)
CHLORIDE SERPL-SCNC: 108 MMOL/L (ref 95–110)
CHOLEST SERPL-MCNC: 160 MG/DL (ref 120–199)
CHOLEST/HDLC SERPL: 5.9 {RATIO} (ref 2–5)
CO2 SERPL-SCNC: 27 MMOL/L (ref 23–29)
CREAT SERPL-MCNC: 0.8 MG/DL (ref 0.5–1.4)
GFR SERPLBLD CREATININE-BSD FMLA CKD-EPI: >60 ML/MIN/1.73/M2
GLUCOSE SERPL-MCNC: 84 MG/DL (ref 70–110)
HDLC SERPL-MCNC: 27 MG/DL (ref 40–75)
HDLC SERPL: 16.9 % (ref 20–50)
LDLC SERPL CALC-MCNC: 92.2 MG/DL (ref 63–159)
NONHDLC SERPL-MCNC: 133 MG/DL
POTASSIUM SERPL-SCNC: 4.3 MMOL/L (ref 3.5–5.1)
PROT SERPL-MCNC: 6.4 GM/DL (ref 6–8.4)
SODIUM SERPL-SCNC: 142 MMOL/L (ref 136–145)
TRIGL SERPL-MCNC: 204 MG/DL (ref 30–150)

## 2025-04-10 PROCEDURE — 80053 COMPREHEN METABOLIC PANEL: CPT

## 2025-04-10 PROCEDURE — 99999 PR PBB SHADOW E&M-EST. PATIENT-LVL V: CPT | Mod: PBBFAC,,, | Performed by: INTERNAL MEDICINE

## 2025-04-10 PROCEDURE — 36415 COLL VENOUS BLD VENIPUNCTURE: CPT

## 2025-04-10 PROCEDURE — 99214 OFFICE O/P EST MOD 30 MIN: CPT | Mod: S$GLB,,, | Performed by: INTERNAL MEDICINE

## 2025-04-10 PROCEDURE — 80061 LIPID PANEL: CPT

## 2025-04-10 RX ORDER — ATORVASTATIN CALCIUM 40 MG/1
40 TABLET, FILM COATED ORAL DAILY
Qty: 90 TABLET | Refills: 3 | Status: SHIPPED | OUTPATIENT
Start: 2025-04-10 | End: 2026-04-10

## 2025-04-10 NOTE — PROGRESS NOTES
"  [facility]    CC: Lower Extremity Edema    Patient ID: Mr. Shiraz Jha is a 65 y.o. male with Afib s/p ablation (7/2014), BLE lymphedema, venous insufficiency, HTN, DM2, morbid obesity s/p gastric bypass (2010), QUINTIN (on CPAP), who presents for a follow up appointment.  Pertinent history/events as follows:    -Pt kindly referred by Twyla Bahena and Dr Scott for evaluation of lower extremity edema (per Dr. Scott's note on 9/24/2019:  "Mr. Jha is in clinic today for continued LE edema and discoloration of his legs.  He had a venous ultrasound on 8/6/19 for the swelling and mild reflux was noted in the left popliteal.  Patient denies chest pain with exertion or at rest, palpitations, SOB, DAI, dizziness, syncope, orthopnea, PND, or claudication.  Instructed to elevate legs when seated, low salt diet, increase walking and compression stockings. No venous reflux noted on US.  Refer to Dr. Ureña in interventional cardiology."    -At our initial clinic visit on 10/16/2019, Mr. Jha reported BLE edema for the past 1 year.  States LE edema is improved with graduated compression hose.  He has no LE pain, claudication symptoms, rest pain, or tissue loss.  No smoking history.  States he has gained over 50 pounds in the past year, despite gastric bypass surgery.  BLE Venous Reflux Study from 8/6/2019 showed no evidence of lower extremity DVT bilaterally.  There is mild left popliteal (deep venous) reflux.  Plan:   BLE Edema- BLE edema likely due to a component of venous insufficiency and morbid obesity.  Check cmp today.  If potassium and creatinine are appropriate, increase lasix to 40 mg bid for 7 days, then resume at 40 mg daily.  Pt to continue this cycle/regimen for lasix.  Pt will benefit from significant weight loss.  Continue graduated compression hose, leg elevation and low salt diet.  Limit fluid intake to 2 liters a day.  Check exercise SURINDER to evaluate for significant PAD.   Morbid Obesity- " Refer back to Bariatric Surgery.     -At follow up clinic visit on 11/27/2019, Mr. Jha reported no significant improvement in BLE edema with lasix regimen of 40 mg bid for 7 days, then resuming at 40 mg daily.  Exercise SURINDER from 11/15/2019 showed normal rest and exercise SURINDER bilaterally with normal PVR waveforms bilaterally.  Exam shows 1+ BLE pitting edema.  Plan:   BLE Edema- BLE edema likely due to a component of venous insufficiency and morbid obesity.  Check cmp today.  If potassium and creatinine are appropriate, discontinue lasix and start bumex 1 mg bid.  Pt will benefit from significant weight loss.  Continue graduated compression hose, leg elevation and low salt diet.  Limit fluid intake to 2 liters a day.  Check exercise SURINDER to evaluate for significant PAD.   Morbid Obesity- Pt referred back to Bariatric Surgery.     -At clinic visit on 1/8/2020, Mr. Jha reported significant improvement in BLE edema since starting bumex 1 mg daily.  Exercise SURINDER study from 11/25/2019 showed normal rest and exercise SURINDER bilaterally.  Normal PVR waveforms bilaterally.  Plan:   BLE Edema- BLE edema now improving.  Continue bumex 1 mg daily.  Check cmp today to monitor renal fxn.  Exercise SURINDER study from 11/25/2019 showed normal rest and exercise SURINDER bilaterally.  Normal PVR waveforms bilaterally. Continue graduated compression hose, leg elevation and low salt diet.  Limit fluid intake to 2 liters a day.    Morbid Obesity- Pt referred back to Bariatric Surgery.     - At clinic visit on 04/08/20, Mr. Jha reported doing well with no significant LE edema.  Continues to wear graduated compression hose, low salt diet, and limiting fluid intake to 2 liters a day.    Plan  BLE Edema- Currently doing well.  Continue bumex 1 mg daily.  Continue graduated compression hose, leg elevation and low salt diet.  Limit fluid intake to 2 liters a day.      -At clinic visit on 12/01/20, Mr. Jha reported worsening swelling of his bilateral  lower extremities. He stopped wearing compression hose a month ago, however, he continues to take bumex twice a day. He reports no rest pain, claudication, CLI or tissue atrophy.   Plan:  BLE Edema- Due to lymphedema and venous insufficiency. Patient notices worsening of bilateral lower extremity edema after he stopped using compression hose for the past month.  Pt instructed to resume graduated compression hose as prescribed.  Continue bumex 1 mg twice a day.  Continue leg elevation and low salt diet.  Limit fluid intake to 2 liters a day.      -At clinic visit on 1/7/2021, Mr. Jha reported improvement in bilateral lower extremity edema compared to previous visit on 12/01/20.  He reports wearing graduated compression hose.  He is taking Bumex 1 mg twice a day.  He is not following sodium restricted diet, and diet includes soups and gumbo.  He reports no claudication, rest pain, or tissue loss.      Plan:   BLE Edema- Due to lymphedema and venous insufficiency. Patient notices improvement of bilateral lower extremity edema. Continue graduated compression hose.  Continue bumex 1 mg twice a day.  Continue leg elevation when resting.  Encourage sodium restriction to 2000 mg/day and limit fluid intake to 2 liters a day.  Obesity- Pt is following up with Bariatric Surgery.  Encourage dietary modification, exercise and weight loss.      -At clinic visit on 2/9/2021, Mr. Jha reports mild improvement in BLE edema since clinic visit on 1/7/2021.  He has no claudication or tissue loss.   Plan:   BLE Edema- Due to lymphedema and venous insufficiency.   Edema is improving.  Check cmp today.  If creatinine and potassium are appropriate, increase bumex to 2 mg bid for 5 days, then decrease to 1 mg bid for 5 days.  Pt to continue this cycle/ergiemen of lasix.  Continue leg elevation when resting.  Limit sodium restriction to 2000 mg/day and limit fluid intake to 2 liters a day.  Obesity- Pt is following up with Bariatric  Surgery.  Encourage dietary modification, exercise and weight loss.      -At clinic visit on 03/25/21, Mr. Jha reported no new symptoms.  Exam shows BLE edema has improved significantly.  Plan:  BLE Edema- Due to lymphedema and venous insufficiency.   Edema has improved significantly.  Refer to lymphedema clinic.  Recheck cmp today.  If creatinine and potassium are appropriate, continue bumex 2 mg bid for 5 days, then decrease to 1 mg bid for 5 days.  Pt to continue this cycle/ergiemen of lasix.  Continue leg elevation when resting.  Limit sodium restriction to 2000 mg/day and limit fluid intake to 2 liters a day.    Obesity- Pt is following up with Bariatric Surgery.  Encourage dietary modification, exercise and weight loss.      -At clinic visit on 5/25/2021, Mr. Jha reported significant improvement in lower extremity edema since initiation of lymphedema clinic therapy.   He reports no claudication, rest pain or tissue loss.   Plan:    BLE Edema- Due to lymphedema and venous insufficiency.   Edema has improved significantly.  Continue lymphedema clinic.  Check CMP today.  If creatinine and potassium are appropriate, continue bumex 2 mg bid for 5 days, then decrease to 1 mg bid for 5 days.  Pt to continue this cycle/ergiemen of lasix.  Continue leg elevation when resting.  Limit sodium restriction to 2000 mg/day and limit fluid intake to 2 liters a day.    Obesity- Pt is following up with Bariatric Surgery.  Encourage dietary modification, exercise and weight loss.       -At clinic visit on 7/13/2021, Mr. Jha reported significant improvement in BLE edema following lymphedema clinic therapy.  He has no claudication or tissue loss.   Plan:   BLE Edema due to lymphedema and venous insufficiency- Now significantly improved following lymphedema clinic therapy.  Check cmp today.  If potassium and creatinine are appropriate, continue bumex 2 mg bid for 1 week, then reduce to 1 mg bid for 1 week.  Pt to continue this  regimen/cycle of bumex.    Obesity- Pt is following up with Bariatric Surgery.  Encourage dietary modification, exercise and weight loss.     -At clinic visit on 10/14/2021 clinic visit, Mr. Jha reporeds continued improvement in BLE edema.  He has no claudication or tissue loss.    Plan:  BLE Edema due to lymphedema and venous insufficiency- Remains significantly improved.  Continue bumex 2 mg bid for 1 week, then reduce to 1 mg bid for 1 week.  Pt to continue this regimen/cycle of bumex.    Obesity- Pt is following up with Bariatric Surgery.  Encourage dietary modification, exercise and weight loss.      -At clinic visit on 1/18/2022, Mr. Jha reported feeling well and swelling improved. He has no other complaints.   Plan:   BLE Edema due to lymphedema and venous insufficiency- Continues to improve. Continue bumex, compression stockings, elevate legs when resting, limit fluid intake to 1.5L daily, sodium intake to 2000mg daily.  Morbid Obesity- Pt is following up with Bariatric Surgery. Stable. Encourage dietary modification, exercise and weight loss.      -At clinic visit on 5/17/2022, Mr. Jha reported worsening leg swelling.  On 5/10/2022, he was admitted for GI bleeding.  During the hospitalization, bumex was held.  He was subsequently restarted on bumex after discharge.   on 5/11/2022.  Currently taking bumex 2 mg bid on Tues/Thur/Sat/Sun.  Taking 4 mg bid on Mon/Wed/Fri.     Plan:   BLE Edema due to lymphedema and venous insufficiency- Pt with worsening leg swelling since hospitalization on 5/10/2022.  Improving on current regimen.  Refer back to lymphedema clinic.  Continue bumex 2 mg bid on Tues/Thur/Sat/Sun and 4 mg bid on Mon/Wed/Fri.   Continue graduated compression hose; elevate legs when resting, limit fluid intake to 1.5L daily, sodium intake to 2000mg daily.  Check cmp today.    Morbid Obesity- Pt is following up with Bariatric Surgery. Stable. Encourage dietary modification, exercise and  weight loss.    -At clinic visit on 6/28/2022, Mr. Jha reported improvement in BLE edema.  He is on the waiting list for lymphedema clinic therapy.  Currently taking bumex 2 mg bid.  Plan:   BLE Edema due to lymphedema and venous insufficiency- Improving.  Pt is on the waiting list for lymphedema clinic therapy.  Continue bumex 2 mg bid.  Check cmp today to monitor renal function.  Continue graduated compression hose; elevate legs when resting, limit fluid intake to 1.5L daily, sodium intake to 2000mg daily.    Morbid Obesity s/p Gastric Bypass- Pt is following up with Bariatric Surgery. Stable. Encourage dietary modification, exercise and weight loss.    Afib s/p ablation (7/2014)- Stable.  Continue Xarelto 20 mg daily for anticoagulation.   HTN- Controlled.  Continue current medications.    QUINTIN- Continue CPAP nightly.     -At clinic visit on 12/27/2022, Mr. Jha reported BLE edema has improved and is stable.  Spironolactone 25 mg daily was added to his regimen by Dr. James on 10/11/202.  He has no chest pain or SOB.  Plans to have right ankle surgery on 2/3/2022.   Plan:   BLE Edema due to lymphedema and venous insufficiency- Improved and stable.  Refer back to lymphedema clinic.  Continue bumex 2 mg bid and spironolactone 25 mg daily.  Continue graduated compression hose; elevate legs when resting, limit fluid intake to 1.5L daily, sodium intake to 2000mg daily.    Morbid Obesity s/p Gastric Bypass- Encourage dietary modification, exercise and weight loss.    Afib s/p ablation (7/2014)- Stable.  Continue Xarelto 20 mg daily for anticoagulation.   HTN- Controlled.  Continue current medications.    QUINTIN- Continue CPAP nightly.     -2/20/2024 clinic visit, Mr. Jha reports significant improvement in BLE edema.  He has completed another course of lymphedema clinic therapy.  He reports no claudication or tissue loss.  Plan:  BLE Edema due to lymphedema and venous insufficiency- Stable.  Continue bumex 2 mg bid and  spironolactone 25 mg daily.  Continue graduated compression hose; elevate legs when resting, limit fluid intake to 1.5L daily, sodium intake to 2000mg daily.  Check cmp today to monitor renal function.   Morbid Obesity s/p Gastric Bypass- Encourage dietary modification, exercise and weight loss.    Afib s/p ablation (7/2014)- Stable.  Continue Xarelto 20 mg daily for anticoagulation.   HTN- Controlled.  Continue current medications.    QUINTIN- Continue CPAP nightly.     -2/6/2025 clinic visit: Mr. Jha reports worsening leg swelling over the past few weeks. He has no chest pain or SOB.  States he's taking bumex daily instead of bid as prescribed.   Plan:  BLE Edema due to lymphedema and venous insufficiency- Mr. Jha reports worsening leg swelling over the past few weeks. States he's taking bumex daily instead of bid as prescribed.  Pt instructed to take bumex 2 mg bid and spironolactone 25 mg daily. Check cmp today and in 1 week. Continue graduated compression hose; elevate legs when resting, limit fluid intake to 1.5L daily, sodium intake to 2000mg daily.  Check cmp today to monitor renal function.   Morbid Obesity s/p Gastric Bypass- Encourage dietary modification, exercise and weight loss.    Afib s/p ablation (7/2014)- Stable.  Continue Xarelto 20 mg daily for anticoagulation.   HTN- Controlled.  Continue current medications.    QUINTIN- Continue CPAP nightly.    HPI:  Mr. Jha reports mild improvement in leg swelling since clinic visit on 2/6/2025. He's taking bumex 2 mg bid. Renal function is stable.     Past Medical History:   Diagnosis Date    Allergy     Anemia     Asthma     Atrial fibrillation     Cataract     Chronic rhinitis 9/26/2013    DDD (degenerative disc disease) 4/3/2015    Depression     Diabetes mellitus     Fibromyalgia     Gastroesophageal reflux disease without esophagitis 7/14/2017    Hypertension     Sinusitis, acute, maxillary 12/20/2012    Thyroid disease        Past Surgical History:    Procedure Laterality Date    ANTEGRADE SINGLE BALLOON ENTEROSCOPY N/A 5/26/2022    Procedure: ENTEROSCOPY, SINGLE BALLOON, ANTEGRADE;  Surgeon: Lyle Edmond MD;  Location: [facility] (2ND FLR);  Service: Endoscopy;  Laterality: N/A;  Will use single-balloon scope for both the upper endoscopy and the colonoscopy - recent incomplete colonoscopy due to looping.    Pt is fully vaccinated-DS  5/20/22-Approval to hold Xarelto rec'd from Dr. Webb-pending approval (see telephoned en    CERVICAL SPINE SURGERY      COLONOSCOPY N/A 5/10/2022    Procedure: COLONOSCOPY;  Surgeon: Conrad Diaz MD;  Location: [facility] ENDO (2ND FLR);  Service: Endoscopy;  Laterality: N/A;    COLONOSCOPY N/A 5/26/2022    Procedure: COLONOSCOPY;  Surgeon: Lyle Edmond MD;  Location: [facility] ENDO (2ND FLR);  Service: Endoscopy;  Laterality: N/A;    EPIDURAL STEROID INJECTION INTO LUMBAR SPINE N/A 5/29/2018    Procedure: INJECTION-STEROID-EPIDURAL-LUMBAR- L4-5;  Surgeon: Roman Lyman MD;  Location: [facility] PAIN MGT;  Service: Pain Management;  Laterality: N/A;  Patient is diabetic and Xarleto     EPIDURAL STEROID INJECTION INTO LUMBAR SPINE N/A 7/3/2018    Procedure: INJECTION, STEROID, SPINE, LUMBAR, EPIDURAL- L4-5;  Surgeon: Roman Lyman MD;  Location: [facility] PAIN MGT;  Service: Pain Management;  Laterality: N/A;  Patient takes Xarelto and ASA     ESOPHAGOGASTRODUODENOSCOPY N/A 5/9/2022    Procedure: EGD (ESOPHAGOGASTRODUODENOSCOPY);  Surgeon: Conrad Diaz MD;  Location: [facility] (2ND FLR);  Service: Endoscopy;  Laterality: N/A;    EXCISION OF LESION OF LIP N/A 7/7/2020    Procedure: EXCISION, LESION, LIP;  Surgeon: Caden Navarro MD;  Location: [facility] OR 2ND FLR;  Service: ENT;  Laterality: N/A;    GASTRIC BYPASS      SINUS SURGERY  2000    Dr[facility] Watt at [facility]    TONSILLECTOMY         Social History     Socioeconomic History    Marital status: Single   Tobacco Use    Smoking status: Never    Smokeless tobacco: Never   Substance and  Sexual Activity    Alcohol use: No     Comment: rare    Drug use: No    Sexual activity: Yes     Partners: Female   Social History Narrative    [place], lives alone w/2 dogs.    Dogs are his kids.    Works PT --  at [employer], on ssdi from neck and back/depression.     Social Drivers of Health     Financial Resource Strain: High Risk (1/30/2025)    Overall Financial Resource Strain (CARDIA)     Difficulty of Paying Living Expenses: Very hard   Food Insecurity: Food Insecurity Present (1/30/2025)    Hunger Vital Sign     Worried About Running Out of Food in the Last Year: Sometimes true     Ran Out of Food in the Last Year: Patient declined   Transportation Needs: No Transportation Needs (5/8/2023)    PRAPARE - Transportation     Lack of Transportation (Medical): No     Lack of Transportation (Non-Medical): No   Physical Activity: Inactive (1/30/2025)    Exercise Vital Sign     Days of Exercise per Week: 0 days     Minutes of Exercise per Session: 0 min   Stress: Patient Declined (1/30/2025)    [employer] [employer] - Occupational Stress Questionnaire     Feeling of Stress : Patient declined   Housing Stability: High Risk (5/8/2023)    Housing Stability Vital Sign     Unable to Pay for Housing in the Last Year: Yes     Number of Places Lived in the Last Year: 1     Unstable Housing in the Last Year: Yes       Family History   Problem Relation Name Age of Onset    Heart disease Father      Cancer Mother          lung    Hypertension Sister      Heart disease Brother      Cirrhosis Brother      Diabetes Brother         Review of patient's allergies indicates:   Allergen Reactions    Iodinated contrast media Other (See Comments)     Raises BP         Medication List with Changes/Refills   Current Medications    BUMETANIDE (BUMEX) 2 MG TABLET    Take 1 tablet (2 mg total) by mouth 2 (two) times daily.    BUPROPION (WELLBUTRIN XL) 300 MG 24 HR TABLET    Take 300 mg by mouth once daily. Pt  states he only takes 300mg    CYANOCOBALAMIN 500 MCG TABLET    Take 500 mcg by mouth once daily.    DEXTROAMPHETAMINE-AMPHETAMINE (ADDERALL) 20 MG TABLET    Take 1 tablet by mouth every morning, take 1 tablet by mouth 3-4 hours later, and take 1/2 tablet every afternoon    FLUTICASONE PROPIONATE (FLONASE) 50 MCG/ACTUATION NASAL SPRAY    2 sprays (100 mcg total) by Each Nostril route once daily.    GABAPENTIN (NEURONTIN) 300 MG CAPSULE    Take 1 capsule (300 mg total) by mouth 3 (three) times daily.    IMPOYZ 0.025 % CREA    Apply topically as needed.     LEVOCETIRIZINE (XYZAL) 5 MG TABLET    TAKE 1 TABLET (5 MG TOTAL) BY MOUTH EVERY EVENING.    LUZU 1 % CREA    Apply topically as needed.     METHYLPREDNISOLONE (MEDROL DOSEPACK) 4 MG TABLET    Take as directed    METOPROLOL TARTRATE (LOPRESSOR) 25 MG TABLET    Take 1 tablet (25 mg total) by mouth 2 (two) times daily.    MULTIVITAMIN (THERAGRAN) PER TABLET    Take 1 tablet by mouth once daily.     NITROGLYCERIN (NITROSTAT) 0.4 MG SL TABLET    Please dispense 25 pills in 4 bottles.    OMEPRAZOLE (PRILOSEC) 10 MG CAPSULE    Take 2 capsules (20 mg total) by mouth once daily.    POTASSIUM CHLORIDE SA (K-DUR,KLOR-CON M) 10 MEQ TABLET    Take 1 tablet (10 mEq total) by mouth 2 (two) times daily.    PREVIDENT 5000 DRY MOUTH 1.1 % PSTE    DO NOT EAT ,DRINK OR RINSE FOR 30 MINUTES AFTER BRUSHING    RIVAROXABAN (XARELTO) 20 MG TAB    Take 1 tablet (20 mg total) by mouth once daily.    SOTALOL (BETAPACE) 80 MG TABLET    Take 1 tablet (80 mg total) by mouth 2 (two) times daily.    SPIRONOLACTONE (ALDACTONE) 25 MG TABLET    Take 1 tablet by mouth once daily    TAMSULOSIN (FLOMAX) 0.4 MG CAP    Take 1 capsule (0.4 mg total) by mouth once daily.    THIAMINE 250 MG TABLET    Take 250 mg by mouth once daily.    TRAZODONE (DESYREL) 150 MG TABLET    Take 150 mg by mouth every evening.    TRAZODONE (DESYREL) 50 MG TABLET    Take 50 mg by mouth every evening.    VITAMIN D (VITAMIN D3)  "1000 UNITS TAB    Take 1,000 Units by mouth once daily.    VRAYLAR 3 MG CAP    Take 3 mg by mouth Daily.       Review of Systems  Constitution: Denies chills, fever, and sweats.  HENT: Denies headaches or blurry vision.  Cardiovascular: Denies chest pain or irregular heart beat.  Respiratory: Denies cough or shortness of breath.  Gastrointestinal: Denies abdominal pain, nausea, or vomiting.  Musculoskeletal: Positive for BLE swelling  improved  Neurological: Denies dizziness or focal weakness.  Psychiatric/Behavioral: Normal mental status.  Hematologic/Lymphatic: Denies bleeding problem or easy bruising/bleeding.  Skin: Denies rash or suspicious lesions    Physical Examination  /74   Pulse 62   Ht 5' 8" (1.727 m)   Wt 130.2 kg (287 lb 0.6 oz)   BMI 43.64 kg/m²     Constitutional: No acute distress, conversant  HEENT: Sclera anicteric, Pupils equal, round and reactive to light, extraocular motions intact, Oropharynx clear  Neck: No JVD, no carotid bruits  Cardiovascular: regular rate and rhythm, no murmur, rubs or gallops, normal S1/S2  Pulmonary: Clear to auscultation bilaterally  Abdominal: Abdomen soft, nontender, nondistended, positive bowel sounds  Extremities: BLE's with minimal pitting edema   Pulses:  Carotid pulses are 2+ on the right side, and 2+ on the left side.  Radial pulses are 2+ on the right side, and 2+ on the left side.   Femoral pulses are 2+ on the right side, and 2+ on the left side.  Popliteal pulses are 2+ on the right side, and 2+ on the left side.   Dorsalis pedis pulses are 2+ on the right side, and 2+ on the left side.   Posterior tibial pulses are 2+ on the right side, and 2+ on the left side.    Skin: No ecchymosis, erythema, or ulcers  Psych: Alert and oriented x 3, appropriate affect  Neuro: CNII-XII intact, no focal deficits    Labs:  Most Recent Data  CBC:   Lab Results   Component Value Date    WBC 12.05 11/18/2023    HGB 11.3 (L) 11/18/2023    HCT 38.0 (L) 11/18/2023    "  11/18/2023    MCV 76 (L) 11/18/2023    RDW 16.6 (H) 11/18/2023     BMP:   Lab Results   Component Value Date     02/12/2025    K 4.0 02/12/2025     02/12/2025    CO2 27 02/12/2025    BUN 19 02/12/2025    CREATININE 0.9 02/12/2025     (H) 02/12/2025    CALCIUM 8.9 02/12/2025    MG 2.3 05/08/2023    PHOS 2.8 05/08/2023     LFTS;   Lab Results   Component Value Date    PROT 6.9 02/12/2025    ALBUMIN 4.0 02/12/2025    BILITOT 0.9 02/12/2025    AST 17 02/12/2025    ALKPHOS 111 02/12/2025    ALT 14 02/12/2025     COAGS:   Lab Results   Component Value Date    INR 1.1 05/06/2022     FLP:   Lab Results   Component Value Date    CHOL 171 02/12/2025    HDL 27 (L) 02/12/2025    LDLCALC 116.2 02/12/2025    TRIG 139 02/12/2025    CHOLHDL 15.8 (L) 02/12/2025     CARDIAC:   Lab Results   Component Value Date    TROPONINI <0.006 07/12/2022    BNP 32 11/18/2023       Echo 7/23/2019:  Normal left ventricular systolic function. The estimated ejection fraction is 60%  Normal right ventricular systolic function.  Normal LV diastolic function.  Moderate left atrial enlargement.  Mild right atrial enlargement.  The ascending aorta is mildly dilated (42mm in diameter, unchanged since Feb 2018).  The estimated PA systolic pressure is 23 mm Hg  Normal central venous pressure (3 mm Hg).     Exercise SURINDER 11/25/2019:  Normal rest and exercise SURINDER bilaterally.  Normal PVR waveforms bilaterally.    BLE Venous Reflux Study 8/6/2019:  No evidence of lower extremity DVT bilaterally.  Mild left popliteal (deep venous) reflux.    Assessment/Plan:  Mr. Shiraz Jha is a 65 y.o. male with Afib s/p ablation (7/2014), BLE lymphedema, venous insufficiency, HTN, DM2, morbid obesity s/p gastric bypass (2010), QUINTIN (on CPAP), who presents for a follow up appointment.      1. BLE Edema due to lymphedema and venous insufficiency- Continue bumex 2 mg bid and spironolactone 25 mg daily. Check cmp today. Refer back to lymphedema clinic.  Continue graduated compression hose; elevate legs when resting, limit fluid intake to 1.5L daily, sodium intake to 2000mg daily.  Check cmp today to monitor renal function.     2. Morbid Obesity s/p Gastric Bypass- Encourage dietary modification, exercise and weight loss.      3. Afib s/p ablation (7/2014)- Stable. Continue Xarelto 20 mg daily for anticoagulation.     4. HTN- Controlled.  Continue current medications.      5. QUINTIN- Continue CPAP nightly.     6. HLD- The 10-year ASCVD risk score (Zion DK, et al., 2019) is: 25.2%.  on 2/12/2025. Start atorvastatin 40 mg daily.      Follow up in 3 months with lipids prior    Total duration of face to face visit time 15 minutes.  Total time spent counseling greater than fifty percent of total visit time.  Counseling included discussion regarding imaging findings, diagnosis, possibilities, treatment options, risks and benefits.  The patient had many questions regarding the options and long-term effects.    Randolph Ureña MD, PhD  Interventional Cardiology

## 2025-04-10 NOTE — PATIENT INSTRUCTIONS
Assessment/Plan:  Mr. Shiraz Jha is a 65 y.o. male with Afib s/p ablation (7/2014), BLE lymphedema, venous insufficiency, HTN, DM2, morbid obesity s/p gastric bypass (2010), QUINTIN (on CPAP), who presents for a follow up appointment.      1. BLE Edema due to lymphedema and venous insufficiency- Continue bumex 2 mg bid and spironolactone 25 mg daily. Check cmp today. Refer back to lymphedema clinic. Continue graduated compression hose; elevate legs when resting, limit fluid intake to 1.5L daily, sodium intake to 2000mg daily.  Check cmp today to monitor renal function.     2. Morbid Obesity s/p Gastric Bypass- Encourage dietary modification, exercise and weight loss.      3. Afib s/p ablation (7/2014)- Stable. Continue Xarelto 20 mg daily for anticoagulation.     4. HTN- Controlled.  Continue current medications.      5. QUINTIN- Continue CPAP nightly.     Follow up in 3 months

## 2025-05-09 ENCOUNTER — TELEPHONE (OUTPATIENT)
Dept: PAIN MEDICINE | Facility: CLINIC | Age: 65
End: 2025-05-09
Payer: COMMERCIAL

## 2025-05-09 NOTE — TELEPHONE ENCOUNTER
----- Message from [employer] sent at 5/9/2025  1:27 PM CDT -----  Who Called:selfWho Left Message for Patient:staffDoes the patient know what this is regarding?:noWould the patient rather a call back or a response via [facility]?call [facility] Call Back Number.[phone] Information:

## 2025-05-09 NOTE — TELEPHONE ENCOUNTER
Staff tried contacting patient to assist him with getting rescheduled.patient did not answer staff left a vm.

## 2025-05-09 NOTE — TELEPHONE ENCOUNTER
Staff contacted patient to reschedule their appointment 06/02/25 with , do to him being unavailable.patient did not answer staff left a message for a return call back.

## 2025-05-14 ENCOUNTER — TELEPHONE (OUTPATIENT)
Dept: PAIN MEDICINE | Facility: CLINIC | Age: 65
End: 2025-05-14
Payer: COMMERCIAL

## 2025-05-14 NOTE — TELEPHONE ENCOUNTER
----- Message from [employer] sent at 5/14/2025  3:42 PM CDT -----  Who Called:selfWho Left Message for Patient: staffDoes the patient know what this is regarding?:yesWould the patient rather a call back or a response via [facility]:.[phone] Information:

## 2025-05-14 NOTE — TELEPHONE ENCOUNTER
Staff called patient back in regards to his appointment. Patient states he would like his appointments cancelled he found another pain management doctor.

## 2025-05-14 NOTE — TELEPHONE ENCOUNTER
Staff attempted to contact patient to assist with rescheduling upcoming appointment due to provider being out of office.

## 2025-05-20 ENCOUNTER — CLINICAL SUPPORT (OUTPATIENT)
Dept: INTERNAL MEDICINE | Facility: CLINIC | Age: 65
End: 2025-05-20
Payer: COMMERCIAL

## 2025-05-20 ENCOUNTER — OFFICE VISIT (OUTPATIENT)
Dept: INTERNAL MEDICINE | Facility: CLINIC | Age: 65
End: 2025-05-20
Payer: COMMERCIAL

## 2025-05-20 ENCOUNTER — LAB VISIT (OUTPATIENT)
Dept: LAB | Facility: HOSPITAL | Age: 65
End: 2025-05-20
Attending: STUDENT IN AN ORGANIZED HEALTH CARE EDUCATION/TRAINING PROGRAM
Payer: COMMERCIAL

## 2025-05-20 VITALS
DIASTOLIC BLOOD PRESSURE: 62 MMHG | OXYGEN SATURATION: 98 % | BODY MASS INDEX: 42.9 KG/M2 | SYSTOLIC BLOOD PRESSURE: 116 MMHG | HEIGHT: 68 IN | WEIGHT: 283.06 LBS | HEART RATE: 59 BPM

## 2025-05-20 DIAGNOSIS — E11.9 TYPE 2 DIABETES MELLITUS WITHOUT COMPLICATION, WITHOUT LONG-TERM CURRENT USE OF INSULIN: Chronic | ICD-10-CM

## 2025-05-20 DIAGNOSIS — Z00.00 ANNUAL PHYSICAL EXAM: Primary | ICD-10-CM

## 2025-05-20 DIAGNOSIS — M51.369 DDD (DEGENERATIVE DISC DISEASE), LUMBAR: ICD-10-CM

## 2025-05-20 DIAGNOSIS — L30.4 INTERTRIGO: ICD-10-CM

## 2025-05-20 DIAGNOSIS — I48.19 PERSISTENT ATRIAL FIBRILLATION: ICD-10-CM

## 2025-05-20 DIAGNOSIS — M54.17 LUMBOSACRAL RADICULOPATHY: ICD-10-CM

## 2025-05-20 DIAGNOSIS — M53.3 SACROILIAC JOINT PAIN: ICD-10-CM

## 2025-05-20 LAB
ALBUMIN/CREAT UR: 7 UG/MG
CREAT UR-MCNC: 114 MG/DL (ref 23–375)
EAG (OHS): 131 MG/DL (ref 68–131)
ERYTHROCYTE [DISTWIDTH] IN BLOOD BY AUTOMATED COUNT: 17.2 % (ref 11.5–14.5)
HBA1C MFR BLD: 6.2 % (ref 4–5.6)
HCT VFR BLD AUTO: 37.3 % (ref 40–54)
HGB BLD-MCNC: 10.5 GM/DL (ref 14–18)
MCH RBC QN AUTO: 21.9 PG (ref 27–31)
MCHC RBC AUTO-ENTMCNC: 28.2 G/DL (ref 32–36)
MCV RBC AUTO: 78 FL (ref 82–98)
MICROALBUMIN UR-MCNC: 8 UG/ML (ref ?–5000)
PLATELET # BLD AUTO: 328 K/UL (ref 150–450)
PMV BLD AUTO: 10.4 FL (ref 9.2–12.9)
RBC # BLD AUTO: 4.8 M/UL (ref 4.6–6.2)
TSH SERPL-ACNC: 1.53 UIU/ML (ref 0.4–4)
WBC # BLD AUTO: 5.98 K/UL (ref 3.9–12.7)

## 2025-05-20 PROCEDURE — 90471 IMMUNIZATION ADMIN: CPT | Mod: S$GLB,,, | Performed by: STUDENT IN AN ORGANIZED HEALTH CARE EDUCATION/TRAINING PROGRAM

## 2025-05-20 PROCEDURE — 84443 ASSAY THYROID STIM HORMONE: CPT

## 2025-05-20 PROCEDURE — 90677 PCV20 VACCINE IM: CPT | Mod: S$GLB,,, | Performed by: STUDENT IN AN ORGANIZED HEALTH CARE EDUCATION/TRAINING PROGRAM

## 2025-05-20 PROCEDURE — 99999 PR PBB SHADOW E&M-EST. PATIENT-LVL I: CPT | Mod: PBBFAC,,,

## 2025-05-20 PROCEDURE — 99396 PREV VISIT EST AGE 40-64: CPT | Mod: 25,S$GLB,, | Performed by: STUDENT IN AN ORGANIZED HEALTH CARE EDUCATION/TRAINING PROGRAM

## 2025-05-20 PROCEDURE — 99999 PR PBB SHADOW E&M-EST. PATIENT-LVL V: CPT | Mod: PBBFAC,,, | Performed by: STUDENT IN AN ORGANIZED HEALTH CARE EDUCATION/TRAINING PROGRAM

## 2025-05-20 PROCEDURE — 82043 UR ALBUMIN QUANTITATIVE: CPT | Performed by: STUDENT IN AN ORGANIZED HEALTH CARE EDUCATION/TRAINING PROGRAM

## 2025-05-20 PROCEDURE — 36415 COLL VENOUS BLD VENIPUNCTURE: CPT

## 2025-05-20 PROCEDURE — 83036 HEMOGLOBIN GLYCOSYLATED A1C: CPT

## 2025-05-20 PROCEDURE — 85027 COMPLETE CBC AUTOMATED: CPT

## 2025-05-20 RX ORDER — LULICONAZOLE 10 MG/G
CREAM TOPICAL
Qty: 60 G | Refills: 0 | Status: SHIPPED | OUTPATIENT
Start: 2025-05-20

## 2025-05-20 RX ORDER — GABAPENTIN 300 MG/1
300 CAPSULE ORAL 3 TIMES DAILY
Qty: 270 CAPSULE | Refills: 3 | Status: SHIPPED | OUTPATIENT
Start: 2025-05-20 | End: 2026-05-20

## 2025-05-20 NOTE — PROGRESS NOTES
SUBJECTIVE     Chief Complaint   Patient presents with    Annual Exam       HPI  Shiraz Jha is a 65 y.o. male with HTN, HLD, T2DM, persistent atrial fibrillation s/p ablation, chronic venous insufficiency of BLE, lymphedema of BLE, GERD, Gout, QUINTIN, DDD of lumbar spine, depression that presents for annual exam.     This is a new patient to me but established to [facility].    CURRENT MEDICAL CONDITIONS:  He has atrial fibrillation, chronic venous insufficiency with lymphedema, benign prostatic hyperplasia, hyperlipidemia, and diabetes. His last A1C was 6.8% two years ago and he does not check blood sugar at home.    CURRENT SYMPTOMS:  He reports back and leg pain, and has attempted twice to establish care with pain management specialist Dr. Lyman without success. He also reports an extensive fungal infection affecting the groin area and gluteal cleft.    MEDICATIONS:  He takes Metoprolol for atrial fibrillation, Bumetanide and Spironolactone for leg swelling due to chronic venous insufficiency and lymphedema, Flomax for prostate, and Atorvastatin for cholesterol management. He discontinued Xarelto due to cost and stopped trazodone due to ineffectiveness.    ALLERGIES:  He has an allergy to iodine contrast and denies any other medication allergies.    SOCIAL HISTORY:  He reports very rare alcohol consumption.         PAST MEDICAL HISTORY:  Past Medical History:   Diagnosis Date    Allergy     Anemia     Asthma     Atrial fibrillation     Cataract     Chronic rhinitis 9/26/2013    DDD (degenerative disc disease) 4/3/2015    Depression     Diabetes mellitus     Fibromyalgia     Gastroesophageal reflux disease without esophagitis 7/14/2017    Hypertension     Mixed hyperlipidemia 4/10/2025    Sinusitis, acute, maxillary 12/20/2012    Thyroid disease        PAST SURGICAL HISTORY:  Past Surgical History:   Procedure Laterality Date    ANTEGRADE SINGLE BALLOON ENTEROSCOPY N/A 5/26/2022    Procedure: ENTEROSCOPY, SINGLE  BALLOON, ANTEGRADE;  Surgeon: Lyle Edmond MD;  Location: [facility] ENDO (2ND FLR);  Service: Endoscopy;  Laterality: N/A;  Will use single-balloon scope for both the upper endoscopy and the colonoscopy - recent incomplete colonoscopy due to looping.    Pt is fully vaccinated-DS  5/20/22-Approval to hold Xarelto rec'd from Dr. Webb-pending approval (see telephoned en    CERVICAL SPINE SURGERY      COLONOSCOPY N/A 5/10/2022    Procedure: COLONOSCOPY;  Surgeon: Conrad Diaz MD;  Location: [facility] ENDO (2ND FLR);  Service: Endoscopy;  Laterality: N/A;    COLONOSCOPY N/A 5/26/2022    Procedure: COLONOSCOPY;  Surgeon: Lyle Edmond MD;  Location: [facility] ENDO (2ND FLR);  Service: Endoscopy;  Laterality: N/A;    EPIDURAL STEROID INJECTION INTO LUMBAR SPINE N/A 5/29/2018    Procedure: INJECTION-STEROID-EPIDURAL-LUMBAR- L4-5;  Surgeon: Roman Lyman MD;  Location: [facility] PAIN MGT;  Service: Pain Management;  Laterality: N/A;  Patient is diabetic and Xarleto     EPIDURAL STEROID INJECTION INTO LUMBAR SPINE N/A 7/3/2018    Procedure: INJECTION, STEROID, SPINE, LUMBAR, EPIDURAL- L4-5;  Surgeon: Roman Lyman MD;  Location: [facility] PAIN T;  Service: Pain Management;  Laterality: N/A;  Patient takes Xarelto and ASA     ESOPHAGOGASTRODUODENOSCOPY N/A 5/9/2022    Procedure: EGD (ESOPHAGOGASTRODUODENOSCOPY);  Surgeon: Conrad Diaz MD;  Location: [facility] ENDO (2ND FLR);  Service: Endoscopy;  Laterality: N/A;    EXCISION OF LESION OF LIP N/A 7/7/2020    Procedure: EXCISION, LESION, LIP;  Surgeon: Caden Navarro MD;  Location: [facility] OR 2ND FLR;  Service: ENT;  Laterality: N/A;    GASTRIC BYPASS      SINUS SURGERY  2000    Dr. Watt at [facility]    TONSILLECTOMY         FAMILY HISTORY:  Family History   Problem Relation Name Age of Onset    Heart disease Father      Cancer Mother          lung    Hypertension Sister      Heart disease Brother      Cirrhosis Brother      Diabetes Brother         ALLERGIES AND MEDICATIONS: updated and  "reviewed.  Review of patient's allergies indicates:   Allergen Reactions    Iodinated contrast media Other (See Comments)     Raises BP       Current Medications[1]    ROS  Review of Systems   Constitutional:  Negative for chills, fatigue and fever.   Eyes:  Negative for visual disturbance.   Respiratory:  Negative for shortness of breath.    Cardiovascular:  Positive for leg swelling. Negative for chest pain.   Gastrointestinal:  Negative for abdominal pain.   Endocrine: Negative for polyphagia and polyuria.   Musculoskeletal:  Positive for arthralgias and back pain.   Skin:  Positive for rash.         OBJECTIVE     Physical Exam  Vitals:    05/20/25 1053   BP: 116/62   Pulse: (!) 59    Body mass index is 43.04 kg/m².  Weight: 128.4 kg (283 lb 1.1 oz)   Height: 5' 8" (172.7 cm)     Physical Exam  Vitals reviewed.   Constitutional:       General: He is not in acute distress.     Appearance: Normal appearance. He is obese.   HENT:      Head: Normocephalic and atraumatic.      Mouth/Throat:      Mouth: Mucous membranes are moist.      Pharynx: Oropharynx is clear.   Eyes:      Extraocular Movements: Extraocular movements intact.      Conjunctiva/sclera: Conjunctivae normal.      Pupils: Pupils are equal, round, and reactive to light.   Cardiovascular:      Rate and Rhythm: Normal rate and regular rhythm.      Pulses: Normal pulses.      Heart sounds: Normal heart sounds.   Pulmonary:      Effort: Pulmonary effort is normal.      Breath sounds: Normal breath sounds.   Abdominal:      General: There is no distension.   Musculoskeletal:         General: Normal range of motion.      Cervical back: Normal range of motion and neck supple.      Right lower leg: Edema present.      Left lower leg: Edema present.   Skin:     General: Skin is warm and dry.   Neurological:      General: No focal deficit present.      Mental Status: He is alert.           ASSESSMENT     65 y.o. male with     1. Annual physical exam    2. " Persistent atrial fibrillation    3. Type 2 diabetes mellitus without complication, without long-term current use of insulin    4. Lumbosacral radiculopathy    5. DDD (degenerative disc disease), lumbar    6. Sacroiliac joint pain    7. Intertrigo        PLAN:     1. Annual physical exam  - Discussed age and gender appropriate screenings at this visit and encouraged a healthy diet low in simple carbohydrates, and increased physical activity.  Counseled on medically appropriate vaccines based on age and current health condition.  Screening test reviewed and discussed with patient.       2. Persistent atrial fibrillation  - RRR today with h/o ablation.   - Continue metoprolol, sotalol as managed by EP.   - TSH; Future    3. Type 2 diabetes mellitus without complication, without long-term current use of insulin  - Stable off of medications. Recheck levels.   - Microalbumin/Creatinine Ratio, Urine; Future  - HEMOGLOBIN A1C; Future  - CBC Without Differential; Future  - Ambulatory referral/consult to Optometry; Future  - Microalbumin/Creatinine Ratio, Urine    4. Lumbosacral radiculopathy  - Stable on current regimen, follow up with pain management.   - gabapentin (NEURONTIN) 300 MG capsule; Take 1 capsule (300 mg total) by mouth 3 (three) times daily.  Dispense: 270 capsule; Refill: 3    5. DDD (degenerative disc disease), lumbar  - Stable on current regimen, follow up with pain management.   - gabapentin (NEURONTIN) 300 MG capsule; Take 1 capsule (300 mg total) by mouth 3 (three) times daily.  Dispense: 270 capsule; Refill: 3    6. Sacroiliac joint pain  - Stable on current regimen, follow up with pain management.   - gabapentin (NEURONTIN) 300 MG capsule; Take 1 capsule (300 mg total) by mouth 3 (three) times daily.  Dispense: 270 capsule; Refill: 3    7. Intertrigo  - Previously responsive to [facility], requests refill.   - LUZU 1 % Crea; Apply topically as needed  Dispense: 60 g; Refill: 0      RTC in 6 months.         Prashant Liu MD  Family Medicine  [facility]  05/20/2025    This note was generated with the assistance of ambient listening technology. Verbal consent was obtained by the patient and accompanying visitor(s) for the recording of patient appointment to facilitate this note. I attest to having reviewed and edited the generated note for accuracy, though some syntax or spelling errors may persist. Please contact the author of this note for any clarification.      No follow-ups on file.                       [1]   Current Outpatient Medications   Medication Sig Dispense Refill    atorvastatin (LIPITOR) 40 MG tablet Take 1 tablet (40 mg total) by mouth once daily. 90 tablet 3    bumetanide (BUMEX) 2 MG tablet Take 1 tablet (2 mg total) by mouth 2 (two) times daily. 180 tablet 3    buPROPion (WELLBUTRIN XL) 300 MG 24 hr tablet Take 300 mg by mouth once daily. Pt states he only takes 300mg      cyanocobalamin 500 MCG tablet Take 500 mcg by mouth once daily.      dextroamphetamine-amphetamine (ADDERALL) 20 mg tablet Take 1 tablet by mouth every morning, take 1 tablet by mouth 3-4 hours later, and take 1/2 tablet every afternoon      fluticasone propionate (FLONASE) 50 mcg/actuation nasal spray 2 sprays (100 mcg total) by Each Nostril route once daily. 16 g 11    IMPOYZ 0.025 % Crea Apply topically as needed.       LUZU 1 % Crea Apply topically as needed.       methylPREDNISolone (MEDROL DOSEPACK) 4 mg tablet Take as directed 1 each 0    metoprolol tartrate (LOPRESSOR) 25 MG tablet Take 1 tablet (25 mg total) by mouth 2 (two) times daily. 180 tablet 3    multivitamin (THERAGRAN) per tablet Take 1 tablet by mouth once daily.       nitroGLYCERIN (NITROSTAT) 0.4 MG SL tablet Please dispense 25 pills in 4 bottles. 100 tablet 0    omeprazole (PRILOSEC) 10 MG capsule Take 2 capsules (20 mg total) by mouth once daily. 30 capsule 6    potassium chloride SA (K-DUR,KLOR-CON M) 10 MEQ tablet  Take 1 tablet (10 mEq total) by mouth 2 (two) times daily. 30 tablet 11    PREVIDENT 5000 DRY MOUTH 1.1 % Pste DO NOT EAT ,DRINK OR RINSE FOR 30 MINUTES AFTER BRUSHING      rivaroxaban (XARELTO) 20 mg Tab Take 1 tablet (20 mg total) by mouth once daily. 90 tablet 3    sotaloL (BETAPACE) 80 MG tablet Take 1 tablet (80 mg total) by mouth 2 (two) times daily. 180 tablet 3    spironolactone (ALDACTONE) 25 MG tablet Take 1 tablet by mouth once daily 90 tablet 0    tamsulosin (FLOMAX) 0.4 mg Cap Take 1 capsule (0.4 mg total) by mouth once daily. 30 capsule 11    thiamine 250 MG tablet Take 250 mg by mouth once daily.      traZODone (DESYREL) 150 MG tablet Take 150 mg by mouth every evening.      traZODone (DESYREL) 50 MG tablet Take 50 mg by mouth every evening.      vitamin D (VITAMIN D3) 1000 units Tab Take 1,000 Units by mouth once daily.      VRAYLAR 3 mg Cap Take 3 mg by mouth Daily.      gabapentin (NEURONTIN) 300 MG capsule Take 1 capsule (300 mg total) by mouth 3 (three) times daily. 270 capsule 3    levocetirizine (XYZAL) 5 MG tablet TAKE 1 TABLET (5 MG TOTAL) BY MOUTH EVERY EVENING. 30 tablet 11     No current facility-administered medications for this visit.

## 2025-05-20 NOTE — PROGRESS NOTES
Used 2 pt identifiers and reviewed allergies prior to giving pneumoc 20-trudy conj-dip cr(PF) (PREVNAR-20 (PF)) injection Syrg 0.5 mL injection, VIS given then asked pt to wait 15 mins post injection for s/sx of adverse reactions.

## 2025-05-21 ENCOUNTER — TELEPHONE (OUTPATIENT)
Dept: HEMATOLOGY/ONCOLOGY | Facility: CLINIC | Age: 65
End: 2025-05-21
Payer: COMMERCIAL

## 2025-05-21 ENCOUNTER — TELEPHONE (OUTPATIENT)
Dept: INTERNAL MEDICINE | Facility: CLINIC | Age: 65
End: 2025-05-21
Payer: COMMERCIAL

## 2025-05-21 ENCOUNTER — RESULTS FOLLOW-UP (OUTPATIENT)
Dept: INTERNAL MEDICINE | Facility: CLINIC | Age: 65
End: 2025-05-21

## 2025-05-21 DIAGNOSIS — D50.8 OTHER IRON DEFICIENCY ANEMIA: Primary | ICD-10-CM

## 2025-05-21 DIAGNOSIS — Z98.84 STATUS POST GASTRIC BYPASS FOR OBESITY: ICD-10-CM

## 2025-05-21 NOTE — TELEPHONE ENCOUNTER
----- Message from Brit sent at 5/21/2025 12:07 PM CDT -----  Contact: [phone] .[email or web address] Patient is calling for Medical Advice regarding:pt is calling in regards to a vaccine he is trying to get and needs someone to call as soon as possible Patient wants a call back or thru [facility]:call backComments:Please advise patient replies from provider may take up to 48 hours.

## 2025-05-23 ENCOUNTER — TELEPHONE (OUTPATIENT)
Dept: INTERNAL MEDICINE | Facility: CLINIC | Age: 65
End: 2025-05-23
Payer: COMMERCIAL

## 2025-05-23 NOTE — TELEPHONE ENCOUNTER
I called pt however he didn't respond I did leave a vm for him to call back. I was a little confused on what he was asking for as far as the infectious Disease referral.

## 2025-05-23 NOTE — TELEPHONE ENCOUNTER
----- Message from Ingrid sent at 5/23/2025 11:16 AM CDT -----  Contact: [phone]  Would you like a call back, or a response through your [facility] portal?:   CallComments: Patient said that he put in a referral for infectious Disease, and he would like to know the status of the referral?

## 2025-05-27 ENCOUNTER — PATIENT MESSAGE (OUTPATIENT)
Dept: INTERNAL MEDICINE | Facility: CLINIC | Age: 65
End: 2025-05-27

## 2025-05-27 ENCOUNTER — OFFICE VISIT (OUTPATIENT)
Dept: HEMATOLOGY/ONCOLOGY | Facility: CLINIC | Age: 65
End: 2025-05-27
Payer: COMMERCIAL

## 2025-05-27 ENCOUNTER — LAB VISIT (OUTPATIENT)
Dept: LAB | Facility: HOSPITAL | Age: 65
End: 2025-05-27
Attending: PHYSICIAN ASSISTANT
Payer: COMMERCIAL

## 2025-05-27 VITALS
HEART RATE: 57 BPM | SYSTOLIC BLOOD PRESSURE: 130 MMHG | TEMPERATURE: 99 F | WEIGHT: 288.81 LBS | DIASTOLIC BLOOD PRESSURE: 71 MMHG | BODY MASS INDEX: 43.91 KG/M2 | OXYGEN SATURATION: 97 %

## 2025-05-27 DIAGNOSIS — Z98.84 STATUS POST GASTRIC BYPASS FOR OBESITY: ICD-10-CM

## 2025-05-27 DIAGNOSIS — I48.19 PERSISTENT ATRIAL FIBRILLATION: ICD-10-CM

## 2025-05-27 DIAGNOSIS — D50.8 OTHER IRON DEFICIENCY ANEMIA: Primary | ICD-10-CM

## 2025-05-27 DIAGNOSIS — D50.8 OTHER IRON DEFICIENCY ANEMIA: ICD-10-CM

## 2025-05-27 LAB
FERRITIN SERPL-MCNC: 5.7 NG/ML (ref 20–300)
FOLATE SERPL-MCNC: 2.4 NG/ML (ref 4–24)
IRON SATN MFR SERPL: 7 % (ref 20–50)
IRON SERPL-MCNC: 36 UG/DL (ref 45–160)
TIBC SERPL-MCNC: 519 UG/DL (ref 250–450)
TRANSFERRIN SERPL-MCNC: 351 MG/DL (ref 200–375)
VIT B12 SERPL-MCNC: 244 PG/ML (ref 210–950)

## 2025-05-27 PROCEDURE — 82746 ASSAY OF FOLIC ACID SERUM: CPT

## 2025-05-27 PROCEDURE — 82525 ASSAY OF COPPER: CPT

## 2025-05-27 PROCEDURE — 84630 ASSAY OF ZINC: CPT

## 2025-05-27 PROCEDURE — 99203 OFFICE O/P NEW LOW 30 MIN: CPT | Mod: S$GLB,,, | Performed by: PHYSICIAN ASSISTANT

## 2025-05-27 PROCEDURE — 99999 PR PBB SHADOW E&M-EST. PATIENT-LVL IV: CPT | Mod: PBBFAC,,, | Performed by: PHYSICIAN ASSISTANT

## 2025-05-27 PROCEDURE — 82607 VITAMIN B-12: CPT

## 2025-05-27 PROCEDURE — 83540 ASSAY OF IRON: CPT

## 2025-05-27 PROCEDURE — 36415 COLL VENOUS BLD VENIPUNCTURE: CPT

## 2025-05-27 PROCEDURE — 82728 ASSAY OF FERRITIN: CPT

## 2025-05-27 NOTE — PROGRESS NOTES
Hematology/Oncology Clinic New Patient Note    Patient ID: Shiraz Jha is a 65 y.o. male.    Chief Complaint: Anemia    History     Mr. Jha is a 65 y.o. male referred to hematology for the evaluation and management of anemia. Co morbidities include HTN, HLD, T2DM, persistent A fib s/p ablation, chronic venous insufficiency of BLE, lymphedema of BLE, GERD, gout, QUINTIN, DDD of lumbar spine, obesity, and depression. S/p gastric bypass 15-20 years ago, cannot remember which year.     Microcytic anemia noted since 2022 (~10-11 g/dL). Last iron studies were in 2023. Takes multivitamins off and on. Reports that he is not taking Xarelto due to pricing. He endorses fatigue, occasional dizziness/headaches, and shortness of breath with exertion. Denies chest pain or bleeding.     Fam hx: dad had prostate cancer, mom had lung cancer; no bleeding or clotting disorders    Social: part time work; nonsmoker; rarely drinks alcohol    Review of patient's allergies indicates:   Allergen Reactions    Iodinated contrast media Other (See Comments)     Raises BP           Past medical, surgical, and medication history, past family history reviewed and updated with patient today as noted.      Past Medical History:   Diagnosis Date    Allergy     Anemia     Asthma     Atrial fibrillation     Cataract     Chronic rhinitis 9/26/2013    DDD (degenerative disc disease) 4/3/2015    Depression     Diabetes mellitus     Fibromyalgia     Gastroesophageal reflux disease without esophagitis 7/14/2017    Hypertension     Mixed hyperlipidemia 4/10/2025    Sinusitis, acute, maxillary 12/20/2012    Thyroid disease        Past Surgical History:   Procedure Laterality Date    ANTEGRADE SINGLE BALLOON ENTEROSCOPY N/A 5/26/2022    Procedure: ENTEROSCOPY, SINGLE BALLOON, ANTEGRADE;  Surgeon: Lyle Edmond MD;  [facility] ([address] [place] [address]);  Service: Endoscopy;  Laterality: N/A;  Will use single-balloon scope for both the upper endoscopy and the  colonoscopy - recent incomplete colonoscopy due to looping.    Pt is fully vaccinated-DS  5/20/22-Approval to hold Xarelto rec'd from Dr. Webb-pending approval (see telephoned en    CERVICAL SPINE SURGERY      COLONOSCOPY N/A 5/10/2022    Procedure: COLONOSCOPY;  Surgeon: Conrad Diaz MD;  Location: [facility] (2ND FLR);  Service: Endoscopy;  Laterality: N/A;    COLONOSCOPY N/A 5/26/2022    Procedure: COLONOSCOPY;  Surgeon: Lyle Edmond MD;  Location: [facility] (2ND FLR);  Service: Endoscopy;  Laterality: N/A;    EPIDURAL STEROID INJECTION INTO LUMBAR SPINE N/A 5/29/2018    Procedure: INJECTION-STEROID-EPIDURAL-LUMBAR- L4-5;  Surgeon: Roman Lyman MD;  Location: [facility] PAIN MGT;  Service: Pain Management;  Laterality: N/A;  Patient is diabetic and Xarleto     EPIDURAL STEROID INJECTION INTO LUMBAR SPINE N/A 7/3/2018    Procedure: INJECTION, STEROID, SPINE, LUMBAR, EPIDURAL- L4-5;  Surgeon: Roman Lyman MD;  Location: [facility] PAIN MGT;  Service: Pain Management;  Laterality: N/A;  Patient takes Xarelto and ASA     ESOPHAGOGASTRODUODENOSCOPY N/A 5/9/2022    Procedure: EGD (ESOPHAGOGASTRODUODENOSCOPY);  Surgeon: Conrad Diaz MD;  Location: [facility] (2ND FLR);  Service: Endoscopy;  Laterality: N/A;    EXCISION OF LESION OF LIP N/A 7/7/2020    Procedure: EXCISION, LESION, LIP;  Surgeon: Caden Navarro MD;  Location: [facility] OR 2ND FLR;  Service: ENT;  Laterality: N/A;    GASTRIC BYPASS      SINUS SURGERY  2000    Dr. Watt at [facility]    TONSILLECTOMY         Review of Systems:  Review of Systems   Constitutional:  Positive for malaise/fatigue.   HENT:  Negative for congestion and nosebleeds.    Respiratory:  Positive for shortness of breath (with walking and activity).    Cardiovascular:  Negative for chest pain.   Gastrointestinal:  Positive for diarrhea (intermittent - related to food intake). Negative for abdominal pain, blood in stool and vomiting.   Genitourinary:  Negative for hematuria.   Neurological:   Positive for dizziness. Negative for headaches (not frequently).        Physical Exam   Vitals:  /71   Pulse (!) 57   Temp 98.8 °F (37.1 °C)   Wt 131 kg (288 lb 12.8 oz)   SpO2 97%   BMI 43.91 kg/m²     Labs:  No visits with results within 2 Day(s) from this visit.   Latest known visit with results is:   Lab Visit on 05/20/2025   Component Date Value Ref Range Status    Hemoglobin A1c 05/20/2025 6.2 (H)  4.0 - 5.6 % Final    ADA Screening Guidelines:  5.7-6.4%  Consistent with prediabetes  >=6.5%  Consistent with diabetes    High levels of fetal hemoglobin interfere with the HbA1C  assay. Heterozygous hemoglobin variants (HbS, HgC, etc)do  not significantly interfere with this assay.   However, presence of multiple variants may affect accuracy.    Estimated Average Glucose 05/20/2025 131  68 - 131 mg/dL Final    WBC 05/20/2025 5.98  3.90 - 12.70 K/uL Final    RBC 05/20/2025 4.80  4.60 - 6.20 M/uL Final    HGB 05/20/2025 10.5 (L)  14.0 - 18.0 gm/dL Final    HCT 05/20/2025 37.3 (L)  40.0 - 54.0 % Final    MCV 05/20/2025 78 (L)  82 - 98 fL Final    MCHC 05/20/2025 28.2 (L)  32.0 - 36.0 g/dL Final    RDW 05/20/2025 17.2 (H)  11.5 - 14.5 % Final    Platelet Count 05/20/2025 328  150 - 450 K/uL Final    MCH 05/20/2025 21.9 (L)  27.0 - 31.0 pg Final    MPV 05/20/2025 10.4  9.2 - 12.9 fL Final    TSH 05/20/2025 1.533  0.400 - 4.000 uIU/mL Final        Physical Exam:  Physical Exam  Constitutional:       General: He is not in acute distress.     Appearance: He is obese.   Eyes:      General: No scleral icterus.  Pulmonary:      Effort: Pulmonary effort is normal. No respiratory distress.   Skin:     Coloration: Skin is not jaundiced or pale.   Neurological:      Mental Status: He is alert.          ECOG:   ECOG SCORE    1 - Restricted in strenuous activity-ambulatory and able to carry out work of a light nature            PROCEDURES/IMAGING  X-Ray Ankle Complete Right  Narrative: EXAMINATION:  XR ANKLE COMPLETE 3  VIEW RIGHT    CLINICAL HISTORY:  Pain, unspecified    TECHNIQUE:  AP, lateral, and oblique images of the right ankle were performed.    COMPARISON:  None    FINDINGS:  There is diffuse nonspecific soft tissue thickening with subcutaneous stranding about the imaged right lower leg, ankle and hindfoot, particularly along the ventral aspect.  No subcutaneous emphysema or radiodense retained foreign body.  Few scattered nonspecific linear soft tissue calcifications noted.    Bones appear well mineralized for age.  Overall alignment is within normal limits.  Ankle mortise appears intact.  No displaced fracture, dislocation or destructive osseous process.  Minimal DJD.  Small plantar calcaneal spur noted.  Impression: Distal right lower leg, ankle and hindfoot diffuse nonspecific soft tissue swelling from edema or cellulitis, without acute displaced fracture-dislocation identified.    Electronically signed by: Caden France MD  Date:    11/18/2023  Time:    15:58         Discussion     Problem List:  Problem List Items Addressed This Visit       Persistent atrial fibrillation    Status post gastric bypass for obesity    Relevant Orders    Iron and TIBC    Ferritin (Completed)    Zinc    Copper, Serum    Folate    Vitamin B12    Anemia - Primary    Relevant Orders    Iron and TIBC    Ferritin (Completed)        Hemoglobin stable 10.5 g/dL. No recent iron studies. Repeat iron studies, ferritin, b12, folate, copper, and zinc today.   Would benefit from IV iron infusions pending workup    Patient stopped Xarelto due to financial concerns, is not currently on any anticoagulation for his afib. Urged that he talk to his cardiologist about other options.     Call with results and plan    Ngozi Torres PA-C  5/27/2025 10:40 AM   Hematology/Oncology  [facility] - [address], [address]  [place] [address]  Phone: [phone]  Fax: [phone]

## 2025-05-28 ENCOUNTER — RESULTS FOLLOW-UP (OUTPATIENT)
Dept: HEMATOLOGY/ONCOLOGY | Facility: CLINIC | Age: 65
End: 2025-05-28

## 2025-05-28 DIAGNOSIS — E53.8 FOLATE DEFICIENCY: ICD-10-CM

## 2025-05-28 DIAGNOSIS — D50.9 IRON DEFICIENCY ANEMIA, UNSPECIFIED IRON DEFICIENCY ANEMIA TYPE: Primary | ICD-10-CM

## 2025-05-28 DIAGNOSIS — E53.8 B12 DEFICIENCY: ICD-10-CM

## 2025-05-28 RX ORDER — SODIUM CHLORIDE 0.9 % (FLUSH) 0.9 %
10 SYRINGE (ML) INJECTION
OUTPATIENT
Start: 2025-05-28

## 2025-05-28 RX ORDER — EPINEPHRINE 0.3 MG/.3ML
0.3 INJECTION SUBCUTANEOUS ONCE AS NEEDED
OUTPATIENT
Start: 2025-05-28

## 2025-05-28 RX ORDER — HEPARIN 100 UNIT/ML
500 SYRINGE INTRAVENOUS
OUTPATIENT
Start: 2025-05-28

## 2025-05-28 NOTE — TELEPHONE ENCOUNTER
Pt states his insurance requires him to have a referral to ID for the shingrix vaccine, pended but unsure what to put for reason     LOV with No primary care provider on file. , 5/20/2025

## 2025-05-30 LAB — W ZINC: 72 UG/DL

## 2025-06-02 LAB — W COPPER: 1214 UG/L

## 2025-06-16 DIAGNOSIS — I48.19 PERSISTENT ATRIAL FIBRILLATION: ICD-10-CM

## 2025-06-16 RX ORDER — SOTALOL HYDROCHLORIDE 80 MG/1
80 TABLET ORAL 2 TIMES DAILY
Qty: 120 TABLET | Refills: 0 | Status: SHIPPED | OUTPATIENT
Start: 2025-06-16

## 2025-06-16 NOTE — PROGRESS NOTES
Mr. Jha is a patient of Dr. Webb and was last seen in clinic 5/23/2023.      Subjective:   Patient ID:  Shiraz Jha is a 65 y.o. male who presents for follow up of Atrial Fibrillation  .     The patient location is: [place], [place]  The chief complaint leading to consultation is: AF  Visit type: audiovisual  Face to Face time with patient: 15  20 minutes of total time spent on the encounter, which includes face to face time and non-face to face time preparing to see the patient (eg, review of tests), Obtaining and/or reviewing separately obtained history, Documenting clinical information in the electronic or other health record, Independently interpreting results (not separately reported) and communicating results to the patient/family/caregiver, or Care coordination (not separately reported).   Each patient to whom he or she provides medical services by telemedicine is:  (1) informed of the relationship between the physician and patient and the respective role of any other health care provider with respect to management of the patient; and (2) notified that he or she may decline to receive medical services by telemedicine and may withdraw from such care at any time.    HPI:    Mr. Jha is a 65 y.o. male with Htn, morbid obesity, atrial fibrillation (cryo PVI 2014), atrial flutter, Sleep Apnea, diet controlled DM with a telemedicine visit for follow up.      Background:    H/o recurrent atrial arrhythmias since 2008, previously managed at .   Presented to [facility] 11/12 with symptomatic atrial flutter and fibrillation. Had been on Dronedarone at that time. RFA of CTI 11/15/12. Sotalol and Xarelto then initiated.   Did well initially, but had increasing episodes. Subsequently had a Cryoablation PVI on 7/2014. He was last seen in 2/15, when he was still having on and off symptoms.   Has not had symptoms c/w atrial fibrillation. Notes dyspnea with walking up the stairs, which is not new.  We elected to  continue Sotalol and observe.  Echo 7/23/19 normal biventricular structure and function.  Clinic visit 12/9/2019: QT 420ms.  1/13/2021: Doing well from a rhythm standpoint, maintaining sinus rhythm on sotalol. EKG with acceptable intervals. CHADSVASc 2 and he remains on xarelto for CVA prophylaxis.  Sees Dr. Ureña and Dr. Scott. He feels well.  11/22/2022:     11/22/2022: Doing well from a rhythm standpoint, with no AF symptoms on sotalol. EKG with stable intervals. Willem but asymptomatic. CHADSVASc 2 on xarelto with no bleeding.  Echo 5/2022 showed normal heart function.      5/23/2023: Doing well from a rhythm standpoint, maintaining sinus rhythm on sotalol. ECG with stable intervals. CHADSVASc 2 on xarelto. On CPAP.    Update (06/23/2025):    Today he says he has not had any AF symptoms recently (sweating). However, he did have some right ankle swelling and pain and just completed medrol dose pack. It was noted that he had bilateral leg edema. He takes bumex for this and has been prescribed spironolactone but did not start this as he was concerned about taking 2 diuretics. BPs have been elevated but improving. No palps, CP, worsening DAI but some occ LH. No syncope.    He not taking xarelto due to cost. He is currently being treated with sotalol 80mg BID for rhythm control and lopressor 25mg BID for HR control.  Kidney function is stable, with a creatinine of 0.8 on 4/10/2025.    ECG not yet scheduled.     Relevant Cardiac Test Results:    2D Echo (5/17/2022):  The left ventricle is normal in size with normal systolic function. The estimated ejection fraction is 60%.  Normal right ventricular size with normal right ventricular systolic function.  Normal left ventricular diastolic function.  The estimated PA systolic pressure is 24 mmHg.  Normal central venous pressure (3 mmHg).    Current Outpatient Medications   Medication Sig    atorvastatin (LIPITOR) 40 MG tablet Take 1 tablet (40 mg total) by mouth once  daily.    bumetanide (BUMEX) 2 MG tablet Take 1 tablet (2 mg total) by mouth 2 (two) times daily.    cyanocobalamin 500 MCG tablet Take 500 mcg by mouth once daily.    fluticasone propionate (FLONASE) 50 mcg/actuation nasal spray 2 sprays (100 mcg total) by Each Nostril route once daily.    gabapentin (NEURONTIN) 300 MG capsule Take 1 capsule (300 mg total) by mouth 3 (three) times daily.    IMPOYZ 0.025 % Crea Apply topically as needed.  (Patient not taking: Reported on 6/17/2025)    levocetirizine (XYZAL) 5 MG tablet TAKE 1 TABLET (5 MG TOTAL) BY MOUTH EVERY EVENING. (Patient not taking: Reported on 6/17/2025)    LUZU 1 % Crea Apply topically as needed    methylPREDNISolone (MEDROL DOSEPACK) 4 mg tablet use as directed    metoprolol tartrate (LOPRESSOR) 25 MG tablet Take 1 tablet (25 mg total) by mouth 2 (two) times daily.    multivitamin (THERAGRAN) per tablet Take 1 tablet by mouth once daily.     nitroGLYCERIN (NITROSTAT) 0.4 MG SL tablet Please dispense 25 pills in 4 bottles.    PREVIDENT 5000 DRY MOUTH 1.1 % Pste DO NOT EAT ,DRINK OR RINSE FOR 30 MINUTES AFTER BRUSHING    rivaroxaban (XARELTO) 20 mg Tab Take 1 tablet (20 mg total) by mouth once daily. (Patient not taking: Reported on 6/17/2025)    sotaloL (BETAPACE) 80 MG tablet Take 1 tablet by mouth twice daily    spironolactone (ALDACTONE) 25 MG tablet Take 1 tablet (25 mg total) by mouth once daily.    tamsulosin (FLOMAX) 0.4 mg Cap Take 1 capsule (0.4 mg total) by mouth once daily.     No current facility-administered medications for this visit.       Review of Systems   Constitutional: Negative for malaise/fatigue.   Cardiovascular:  Positive for leg swelling. Negative for chest pain, dyspnea on exertion, irregular heartbeat and palpitations.   Respiratory:  Negative for shortness of breath.    Hematologic/Lymphatic: Negative for bleeding problem.   Skin:  Negative for rash.   Musculoskeletal:  Negative for myalgias.   Gastrointestinal:  Negative for  hematemesis, hematochezia and nausea.   Genitourinary:  Negative for hematuria.   Neurological:  Negative for light-headedness.   Psychiatric/Behavioral:  Negative for altered mental status.    Allergic/Immunologic: Negative for persistent infections.       Objective:          Wt 133.8 kg (295 lb)   BMI 44.85 kg/m²   - N/A telemedicine visit    Physical Exam  - N/A telemedicine visit    Lab Results   Component Value Date     06/17/2025     02/12/2025    K 4.2 06/17/2025    K 4.0 02/12/2025    MG 2.3 05/08/2023    BUN 14 06/17/2025    CREATININE 0.9 06/17/2025     (H) 06/17/2025    ALT 14 02/12/2025     (H) 06/17/2025    AST 17 02/12/2025    HGB 10.5 (L) 05/20/2025    HGB 11.3 (L) 11/18/2023    HCT 37.3 (L) 05/20/2025    HCT 38.0 (L) 11/18/2023    TSH 1.533 05/20/2025    TSH 0.738 05/08/2023    LDLCALC 92.2 04/10/2025             Assessment:     1. Persistent atrial fibrillation    2. Essential hypertension    3. Atrial flutter, unspecified type    4. Obstructive sleep apnea    5. S/P ablation of atrial fibrillation    6. Severe obesity (BMI >= 40)    7. Status post catheter ablation of atrial fibrillation    8. Status post catheter ablation of atrial flutter        Plan:     In summary, Mr. Jha is a 65 y.o. male with Htn, morbid obesity, atrial fibrillation (cryo PVI 2014), atrial flutter, Sleep Apnea, diet controlled DM with a telemedicine visit for follow up.    Pt with telemedicine visit for routine follow up. No AF symptoms on sotalol. We discussed that ECG needed to check QT interval. He is not on xarelto due to cost. CHADSVASc 2 and OAC is recommended. He is open to starting pradaxa.  Sent pradaxa and metoprolol to pharmacy and will refill sotalol as soon as ECG is complete. He has bilateral leg edema but denies worsening SOB. I advised him that it is ok to take both his spironolactone and bumex and he has a follow up with Dr. Ureña scheduled with labs prior. If ECG shows SR with  normal intervals then he can RTC in 1 yr.    ECG needed, then will refill sotalol  Start pradaxa 150mg BID  Continue other meds  If testing WNL, RTC 1yr sooner if needed    *A copy of this note has been sent to Dr. Webb*    Follow up in about 1 year (around 6/23/2026).      ------------------------------------------------------------------    FERNANDA Hansen, NP-C  Cardiac Electrophysiology

## 2025-06-17 ENCOUNTER — OFFICE VISIT (OUTPATIENT)
Dept: INTERNAL MEDICINE | Facility: CLINIC | Age: 65
End: 2025-06-17
Payer: COMMERCIAL

## 2025-06-17 ENCOUNTER — LAB VISIT (OUTPATIENT)
Dept: LAB | Facility: HOSPITAL | Age: 65
End: 2025-06-17
Payer: COMMERCIAL

## 2025-06-17 ENCOUNTER — PATIENT MESSAGE (OUTPATIENT)
Dept: ELECTROPHYSIOLOGY | Facility: CLINIC | Age: 65
End: 2025-06-17
Payer: COMMERCIAL

## 2025-06-17 VITALS
HEIGHT: 68 IN | DIASTOLIC BLOOD PRESSURE: 67 MMHG | BODY MASS INDEX: 44.74 KG/M2 | WEIGHT: 295.19 LBS | HEART RATE: 66 BPM | OXYGEN SATURATION: 97 % | SYSTOLIC BLOOD PRESSURE: 151 MMHG

## 2025-06-17 DIAGNOSIS — Z87.39 HISTORY OF GOUT: ICD-10-CM

## 2025-06-17 DIAGNOSIS — M25.571 ACUTE RIGHT ANKLE PAIN: Primary | ICD-10-CM

## 2025-06-17 DIAGNOSIS — Z86.79 S/P ABLATION OF ATRIAL FIBRILLATION: ICD-10-CM

## 2025-06-17 DIAGNOSIS — I48.19 PERSISTENT ATRIAL FIBRILLATION: Primary | ICD-10-CM

## 2025-06-17 DIAGNOSIS — I10 HYPERTENSION, UNSPECIFIED TYPE: ICD-10-CM

## 2025-06-17 DIAGNOSIS — R60.0 PERIPHERAL EDEMA: ICD-10-CM

## 2025-06-17 DIAGNOSIS — I87.2 VENOUS INSUFFICIENCY OF BOTH LOWER EXTREMITIES: ICD-10-CM

## 2025-06-17 DIAGNOSIS — E66.01 SEVERE OBESITY (BMI >= 40): Chronic | ICD-10-CM

## 2025-06-17 DIAGNOSIS — M25.571 ACUTE RIGHT ANKLE PAIN: ICD-10-CM

## 2025-06-17 DIAGNOSIS — I48.3 TYPICAL ATRIAL FLUTTER: Chronic | ICD-10-CM

## 2025-06-17 DIAGNOSIS — Z98.890 S/P ABLATION OF ATRIAL FIBRILLATION: ICD-10-CM

## 2025-06-17 DIAGNOSIS — I89.0 LYMPHEDEMA OF BOTH LOWER EXTREMITIES: ICD-10-CM

## 2025-06-17 PROCEDURE — 80053 COMPREHEN METABOLIC PANEL: CPT

## 2025-06-17 PROCEDURE — 84550 ASSAY OF BLOOD/URIC ACID: CPT

## 2025-06-17 PROCEDURE — 99999 PR PBB SHADOW E&M-EST. PATIENT-LVL V: CPT | Mod: PBBFAC,,,

## 2025-06-17 PROCEDURE — 99215 OFFICE O/P EST HI 40 MIN: CPT | Mod: S$GLB,,,

## 2025-06-17 PROCEDURE — 36415 COLL VENOUS BLD VENIPUNCTURE: CPT

## 2025-06-17 RX ORDER — NITROGLYCERIN 0.4 MG/1
TABLET SUBLINGUAL
Qty: 100 TABLET | Refills: 0 | Status: SHIPPED | OUTPATIENT
Start: 2025-06-17

## 2025-06-17 RX ORDER — METHYLPREDNISOLONE 4 MG/1
TABLET ORAL
Qty: 21 EACH | Refills: 0 | Status: SHIPPED | OUTPATIENT
Start: 2025-06-17 | End: 2025-07-08

## 2025-06-17 RX ORDER — SPIRONOLACTONE 25 MG/1
25 TABLET ORAL DAILY
Qty: 90 TABLET | Refills: 0 | Status: SHIPPED | OUTPATIENT
Start: 2025-06-17

## 2025-06-17 NOTE — PROGRESS NOTES
"INTERNAL MEDICINE URGENT CARE NOTE    CHIEF COMPLAINT     Shiraz presents today with right ankle pain    HPI     Shiraz Jha is a 65 y.o. male with HTN, HLD, T2DM, persistent atrial fibrillation s/p ablation, chronic venous insufficiency of BLE, lymphedema of BLE, GERD, Gout, QUINTIN, DDD of lumbar spine, depression who presents for an urgent care visit today.    HISTORY OF PRESENT ILLNESS:  He reports severe right ankle pain for about a week, describing it as "really hurting." He denies any trauma, twisting, or falling incidents. He also notes chronic bilateral lower leg swelling, though the right ankle pain is his primary concern.    GOUT:  He has a history of gout with frequent but typically short-lasting attacks. A previous severe gout attack in his knee required drainage and hospital admission. He maintains a low purine diet for management.    CARDIOVASCULAR:  He has atrial fibrillation and is followed by two cardiologists, including an electrophysiologist. He has chronic bilateral lower extremity venous insufficiency and lymphedema present for several years with intermittent swelling. He is currently unable to wear compression stockings due to leg size. He tries to elevate his legs when resting.    DIET AND FLUID:  He reports very limited fluid intake and follows a low-salt diet.    MEDICATIONS:  He takes Bumex 2mg twice daily and Metoprolol. He has a history of stomach ulcer which contraindicates NSAID use.    ROS:  General: -fever, -chills, -fatigue, -weight gain, -weight loss  Eyes: -vision changes, -redness, -discharge  ENT: -ear pain, -nasal congestion, -sore throat  Cardiovascular: -chest pain, -palpitations, +lower extremity edema,   Respiratory: -cough, -shortness of breath  Gastrointestinal: -abdominal pain, -nausea, -vomiting, -diarrhea, -constipation, -blood in stool  Genitourinary: -dysuria, -hematuria, -frequency  Musculoskeletal: +joint pain, -muscle pain, +limb pain  Skin: -rash, " -lesion  Neurological: -headache, -dizziness, -numbness, -tingling  Psychiatric: -anxiety, -depression, -sleep difficulty        Past Medical History:  Past Medical History:   Diagnosis Date    Allergy     Anemia     Asthma     Atrial fibrillation     Cataract     Chronic rhinitis 9/26/2013    DDD (degenerative disc disease) 4/3/2015    Depression     Diabetes mellitus     Fibromyalgia     Gastroesophageal reflux disease without esophagitis 7/14/2017    Hypertension     Mixed hyperlipidemia 4/10/2025    Sinusitis, acute, maxillary 12/20/2012    Thyroid disease      Home Medications:  Prior to Admission medications    Medication Sig Start Date End Date Taking? Authorizing Provider   atorvastatin (LIPITOR) 40 MG tablet Take 1 tablet (40 mg total) by mouth once daily. 4/10/25 4/10/26  Randolph Ureña MD PhD   bumetanide (BUMEX) 2 MG tablet Take 1 tablet (2 mg total) by mouth 2 (two) times daily. 2/6/25 2/6/26  Randolph Ureña MD PhD   cyanocobalamin 500 MCG tablet Take 500 mcg by mouth once daily.    Provider, Historical   fluticasone propionate (FLONASE) 50 mcg/actuation nasal spray 2 sprays (100 mcg total) by Each Nostril route once daily. 10/18/22   Hang Romano MD   gabapentin (NEURONTIN) 300 MG capsule Take 1 capsule (300 mg total) by mouth 3 (three) times daily. 5/20/25 5/20/26  Prashant Liu MD   IMPOYZ 0.025 % Crea Apply topically as needed.  7/23/18   Provider, Historical   levocetirizine (XYZAL) 5 MG tablet TAKE 1 TABLET (5 MG TOTAL) BY MOUTH EVERY EVENING. 2/28/22 2/6/25  Tanna James NP   LUZU 1 % Crea Apply topically as needed 5/20/25   Prashant Liu MD   methylPREDNISolone (MEDROL DOSEPACK) 4 mg tablet Take as directed 9/17/24   Avila Condon III, MD   metoprolol tartrate (LOPRESSOR) 25 MG tablet Take 1 tablet (25 mg total) by mouth 2 (two) times daily. 6/10/24   All Webb MD   multivitamin (THERAGRAN) per tablet Take 1 tablet by mouth once daily.     Provider,  "Historical   nitroGLYCERIN (NITROSTAT) 0.4 MG SL tablet Please dispense 25 pills in 4 bottles. 19   Twyla Bahena NP   PREVIDENT 5000 DRY MOUTH 1.1 % Pste DO NOT EAT ,DRINK OR RINSE FOR 30 MINUTES AFTER BRUSHING 24   Provider, Historical   rivaroxaban (XARELTO) 20 mg Tab Take 1 tablet (20 mg total) by mouth once daily. 23   Julia Mcgraw NP   sotaloL (BETAPACE) 80 MG tablet Take 1 tablet by mouth twice daily 25   All Webb MD   spironolactone (ALDACTONE) 25 MG tablet Take 1 tablet by mouth once daily 24   Amy James MD   tamsulosin (FLOMAX) 0.4 mg Cap Take 1 capsule (0.4 mg total) by mouth once daily. 25  David Escobedo MD     PHYSICAL EXAM     Vitals: Blood pressure: 151/67.  General: No acute distress. Well-developed. Well-nourished.  Eyes: EOMI. Sclerae anicteric.  HENT: Normocephalic. Atraumatic. Nares patent. Moist oral mucosa.  Cardiovascular: Regular rate. Regular rhythm. No murmurs. No rubs. No gallops. Normal S1, S2.     Pulses:           Dorsalis pedis pulses are 2+ on the right side and 2+ on the left side.        Posterior tibial pulses are 2+ on the right side and 2+ on the left side.   Respiratory: Normal respiratory effort. Clear to auscultation bilaterally. No rales. No rhonchi. No wheezing.  Musculoskeletal: No  obvious deformity. Negative Jean's sign  Extremities: Bilateral lower extremity edema.     Right lower le+ Pitting Edema present.      Left lower le+ Pitting Edema present.   Neurological: Alert & oriented x3. No slurred speech. Normal gait.  Psychiatric: Normal mood. Normal affect. Good insight. Good judgment.  Skin: Warm. Dry. No rash.  MSK: Foot/Ankle - Right: Right ankle tenderness. No erythema, warmth or deformity of right ankle.            BP (!) 151/67   Pulse 66   Ht 5' 8" (1.727 m)   Wt 133.9 kg (295 lb 3.1 oz)   SpO2 97%   BMI 44.88 kg/m²   ASSESSMENT/PLAN     ACUTE RIGHT ANKLE PAIN:  - COMPREHENSIVE " METABOLIC PANEL; Future; Expected date: 06/17/2025  - Uric Acid; Future; Expected date: 06/17/2025  - methylPREDNISolone (MEDROL DOSEPACK) 4 mg tablet; use as directed  Dispense: 21 each; Refill: 0  - X-Ray Ankle Complete 3 View Right; Future; Expected date: 06/17/2025  - Monitored report of severe right ankle pain without injury, lasting about a week.  - Pain lacks classic gout symptoms such as redness and warmth, but shows tenderness and limited range of motion.  - Assessed ankle pain and swelling, considering gout vs. fluid retention.  - Prescribed short course of steroids with Medrol Dosepak, similar to previous effective treatment.  - Advised using heat for pain management.  - Ordered foot x-ray.    VENOUS INSUFFICIENCY (BILATERAL):  PERIPHERAL EDEMA  - Bilateral lower extremity edema assessed  - Swelling likely due to combination of venous insufficiency and lymphedema.  - Management plan includes leg elevation, compression stockings, fluid intake monitoring, and continuation of Bumex (2 mg BID) and spironolactone (25 mg daily).  - Pt had not been taking spironolactone. Thought spironolactone was replaced by Bumex. Per Cardio note (Dr. Jacinto), both are to be taken. Informed patient.    GOUT:  - Monitored history of gout with frequent attacks, previously treated with steroids.  - Advised patient to follow and continue low purine diet for management.  - Ordered uric acid level test.  - COMPREHENSIVE METABOLIC PANEL; Future; Expected date: 06/17/2025  - Uric Acid; Future; Expected date: 06/17/2025    LYMPHEDEMA  - Bilateral lower extremity edema assessed  - Swelling likely due to combination of venous insufficiency and lymphedema.  - Management plan includes leg elevation, compression stockings, fluid intake monitoring, and continuation of Bumex (2 mg BID) and spironolactone (25 mg daily).    ATRIAL FLUTTER:  - Monitored patient's AFlutter under cardiologist management.  - Reviewed Dr. Ureña's notes regarding  fluid management medications and explained the difference between Bumex and Lasix (Bumex is stronger and requires closer monitoring of renal function).  - Recommend maintaining fluid intake of no more than 1.5 L per day and continuing low salt diet.  - Ordered BNP and CMP labs.  - Refilled nitroglycerin and spironolactone 25 mg daily (pending confirmation with cardiologist).  - Scheduled cardiology appointment on July 10th.    HYPERTENSION  - Chronic, elevated today  - Restart spironolactone and continue Lopressor 25 mg BID  - spironolactone (ALDACTONE) 25 MG tablet; Take 1 tablet (25 mg total) by mouth once daily.  Dispense: 90 tablet; Refill: 0  - nitroGLYCERIN (NITROSTAT) 0.4 MG SL tablet; Please dispense 25 pills in 4 bottles.  Dispense: 100 tablet; Refill: 0  - Scheduled BP check with nurse next week.    SEVERE OBESITY  - Discussed Mediterranean-style diet benefits and recommended adoption.  - Recommend increasing exercise by walking dog more frequently.  - Discussed BMI    FOLLOW-UP:  - Noted elevated blood pressure despite medication adherence.  - Scheduled follow up in 1 week for blood pressure check with nurse.        I spent a total of 49 minutes on the day of the visit.This includes face to face time and non-face to face time preparing to see the patient (eg, review of tests), obtaining and/or reviewing separately obtained history, documenting clinical information in the electronic or other health record, independently interpreting results and communicating results to the patient/family/caregiver, or care coordinator.     Patient education provided from [place]. Patient was counseled on when and how to seek emergent care.   This note was generated with the assistance of ambient listening technology. Verbal consent was obtained by the patient and accompanying visitor(s) for the recording of patient appointment to facilitate this note. I attest to having reviewed and edited the generated note for accuracy,  though some syntax or spelling errors may persist. Please contact the author of this note for any clarification.       Zoë PINO, FERNANDA, FNP-c   Department of Internal Medicine - [facility] [address]  1:38 PM

## 2025-06-18 ENCOUNTER — TELEPHONE (OUTPATIENT)
Dept: ELECTROPHYSIOLOGY | Facility: CLINIC | Age: 65
End: 2025-06-18
Payer: COMMERCIAL

## 2025-06-18 LAB
ALBUMIN SERPL BCP-MCNC: 4.2 G/DL (ref 3.5–5.2)
ALP SERPL-CCNC: 372 UNIT/L (ref 40–150)
ALT SERPL W/O P-5'-P-CCNC: 244 UNIT/L (ref 10–44)
ANION GAP (OHS): 10 MMOL/L (ref 8–16)
AST SERPL-CCNC: 175 UNIT/L (ref 11–45)
BILIRUB SERPL-MCNC: 1.5 MG/DL (ref 0.1–1)
BUN SERPL-MCNC: 14 MG/DL (ref 8–23)
CALCIUM SERPL-MCNC: 9 MG/DL (ref 8.7–10.5)
CHLORIDE SERPL-SCNC: 107 MMOL/L (ref 95–110)
CO2 SERPL-SCNC: 25 MMOL/L (ref 23–29)
CREAT SERPL-MCNC: 0.9 MG/DL (ref 0.5–1.4)
GFR SERPLBLD CREATININE-BSD FMLA CKD-EPI: >60 ML/MIN/1.73/M2
GLUCOSE SERPL-MCNC: 87 MG/DL (ref 70–110)
POTASSIUM SERPL-SCNC: 4.2 MMOL/L (ref 3.5–5.1)
PROT SERPL-MCNC: 6.7 GM/DL (ref 6–8.4)
SODIUM SERPL-SCNC: 142 MMOL/L (ref 136–145)
URATE SERPL-MCNC: 6 MG/DL (ref 3.4–7)

## 2025-06-23 ENCOUNTER — OFFICE VISIT (OUTPATIENT)
Dept: ELECTROPHYSIOLOGY | Facility: CLINIC | Age: 65
End: 2025-06-23
Payer: COMMERCIAL

## 2025-06-23 VITALS — WEIGHT: 295 LBS | BODY MASS INDEX: 44.85 KG/M2

## 2025-06-23 DIAGNOSIS — E66.01 SEVERE OBESITY (BMI >= 40): Chronic | ICD-10-CM

## 2025-06-23 DIAGNOSIS — I48.19 PERSISTENT ATRIAL FIBRILLATION: Primary | ICD-10-CM

## 2025-06-23 DIAGNOSIS — Z98.890 STATUS POST CATHETER ABLATION OF ATRIAL FIBRILLATION: ICD-10-CM

## 2025-06-23 DIAGNOSIS — I48.92 ATRIAL FLUTTER, UNSPECIFIED TYPE: Chronic | ICD-10-CM

## 2025-06-23 DIAGNOSIS — Z98.890 S/P ABLATION OF ATRIAL FIBRILLATION: ICD-10-CM

## 2025-06-23 DIAGNOSIS — Z86.79 S/P ABLATION OF ATRIAL FIBRILLATION: ICD-10-CM

## 2025-06-23 DIAGNOSIS — Z98.890 STATUS POST CATHETER ABLATION OF ATRIAL FLUTTER: ICD-10-CM

## 2025-06-23 DIAGNOSIS — G47.33 OBSTRUCTIVE SLEEP APNEA: Chronic | ICD-10-CM

## 2025-06-23 DIAGNOSIS — I10 ESSENTIAL HYPERTENSION: Chronic | ICD-10-CM

## 2025-06-23 PROCEDURE — 98005 SYNCH AUDIO-VIDEO EST LOW 20: CPT | Mod: 95,,, | Performed by: NURSE PRACTITIONER

## 2025-06-23 RX ORDER — METOPROLOL TARTRATE 25 MG/1
25 TABLET, FILM COATED ORAL 2 TIMES DAILY
Qty: 180 TABLET | Refills: 3 | Status: SHIPPED | OUTPATIENT
Start: 2025-06-23

## 2025-06-23 RX ORDER — DABIGATRAN ETEXILATE 150 MG/1
150 CAPSULE ORAL EVERY 12 HOURS
Qty: 60 CAPSULE | Refills: 11 | Status: SHIPPED | OUTPATIENT
Start: 2025-06-23

## 2025-06-23 NOTE — PATIENT INSTRUCTIONS
BIN [id number]   [facility]DA[facility]   Group [id number]   Member ID [id number]   [facility]

## 2025-06-23 NOTE — PROGRESS NOTES
Shiraz Jha  1960        Subjective     Chief Complaint: Establish Care, Referral, and Annual Exam      History of Present Illness:  Mr. Shiraz Jha is a 65 y.o. male who presents to clinic for establishing care/annual.      C-scope: Utd, next due 5/2027      History of Present Illness    CHIEF COMPLAINT:  Mr. Jha presents today with oral herpes outbreak.    ORAL HERPES:  He reports second recurrent oral herpes outbreak within 30 days. Current lesion is described as painful with active, unruptured blistering.    LIVER FUNCTION AND GI SYMPTOMS:  Recent bloodwork at [facility] showed elevated bilirubin. He experiences intermittent right-sided abdominal pain and alternating bowel consistency with occasional diarrhea. Prior to starting iron supplementation two weeks ago, stools were very light-colored or yellowish, now dark green. He denies nausea or vomiting.    ANEMIA:  He is currently under care of hematologist Dr. Nitesh Torres and started iron supplementation two weeks ago. Blood transfusion was recommended by hematologist but pending insurance authorization.    PAST MEDICAL HISTORY:  History of gastric bypass surgery 15-20 years ago. Diabetes is well controlled through diet management without medication. History of depression, currently stable on medication.    CURRENT MEDICATIONS:  Started Atorvastatin (Lipitor) 30-40 days ago prescribed by Dr. Ureña. Currently taking iron supplementation.      ROS:  Cardiovascular: -chest pain  Gastrointestinal: +RUQ abdominal pain, +diarrhea, +stool color changes  Psychiatric: -depression             PAST HISTORY:     Past Medical History:   Diagnosis Date    Allergy     Anemia     Asthma     Atrial fibrillation     Cataract     Chronic rhinitis 9/26/2013    DDD (degenerative disc disease) 4/3/2015    Depression     Diabetes mellitus     Fibromyalgia     Gastroesophageal reflux disease without esophagitis 7/14/2017    Hypertension     Mixed hyperlipidemia  4/10/2025    Sinusitis, acute, maxillary 12/20/2012    Thyroid disease        Past Surgical History:   Procedure Laterality Date    ANTEGRADE SINGLE BALLOON ENTEROSCOPY N/A 5/26/2022    Procedure: ENTEROSCOPY, SINGLE BALLOON, ANTEGRADE;  Surgeon: Lyle Edmond MD;  Location: [facility] ENDO (2ND FLR);  Service: Endoscopy;  Laterality: N/A;  Will use single-balloon scope for both the upper endoscopy and the colonoscopy - recent incomplete colonoscopy due to looping.    Pt is fully vaccinated-DS  5/20/22-Approval to hold Xarelto rec'd from Dr. Webb-pending approval (see telephoned en    CERVICAL SPINE SURGERY      COLONOSCOPY N/A 5/10/2022    Procedure: COLONOSCOPY;  Surgeon: Conrad Diaz MD;  Location: [facility] ENDO (2ND FLR);  Service: Endoscopy;  Laterality: N/A;    COLONOSCOPY N/A 5/26/2022    Procedure: COLONOSCOPY;  Surgeon: Lyle Edmond MD;  Location: [facility] ENDO (2ND FLR);  Service: Endoscopy;  Laterality: N/A;    EPIDURAL STEROID INJECTION INTO LUMBAR SPINE N/A 5/29/2018    Procedure: INJECTION-STEROID-EPIDURAL-LUMBAR- L4-5;  Surgeon: Roman Lyman MD;  Location: [facility] PAIN MGT;  Service: Pain Management;  Laterality: N/A;  Patient is diabetic and Xarleto     EPIDURAL STEROID INJECTION INTO LUMBAR SPINE N/A 7/3/2018    Procedure: INJECTION, STEROID, SPINE, LUMBAR, EPIDURAL- L4-5;  Surgeon: Roman Lyman MD;  Location: [facility] PAIN MGT;  Service: Pain Management;  Laterality: N/A;  Patient takes Xarelto and ASA     ESOPHAGOGASTRODUODENOSCOPY N/A 5/9/2022    Procedure: EGD (ESOPHAGOGASTRODUODENOSCOPY);  Surgeon: Conrad Diaz MD;  Location: [facility] ENDO (2ND FLR);  Service: Endoscopy;  Laterality: N/A;    EXCISION OF LESION OF LIP N/A 7/7/2020    Procedure: EXCISION, LESION, LIP;  Surgeon: Caden Navarro MD;  Location: [facility] OR 2ND FLR;  Service: ENT;  Laterality: N/A;    GASTRIC BYPASS      SINUS SURGERY  2000    Dr. Watt at [facility]    TONSILLECTOMY         Family History   Problem Relation Name Age of Onset     Heart disease Father      Cancer Mother          lung    Hypertension Sister      Heart disease Brother      Cirrhosis Brother      Diabetes Brother         Social History     Socioeconomic History    Marital status: Single   Tobacco Use    Smoking status: Never    Smokeless tobacco: Never   Substance and Sexual Activity    Alcohol use: No     Comment: rare    Drug use: No    Sexual activity: Yes     Partners: Female   Social History Narrative    [place], lives alone w/2 dogs.    Dogs are his kids.    Works PT --  at [employer], on ssdi from neck and back/depression.     Social Drivers of Health     Financial Resource Strain: High Risk (1/30/2025)    Overall Financial Resource Strain (CARDIA)     Difficulty of Paying Living Expenses: Very hard   Food Insecurity: Food Insecurity Present (1/30/2025)    Hunger Vital Sign     Worried About Running Out of Food in the Last Year: Sometimes true     Ran Out of Food in the Last Year: Patient declined   Transportation Needs: No Transportation Needs (5/8/2023)    PRAPARE - Transportation     Lack of Transportation (Medical): No     Lack of Transportation (Non-Medical): No   Physical Activity: Inactive (1/30/2025)    Exercise Vital Sign     Days of Exercise per Week: 0 days     Minutes of Exercise per Session: 0 min   Stress: Patient Declined (1/30/2025)    [employer] [place] [employer] - Occupational Stress Questionnaire     Feeling of Stress : Patient declined   Housing Stability: High Risk (5/8/2023)    Housing Stability Vital Sign     Unable to Pay for Housing in the Last Year: Yes     Number of Places Lived in the Last Year: 1     Unstable Housing in the Last Year: Yes       MEDICATIONS & ALLERGIES:     Current Outpatient Medications on File Prior to Visit   Medication Sig    atorvastatin (LIPITOR) 40 MG tablet Take 1 tablet (40 mg total) by mouth once daily.    dabigatran etexilate (PRADAXA) 150 mg Cap Take 1 capsule (150 mg total) by mouth every 12  "(twelve) hours.    fluticasone propionate (FLONASE) 50 mcg/actuation nasal spray 2 sprays (100 mcg total) by Each Nostril route once daily.    gabapentin (NEURONTIN) 300 MG capsule Take 1 capsule (300 mg total) by mouth 3 (three) times daily.    metoprolol tartrate (LOPRESSOR) 25 MG tablet Take 1 tablet (25 mg total) by mouth 2 (two) times daily.    multivitamin (THERAGRAN) per tablet Take 1 tablet by mouth once daily.     nitroGLYCERIN (NITROSTAT) 0.4 MG SL tablet Please dispense 25 pills in 4 bottles.    tamsulosin (FLOMAX) 0.4 mg Cap Take 1 capsule (0.4 mg total) by mouth once daily.    bumetanide (BUMEX) 2 MG tablet Take 1 tablet (2 mg total) by mouth 2 (two) times daily.    cyanocobalamin 500 MCG tablet Take 500 mcg by mouth once daily.    LUZU 1 % Crea Apply topically as needed    PREVIDENT 5000 DRY MOUTH 1.1 % Pste DO NOT EAT ,DRINK OR RINSE FOR 30 MINUTES AFTER BRUSHING    sotaloL (BETAPACE) 80 MG tablet Take 1 tablet by mouth twice daily    spironolactone (ALDACTONE) 25 MG tablet Take 1 tablet (25 mg total) by mouth once daily. (Patient not taking: Reported on 6/24/2025)    [DISCONTINUED] IMPOYZ 0.025 % Crea Apply topically as needed.  (Patient not taking: Reported on 6/17/2025)    [DISCONTINUED] levocetirizine (XYZAL) 5 MG tablet TAKE 1 TABLET (5 MG TOTAL) BY MOUTH EVERY EVENING. (Patient not taking: Reported on 6/17/2025)    [DISCONTINUED] methylPREDNISolone (MEDROL DOSEPACK) 4 mg tablet use as directed (Patient not taking: Reported on 6/24/2025)     No current facility-administered medications on file prior to visit.       Review of patient's allergies indicates:   Allergen Reactions    Iodinated contrast media Other (See Comments)     Raises BP         OBJECTIVE:     Vital Signs:  Vitals:    06/24/25 1258   BP: 118/60   BP Location: Right arm   Patient Position: Sitting   Pulse: 63   Resp: 18   Temp: 96.8 °F (36 °C)   TempSrc: Temporal   SpO2: 97%   Weight: 126 kg (277 lb 12.5 oz)   Height: 5' 8" (1.727 " "m)       Body mass index is 42.24 kg/m².     Physical Exam:  Physical Exam  Vitals and nursing note reviewed.   Constitutional:       General: He is not in acute distress.     Appearance: He is not ill-appearing.   HENT:      Head: Normocephalic and atraumatic.      Mouth/Throat:      Mouth: Mucous membranes are moist.      Pharynx: Oropharynx is clear. No oropharyngeal exudate or posterior oropharyngeal erythema.   Eyes:      Extraocular Movements: Extraocular movements intact.      Conjunctiva/sclera: Conjunctivae normal.   Cardiovascular:      Rate and Rhythm: Normal rate and regular rhythm.   Pulmonary:      Effort: Pulmonary effort is normal. No respiratory distress.      Breath sounds: Normal breath sounds. No wheezing or rales.   Chest:      Chest wall: No tenderness.   Abdominal:      Palpations: Abdomen is soft.      Tenderness: There is no abdominal tenderness. There is no guarding.   Musculoskeletal:         General: Normal range of motion.      Cervical back: Normal range of motion and neck supple. No tenderness.      Right lower leg: Edema present.      Left lower leg: Edema present.   Lymphadenopathy:      Cervical: No cervical adenopathy.   Skin:     General: Skin is warm and dry.   Neurological:      Mental Status: He is alert and oriented to person, place, and time.            Laboratory  Lab Results   Component Value Date    WBC 5.98 05/20/2025    HGB 10.5 (L) 05/20/2025    HCT 37.3 (L) 05/20/2025    MCV 78 (L) 05/20/2025     05/20/2025     Lab Results   Component Value Date    GLU 87 06/17/2025     06/17/2025    K 4.2 06/17/2025     06/17/2025    CO2 25 06/17/2025    BUN 14 06/17/2025    CREATININE 0.9 06/17/2025    CALCIUM 9.0 06/17/2025    MG 2.3 05/08/2023     Lab Results   Component Value Date    INR 1.1 05/06/2022    INR 1.0 07/14/2017    INR 1.1 07/15/2014     Lab Results   Component Value Date    HGBA1C 6.2 (H) 05/20/2025     No results for input(s): "POCTGLUCOSE" in the " last 72 hours.      Health Maintenance         Date Due Completion Date    Shingles Vaccine (1 of 2) Never done ---    Diabetic Eye Exam 02/11/2020 2/11/2019    RSV Vaccine (Age 60+ and Pregnant patients) (1 - Risk 60-74 years 1-dose series) Never done ---    Foot Exam 02/18/2023 2/18/2022    Override on 2/18/2022: Done    COVID-19 Vaccine (3 - 2024-25 season) 09/01/2024 12/23/2021    Influenza Vaccine (Season Ended) 09/01/2025 12/15/2022    Override on 12/1/2020: Declined    Override on 1/29/2020: Declined    Hemoglobin A1c 11/20/2025 5/20/2025    Lipid Panel 04/10/2026 4/10/2025    Diabetes Urine Screening 05/20/2026 5/20/2025    High Dose Statin 06/24/2026 6/24/2025    Colorectal Cancer Screening 05/26/2027 5/26/2022    TETANUS VACCINE 05/01/2029 5/1/2019            ASSESSMENT & PLAN:   65 y.o. male who was seen today in clinic for establishing care/annual.    Annual physical exam    Essential hypertension    Persistent atrial fibrillation    S/P ablation of atrial fibrillation    Lymphedema of both lower extremities    Type 2 diabetes mellitus without complication, without long-term current use of insulin  -     Ambulatory referral/consult to Optometry; Future; Expected date: 07/01/2025    Elevated LFTs  -     Comprehensive Metabolic Panel; Future; Expected date: 07/08/2025    Encounter for administration of vaccine  -     Ambulatory referral/consult to Travel Clinic; Future; Expected date: 07/01/2025    HSV (herpes simplex virus) infection         1. Annual physical exam    2. Essential hypertension    3. Persistent atrial fibrillation    4. S/P ablation of atrial fibrillation    5. Lymphedema of both lower extremities    6. Type 2 diabetes mellitus without complication, without long-term current use of insulin    7. Elevated LFTs    8. Encounter for administration of vaccine    9. HSV (herpes simplex virus) infection        1/8.  Fasting labs ordered.  Recommend shingles and RSV vaccine at outside pharmacy as  not available in clinic.  Attempted at outside pharmacy however was told he needs to go to clinic for administration of shingrix.  Travel clinic referral ordered.  2/3/4/5.  Chronic, stable.  Continue follow up with cardiology and home meds.  Seen by EP on 6/22.  S/p ablation.  Recently started on pradaxa as well as continuing metoprolol and sotalol.  Continuing aldactone and bumex  6.  Stable, last A1c reviewed.  Diet controlled.  Follow up with podiatrist. Optometry referral ordered.  7.  Recently elevated LFTs after recently starting statin from cardiology.  Last lipid panel reviewed.  Recommend holding statin and rechecking LFTs in 2 weeks.  Consider imaging if warranted.  Follow up with cardiology.  9.  Intermittent flares, consider valtrex however with underlying liver injury, will defer at this time.      RTC in 4-6 months or sooner if needed    Oscar Boone MD  [facility]    This note was generated with the assistance of ambient listening technology. Verbal consent was obtained by the patient and accompanying visitor(s) for the recording of patient appointment to facilitate this note. I attest to having reviewed and edited the generated note for accuracy, though some syntax or spelling errors may persist. Please contact the author of this note for any clarification.

## 2025-06-24 ENCOUNTER — OFFICE VISIT (OUTPATIENT)
Dept: INTERNAL MEDICINE | Facility: CLINIC | Age: 65
End: 2025-06-24
Payer: COMMERCIAL

## 2025-06-24 ENCOUNTER — TELEPHONE (OUTPATIENT)
Dept: INFECTIOUS DISEASES | Facility: CLINIC | Age: 65
End: 2025-06-24
Payer: COMMERCIAL

## 2025-06-24 VITALS
WEIGHT: 277.75 LBS | RESPIRATION RATE: 18 BRPM | HEIGHT: 68 IN | SYSTOLIC BLOOD PRESSURE: 118 MMHG | BODY MASS INDEX: 42.1 KG/M2 | DIASTOLIC BLOOD PRESSURE: 60 MMHG | OXYGEN SATURATION: 97 % | TEMPERATURE: 97 F | HEART RATE: 63 BPM

## 2025-06-24 DIAGNOSIS — E11.9 TYPE 2 DIABETES MELLITUS WITHOUT COMPLICATION, WITHOUT LONG-TERM CURRENT USE OF INSULIN: ICD-10-CM

## 2025-06-24 DIAGNOSIS — Z86.79 S/P ABLATION OF ATRIAL FIBRILLATION: ICD-10-CM

## 2025-06-24 DIAGNOSIS — I89.0 LYMPHEDEMA OF BOTH LOWER EXTREMITIES: ICD-10-CM

## 2025-06-24 DIAGNOSIS — Z23 ENCOUNTER FOR ADMINISTRATION OF VACCINE: ICD-10-CM

## 2025-06-24 DIAGNOSIS — B00.9 HSV (HERPES SIMPLEX VIRUS) INFECTION: ICD-10-CM

## 2025-06-24 DIAGNOSIS — Z00.00 ANNUAL PHYSICAL EXAM: Primary | ICD-10-CM

## 2025-06-24 DIAGNOSIS — I48.19 PERSISTENT ATRIAL FIBRILLATION: ICD-10-CM

## 2025-06-24 DIAGNOSIS — R79.89 ELEVATED LFTS: ICD-10-CM

## 2025-06-24 DIAGNOSIS — I10 ESSENTIAL HYPERTENSION: Chronic | ICD-10-CM

## 2025-06-24 DIAGNOSIS — Z98.890 S/P ABLATION OF ATRIAL FIBRILLATION: ICD-10-CM

## 2025-06-24 PROCEDURE — 99396 PREV VISIT EST AGE 40-64: CPT | Mod: S$GLB,,,

## 2025-06-24 PROCEDURE — 99999 PR PBB SHADOW E&M-EST. PATIENT-LVL V: CPT | Mod: PBBFAC,,,

## 2025-06-24 NOTE — TELEPHONE ENCOUNTER
Called patient back, but phone call was not answered. Left a voice message to call the ID clinic back.     Patient would need an order from his PCP to receive the Shringrix vaccine at [facility]

## 2025-06-25 ENCOUNTER — RESULTS FOLLOW-UP (OUTPATIENT)
Dept: INTERNAL MEDICINE | Facility: CLINIC | Age: 65
End: 2025-06-25

## 2025-06-25 DIAGNOSIS — R79.89 ELEVATED LFTS: Primary | ICD-10-CM

## 2025-06-26 ENCOUNTER — TELEPHONE (OUTPATIENT)
Dept: INFECTIOUS DISEASES | Facility: CLINIC | Age: 65
End: 2025-06-26
Payer: COMMERCIAL

## 2025-06-26 ENCOUNTER — TELEPHONE (OUTPATIENT)
Dept: INTERNAL MEDICINE | Facility: CLINIC | Age: 65
End: 2025-06-26
Payer: COMMERCIAL

## 2025-06-26 DIAGNOSIS — Z23 ENCOUNTER FOR ADMINISTRATION OF VACCINE: Primary | ICD-10-CM

## 2025-06-26 NOTE — TELEPHONE ENCOUNTER
Patient would like to receive his shingles vaccine at [facility] injection room.   Can you please have the provider place the orders for Shringrix (order #:[id number])?

## 2025-06-26 NOTE — TELEPHONE ENCOUNTER
Copied from CRM #[id number]. Topic: General Inquiry - Patient Advice  >> Jun 26, 2025  9:28 AM Sole wrote:  .1MEDICALADVICE     Patient is calling for Medical Advice regarding:pt is calling the wrong referral was sent.  He need the the one for the shot/vaccine only.  Please call back and advise.    How long has patient had these symptoms:    Pharmacy name and phone#:    Patient wants a call back or thru [place], provide patient's call back phone number:[phone]    Comments:    Please advise patient replies from provider may take up to 48 hours.

## 2025-06-26 NOTE — TELEPHONE ENCOUNTER
Please advise pt stated that wrong referral was ordered Travel clinic for Shringx vaccine. Need to be covered by insurance

## 2025-06-26 NOTE — TELEPHONE ENCOUNTER
Copied from CRM #[id number]. Topic: Appointments - Amb Referral  >> Jun 26, 2025  9:44 AM Antonio wrote:  Who call ? Shiraz Jha     What is the request Details : Pt calling to speak with someone in provider office regarding orders for Shringrix. [facility] said insurance will not cover injection at the pharmacy , only cover if he have  it done in the clinic.   Please call pt back.     Can clinic  use patient portal  : No     What number to call back : [phone]

## 2025-07-01 ENCOUNTER — CLINICAL SUPPORT (OUTPATIENT)
Dept: INTERNAL MEDICINE | Facility: CLINIC | Age: 65
End: 2025-07-01
Payer: COMMERCIAL

## 2025-07-01 VITALS — DIASTOLIC BLOOD PRESSURE: 62 MMHG | SYSTOLIC BLOOD PRESSURE: 120 MMHG | HEART RATE: 61 BPM | OXYGEN SATURATION: 98 %

## 2025-07-01 DIAGNOSIS — I10 ESSENTIAL HYPERTENSION: Primary | ICD-10-CM

## 2025-07-01 NOTE — PROGRESS NOTES
Pt here for BP nurse visit, 2 pt identifiers used. Pt states he has been on the same blood pressure medication for years and did take it before the visit. Pt's BP was taken manually and updated in the chart. Pt was educated and had no further questions

## 2025-07-08 ENCOUNTER — HOSPITAL ENCOUNTER (OUTPATIENT)
Dept: CARDIOLOGY | Facility: CLINIC | Age: 65
Discharge: HOME | End: 2025-07-08
Payer: COMMERCIAL

## 2025-07-08 ENCOUNTER — LAB VISIT (OUTPATIENT)
Dept: LAB | Facility: HOSPITAL | Age: 65
End: 2025-07-08
Attending: INTERNAL MEDICINE
Payer: COMMERCIAL

## 2025-07-08 ENCOUNTER — RESULTS FOLLOW-UP (OUTPATIENT)
Dept: ELECTROPHYSIOLOGY | Facility: CLINIC | Age: 65
End: 2025-07-08
Payer: COMMERCIAL

## 2025-07-08 DIAGNOSIS — E78.2 MIXED HYPERLIPIDEMIA: ICD-10-CM

## 2025-07-08 DIAGNOSIS — D50.9 IRON DEFICIENCY ANEMIA, UNSPECIFIED IRON DEFICIENCY ANEMIA TYPE: ICD-10-CM

## 2025-07-08 DIAGNOSIS — R79.89 ELEVATED LFTS: ICD-10-CM

## 2025-07-08 DIAGNOSIS — R60.0 EDEMA OF BOTH LOWER EXTREMITIES: ICD-10-CM

## 2025-07-08 DIAGNOSIS — I48.19 PERSISTENT ATRIAL FIBRILLATION: ICD-10-CM

## 2025-07-08 DIAGNOSIS — E53.8 B12 DEFICIENCY: ICD-10-CM

## 2025-07-08 DIAGNOSIS — E53.8 FOLATE DEFICIENCY: ICD-10-CM

## 2025-07-08 LAB
ABSOLUTE EOSINOPHIL (OHS): 0.12 K/UL
ABSOLUTE MONOCYTE (OHS): 0.49 K/UL (ref 0.3–1)
ABSOLUTE NEUTROPHIL COUNT (OHS): 3.7 K/UL (ref 1.8–7.7)
ALBUMIN SERPL BCP-MCNC: 3.9 G/DL (ref 3.5–5.2)
ALP SERPL-CCNC: 136 UNIT/L (ref 40–150)
ALT SERPL W/O P-5'-P-CCNC: 18 UNIT/L (ref 10–44)
ANION GAP (OHS): 7 MMOL/L (ref 8–16)
AST SERPL-CCNC: 18 UNIT/L (ref 11–45)
BASOPHILS # BLD AUTO: 0.05 K/UL
BASOPHILS NFR BLD AUTO: 0.8 %
BILIRUB SERPL-MCNC: 0.8 MG/DL (ref 0.1–1)
BUN SERPL-MCNC: 16 MG/DL (ref 8–23)
CALCIUM SERPL-MCNC: 9 MG/DL (ref 8.7–10.5)
CHLORIDE SERPL-SCNC: 105 MMOL/L (ref 95–110)
CO2 SERPL-SCNC: 29 MMOL/L (ref 23–29)
CREAT SERPL-MCNC: 0.9 MG/DL (ref 0.5–1.4)
ERYTHROCYTE [DISTWIDTH] IN BLOOD BY AUTOMATED COUNT: 19 % (ref 11.5–14.5)
FERRITIN SERPL-MCNC: 14 NG/ML (ref 20–300)
FOLATE SERPL-MCNC: 14.7 NG/ML (ref 4–24)
GFR SERPLBLD CREATININE-BSD FMLA CKD-EPI: >60 ML/MIN/1.73/M2
GLUCOSE SERPL-MCNC: 120 MG/DL (ref 70–110)
HCT VFR BLD AUTO: 40.6 % (ref 40–54)
HGB BLD-MCNC: 12 GM/DL (ref 14–18)
IMM GRANULOCYTES # BLD AUTO: 0.01 K/UL (ref 0–0.04)
IMM GRANULOCYTES NFR BLD AUTO: 0.2 % (ref 0–0.5)
IRON SATN MFR SERPL: 8 % (ref 20–50)
IRON SERPL-MCNC: 42 UG/DL (ref 45–160)
LYMPHOCYTES # BLD AUTO: 2.06 K/UL (ref 1–4.8)
MCH RBC QN AUTO: 24 PG (ref 27–31)
MCHC RBC AUTO-ENTMCNC: 29.6 G/DL (ref 32–36)
MCV RBC AUTO: 81 FL (ref 82–98)
NUCLEATED RBC (/100WBC) (OHS): 0 /100 WBC
OHS QRS DURATION: 88 MS
OHS QTC CALCULATION: 431 MS
PLATELET # BLD AUTO: 321 K/UL (ref 150–450)
PMV BLD AUTO: 10.5 FL (ref 9.2–12.9)
POTASSIUM SERPL-SCNC: 4.1 MMOL/L (ref 3.5–5.1)
PROT SERPL-MCNC: 6.8 GM/DL (ref 6–8.4)
RBC # BLD AUTO: 5 M/UL (ref 4.6–6.2)
RELATIVE EOSINOPHIL (OHS): 1.9 %
RELATIVE LYMPHOCYTE (OHS): 32 % (ref 18–48)
RELATIVE MONOCYTE (OHS): 7.6 % (ref 4–15)
RELATIVE NEUTROPHIL (OHS): 57.5 % (ref 38–73)
SODIUM SERPL-SCNC: 141 MMOL/L (ref 136–145)
TIBC SERPL-MCNC: 511 UG/DL (ref 250–450)
TRANSFERRIN SERPL-MCNC: 345 MG/DL (ref 200–375)
VIT B12 SERPL-MCNC: >2000 PG/ML (ref 210–950)
WBC # BLD AUTO: 6.43 K/UL (ref 3.9–12.7)

## 2025-07-08 PROCEDURE — 82728 ASSAY OF FERRITIN: CPT

## 2025-07-08 PROCEDURE — 83540 ASSAY OF IRON: CPT

## 2025-07-08 PROCEDURE — 82607 VITAMIN B-12: CPT

## 2025-07-08 PROCEDURE — 85025 COMPLETE CBC W/AUTO DIFF WBC: CPT

## 2025-07-08 PROCEDURE — 36415 COLL VENOUS BLD VENIPUNCTURE: CPT

## 2025-07-08 PROCEDURE — 82040 ASSAY OF SERUM ALBUMIN: CPT

## 2025-07-08 PROCEDURE — 82746 ASSAY OF FOLIC ACID SERUM: CPT

## 2025-07-08 PROCEDURE — 93010 ELECTROCARDIOGRAM REPORT: CPT | Mod: S$GLB,,, | Performed by: INTERNAL MEDICINE

## 2025-07-08 RX ORDER — SOTALOL HYDROCHLORIDE 80 MG/1
80 TABLET ORAL 2 TIMES DAILY
Qty: 180 TABLET | Refills: 3 | Status: SHIPPED | OUTPATIENT
Start: 2025-07-08

## 2025-07-10 ENCOUNTER — OFFICE VISIT (OUTPATIENT)
Dept: CARDIOLOGY | Facility: CLINIC | Age: 65
End: 2025-07-10
Payer: COMMERCIAL

## 2025-07-10 VITALS
HEART RATE: 64 BPM | HEIGHT: 68 IN | WEIGHT: 282.19 LBS | OXYGEN SATURATION: 97 % | SYSTOLIC BLOOD PRESSURE: 110 MMHG | DIASTOLIC BLOOD PRESSURE: 77 MMHG | BODY MASS INDEX: 42.77 KG/M2

## 2025-07-10 DIAGNOSIS — Z98.890 S/P ABLATION OF ATRIAL FIBRILLATION: ICD-10-CM

## 2025-07-10 DIAGNOSIS — Z98.890 STATUS POST CATHETER ABLATION OF ATRIAL FIBRILLATION: ICD-10-CM

## 2025-07-10 DIAGNOSIS — E11.9 TYPE 2 DIABETES MELLITUS WITHOUT COMPLICATION, WITHOUT LONG-TERM CURRENT USE OF INSULIN: Chronic | ICD-10-CM

## 2025-07-10 DIAGNOSIS — I10 ESSENTIAL HYPERTENSION: Chronic | ICD-10-CM

## 2025-07-10 DIAGNOSIS — I77.89 ENLARGED THORACIC AORTA: ICD-10-CM

## 2025-07-10 DIAGNOSIS — I89.0 LYMPHEDEMA OF BOTH LOWER EXTREMITIES: ICD-10-CM

## 2025-07-10 DIAGNOSIS — G47.33 OBSTRUCTIVE SLEEP APNEA: Chronic | ICD-10-CM

## 2025-07-10 DIAGNOSIS — I87.2 VENOUS INSUFFICIENCY OF BOTH LOWER EXTREMITIES: ICD-10-CM

## 2025-07-10 DIAGNOSIS — Z86.79 S/P ABLATION OF ATRIAL FIBRILLATION: ICD-10-CM

## 2025-07-10 DIAGNOSIS — E66.01 SEVERE OBESITY (BMI >= 40): Chronic | ICD-10-CM

## 2025-07-10 DIAGNOSIS — R60.0 EDEMA OF BOTH LOWER EXTREMITIES: Primary | ICD-10-CM

## 2025-07-10 DIAGNOSIS — I48.19 PERSISTENT ATRIAL FIBRILLATION: ICD-10-CM

## 2025-07-10 DIAGNOSIS — E78.2 MIXED HYPERLIPIDEMIA: ICD-10-CM

## 2025-07-10 DIAGNOSIS — Z79.01 CURRENT USE OF LONG TERM ANTICOAGULATION: Chronic | ICD-10-CM

## 2025-07-10 PROCEDURE — 99999 PR PBB SHADOW E&M-EST. PATIENT-LVL IV: CPT | Mod: PBBFAC,,, | Performed by: INTERNAL MEDICINE

## 2025-07-10 PROCEDURE — 99215 OFFICE O/P EST HI 40 MIN: CPT | Mod: S$GLB,,, | Performed by: INTERNAL MEDICINE

## 2025-07-10 RX ORDER — EZETIMIBE 10 MG/1
10 TABLET ORAL DAILY
Qty: 90 TABLET | Refills: 3 | Status: SHIPPED | OUTPATIENT
Start: 2025-07-10 | End: 2026-07-10

## 2025-07-10 NOTE — PATIENT INSTRUCTIONS
Assessment/Plan:  Mr. Shiraz Jha is a 65 y.o. male with Afib s/p ablation (7/2014), BLE lymphedema, venous insufficiency, HTN, DM2, morbid obesity s/p gastric bypass (2010), QUINTIN (on CPAP), who presents for a follow up appointment.      1. BLE Edema due to lymphedema and venous insufficiency- Continue bumex 2 mg bid and spironolactone 25 mg daily. Check cmp today. Refer back to lymphedema clinic. Continue graduated compression hose; elevate legs when resting, limit fluid intake to 1.5L daily, sodium intake to 2000mg daily.  Check cmp today to monitor renal function.     2. Morbid Obesity s/p Gastric Bypass- Encourage dietary modification, exercise and weight loss.      3. Afib s/p ablation (7/2014)- Stable. Continue Xarelto 20 mg daily for anticoagulation.     4. HTN- Controlled.  Continue current medications.      5. QUINTIN- Continue CPAP nightly.     6. HLD- The 10-year ASCVD risk score ([place] DK, et al., 2019) is: 25.2%. Pt stopped taking atorvastatin due to elevated liver enzymes, which has since returned to baseline.  Start zetia 10 mg daily.      Follow up in 2 months with cmp and lipids prior

## 2025-07-10 NOTE — PROGRESS NOTES
"  [facility]    CC: Lower Extremity Edema    Patient ID: Mr. Shiraz Jha is a 65 y.o. male with Afib s/p ablation (7/2014), BLE lymphedema, venous insufficiency, HTN, DM2, morbid obesity s/p gastric bypass (2010), QUINTIN (on CPAP), who presents for a follow up appointment.  Pertinent history/events as follows:    -Pt kindly referred by Twyla Bahena and Dr Scott for evaluation of lower extremity edema (per Dr. Scott's note on 9/24/2019:  "Mr. Jha is in clinic today for continued LE edema and discoloration of his legs.  He had a venous ultrasound on 8/6/19 for the swelling and mild reflux was noted in the left popliteal.  Patient denies chest pain with exertion or at rest, palpitations, SOB, DAI, dizziness, syncope, orthopnea, PND, or claudication.  Instructed to elevate legs when seated, low salt diet, increase walking and compression stockings. No venous reflux noted on US.  Refer to Dr. Ureña in interventional cardiology."    -At our initial clinic visit on 10/16/2019, Mr. Jha reported BLE edema for the past 1 year.  States LE edema is improved with graduated compression hose.  He has no LE pain, claudication symptoms, rest pain, or tissue loss.  No smoking history.  States he has gained over 50 pounds in the past year, despite gastric bypass surgery.  BLE Venous Reflux Study from 8/6/2019 showed no evidence of lower extremity DVT bilaterally.  There is mild left popliteal (deep venous) reflux.  Plan:   BLE Edema- BLE edema likely due to a component of venous insufficiency and morbid obesity.  Check cmp today.  If potassium and creatinine are appropriate, increase lasix to 40 mg bid for 7 days, then resume at 40 mg daily.  Pt to continue this cycle/regimen for lasix.  Pt will benefit from significant weight loss.  Continue graduated compression hose, leg elevation and low salt diet.  Limit fluid intake to 2 liters a day.  Check exercise SURINDER to evaluate for significant PAD.   Morbid Obesity- " Refer back to Bariatric Surgery.     -At follow up clinic visit on 11/27/2019, Mr. Jha reported no significant improvement in BLE edema with lasix regimen of 40 mg bid for 7 days, then resuming at 40 mg daily.  Exercise SURINDER from 11/15/2019 showed normal rest and exercise SURINDER bilaterally with normal PVR waveforms bilaterally.  Exam shows 1+ BLE pitting edema.  Plan:   BLE Edema- BLE edema likely due to a component of venous insufficiency and morbid obesity.  Check cmp today.  If potassium and creatinine are appropriate, discontinue lasix and start bumex 1 mg bid.  Pt will benefit from significant weight loss.  Continue graduated compression hose, leg elevation and low salt diet.  Limit fluid intake to 2 liters a day.  Check exercise SURINDER to evaluate for significant PAD.   Morbid Obesity- Pt referred back to Bariatric Surgery.     -At clinic visit on 1/8/2020, Mr. Jha reported significant improvement in BLE edema since starting bumex 1 mg daily.  Exercise SURINDER study from 11/25/2019 showed normal rest and exercise SURINDER bilaterally.  Normal PVR waveforms bilaterally.  Plan:   BLE Edema- BLE edema now improving.  Continue bumex 1 mg daily.  Check cmp today to monitor renal fxn.  Exercise SURINDER study from 11/25/2019 showed normal rest and exercise SURINDER bilaterally.  Normal PVR waveforms bilaterally. Continue graduated compression hose, leg elevation and low salt diet.  Limit fluid intake to 2 liters a day.    Morbid Obesity- Pt referred back to Bariatric Surgery.     - At clinic visit on 04/08/20, Mr. Jha reported doing well with no significant LE edema.  Continues to wear graduated compression hose, low salt diet, and limiting fluid intake to 2 liters a day.    Plan  BLE Edema- Currently doing well.  Continue bumex 1 mg daily.  Continue graduated compression hose, leg elevation and low salt diet.  Limit fluid intake to 2 liters a day.      -At clinic visit on 12/01/20, Mr. Jha reported worsening swelling of his bilateral  lower extremities. He stopped wearing compression hose a month ago, however, he continues to take bumex twice a day. He reports no rest pain, claudication, CLI or tissue atrophy.   Plan:  BLE Edema- Due to lymphedema and venous insufficiency. Patient notices worsening of bilateral lower extremity edema after he stopped using compression hose for the past month.  Pt instructed to resume graduated compression hose as prescribed.  Continue bumex 1 mg twice a day.  Continue leg elevation and low salt diet.  Limit fluid intake to 2 liters a day.      -At clinic visit on 1/7/2021, Mr. Jah reported improvement in bilateral lower extremity edema compared to previous visit on 12/01/20.  He reports wearing graduated compression hose.  He is taking Bumex 1 mg twice a day.  He is not following sodium restricted diet, and diet includes soups and gumbo.  He reports no claudication, rest pain, or tissue loss.      Plan:   BLE Edema- Due to lymphedema and venous insufficiency. Patient notices improvement of bilateral lower extremity edema. Continue graduated compression hose.  Continue bumex 1 mg twice a day.  Continue leg elevation when resting.  Encourage sodium restriction to 2000 mg/day and limit fluid intake to 2 liters a day.  Obesity- Pt is following up with Bariatric Surgery.  Encourage dietary modification, exercise and weight loss.      -At clinic visit on 2/9/2021, Mr. Jha reports mild improvement in BLE edema since clinic visit on 1/7/2021.  He has no claudication or tissue loss.   Plan:   BLE Edema- Due to lymphedema and venous insufficiency.   Edema is improving.  Check cmp today.  If creatinine and potassium are appropriate, increase bumex to 2 mg bid for 5 days, then decrease to 1 mg bid for 5 days.  Pt to continue this cycle/ergiemen of lasix.  Continue leg elevation when resting.  Limit sodium restriction to 2000 mg/day and limit fluid intake to 2 liters a day.  Obesity- Pt is following up with Bariatric  Surgery.  Encourage dietary modification, exercise and weight loss.      -At clinic visit on 03/25/21, Mr. Jha reported no new symptoms.  Exam shows BLE edema has improved significantly.  Plan:  BLE Edema- Due to lymphedema and venous insufficiency.   Edema has improved significantly.  Refer to lymphedema clinic.  Recheck cmp today.  If creatinine and potassium are appropriate, continue bumex 2 mg bid for 5 days, then decrease to 1 mg bid for 5 days.  Pt to continue this cycle/ergiemen of lasix.  Continue leg elevation when resting.  Limit sodium restriction to 2000 mg/day and limit fluid intake to 2 liters a day.    Obesity- Pt is following up with Bariatric Surgery.  Encourage dietary modification, exercise and weight loss.      -At clinic visit on 5/25/2021, Mr. Jha reported significant improvement in lower extremity edema since initiation of lymphedema clinic therapy.   He reports no claudication, rest pain or tissue loss.   Plan:    BLE Edema- Due to lymphedema and venous insufficiency.   Edema has improved significantly.  Continue lymphedema clinic.  Check CMP today.  If creatinine and potassium are appropriate, continue bumex 2 mg bid for 5 days, then decrease to 1 mg bid for 5 days.  Pt to continue this cycle/ergiemen of lasix.  Continue leg elevation when resting.  Limit sodium restriction to 2000 mg/day and limit fluid intake to 2 liters a day.    Obesity- Pt is following up with Bariatric Surgery.  Encourage dietary modification, exercise and weight loss.       -At clinic visit on 7/13/2021, Mr. Jha reported significant improvement in BLE edema following lymphedema clinic therapy.  He has no claudication or tissue loss.   Plan:   BLE Edema due to lymphedema and venous insufficiency- Now significantly improved following lymphedema clinic therapy.  Check cmp today.  If potassium and creatinine are appropriate, continue bumex 2 mg bid for 1 week, then reduce to 1 mg bid for 1 week.  Pt to continue this  regimen/cycle of bumex.    Obesity- Pt is following up with Bariatric Surgery.  Encourage dietary modification, exercise and weight loss.     -At clinic visit on 10/14/2021 clinic visit, Mr. Jha reporeds continued improvement in BLE edema.  He has no claudication or tissue loss.    Plan:  BLE Edema due to lymphedema and venous insufficiency- Remains significantly improved.  Continue bumex 2 mg bid for 1 week, then reduce to 1 mg bid for 1 week.  Pt to continue this regimen/cycle of bumex.    Obesity- Pt is following up with Bariatric Surgery.  Encourage dietary modification, exercise and weight loss.      -At clinic visit on 1/18/2022, Mr. Jha reported feeling well and swelling improved. He has no other complaints.   Plan:   BLE Edema due to lymphedema and venous insufficiency- Continues to improve. Continue bumex, compression stockings, elevate legs when resting, limit fluid intake to 1.5L daily, sodium intake to 2000mg daily.  Morbid Obesity- Pt is following up with Bariatric Surgery. Stable. Encourage dietary modification, exercise and weight loss.      -At clinic visit on 5/17/2022, Mr. Jha reported worsening leg swelling.  On 5/10/2022, he was admitted for GI bleeding.  During the hospitalization, bumex was held.  He was subsequently restarted on bumex after discharge.   on 5/11/2022.  Currently taking bumex 2 mg bid on Tues/Thur/Sat/Sun.  Taking 4 mg bid on Mon/Wed/Fri.     Plan:   BLE Edema due to lymphedema and venous insufficiency- Pt with worsening leg swelling since hospitalization on 5/10/2022.  Improving on current regimen.  Refer back to lymphedema clinic.  Continue bumex 2 mg bid on Tues/Thur/Sat/Sun and 4 mg bid on Mon/Wed/Fri.   Continue graduated compression hose; elevate legs when resting, limit fluid intake to 1.5L daily, sodium intake to 2000mg daily.  Check cmp today.    Morbid Obesity- Pt is following up with Bariatric Surgery. Stable. Encourage dietary modification, exercise and  weight loss.    -At clinic visit on 6/28/2022, Mr. Jha reported improvement in BLE edema.  He is on the waiting list for lymphedema clinic therapy.  Currently taking bumex 2 mg bid.  Plan:   BLE Edema due to lymphedema and venous insufficiency- Improving.  Pt is on the waiting list for lymphedema clinic therapy.  Continue bumex 2 mg bid.  Check cmp today to monitor renal function.  Continue graduated compression hose; elevate legs when resting, limit fluid intake to 1.5L daily, sodium intake to 2000mg daily.    Morbid Obesity s/p Gastric Bypass- Pt is following up with Bariatric Surgery. Stable. Encourage dietary modification, exercise and weight loss.    Afib s/p ablation (7/2014)- Stable.  Continue Xarelto 20 mg daily for anticoagulation.   HTN- Controlled.  Continue current medications.    QUINTIN- Continue CPAP nightly.     -At clinic visit on 12/27/2022, Mr. Jha reported BLE edema has improved and is stable.  Spironolactone 25 mg daily was added to his regimen by Dr. James on 10/11/202.  He has no chest pain or SOB.  Plans to have right ankle surgery on 2/3/2022.   Plan:   BLE Edema due to lymphedema and venous insufficiency- Improved and stable.  Refer back to lymphedema clinic.  Continue bumex 2 mg bid and spironolactone 25 mg daily.  Continue graduated compression hose; elevate legs when resting, limit fluid intake to 1.5L daily, sodium intake to 2000mg daily.    Morbid Obesity s/p Gastric Bypass- Encourage dietary modification, exercise and weight loss.    Afib s/p ablation (7/2014)- Stable.  Continue Xarelto 20 mg daily for anticoagulation.   HTN- Controlled.  Continue current medications.    QUINTIN- Continue CPAP nightly.     -2/20/2024 clinic visit, Mr. Jha reports significant improvement in BLE edema.  He has completed another course of lymphedema clinic therapy.  He reports no claudication or tissue loss.  Plan:  BLE Edema due to lymphedema and venous insufficiency- Stable.  Continue bumex 2 mg bid and  spironolactone 25 mg daily.  Continue graduated compression hose; elevate legs when resting, limit fluid intake to 1.5L daily, sodium intake to 2000mg daily.  Check cmp today to monitor renal function.   Morbid Obesity s/p Gastric Bypass- Encourage dietary modification, exercise and weight loss.    Afib s/p ablation (7/2014)- Stable.  Continue Xarelto 20 mg daily for anticoagulation.   HTN- Controlled.  Continue current medications.    QUINTIN- Continue CPAP nightly.     -2/6/2025 clinic visit: Mr. Jha reports worsening leg swelling over the past few weeks. He has no chest pain or SOB.  States he's taking bumex daily instead of bid as prescribed.   Plan:  BLE Edema due to lymphedema and venous insufficiency- Mr. Jha reports worsening leg swelling over the past few weeks. States he's taking bumex daily instead of bid as prescribed.  Pt instructed to take bumex 2 mg bid and spironolactone 25 mg daily. Check cmp today and in 1 week. Continue graduated compression hose; elevate legs when resting, limit fluid intake to 1.5L daily, sodium intake to 2000mg daily.  Check cmp today to monitor renal function.   Morbid Obesity s/p Gastric Bypass- Encourage dietary modification, exercise and weight loss.    Afib s/p ablation (7/2014)- Stable.  Continue Xarelto 20 mg daily for anticoagulation.   HTN- Controlled.  Continue current medications.    QUINTIN- Continue CPAP nightly.    -4/10/2025 clinic visit: Mr. Jha reports mild improvement in leg swelling since clinic visit on 2/6/2025. He's taking bumex 2 mg bid. Renal function is stable.   Plan:  BLE Edema due to lymphedema and venous insufficiency- Continue bumex 2 mg bid and spironolactone 25 mg daily. Check cmp today. Refer back to lymphedema clinic. Continue graduated compression hose; elevate legs when resting, limit fluid intake to 1.5L daily, sodium intake to 2000mg daily.  Check cmp today to monitor renal function.   Morbid Obesity s/p Gastric Bypass- Encourage dietary  modification, exercise and weight loss.    Afib s/p ablation (7/2014)- Stable. Continue Xarelto 20 mg daily for anticoagulation.   HTN- Controlled.  Continue current medications.    QUINTIN- Continue CPAP nightly.   HLD- The 10-year ASCVD risk score (Zion DK, et al., 2019) is: 25.2%.  on 2/12/2025. Start atorvastatin 40 mg daily.      HPI:  Mr. Jha reports some improvement in leg swelling since clinic visit on 4/10/2025. Currently taking bumex 2 mg bid and spironolactone 25 mg daily. BUN/Cr 16/0.9 on 7/8/2025. Pt stopped taking atorvastatin due to elevated liver enzymes, which has since returned to baseline.      Past Medical History:   Diagnosis Date    Allergy     Anemia     Asthma     Atrial fibrillation     Cataract     Chronic rhinitis 9/26/2013    DDD (degenerative disc disease) 4/3/2015    Depression     Diabetes mellitus     Fibromyalgia     Gastroesophageal reflux disease without esophagitis 7/14/2017    Hypertension     Mixed hyperlipidemia 4/10/2025    Sinusitis, acute, maxillary 12/20/2012    Thyroid disease        Past Surgical History:   Procedure Laterality Date    ANTEGRADE SINGLE BALLOON ENTEROSCOPY N/A 5/26/2022    Procedure: ENTEROSCOPY, SINGLE BALLOON, ANTEGRADE;  Surgeon: Lyle Edmond MD;  Location: [facility] ([address] [place] [address]);  Service: Endoscopy;  Laterality: N/A;  Will use single-balloon scope for both the upper endoscopy and the colonoscopy - recent incomplete colonoscopy due to looping.    Pt is fully vaccinated-DS  5/20/22-Approval to hold Xarelto rec'd from Dr. Webb-pending approval (see telephoned en    CERVICAL SPINE SURGERY      COLONOSCOPY N/A 5/10/2022    Procedure: COLONOSCOPY;  Surgeon: Conrad Diaz MD;  Location: [facility] ([address] [place] [address]);  Service: Endoscopy;  Laterality: N/A;    COLONOSCOPY N/A 5/26/2022    Procedure: COLONOSCOPY;  Surgeon: Lyle Edmond MD;  Location: [facility] ([address] [place] [address]);  Service: Endoscopy;  Laterality: N/A;    EPIDURAL STEROID INJECTION INTO LUMBAR SPINE  N/A 5/29/2018    Procedure: INJECTION-STEROID-EPIDURAL-LUMBAR- L4-5;  Surgeon: Roman Lyman MD;  Location: [facility];  Service: Pain Management;  Laterality: N/A;  Patient is diabetic and Xarleto     EPIDURAL STEROID INJECTION INTO LUMBAR SPINE N/A 7/3/2018    Procedure: INJECTION, STEROID, SPINE, LUMBAR, EPIDURAL- L4-5;  Surgeon: Roman Lyman MD;  Location: [facility];  Service: Pain Management;  Laterality: N/A;  Patient takes Xarelto and ASA     ESOPHAGOGASTRODUODENOSCOPY N/A 5/9/2022    Procedure: EGD (ESOPHAGOGASTRODUODENOSCOPY);  Surgeon: Conrad Diaz MD;  Location: [facility] ([address] [place] [address]);  Service: Endoscopy;  Laterality: N/A;    EXCISION OF LESION OF LIP N/A 7/7/2020    Procedure: EXCISION, LESION, LIP;  Surgeon: Caden Navarro MD;  Location: [facility] OR [address] [place] [address];  Service: ENT;  Laterality: N/A;    GASTRIC BYPASS      SINUS SURGERY  2000    Dr. Watt at [facility]    TONSILLECTOMY         Social History     Socioeconomic History    Marital status: Single   Tobacco Use    Smoking status: Never    Smokeless tobacco: Never   Substance and Sexual Activity    Alcohol use: No     Comment: rare    Drug use: No    Sexual activity: Yes     Partners: Female   Social History Narrative    [place], lives alone w/2 dogs.    Dogs are his kids.    Works PT --  at [employer], on ssdi from neck and back/depression.     Social Drivers of Health     Financial Resource Strain: High Risk (1/30/2025)    Overall Financial Resource Strain (CARDIA)     Difficulty of Paying Living Expenses: Very hard   Food Insecurity: Food Insecurity Present (1/30/2025)    Hunger Vital Sign     Worried About Running Out of Food in the Last Year: Sometimes true     Ran Out of Food in the Last Year: Patient declined   Transportation Needs: No Transportation Needs (5/8/2023)    PRAPARE - Transportation     Lack of Transportation (Medical): No     Lack of Transportation (Non-Medical): No   Physical Activity: Inactive (1/30/2025)     Exercise Vital Sign     Days of Exercise per Week: 0 days     Minutes of Exercise per Session: 0 min   Stress: Patient Declined (1/30/2025)    [employer] - Occupational Stress Questionnaire     Feeling of Stress : Patient declined   Housing Stability: High Risk (5/8/2023)    Housing Stability Vital Sign     Unable to Pay for Housing in the Last Year: Yes     Number of Places Lived in the Last Year: 1     Unstable Housing in the Last Year: Yes       Family History   Problem Relation Name Age of Onset    Heart disease Father      Cancer Mother          lung    Hypertension Sister      Heart disease Brother      Cirrhosis Brother      Diabetes Brother         Review of patient's allergies indicates:   Allergen Reactions    Iodinated contrast media Other (See Comments)     Raises BP         Medication List with Changes/Refills   Current Medications    ATORVASTATIN (LIPITOR) 40 MG TABLET    Take 1 tablet (40 mg total) by mouth once daily.    BUMETANIDE (BUMEX) 2 MG TABLET    Take 1 tablet (2 mg total) by mouth 2 (two) times daily.    CYANOCOBALAMIN 500 MCG TABLET    Take 500 mcg by mouth once daily.    DABIGATRAN ETEXILATE (PRADAXA) 150 MG CAP    Take 1 capsule (150 mg total) by mouth every 12 (twelve) hours.    FLUTICASONE PROPIONATE (FLONASE) 50 MCG/ACTUATION NASAL SPRAY    2 sprays (100 mcg total) by Each Nostril route once daily.    GABAPENTIN (NEURONTIN) 300 MG CAPSULE    Take 1 capsule (300 mg total) by mouth 3 (three) times daily.    LUZU 1 % CREA    Apply topically as needed    METOPROLOL TARTRATE (LOPRESSOR) 25 MG TABLET    Take 1 tablet (25 mg total) by mouth 2 (two) times daily.    MULTIVITAMIN (THERAGRAN) PER TABLET    Take 1 tablet by mouth once daily.     NITROGLYCERIN (NITROSTAT) 0.4 MG SL TABLET    Please dispense 25 pills in 4 bottles.    PREVIDENT 5000 DRY MOUTH 1.1 % PSTE    DO NOT EAT ,DRINK OR RINSE FOR 30 MINUTES AFTER BRUSHING    SOTALOL (BETAPACE) 80 MG TABLET    Take  "1 tablet (80 mg total) by mouth 2 (two) times daily.    SPIRONOLACTONE (ALDACTONE) 25 MG TABLET    Take 1 tablet (25 mg total) by mouth once daily.    TAMSULOSIN (FLOMAX) 0.4 MG CAP    Take 1 capsule (0.4 mg total) by mouth once daily.       Review of Systems  Constitution: Denies chills, fever, and sweats.  HENT: Denies headaches or blurry vision.  Cardiovascular: Denies chest pain or irregular heart beat.  Respiratory: Denies cough or shortness of breath.  Gastrointestinal: Denies abdominal pain, nausea, or vomiting.  Musculoskeletal: Positive for BLE swelling  improved  Neurological: Denies dizziness or focal weakness.  Psychiatric/Behavioral: Normal mental status.  Hematologic/Lymphatic: Denies bleeding problem or easy bruising/bleeding.  Skin: Denies rash or suspicious lesions    Physical Examination  /77   Pulse 64   Ht 5' 8" (1.727 m)   Wt 128 kg (282 lb 3 oz)   SpO2 97%   BMI 42.91 kg/m²     Constitutional: No acute distress, conversant  HEENT: Sclera anicteric, Pupils equal, round and reactive to light, extraocular motions intact, Oropharynx clear  Neck: No JVD, no carotid bruits  Cardiovascular: regular rate and rhythm, no murmur, rubs or gallops, normal S1/S2  Pulmonary: Clear to auscultation bilaterally  Abdominal: Abdomen soft, nontender, nondistended, positive bowel sounds  Extremities: BLE's with minimal pitting edema   Pulses:  Carotid pulses are 2+ on the right side, and 2+ on the left side.  Radial pulses are 2+ on the right side, and 2+ on the left side.   Femoral pulses are 2+ on the right side, and 2+ on the left side.  Popliteal pulses are 2+ on the right side, and 2+ on the left side.   Dorsalis pedis pulses are 2+ on the right side, and 2+ on the left side.   Posterior tibial pulses are 2+ on the right side, and 2+ on the left side.    Skin: No ecchymosis, erythema, or ulcers  Psych: Alert and oriented x 3, appropriate affect  Neuro: CNII-XII intact, no focal " deficits    Labs:  Most Recent Data  CBC:   Lab Results   Component Value Date    WBC 6.43 07/08/2025    HGB 12.0 (L) 07/08/2025    HCT 40.6 07/08/2025     07/08/2025    MCV 81 (L) 07/08/2025    RDW 19.0 (H) 07/08/2025     BMP:   Lab Results   Component Value Date     07/08/2025    K 4.1 07/08/2025     07/08/2025    CO2 29 07/08/2025    BUN 16 07/08/2025    CREATININE 0.9 07/08/2025     (H) 07/08/2025    CALCIUM 9.0 07/08/2025    MG 2.3 05/08/2023    PHOS 2.8 05/08/2023     LFTS;   Lab Results   Component Value Date    PROT 6.8 07/08/2025    ALBUMIN 3.9 07/08/2025    BILITOT 0.8 07/08/2025    AST 18 07/08/2025    ALKPHOS 136 07/08/2025    ALT 18 07/08/2025     COAGS:   Lab Results   Component Value Date    INR 1.1 05/06/2022     FLP:   Lab Results   Component Value Date    CHOL 160 04/10/2025    HDL 27 (L) 04/10/2025    LDLCALC 92.2 04/10/2025    TRIG 204 (H) 04/10/2025    CHOLHDL 16.9 (L) 04/10/2025     CARDIAC:   Lab Results   Component Value Date    TROPONINI <0.006 07/12/2022    BNP 32 11/18/2023       Echo 7/23/2019:  Normal left ventricular systolic function. The estimated ejection fraction is 60%  Normal right ventricular systolic function.  Normal LV diastolic function.  Moderate left atrial enlargement.  Mild right atrial enlargement.  The ascending aorta is mildly dilated (42mm in diameter, unchanged since Feb 2018).  The estimated PA systolic pressure is 23 mm Hg  Normal central venous pressure (3 mm Hg).     Exercise SURINDER 11/25/2019:  Normal rest and exercise SURINDER bilaterally.  Normal PVR waveforms bilaterally.    BLE Venous Reflux Study 8/6/2019:  No evidence of lower extremity DVT bilaterally.  Mild left popliteal (deep venous) reflux.    Assessment/Plan:  Mr. Shiraz Jha is a 65 y.o. male with Afib s/p ablation (7/2014), BLE lymphedema, venous insufficiency, HTN, DM2, morbid obesity s/p gastric bypass (2010), QUINTIN (on CPAP), who presents for a follow up appointment.      1. BLE  Edema due to lymphedema and venous insufficiency- Continue bumex 2 mg bid and spironolactone 25 mg daily. Check cmp today. Refer back to lymphedema clinic. Continue graduated compression hose; elevate legs when resting, limit fluid intake to 1.5L daily, sodium intake to 2000mg daily.  Check cmp today to monitor renal function.     2. Morbid Obesity s/p Gastric Bypass- Encourage dietary modification, exercise and weight loss.      3. Afib s/p ablation (7/2014)- Stable. Continue Xarelto 20 mg daily for anticoagulation.     4. HTN- Controlled.  Continue current medications.      5. QUINTIN- Continue CPAP nightly.     6. HLD- The 10-year ASCVD risk score (Zion DK, et al., 2019) is: 25.2%. Pt stopped taking atorvastatin due to elevated liver enzymes, which has since returned to baseline.  Start zetia 10 mg daily.      Follow up in 2 months with cmp and lipids prior    Total duration of face to face visit time 15 minutes.  Total time spent counseling greater than fifty percent of total visit time.  Counseling included discussion regarding imaging findings, diagnosis, possibilities, treatment options, risks and benefits.  The patient had many questions regarding the options and long-term effects.    Randolph Ureña MD, PhD  Interventional Cardiology

## 2025-07-24 NOTE — PROGRESS NOTES
Shiraz Jha  1960        Subjective     Chief Complaint: Rash, Sore Throat, and Otalgia (Rash on left leg)      History of Present Illness:  Mr. Shiraz Jha is a 65 y.o. male who presents to clinic for URI and skin rash.    History of Present Illness    CHIEF COMPLAINT:  Mr. Jha presents today with sore throat and ear pain.    ENT SYMPTOMS:  He reports throat and ear symptoms that started two days ago with constant unilateral throat pain and difficulty swallowing, describing a choking sensation when attempting to swallow. He notes left-sided lymph nodes are swollen and tender. He denies fever, chest pain, and shortness of breath. He has intermittent cough producing white to clear sputum with associated pain extending to lower chest area.    DERMATOLOGIC:  He reports a persistent, itchy, and scaly rash on his leg present for months to years. The rash is localized below the knee and temporarily improves with hand lotion but quickly returns. Previous treatment with Luzu cream was attempted.    MEDICAL HISTORY:  He has a history of atrial fibrillation for a few years and chronic sinus problems with persistent sinus pressure.    CURRENT MEDICATIONS:  He takes Mucinex daily for allergy management.      ROS:  Constitutional: -fevers  ENT: +ear pain, +sinus pressure, +sore throat, +choking sensation, +post nasal drip  Respiratory: +cough, +pain with cough, +productive cough  Cardiovascular: -chest pain  Gastrointestinal: -diarrhea  Integumentary: +rash  Hematologic/Lymphatic: +swollen lymph nodes           PAST HISTORY:     Past Medical History:   Diagnosis Date    Allergy     Anemia     Asthma     Atrial fibrillation     Cataract     Chronic rhinitis 9/26/2013    DDD (degenerative disc disease) 4/3/2015    Depression     Diabetes mellitus     Fibromyalgia     Gastroesophageal reflux disease without esophagitis 7/14/2017    Hypertension     Mixed hyperlipidemia 4/10/2025    Sinusitis, acute, maxillary 12/20/2012     Thyroid disease        Past Surgical History:   Procedure Laterality Date    ANTEGRADE SINGLE BALLOON ENTEROSCOPY N/A 5/26/2022    Procedure: ENTEROSCOPY, SINGLE BALLOON, ANTEGRADE;  Surgeon: Lyle Edmond MD;  Location: [facility] ENDO (2ND FLR);  Service: Endoscopy;  Laterality: N/A;  Will use single-balloon scope for both the upper endoscopy and the colonoscopy - recent incomplete colonoscopy due to looping.    Pt is fully vaccinated-DS  5/20/22-Approval to hold Xarelto rec'd from Dr. Webb-pending approval (see telephoned en    CERVICAL SPINE SURGERY      COLONOSCOPY N/A 5/10/2022    Procedure: COLONOSCOPY;  Surgeon: Conrad Diaz MD;  Location: [facility] ENDO (2ND FLR);  Service: Endoscopy;  Laterality: N/A;    COLONOSCOPY N/A 5/26/2022    Procedure: COLONOSCOPY;  Surgeon: Lyle Edmond MD;  Location: [facility] ENDO (2ND FLR);  Service: Endoscopy;  Laterality: N/A;    EPIDURAL STEROID INJECTION INTO LUMBAR SPINE N/A 5/29/2018    Procedure: INJECTION-STEROID-EPIDURAL-LUMBAR- L4-5;  Surgeon: Roman Lyman MD;  Location: [facility] PAIN MGT;  Service: Pain Management;  Laterality: N/A;  Patient is diabetic and Xarleto     EPIDURAL STEROID INJECTION INTO LUMBAR SPINE N/A 7/3/2018    Procedure: INJECTION, STEROID, SPINE, LUMBAR, EPIDURAL- L4-5;  Surgeon: Roman Lyman MD;  Location: [facility] PAIN MGT;  Service: Pain Management;  Laterality: N/A;  Patient takes Xarelto and ASA     ESOPHAGOGASTRODUODENOSCOPY N/A 5/9/2022    Procedure: EGD (ESOPHAGOGASTRODUODENOSCOPY);  Surgeon: Conrad Diaz MD;  Location: [facility] ENDO (2ND FLR);  Service: Endoscopy;  Laterality: N/A;    EXCISION OF LESION OF LIP N/A 7/7/2020    Procedure: EXCISION, LESION, LIP;  Surgeon: Caden Navarro MD;  Location: [facility] OR 2ND FLR;  Service: ENT;  Laterality: N/A;    GASTRIC BYPASS      SINUS SURGERY  2000    Dr. Watt at [facility]    TONSILLECTOMY         Family History   Problem Relation Name Age of Onset    Heart disease Father      Cancer Mother           lung    Hypertension Sister      Heart disease Brother      Cirrhosis Brother      Diabetes Brother         Social History     Socioeconomic History    Marital status: Single   Tobacco Use    Smoking status: Never    Smokeless tobacco: Never   Substance and Sexual Activity    Alcohol use: No     Comment: rare    Drug use: No    Sexual activity: Yes     Partners: Female   Social History Narrative    [place], lives alone w/2 dogs.    Dogs are his kids.    Works PT --  at [employer], on ssdi from neck and back/depression.     Social Drivers of Health     Financial Resource Strain: High Risk (1/30/2025)    Overall Financial Resource Strain (CARDIA)     Difficulty of Paying Living Expenses: Very hard   Food Insecurity: Food Insecurity Present (1/30/2025)    Hunger Vital Sign     Worried About Running Out of Food in the Last Year: Sometimes true     Ran Out of Food in the Last Year: Patient declined   Transportation Needs: No Transportation Needs (5/8/2023)    PRAPARE - Transportation     Lack of Transportation (Medical): No     Lack of Transportation (Non-Medical): No   Physical Activity: Inactive (1/30/2025)    Exercise Vital Sign     Days of Exercise per Week: 0 days     Minutes of Exercise per Session: 0 min   Stress: Patient Declined (1/30/2025)    [employer] [place] [employer] - Occupational Stress Questionnaire     Feeling of Stress : Patient declined   Housing Stability: High Risk (5/8/2023)    Housing Stability Vital Sign     Unable to Pay for Housing in the Last Year: Yes     Number of Places Lived in the Last Year: 1     Unstable Housing in the Last Year: Yes       MEDICATIONS & ALLERGIES:     Current Outpatient Medications on File Prior to Visit   Medication Sig    atorvastatin (LIPITOR) 40 MG tablet Take 1 tablet (40 mg total) by mouth once daily. (Patient not taking: Reported on 7/10/2025)    bumetanide (BUMEX) 2 MG tablet Take 1 tablet (2 mg total) by mouth 2 (two) times daily.     "cyanocobalamin 500 MCG tablet Take 500 mcg by mouth once daily.    dabigatran etexilate (PRADAXA) 150 mg Cap Take 1 capsule (150 mg total) by mouth every 12 (twelve) hours.    ezetimibe (ZETIA) 10 mg tablet Take 1 tablet (10 mg total) by mouth once daily.    fluticasone propionate (FLONASE) 50 mcg/actuation nasal spray 2 sprays (100 mcg total) by Each Nostril route once daily.    gabapentin (NEURONTIN) 300 MG capsule Take 1 capsule (300 mg total) by mouth 3 (three) times daily.    metoprolol tartrate (LOPRESSOR) 25 MG tablet Take 1 tablet (25 mg total) by mouth 2 (two) times daily.    multivitamin (THERAGRAN) per tablet Take 1 tablet by mouth once daily.     nitroGLYCERIN (NITROSTAT) 0.4 MG SL tablet Please dispense 25 pills in 4 bottles.    PREVIDENT 5000 DRY MOUTH 1.1 % Pste DO NOT EAT ,DRINK OR RINSE FOR 30 MINUTES AFTER BRUSHING    sotaloL (BETAPACE) 80 MG tablet Take 1 tablet (80 mg total) by mouth 2 (two) times daily.    spironolactone (ALDACTONE) 25 MG tablet Take 1 tablet (25 mg total) by mouth once daily.    tamsulosin (FLOMAX) 0.4 mg Cap Take 1 capsule (0.4 mg total) by mouth once daily.    [DISCONTINUED] LUZU 1 % Crea Apply topically as needed     Current Facility-Administered Medications on File Prior to Visit   Medication    varicella zoster (Shingrix) IM vaccine (>/= 49 yo)       Review of patient's allergies indicates:   Allergen Reactions    Iodinated contrast media Other (See Comments)     Raises BP         OBJECTIVE:     Vital Signs:  Vitals:    07/25/25 1331   BP: 114/70   BP Location: Right arm   Patient Position: Sitting   Pulse: 62   Resp: 18   Temp: 96.7 °F (35.9 °C)   TempSrc: Oral   SpO2: 95%   Weight: 124.5 kg (274 lb 7.6 oz)   Height: 5' 8" (1.727 m)       Body mass index is 41.73 kg/m².     Physical Exam:  Physical Exam  Vitals and nursing note reviewed.   Constitutional:       General: He is not in acute distress.     Appearance: He is not ill-appearing.   HENT:      Head: Normocephalic " and atraumatic.      Right Ear: Tympanic membrane, ear canal and external ear normal.      Left Ear: Tympanic membrane, ear canal and external ear normal.      Mouth/Throat:      Mouth: Mucous membranes are moist.      Pharynx: Oropharynx is clear. Posterior oropharyngeal erythema present. No oropharyngeal exudate.   Eyes:      Extraocular Movements: Extraocular movements intact.      Conjunctiva/sclera: Conjunctivae normal.   Cardiovascular:      Rate and Rhythm: Normal rate and regular rhythm.   Pulmonary:      Effort: Pulmonary effort is normal. No respiratory distress.      Breath sounds: Normal breath sounds. No wheezing or rales.   Chest:      Chest wall: No tenderness.   Abdominal:      Palpations: Abdomen is soft.      Tenderness: There is no abdominal tenderness. There is no right CVA tenderness, left CVA tenderness or guarding.   Musculoskeletal:         General: Normal range of motion.      Cervical back: Normal range of motion and neck supple. Tenderness (left) present.      Right lower leg: Edema present.      Left lower leg: Edema present.   Lymphadenopathy:      Cervical: Cervical adenopathy present.   Skin:     General: Skin is warm and dry.      Findings: Rash (macuopapular on left upper thigh) present.   Neurological:      Mental Status: He is alert and oriented to person, place, and time.            Laboratory  Lab Results   Component Value Date    WBC 6.43 07/08/2025    HGB 12.0 (L) 07/08/2025    HCT 40.6 07/08/2025    MCV 81 (L) 07/08/2025     07/08/2025     Lab Results   Component Value Date     (H) 07/08/2025     07/08/2025    K 4.1 07/08/2025     07/08/2025    CO2 29 07/08/2025    BUN 16 07/08/2025    CREATININE 0.9 07/08/2025    CALCIUM 9.0 07/08/2025    MG 2.3 05/08/2023     Lab Results   Component Value Date    INR 1.1 05/06/2022    INR 1.0 07/14/2017    INR 1.1 07/15/2014     Lab Results   Component Value Date    HGBA1C 6.2 (H) 05/20/2025     No results for  "input(s): "POCTGLUCOSE" in the last 72 hours.      Health Maintenance         Date Due Completion Date    Shingles Vaccine (1 of 2) Never done ---    Diabetic Eye Exam 02/11/2020 2/11/2019    RSV Vaccine (Age 60+ and Pregnant patients) (1 - Risk 60-74 years 1-dose series) Never done ---    Foot Exam 02/18/2023 2/18/2022    Override on 2/18/2022: Done    COVID-19 Vaccine (3 - 2024-25 season) 09/01/2024 12/23/2021    Influenza Vaccine (1) 09/01/2025 12/15/2022    Override on 12/1/2020: Declined    Override on 1/29/2020: Declined    Hemoglobin A1c 11/20/2025 5/20/2025    Lipid Panel 04/10/2026 4/10/2025    Diabetes Urine Screening 05/20/2026 5/20/2025    High Dose Statin 07/10/2026 7/10/2025    Colorectal Cancer Screening 05/26/2027 5/26/2022    TETANUS VACCINE 05/01/2029 5/1/2019            ASSESSMENT & PLAN:   65 y.o. male who was seen today in clinic for URI and skin rash    Upper respiratory tract infection, unspecified type  -     promethazine-dextromethorphan (PROMETHAZINE-DM) 6.25-15 mg/5 mL Syrp; Take 5 mLs by mouth nightly as needed.  Dispense: 118 mL; Refill: 0  -     methylPREDNISolone (MEDROL DOSEPACK) 4 mg tablet; use as directed  Dispense: 21 each; Refill: 0    Sore throat (viral)  -     methylPREDNISolone (MEDROL DOSEPACK) 4 mg tablet; use as directed  Dispense: 21 each; Refill: 0    Cough, unspecified type  -     promethazine-dextromethorphan (PROMETHAZINE-DM) 6.25-15 mg/5 mL Syrp; Take 5 mLs by mouth nightly as needed.  Dispense: 118 mL; Refill: 0    Dermatitis  -     hydrocortisone 2.5 % cream; Apply topically 2 (two) times daily.  Dispense: 28 g; Refill: 0    Lymphedema of both lower extremities         1. Upper respiratory tract infection, unspecified type    2. Sore throat (viral)    3. Cough, unspecified type    4. Dermatitis    5. Lymphedema of both lower extremities      1/2/3.  2 day onset of URI symptoms with erythematous oropharynx as well as cervical adenopathy.  Discussed viral etiology and " symptom control.  Medication sent to pharmacy.  Consider abx course if persistent.  4.  Chronic, months of stable intermittent pruritic rash on left upper thigh.  Topical steroid sent to pharmacy.   5. BLE edema 2/2 lymphedema and venous insufficiency, following with cardiology.  Continue meds and follow up with lymphedema clinic.      RTC with scheduled follow up appt    Oscar Boone MD  [facility]    This note was generated with the assistance of ambient listening technology. Verbal consent was obtained by the patient and accompanying visitor(s) for the recording of patient appointment to facilitate this note. I attest to having reviewed and edited the generated note for accuracy, though some syntax or spelling errors may persist. Please contact the author of this note for any clarification.

## 2025-07-25 ENCOUNTER — OFFICE VISIT (OUTPATIENT)
Dept: INTERNAL MEDICINE | Facility: CLINIC | Age: 65
End: 2025-07-25
Payer: COMMERCIAL

## 2025-07-25 VITALS
SYSTOLIC BLOOD PRESSURE: 114 MMHG | WEIGHT: 274.5 LBS | RESPIRATION RATE: 18 BRPM | TEMPERATURE: 97 F | DIASTOLIC BLOOD PRESSURE: 70 MMHG | OXYGEN SATURATION: 95 % | HEIGHT: 68 IN | HEART RATE: 62 BPM | BODY MASS INDEX: 41.6 KG/M2

## 2025-07-25 DIAGNOSIS — I89.0 LYMPHEDEMA OF BOTH LOWER EXTREMITIES: ICD-10-CM

## 2025-07-25 DIAGNOSIS — J02.8 SORE THROAT (VIRAL): ICD-10-CM

## 2025-07-25 DIAGNOSIS — L30.9 DERMATITIS: ICD-10-CM

## 2025-07-25 DIAGNOSIS — J06.9 UPPER RESPIRATORY TRACT INFECTION, UNSPECIFIED TYPE: Primary | ICD-10-CM

## 2025-07-25 DIAGNOSIS — B97.89 SORE THROAT (VIRAL): ICD-10-CM

## 2025-07-25 DIAGNOSIS — R05.9 COUGH, UNSPECIFIED TYPE: ICD-10-CM

## 2025-07-25 PROCEDURE — 99999 PR PBB SHADOW E&M-EST. PATIENT-LVL IV: CPT | Mod: PBBFAC,,,

## 2025-07-25 RX ORDER — HYDROCORTISONE 25 MG/G
CREAM TOPICAL 2 TIMES DAILY
Qty: 28 G | Refills: 0 | Status: SHIPPED | OUTPATIENT
Start: 2025-07-25

## 2025-07-25 RX ORDER — METHYLPREDNISOLONE 4 MG/1
TABLET ORAL
Qty: 21 EACH | Refills: 0 | Status: SHIPPED | OUTPATIENT
Start: 2025-07-25 | End: 2025-08-15

## 2025-07-25 RX ORDER — PROMETHAZINE HYDROCHLORIDE AND DEXTROMETHORPHAN HYDROBROMIDE 6.25; 15 MG/5ML; MG/5ML
5 SYRUP ORAL NIGHTLY PRN
Qty: 118 ML | Refills: 0 | Status: SHIPPED | OUTPATIENT
Start: 2025-07-25 | End: 2025-08-04

## 2025-07-28 NOTE — PROGRESS NOTES
Shiraz Jha  1960        Subjective     Chief Complaint: Sore Throat (Thinks he has a blister on his throat (inside)) and Night Sweats      History of Present Illness:  Mr. Shiraz Jha is a 65 y.o. male who presents to clinic for persistent URI symptoms        History of Present Illness    CHIEF COMPLAINT:  Mr. Jha presents today with sore throat and tongue pain.    ORAL AND THROAT SYMPTOMS:  He reports a painful blister on the bottom back of his tongue that has ruptured. He describes severe pain with swallowing. His tongue pain and throat pain have recently worsened.    ENT:  He reports ear discomfort with mild pressure sensation that appears to be related to sinus pressure. These symptoms have recently worsened.    CONSTITUTIONAL:  He reports night sweats which may be associated with intermittent fevers.    MEDICATIONS:  He is currently on the second to last day of prescribed steroid medication.    ALLERGIES:  He denies any allergies to antibiotics.      ROS:  Constitutional: +fevers, +night sweats  ENT: +ear pain, +sore throat, +difficulty swallowing, +tongue pain  Hematologic/Lymphatic: +swollen lymph nodes           PAST HISTORY:     Past Medical History:   Diagnosis Date    Allergy     Anemia     Asthma     Atrial fibrillation     Cataract     Chronic rhinitis 9/26/2013    DDD (degenerative disc disease) 4/3/2015    Depression     Diabetes mellitus     Fibromyalgia     Gastroesophageal reflux disease without esophagitis 7/14/2017    Hypertension     Mixed hyperlipidemia 4/10/2025    Sinusitis, acute, maxillary 12/20/2012    Thyroid disease        Past Surgical History:   Procedure Laterality Date    ANTEGRADE SINGLE BALLOON ENTEROSCOPY N/A 5/26/2022    Procedure: ENTEROSCOPY, SINGLE BALLOON, ANTEGRADE;  Surgeon: Lyle Edmond MD;  Location: [address]);  Service: Endoscopy;  Laterality: N/A;  Will use single-balloon scope for both the upper endoscopy and the colonoscopy - recent incomplete  colonoscopy due to looping.    Pt is fully vaccinated-DS  5/20/22-Approval to hold Xarelto rec'd from Dr. Webb-pending approval (see telephoned en    CERVICAL SPINE SURGERY      COLONOSCOPY N/A 5/10/2022    Procedure: COLONOSCOPY;  Surgeon: Conrad Diaz MD;  Location: [facility] (2ND FLR);  Service: Endoscopy;  Laterality: N/A;    COLONOSCOPY N/A 5/26/2022    Procedure: COLONOSCOPY;  Surgeon: Lyle Edmond MD;  Location: [facility] (2ND FLR);  Service: Endoscopy;  Laterality: N/A;    EPIDURAL STEROID INJECTION INTO LUMBAR SPINE N/A 5/29/2018    Procedure: INJECTION-STEROID-EPIDURAL-LUMBAR- L4-5;  Surgeon: Roman Lyman MD;  Location: [facility]IN [facility]GT;  Service: Pain Management;  Laterality: N/A;  Patient is diabetic and Xarleto     EPIDURAL STEROID INJECTION INTO LUMBAR SPINE N/A 7/3/2018    Procedure: INJECTION, STEROID, SPINE, LUMBAR, EPIDURAL- L4-5;  Surgeon: Roman Lyman MD;  Location: [facility];  Service: Pain Management;  Laterality: N/A;  Patient takes Xarelto and ASA     ESOPHAGOGASTRODUODENOSCOPY N/A 5/9/2022    Procedure: EGD (ESOPHAGOGASTRODUODENOSCOPY);  Surgeon: Conrad Diaz MD;  Location: [facility] (2ND FLR);  Service: Endoscopy;  Laterality: N/A;    EXCISION OF LESION OF LIP N/A 7/7/2020    Procedure: EXCISION, LESION, LIP;  Surgeon: Caden Navarro MD;  Location: [facility] OR 2ND FLR;  Service: ENT;  Laterality: N/A;    GASTRIC BYPASS      SINUS SURGERY  2000    Dr. Watt at [facility]    TONSILLECTOMY         Family History   Problem Relation Name Age of Onset    Heart disease Father      Cancer Mother          lung    Hypertension Sister      Heart disease Brother      Cirrhosis Brother      Diabetes Brother         Social History     Socioeconomic History    Marital status: Single   Tobacco Use    Smoking status: Never    Smokeless tobacco: Never   Substance and Sexual Activity    Alcohol use: No     Comment: rare    Drug use: No    Sexual activity: Yes     Partners: Female   Social  History Narrative    [place], lives alone w/2 dogs.    Dogs are his kids.    Works PT --  at [employer], on ssdi from neck and back/depression.     Social Drivers of Health     Financial Resource Strain: High Risk (1/30/2025)    Overall Financial Resource Strain (CARDIA)     Difficulty of Paying Living Expenses: Very hard   Food Insecurity: Food Insecurity Present (1/30/2025)    Hunger Vital Sign     Worried About Running Out of Food in the Last Year: Sometimes true     Ran Out of Food in the Last Year: Patient declined   Transportation Needs: No Transportation Needs (5/8/2023)    PRAPARE - Transportation     Lack of Transportation (Medical): No     Lack of Transportation (Non-Medical): No   Physical Activity: Inactive (1/30/2025)    Exercise Vital Sign     Days of Exercise per Week: 0 days     Minutes of Exercise per Session: 0 min   Stress: Patient Declined (1/30/2025)    [employer] - Occupational Stress Questionnaire     Feeling of Stress : Patient declined   Housing Stability: High Risk (5/8/2023)    Housing Stability Vital Sign     Unable to Pay for Housing in the Last Year: Yes     Number of Places Lived in the Last Year: 1     Unstable Housing in the Last Year: Yes       MEDICATIONS & ALLERGIES:     Current Outpatient Medications on File Prior to Visit   Medication Sig    atorvastatin (LIPITOR) 40 MG tablet Take 1 tablet (40 mg total) by mouth once daily. (Patient not taking: Reported on 7/10/2025)    bumetanide (BUMEX) 2 MG tablet Take 1 tablet (2 mg total) by mouth 2 (two) times daily.    cyanocobalamin 500 MCG tablet Take 500 mcg by mouth once daily.    dabigatran etexilate (PRADAXA) 150 mg Cap Take 1 capsule (150 mg total) by mouth every 12 (twelve) hours.    ezetimibe (ZETIA) 10 mg tablet Take 1 tablet (10 mg total) by mouth once daily.    fluticasone propionate (FLONASE) 50 mcg/actuation nasal spray 2 sprays (100 mcg total) by Each Nostril route once daily.     "gabapentin (NEURONTIN) 300 MG capsule Take 1 capsule (300 mg total) by mouth 3 (three) times daily.    hydrocortisone 2.5 % cream Apply topically 2 (two) times daily.    methylPREDNISolone (MEDROL DOSEPACK) 4 mg tablet use as directed    metoprolol tartrate (LOPRESSOR) 25 MG tablet Take 1 tablet (25 mg total) by mouth 2 (two) times daily.    multivitamin (THERAGRAN) per tablet Take 1 tablet by mouth once daily.     nitroGLYCERIN (NITROSTAT) 0.4 MG SL tablet Please dispense 25 pills in 4 bottles.    PREVIDENT 5000 DRY MOUTH 1.1 % Pste DO NOT EAT ,DRINK OR RINSE FOR 30 MINUTES AFTER BRUSHING    promethazine-dextromethorphan (PROMETHAZINE-DM) 6.25-15 mg/5 mL Syrp Take 5 mLs by mouth nightly as needed.    sotaloL (BETAPACE) 80 MG tablet Take 1 tablet (80 mg total) by mouth 2 (two) times daily.    spironolactone (ALDACTONE) 25 MG tablet Take 1 tablet (25 mg total) by mouth once daily.    tamsulosin (FLOMAX) 0.4 mg Cap Take 1 capsule (0.4 mg total) by mouth once daily.     Current Facility-Administered Medications on File Prior to Visit   Medication    varicella zoster (Shingrix) IM vaccine (>/= 49 yo)       Review of patient's allergies indicates:   Allergen Reactions    Iodinated contrast media Other (See Comments)     Raises BP         OBJECTIVE:     Vital Signs:  Vitals:    07/29/25 1421   BP: 112/70   BP Location: Right arm   Patient Position: Sitting   Pulse: (!) 53   Resp: 18   Temp: 98.2 °F (36.8 °C)   TempSrc: Oral   SpO2: 97%   Weight: 123.5 kg (272 lb 4.3 oz)   Height: 5' 8" (1.727 m)       Body mass index is 41.4 kg/m².     Physical Exam:  Physical Exam  Vitals and nursing note reviewed.   Constitutional:       General: He is not in acute distress.     Appearance: He is not ill-appearing.   HENT:      Head: Normocephalic and atraumatic.      Right Ear: Tympanic membrane, ear canal and external ear normal.      Left Ear: Tympanic membrane, ear canal and external ear normal.      Nose: Congestion present.      " "Right Sinus: Maxillary sinus tenderness present. No frontal sinus tenderness.      Left Sinus: Maxillary sinus tenderness present. No frontal sinus tenderness.      Mouth/Throat:      Mouth: Mucous membranes are moist.      Pharynx: Oropharynx is clear. Posterior oropharyngeal erythema present. No oropharyngeal exudate.   Eyes:      Extraocular Movements: Extraocular movements intact.      Conjunctiva/sclera: Conjunctivae normal.   Cardiovascular:      Rate and Rhythm: Normal rate and regular rhythm.   Pulmonary:      Effort: Pulmonary effort is normal. No respiratory distress.      Breath sounds: Normal breath sounds. No wheezing or rales.   Chest:      Chest wall: No tenderness.   Abdominal:      Palpations: Abdomen is soft.      Tenderness: There is no abdominal tenderness. There is no guarding.   Musculoskeletal:         General: Normal range of motion.      Cervical back: Normal range of motion and neck supple. Tenderness present.      Right lower leg: No edema.      Left lower leg: No edema.   Lymphadenopathy:      Cervical: Cervical adenopathy present.   Skin:     General: Skin is warm and dry.   Neurological:      Mental Status: He is alert and oriented to person, place, and time.            Laboratory  Lab Results   Component Value Date    WBC 6.43 07/08/2025    HGB 12.0 (L) 07/08/2025    HCT 40.6 07/08/2025    MCV 81 (L) 07/08/2025     07/08/2025     Lab Results   Component Value Date     (H) 07/08/2025     07/08/2025    K 4.1 07/08/2025     07/08/2025    CO2 29 07/08/2025    BUN 16 07/08/2025    CREATININE 0.9 07/08/2025    CALCIUM 9.0 07/08/2025    MG 2.3 05/08/2023     Lab Results   Component Value Date    INR 1.1 05/06/2022    INR 1.0 07/14/2017    INR 1.1 07/15/2014     Lab Results   Component Value Date    HGBA1C 6.2 (H) 05/20/2025     No results for input(s): "POCTGLUCOSE" in the last 72 hours.      Health Maintenance         Date Due Completion Date    Shingles Vaccine (1 " of 2) Never done ---    Diabetic Eye Exam 02/11/2020 2/11/2019    RSV Vaccine (Age 60+ and Pregnant patients) (1 - Risk 60-74 years 1-dose series) Never done ---    Foot Exam 02/18/2023 2/18/2022    Override on 2/18/2022: Done    COVID-19 Vaccine (3 - 2024-25 season) 09/01/2024 12/23/2021    Influenza Vaccine (1) 09/01/2025 12/15/2022    Override on 12/1/2020: Declined    Override on 1/29/2020: Declined    Hemoglobin A1c 11/20/2025 5/20/2025    Lipid Panel 04/10/2026 4/10/2025    Diabetes Urine Screening 05/20/2026 5/20/2025    High Dose Statin 07/10/2026 7/10/2025    Colorectal Cancer Screening 05/26/2027 5/26/2022    TETANUS VACCINE 05/01/2029 5/1/2019            ASSESSMENT & PLAN:   65 y.o. male who was seen today in clinic for sinusitis    Sinusitis, unspecified chronicity, unspecified location    Cough, unspecified type    Sore throat    Other orders  -     amoxicillin-clavulanate 875-125mg (AUGMENTIN) 875-125 mg per tablet; Take 1 tablet by mouth every 12 (twelve) hours. for 7 days  Dispense: 14 tablet; Refill: 0         1. Sinusitis, unspecified chronicity, unspecified location    2. Cough, unspecified type    3. Sore throat        1/2/3.  Attempted symptom management as well as steroid course for viral etiology however worsening/persistent symptoms.  Abx course sent to pharmacy.  Strict RTC/ED precautions given.  Continue OTC meds for symptom relief.  Consider ENT referral if persistent despite abx course      RTC as needed    Oscar Boone MD  [facility]    This note was generated with the assistance of ambient listening technology. Verbal consent was obtained by the patient and accompanying visitor(s) for the recording of patient appointment to facilitate this note. I attest to having reviewed and edited the generated note for accuracy, though some syntax or spelling errors may persist. Please contact the author of this note for any clarification.

## 2025-07-29 ENCOUNTER — OFFICE VISIT (OUTPATIENT)
Dept: INTERNAL MEDICINE | Facility: CLINIC | Age: 65
End: 2025-07-29
Payer: COMMERCIAL

## 2025-07-29 VITALS
SYSTOLIC BLOOD PRESSURE: 112 MMHG | RESPIRATION RATE: 18 BRPM | OXYGEN SATURATION: 97 % | WEIGHT: 272.25 LBS | HEART RATE: 53 BPM | BODY MASS INDEX: 41.26 KG/M2 | HEIGHT: 68 IN | TEMPERATURE: 98 F | DIASTOLIC BLOOD PRESSURE: 70 MMHG

## 2025-07-29 DIAGNOSIS — J02.9 SORE THROAT: ICD-10-CM

## 2025-07-29 DIAGNOSIS — R05.9 COUGH, UNSPECIFIED TYPE: ICD-10-CM

## 2025-07-29 DIAGNOSIS — J32.9 SINUSITIS, UNSPECIFIED CHRONICITY, UNSPECIFIED LOCATION: Primary | ICD-10-CM

## 2025-07-29 PROCEDURE — 99999 PR PBB SHADOW E&M-EST. PATIENT-LVL V: CPT | Mod: PBBFAC,,,

## 2025-07-29 PROCEDURE — 99214 OFFICE O/P EST MOD 30 MIN: CPT | Mod: S$GLB,,,

## 2025-07-29 RX ORDER — AMOXICILLIN AND CLAVULANATE POTASSIUM 875; 125 MG/1; MG/1
1 TABLET, FILM COATED ORAL EVERY 12 HOURS
Qty: 14 TABLET | Refills: 0 | Status: SHIPPED | OUTPATIENT
Start: 2025-07-29 | End: 2025-08-05

## 2025-08-04 NOTE — PROGRESS NOTES
Subjective:     HPI: Shiraz Jha is a 65 y.o. male who was self-referred for sinusitis.     He reports 2-3 weeks of sore throat, dysphagia, and left otalgia. He was initially seen by PCP, trial of steroids and Augmentin with no improvement in symptoms. Dysphagia with foods, liquids, and a saliva plus associated discomfort with swallowing. He experiences otalgia, primarily when swallowing. He denies voice changes, globus sensation, fever, chills, purulent nasal secretions. He denies otorrhea, change in hearing, tinnitus. He denies any previous head/neck surgery or trauma. He denies any prior ear surgeries.  He is able to auto insufflate his ears.    Per chart review, patient was evaluated on 7/25/25 by his PCP and prescribed Medrol Dosepak and promethazine cough syrup for possible viral etiology.  Symptoms not improved 4 days later, so PCP prescribed him oral Augmentin (7/29/25).    Current sinonasal medications as above.  The last course of antibiotics was recently.    He does not recall previously having allergy testing.  He relates a history of asthma as a child.  He relates a history of reflux symptoms which is not currently managed with medication.    He relates a diagnosis of obstructive sleep apnea with regular CPAP use.   He does not recall a prior history of nasal trauma.  He has not had sinonasal surgery.    He has had a tonsillectomy.  He is not a tobacco smoker.     Past Medical/Past Surgical History  Past Medical History:   Diagnosis Date    Allergy     Anemia     Asthma     Atrial fibrillation     Cataract     Chronic rhinitis 9/26/2013    DDD (degenerative disc disease) 4/3/2015    Depression     Diabetes mellitus     Fibromyalgia     Gastroesophageal reflux disease without esophagitis 7/14/2017    Hypertension     Mixed hyperlipidemia 4/10/2025    Sinusitis, acute, maxillary 12/20/2012    Thyroid disease      He has a past surgical history that includes Tonsillectomy; Cervical spine surgery; Gastric  bypass; Sinus surgery (2000); Epidural steroid injection into lumbar spine (N/A, 5/29/2018); Epidural steroid injection into lumbar spine (N/A, 7/3/2018); Excision of lesion of lip (N/A, 7/7/2020); Esophagogastroduodenoscopy (N/A, 5/9/2022); Colonoscopy (N/A, 5/10/2022); Antegrade single balloon enteroscopy (N/A, 5/26/2022); and Colonoscopy (N/A, 5/26/2022).    Family History/Social History  His family history includes Cancer in his mother; Cirrhosis in his brother; Diabetes in his brother; Heart disease in his brother and father; Hypertension in his sister.  He reports that he has never smoked. He has never used smokeless tobacco. He reports that he does not drink alcohol and does not use drugs.    Allergies:  He is allergic to iodinated contrast media.     Medications   amoxicillin-clavulanate 875-125mg  atorvastatin  bumetanide  cyanocobalamin  dabigatran etexilate Cap  ezetimibe  fluticasone propionate  gabapentin  hydrocortisone  methylPREDNISolone  metoprolol tartrate  multivitamin  nitroGLYCERIN  PREVIDENT 5000 DRY MOUTH Pste  sotaloL  spironolactone  tamsulosin Cap  varicella-zoster [id number] (PF)     Review of Systems     Constitutional: Positive for fatigue.      HENT: Positive for ear pain, mouth sores, postnasal drip, sinus pressure, sore throat, dental problems and trouble swallowing.      Eyes:  Positive for eye drainage and eye itching.     Respiratory:  Positive for cough, sleep apnea and snoring.      Cardiovascular:  Positive for foot swelling and irregular heartbeat.     Gastrointestinal:  Positive for abdominal pain, acid reflux and diarrhea.     Genitourinary: Positive for difficulty urinating.     Musc: Positive for aching muscles, back pain and neck pain.     Skin: Positive for rash.     Allergy: Positive for seasonal allergies.     Endocrine: Negative for cold intolerance and heat intolerance.      Neurological: Positive for dizziness, headaches and light-headedness.     Hematologic: Positive  for bruises/bleeds easily and swollen glands.     Psychiatric: Positive for sleep disturbance.         Objective:     BP (!) 101/59 (BP Location: Right arm, Patient Position: Sitting)   Pulse (!) 56   Wt 127.7 kg (281 lb 8.4 oz)   BMI 42.81 kg/m²        Constitutional:   He appears well-developed and well-nourished. He appears alert. He is cooperative.  Non-toxic appearance. He does not have a sickly appearance. He does not appear ill. No distress. Normal speech.      Head:  Normocephalic and atraumatic. Not macrocephalic and not microcephalic. Head is without abrasion, without contusion and without TMJ tenderness. Salivary glands normal.      Ears:    Right Ear: No drainage, swelling or tenderness. No mastoid tenderness. Tympanic membrane is not perforated, not erythematous, not retracted and not bulging. Tympanic membrane mobility is normal. No middle ear effusion.   Left Ear: No drainage, swelling or tenderness. No mastoid tenderness. Tympanic membrane is not perforated, not erythematous, not retracted and not bulging. Tympanic membrane mobility is normal.  No middle ear effusion.     Nose:  No mucosal edema, rhinorrhea or septal deviation. No epistaxis. No turbinate hypertrophy.  Right sinus exhibits no maxillary sinus tenderness and no frontal sinus tenderness. Left sinus exhibits no maxillary sinus tenderness and no frontal sinus tenderness.     Mouth/Throat  Oropharynx clear and moist without lesions or asymmetry, normal uvula midline and lips, teeth, and gums normal. No oral lesions. No oropharyngeal exudate, posterior oropharyngeal edema or posterior oropharyngeal erythema. Tonsils present, +2.  Tonsillar erythema, tonsillar exudate.      Neck:  No adenopathy.     Psychiatric:   He has a normal mood and affect. His speech is normal and behavior is normal.     Neurological:   He is alert.        Procedure  Flexible laryngoscopy performed.  See procedure note.                                Data  "Reviewed  I personally reviewed the chart, including any outside records, and pertinent data below:    I reviewed the following notes: Internal Medicine     WBC (K/uL)   Date Value   07/08/2025 6.43     Lymph # (K/uL)   Date Value   07/08/2025 2.06   11/18/2023 1.0     Lymph % (%)   Date Value   07/08/2025 32.0   11/18/2023 8.5 (L)     Eosinophil % (%)   Date Value   11/18/2023 0.1     No results found for: "IGE"  Eos # (K/uL)   Date Value   07/08/2025 0.12   11/18/2023 0.0     Eos % (%)   Date Value   07/08/2025 1.9       Platelet Count (K/uL)   Date Value   07/08/2025 321     Platelets (K/uL)   Date Value   11/18/2023 289     Glucose (mg/dL)   Date Value   07/08/2025 120 (H)   02/12/2025 121 (H)       No sinus imaging available.    Assessment & Plan:     1. Dysphagia, unspecified type  - Possibly due to xerostomia  - Laryngoscopy performed in clinic.   - Ordered MBSS for further evaluation of dysphagia.    2. Drug-induced xerostomia  - Patient counseled on dry mouth (xerostomia), likely multifactorial (medication side effects, hydration status, mouth breathing).   - Advised supportive measures including frequent water intake, use of a cool mist humidifier at night, and avoidance of alcohol, caffeine, and tobacco products.   - Recommend OTC saliva substitutes and stimulants such as Biotene products (oral rinse, moisturizing gel), xylitol-containing lozenges/gum  (e.g., XyliMelts, ACT lozenges), and alcohol-free mouthwashes.   - Reviewed importance of good oral hygiene and regular dental care to prevent cavities and oral infections.   - Advised to monitor for any signs of oral discomfort, candidiasis, or progressive symptoms.     3. Acute otalgia, left  - Ear exam WNL. No drainage, fluid, or signs of infection.  - Tenderness of the left jaw on exam.  - Ordered MBSS for further evaluation of dysphagia and referred ear pain with swallowing.    He will follow up in 6-8 weeks  I had a discussion with the patient " regarding his condition and the further workup and management options.    All questions were answered, and the patient is in agreement with the above.     Disclaimer:  This note may have been prepared utilizing voice recognition software which may result in occasional typographical errors in the text such as sound alike words.   If further clarification is needed, please contact the ENT department of [facility].    Abbreviations:  AR= allergic rhinitis IT= inferior turbinate   CRS=chronic rhinosinusitis MT= middle turbinate   CRSwNP= CRS with nasal polyposis ET= eustachian tube   AFRS= allergic fungal rhinosinusitis PC= posterior choana   AERD= ASA- exacerbated respiratory dz PND= postnasal drip   NV= nasal valve Abx= antibiotics   DNS= deviated nasal septum NOS= nasal obstruction   CB= gibson bullosa BITR= bilateral inferior turbinate reduction   FFL= Flexible laryngoscopy SMR= submucosal resection (of septum)

## 2025-08-05 ENCOUNTER — OFFICE VISIT (OUTPATIENT)
Dept: OTOLARYNGOLOGY | Facility: CLINIC | Age: 65
End: 2025-08-05

## 2025-08-05 VITALS
BODY MASS INDEX: 42.81 KG/M2 | HEART RATE: 56 BPM | DIASTOLIC BLOOD PRESSURE: 59 MMHG | SYSTOLIC BLOOD PRESSURE: 101 MMHG | WEIGHT: 281.5 LBS

## 2025-08-05 DIAGNOSIS — H92.02 ACUTE OTALGIA, LEFT: ICD-10-CM

## 2025-08-05 DIAGNOSIS — K11.7 DRUG-INDUCED XEROSTOMIA: ICD-10-CM

## 2025-08-05 DIAGNOSIS — T50.905A DRUG-INDUCED XEROSTOMIA: ICD-10-CM

## 2025-08-05 DIAGNOSIS — R13.10 DYSPHAGIA, UNSPECIFIED TYPE: Primary | ICD-10-CM

## 2025-08-05 PROCEDURE — 31575 DIAGNOSTIC LARYNGOSCOPY: CPT | Mod: PBBFAC | Performed by: PHYSICIAN ASSISTANT

## 2025-08-05 PROCEDURE — 99215 OFFICE O/P EST HI 40 MIN: CPT | Mod: PBBFAC,25 | Performed by: PHYSICIAN ASSISTANT

## 2025-08-05 PROCEDURE — 99999 PR PBB SHADOW E&M-EST. PATIENT-LVL V: CPT | Mod: PBBFAC,,, | Performed by: PHYSICIAN ASSISTANT

## 2025-08-05 PROCEDURE — 31575 DIAGNOSTIC LARYNGOSCOPY: CPT | Mod: S$PBB,,, | Performed by: PHYSICIAN ASSISTANT

## 2025-08-05 PROCEDURE — 99204 OFFICE O/P NEW MOD 45 MIN: CPT | Mod: 25,S$PBB,, | Performed by: PHYSICIAN ASSISTANT

## 2025-08-05 NOTE — PATIENT INSTRUCTIONS
DRY THROAT/MOUTH Remedies:  - Over the counter Lozenges that help with moisture:  [place] [employer] - sold with cough drops  Xylimelts - on toothpaste aisle at [facility] with dry mouth products or online, these are convenient for when you are talking and or sleeping - they stick to gumline and provide moisture so you won't be so dry overnight. You can even use them during the day when you are singing. They stay in place when you are sleeping, to reduce risk of choking on them  - Biotene mouth wash during the day for dry mouth  - Increase your water intake to 64-80 oz daily to help thin mucus  - Can consider humidifier while you sleep

## 2025-08-05 NOTE — PROCEDURES
Laryngoscopy    Date/Time: 8/5/2025 2:30 PM    Performed by: Oscar Her PA-C  Authorized by: Oscar Her PA-C    Anesthesia:     Local anesthetic:  Phenylephrine spray    Patient tolerance:  Patient tolerated the procedure well with no immediate complications  Laryngoscopy:     Areas examined:  Nasal cavities, nasopharynx, oropharynx, hypopharynx, larynx and vocal cords    Laryngoscope size:  4 mm  Nose External:      No external nasal deformity  Nose Intranasal:      Mucosa no polyps     Mucosa ulcers not present     No mucosa lesions present     No septum gross deformity     Enlarged turbinates  Nasopharynx:      No mucosa lesions     Adenoids not present (adenoidectomy scar)     Posterior choanae patent     Eustachian tube patent  Larynx/hypopharynx:      No epiglottis lesions     No epiglottis edema     No AE folds lesions     No vocal cord polyps     Equal and normal bilateral     No hypopharynx lesions     No piriform sinus pooling     No piriform sinus lesions     No post cricoid edema     No post cricoid erythema     Aerated secretions throughout madiha/hypopharynx

## 2025-08-18 ENCOUNTER — TELEPHONE (OUTPATIENT)
Dept: SPEECH THERAPY | Facility: HOSPITAL | Age: 65
End: 2025-08-18

## 2025-08-25 ENCOUNTER — TELEPHONE (OUTPATIENT)
Dept: HEMATOLOGY/ONCOLOGY | Facility: CLINIC | Age: 65
End: 2025-08-25

## 2025-08-25 RX ORDER — EPINEPHRINE 0.3 MG/.3ML
0.3 INJECTION SUBCUTANEOUS ONCE AS NEEDED
OUTPATIENT
Start: 2025-08-25 | End: 2037-01-21

## 2025-08-25 RX ORDER — SODIUM CHLORIDE 0.9 % (FLUSH) 0.9 %
10 SYRINGE (ML) INJECTION
OUTPATIENT
Start: 2025-08-25

## 2025-08-25 RX ORDER — HEPARIN 100 UNIT/ML
500 SYRINGE INTRAVENOUS
OUTPATIENT
Start: 2025-08-25

## 2025-08-26 ENCOUNTER — TELEPHONE (OUTPATIENT)
Dept: HEMATOLOGY/ONCOLOGY | Facility: CLINIC | Age: 65
End: 2025-08-26

## 2025-09-02 ENCOUNTER — HOSPITAL ENCOUNTER (OUTPATIENT)
Dept: RADIOLOGY | Facility: HOSPITAL | Age: 65
Discharge: HOME | End: 2025-09-02
Attending: PHYSICIAN ASSISTANT

## 2025-09-02 DIAGNOSIS — R13.10 DYSPHAGIA, UNSPECIFIED TYPE: ICD-10-CM

## 2025-09-02 PROCEDURE — 25500020 PHARM REV CODE 255: Performed by: PHYSICIAN ASSISTANT

## 2025-09-02 PROCEDURE — A9698 NON-RAD CONTRAST MATERIALNOC: HCPCS | Performed by: PHYSICIAN ASSISTANT

## 2025-09-02 PROCEDURE — 74230 X-RAY XM SWLNG FUNCJ C+: CPT | Mod: TC,FY

## 2025-09-02 RX ADMIN — BARIUM SULFATE 100 ML: 0.81 POWDER, FOR SUSPENSION ORAL at 02:09

## 2025-09-03 ENCOUNTER — TELEPHONE (OUTPATIENT)
Dept: INFUSION THERAPY | Facility: HOSPITAL | Age: 65
End: 2025-09-03

## 2025-09-04 ENCOUNTER — TELEPHONE (OUTPATIENT)
Dept: INFUSION THERAPY | Facility: HOSPITAL | Age: 65
End: 2025-09-04

## (undated) DEVICE — ELECTRODE REM PLYHSV RETURN 9

## (undated) DEVICE — SUT 4-0 CHROMIC GUT / RB1

## (undated) DEVICE — GOWN SURGICAL X-LARGE

## (undated) DEVICE — COVER LIGHT HANDLE 80/CA

## (undated) DEVICE — GAUZE SPONGE PEANUT STRL

## (undated) DEVICE — SEE MEDLINE ITEM 157128

## (undated) DEVICE — STAPLER SKIN ROTATING HEAD

## (undated) DEVICE — ELECTRODE BLADE INSULATED 1 IN

## (undated) DEVICE — SEE MEDLINE ITEM 154981

## (undated) DEVICE — CORD BIPOLAR 12 FOOT

## (undated) DEVICE — SHEET EENT SPLIT

## (undated) DEVICE — NDL HYPO REG 25G X 1 1/2

## (undated) DEVICE — TRAY MINOR GEN SURG

## (undated) DEVICE — SKINMARKER & RULER REGULAR X-F

## (undated) DEVICE — BLADE SURG #15 CARBON STEEL

## (undated) DEVICE — BANDAGE ADHESIVE